# Patient Record
Sex: MALE | Race: BLACK OR AFRICAN AMERICAN | Employment: OTHER | URBAN - METROPOLITAN AREA
[De-identification: names, ages, dates, MRNs, and addresses within clinical notes are randomized per-mention and may not be internally consistent; named-entity substitution may affect disease eponyms.]

---

## 2017-01-01 ENCOUNTER — NURSING HOME VISIT (OUTPATIENT)
Dept: GERIATRICS | Facility: CLINIC | Age: 72
End: 2017-01-01
Payer: COMMERCIAL

## 2017-01-01 ENCOUNTER — TRANSFERRED RECORDS (OUTPATIENT)
Dept: HEALTH INFORMATION MANAGEMENT | Facility: CLINIC | Age: 72
End: 2017-01-01

## 2017-01-01 ENCOUNTER — HOSPITAL ENCOUNTER (EMERGENCY)
Facility: CLINIC | Age: 72
Discharge: HOME | End: 2017-10-11
Attending: EMERGENCY MEDICINE
Payer: COMMERCIAL

## 2017-01-01 ENCOUNTER — APPOINTMENT (OUTPATIENT)
Dept: GENERAL RADIOLOGY | Facility: CLINIC | Age: 72
End: 2017-01-01
Attending: EMERGENCY MEDICINE
Payer: COMMERCIAL

## 2017-01-01 ENCOUNTER — APPOINTMENT (OUTPATIENT)
Dept: PHYSICAL THERAPY | Facility: CLINIC | Age: 72
DRG: 872 | End: 2017-01-01
Payer: COMMERCIAL

## 2017-01-01 ENCOUNTER — HOSPITAL ENCOUNTER (OUTPATIENT)
Facility: CLINIC | Age: 72
Setting detail: SPECIMEN
Discharge: HOME | End: 2017-08-16
Attending: PATHOLOGY | Admitting: INTERNAL MEDICINE
Payer: COMMERCIAL

## 2017-01-01 ENCOUNTER — INFUSION THERAPY VISIT (OUTPATIENT)
Dept: INFUSION THERAPY | Facility: CLINIC | Age: 72
End: 2017-01-01
Attending: INTERNAL MEDICINE
Payer: COMMERCIAL

## 2017-01-01 ENCOUNTER — DOCUMENTATION ONLY (OUTPATIENT)
Dept: PHARMACY | Facility: CLINIC | Age: 72
End: 2017-01-01

## 2017-01-01 ENCOUNTER — ONCOLOGY VISIT (OUTPATIENT)
Dept: ONCOLOGY | Facility: CLINIC | Age: 72
End: 2017-01-01
Attending: INTERNAL MEDICINE
Payer: COMMERCIAL

## 2017-01-01 ENCOUNTER — TELEPHONE (OUTPATIENT)
Dept: ONCOLOGY | Facility: CLINIC | Age: 72
End: 2017-01-01

## 2017-01-01 ENCOUNTER — APPOINTMENT (OUTPATIENT)
Dept: LAB | Facility: CLINIC | Age: 72
End: 2017-01-01
Attending: INTERNAL MEDICINE
Payer: COMMERCIAL

## 2017-01-01 ENCOUNTER — TELEPHONE (OUTPATIENT)
Dept: PHARMACY | Facility: CLINIC | Age: 72
End: 2017-01-01

## 2017-01-01 ENCOUNTER — CLINICAL UPDATE (OUTPATIENT)
Dept: PHARMACY | Facility: CLINIC | Age: 72
End: 2017-01-01

## 2017-01-01 ENCOUNTER — HOSPITAL ENCOUNTER (EMERGENCY)
Facility: CLINIC | Age: 72
Discharge: HOME | End: 2017-09-26
Attending: EMERGENCY MEDICINE | Admitting: EMERGENCY MEDICINE
Payer: COMMERCIAL

## 2017-01-01 ENCOUNTER — HOSPITAL ENCOUNTER (EMERGENCY)
Facility: CLINIC | Age: 72
Discharge: HOME | End: 2017-07-19
Attending: EMERGENCY MEDICINE | Admitting: EMERGENCY MEDICINE
Payer: COMMERCIAL

## 2017-01-01 ENCOUNTER — HOSPITAL ENCOUNTER (OUTPATIENT)
Facility: CLINIC | Age: 72
Setting detail: SPECIMEN
Discharge: HOME | End: 2017-06-14
Attending: INTERNAL MEDICINE | Admitting: INTERNAL MEDICINE
Payer: COMMERCIAL

## 2017-01-01 ENCOUNTER — HOSPITAL ENCOUNTER (OUTPATIENT)
Facility: CLINIC | Age: 72
Setting detail: SPECIMEN
Discharge: HOME | End: 2017-12-01
Attending: INTERNAL MEDICINE | Admitting: INTERNAL MEDICINE
Payer: COMMERCIAL

## 2017-01-01 ENCOUNTER — HOSPITAL ENCOUNTER (OUTPATIENT)
Facility: CLINIC | Age: 72
Setting detail: SPECIMEN
Discharge: HOME | End: 2017-03-22
Attending: UROLOGY | Admitting: INTERNAL MEDICINE
Payer: COMMERCIAL

## 2017-01-01 ENCOUNTER — HOSPITAL ENCOUNTER (OUTPATIENT)
Facility: CLINIC | Age: 72
Setting detail: SPECIMEN
Discharge: HOME | End: 2017-08-14
Attending: INTERNAL MEDICINE | Admitting: INTERNAL MEDICINE
Payer: COMMERCIAL

## 2017-01-01 ENCOUNTER — TELEPHONE (OUTPATIENT)
Dept: GERIATRICS | Facility: CLINIC | Age: 72
End: 2017-01-01

## 2017-01-01 ENCOUNTER — CARE COORDINATION (OUTPATIENT)
Dept: ONCOLOGY | Facility: CLINIC | Age: 72
End: 2017-01-01

## 2017-01-01 ENCOUNTER — APPOINTMENT (OUTPATIENT)
Dept: GENERAL RADIOLOGY | Facility: CLINIC | Age: 72
DRG: 872 | End: 2017-01-01
Attending: EMERGENCY MEDICINE
Payer: COMMERCIAL

## 2017-01-01 ENCOUNTER — HOSPITAL ENCOUNTER (OUTPATIENT)
Facility: CLINIC | Age: 72
Setting detail: SPECIMEN
Discharge: HOME | End: 2017-12-29
Payer: COMMERCIAL

## 2017-01-01 ENCOUNTER — NURSING HOME VISIT (OUTPATIENT)
Dept: GERIATRICS | Facility: CLINIC | Age: 72
End: 2017-01-01

## 2017-01-01 ENCOUNTER — PRE VISIT (OUTPATIENT)
Dept: PULMONOLOGY | Facility: CLINIC | Age: 72
End: 2017-01-01

## 2017-01-01 ENCOUNTER — HOSPITAL ENCOUNTER (OUTPATIENT)
Dept: NUCLEAR MEDICINE | Facility: CLINIC | Age: 72
Setting detail: NUCLEAR MEDICINE
Discharge: HOME | End: 2017-03-29
Attending: INTERNAL MEDICINE | Admitting: INTERNAL MEDICINE
Payer: COMMERCIAL

## 2017-01-01 ENCOUNTER — HOSPITAL ENCOUNTER (EMERGENCY)
Facility: CLINIC | Age: 72
Discharge: HOME | End: 2017-12-18
Attending: EMERGENCY MEDICINE | Admitting: EMERGENCY MEDICINE
Payer: COMMERCIAL

## 2017-01-01 ENCOUNTER — HOSPITAL ENCOUNTER (INPATIENT)
Facility: CLINIC | Age: 72
LOS: 5 days | Discharge: NURSING, REHAB OR LONG-TERM CARE | DRG: 872 | End: 2017-06-06
Attending: EMERGENCY MEDICINE | Admitting: INTERNAL MEDICINE
Payer: COMMERCIAL

## 2017-01-01 ENCOUNTER — INFUSION THERAPY VISIT (OUTPATIENT)
Dept: INFUSION THERAPY | Facility: CLINIC | Age: 72
End: 2017-01-01
Attending: UROLOGY
Payer: COMMERCIAL

## 2017-01-01 ENCOUNTER — HOSPITAL ENCOUNTER (OUTPATIENT)
Facility: CLINIC | Age: 72
Setting detail: SPECIMEN
Discharge: HOME | End: 2017-04-03
Attending: INTERNAL MEDICINE | Admitting: INTERNAL MEDICINE
Payer: COMMERCIAL

## 2017-01-01 ENCOUNTER — HOSPITAL ENCOUNTER (OUTPATIENT)
Dept: NUCLEAR MEDICINE | Facility: CLINIC | Age: 72
Setting detail: NUCLEAR MEDICINE
Discharge: HOME | End: 2017-04-26
Attending: INTERNAL MEDICINE | Admitting: INTERNAL MEDICINE
Payer: COMMERCIAL

## 2017-01-01 ENCOUNTER — HOSPITAL ENCOUNTER (EMERGENCY)
Facility: CLINIC | Age: 72
Discharge: HOME | End: 2017-10-12
Attending: EMERGENCY MEDICINE | Admitting: EMERGENCY MEDICINE
Payer: COMMERCIAL

## 2017-01-01 ENCOUNTER — HOSPITAL ENCOUNTER (OUTPATIENT)
Facility: CLINIC | Age: 72
Setting detail: OBSERVATION
Discharge: HOME | End: 2017-12-14
Attending: EMERGENCY MEDICINE | Admitting: EMERGENCY MEDICINE
Payer: COMMERCIAL

## 2017-01-01 ENCOUNTER — HOSPITAL ENCOUNTER (OUTPATIENT)
Facility: CLINIC | Age: 72
Setting detail: SPECIMEN
Discharge: HOME | End: 2017-05-17
Attending: INTERNAL MEDICINE | Admitting: INTERNAL MEDICINE
Payer: COMMERCIAL

## 2017-01-01 ENCOUNTER — HOSPITAL ENCOUNTER (OUTPATIENT)
Dept: NUCLEAR MEDICINE | Facility: CLINIC | Age: 72
Setting detail: NUCLEAR MEDICINE
Discharge: HOME | End: 2017-05-24
Attending: INTERNAL MEDICINE | Admitting: INTERNAL MEDICINE
Payer: COMMERCIAL

## 2017-01-01 ENCOUNTER — MEDICAL CORRESPONDENCE (OUTPATIENT)
Dept: HEALTH INFORMATION MANAGEMENT | Facility: CLINIC | Age: 72
End: 2017-01-01

## 2017-01-01 ENCOUNTER — ONCOLOGY VISIT (OUTPATIENT)
Dept: ONCOLOGY | Facility: CLINIC | Age: 72
End: 2017-01-01
Attending: PHYSICIAN ASSISTANT
Payer: COMMERCIAL

## 2017-01-01 ENCOUNTER — HOSPITAL ENCOUNTER (OUTPATIENT)
Facility: CLINIC | Age: 72
Setting detail: SPECIMEN
Discharge: HOME | End: 2017-12-04
Attending: INTERNAL MEDICINE | Admitting: INTERNAL MEDICINE
Payer: COMMERCIAL

## 2017-01-01 ENCOUNTER — HOSPITAL ENCOUNTER (EMERGENCY)
Facility: CLINIC | Age: 72
Discharge: OTHER INSTITUTION | End: 2017-06-21
Attending: EMERGENCY MEDICINE | Admitting: EMERGENCY MEDICINE
Payer: COMMERCIAL

## 2017-01-01 ENCOUNTER — DOCUMENTATION ONLY (OUTPATIENT)
Dept: GERIATRICS | Facility: CLINIC | Age: 72
End: 2017-01-01

## 2017-01-01 ENCOUNTER — ALLIED HEALTH/NURSE VISIT (OUTPATIENT)
Dept: ONCOLOGY | Facility: CLINIC | Age: 72
End: 2017-01-01

## 2017-01-01 ENCOUNTER — HOSPITAL ENCOUNTER (OUTPATIENT)
Dept: LAB | Facility: CLINIC | Age: 72
End: 2017-06-12
Attending: PHYSICIAN ASSISTANT
Payer: COMMERCIAL

## 2017-01-01 ENCOUNTER — OFFICE VISIT (OUTPATIENT)
Dept: PALLIATIVE CARE | Facility: CLINIC | Age: 72
End: 2017-01-01
Attending: INTERNAL MEDICINE
Payer: COMMERCIAL

## 2017-01-01 ENCOUNTER — PRE VISIT (OUTPATIENT)
Dept: UROLOGY | Facility: CLINIC | Age: 72
End: 2017-01-01

## 2017-01-01 ENCOUNTER — ONCOLOGY VISIT (OUTPATIENT)
Dept: ONCOLOGY | Facility: CLINIC | Age: 72
End: 2017-01-01
Attending: UROLOGY
Payer: COMMERCIAL

## 2017-01-01 ENCOUNTER — OFFICE VISIT (OUTPATIENT)
Dept: PULMONOLOGY | Facility: CLINIC | Age: 72
End: 2017-01-01
Attending: INTERNAL MEDICINE
Payer: COMMERCIAL

## 2017-01-01 ENCOUNTER — INFUSION THERAPY VISIT (OUTPATIENT)
Dept: INFUSION THERAPY | Facility: CLINIC | Age: 72
End: 2017-01-01
Attending: FAMILY MEDICINE
Payer: COMMERCIAL

## 2017-01-01 ENCOUNTER — CARE COORDINATION (OUTPATIENT)
Dept: CARE COORDINATION | Facility: CLINIC | Age: 72
End: 2017-01-01

## 2017-01-01 ENCOUNTER — CARE COORDINATION (OUTPATIENT)
Dept: GERIATRIC MEDICINE | Facility: CLINIC | Age: 72
End: 2017-01-01

## 2017-01-01 ENCOUNTER — HOSPITAL ENCOUNTER (OUTPATIENT)
Facility: CLINIC | Age: 72
Setting detail: SPECIMEN
End: 2017-01-01
Attending: INTERNAL MEDICINE
Payer: COMMERCIAL

## 2017-01-01 ENCOUNTER — DOCUMENTATION ONLY (OUTPATIENT)
Dept: INFUSION THERAPY | Facility: CLINIC | Age: 72
End: 2017-01-01

## 2017-01-01 ENCOUNTER — APPOINTMENT (OUTPATIENT)
Dept: OCCUPATIONAL THERAPY | Facility: CLINIC | Age: 72
DRG: 872 | End: 2017-01-01
Attending: INTERNAL MEDICINE
Payer: COMMERCIAL

## 2017-01-01 ENCOUNTER — HOSPITAL ENCOUNTER (OUTPATIENT)
Facility: CLINIC | Age: 72
Setting detail: SPECIMEN
Discharge: HOME | End: 2017-04-19
Attending: INTERNAL MEDICINE | Admitting: INTERNAL MEDICINE
Payer: COMMERCIAL

## 2017-01-01 ENCOUNTER — HOSPITAL ENCOUNTER (OUTPATIENT)
Facility: CLINIC | Age: 72
Setting detail: SPECIMEN
Discharge: HOME | End: 2017-08-29
Attending: INTERNAL MEDICINE | Admitting: INTERNAL MEDICINE
Payer: COMMERCIAL

## 2017-01-01 ENCOUNTER — APPOINTMENT (OUTPATIENT)
Dept: OCCUPATIONAL THERAPY | Facility: CLINIC | Age: 72
DRG: 872 | End: 2017-01-01
Payer: COMMERCIAL

## 2017-01-01 ENCOUNTER — HOSPITAL ENCOUNTER (OUTPATIENT)
Facility: CLINIC | Age: 72
Setting detail: SPECIMEN
Discharge: HOME | End: 2017-07-19
Attending: INTERNAL MEDICINE | Admitting: INTERNAL MEDICINE
Payer: COMMERCIAL

## 2017-01-01 ENCOUNTER — HOSPITAL ENCOUNTER (OUTPATIENT)
Facility: CLINIC | Age: 72
Setting detail: SPECIMEN
Discharge: HOME | End: 2017-10-09
Attending: INTERNAL MEDICINE | Admitting: INTERNAL MEDICINE
Payer: COMMERCIAL

## 2017-01-01 VITALS
HEIGHT: 71 IN | HEART RATE: 92 BPM | RESPIRATION RATE: 16 BRPM | TEMPERATURE: 99.7 F | SYSTOLIC BLOOD PRESSURE: 120 MMHG | OXYGEN SATURATION: 98 % | WEIGHT: 230 LBS | DIASTOLIC BLOOD PRESSURE: 51 MMHG | BODY MASS INDEX: 32.2 KG/M2

## 2017-01-01 VITALS
DIASTOLIC BLOOD PRESSURE: 71 MMHG | OXYGEN SATURATION: 94 % | HEART RATE: 78 BPM | BODY MASS INDEX: 32.64 KG/M2 | WEIGHT: 234 LBS | TEMPERATURE: 99.5 F | RESPIRATION RATE: 20 BRPM | SYSTOLIC BLOOD PRESSURE: 125 MMHG

## 2017-01-01 VITALS
HEART RATE: 109 BPM | SYSTOLIC BLOOD PRESSURE: 143 MMHG | DIASTOLIC BLOOD PRESSURE: 78 MMHG | RESPIRATION RATE: 20 BRPM | TEMPERATURE: 98 F

## 2017-01-01 VITALS
TEMPERATURE: 97.5 F | OXYGEN SATURATION: 94 % | DIASTOLIC BLOOD PRESSURE: 61 MMHG | RESPIRATION RATE: 18 BRPM | HEART RATE: 89 BPM | SYSTOLIC BLOOD PRESSURE: 105 MMHG

## 2017-01-01 VITALS
TEMPERATURE: 97 F | BODY MASS INDEX: 34.38 KG/M2 | HEART RATE: 88 BPM | DIASTOLIC BLOOD PRESSURE: 66 MMHG | RESPIRATION RATE: 16 BRPM | OXYGEN SATURATION: 97 % | WEIGHT: 245.6 LBS | HEIGHT: 71 IN | SYSTOLIC BLOOD PRESSURE: 145 MMHG

## 2017-01-01 VITALS
BODY MASS INDEX: 33.47 KG/M2 | TEMPERATURE: 97.8 F | WEIGHT: 240 LBS | SYSTOLIC BLOOD PRESSURE: 136 MMHG | OXYGEN SATURATION: 95 % | RESPIRATION RATE: 20 BRPM | HEART RATE: 72 BPM | DIASTOLIC BLOOD PRESSURE: 74 MMHG

## 2017-01-01 VITALS
DIASTOLIC BLOOD PRESSURE: 63 MMHG | RESPIRATION RATE: 16 BRPM | HEART RATE: 105 BPM | TEMPERATURE: 99.1 F | SYSTOLIC BLOOD PRESSURE: 108 MMHG | WEIGHT: 247.5 LBS | SYSTOLIC BLOOD PRESSURE: 106 MMHG | HEART RATE: 52 BPM | BODY MASS INDEX: 34.65 KG/M2 | OXYGEN SATURATION: 97 % | HEIGHT: 71 IN | DIASTOLIC BLOOD PRESSURE: 73 MMHG | RESPIRATION RATE: 16 BRPM | TEMPERATURE: 98.4 F

## 2017-01-01 VITALS
TEMPERATURE: 97 F | BODY MASS INDEX: 33.6 KG/M2 | HEIGHT: 71 IN | OXYGEN SATURATION: 94 % | RESPIRATION RATE: 20 BRPM | WEIGHT: 240 LBS | HEART RATE: 80 BPM | DIASTOLIC BLOOD PRESSURE: 78 MMHG | SYSTOLIC BLOOD PRESSURE: 145 MMHG

## 2017-01-01 VITALS
DIASTOLIC BLOOD PRESSURE: 71 MMHG | HEART RATE: 107 BPM | RESPIRATION RATE: 20 BRPM | SYSTOLIC BLOOD PRESSURE: 118 MMHG | OXYGEN SATURATION: 98 % | TEMPERATURE: 98.9 F

## 2017-01-01 VITALS
HEART RATE: 101 BPM | DIASTOLIC BLOOD PRESSURE: 60 MMHG | WEIGHT: 239 LBS | RESPIRATION RATE: 20 BRPM | BODY MASS INDEX: 35.29 KG/M2 | TEMPERATURE: 98.3 F | SYSTOLIC BLOOD PRESSURE: 117 MMHG | OXYGEN SATURATION: 94 %

## 2017-01-01 VITALS
SYSTOLIC BLOOD PRESSURE: 111 MMHG | HEART RATE: 99 BPM | TEMPERATURE: 97.2 F | WEIGHT: 208.3 LBS | BODY MASS INDEX: 30.76 KG/M2 | DIASTOLIC BLOOD PRESSURE: 60 MMHG | OXYGEN SATURATION: 95 % | RESPIRATION RATE: 18 BRPM

## 2017-01-01 VITALS
TEMPERATURE: 98.6 F | HEART RATE: 97 BPM | SYSTOLIC BLOOD PRESSURE: 120 MMHG | OXYGEN SATURATION: 99 % | DIASTOLIC BLOOD PRESSURE: 71 MMHG | RESPIRATION RATE: 18 BRPM

## 2017-01-01 VITALS
TEMPERATURE: 97 F | SYSTOLIC BLOOD PRESSURE: 120 MMHG | OXYGEN SATURATION: 94 % | HEART RATE: 86 BPM | RESPIRATION RATE: 20 BRPM | BODY MASS INDEX: 29.34 KG/M2 | DIASTOLIC BLOOD PRESSURE: 80 MMHG | WEIGHT: 210.4 LBS

## 2017-01-01 VITALS
SYSTOLIC BLOOD PRESSURE: 135 MMHG | HEART RATE: 82 BPM | OXYGEN SATURATION: 97 % | BODY MASS INDEX: 30.32 KG/M2 | WEIGHT: 205.3 LBS | TEMPERATURE: 97.4 F | RESPIRATION RATE: 18 BRPM | DIASTOLIC BLOOD PRESSURE: 78 MMHG

## 2017-01-01 VITALS
WEIGHT: 240 LBS | OXYGEN SATURATION: 96 % | HEART RATE: 80 BPM | SYSTOLIC BLOOD PRESSURE: 122 MMHG | TEMPERATURE: 97.2 F | DIASTOLIC BLOOD PRESSURE: 62 MMHG | BODY MASS INDEX: 33.47 KG/M2 | RESPIRATION RATE: 20 BRPM

## 2017-01-01 VITALS
WEIGHT: 223.9 LBS | RESPIRATION RATE: 18 BRPM | BODY MASS INDEX: 31.23 KG/M2 | TEMPERATURE: 98.7 F | OXYGEN SATURATION: 96 % | SYSTOLIC BLOOD PRESSURE: 107 MMHG | HEART RATE: 94 BPM | DIASTOLIC BLOOD PRESSURE: 58 MMHG

## 2017-01-01 VITALS
RESPIRATION RATE: 16 BRPM | WEIGHT: 205.25 LBS | TEMPERATURE: 97.4 F | HEART RATE: 108 BPM | SYSTOLIC BLOOD PRESSURE: 139 MMHG | BODY MASS INDEX: 28.73 KG/M2 | OXYGEN SATURATION: 98 % | HEIGHT: 71 IN | DIASTOLIC BLOOD PRESSURE: 59 MMHG

## 2017-01-01 VITALS
HEART RATE: 97 BPM | RESPIRATION RATE: 18 BRPM | OXYGEN SATURATION: 97 % | DIASTOLIC BLOOD PRESSURE: 48 MMHG | TEMPERATURE: 97.7 F | SYSTOLIC BLOOD PRESSURE: 105 MMHG

## 2017-01-01 VITALS
RESPIRATION RATE: 18 BRPM | SYSTOLIC BLOOD PRESSURE: 129 MMHG | OXYGEN SATURATION: 91 % | DIASTOLIC BLOOD PRESSURE: 62 MMHG | HEART RATE: 102 BPM | TEMPERATURE: 98.4 F

## 2017-01-01 VITALS
RESPIRATION RATE: 18 BRPM | SYSTOLIC BLOOD PRESSURE: 123 MMHG | TEMPERATURE: 98.3 F | HEIGHT: 71 IN | WEIGHT: 237 LBS | BODY MASS INDEX: 33.18 KG/M2 | OXYGEN SATURATION: 93 % | HEART RATE: 114 BPM | DIASTOLIC BLOOD PRESSURE: 68 MMHG

## 2017-01-01 VITALS
TEMPERATURE: 98.9 F | DIASTOLIC BLOOD PRESSURE: 55 MMHG | WEIGHT: 204 LBS | SYSTOLIC BLOOD PRESSURE: 116 MMHG | OXYGEN SATURATION: 98 % | RESPIRATION RATE: 18 BRPM | HEART RATE: 100 BPM | BODY MASS INDEX: 28.45 KG/M2

## 2017-01-01 VITALS
HEART RATE: 96 BPM | TEMPERATURE: 94 F | SYSTOLIC BLOOD PRESSURE: 108 MMHG | HEIGHT: 69 IN | WEIGHT: 239.9 LBS | DIASTOLIC BLOOD PRESSURE: 68 MMHG | OXYGEN SATURATION: 94 % | RESPIRATION RATE: 18 BRPM | BODY MASS INDEX: 35.53 KG/M2

## 2017-01-01 VITALS
SYSTOLIC BLOOD PRESSURE: 138 MMHG | HEART RATE: 83 BPM | TEMPERATURE: 97.6 F | WEIGHT: 209 LBS | RESPIRATION RATE: 18 BRPM | DIASTOLIC BLOOD PRESSURE: 72 MMHG | BODY MASS INDEX: 29.15 KG/M2 | OXYGEN SATURATION: 96 %

## 2017-01-01 VITALS
HEIGHT: 69 IN | WEIGHT: 202 LBS | BODY MASS INDEX: 29.92 KG/M2 | SYSTOLIC BLOOD PRESSURE: 120 MMHG | TEMPERATURE: 99.3 F | HEART RATE: 112 BPM | RESPIRATION RATE: 20 BRPM | DIASTOLIC BLOOD PRESSURE: 69 MMHG | OXYGEN SATURATION: 98 %

## 2017-01-01 VITALS
OXYGEN SATURATION: 94 % | WEIGHT: 221.3 LBS | DIASTOLIC BLOOD PRESSURE: 64 MMHG | RESPIRATION RATE: 18 BRPM | SYSTOLIC BLOOD PRESSURE: 124 MMHG | HEART RATE: 90 BPM | TEMPERATURE: 98 F | BODY MASS INDEX: 30.87 KG/M2

## 2017-01-01 VITALS
SYSTOLIC BLOOD PRESSURE: 120 MMHG | OXYGEN SATURATION: 95 % | TEMPERATURE: 99 F | BODY MASS INDEX: 29.07 KG/M2 | WEIGHT: 208.4 LBS | DIASTOLIC BLOOD PRESSURE: 64 MMHG | RESPIRATION RATE: 20 BRPM | HEART RATE: 80 BPM

## 2017-01-01 VITALS
WEIGHT: 220 LBS | HEART RATE: 70 BPM | SYSTOLIC BLOOD PRESSURE: 130 MMHG | TEMPERATURE: 97.9 F | OXYGEN SATURATION: 95 % | RESPIRATION RATE: 18 BRPM | DIASTOLIC BLOOD PRESSURE: 70 MMHG | BODY MASS INDEX: 30.68 KG/M2

## 2017-01-01 VITALS
RESPIRATION RATE: 18 BRPM | HEART RATE: 78 BPM | TEMPERATURE: 96.7 F | OXYGEN SATURATION: 96 % | BODY MASS INDEX: 34.93 KG/M2 | DIASTOLIC BLOOD PRESSURE: 67 MMHG | WEIGHT: 249.5 LBS | SYSTOLIC BLOOD PRESSURE: 124 MMHG | HEIGHT: 71 IN

## 2017-01-01 VITALS
HEART RATE: 98 BPM | RESPIRATION RATE: 18 BRPM | SYSTOLIC BLOOD PRESSURE: 110 MMHG | DIASTOLIC BLOOD PRESSURE: 67 MMHG | OXYGEN SATURATION: 95 % | WEIGHT: 146.4 LBS | TEMPERATURE: 98.5 F | BODY MASS INDEX: 20.5 KG/M2 | HEIGHT: 71 IN

## 2017-01-01 VITALS
SYSTOLIC BLOOD PRESSURE: 122 MMHG | OXYGEN SATURATION: 97 % | TEMPERATURE: 98.2 F | HEIGHT: 71 IN | DIASTOLIC BLOOD PRESSURE: 61 MMHG | HEART RATE: 106 BPM | RESPIRATION RATE: 16 BRPM

## 2017-01-01 VITALS
RESPIRATION RATE: 18 BRPM | HEART RATE: 98 BPM | TEMPERATURE: 97.2 F | WEIGHT: 208.2 LBS | DIASTOLIC BLOOD PRESSURE: 60 MMHG | BODY MASS INDEX: 30.75 KG/M2 | SYSTOLIC BLOOD PRESSURE: 111 MMHG | OXYGEN SATURATION: 96 %

## 2017-01-01 VITALS
OXYGEN SATURATION: 98 % | BODY MASS INDEX: 30.94 KG/M2 | SYSTOLIC BLOOD PRESSURE: 134 MMHG | HEIGHT: 71 IN | DIASTOLIC BLOOD PRESSURE: 77 MMHG | HEART RATE: 100 BPM | WEIGHT: 221 LBS | RESPIRATION RATE: 18 BRPM | TEMPERATURE: 97.6 F

## 2017-01-01 VITALS
DIASTOLIC BLOOD PRESSURE: 69 MMHG | TEMPERATURE: 98.1 F | HEART RATE: 102 BPM | RESPIRATION RATE: 22 BRPM | SYSTOLIC BLOOD PRESSURE: 147 MMHG

## 2017-01-01 VITALS
WEIGHT: 240.08 LBS | HEIGHT: 71 IN | SYSTOLIC BLOOD PRESSURE: 138 MMHG | OXYGEN SATURATION: 94 % | RESPIRATION RATE: 21 BRPM | TEMPERATURE: 98.8 F | DIASTOLIC BLOOD PRESSURE: 66 MMHG | HEART RATE: 70 BPM | BODY MASS INDEX: 33.61 KG/M2

## 2017-01-01 VITALS
WEIGHT: 224 LBS | SYSTOLIC BLOOD PRESSURE: 137 MMHG | DIASTOLIC BLOOD PRESSURE: 68 MMHG | HEART RATE: 90 BPM | BODY MASS INDEX: 31.24 KG/M2 | OXYGEN SATURATION: 96 % | TEMPERATURE: 98.5 F | RESPIRATION RATE: 18 BRPM

## 2017-01-01 VITALS
OXYGEN SATURATION: 96 % | RESPIRATION RATE: 18 BRPM | BODY MASS INDEX: 31.38 KG/M2 | SYSTOLIC BLOOD PRESSURE: 128 MMHG | TEMPERATURE: 97.6 F | HEART RATE: 78 BPM | WEIGHT: 225 LBS | DIASTOLIC BLOOD PRESSURE: 60 MMHG

## 2017-01-01 VITALS
BODY MASS INDEX: 28.42 KG/M2 | HEIGHT: 71 IN | WEIGHT: 203 LBS | OXYGEN SATURATION: 95 % | SYSTOLIC BLOOD PRESSURE: 178 MMHG | TEMPERATURE: 97.6 F | DIASTOLIC BLOOD PRESSURE: 93 MMHG | RESPIRATION RATE: 20 BRPM

## 2017-01-01 VITALS
HEART RATE: 80 BPM | RESPIRATION RATE: 20 BRPM | WEIGHT: 221 LBS | OXYGEN SATURATION: 94 % | TEMPERATURE: 97.1 F | SYSTOLIC BLOOD PRESSURE: 128 MMHG | DIASTOLIC BLOOD PRESSURE: 60 MMHG | BODY MASS INDEX: 30.82 KG/M2

## 2017-01-01 VITALS
BODY MASS INDEX: 35.43 KG/M2 | RESPIRATION RATE: 20 BRPM | HEART RATE: 99 BPM | DIASTOLIC BLOOD PRESSURE: 55 MMHG | SYSTOLIC BLOOD PRESSURE: 100 MMHG | TEMPERATURE: 98.1 F | WEIGHT: 254 LBS | OXYGEN SATURATION: 91 %

## 2017-01-01 VITALS
RESPIRATION RATE: 18 BRPM | SYSTOLIC BLOOD PRESSURE: 115 MMHG | HEART RATE: 72 BPM | OXYGEN SATURATION: 94 % | TEMPERATURE: 97.2 F | WEIGHT: 239.2 LBS | BODY MASS INDEX: 35.32 KG/M2 | DIASTOLIC BLOOD PRESSURE: 62 MMHG

## 2017-01-01 VITALS
DIASTOLIC BLOOD PRESSURE: 48 MMHG | OXYGEN SATURATION: 95 % | SYSTOLIC BLOOD PRESSURE: 91 MMHG | BODY MASS INDEX: 30.92 KG/M2 | WEIGHT: 221.7 LBS | HEART RATE: 92 BPM | RESPIRATION RATE: 20 BRPM | TEMPERATURE: 98.7 F

## 2017-01-01 VITALS
HEIGHT: 71 IN | HEART RATE: 98 BPM | TEMPERATURE: 98.5 F | DIASTOLIC BLOOD PRESSURE: 65 MMHG | RESPIRATION RATE: 20 BRPM | WEIGHT: 237 LBS | SYSTOLIC BLOOD PRESSURE: 104 MMHG | OXYGEN SATURATION: 91 % | BODY MASS INDEX: 33.18 KG/M2

## 2017-01-01 VITALS
HEART RATE: 102 BPM | SYSTOLIC BLOOD PRESSURE: 120 MMHG | RESPIRATION RATE: 20 BRPM | BODY MASS INDEX: 30.85 KG/M2 | HEIGHT: 69 IN | OXYGEN SATURATION: 94 % | WEIGHT: 208.3 LBS | DIASTOLIC BLOOD PRESSURE: 64 MMHG | TEMPERATURE: 99 F

## 2017-01-01 VITALS
WEIGHT: 213 LBS | SYSTOLIC BLOOD PRESSURE: 144 MMHG | OXYGEN SATURATION: 94 % | HEART RATE: 102 BPM | TEMPERATURE: 98.4 F | BODY MASS INDEX: 29.71 KG/M2 | RESPIRATION RATE: 20 BRPM | DIASTOLIC BLOOD PRESSURE: 88 MMHG

## 2017-01-01 VITALS
HEIGHT: 71 IN | SYSTOLIC BLOOD PRESSURE: 151 MMHG | DIASTOLIC BLOOD PRESSURE: 87 MMHG | TEMPERATURE: 98.3 F | OXYGEN SATURATION: 97 % | BODY MASS INDEX: 32.08 KG/M2 | RESPIRATION RATE: 18 BRPM | HEART RATE: 88 BPM

## 2017-01-01 VITALS
DIASTOLIC BLOOD PRESSURE: 78 MMHG | SYSTOLIC BLOOD PRESSURE: 127 MMHG | TEMPERATURE: 99.7 F | RESPIRATION RATE: 22 BRPM | OXYGEN SATURATION: 98 %

## 2017-01-01 VITALS
TEMPERATURE: 97.6 F | OXYGEN SATURATION: 96 % | BODY MASS INDEX: 32.43 KG/M2 | HEART RATE: 78 BPM | RESPIRATION RATE: 18 BRPM | WEIGHT: 232.5 LBS

## 2017-01-01 VITALS
DIASTOLIC BLOOD PRESSURE: 67 MMHG | TEMPERATURE: 98.4 F | HEART RATE: 79 BPM | RESPIRATION RATE: 20 BRPM | SYSTOLIC BLOOD PRESSURE: 119 MMHG | OXYGEN SATURATION: 93 %

## 2017-01-01 VITALS — WEIGHT: 251.13 LBS | BODY MASS INDEX: 35.02 KG/M2

## 2017-01-01 VITALS — BODY MASS INDEX: 36.11 KG/M2 | WEIGHT: 244.5 LBS

## 2017-01-01 DIAGNOSIS — C61 PROSTATE CANCER METASTATIC TO MULTIPLE SITES (H): Primary | ICD-10-CM

## 2017-01-01 DIAGNOSIS — H40.9 GLAUCOMA, UNSPECIFIED GLAUCOMA TYPE, UNSPECIFIED LATERALITY: ICD-10-CM

## 2017-01-01 DIAGNOSIS — I48.0 PAROXYSMAL ATRIAL FIBRILLATION (H): ICD-10-CM

## 2017-01-01 DIAGNOSIS — N18.30 TYPE 2 DIABETES MELLITUS WITH STAGE 3 CHRONIC KIDNEY DISEASE, WITH LONG-TERM CURRENT USE OF INSULIN (H): Primary | ICD-10-CM

## 2017-01-01 DIAGNOSIS — J42 CHRONIC BRONCHITIS, UNSPECIFIED CHRONIC BRONCHITIS TYPE (H): ICD-10-CM

## 2017-01-01 DIAGNOSIS — C79.51 METASTASIS TO BONE (H): ICD-10-CM

## 2017-01-01 DIAGNOSIS — C79.51 METASTASIS TO BONE (H): Primary | ICD-10-CM

## 2017-01-01 DIAGNOSIS — J44.9 CHRONIC OBSTRUCTIVE PULMONARY DISEASE WITH SEVERITY TO BE DETERMINED (H): Primary | ICD-10-CM

## 2017-01-01 DIAGNOSIS — I25.119 CORONARY ARTERY DISEASE INVOLVING NATIVE HEART WITH ANGINA PECTORIS, UNSPECIFIED VESSEL OR LESION TYPE (H): ICD-10-CM

## 2017-01-01 DIAGNOSIS — H10.33 ACUTE CONJUNCTIVITIS OF BOTH EYES, UNSPECIFIED ACUTE CONJUNCTIVITIS TYPE: ICD-10-CM

## 2017-01-01 DIAGNOSIS — D72.829 LEUKOCYTOSIS, UNSPECIFIED TYPE: ICD-10-CM

## 2017-01-01 DIAGNOSIS — N39.0 URINARY TRACT INFECTION, SITE UNSPECIFIED: Primary | ICD-10-CM

## 2017-01-01 DIAGNOSIS — S81.812D LACERATION OF LEG, LEFT, SUBSEQUENT ENCOUNTER: ICD-10-CM

## 2017-01-01 DIAGNOSIS — K21.9 GASTROESOPHAGEAL REFLUX DISEASE, ESOPHAGITIS PRESENCE NOT SPECIFIED: ICD-10-CM

## 2017-01-01 DIAGNOSIS — N18.30 CKD (CHRONIC KIDNEY DISEASE) STAGE 3, GFR 30-59 ML/MIN (H): ICD-10-CM

## 2017-01-01 DIAGNOSIS — Z71.9 COUNSELING NOS(V65.40): Primary | ICD-10-CM

## 2017-01-01 DIAGNOSIS — F41.9 ANXIETY AND DEPRESSION: ICD-10-CM

## 2017-01-01 DIAGNOSIS — G89.4 CHRONIC PAIN SYNDROME: ICD-10-CM

## 2017-01-01 DIAGNOSIS — E66.01 MORBID OBESITY DUE TO EXCESS CALORIES (H): ICD-10-CM

## 2017-01-01 DIAGNOSIS — J44.9 CHRONIC OBSTRUCTIVE PULMONARY DISEASE, UNSPECIFIED COPD TYPE (H): ICD-10-CM

## 2017-01-01 DIAGNOSIS — J44.1 COPD EXACERBATION (H): ICD-10-CM

## 2017-01-01 DIAGNOSIS — D63.0 ANEMIA IN NEOPLASTIC DISEASE: ICD-10-CM

## 2017-01-01 DIAGNOSIS — R07.89 CHEST WALL PAIN: ICD-10-CM

## 2017-01-01 DIAGNOSIS — I50.32 CHRONIC DIASTOLIC HEART FAILURE (H): ICD-10-CM

## 2017-01-01 DIAGNOSIS — H57.10 EYE PAIN, UNSPECIFIED LATERALITY: ICD-10-CM

## 2017-01-01 DIAGNOSIS — C61 PROSTATE CANCER METASTATIC TO MULTIPLE SITES (H): ICD-10-CM

## 2017-01-01 DIAGNOSIS — R10.84 ABDOMINAL PAIN, GENERALIZED: ICD-10-CM

## 2017-01-01 DIAGNOSIS — C61 PROSTATE CANCER (H): ICD-10-CM

## 2017-01-01 DIAGNOSIS — S81.812A LACERATION OF LEG, LEFT, INITIAL ENCOUNTER: ICD-10-CM

## 2017-01-01 DIAGNOSIS — S81.812D LACERATION OF LEG, LEFT, SUBSEQUENT ENCOUNTER: Primary | ICD-10-CM

## 2017-01-01 DIAGNOSIS — M54.2 NECK PAIN ON LEFT SIDE: ICD-10-CM

## 2017-01-01 DIAGNOSIS — R63.4 LOSS OF WEIGHT: ICD-10-CM

## 2017-01-01 DIAGNOSIS — C79.51 MALIGNANT NEOPLASM OF PROSTATE METASTATIC TO BONE (H): ICD-10-CM

## 2017-01-01 DIAGNOSIS — N39.0 RECURRENT UTI: ICD-10-CM

## 2017-01-01 DIAGNOSIS — E11.22 TYPE 2 DIABETES MELLITUS WITH STAGE 3 CHRONIC KIDNEY DISEASE, WITH LONG-TERM CURRENT USE OF INSULIN (H): ICD-10-CM

## 2017-01-01 DIAGNOSIS — C61 PROSTATE CANCER METASTATIC TO BONE (H): Primary | ICD-10-CM

## 2017-01-01 DIAGNOSIS — C79.51 PROSTATE CANCER METASTATIC TO BONE (H): ICD-10-CM

## 2017-01-01 DIAGNOSIS — E11.22 TYPE 2 DIABETES MELLITUS WITH CHRONIC KIDNEY DISEASE, WITH LONG-TERM CURRENT USE OF INSULIN, UNSPECIFIED CKD STAGE (H): ICD-10-CM

## 2017-01-01 DIAGNOSIS — C61 PROSTATE CANCER METASTATIC TO BONE (H): ICD-10-CM

## 2017-01-01 DIAGNOSIS — I89.0 LYMPHEDEMA: ICD-10-CM

## 2017-01-01 DIAGNOSIS — G47.33 OSA (OBSTRUCTIVE SLEEP APNEA): ICD-10-CM

## 2017-01-01 DIAGNOSIS — N18.30 TYPE 2 DIABETES MELLITUS WITH STAGE 3 CHRONIC KIDNEY DISEASE, WITH LONG-TERM CURRENT USE OF INSULIN (H): ICD-10-CM

## 2017-01-01 DIAGNOSIS — I25.10 CORONARY ARTERY DISEASE INVOLVING NATIVE HEART, ANGINA PRESENCE UNSPECIFIED, UNSPECIFIED VESSEL OR LESION TYPE: ICD-10-CM

## 2017-01-01 DIAGNOSIS — F32.A ANXIETY AND DEPRESSION: ICD-10-CM

## 2017-01-01 DIAGNOSIS — J44.9 COPD (CHRONIC OBSTRUCTIVE PULMONARY DISEASE) (H): Primary | ICD-10-CM

## 2017-01-01 DIAGNOSIS — N30.00 ACUTE CYSTITIS WITHOUT HEMATURIA: Primary | ICD-10-CM

## 2017-01-01 DIAGNOSIS — A49.8 INFECTION DUE TO ESBL-PRODUCING ESCHERICHIA COLI: Primary | ICD-10-CM

## 2017-01-01 DIAGNOSIS — I25.10 ASCVD (ARTERIOSCLEROTIC CARDIOVASCULAR DISEASE): ICD-10-CM

## 2017-01-01 DIAGNOSIS — I10 BENIGN ESSENTIAL HYPERTENSION: ICD-10-CM

## 2017-01-01 DIAGNOSIS — D64.9 ANEMIA, UNSPECIFIED TYPE: ICD-10-CM

## 2017-01-01 DIAGNOSIS — C61 MALIGNANT NEOPLASM OF PROSTATE METASTATIC TO BONE (H): ICD-10-CM

## 2017-01-01 DIAGNOSIS — Z53.9 ERRONEOUS ENCOUNTER--DISREGARD: Primary | ICD-10-CM

## 2017-01-01 DIAGNOSIS — N39.0 URINARY TRACT INFECTION, SITE UNSPECIFIED: ICD-10-CM

## 2017-01-01 DIAGNOSIS — N18.9 ACUTE ON CHRONIC KIDNEY FAILURE (H): Primary | ICD-10-CM

## 2017-01-01 DIAGNOSIS — Z79.4 TYPE 2 DIABETES MELLITUS WITHOUT COMPLICATION, WITH LONG-TERM CURRENT USE OF INSULIN (H): ICD-10-CM

## 2017-01-01 DIAGNOSIS — E11.22 TYPE 2 DIABETES MELLITUS WITH STAGE 3 CHRONIC KIDNEY DISEASE, WITH LONG-TERM CURRENT USE OF INSULIN (H): Primary | ICD-10-CM

## 2017-01-01 DIAGNOSIS — R07.89 OTHER CHEST PAIN: ICD-10-CM

## 2017-01-01 DIAGNOSIS — R41.89 COGNITIVE IMPAIRMENT: ICD-10-CM

## 2017-01-01 DIAGNOSIS — F43.23 ADJUSTMENT DISORDER WITH MIXED ANXIETY AND DEPRESSED MOOD: ICD-10-CM

## 2017-01-01 DIAGNOSIS — N17.9 ACUTE ON CHRONIC KIDNEY FAILURE (H): Primary | ICD-10-CM

## 2017-01-01 DIAGNOSIS — J96.11 CHRONIC RESPIRATORY FAILURE WITH HYPOXIA (H): ICD-10-CM

## 2017-01-01 DIAGNOSIS — C79.51 PROSTATE CANCER METASTATIC TO BONE (H): Primary | ICD-10-CM

## 2017-01-01 DIAGNOSIS — I25.118 CORONARY ARTERY DISEASE OF NATIVE HEART WITH STABLE ANGINA PECTORIS, UNSPECIFIED VESSEL OR LESION TYPE (H): ICD-10-CM

## 2017-01-01 DIAGNOSIS — E11.9 TYPE 2 DIABETES MELLITUS WITHOUT COMPLICATION, WITH LONG-TERM CURRENT USE OF INSULIN (H): ICD-10-CM

## 2017-01-01 DIAGNOSIS — G47.30 SLEEP APNEA, UNSPECIFIED TYPE: ICD-10-CM

## 2017-01-01 DIAGNOSIS — J44.9 CHRONIC OBSTRUCTIVE PULMONARY DISEASE, UNSPECIFIED COPD TYPE (H): Primary | ICD-10-CM

## 2017-01-01 DIAGNOSIS — I50.32 CHRONIC DIASTOLIC CONGESTIVE HEART FAILURE (H): Primary | ICD-10-CM

## 2017-01-01 DIAGNOSIS — R33.9 URINE RETENTION: ICD-10-CM

## 2017-01-01 DIAGNOSIS — H10.9 CONJUNCTIVITIS OF BOTH EYES, UNSPECIFIED CONJUNCTIVITIS TYPE: Primary | ICD-10-CM

## 2017-01-01 DIAGNOSIS — E78.5 HYPERLIPIDEMIA, UNSPECIFIED HYPERLIPIDEMIA TYPE: ICD-10-CM

## 2017-01-01 DIAGNOSIS — M25.569 KNEE PAIN, UNSPECIFIED CHRONICITY, UNSPECIFIED LATERALITY: ICD-10-CM

## 2017-01-01 DIAGNOSIS — I89.0 LYMPHEDEMA OF BOTH LOWER EXTREMITIES: ICD-10-CM

## 2017-01-01 DIAGNOSIS — H10.9 CONJUNCTIVITIS OF BOTH EYES, UNSPECIFIED CONJUNCTIVITIS TYPE: ICD-10-CM

## 2017-01-01 DIAGNOSIS — I89.0 LYMPHEDEMA OF BOTH LOWER EXTREMITIES: Primary | ICD-10-CM

## 2017-01-01 DIAGNOSIS — R33.9 URINARY RETENTION: ICD-10-CM

## 2017-01-01 DIAGNOSIS — G89.29 CHRONIC ABDOMINAL PAIN: Primary | ICD-10-CM

## 2017-01-01 DIAGNOSIS — G89.29 OTHER CHRONIC PAIN: Primary | ICD-10-CM

## 2017-01-01 DIAGNOSIS — R06.02 SHORTNESS OF BREATH: ICD-10-CM

## 2017-01-01 DIAGNOSIS — Z79.4 TYPE 2 DIABETES MELLITUS WITH STAGE 3 CHRONIC KIDNEY DISEASE, WITH LONG-TERM CURRENT USE OF INSULIN (H): ICD-10-CM

## 2017-01-01 DIAGNOSIS — M25.569 KNEE PAIN: Primary | ICD-10-CM

## 2017-01-01 DIAGNOSIS — M25.569 KNEE PAIN: ICD-10-CM

## 2017-01-01 DIAGNOSIS — J44.89 OBSTRUCTIVE CHRONIC BRONCHITIS WITHOUT EXACERBATION (H): ICD-10-CM

## 2017-01-01 DIAGNOSIS — I10 ESSENTIAL HYPERTENSION: ICD-10-CM

## 2017-01-01 DIAGNOSIS — R11.0 NAUSEA: ICD-10-CM

## 2017-01-01 DIAGNOSIS — Z87.891 PERSONAL HISTORY OF TOBACCO USE, PRESENTING HAZARDS TO HEALTH: ICD-10-CM

## 2017-01-01 DIAGNOSIS — Z79.01 LONG TERM (CURRENT) USE OF ANTICOAGULANTS: ICD-10-CM

## 2017-01-01 DIAGNOSIS — E87.5 HYPERKALEMIA: ICD-10-CM

## 2017-01-01 DIAGNOSIS — K21.9 GASTROESOPHAGEAL REFLUX DISEASE WITHOUT ESOPHAGITIS: ICD-10-CM

## 2017-01-01 DIAGNOSIS — F43.21 SITUATIONAL DEPRESSION: ICD-10-CM

## 2017-01-01 DIAGNOSIS — R07.9 CHEST PAIN, UNSPECIFIED TYPE: ICD-10-CM

## 2017-01-01 DIAGNOSIS — Z79.4 TYPE 2 DIABETES MELLITUS WITH CHRONIC KIDNEY DISEASE, WITH LONG-TERM CURRENT USE OF INSULIN, UNSPECIFIED CKD STAGE (H): ICD-10-CM

## 2017-01-01 DIAGNOSIS — D63.8 ANEMIA OF CHRONIC DISEASE: Primary | ICD-10-CM

## 2017-01-01 DIAGNOSIS — Z79.4 TYPE 2 DIABETES MELLITUS WITH STAGE 3 CHRONIC KIDNEY DISEASE, WITH LONG-TERM CURRENT USE OF INSULIN (H): Primary | ICD-10-CM

## 2017-01-01 DIAGNOSIS — L97.909 VENOUS STASIS ULCERS, UNSPECIFIED LATERALITY: ICD-10-CM

## 2017-01-01 DIAGNOSIS — R10.9 CHRONIC ABDOMINAL PAIN: Primary | ICD-10-CM

## 2017-01-01 DIAGNOSIS — J42 CHRONIC BRONCHITIS, UNSPECIFIED CHRONIC BRONCHITIS TYPE (H): Primary | ICD-10-CM

## 2017-01-01 DIAGNOSIS — R07.89 OTHER CHEST PAIN: Primary | ICD-10-CM

## 2017-01-01 DIAGNOSIS — F19.90 MISUSE OF DRUGS: ICD-10-CM

## 2017-01-01 DIAGNOSIS — M54.2 NECK PAIN: ICD-10-CM

## 2017-01-01 DIAGNOSIS — C61 PROSTATE CANCER, PRIMARY, WITH METASTASIS FROM PROSTATE TO OTHER SITE (H): ICD-10-CM

## 2017-01-01 DIAGNOSIS — Z71.89 ADVANCED DIRECTIVES, COUNSELING/DISCUSSION: ICD-10-CM

## 2017-01-01 DIAGNOSIS — D64.89 ANEMIA DUE TO OTHER CAUSE: Primary | ICD-10-CM

## 2017-01-01 DIAGNOSIS — Z16.12 INFECTION DUE TO ESBL-PRODUCING ESCHERICHIA COLI: Primary | ICD-10-CM

## 2017-01-01 DIAGNOSIS — I48.0 PAROXYSMAL ATRIAL FIBRILLATION (H): Primary | ICD-10-CM

## 2017-01-01 DIAGNOSIS — I83.009 VENOUS STASIS ULCERS, UNSPECIFIED LATERALITY: ICD-10-CM

## 2017-01-01 DIAGNOSIS — D64.9 ANEMIA, UNSPECIFIED TYPE: Primary | ICD-10-CM

## 2017-01-01 DIAGNOSIS — R78.81 GRAM-POSITIVE BACTEREMIA: ICD-10-CM

## 2017-01-01 DIAGNOSIS — Z71.89 ACP (ADVANCE CARE PLANNING): Chronic | ICD-10-CM

## 2017-01-01 DIAGNOSIS — R50.9 FEVER: ICD-10-CM

## 2017-01-01 DIAGNOSIS — I25.10 ATHEROSCLEROSIS OF NATIVE CORONARY ARTERY OF NATIVE HEART WITHOUT ANGINA PECTORIS: ICD-10-CM

## 2017-01-01 DIAGNOSIS — D64.9 NORMOCYTIC ANEMIA: Primary | ICD-10-CM

## 2017-01-01 DIAGNOSIS — R50.9 FEVER, UNSPECIFIED FEVER CAUSE: ICD-10-CM

## 2017-01-01 DIAGNOSIS — R78.81 BACTEREMIA: Primary | ICD-10-CM

## 2017-01-01 DIAGNOSIS — Z79.4 ENCOUNTER FOR LONG-TERM (CURRENT) USE OF INSULIN (H): ICD-10-CM

## 2017-01-01 DIAGNOSIS — J44.9 COPD (CHRONIC OBSTRUCTIVE PULMONARY DISEASE) (H): ICD-10-CM

## 2017-01-01 DIAGNOSIS — D64.9 ANEMIA REQUIRING TRANSFUSIONS: Primary | ICD-10-CM

## 2017-01-01 DIAGNOSIS — R05.9 COUGH: ICD-10-CM

## 2017-01-01 LAB
ABO + RH BLD: NORMAL
ALBUMIN SERPL ELPH-MCNC: 2.5 G/DL (ref 3.7–5.1)
ALBUMIN SERPL-MCNC: 2.2 G/DL (ref 3.4–5)
ALBUMIN SERPL-MCNC: 2.2 GM/DL (ref 3.4–5)
ALBUMIN SERPL-MCNC: 2.3 G/DL (ref 3.4–5)
ALBUMIN SERPL-MCNC: 2.4 G/DL (ref 3.4–5)
ALBUMIN SERPL-MCNC: 2.4 G/DL (ref 3.4–5)
ALBUMIN SERPL-MCNC: 2.5 G/DL (ref 3.4–5)
ALBUMIN SERPL-MCNC: 2.6 G/DL (ref 3.4–5)
ALBUMIN SERPL-MCNC: 2.7 G/DL (ref 3.4–5)
ALBUMIN SERPL-MCNC: 2.8 G/DL (ref 3.4–5)
ALBUMIN SERPL-MCNC: 2.9 G/DL (ref 3.4–5)
ALBUMIN SERPL-MCNC: 3 G/DL (ref 3.4–5)
ALBUMIN UR-MCNC: 10 MG/DL
ALP SERPL-CCNC: 103 U/L (ref 40–150)
ALP SERPL-CCNC: 120 U/L (ref 40–150)
ALP SERPL-CCNC: 122 U/L (ref 40–150)
ALP SERPL-CCNC: 123 U/L (ref 40–150)
ALP SERPL-CCNC: 152 U/L (ref 40–150)
ALP SERPL-CCNC: 157 U/L (ref 40–150)
ALP SERPL-CCNC: 159 U/L (ref 40–150)
ALP SERPL-CCNC: 164 U/L (ref 40–150)
ALP SERPL-CCNC: 191 U/L (ref 40–150)
ALP SERPL-CCNC: 90 U/L (ref 40–150)
ALP SERPL-CCNC: 91 U/L (ref 40–150)
ALP SERPL-CCNC: 96 IU/L (ref 45–117)
ALP SERPL-CCNC: 99 U/L (ref 40–150)
ALPHA1 GLOB SERPL ELPH-MCNC: 0.8 G/DL (ref 0.2–0.4)
ALPHA2 GLOB SERPL ELPH-MCNC: 1.6 G/DL (ref 0.5–0.9)
ALT SERPL W P-5'-P-CCNC: 11 U/L (ref 0–70)
ALT SERPL W P-5'-P-CCNC: 11 U/L (ref 0–70)
ALT SERPL W P-5'-P-CCNC: 12 U/L (ref 0–70)
ALT SERPL W P-5'-P-CCNC: 13 U/L (ref 0–70)
ALT SERPL W P-5'-P-CCNC: 13 U/L (ref 0–70)
ALT SERPL W P-5'-P-CCNC: 14 U/L (ref 0–70)
ALT SERPL W P-5'-P-CCNC: 15 U/L (ref 0–70)
ALT SERPL W P-5'-P-CCNC: 15 U/L (ref 0–70)
ALT SERPL W P-5'-P-CCNC: 16 U/L (ref 0–70)
ALT SERPL W P-5'-P-CCNC: 17 U/L (ref 0–70)
ALT SERPL W P-5'-P-CCNC: 17 U/L (ref 0–70)
ALT SERPL W P-5'-P-CCNC: 9 U/L (ref 0–70)
ALT SERPL-CCNC: 9 IU/L (ref 12–68)
ANION GAP SERPL CALCULATED.3IONS-SCNC: 2 MMOL/L (ref 0–15)
ANION GAP SERPL CALCULATED.3IONS-SCNC: 2 MMOL/L (ref 3–14)
ANION GAP SERPL CALCULATED.3IONS-SCNC: 3 MMOL/L (ref 3–14)
ANION GAP SERPL CALCULATED.3IONS-SCNC: 3 MMOL/L (ref 3–14)
ANION GAP SERPL CALCULATED.3IONS-SCNC: 4 MMOL/L (ref 0–15)
ANION GAP SERPL CALCULATED.3IONS-SCNC: 4 MMOL/L (ref 3–14)
ANION GAP SERPL CALCULATED.3IONS-SCNC: 5 MMOL/L (ref 0–15)
ANION GAP SERPL CALCULATED.3IONS-SCNC: 5 MMOL/L (ref 3–14)
ANION GAP SERPL CALCULATED.3IONS-SCNC: 6 MMOL/L (ref 0–15)
ANION GAP SERPL CALCULATED.3IONS-SCNC: 6 MMOL/L (ref 0–1530)
ANION GAP SERPL CALCULATED.3IONS-SCNC: 6 MMOL/L (ref 3–14)
ANION GAP SERPL CALCULATED.3IONS-SCNC: 6 MMOL/L (ref 3–14)
ANION GAP SERPL CALCULATED.3IONS-SCNC: 7 MMOL/L (ref 3–14)
ANION GAP SERPL CALCULATED.3IONS-SCNC: 8 MMOL/L (ref 0–15)
ANION GAP SERPL CALCULATED.3IONS-SCNC: 8 MMOL/L (ref 3–14)
ANION GAP SERPL CALCULATED.3IONS-SCNC: 9 MMOL/L (ref 0–15)
ANION GAP SERPL CALCULATED.3IONS-SCNC: 9 MMOL/L (ref 3–14)
ANISOCYTOSIS BLD QL SMEAR: ABNORMAL
APPEARANCE UR: CLEAR
AST SERPL W P-5'-P-CCNC: 11 U/L (ref 0–45)
AST SERPL W P-5'-P-CCNC: 14 U/L (ref 0–45)
AST SERPL W P-5'-P-CCNC: 15 U/L (ref 0–45)
AST SERPL W P-5'-P-CCNC: 16 U/L (ref 0–45)
AST SERPL W P-5'-P-CCNC: 16 U/L (ref 0–45)
AST SERPL W P-5'-P-CCNC: 17 U/L (ref 0–45)
AST SERPL W P-5'-P-CCNC: 18 U/L (ref 0–45)
AST SERPL W P-5'-P-CCNC: 18 U/L (ref 0–45)
AST SERPL W P-5'-P-CCNC: 22 U/L (ref 0–45)
AST SERPL W P-5'-P-CCNC: 29 U/L (ref 0–45)
AST SERPL W P-5'-P-CCNC: 31 U/L (ref 0–45)
AST SERPL W P-5'-P-CCNC: ABNORMAL U/L (ref 0–45)
AST SERPL-CCNC: 13 IU/L (ref 12–37)
AST SERPL-CCNC: 16 IU/L (ref 12–37)
AST SERPL-CCNC: 34 IU/L (ref 12–37)
B-GLOBULIN SERPL ELPH-MCNC: 1.1 G/DL (ref 0.6–1)
BACTERIA SPEC CULT: ABNORMAL
BACTERIA SPEC CULT: ABNORMAL
BACTERIA SPEC CULT: NO GROWTH
BACTERIA SPEC CULT: NORMAL
BASOPHILS # BLD AUTO: 0 10E9/L (ref 0–0.2)
BASOPHILS NFR BLD AUTO: 0.1 %
BASOPHILS NFR BLD AUTO: 0.2 %
BASOPHILS NFR BLD AUTO: 0.3 %
BASOPHILS NFR BLD AUTO: 0.4 %
BASOPHILS NFR BLD AUTO: 0.4 %
BASOPHILS NFR BLD AUTO: 0.5 %
BASOPHILS NFR BLD AUTO: 0.5 %
BILIRUB SERPL-MCNC: 0.2 MG/DL (ref 0.2–1)
BILIRUB SERPL-MCNC: 0.3 MG/DL (ref 0.2–1.3)
BILIRUB SERPL-MCNC: 0.4 MG/DL (ref 0.2–1.3)
BILIRUB SERPL-MCNC: 0.5 MG/DL (ref 0.2–1.3)
BILIRUB SERPL-MCNC: 0.6 MG/DL (ref 0.2–1.3)
BILIRUB UR QL STRIP: NEGATIVE
BLD GP AB SCN SERPL QL: NORMAL
BLD PROD TYP BPU: NORMAL
BLD UNIT ID BPU: 0
BLOOD BANK CMNT PATIENT-IMP: NORMAL
BLOOD PRODUCT CODE: NORMAL
BPU ID: NORMAL
BUN SERPL-MCNC: 25 MG/DL (ref 7–30)
BUN SERPL-MCNC: 26 MG/DL (ref 7–24)
BUN SERPL-MCNC: 28 MG/DL (ref 7–24)
BUN SERPL-MCNC: 28 MG/DL (ref 7–30)
BUN SERPL-MCNC: 29 MG/DL (ref 7–24)
BUN SERPL-MCNC: 29 MG/DL (ref 7–30)
BUN SERPL-MCNC: 31 MG/DL (ref 7–30)
BUN SERPL-MCNC: 31 MG/DL (ref 7–30)
BUN SERPL-MCNC: 32 MG/DL (ref 7–24)
BUN SERPL-MCNC: 33 MG/DL (ref 7–30)
BUN SERPL-MCNC: 34 MG/DL (ref 7–24)
BUN SERPL-MCNC: 34 MG/DL (ref 7–24)
BUN SERPL-MCNC: 35 MG/DL (ref 7–24)
BUN SERPL-MCNC: 35 MG/DL (ref 7–30)
BUN SERPL-MCNC: 36 MG/DL (ref 7–30)
BUN SERPL-MCNC: 37 MG/DL (ref 7–30)
BUN SERPL-MCNC: 38 MG/DL (ref 7–24)
BUN SERPL-MCNC: 38 MG/DL (ref 7–24)
BUN SERPL-MCNC: 40 MG/DL (ref 7–24)
BUN SERPL-MCNC: 40 MG/DL (ref 7–30)
BUN SERPL-MCNC: 41 MG/DL (ref 7–30)
BUN SERPL-MCNC: 45 MG/DL (ref 7–30)
BUN SERPL-MCNC: 46 MG/DL (ref 7–21)
BUN SERPL-MCNC: 47 MG/DL (ref 7–30)
BUN SERPL-MCNC: 51 MG/DL (ref 7–30)
BUN SERPL-MCNC: 52 MG/DL (ref 7–24)
CALCIUM SERPL-MCNC: 7.4 MG/DL (ref 8.5–10.1)
CALCIUM SERPL-MCNC: 7.5 MG/DL (ref 8.5–10.1)
CALCIUM SERPL-MCNC: 7.8 MG/DL (ref 8.5–10.1)
CALCIUM SERPL-MCNC: 7.9 MG/DL (ref 8.5–10.1)
CALCIUM SERPL-MCNC: 7.9 MG/DL (ref 8.5–10.1)
CALCIUM SERPL-MCNC: 8 MG/DL (ref 8.5–10.1)
CALCIUM SERPL-MCNC: 8.1 MG/DL (ref 8.5–10.1)
CALCIUM SERPL-MCNC: 8.2 MG/DL (ref 8.5–10.1)
CALCIUM SERPL-MCNC: 8.3 MG/DL (ref 8.5–10.1)
CALCIUM SERPL-MCNC: 8.3 MG/DL (ref 8.5–10.1)
CALCIUM SERPL-MCNC: 8.4 MG/DL (ref 8.5–10.1)
CALCIUM SERPL-MCNC: 8.4 MG/DL (ref 8.5–10.1)
CALCIUM SERPL-MCNC: 8.6 MG/DL (ref 8.5–10.1)
CALCIUM SERPL-MCNC: 8.7 MG/DL (ref 8.5–10.1)
CALCIUM SERPL-MCNC: 8.7 MG/DL (ref 8.5–10.1)
CALCIUM SERPL-MCNC: 8.8 MG/DL (ref 8.5–10.1)
CALCIUM SERPL-MCNC: 8.8 MG/DL (ref 8.5–10.1)
CALCIUM SERPL-MCNC: 8.9 MG/DL (ref 8.5–10.1)
CALCIUM SERPL-MCNC: 9 MG/DL (ref 8.5–10.1)
CALCIUM SERPL-MCNC: 9.1 MG/DL (ref 8.5–10.1)
CALCIUM SERPL-MCNC: 9.1 MG/DL (ref 8.5–10.1)
CALCIUM SERPL-MCNC: 9.2 MG/DL (ref 8.5–10.1)
CALCIUM SERPL-MCNC: 9.3 MG/DL (ref 8.5–10.1)
CALCIUM SERPL-MCNC: 9.7 MG/DL (ref 8.5–10.1)
CHLORIDE SERPL-SCNC: 100 MMOL/L (ref 94–109)
CHLORIDE SERPL-SCNC: 102 MMOL/L (ref 94–109)
CHLORIDE SERPL-SCNC: 103 MMOL/L (ref 94–109)
CHLORIDE SERPL-SCNC: 103 MMOL/L (ref 94–109)
CHLORIDE SERPL-SCNC: 104 MMOL/L (ref 94–109)
CHLORIDE SERPL-SCNC: 105 MMOL/L (ref 94–109)
CHLORIDE SERPL-SCNC: 105 MMOL/L (ref 94–109)
CHLORIDE SERPL-SCNC: 106 MMOL/L (ref 94–109)
CHLORIDE SERPL-SCNC: 108 MMOL/L (ref 94–109)
CHLORIDE SERPL-SCNC: 109 MMOL/L (ref 94–109)
CHLORIDE SERPL-SCNC: 110 MMOL/L (ref 94–109)
CHLORIDE SERPL-SCNC: 111 MMOL/L (ref 94–109)
CHLORIDE SERPL-SCNC: 99 MMOL/L (ref 94–109)
CHLORIDE SERPL-SCNC: 99 MMOL/L (ref 94–109)
CHLORIDE SERPLBLD-SCNC: 101 MMOL/L (ref 98–112)
CHLORIDE SERPLBLD-SCNC: 102 MMOL/L (ref 98–112)
CHLORIDE SERPLBLD-SCNC: 102 MMOL/L (ref 98–112)
CHLORIDE SERPLBLD-SCNC: 103 MMOL/L (ref 98–112)
CHLORIDE SERPLBLD-SCNC: 107 MMOL/L (ref 98–112)
CHLORIDE SERPLBLD-SCNC: 109 MMOL/L (ref 98–112)
CHLORIDE SERPLBLD-SCNC: 111 MMOL/L (ref 98–112)
CHLORIDE SERPLBLD-SCNC: 111 MMOL/L (ref 98–112)
CHOLEST SERPL-MCNC: 134 MG/DL
CHOLEST SERPL-MCNC: 142 MG/DL
CO2 BLDCOV-SCNC: 27 MMOL/L (ref 21–28)
CO2 BLDCOV-SCNC: 30 MMOL/L (ref 21–28)
CO2 BLDCOV-SCNC: 42 MMOL/L (ref 21–28)
CO2 SERPL-SCNC: 25 MMOL/L (ref 20–32)
CO2 SERPL-SCNC: 25 MMOL/L (ref 21–32)
CO2 SERPL-SCNC: 26 MMOL/L (ref 20–32)
CO2 SERPL-SCNC: 26 MMOL/L (ref 20–32)
CO2 SERPL-SCNC: 27 MMOL/L (ref 20–32)
CO2 SERPL-SCNC: 28 MMOL/L (ref 20–32)
CO2 SERPL-SCNC: 28 MMOL/L (ref 20–32)
CO2 SERPL-SCNC: 28 MMOL/L (ref 21–32)
CO2 SERPL-SCNC: 29 MMOL/L (ref 20–32)
CO2 SERPL-SCNC: 29 MMOL/L (ref 20–32)
CO2 SERPL-SCNC: 30 MMOL/L (ref 20–32)
CO2 SERPL-SCNC: 30 MMOL/L (ref 20–32)
CO2 SERPL-SCNC: 30 MMOL/L (ref 21–32)
CO2 SERPL-SCNC: 31 MMOL/L (ref 20–32)
CO2 SERPL-SCNC: 31 MMOL/L (ref 20–32)
CO2 SERPL-SCNC: 32 MMOL/L (ref 21–32)
CO2 SERPL-SCNC: 33 MMOL/L (ref 20–32)
CO2 SERPL-SCNC: 33 MMOL/L (ref 21–32)
CO2 SERPL-SCNC: 34 MMOL/L (ref 20–32)
CO2 SERPL-SCNC: 34 MMOL/L (ref 21–32)
CO2 SERPL-SCNC: 34 MMOL/L (ref 21–32)
CO2 SERPL-SCNC: 35 MMOL/L (ref 21–32)
CO2 SERPL-SCNC: 36 MMOL/L (ref 20–32)
CO2 SERPL-SCNC: 36 MMOL/L (ref 20–32)
CO2 SERPL-SCNC: 37 MMOL/L (ref 20–32)
CO2 SERPL-SCNC: 37 MMOL/L (ref 21–32)
CO2 SERPL-SCNC: 37 MMOL/L (ref 21–32)
CO2 SERPL-SCNC: 39 MMOL/L (ref 20–32)
COLOR UR AUTO: YELLOW
COPATH REPORT: NORMAL
CREAT SERPL-MCNC: 1.19 MG/DL (ref 0.66–1.25)
CREAT SERPL-MCNC: 1.24 MG/DL (ref 0.66–1.25)
CREAT SERPL-MCNC: 1.27 MG/DL (ref 0.7–1.3)
CREAT SERPL-MCNC: 1.41 MG/DL (ref 0.66–1.25)
CREAT SERPL-MCNC: 1.42 MG/DL (ref 0.7–1.3)
CREAT SERPL-MCNC: 1.47 MG/DL (ref 0.7–1.3)
CREAT SERPL-MCNC: 1.52 MG/DL (ref 0.66–1.25)
CREAT SERPL-MCNC: 1.54 MG/DL (ref 0.66–1.25)
CREAT SERPL-MCNC: 1.55 MG/DL (ref 0.66–1.25)
CREAT SERPL-MCNC: 1.57 MG/DL (ref 0.7–1.3)
CREAT SERPL-MCNC: 1.59 MG/DL (ref 0.66–1.25)
CREAT SERPL-MCNC: 1.61 MG/DL (ref 0.66–1.25)
CREAT SERPL-MCNC: 1.61 MG/DL (ref 0.7–1.3)
CREAT SERPL-MCNC: 1.63 MG/DL (ref 0.7–1.3)
CREAT SERPL-MCNC: 1.64 MG/DL (ref 0.66–1.25)
CREAT SERPL-MCNC: 1.66 MG/DL (ref 0.7–1.3)
CREAT SERPL-MCNC: 1.67 MG/DL (ref 0.66–1.25)
CREAT SERPL-MCNC: 1.74 MG/DL (ref 0.66–1.25)
CREAT SERPL-MCNC: 1.77 MG/DL (ref 0.66–1.25)
CREAT SERPL-MCNC: 1.79 MG/DL (ref 0.7–1.3)
CREAT SERPL-MCNC: 1.79 MG/DL (ref 0.7–1.3)
CREAT SERPL-MCNC: 1.81 MG/DL (ref 0.66–1.25)
CREAT SERPL-MCNC: 1.82 MG/DL (ref 0.66–1.25)
CREAT SERPL-MCNC: 1.83 MG/DL (ref 0.66–1.25)
CREAT SERPL-MCNC: 1.87 MG/DL (ref 0.66–1.25)
CREAT SERPL-MCNC: 1.93 MG/DL (ref 0.66–1.25)
CREAT SERPL-MCNC: 1.93 MG/DL (ref 0.66–1.25)
CREAT SERPL-MCNC: 1.94 MG/DL (ref 0.7–1.3)
CREAT SERPL-MCNC: 1.96 MG/DL (ref 0.7–1.3)
CREAT SERPL-MCNC: 2.01 MG/DL (ref 0.66–1.25)
CREAT SERPL-MCNC: 2.02 MG/DL (ref 0.7–1.3)
CREAT SERPL-MCNC: 2.09 MG/DL (ref 0.7–1.3)
CREAT SERPL-MCNC: 2.12 MG/DL (ref 0.7–1.3)
CREAT SERPL-MCNC: 2.4 MG/DL (ref 0.66–1.25)
CRP SERPL-MCNC: 240 MG/L (ref 0–8)
CRP SERPL-MCNC: 240 MG/L (ref 0–8)
CRP SERPL-MCNC: 68 MG/L (ref 0–8)
CRP SERPL-MCNC: 96 MG/L (ref 0–8)
DAT IGG-SP REAG RBC-IMP: NORMAL
DIFFERENTIAL METHOD BLD: ABNORMAL
DIFFERENTIAL: ABNORMAL
DLCOCOR-%PRED-PRE: 54 %
DLCOCOR-PRE: 14.4 ML/MIN/MMHG
DLCOUNC-%PRED-PRE: 39 %
DLCOUNC-PRE: 10.28 ML/MIN/MMHG
DLCOUNC-PRED: 26.19 ML/MIN/MMHG
EOSINOPHIL # BLD AUTO: 0 10E9/L (ref 0–0.7)
EOSINOPHIL # BLD AUTO: 0.1 10E9/L (ref 0–0.7)
EOSINOPHIL # BLD AUTO: 0.1 10E9/L (ref 0–0.7)
EOSINOPHIL # BLD AUTO: 0.2 10E9/L (ref 0–0.7)
EOSINOPHIL NFR BLD AUTO: 0 %
EOSINOPHIL NFR BLD AUTO: 0 %
EOSINOPHIL NFR BLD AUTO: 0.1 %
EOSINOPHIL NFR BLD AUTO: 0.3 %
EOSINOPHIL NFR BLD AUTO: 0.3 %
EOSINOPHIL NFR BLD AUTO: 1.3 %
EOSINOPHIL NFR BLD AUTO: 1.4 %
EOSINOPHIL NFR BLD AUTO: 1.5 %
EOSINOPHIL NFR BLD AUTO: 1.7 %
EOSINOPHIL NFR BLD AUTO: 1.8 %
EOSINOPHIL NFR BLD AUTO: 1.9 %
EOSINOPHIL NFR BLD AUTO: 2 %
EOSINOPHIL NFR BLD AUTO: 2 %
EOSINOPHIL NFR BLD AUTO: 2.6 %
EOSINOPHIL NFR BLD AUTO: 2.8 %
EOSINOPHIL NFR BLD AUTO: 2.8 %
EOSINOPHIL NFR BLD AUTO: 2.9 %
ERV-%PRED-PRE: 152 %
ERV-PRE: 1.23 L
ERV-PRED: 0.81 L
ERYTHROCYTE [DISTWIDTH] IN BLOOD BY AUTOMATED COUNT: 15.8 % (ref 10–15)
ERYTHROCYTE [DISTWIDTH] IN BLOOD BY AUTOMATED COUNT: 15.8 % (ref 10–15)
ERYTHROCYTE [DISTWIDTH] IN BLOOD BY AUTOMATED COUNT: 16 % (ref 10–15)
ERYTHROCYTE [DISTWIDTH] IN BLOOD BY AUTOMATED COUNT: 16.1 % (ref 11.5–14.5)
ERYTHROCYTE [DISTWIDTH] IN BLOOD BY AUTOMATED COUNT: 16.2 % (ref 10–15)
ERYTHROCYTE [DISTWIDTH] IN BLOOD BY AUTOMATED COUNT: 16.2 % (ref 10–15)
ERYTHROCYTE [DISTWIDTH] IN BLOOD BY AUTOMATED COUNT: 16.5 % (ref 10–15)
ERYTHROCYTE [DISTWIDTH] IN BLOOD BY AUTOMATED COUNT: 17 % (ref 11.5–14.5)
ERYTHROCYTE [DISTWIDTH] IN BLOOD BY AUTOMATED COUNT: 17.1 % (ref 11.5–14.5)
ERYTHROCYTE [DISTWIDTH] IN BLOOD BY AUTOMATED COUNT: 17.6 % (ref 11.5–14.5)
ERYTHROCYTE [DISTWIDTH] IN BLOOD BY AUTOMATED COUNT: 17.9 % (ref 10–15)
ERYTHROCYTE [DISTWIDTH] IN BLOOD BY AUTOMATED COUNT: 18.1 % (ref 10–15)
ERYTHROCYTE [DISTWIDTH] IN BLOOD BY AUTOMATED COUNT: 18.3 % (ref 11.5–14.5)
ERYTHROCYTE [DISTWIDTH] IN BLOOD BY AUTOMATED COUNT: 18.9 % (ref 11.5–14.5)
ERYTHROCYTE [DISTWIDTH] IN BLOOD BY AUTOMATED COUNT: 19.1 % (ref 10–15)
ERYTHROCYTE [DISTWIDTH] IN BLOOD BY AUTOMATED COUNT: 19.1 % (ref 11.5–14.5)
ERYTHROCYTE [DISTWIDTH] IN BLOOD BY AUTOMATED COUNT: 19.1 % (ref 11.5–14.5)
ERYTHROCYTE [DISTWIDTH] IN BLOOD BY AUTOMATED COUNT: 19.5 % (ref 10–15)
ERYTHROCYTE [DISTWIDTH] IN BLOOD BY AUTOMATED COUNT: 19.7 % (ref 10–15)
ERYTHROCYTE [DISTWIDTH] IN BLOOD BY AUTOMATED COUNT: 19.7 % (ref 11.5–14.5)
ERYTHROCYTE [DISTWIDTH] IN BLOOD BY AUTOMATED COUNT: 19.8 % (ref 10–15)
ERYTHROCYTE [DISTWIDTH] IN BLOOD BY AUTOMATED COUNT: 19.9 % (ref 10–15)
ERYTHROCYTE [DISTWIDTH] IN BLOOD BY AUTOMATED COUNT: 20.1 % (ref 10–15)
ERYTHROCYTE [DISTWIDTH] IN BLOOD BY AUTOMATED COUNT: 20.3 % (ref 11.5–14.5)
ERYTHROCYTE [DISTWIDTH] IN BLOOD BY AUTOMATED COUNT: 20.6 % (ref 11.5–14.5)
ERYTHROCYTE [DISTWIDTH] IN BLOOD BY AUTOMATED COUNT: 20.7 % (ref 10–15)
ERYTHROCYTE [DISTWIDTH] IN BLOOD BY AUTOMATED COUNT: 20.9 % (ref 10–15)
ERYTHROCYTE [SEDIMENTATION RATE] IN BLOOD BY WESTERGREN METHOD: 137 MM/H (ref 0–20)
EXPTIME-PRE: 10.91 SEC
FEF2575-%PRED-PRE: 14 %
FEF2575-PRE: 0.33 L/SEC
FEF2575-PRED: 2.31 L/SEC
FEFMAX-%PRED-PRE: 30 %
FEFMAX-PRE: 2.56 L/SEC
FEFMAX-PRED: 8.42 L/SEC
FERRITIN SERPL-MCNC: 4041 NG/ML (ref 26–388)
FEV1-%PRED-PRE: 31 %
FEV1-PRE: 0.96 L
FEV1FEV6-PRE: 46 %
FEV1FEV6-PRED: 77 %
FEV1FVC-PRE: 39 %
FEV1FVC-PRED: 76 %
FEV1SVC-PRE: 36 %
FEV1SVC-PRED: 62 %
FIFMAX-PRE: 2.3 L/SEC
FOLATE SERPL-MCNC: 3.4 NG/ML
FRCPLETH-%PRED-PRE: 134 %
FRCPLETH-PRE: 5.07 L
FRCPLETH-PRED: 3.77 L
FVC-%PRED-PRE: 61 %
FVC-PRE: 2.44 L
FVC-PRED: 3.99 L
GAMMA GLOB SERPL ELPH-MCNC: 0.7 G/DL (ref 0.7–1.6)
GFR SERPL CREATININE-BSD FRML MDRD: 27 ML/MIN/1.7M2
GFR SERPL CREATININE-BSD FRML MDRD: 31 ML/MIN/1.73M2
GFR SERPL CREATININE-BSD FRML MDRD: 31 ML/MIN/1.73M2
GFR SERPL CREATININE-BSD FRML MDRD: 33 ML/MIN/1.73M2
GFR SERPL CREATININE-BSD FRML MDRD: 33 ML/MIN/1.7M2
GFR SERPL CREATININE-BSD FRML MDRD: 34 ML/MIN/1.73M2
GFR SERPL CREATININE-BSD FRML MDRD: 34 ML/MIN/1.73M2
GFR SERPL CREATININE-BSD FRML MDRD: 34 ML/MIN/1.7M2
GFR SERPL CREATININE-BSD FRML MDRD: 34 ML/MIN/1.7M2
GFR SERPL CREATININE-BSD FRML MDRD: 36 ML/MIN/1.7M2
GFR SERPL CREATININE-BSD FRML MDRD: 37 ML/MIN/1.7M2
GFR SERPL CREATININE-BSD FRML MDRD: 38 ML/MIN/1.73M2
GFR SERPL CREATININE-BSD FRML MDRD: 38 ML/MIN/1.73M2
GFR SERPL CREATININE-BSD FRML MDRD: 38 ML/MIN/1.7M2
GFR SERPL CREATININE-BSD FRML MDRD: 39 ML/MIN/1.7M2
GFR SERPL CREATININE-BSD FRML MDRD: 41 ML/MIN/1.7M2
GFR SERPL CREATININE-BSD FRML MDRD: 42 ML/MIN/1.73M2
GFR SERPL CREATININE-BSD FRML MDRD: 42 ML/MIN/1.7M2
GFR SERPL CREATININE-BSD FRML MDRD: 42 ML/MIN/1.7M2
GFR SERPL CREATININE-BSD FRML MDRD: 43 ML/MIN/1.73M2
GFR SERPL CREATININE-BSD FRML MDRD: 43 ML/MIN/1.7M2
GFR SERPL CREATININE-BSD FRML MDRD: 44 ML/MIN/1.73M2
GFR SERPL CREATININE-BSD FRML MDRD: 44 ML/MIN/1.7M2
GFR SERPL CREATININE-BSD FRML MDRD: 45 ML/MIN/1.7M2
GFR SERPL CREATININE-BSD FRML MDRD: 45 ML/MIN/1.7M2
GFR SERPL CREATININE-BSD FRML MDRD: 47 ML/MIN/1.73M2
GFR SERPL CREATININE-BSD FRML MDRD: 49 ML/MIN/1.73M2
GFR SERPL CREATININE-BSD FRML MDRD: 49 ML/MIN/1.7M2
GFR SERPL CREATININE-BSD FRML MDRD: 50 ML/MIN/1.73M2
GFR SERPL CREATININE-BSD FRML MDRD: 56 ML/MIN/1.73M2
GFR SERPL CREATININE-BSD FRML MDRD: 57 ML/MIN/1.7M2
GFR SERPL CREATININE-BSD FRML MDRD: 60 ML/MIN/1.7M2
GLUCOSE BLDC GLUCOMTR-MCNC: 102 MG/DL (ref 70–99)
GLUCOSE BLDC GLUCOMTR-MCNC: 104 MG/DL (ref 70–99)
GLUCOSE BLDC GLUCOMTR-MCNC: 106 MG/DL (ref 70–99)
GLUCOSE BLDC GLUCOMTR-MCNC: 106 MG/DL (ref 70–99)
GLUCOSE BLDC GLUCOMTR-MCNC: 107 MG/DL (ref 70–99)
GLUCOSE BLDC GLUCOMTR-MCNC: 110 MG/DL (ref 70–99)
GLUCOSE BLDC GLUCOMTR-MCNC: 110 MG/DL (ref 70–99)
GLUCOSE BLDC GLUCOMTR-MCNC: 112 MG/DL (ref 70–99)
GLUCOSE BLDC GLUCOMTR-MCNC: 114 MG/DL (ref 70–99)
GLUCOSE BLDC GLUCOMTR-MCNC: 115 MG/DL (ref 70–99)
GLUCOSE BLDC GLUCOMTR-MCNC: 120 MG/DL (ref 70–99)
GLUCOSE BLDC GLUCOMTR-MCNC: 125 MG/DL (ref 70–99)
GLUCOSE BLDC GLUCOMTR-MCNC: 129 MG/DL (ref 70–99)
GLUCOSE BLDC GLUCOMTR-MCNC: 131 MG/DL (ref 70–99)
GLUCOSE BLDC GLUCOMTR-MCNC: 132 MG/DL (ref 70–99)
GLUCOSE BLDC GLUCOMTR-MCNC: 133 MG/DL (ref 70–99)
GLUCOSE BLDC GLUCOMTR-MCNC: 133 MG/DL (ref 70–99)
GLUCOSE BLDC GLUCOMTR-MCNC: 135 MG/DL (ref 70–99)
GLUCOSE BLDC GLUCOMTR-MCNC: 136 MG/DL (ref 70–99)
GLUCOSE BLDC GLUCOMTR-MCNC: 144 MG/DL (ref 70–99)
GLUCOSE BLDC GLUCOMTR-MCNC: 151 MG/DL (ref 70–99)
GLUCOSE BLDC GLUCOMTR-MCNC: 152 MG/DL (ref 70–99)
GLUCOSE BLDC GLUCOMTR-MCNC: 159 MG/DL (ref 70–99)
GLUCOSE BLDC GLUCOMTR-MCNC: 176 MG/DL (ref 70–99)
GLUCOSE BLDC GLUCOMTR-MCNC: 181 MG/DL (ref 70–99)
GLUCOSE BLDC GLUCOMTR-MCNC: 211 MG/DL (ref 70–99)
GLUCOSE BLDC GLUCOMTR-MCNC: 218 MG/DL (ref 70–99)
GLUCOSE BLDC GLUCOMTR-MCNC: 240 MG/DL (ref 70–99)
GLUCOSE BLDC GLUCOMTR-MCNC: 346 MG/DL (ref 70–99)
GLUCOSE BLDC GLUCOMTR-MCNC: 37 MG/DL (ref 70–99)
GLUCOSE BLDC GLUCOMTR-MCNC: 48 MG/DL (ref 70–99)
GLUCOSE BLDC GLUCOMTR-MCNC: 54 MG/DL (ref 70–99)
GLUCOSE BLDC GLUCOMTR-MCNC: 57 MG/DL (ref 70–99)
GLUCOSE BLDC GLUCOMTR-MCNC: 60 MG/DL (ref 70–99)
GLUCOSE BLDC GLUCOMTR-MCNC: 62 MG/DL (ref 70–99)
GLUCOSE BLDC GLUCOMTR-MCNC: 65 MG/DL (ref 70–99)
GLUCOSE BLDC GLUCOMTR-MCNC: 69 MG/DL (ref 70–99)
GLUCOSE BLDC GLUCOMTR-MCNC: 74 MG/DL (ref 70–99)
GLUCOSE BLDC GLUCOMTR-MCNC: 74 MG/DL (ref 70–99)
GLUCOSE BLDC GLUCOMTR-MCNC: 75 MG/DL (ref 70–99)
GLUCOSE BLDC GLUCOMTR-MCNC: 79 MG/DL (ref 70–99)
GLUCOSE BLDC GLUCOMTR-MCNC: 81 MG/DL (ref 70–99)
GLUCOSE BLDC GLUCOMTR-MCNC: 82 MG/DL (ref 70–99)
GLUCOSE BLDC GLUCOMTR-MCNC: 90 MG/DL (ref 70–99)
GLUCOSE BLDC GLUCOMTR-MCNC: 93 MG/DL (ref 70–99)
GLUCOSE BLDC GLUCOMTR-MCNC: 94 MG/DL (ref 70–99)
GLUCOSE BLDC GLUCOMTR-MCNC: 96 MG/DL (ref 70–99)
GLUCOSE BLDC GLUCOMTR-MCNC: 97 MG/DL (ref 70–99)
GLUCOSE BLDC GLUCOMTR-MCNC: 98 MG/DL (ref 70–99)
GLUCOSE SERPL-MCNC: 100 MG/DL (ref 70–99)
GLUCOSE SERPL-MCNC: 103 MG/DL (ref 70–99)
GLUCOSE SERPL-MCNC: 104 MG/DL (ref 74–106)
GLUCOSE SERPL-MCNC: 105 MG/DL (ref 74–106)
GLUCOSE SERPL-MCNC: 108 MG/DL (ref 70–99)
GLUCOSE SERPL-MCNC: 108 MG/DL (ref 74–106)
GLUCOSE SERPL-MCNC: 114 MG/DL (ref 70–99)
GLUCOSE SERPL-MCNC: 114 MG/DL (ref 70–99)
GLUCOSE SERPL-MCNC: 115 MG/DL (ref 70–99)
GLUCOSE SERPL-MCNC: 115 MG/DL (ref 74–106)
GLUCOSE SERPL-MCNC: 121 MG/DL (ref 74–106)
GLUCOSE SERPL-MCNC: 125 MG/DL (ref 70–99)
GLUCOSE SERPL-MCNC: 125 MG/DL (ref 74–106)
GLUCOSE SERPL-MCNC: 126 MG/DL (ref 70–99)
GLUCOSE SERPL-MCNC: 128 MG/DL (ref 70–99)
GLUCOSE SERPL-MCNC: 128 MG/DL (ref 74–106)
GLUCOSE SERPL-MCNC: 155 MG/DL (ref 70–99)
GLUCOSE SERPL-MCNC: 160 MG/DL (ref 70–99)
GLUCOSE SERPL-MCNC: 201 MG/DL (ref 74–106)
GLUCOSE SERPL-MCNC: 228 MG/DL (ref 74–106)
GLUCOSE SERPL-MCNC: 39 MG/DL (ref 70–99)
GLUCOSE SERPL-MCNC: 51 MG/DL (ref 74–106)
GLUCOSE SERPL-MCNC: 61 MG/DL (ref 70–99)
GLUCOSE SERPL-MCNC: 68 MG/DL (ref 74–106)
GLUCOSE SERPL-MCNC: 72 MG/DL (ref 70–99)
GLUCOSE SERPL-MCNC: 87 MG/DL (ref 70–99)
GLUCOSE SERPL-MCNC: 92 MG/DL (ref 70–99)
GLUCOSE SERPL-MCNC: 94 MG/DL (ref 70–99)
GLUCOSE SERPL-MCNC: 96 MG/DL (ref 74–106)
GLUCOSE SERPL-MCNC: 97 MG/DL (ref 70–99)
GLUCOSE UR STRIP-MCNC: NEGATIVE MG/DL
GRAM STN SPEC: ABNORMAL
HAPTOGLOB SERPL-MCNC: 663 MG/DL (ref 35–175)
HBA1C MFR BLD: 6 % (ref 0–5.7)
HBA1C MFR BLD: 6.2 % (ref 0–5.7)
HBA1C MFR BLD: 6.8 % (ref 0–5.7)
HCT VFR BLD AUTO: 18.9 % (ref 40–54)
HCT VFR BLD AUTO: 18.9 % (ref 40–54)
HCT VFR BLD AUTO: 20.5 % (ref 40–53)
HCT VFR BLD AUTO: 21.5 % (ref 40–53)
HCT VFR BLD AUTO: 21.6 % (ref 40–54)
HCT VFR BLD AUTO: 24.8 % (ref 40–54)
HCT VFR BLD AUTO: 24.9 % (ref 40–53)
HCT VFR BLD AUTO: 25.3 % (ref 40–53)
HCT VFR BLD AUTO: 25.4 % (ref 40–54)
HCT VFR BLD AUTO: 25.5 % (ref 40–53)
HCT VFR BLD AUTO: 25.6 % (ref 40–53)
HCT VFR BLD AUTO: 26.1 % (ref 40–53)
HCT VFR BLD AUTO: 26.1 % (ref 40–53)
HCT VFR BLD AUTO: 26.7 % (ref 40–53)
HCT VFR BLD AUTO: 27.5 % (ref 40–53)
HCT VFR BLD AUTO: 27.6 % (ref 40–54)
HCT VFR BLD AUTO: 28.4 % (ref 40–54)
HCT VFR BLD AUTO: 29.9 % (ref 40–53)
HCT VFR BLD AUTO: 30.3 % (ref 40–54)
HCT VFR BLD AUTO: 30.4 % (ref 40–54)
HCT VFR BLD AUTO: 30.5 % (ref 40–54)
HCT VFR BLD AUTO: 31.4 % (ref 40–53)
HCT VFR BLD AUTO: 31.5 % (ref 40–53)
HCT VFR BLD AUTO: 33.2 % (ref 40–53)
HCT VFR BLD AUTO: 33.5 % (ref 40–53)
HCT VFR BLD AUTO: 34 % (ref 40–54)
HCT VFR BLD AUTO: 34.1 % (ref 40–53)
HCT VFR BLD AUTO: 35.1 % (ref 40–53)
HCT VFR BLD AUTO: 35.6 % (ref 40–53)
HDLC SERPL-MCNC: 51 MG/DL
HDLC SERPL-MCNC: 54 MG/DL
HEMOGLOBIN: 5.5 GM/DL (ref 14–18)
HEMOGLOBIN: 5.5 GM/DL (ref 14–18)
HEMOGLOBIN: 6.4 GM/DL (ref 14–18)
HEMOGLOBIN: 6.9 GM/DL (ref 14–18)
HEMOGLOBIN: 7.1 G/DL (ref 14–18)
HEMOGLOBIN: 7.3 G/DL (ref 14–18)
HEMOGLOBIN: 7.3 G/DL (ref 14–18)
HEMOGLOBIN: 7.8 G/DL (ref 14–18)
HEMOGLOBIN: 8.1 GM/DL (ref 14–18)
HEMOGLOBIN: 8.2 GM/DL (ref 14–18)
HEMOGLOBIN: 8.6 G/DL (ref 14–18)
HEMOGLOBIN: 8.7 GM/DL (ref 14–18)
HEMOGLOBIN: 8.9 G/DL (ref 14–18)
HEMOGLOBIN: 9.8 GM/DL (ref 14–18)
HGB BLD-MCNC: 10.1 G/DL (ref 13.3–17.7)
HGB BLD-MCNC: 10.6 G/DL (ref 13.3–17.7)
HGB BLD-MCNC: 5.8 G/DL (ref 13.3–17.7)
HGB BLD-MCNC: 6.4 G/DL (ref 13.3–17.7)
HGB BLD-MCNC: 7 G/DL (ref 13.3–17.7)
HGB BLD-MCNC: 7.3 G/DL (ref 13.3–17.7)
HGB BLD-MCNC: 7.4 G/DL (ref 13.3–17.7)
HGB BLD-MCNC: 7.6 G/DL (ref 13.3–17.7)
HGB BLD-MCNC: 7.9 G/DL (ref 13.3–17.7)
HGB BLD-MCNC: 8 G/DL (ref 13.3–17.7)
HGB BLD-MCNC: 8 G/DL (ref 13.3–17.7)
HGB BLD-MCNC: 8.7 G/DL (ref 13.3–17.7)
HGB BLD-MCNC: 9 G/DL (ref 13.3–17.7)
HGB BLD-MCNC: 9 G/DL (ref 13.3–17.7)
HGB BLD-MCNC: 9.3 G/DL (ref 13.3–17.7)
HGB BLD-MCNC: 9.8 G/DL (ref 13.3–17.7)
HGB BLD-MCNC: 9.8 G/DL (ref 13.3–17.7)
HGB UR QL STRIP: NEGATIVE
IC-%PRED-PRE: 34 %
IC-PRE: 1.4 L
IC-PRED: 4.09 L
IMM GRANULOCYTES # BLD: 0 10E9/L (ref 0–0.4)
IMM GRANULOCYTES # BLD: 0 10E9/L (ref 0–0.4)
IMM GRANULOCYTES # BLD: 0.1 10E9/L (ref 0–0.4)
IMM GRANULOCYTES # BLD: 0.2 10E9/L (ref 0–0.4)
IMM GRANULOCYTES # BLD: 0.2 10E9/L (ref 0–0.4)
IMM GRANULOCYTES # BLD: 0.6 10E9/L (ref 0–0.4)
IMM GRANULOCYTES NFR BLD: 0.4 %
IMM GRANULOCYTES NFR BLD: 0.4 %
IMM GRANULOCYTES NFR BLD: 0.5 %
IMM GRANULOCYTES NFR BLD: 0.7 %
IMM GRANULOCYTES NFR BLD: 0.8 %
IMM GRANULOCYTES NFR BLD: 0.9 %
IMM GRANULOCYTES NFR BLD: 0.9 %
IMM GRANULOCYTES NFR BLD: 1 %
IMM GRANULOCYTES NFR BLD: 1.2 %
IMM GRANULOCYTES NFR BLD: 1.4 %
IMM GRANULOCYTES NFR BLD: 1.5 %
IMM GRANULOCYTES NFR BLD: 2.1 %
IMM GRANULOCYTES NFR BLD: 3.8 %
INR PPP: 1.5 (ref 1–1.2)
INR PPP: 1.68 (ref 0.86–1.14)
INR PPP: 1.7 (ref 1–1.2)
INR PPP: 1.74 (ref 0.86–1.14)
INR PPP: 1.8 (ref 1–1.2)
INR PPP: 1.8 (ref 1–1.2)
INR PPP: 1.9 (ref 1–1.2)
INR PPP: 1.92 (ref 0.86–1.14)
INR PPP: 2 (ref 1–1.2)
INR PPP: 2 (ref 1–1.2)
INR PPP: 2.1 (ref 1–1.2)
INR PPP: 2.2 (ref 1–1.2)
INR PPP: 2.2 (ref 1–1.2)
INR PPP: 2.25 (ref 0.86–1.14)
INR PPP: 2.3 (ref 1–1.2)
INR PPP: 2.6 (ref 1–1.2)
INR PPP: 2.7 (ref 1–1.2)
INR PPP: 2.8 (ref 1–1.2)
INR PPP: 2.8 (ref 1–1.2)
INR PPP: 3.1 (ref 1–1.2)
INR PPP: 3.3 (ref 1–1.2)
INR PPP: 3.3 (ref 1–1.2)
INR PPP: 3.4 (ref 1–1.2)
INR PPP: 4 (ref 1–1.2)
INR PPP: 4.2 (ref 1–1.2)
INR PPP: 4.8 (ref 1–1.2)
INR PPP: 5.1 (ref 1–1.2)
INR PPP: >8 (ref 1–1.2)
INTERPRETATION ECG - MUSE: NORMAL
IRON SATN MFR SERPL: 9 % (ref 15–46)
IRON SERPL-MCNC: 15 UG/DL (ref 35–180)
KETONES UR STRIP-MCNC: 5 MG/DL
LACTATE BLD-SCNC: 0.3 MMOL/L (ref 0.7–2.1)
LACTATE BLD-SCNC: 0.3 MMOL/L (ref 0.7–2.1)
LACTATE BLD-SCNC: 0.6 MMOL/L (ref 0.7–2.1)
LACTATE BLD-SCNC: 0.8 MMOL/L (ref 0.7–2.1)
LACTATE BLD-SCNC: 0.9 MMOL/L (ref 0.7–2)
LACTATE BLD-SCNC: 0.9 MMOL/L (ref 0.7–2.1)
LACTATE BLD-SCNC: 1 MMOL/L (ref 0.7–2.1)
LACTATE BLD-SCNC: 1.2 MMOL/L (ref 0.7–2.1)
LDH SERPL L TO P-CCNC: 282 U/L (ref 85–227)
LDLC SERPL CALC-MCNC: 50 MG/DL
LDLC SERPL CALC-MCNC: 53 MG/DL
LEUKOCYTE ESTERASE UR QL STRIP: ABNORMAL
LYMPHOCYTES # BLD AUTO: 0.6 10E9/L (ref 0.8–5.3)
LYMPHOCYTES # BLD AUTO: 0.6 10E9/L (ref 0.8–5.3)
LYMPHOCYTES # BLD AUTO: 0.7 10E9/L (ref 0.8–5.3)
LYMPHOCYTES # BLD AUTO: 0.8 10E9/L (ref 0.8–5.3)
LYMPHOCYTES # BLD AUTO: 0.9 10E9/L (ref 0.8–5.3)
LYMPHOCYTES # BLD AUTO: 1 10E9/L (ref 0.8–5.3)
LYMPHOCYTES # BLD AUTO: 1 10E9/L (ref 0.8–5.3)
LYMPHOCYTES # BLD AUTO: 1.1 10E9/L (ref 0.8–5.3)
LYMPHOCYTES # BLD AUTO: 1.2 10E9/L (ref 0.8–5.3)
LYMPHOCYTES # BLD AUTO: 1.3 10E9/L (ref 0.8–5.3)
LYMPHOCYTES # BLD AUTO: 1.3 10E9/L (ref 0.8–5.3)
LYMPHOCYTES # BLD AUTO: 1.4 10E9/L (ref 0.8–5.3)
LYMPHOCYTES # BLD AUTO: 1.6 10E9/L (ref 0.8–5.3)
LYMPHOCYTES # BLD AUTO: 1.6 10E9/L (ref 0.8–5.3)
LYMPHOCYTES # BLD AUTO: 1.7 10E9/L (ref 0.8–5.3)
LYMPHOCYTES NFR BLD AUTO: 11.5 %
LYMPHOCYTES NFR BLD AUTO: 11.6 %
LYMPHOCYTES NFR BLD AUTO: 12.3 %
LYMPHOCYTES NFR BLD AUTO: 12.4 %
LYMPHOCYTES NFR BLD AUTO: 12.5 %
LYMPHOCYTES NFR BLD AUTO: 12.8 %
LYMPHOCYTES NFR BLD AUTO: 13.3 %
LYMPHOCYTES NFR BLD AUTO: 13.7 %
LYMPHOCYTES NFR BLD AUTO: 17.4 %
LYMPHOCYTES NFR BLD AUTO: 17.7 %
LYMPHOCYTES NFR BLD AUTO: 17.7 %
LYMPHOCYTES NFR BLD AUTO: 19.4 %
LYMPHOCYTES NFR BLD AUTO: 3.6 %
LYMPHOCYTES NFR BLD AUTO: 6.2 %
LYMPHOCYTES NFR BLD AUTO: 6.4 %
LYMPHOCYTES NFR BLD AUTO: 6.8 %
LYMPHOCYTES NFR BLD AUTO: 9 %
Lab: NORMAL
M PROTEIN SERPL ELPH-MCNC: 0 G/DL
MCH RBC QN AUTO: 26 PG (ref 26.5–33)
MCH RBC QN AUTO: 26 PG (ref 27–33)
MCH RBC QN AUTO: 26.2 PG (ref 26.5–33)
MCH RBC QN AUTO: 26.4 PG (ref 26.5–33)
MCH RBC QN AUTO: 26.4 PG (ref 26.5–33)
MCH RBC QN AUTO: 26.5 PG (ref 26.5–33)
MCH RBC QN AUTO: 26.6 PG (ref 26.5–33)
MCH RBC QN AUTO: 26.6 PG (ref 26.5–33)
MCH RBC QN AUTO: 26.7 PG (ref 26.5–33)
MCH RBC QN AUTO: 26.7 PG (ref 26.5–33)
MCH RBC QN AUTO: 26.9 PG (ref 26.5–33)
MCH RBC QN AUTO: 27 PG (ref 27–33)
MCH RBC QN AUTO: 27.3 PG (ref 26.5–33)
MCH RBC QN AUTO: 27.4 PG (ref 26.5–33)
MCH RBC QN AUTO: 27.4 PG (ref 26.5–33)
MCH RBC QN AUTO: 27.5 PG (ref 26.5–33)
MCH RBC QN AUTO: 27.5 PG (ref 26.5–33)
MCH RBC QN AUTO: 28 PG (ref 27–33)
MCH RBC QN AUTO: 28.1 PG (ref 26.5–33)
MCHC RBC AUTO-ENTMCNC: 28 G/DL (ref 31.5–36.5)
MCHC RBC AUTO-ENTMCNC: 28 G/DL (ref 33–36)
MCHC RBC AUTO-ENTMCNC: 28 G/DL (ref 33–36)
MCHC RBC AUTO-ENTMCNC: 28 GM/DL (ref 33–36)
MCHC RBC AUTO-ENTMCNC: 28.1 G/DL (ref 31.5–36.5)
MCHC RBC AUTO-ENTMCNC: 28.3 G/DL (ref 31.5–36.5)
MCHC RBC AUTO-ENTMCNC: 28.4 G/DL (ref 31.5–36.5)
MCHC RBC AUTO-ENTMCNC: 28.6 G/DL (ref 31.5–36.5)
MCHC RBC AUTO-ENTMCNC: 28.7 G/DL (ref 31.5–36.5)
MCHC RBC AUTO-ENTMCNC: 28.7 G/DL (ref 31.5–36.5)
MCHC RBC AUTO-ENTMCNC: 28.8 G/DL (ref 31.5–36.5)
MCHC RBC AUTO-ENTMCNC: 28.9 G/DL (ref 31.5–36.5)
MCHC RBC AUTO-ENTMCNC: 29 G/DL (ref 31.5–36.5)
MCHC RBC AUTO-ENTMCNC: 29 G/DL (ref 33–36)
MCHC RBC AUTO-ENTMCNC: 29 G/DL (ref 33–36)
MCHC RBC AUTO-ENTMCNC: 29 GM/DL (ref 33–36)
MCHC RBC AUTO-ENTMCNC: 29.1 G/DL (ref 31.5–36.5)
MCHC RBC AUTO-ENTMCNC: 29.1 G/DL (ref 31.5–36.5)
MCHC RBC AUTO-ENTMCNC: 29.3 G/DL (ref 31.5–36.5)
MCHC RBC AUTO-ENTMCNC: 29.7 G/DL (ref 31.5–36.5)
MCHC RBC AUTO-ENTMCNC: 29.8 G/DL (ref 31.5–36.5)
MCHC RBC AUTO-ENTMCNC: 29.8 G/DL (ref 31.5–36.5)
MCHC RBC AUTO-ENTMCNC: 30 G/DL (ref 31.5–36.5)
MCHC RBC AUTO-ENTMCNC: 30 GM/DL (ref 33–36)
MCHC RBC AUTO-ENTMCNC: 30.3 G/DL (ref 31.5–36.5)
MCV RBC AUTO: 88 FL (ref 78–100)
MCV RBC AUTO: 88 FL (ref 78–100)
MCV RBC AUTO: 89 FL (ref 78–100)
MCV RBC AUTO: 90 FL (ref 78–100)
MCV RBC AUTO: 92 FL (ref 78–100)
MCV RBC AUTO: 92 FL (ref 80–100)
MCV RBC AUTO: 93 FL (ref 78–100)
MCV RBC AUTO: 93 FL (ref 80–100)
MCV RBC AUTO: 94 FL (ref 78–100)
MCV RBC AUTO: 94 FL (ref 80–100)
MCV RBC AUTO: 95 FL (ref 78–100)
MCV RBC AUTO: 95 FL (ref 78–100)
MCV RBC AUTO: 95 FL (ref 80–100)
MCV RBC AUTO: 96 FL (ref 78–100)
MCV RBC AUTO: 96 FL (ref 78–100)
MCV RBC AUTO: 96 FL (ref 80–100)
MCV RBC AUTO: 97 FL (ref 80–100)
MCV RBC AUTO: 98 FL (ref 78–100)
MCV RBC AUTO: 98 FL (ref 78–100)
MICRO REPORT STATUS: ABNORMAL
MICRO REPORT STATUS: NORMAL
MICROCYTES BLD QL SMEAR: PRESENT
MICROORGANISM SPEC CULT: ABNORMAL
MICROORGANISM SPEC CULT: ABNORMAL
MONOCYTES # BLD AUTO: 0.7 10E9/L (ref 0–1.3)
MONOCYTES # BLD AUTO: 0.8 10E9/L (ref 0–1.3)
MONOCYTES # BLD AUTO: 0.9 10E9/L (ref 0–1.3)
MONOCYTES # BLD AUTO: 1 10E9/L (ref 0–1.3)
MONOCYTES # BLD AUTO: 1.1 10E9/L (ref 0–1.3)
MONOCYTES # BLD AUTO: 1.2 10E9/L (ref 0–1.3)
MONOCYTES # BLD AUTO: 1.7 10E9/L (ref 0–1.3)
MONOCYTES NFR BLD AUTO: 10.1 %
MONOCYTES NFR BLD AUTO: 10.3 %
MONOCYTES NFR BLD AUTO: 10.4 %
MONOCYTES NFR BLD AUTO: 10.6 %
MONOCYTES NFR BLD AUTO: 11 %
MONOCYTES NFR BLD AUTO: 11.5 %
MONOCYTES NFR BLD AUTO: 11.6 %
MONOCYTES NFR BLD AUTO: 12.1 %
MONOCYTES NFR BLD AUTO: 12.4 %
MONOCYTES NFR BLD AUTO: 6 %
MONOCYTES NFR BLD AUTO: 6.2 %
MONOCYTES NFR BLD AUTO: 6.9 %
MONOCYTES NFR BLD AUTO: 8.2 %
MONOCYTES NFR BLD AUTO: 8.3 %
MONOCYTES NFR BLD AUTO: 8.4 %
MONOCYTES NFR BLD AUTO: 8.7 %
MONOCYTES NFR BLD AUTO: 8.7 %
MUCOUS THREADS #/AREA URNS LPF: PRESENT /LPF
NEUTROPHILS # BLD AUTO: 12 10E9/L (ref 1.6–8.3)
NEUTROPHILS # BLD AUTO: 14 10E9/L (ref 1.6–8.3)
NEUTROPHILS # BLD AUTO: 4 10E9/L (ref 1.6–8.3)
NEUTROPHILS # BLD AUTO: 4.7 10E9/L (ref 1.6–8.3)
NEUTROPHILS # BLD AUTO: 5 10E9/L (ref 1.6–8.3)
NEUTROPHILS # BLD AUTO: 5.3 10E9/L (ref 1.6–8.3)
NEUTROPHILS # BLD AUTO: 6.1 10E9/L (ref 1.6–8.3)
NEUTROPHILS # BLD AUTO: 6.2 10E9/L (ref 1.6–8.3)
NEUTROPHILS # BLD AUTO: 6.2 10E9/L (ref 1.6–8.3)
NEUTROPHILS # BLD AUTO: 6.9 10E9/L (ref 1.6–8.3)
NEUTROPHILS # BLD AUTO: 7 10E9/L (ref 1.6–8.3)
NEUTROPHILS # BLD AUTO: 7.3 10E9/L (ref 1.6–8.3)
NEUTROPHILS # BLD AUTO: 7.3 10E9/L (ref 1.6–8.3)
NEUTROPHILS # BLD AUTO: 7.8 10E9/L (ref 1.6–8.3)
NEUTROPHILS # BLD AUTO: 8.6 10E9/L (ref 1.6–8.3)
NEUTROPHILS # BLD AUTO: 8.8 10E9/L (ref 1.6–8.3)
NEUTROPHILS # BLD AUTO: 9.4 10E9/L (ref 1.6–8.3)
NEUTROPHILS NFR BLD AUTO: 65.9 %
NEUTROPHILS NFR BLD AUTO: 66.3 %
NEUTROPHILS NFR BLD AUTO: 67.2 %
NEUTROPHILS NFR BLD AUTO: 70.6 %
NEUTROPHILS NFR BLD AUTO: 71.3 %
NEUTROPHILS NFR BLD AUTO: 73 %
NEUTROPHILS NFR BLD AUTO: 74.1 %
NEUTROPHILS NFR BLD AUTO: 74.9 %
NEUTROPHILS NFR BLD AUTO: 75 %
NEUTROPHILS NFR BLD AUTO: 75 %
NEUTROPHILS NFR BLD AUTO: 75.6 %
NEUTROPHILS NFR BLD AUTO: 76.6 %
NEUTROPHILS NFR BLD AUTO: 78.2 %
NEUTROPHILS NFR BLD AUTO: 81.8 %
NEUTROPHILS NFR BLD AUTO: 84.6 %
NEUTROPHILS NFR BLD AUTO: 86.7 %
NEUTROPHILS NFR BLD AUTO: 89.3 %
NITRATE UR QL: NEGATIVE
NRBC # BLD AUTO: 0 10*3/UL
NRBC # BLD AUTO: 0.1 10*3/UL
NRBC BLD AUTO-RTO: 0 /100
NRBC BLD AUTO-RTO: 1 /100
NT-PROBNP SERPL-MCNC: 1090 PG/ML (ref 0–900)
NUM BPU REQUESTED: 1
NUM BPU REQUESTED: 2
NUM BPU REQUESTED: 2
PCO2 BLDV: 45 MM HG (ref 40–50)
PCO2 BLDV: 62 MM HG (ref 40–50)
PCO2 BLDV: 81 MM HG (ref 40–50)
PH BLDV: 7.3 PH (ref 7.32–7.43)
PH BLDV: 7.33 PH (ref 7.32–7.43)
PH BLDV: 7.4 PH (ref 7.32–7.43)
PH UR STRIP: 5.5 PH (ref 5–7)
PLATELET # BLD AUTO: 191 10E9/L (ref 150–450)
PLATELET # BLD AUTO: 192 10E9/L (ref 150–450)
PLATELET # BLD AUTO: 206 10E9/L (ref 150–450)
PLATELET # BLD AUTO: 227 10E9/L (ref 150–450)
PLATELET # BLD AUTO: 232 10E9/L (ref 150–450)
PLATELET # BLD AUTO: 237 10E9/L (ref 150–450)
PLATELET # BLD AUTO: 241 10E9/L (ref 150–450)
PLATELET # BLD AUTO: 244 10E9/L (ref 150–450)
PLATELET # BLD AUTO: 245 K/UL (ref 150–400)
PLATELET # BLD AUTO: 246 10E9/L (ref 150–450)
PLATELET # BLD AUTO: 258 10E9/L (ref 150–450)
PLATELET # BLD AUTO: 261 K/UL (ref 150–400)
PLATELET # BLD AUTO: 267 K/UL (ref 150–400)
PLATELET # BLD AUTO: 267 K/UL (ref 150–400)
PLATELET # BLD AUTO: 276 10E9/L (ref 150–450)
PLATELET # BLD AUTO: 282 10E9/L (ref 150–450)
PLATELET # BLD AUTO: 282 K/UL (ref 150–400)
PLATELET # BLD AUTO: 307 K/UL (ref 150–400)
PLATELET # BLD AUTO: 310 10E9/L (ref 150–450)
PLATELET # BLD AUTO: 310 K/UL (ref 150–400)
PLATELET # BLD AUTO: 311 10E9/L (ref 150–450)
PLATELET # BLD AUTO: 312 10E9/L (ref 150–450)
PLATELET # BLD AUTO: 314 10E9/L (ref 150–450)
PLATELET # BLD AUTO: 317 K/UL (ref 150–400)
PLATELET # BLD AUTO: 336 10E9/L (ref 150–450)
PLATELET # BLD AUTO: 338 10E9/L (ref 150–450)
PLATELET # BLD AUTO: 348 K/UL (ref 150–400)
PLATELET # BLD AUTO: 358 K/UL (ref 150–400)
PLATELET # BLD AUTO: 379 K/UL (ref 150–400)
PLATELET # BLD EST: NORMAL 10*3/UL
PO2 BLDV: 21 MM HG (ref 25–47)
PO2 BLDV: 27 MM HG (ref 25–47)
PO2 BLDV: 31 MM HG (ref 25–47)
POTASSIUM SERPL-SCNC: 3.8 MMOL/L (ref 3.4–5.3)
POTASSIUM SERPL-SCNC: 4 MMOL/L (ref 3.4–5.3)
POTASSIUM SERPL-SCNC: 4 MMOL/L (ref 3.4–5.3)
POTASSIUM SERPL-SCNC: 4.1 MMOL/L (ref 3.4–5.3)
POTASSIUM SERPL-SCNC: 4.2 MMOL/L (ref 3.4–5.3)
POTASSIUM SERPL-SCNC: 4.2 MMOL/L (ref 3.5–5.1)
POTASSIUM SERPL-SCNC: 4.3 MMOL/L (ref 3.4–5.3)
POTASSIUM SERPL-SCNC: 4.3 MMOL/L (ref 3.4–5.3)
POTASSIUM SERPL-SCNC: 4.3 MMOL/L (ref 3.5–5.1)
POTASSIUM SERPL-SCNC: 4.4 MMOL/L (ref 3.4–5.3)
POTASSIUM SERPL-SCNC: 4.5 MMOL/L (ref 3.4–5.3)
POTASSIUM SERPL-SCNC: 4.5 MMOL/L (ref 3.5–5.1)
POTASSIUM SERPL-SCNC: 4.6 MMOL/L (ref 3.4–5.3)
POTASSIUM SERPL-SCNC: 4.6 MMOL/L (ref 3.4–5.3)
POTASSIUM SERPL-SCNC: 4.6 MMOL/L (ref 3.5–5.1)
POTASSIUM SERPL-SCNC: 4.7 MMOL/L (ref 3.4–5.3)
POTASSIUM SERPL-SCNC: 4.8 MMOL/L (ref 3.4–5.3)
POTASSIUM SERPL-SCNC: 4.8 MMOL/L (ref 3.4–5.3)
POTASSIUM SERPL-SCNC: 4.8 MMOL/L (ref 3.5–5.1)
POTASSIUM SERPL-SCNC: 5 MMOL/L (ref 3.4–5.3)
POTASSIUM SERPL-SCNC: 5 MMOL/L (ref 3.5–5.1)
POTASSIUM SERPL-SCNC: 5 MMOL/L (ref 3.5–5.1)
POTASSIUM SERPL-SCNC: 5.1 MMOL/L (ref 3.4–5.3)
POTASSIUM SERPL-SCNC: 5.2 MMOL/L (ref 3.5–5.1)
POTASSIUM SERPL-SCNC: 5.2 MMOL/L (ref 3.5–5.1)
POTASSIUM SERPL-SCNC: 5.4 MMOL/L (ref 3.5–5.1)
POTASSIUM SERPL-SCNC: 5.6 MMOL/L (ref 3.5–5.1)
POTASSIUM SERPL-SCNC: 6 MMOL/L (ref 3.5–5.1)
PROT PATTERN SERPL ELPH-IMP: ABNORMAL
PROT SERPL-MCNC: 6.2 G/DL (ref 6.8–8.8)
PROT SERPL-MCNC: 6.8 G/DL (ref 6.8–8.8)
PROT SERPL-MCNC: 7 G/DL (ref 6.8–8.8)
PROT SERPL-MCNC: 7.1 G/DL (ref 6.8–8.8)
PROT SERPL-MCNC: 7.2 G/DL (ref 6.4–8.2)
PROT SERPL-MCNC: 7.2 G/DL (ref 6.8–8.8)
PROT SERPL-MCNC: 7.2 G/DL (ref 6.8–8.8)
PROT SERPL-MCNC: 7.3 G/DL (ref 6.8–8.8)
PROT SERPL-MCNC: 7.3 G/DL (ref 6.8–8.8)
PROT SERPL-MCNC: 7.5 G/DL (ref 6.8–8.8)
PROT SERPL-MCNC: 7.7 G/DL (ref 6.8–8.8)
PSA SERPL-MCNC: 1100 UG/L (ref 0–4)
PSA SERPL-MCNC: 210.71 UG/L (ref 0–4)
PSA SERPL-MCNC: 249 UG/L (ref 0–4)
PSA SERPL-MCNC: 300 UG/L (ref 0–4)
PSA SERPL-MCNC: 405 UG/L (ref 0–4)
PSA SERPL-MCNC: 598 UG/L (ref 0–4)
PSA SERPL-MCNC: 893 UG/L (ref 0–4)
RBC # BLD AUTO: 1.99 M/UL (ref 4.6–6.2)
RBC # BLD AUTO: 1.99 M/UL (ref 4.6–6.2)
RBC # BLD AUTO: 2.19 10E12/L (ref 4.4–5.9)
RBC # BLD AUTO: 2.35 M/UL (ref 4.6–6.2)
RBC # BLD AUTO: 2.44 10E12/L (ref 4.4–5.9)
RBC # BLD AUTO: 2.62 10E12/L (ref 4.4–5.9)
RBC # BLD AUTO: 2.62 M/UL (ref 4.6–6.2)
RBC # BLD AUTO: 2.71 10E12/L (ref 4.4–5.9)
RBC # BLD AUTO: 2.74 M/UL (ref 4.6–6.2)
RBC # BLD AUTO: 2.77 10E12/L (ref 4.4–5.9)
RBC # BLD AUTO: 2.8 10E12/L (ref 4.4–5.9)
RBC # BLD AUTO: 2.88 10E12/L (ref 4.4–5.9)
RBC # BLD AUTO: 2.93 M/UL (ref 4.6–6.2)
RBC # BLD AUTO: 2.94 M/UL (ref 4.6–6.2)
RBC # BLD AUTO: 2.97 10E12/L (ref 4.4–5.9)
RBC # BLD AUTO: 2.97 10E12/L (ref 4.4–5.9)
RBC # BLD AUTO: 3.01 10E12/L (ref 4.4–5.9)
RBC # BLD AUTO: 3.13 M/UL (ref 4.8–6.2)
RBC # BLD AUTO: 3.14 M/UL (ref 4.6–6.2)
RBC # BLD AUTO: 3.17 M/UL (ref 4.6–6.2)
RBC # BLD AUTO: 3.28 10E12/L (ref 4.4–5.9)
RBC # BLD AUTO: 3.29 10E12/L (ref 4.4–5.9)
RBC # BLD AUTO: 3.35 10E12/L (ref 4.4–5.9)
RBC # BLD AUTO: 3.38 10E12/L (ref 4.4–5.9)
RBC # BLD AUTO: 3.51 M/UL (ref 4.6–6.2)
RBC # BLD AUTO: 3.56 10E12/L (ref 4.4–5.9)
RBC # BLD AUTO: 3.59 10E12/L (ref 4.4–5.9)
RBC # BLD AUTO: 3.6 10E12/L (ref 4.4–5.9)
RBC # BLD AUTO: 3.94 10E12/L (ref 4.4–5.9)
RBC #/AREA URNS AUTO: <1 /HPF (ref 0–2)
RBC INCLUSIONS BLD: SLIGHT
RETICS # AUTO: 41.8 10E9/L (ref 25–95)
RETICS/RBC NFR AUTO: 1.4 % (ref 0.5–2)
RVPLETH-%PRED-PRE: 141 %
RVPLETH-PRE: 3.84 L
RVPLETH-PRED: 2.71 L
SAO2 % BLDV FROM PO2: 29 %
SAO2 % BLDV FROM PO2: 41 %
SAO2 % BLDV FROM PO2: 58 %
SODIUM SERPL-SCNC: 138 MMOL/L (ref 133–144)
SODIUM SERPL-SCNC: 139 MMOL/L (ref 133–144)
SODIUM SERPL-SCNC: 140 MMOL/L (ref 133–144)
SODIUM SERPL-SCNC: 141 MMOL/L (ref 133–144)
SODIUM SERPL-SCNC: 141 MMOL/L (ref 136–145)
SODIUM SERPL-SCNC: 141 MMOL/L (ref 136–145)
SODIUM SERPL-SCNC: 142 MMOL/L (ref 133–144)
SODIUM SERPL-SCNC: 142 MMOL/L (ref 136–145)
SODIUM SERPL-SCNC: 142 MMOL/L (ref 136–145)
SODIUM SERPL-SCNC: 143 MMOL/L (ref 133–144)
SODIUM SERPL-SCNC: 143 MMOL/L (ref 133–144)
SODIUM SERPL-SCNC: 143 MMOL/L (ref 136–145)
SODIUM SERPL-SCNC: 144 MMOL/L (ref 133–144)
SODIUM SERPL-SCNC: 144 MMOL/L (ref 136–145)
SODIUM SERPL-SCNC: 145 MMOL/L (ref 133–144)
SODIUM SERPL-SCNC: 145 MMOL/L (ref 133–144)
SODIUM SERPL-SCNC: 145 MMOL/L (ref 136–145)
SODIUM SERPL-SCNC: 146 MMOL/L (ref 133–144)
SODIUM SERPL-SCNC: 146 MMOL/L (ref 136–145)
SODIUM SERPL-SCNC: 146 MMOL/L (ref 136–145)
SP GR UR STRIP: 1.02 (ref 1–1.03)
SPECIMEN EXP DATE BLD: NORMAL
SPECIMEN SOURCE: ABNORMAL
SPECIMEN SOURCE: NORMAL
SQUAMOUS #/AREA URNS AUTO: <1 /HPF (ref 0–1)
TIBC SERPL-MCNC: 159 UG/DL (ref 240–430)
TLCPLETH-%PRED-PRE: 88 %
TLCPLETH-PRE: 6.48 L
TLCPLETH-PRED: 7.33 L
TRANS CELLS #/AREA URNS HPF: <1 /HPF (ref 0–1)
TRANSFERRIN SERPL-MCNC: 120 MG/DL (ref 210–360)
TRANSFUSION STATUS PATIENT QL: NORMAL
TRIGL SERPL-MCNC: 166 MG/DL
TRIGL SERPL-MCNC: 174 MG/DL
TROPONIN I BLD-MCNC: 0 UG/L (ref 0–0.1)
TROPONIN I SERPL-MCNC: <0.015 UG/L (ref 0–0.04)
TROPONIN I SERPL-MCNC: NORMAL UG/L (ref 0–0.04)
TROPONIN I SERPL-MCNC: NORMAL UG/L (ref 0–0.04)
TSH SERPL-ACNC: 0.57 UIU/ML (ref 0.36–3.74)
URN SPEC COLLECT METH UR: ABNORMAL
UROBILINOGEN UR STRIP-MCNC: NORMAL MG/DL (ref 0–2)
VA-%PRED-PRE: 63 %
VA-PRE: 4.47 L
VANCOMYCIN SERPL-MCNC: 23.3 MG/L
VANCOMYCIN SERPL-MCNC: 27 MG/L
VC-%PRED-PRE: 53 %
VC-PRE: 2.64 L
VC-PRED: 4.9 L
VIT B12 SERPL-MCNC: 607 PG/ML (ref 193–986)
WBC # BLD AUTO: 10.8 10E9/L (ref 4–11)
WBC # BLD AUTO: 11.4 10E9/L (ref 4–11)
WBC # BLD AUTO: 11.8 10E9/L (ref 4–11)
WBC # BLD AUTO: 12.6 K/UL (ref 4.3–10.8)
WBC # BLD AUTO: 12.6 K/UL (ref 4.3–10.8)
WBC # BLD AUTO: 15.3 10E9/L (ref 4–11)
WBC # BLD AUTO: 15.7 10E9/L (ref 4–11)
WBC # BLD AUTO: 6 10E9/L (ref 4–11)
WBC # BLD AUTO: 6.5 K/UL (ref 4.3–10.8)
WBC # BLD AUTO: 6.7 10E9/L (ref 4–11)
WBC # BLD AUTO: 7.4 10E9/L (ref 4–11)
WBC # BLD AUTO: 7.5 10E9/L (ref 4–11)
WBC # BLD AUTO: 7.8 K/UL (ref 4.3–10.8)
WBC # BLD AUTO: 8 10E9/L (ref 4–11)
WBC # BLD AUTO: 8 K/UL (ref 4.3–10.8)
WBC # BLD AUTO: 8.1 10E9/L (ref 4–11)
WBC # BLD AUTO: 8.1 K/UL (ref 4.3–10.8)
WBC # BLD AUTO: 8.2 10E9/L (ref 4–11)
WBC # BLD AUTO: 8.5 10E9/L (ref 4–11)
WBC # BLD AUTO: 8.6 K/UL (ref 4.3–10.8)
WBC # BLD AUTO: 8.7 10E9/L (ref 4–11)
WBC # BLD AUTO: 8.7 K/UL (ref 4.3–10.8)
WBC # BLD AUTO: 8.7 K/UL (ref 4.3–10.8)
WBC # BLD AUTO: 9.2 10E9/L (ref 4–11)
WBC # BLD AUTO: 9.4 10E9/L (ref 4–11)
WBC # BLD AUTO: 9.5 10E9/L (ref 4–11)
WBC # BLD AUTO: 9.6 K/UL (ref 4.3–10.8)
WBC # BLD AUTO: 9.7 K/UL (ref 4.3–10.8)
WBC # BLD AUTO: 9.9 10E9/L (ref 4–11)
WBC #/AREA URNS AUTO: 2 /HPF (ref 0–2)

## 2017-01-01 PROCEDURE — 85025 COMPLETE CBC W/AUTO DIFF WBC: CPT | Performed by: EMERGENCY MEDICINE

## 2017-01-01 PROCEDURE — 36415 COLL VENOUS BLD VENIPUNCTURE: CPT | Performed by: INTERNAL MEDICINE

## 2017-01-01 PROCEDURE — 00000146 ZZHCL STATISTIC GLUCOSE BY METER IP

## 2017-01-01 PROCEDURE — 96361 HYDRATE IV INFUSION ADD-ON: CPT

## 2017-01-01 PROCEDURE — 25000132 ZZH RX MED GY IP 250 OP 250 PS 637: Performed by: HOSPITALIST

## 2017-01-01 PROCEDURE — 36430 TRANSFUSION BLD/BLD COMPNT: CPT

## 2017-01-01 PROCEDURE — 25000125 ZZHC RX 250: Performed by: INTERNAL MEDICINE

## 2017-01-01 PROCEDURE — 99219 ZZC INITIAL OBSERVATION CARE,LEVL II: CPT | Mod: Z6 | Performed by: PHYSICIAN ASSISTANT

## 2017-01-01 PROCEDURE — 93005 ELECTROCARDIOGRAM TRACING: CPT

## 2017-01-01 PROCEDURE — 86901 BLOOD TYPING SEROLOGIC RH(D): CPT | Performed by: INTERNAL MEDICINE

## 2017-01-01 PROCEDURE — 25000132 ZZH RX MED GY IP 250 OP 250 PS 637: Performed by: PHYSICIAN ASSISTANT

## 2017-01-01 PROCEDURE — 40000847 ZZHCL STATISTIC MORPHOLOGY W/INTERP HISTOLOGY TC 85060: Performed by: INTERNAL MEDICINE

## 2017-01-01 PROCEDURE — 25000125 ZZHC RX 250: Performed by: EMERGENCY MEDICINE

## 2017-01-01 PROCEDURE — 85610 PROTHROMBIN TIME: CPT | Performed by: EMERGENCY MEDICINE

## 2017-01-01 PROCEDURE — 97161 PT EVAL LOW COMPLEX 20 MIN: CPT | Mod: GP | Performed by: PHYSICAL THERAPIST

## 2017-01-01 PROCEDURE — 86923 COMPATIBILITY TEST ELECTRIC: CPT

## 2017-01-01 PROCEDURE — 99285 EMERGENCY DEPT VISIT HI MDM: CPT | Mod: Z6 | Performed by: EMERGENCY MEDICINE

## 2017-01-01 PROCEDURE — 84153 ASSAY OF PSA TOTAL: CPT | Performed by: INTERNAL MEDICINE

## 2017-01-01 PROCEDURE — 90662 IIV NO PRSV INCREASED AG IM: CPT | Mod: ZF | Performed by: INTERNAL MEDICINE

## 2017-01-01 PROCEDURE — 40000556 ZZH STATISTIC PERIPHERAL IV START W US GUIDANCE

## 2017-01-01 PROCEDURE — 40000894 ZZH STATISTIC OT IP EVAL DEFER

## 2017-01-01 PROCEDURE — 40000275 ZZH STATISTIC RCP TIME EA 10 MIN

## 2017-01-01 PROCEDURE — 99285 EMERGENCY DEPT VISIT HI MDM: CPT | Mod: 25 | Performed by: EMERGENCY MEDICINE

## 2017-01-01 PROCEDURE — 83010 ASSAY OF HAPTOGLOBIN QUANT: CPT | Performed by: INTERNAL MEDICINE

## 2017-01-01 PROCEDURE — 83605 ASSAY OF LACTIC ACID: CPT | Performed by: EMERGENCY MEDICINE

## 2017-01-01 PROCEDURE — 99309 SBSQ NF CARE MODERATE MDM 30: CPT | Performed by: NURSE PRACTITIONER

## 2017-01-01 PROCEDURE — 85025 COMPLETE CBC W/AUTO DIFF WBC: CPT | Performed by: INTERNAL MEDICINE

## 2017-01-01 PROCEDURE — 99215 OFFICE O/P EST HI 40 MIN: CPT | Performed by: INTERNAL MEDICINE

## 2017-01-01 PROCEDURE — 97530 THERAPEUTIC ACTIVITIES: CPT | Mod: GP | Performed by: REHABILITATION PRACTITIONER

## 2017-01-01 PROCEDURE — 86140 C-REACTIVE PROTEIN: CPT | Performed by: PHYSICIAN ASSISTANT

## 2017-01-01 PROCEDURE — 84484 ASSAY OF TROPONIN QUANT: CPT | Performed by: EMERGENCY MEDICINE

## 2017-01-01 PROCEDURE — 81001 URINALYSIS AUTO W/SCOPE: CPT | Performed by: INTERNAL MEDICINE

## 2017-01-01 PROCEDURE — 80053 COMPREHEN METABOLIC PANEL: CPT | Performed by: INTERNAL MEDICINE

## 2017-01-01 PROCEDURE — 36415 COLL VENOUS BLD VENIPUNCTURE: CPT

## 2017-01-01 PROCEDURE — 84484 ASSAY OF TROPONIN QUANT: CPT

## 2017-01-01 PROCEDURE — 00000402 ZZHCL STATISTIC TOTAL PROTEIN: Performed by: INTERNAL MEDICINE

## 2017-01-01 PROCEDURE — 94640 AIRWAY INHALATION TREATMENT: CPT

## 2017-01-01 PROCEDURE — 96401 CHEMO ANTI-NEOPL SQ/IM: CPT

## 2017-01-01 PROCEDURE — 25000125 ZZHC RX 250: Performed by: PHYSICIAN ASSISTANT

## 2017-01-01 PROCEDURE — 25000128 H RX IP 250 OP 636: Performed by: EMERGENCY MEDICINE

## 2017-01-01 PROCEDURE — 87186 SC STD MICRODIL/AGAR DIL: CPT | Performed by: EMERGENCY MEDICINE

## 2017-01-01 PROCEDURE — 77790 RADIATION HANDLING: CPT

## 2017-01-01 PROCEDURE — 85027 COMPLETE CBC AUTOMATED: CPT | Performed by: INTERNAL MEDICINE

## 2017-01-01 PROCEDURE — 99233 SBSQ HOSP IP/OBS HIGH 50: CPT | Performed by: INTERNAL MEDICINE

## 2017-01-01 PROCEDURE — 99239 HOSP IP/OBS DSCHRG MGMT >30: CPT | Performed by: HOSPITALIST

## 2017-01-01 PROCEDURE — 99310 SBSQ NF CARE HIGH MDM 45: CPT | Performed by: NURSE PRACTITIONER

## 2017-01-01 PROCEDURE — 99212 OFFICE O/P EST SF 10 MIN: CPT | Mod: ZF

## 2017-01-01 PROCEDURE — 82565 ASSAY OF CREATININE: CPT | Performed by: INTERNAL MEDICINE

## 2017-01-01 PROCEDURE — 40000193 ZZH STATISTIC PT WARD VISIT: Performed by: REHABILITATION PRACTITIONER

## 2017-01-01 PROCEDURE — 12000008 ZZH R&B INTERMEDIATE UMMC

## 2017-01-01 PROCEDURE — 93005 ELECTROCARDIOGRAM TRACING: CPT | Performed by: EMERGENCY MEDICINE

## 2017-01-01 PROCEDURE — 86140 C-REACTIVE PROTEIN: CPT | Performed by: EMERGENCY MEDICINE

## 2017-01-01 PROCEDURE — 99212 OFFICE O/P EST SF 10 MIN: CPT | Mod: ZF,25

## 2017-01-01 PROCEDURE — 96372 THER/PROPH/DIAG INJ SC/IM: CPT

## 2017-01-01 PROCEDURE — 71020 XR CHEST 2 VW: CPT | Mod: 77

## 2017-01-01 PROCEDURE — 84165 PROTEIN E-PHORESIS SERUM: CPT | Performed by: INTERNAL MEDICINE

## 2017-01-01 PROCEDURE — 99214 OFFICE O/P EST MOD 30 MIN: CPT | Mod: ZP | Performed by: PHYSICIAN ASSISTANT

## 2017-01-01 PROCEDURE — 97140 MANUAL THERAPY 1/> REGIONS: CPT | Mod: GO | Performed by: OCCUPATIONAL THERAPIST

## 2017-01-01 PROCEDURE — 99212 OFFICE O/P EST SF 10 MIN: CPT | Mod: 25

## 2017-01-01 PROCEDURE — 96374 THER/PROPH/DIAG INJ IV PUSH: CPT | Performed by: EMERGENCY MEDICINE

## 2017-01-01 PROCEDURE — 36415 COLL VENOUS BLD VENIPUNCTURE: CPT | Performed by: EMERGENCY MEDICINE

## 2017-01-01 PROCEDURE — 40000274 ZZH STATISTIC RCP CONSULT EA 30 MIN

## 2017-01-01 PROCEDURE — 86901 BLOOD TYPING SEROLOGIC RH(D): CPT

## 2017-01-01 PROCEDURE — 93010 ELECTROCARDIOGRAM REPORT: CPT | Mod: Z6 | Performed by: EMERGENCY MEDICINE

## 2017-01-01 PROCEDURE — 25000128 H RX IP 250 OP 636: Performed by: INTERNAL MEDICINE

## 2017-01-01 PROCEDURE — 25000132 ZZH RX MED GY IP 250 OP 250 PS 637: Performed by: INTERNAL MEDICINE

## 2017-01-01 PROCEDURE — 00000346 ZZHCL STATISTIC REVIEW OUTSIDE SLIDES TC 88321: Performed by: INTERNAL MEDICINE

## 2017-01-01 PROCEDURE — P9016 RBC LEUKOCYTES REDUCED: HCPCS | Performed by: INTERNAL MEDICINE

## 2017-01-01 PROCEDURE — 82310 ASSAY OF CALCIUM: CPT | Performed by: INTERNAL MEDICINE

## 2017-01-01 PROCEDURE — 99207 ZZC CDG-CORRECTLY CODED, REVIEWED AND AGREE: CPT | Performed by: NURSE PRACTITIONER

## 2017-01-01 PROCEDURE — 82746 ASSAY OF FOLIC ACID SERUM: CPT | Performed by: INTERNAL MEDICINE

## 2017-01-01 PROCEDURE — 86900 BLOOD TYPING SEROLOGIC ABO: CPT | Performed by: EMERGENCY MEDICINE

## 2017-01-01 PROCEDURE — 87077 CULTURE AEROBIC IDENTIFY: CPT | Performed by: INTERNAL MEDICINE

## 2017-01-01 PROCEDURE — 86900 BLOOD TYPING SEROLOGIC ABO: CPT

## 2017-01-01 PROCEDURE — 80053 COMPREHEN METABOLIC PANEL: CPT | Performed by: EMERGENCY MEDICINE

## 2017-01-01 PROCEDURE — 86923 COMPATIBILITY TEST ELECTRIC: CPT | Performed by: EMERGENCY MEDICINE

## 2017-01-01 PROCEDURE — 94640 AIRWAY INHALATION TREATMENT: CPT | Performed by: EMERGENCY MEDICINE

## 2017-01-01 PROCEDURE — 87070 CULTURE OTHR SPECIMN AEROBIC: CPT | Performed by: INTERNAL MEDICINE

## 2017-01-01 PROCEDURE — 99232 SBSQ HOSP IP/OBS MODERATE 35: CPT | Performed by: INTERNAL MEDICINE

## 2017-01-01 PROCEDURE — 40000193 ZZH STATISTIC PT WARD VISIT: Performed by: PHYSICAL THERAPIST

## 2017-01-01 PROCEDURE — 80048 BASIC METABOLIC PNL TOTAL CA: CPT | Performed by: PHYSICIAN ASSISTANT

## 2017-01-01 PROCEDURE — 83615 LACTATE (LD) (LDH) ENZYME: CPT | Performed by: INTERNAL MEDICINE

## 2017-01-01 PROCEDURE — 86900 BLOOD TYPING SEROLOGIC ABO: CPT | Performed by: INTERNAL MEDICINE

## 2017-01-01 PROCEDURE — 82728 ASSAY OF FERRITIN: CPT | Performed by: INTERNAL MEDICINE

## 2017-01-01 PROCEDURE — 00000159 ZZHCL STATISTIC H-SEND OUTS PREP: Performed by: INTERNAL MEDICINE

## 2017-01-01 PROCEDURE — 99233 SBSQ HOSP IP/OBS HIGH 50: CPT | Mod: GC | Performed by: INTERNAL MEDICINE

## 2017-01-01 PROCEDURE — 94640 AIRWAY INHALATION TREATMENT: CPT | Mod: 76

## 2017-01-01 PROCEDURE — 83605 ASSAY OF LACTIC ACID: CPT | Performed by: INTERNAL MEDICINE

## 2017-01-01 PROCEDURE — 99214 OFFICE O/P EST MOD 30 MIN: CPT | Performed by: UROLOGY

## 2017-01-01 PROCEDURE — A9606 RADIUM RA223 DICHLORIDE THER: HCPCS | Performed by: INTERNAL MEDICINE

## 2017-01-01 PROCEDURE — 25000131 ZZH RX MED GY IP 250 OP 636 PS 637: Performed by: INTERNAL MEDICINE

## 2017-01-01 PROCEDURE — 87800 DETECT AGNT MULT DNA DIREC: CPT | Performed by: EMERGENCY MEDICINE

## 2017-01-01 PROCEDURE — 99284 EMERGENCY DEPT VISIT MOD MDM: CPT | Mod: Z6 | Performed by: EMERGENCY MEDICINE

## 2017-01-01 PROCEDURE — 80048 BASIC METABOLIC PNL TOTAL CA: CPT | Performed by: EMERGENCY MEDICINE

## 2017-01-01 PROCEDURE — 99211 OFF/OP EST MAY X REQ PHY/QHP: CPT | Mod: 25

## 2017-01-01 PROCEDURE — 86850 RBC ANTIBODY SCREEN: CPT | Performed by: INTERNAL MEDICINE

## 2017-01-01 PROCEDURE — 34400006 ZZH RX 344: Performed by: INTERNAL MEDICINE

## 2017-01-01 PROCEDURE — 86880 COOMBS TEST DIRECT: CPT | Performed by: INTERNAL MEDICINE

## 2017-01-01 PROCEDURE — 83605 ASSAY OF LACTIC ACID: CPT

## 2017-01-01 PROCEDURE — 99214 OFFICE O/P EST MOD 30 MIN: CPT | Performed by: INTERNAL MEDICINE

## 2017-01-01 PROCEDURE — 82803 BLOOD GASES ANY COMBINATION: CPT

## 2017-01-01 PROCEDURE — 25000132 ZZH RX MED GY IP 250 OP 250 PS 637: Performed by: EMERGENCY MEDICINE

## 2017-01-01 PROCEDURE — P9016 RBC LEUKOCYTES REDUCED: HCPCS | Performed by: EMERGENCY MEDICINE

## 2017-01-01 PROCEDURE — 97166 OT EVAL MOD COMPLEX 45 MIN: CPT | Mod: GO

## 2017-01-01 PROCEDURE — 25000132 ZZH RX MED GY IP 250 OP 250 PS 637: Performed by: NURSE PRACTITIONER

## 2017-01-01 PROCEDURE — 99284 EMERGENCY DEPT VISIT MOD MDM: CPT | Mod: 25 | Performed by: EMERGENCY MEDICINE

## 2017-01-01 PROCEDURE — 36415 COLL VENOUS BLD VENIPUNCTURE: CPT | Performed by: PHYSICIAN ASSISTANT

## 2017-01-01 PROCEDURE — 99309 SBSQ NF CARE MODERATE MDM 30: CPT | Performed by: INTERNAL MEDICINE

## 2017-01-01 PROCEDURE — 96372 THER/PROPH/DIAG INJ SC/IM: CPT | Mod: 59

## 2017-01-01 PROCEDURE — 99285 EMERGENCY DEPT VISIT HI MDM: CPT | Mod: 25

## 2017-01-01 PROCEDURE — 88321 CONSLTJ&REPRT SLD PREP ELSWR: CPT | Performed by: INTERNAL MEDICINE

## 2017-01-01 PROCEDURE — 80202 ASSAY OF VANCOMYCIN: CPT | Performed by: INTERNAL MEDICINE

## 2017-01-01 PROCEDURE — 84484 ASSAY OF TROPONIN QUANT: CPT | Mod: 91 | Performed by: EMERGENCY MEDICINE

## 2017-01-01 PROCEDURE — 83880 ASSAY OF NATRIURETIC PEPTIDE: CPT | Performed by: EMERGENCY MEDICINE

## 2017-01-01 PROCEDURE — 99214 OFFICE O/P EST MOD 30 MIN: CPT | Performed by: PHYSICIAN ASSISTANT

## 2017-01-01 PROCEDURE — 99223 1ST HOSP IP/OBS HIGH 75: CPT | Mod: AI | Performed by: INTERNAL MEDICINE

## 2017-01-01 PROCEDURE — 99211 OFF/OP EST MAY X REQ PHY/QHP: CPT

## 2017-01-01 PROCEDURE — 71010 XR CHEST PORT 1 VW: CPT

## 2017-01-01 PROCEDURE — 83540 ASSAY OF IRON: CPT | Performed by: INTERNAL MEDICINE

## 2017-01-01 PROCEDURE — 97530 THERAPEUTIC ACTIVITIES: CPT | Mod: GP | Performed by: PHYSICAL THERAPIST

## 2017-01-01 PROCEDURE — 87077 CULTURE AEROBIC IDENTIFY: CPT | Performed by: EMERGENCY MEDICINE

## 2017-01-01 PROCEDURE — 87186 SC STD MICRODIL/AGAR DIL: CPT | Performed by: INTERNAL MEDICINE

## 2017-01-01 PROCEDURE — 87205 SMEAR GRAM STAIN: CPT | Performed by: INTERNAL MEDICINE

## 2017-01-01 PROCEDURE — 96360 HYDRATION IV INFUSION INIT: CPT | Performed by: EMERGENCY MEDICINE

## 2017-01-01 PROCEDURE — 25000125 ZZHC RX 250

## 2017-01-01 PROCEDURE — 85025 COMPLETE CBC W/AUTO DIFF WBC: CPT | Performed by: PHYSICIAN ASSISTANT

## 2017-01-01 PROCEDURE — 99212 OFFICE O/P EST SF 10 MIN: CPT

## 2017-01-01 PROCEDURE — 85018 HEMOGLOBIN: CPT | Mod: 91 | Performed by: EMERGENCY MEDICINE

## 2017-01-01 PROCEDURE — 25800025 ZZH RX 258: Performed by: INTERNAL MEDICINE

## 2017-01-01 PROCEDURE — 12000001 ZZH R&B MED SURG/OB UMMC

## 2017-01-01 PROCEDURE — P9016 RBC LEUKOCYTES REDUCED: HCPCS

## 2017-01-01 PROCEDURE — 86901 BLOOD TYPING SEROLOGIC RH(D): CPT | Performed by: EMERGENCY MEDICINE

## 2017-01-01 PROCEDURE — 25000128 H RX IP 250 OP 636: Mod: ZF | Performed by: INTERNAL MEDICINE

## 2017-01-01 PROCEDURE — G0378 HOSPITAL OBSERVATION PER HR: HCPCS

## 2017-01-01 PROCEDURE — 97140 MANUAL THERAPY 1/> REGIONS: CPT | Mod: GO

## 2017-01-01 PROCEDURE — 84466 ASSAY OF TRANSFERRIN: CPT | Performed by: INTERNAL MEDICINE

## 2017-01-01 PROCEDURE — 86923 COMPATIBILITY TEST ELECTRIC: CPT | Performed by: INTERNAL MEDICINE

## 2017-01-01 PROCEDURE — 99282 EMERGENCY DEPT VISIT SF MDM: CPT

## 2017-01-01 PROCEDURE — 81301 MICROSATELLITE INSTABILITY: CPT | Performed by: INTERNAL MEDICINE

## 2017-01-01 PROCEDURE — 87040 BLOOD CULTURE FOR BACTERIA: CPT | Performed by: EMERGENCY MEDICINE

## 2017-01-01 PROCEDURE — G0008 ADMIN INFLUENZA VIRUS VAC: HCPCS | Mod: ZF

## 2017-01-01 PROCEDURE — 99207 ZZC CDG-CORRECTLY CODED, REVIEWED AND AGREE: CPT | Performed by: INTERNAL MEDICINE

## 2017-01-01 PROCEDURE — 87086 URINE CULTURE/COLONY COUNT: CPT | Performed by: INTERNAL MEDICINE

## 2017-01-01 PROCEDURE — 82607 VITAMIN B-12: CPT | Performed by: INTERNAL MEDICINE

## 2017-01-01 PROCEDURE — 80048 BASIC METABOLIC PNL TOTAL CA: CPT | Performed by: INTERNAL MEDICINE

## 2017-01-01 PROCEDURE — 99207 ZZC APP CREDIT; MD BILLING SHARED VISIT: CPT | Mod: Z6 | Performed by: EMERGENCY MEDICINE

## 2017-01-01 PROCEDURE — 87040 BLOOD CULTURE FOR BACTERIA: CPT | Performed by: INTERNAL MEDICINE

## 2017-01-01 PROCEDURE — 83550 IRON BINDING TEST: CPT | Performed by: INTERNAL MEDICINE

## 2017-01-01 PROCEDURE — 96365 THER/PROPH/DIAG IV INF INIT: CPT

## 2017-01-01 PROCEDURE — 96367 TX/PROPH/DG ADDL SEQ IV INF: CPT

## 2017-01-01 PROCEDURE — G0008 ADMIN INFLUENZA VIRUS VAC: HCPCS

## 2017-01-01 PROCEDURE — 40000141 ZZH STATISTIC PERIPHERAL IV START W/O US GUIDANCE

## 2017-01-01 PROCEDURE — 40000133 ZZH STATISTIC OT WARD VISIT

## 2017-01-01 PROCEDURE — 99284 EMERGENCY DEPT VISIT MOD MDM: CPT | Performed by: EMERGENCY MEDICINE

## 2017-01-01 PROCEDURE — 86850 RBC ANTIBODY SCREEN: CPT

## 2017-01-01 PROCEDURE — 96372 THER/PROPH/DIAG INJ SC/IM: CPT | Mod: ZF

## 2017-01-01 PROCEDURE — 86850 RBC ANTIBODY SCREEN: CPT | Performed by: EMERGENCY MEDICINE

## 2017-01-01 PROCEDURE — 99217 ZZC OBSERVATION CARE DISCHARGE: CPT | Mod: Z6 | Performed by: FAMILY MEDICINE

## 2017-01-01 PROCEDURE — 85045 AUTOMATED RETICULOCYTE COUNT: CPT | Performed by: INTERNAL MEDICINE

## 2017-01-01 PROCEDURE — 85652 RBC SED RATE AUTOMATED: CPT | Performed by: EMERGENCY MEDICINE

## 2017-01-01 PROCEDURE — 85060 BLOOD SMEAR INTERPRETATION: CPT | Performed by: INTERNAL MEDICINE

## 2017-01-01 PROCEDURE — 40000133 ZZH STATISTIC OT WARD VISIT: Performed by: OCCUPATIONAL THERAPIST

## 2017-01-01 RX ORDER — ALBUTEROL SULFATE 0.83 MG/ML
2.5 SOLUTION RESPIRATORY (INHALATION) ONCE
Status: COMPLETED | OUTPATIENT
Start: 2017-01-01 | End: 2017-01-01

## 2017-01-01 RX ORDER — PREDNISONE 5 MG/1
5 TABLET ORAL 2 TIMES DAILY
Qty: 60 TABLET | Refills: 0 | Status: SHIPPED | OUTPATIENT
Start: 2017-01-01 | End: 2017-01-01

## 2017-01-01 RX ORDER — BACITRACIN ZINC 500 [USP'U]/G
OINTMENT TOPICAL AT BEDTIME
Status: DISCONTINUED | OUTPATIENT
Start: 2017-01-01 | End: 2017-01-01 | Stop reason: HOSPADM

## 2017-01-01 RX ORDER — DEXTROSE MONOHYDRATE 25 G/50ML
25-50 INJECTION, SOLUTION INTRAVENOUS
Status: DISCONTINUED | OUTPATIENT
Start: 2017-01-01 | End: 2017-01-01 | Stop reason: HOSPADM

## 2017-01-01 RX ORDER — ONDANSETRON 4 MG/1
4 TABLET, ORALLY DISINTEGRATING ORAL EVERY 6 HOURS PRN
Status: DISCONTINUED | OUTPATIENT
Start: 2017-01-01 | End: 2017-01-01 | Stop reason: HOSPADM

## 2017-01-01 RX ORDER — ATORVASTATIN CALCIUM 10 MG/1
10 TABLET, FILM COATED ORAL AT BEDTIME
Status: DISCONTINUED | OUTPATIENT
Start: 2017-01-01 | End: 2017-01-01 | Stop reason: HOSPADM

## 2017-01-01 RX ORDER — POLYVINYL ALCOHOL 14 MG/ML
1 SOLUTION/ DROPS OPHTHALMIC 3 TIMES DAILY
Status: DISCONTINUED | OUTPATIENT
Start: 2017-01-01 | End: 2017-01-01 | Stop reason: HOSPADM

## 2017-01-01 RX ORDER — SULFAMETHOXAZOLE/TRIMETHOPRIM 800-160 MG
1 TABLET ORAL 2 TIMES DAILY
COMMUNITY
End: 2017-01-01

## 2017-01-01 RX ORDER — NICOTINE POLACRILEX 4 MG
15-30 LOZENGE BUCCAL
Status: DISCONTINUED | OUTPATIENT
Start: 2017-01-01 | End: 2017-01-01 | Stop reason: HOSPADM

## 2017-01-01 RX ORDER — IPRATROPIUM BROMIDE AND ALBUTEROL SULFATE 2.5; .5 MG/3ML; MG/3ML
3 SOLUTION RESPIRATORY (INHALATION) ONCE
Status: COMPLETED | OUTPATIENT
Start: 2017-01-01 | End: 2017-01-01

## 2017-01-01 RX ORDER — ALUMINA, MAGNESIA, AND SIMETHICONE 2400; 2400; 240 MG/30ML; MG/30ML; MG/30ML
30 SUSPENSION ORAL EVERY 4 HOURS PRN
Status: DISCONTINUED | OUTPATIENT
Start: 2017-01-01 | End: 2017-01-01 | Stop reason: HOSPADM

## 2017-01-01 RX ORDER — OMEPRAZOLE 10 MG/1
10 CAPSULE, DELAYED RELEASE ORAL DAILY
Status: DISCONTINUED | OUTPATIENT
Start: 2017-01-01 | End: 2017-01-01 | Stop reason: HOSPADM

## 2017-01-01 RX ORDER — DOXYCYCLINE 100 MG/1
100 CAPSULE ORAL EVERY 12 HOURS SCHEDULED
Status: DISCONTINUED | OUTPATIENT
Start: 2017-01-01 | End: 2017-01-01 | Stop reason: HOSPADM

## 2017-01-01 RX ORDER — CARBOXYMETHYLCELLULOSE SODIUM 5 MG/ML
1 SOLUTION/ DROPS OPHTHALMIC 4 TIMES DAILY
COMMUNITY
End: 2017-01-01

## 2017-01-01 RX ORDER — BUPIVACAINE HYDROCHLORIDE 5 MG/ML
INJECTION, SOLUTION PERINEURAL
Status: DISCONTINUED
Start: 2017-01-01 | End: 2017-01-01 | Stop reason: HOSPADM

## 2017-01-01 RX ORDER — BENZONATATE 100 MG/1
100 CAPSULE ORAL 3 TIMES DAILY PRN
Status: DISCONTINUED | OUTPATIENT
Start: 2017-01-01 | End: 2017-01-01 | Stop reason: HOSPADM

## 2017-01-01 RX ORDER — PIPERACILLIN SODIUM, TAZOBACTAM SODIUM 3; .375 G/15ML; G/15ML
3.38 INJECTION, POWDER, LYOPHILIZED, FOR SOLUTION INTRAVENOUS ONCE
Status: COMPLETED | OUTPATIENT
Start: 2017-01-01 | End: 2017-01-01

## 2017-01-01 RX ORDER — ABIRATERONE ACETATE 250 MG/1
1000 TABLET ORAL DAILY
Qty: 120 TABLET | Refills: 0 | Status: SHIPPED | OUTPATIENT
Start: 2017-01-01 | End: 2017-01-01

## 2017-01-01 RX ORDER — ATORVASTATIN CALCIUM 10 MG/1
10 TABLET, FILM COATED ORAL DAILY
Status: DISCONTINUED | OUTPATIENT
Start: 2017-01-01 | End: 2017-01-01 | Stop reason: HOSPADM

## 2017-01-01 RX ORDER — PIPERACILLIN SODIUM, TAZOBACTAM SODIUM 3; .375 G/15ML; G/15ML
3.38 INJECTION, POWDER, LYOPHILIZED, FOR SOLUTION INTRAVENOUS EVERY 6 HOURS
Status: DISCONTINUED | OUTPATIENT
Start: 2017-01-01 | End: 2017-01-01

## 2017-01-01 RX ORDER — ISOSORBIDE MONONITRATE 30 MG/1
60 TABLET, EXTENDED RELEASE ORAL DAILY
Status: DISCONTINUED | OUTPATIENT
Start: 2017-01-01 | End: 2017-01-01 | Stop reason: HOSPADM

## 2017-01-01 RX ORDER — HYDROMORPHONE HYDROCHLORIDE 1 MG/ML
0.5 INJECTION, SOLUTION INTRAMUSCULAR; INTRAVENOUS; SUBCUTANEOUS
Status: DISCONTINUED | OUTPATIENT
Start: 2017-01-01 | End: 2017-01-01 | Stop reason: HOSPADM

## 2017-01-01 RX ORDER — BUDESONIDE AND FORMOTEROL FUMARATE DIHYDRATE 160; 4.5 UG/1; UG/1
2 AEROSOL RESPIRATORY (INHALATION) 2 TIMES DAILY
COMMUNITY
End: 2017-01-01

## 2017-01-01 RX ORDER — ISOSORBIDE MONONITRATE 30 MG/1
30 TABLET, EXTENDED RELEASE ORAL DAILY
Start: 2017-01-01 | End: 2017-01-01

## 2017-01-01 RX ORDER — HYDROMORPHONE HYDROCHLORIDE 2 MG/1
2 TABLET ORAL 3 TIMES DAILY
Start: 2017-01-01 | End: 2017-01-01

## 2017-01-01 RX ORDER — PREDNISONE 5 MG/1
5 TABLET ORAL 2 TIMES DAILY WITH MEALS
Status: DISCONTINUED | OUTPATIENT
Start: 2017-01-01 | End: 2017-01-01 | Stop reason: HOSPADM

## 2017-01-01 RX ORDER — AMOXICILLIN 250 MG
1 CAPSULE ORAL 2 TIMES DAILY PRN
Status: DISCONTINUED | OUTPATIENT
Start: 2017-01-01 | End: 2017-01-01 | Stop reason: HOSPADM

## 2017-01-01 RX ORDER — ISOSORBIDE MONONITRATE 30 MG/1
60 TABLET, EXTENDED RELEASE ORAL DAILY
Qty: 30 TABLET | Status: ON HOLD
Start: 2017-01-01 | End: 2018-01-01

## 2017-01-01 RX ORDER — POLYVINYL ALCOHOL 14 MG/ML
1 SOLUTION/ DROPS OPHTHALMIC 3 TIMES DAILY
COMMUNITY
End: 2017-01-01

## 2017-01-01 RX ORDER — HYDROMORPHONE HYDROCHLORIDE 2 MG/1
4 TABLET ORAL 2 TIMES DAILY
Start: 2017-01-01 | End: 2017-01-01 | Stop reason: ALTCHOICE

## 2017-01-01 RX ORDER — ONDANSETRON 2 MG/ML
4 INJECTION INTRAMUSCULAR; INTRAVENOUS EVERY 6 HOURS PRN
Status: DISCONTINUED | OUTPATIENT
Start: 2017-01-01 | End: 2017-01-01 | Stop reason: HOSPADM

## 2017-01-01 RX ORDER — ASPIRIN 81 MG/1
81 TABLET ORAL DAILY
Status: DISCONTINUED | OUTPATIENT
Start: 2017-01-01 | End: 2017-01-01 | Stop reason: HOSPADM

## 2017-01-01 RX ORDER — TIOTROPIUM BROMIDE 18 UG/1
18 CAPSULE ORAL; RESPIRATORY (INHALATION) DAILY
Status: DISCONTINUED | OUTPATIENT
Start: 2017-01-01 | End: 2017-01-01 | Stop reason: CLARIF

## 2017-01-01 RX ORDER — PIPERACILLIN SODIUM, TAZOBACTAM SODIUM 2; .25 G/10ML; G/10ML
2.25 INJECTION, POWDER, LYOPHILIZED, FOR SOLUTION INTRAVENOUS EVERY 6 HOURS
Status: DISCONTINUED | OUTPATIENT
Start: 2017-01-01 | End: 2017-01-01

## 2017-01-01 RX ORDER — FUROSEMIDE 20 MG
20 TABLET ORAL DAILY
Status: DISCONTINUED | OUTPATIENT
Start: 2017-01-01 | End: 2017-01-01 | Stop reason: HOSPADM

## 2017-01-01 RX ORDER — SACCHAROMYCES BOULARDII 250 MG
250 CAPSULE ORAL 2 TIMES DAILY
COMMUNITY
End: 2017-01-01

## 2017-01-01 RX ORDER — BUDESONIDE 1 MG/2ML
1 INHALANT ORAL 2 TIMES DAILY
Status: DISCONTINUED | OUTPATIENT
Start: 2017-01-01 | End: 2017-01-01 | Stop reason: HOSPADM

## 2017-01-01 RX ORDER — ALBUTEROL SULFATE 0.83 MG/ML
2.5 SOLUTION RESPIRATORY (INHALATION)
Status: DISCONTINUED | OUTPATIENT
Start: 2017-01-01 | End: 2017-01-01 | Stop reason: HOSPADM

## 2017-01-01 RX ORDER — PREDNISONE 20 MG/1
60 TABLET ORAL ONCE
Status: COMPLETED | OUTPATIENT
Start: 2017-01-01 | End: 2017-01-01

## 2017-01-01 RX ORDER — HYDROMORPHONE HYDROCHLORIDE 2 MG/1
1 TABLET ORAL EVERY 4 HOURS PRN
Qty: 10 TABLET | Refills: 0 | Status: SHIPPED | DISCHARGE
Start: 2017-01-01 | End: 2017-01-01

## 2017-01-01 RX ORDER — NITROGLYCERIN 0.4 MG/1
0.4 TABLET SUBLINGUAL EVERY 5 MIN PRN
Status: DISCONTINUED | OUTPATIENT
Start: 2017-01-01 | End: 2017-01-01 | Stop reason: HOSPADM

## 2017-01-01 RX ORDER — FERROUS SULFATE 325(65) MG
325 TABLET ORAL 2 TIMES DAILY
Status: ON HOLD | COMMUNITY
End: 2018-01-01

## 2017-01-01 RX ORDER — HEPARIN SODIUM 5000 [USP'U]/.5ML
5000 INJECTION, SOLUTION INTRAVENOUS; SUBCUTANEOUS EVERY 12 HOURS
Status: DISCONTINUED | OUTPATIENT
Start: 2017-01-01 | End: 2017-01-01 | Stop reason: HOSPADM

## 2017-01-01 RX ORDER — ACETAMINOPHEN 500 MG
1000 TABLET ORAL ONCE
Status: COMPLETED | OUTPATIENT
Start: 2017-01-01 | End: 2017-01-01

## 2017-01-01 RX ORDER — MEGESTROL ACETATE 40 MG/1
80 TABLET ORAL 2 TIMES DAILY
Status: DISCONTINUED | OUTPATIENT
Start: 2017-01-01 | End: 2017-01-01 | Stop reason: HOSPADM

## 2017-01-01 RX ORDER — PREDNISONE 20 MG/1
60 TABLET ORAL DAILY
Status: DISCONTINUED | OUTPATIENT
Start: 2017-01-01 | End: 2017-01-01 | Stop reason: HOSPADM

## 2017-01-01 RX ORDER — ACETAMINOPHEN 325 MG/1
975 TABLET ORAL ONCE
Status: COMPLETED | OUTPATIENT
Start: 2017-01-01 | End: 2017-01-01

## 2017-01-01 RX ORDER — IPRATROPIUM BROMIDE AND ALBUTEROL SULFATE 2.5; .5 MG/3ML; MG/3ML
1 SOLUTION RESPIRATORY (INHALATION) EVERY 4 HOURS PRN
Status: DISCONTINUED | OUTPATIENT
Start: 2017-01-01 | End: 2017-01-01 | Stop reason: HOSPADM

## 2017-01-01 RX ORDER — TAMSULOSIN HYDROCHLORIDE 0.4 MG/1
0.4 CAPSULE ORAL DAILY
Status: DISCONTINUED | OUTPATIENT
Start: 2017-01-01 | End: 2017-01-01 | Stop reason: HOSPADM

## 2017-01-01 RX ORDER — IPRATROPIUM BROMIDE AND ALBUTEROL SULFATE 2.5; .5 MG/3ML; MG/3ML
1 SOLUTION RESPIRATORY (INHALATION) EVERY 4 HOURS PRN
Status: ON HOLD | COMMUNITY
End: 2018-01-01

## 2017-01-01 RX ORDER — ERYTHROMYCIN 5 MG/G
1 OINTMENT OPHTHALMIC AT BEDTIME
COMMUNITY
Start: 2017-01-01 | End: 2017-01-01

## 2017-01-01 RX ORDER — SODIUM POLYSTYRENE SULFONATE 15 G/60ML
15 SUSPENSION ORAL; RECTAL ONCE
Status: DISCONTINUED | OUTPATIENT
Start: 2017-01-01 | End: 2017-01-01 | Stop reason: HOSPADM

## 2017-01-01 RX ORDER — NALOXONE HYDROCHLORIDE 0.4 MG/ML
.1-.4 INJECTION, SOLUTION INTRAMUSCULAR; INTRAVENOUS; SUBCUTANEOUS
Status: DISCONTINUED | OUTPATIENT
Start: 2017-01-01 | End: 2017-01-01 | Stop reason: HOSPADM

## 2017-01-01 RX ORDER — FUROSEMIDE 20 MG
20 TABLET ORAL 2 TIMES DAILY
Qty: 30 TABLET | Status: ON HOLD | DISCHARGE
Start: 2017-01-01 | End: 2018-01-01

## 2017-01-01 RX ORDER — ISOSORBIDE MONONITRATE 60 MG/1
60 TABLET, EXTENDED RELEASE ORAL DAILY
Status: DISCONTINUED | OUTPATIENT
Start: 2017-01-01 | End: 2017-01-01 | Stop reason: HOSPADM

## 2017-01-01 RX ORDER — SACCHAROMYCES BOULARDII 250 MG
250 CAPSULE ORAL 2 TIMES DAILY
Qty: 18 CAPSULE | Refills: 0 | DISCHARGE
Start: 2017-01-01 | End: 2017-01-01

## 2017-01-01 RX ORDER — AMOXICILLIN 250 MG
2 CAPSULE ORAL 2 TIMES DAILY PRN
Status: DISCONTINUED | OUTPATIENT
Start: 2017-01-01 | End: 2017-01-01 | Stop reason: HOSPADM

## 2017-01-01 RX ORDER — PIPERACILLIN SODIUM, TAZOBACTAM SODIUM 3; .375 G/15ML; G/15ML
3.38 INJECTION, POWDER, LYOPHILIZED, FOR SOLUTION INTRAVENOUS EVERY 6 HOURS
Status: DISCONTINUED | OUTPATIENT
Start: 2017-01-01 | End: 2017-01-01 | Stop reason: HOSPADM

## 2017-01-01 RX ORDER — PREDNISONE 20 MG/1
40 TABLET ORAL ONCE
Status: COMPLETED | OUTPATIENT
Start: 2017-01-01 | End: 2017-01-01

## 2017-01-01 RX ORDER — ACETAMINOPHEN 325 MG/1
650 TABLET ORAL EVERY 4 HOURS PRN
Status: DISCONTINUED | OUTPATIENT
Start: 2017-01-01 | End: 2017-01-01

## 2017-01-01 RX ORDER — FUROSEMIDE 20 MG
20 TABLET ORAL 2 TIMES DAILY
Status: DISCONTINUED | OUTPATIENT
Start: 2017-01-01 | End: 2017-01-01 | Stop reason: HOSPADM

## 2017-01-01 RX ORDER — IPRATROPIUM BROMIDE AND ALBUTEROL SULFATE 2.5; .5 MG/3ML; MG/3ML
3 SOLUTION RESPIRATORY (INHALATION) 2 TIMES DAILY
Status: DISCONTINUED | OUTPATIENT
Start: 2017-01-01 | End: 2017-01-01 | Stop reason: HOSPADM

## 2017-01-01 RX ORDER — AMOXICILLIN 250 MG
2 CAPSULE ORAL 2 TIMES DAILY
Status: ON HOLD | COMMUNITY
End: 2018-01-01

## 2017-01-01 RX ORDER — METOPROLOL SUCCINATE 50 MG/1
50 TABLET, EXTENDED RELEASE ORAL DAILY
Status: DISCONTINUED | OUTPATIENT
Start: 2017-01-01 | End: 2017-01-01 | Stop reason: HOSPADM

## 2017-01-01 RX ORDER — HYDROMORPHONE HYDROCHLORIDE 2 MG/1
2 TABLET ORAL ONCE
Status: DISCONTINUED | OUTPATIENT
Start: 2017-01-01 | End: 2017-01-01

## 2017-01-01 RX ORDER — IPRATROPIUM BROMIDE AND ALBUTEROL SULFATE 2.5; .5 MG/3ML; MG/3ML
3 SOLUTION RESPIRATORY (INHALATION) 4 TIMES DAILY
Status: DISCONTINUED | OUTPATIENT
Start: 2017-01-01 | End: 2017-01-01 | Stop reason: HOSPADM

## 2017-01-01 RX ORDER — HYDROMORPHONE HYDROCHLORIDE 2 MG/1
2 TABLET ORAL EVERY 4 HOURS PRN
Start: 2017-01-01 | End: 2017-01-01

## 2017-01-01 RX ORDER — LIDOCAINE 40 MG/G
CREAM TOPICAL
Status: DISCONTINUED | OUTPATIENT
Start: 2017-01-01 | End: 2017-01-01 | Stop reason: HOSPADM

## 2017-01-01 RX ORDER — PREDNISONE 20 MG/1
40 TABLET ORAL DAILY
Qty: 8 TABLET | Refills: 0 | Status: SHIPPED | OUTPATIENT
Start: 2017-01-01 | End: 2017-01-01

## 2017-01-01 RX ORDER — LATANOPROST 50 UG/ML
1 SOLUTION/ DROPS OPHTHALMIC AT BEDTIME
COMMUNITY
End: 2017-01-01

## 2017-01-01 RX ORDER — FENTANYL 12.5 UG/1
12 PATCH TRANSDERMAL
Status: DISCONTINUED | OUTPATIENT
Start: 2017-01-01 | End: 2017-01-01 | Stop reason: HOSPADM

## 2017-01-01 RX ORDER — BUDESONIDE AND FORMOTEROL FUMARATE DIHYDRATE 160; 4.5 UG/1; UG/1
2 AEROSOL RESPIRATORY (INHALATION) 2 TIMES DAILY
Status: ON HOLD | COMMUNITY
End: 2018-01-01

## 2017-01-01 RX ORDER — AMOXICILLIN 250 MG
1 CAPSULE ORAL 2 TIMES DAILY
Status: DISCONTINUED | OUTPATIENT
Start: 2017-01-01 | End: 2017-01-01 | Stop reason: HOSPADM

## 2017-01-01 RX ORDER — FENTANYL 12.5 UG/1
1 PATCH TRANSDERMAL
Status: ON HOLD | COMMUNITY
End: 2018-01-01

## 2017-01-01 RX ORDER — FERROUS SULFATE 325(65) MG
325 TABLET ORAL 2 TIMES DAILY
Status: DISCONTINUED | OUTPATIENT
Start: 2017-01-01 | End: 2017-01-01 | Stop reason: HOSPADM

## 2017-01-01 RX ORDER — OSELTAMIVIR PHOSPHATE 30 MG/1
30 CAPSULE ORAL DAILY
COMMUNITY
Start: 2017-01-01 | End: 2017-01-01

## 2017-01-01 RX ORDER — ACETAMINOPHEN 650 MG/1
650 SUPPOSITORY RECTAL EVERY 4 HOURS PRN
Status: DISCONTINUED | OUTPATIENT
Start: 2017-01-01 | End: 2017-01-01

## 2017-01-01 RX ORDER — AMMONIUM LACTATE 12 G/100G
LOTION TOPICAL 2 TIMES DAILY PRN
Status: DISCONTINUED | OUTPATIENT
Start: 2017-01-01 | End: 2017-01-01 | Stop reason: HOSPADM

## 2017-01-01 RX ORDER — ERTAPENEM 1 G/1
1 INJECTION, POWDER, LYOPHILIZED, FOR SOLUTION INTRAMUSCULAR; INTRAVENOUS EVERY 24 HOURS
COMMUNITY
Start: 2017-01-01 | End: 2017-01-01

## 2017-01-01 RX ORDER — SODIUM POLYSTYRENE SULFONATE 15 G/60ML
15 SUSPENSION ORAL; RECTAL ONCE
Status: ON HOLD | COMMUNITY
End: 2017-01-01

## 2017-01-01 RX ORDER — IPRATROPIUM BROMIDE AND ALBUTEROL SULFATE 2.5; .5 MG/3ML; MG/3ML
3 SOLUTION RESPIRATORY (INHALATION)
Status: DISCONTINUED | OUTPATIENT
Start: 2017-01-01 | End: 2017-01-01

## 2017-01-01 RX ORDER — CARBOXYMETHYLCELLULOSE SODIUM 5 MG/ML
1 SOLUTION/ DROPS OPHTHALMIC 3 TIMES DAILY
Status: DISCONTINUED | OUTPATIENT
Start: 2017-01-01 | End: 2017-01-01 | Stop reason: HOSPADM

## 2017-01-01 RX ORDER — ABIRATERONE ACETATE 250 MG/1
1000 TABLET ORAL DAILY
COMMUNITY
End: 2017-01-01

## 2017-01-01 RX ORDER — METOPROLOL SUCCINATE 25 MG/1
50 TABLET, EXTENDED RELEASE ORAL DAILY
Status: DISCONTINUED | OUTPATIENT
Start: 2017-01-01 | End: 2017-01-01 | Stop reason: HOSPADM

## 2017-01-01 RX ORDER — AMOXICILLIN 250 MG
2 CAPSULE ORAL 2 TIMES DAILY
Start: 2017-01-01 | End: 2017-01-01

## 2017-01-01 RX ORDER — SODIUM CHLORIDE 9 MG/ML
INJECTION, SOLUTION INTRAVENOUS CONTINUOUS
Status: ACTIVE | OUTPATIENT
Start: 2017-01-01 | End: 2017-01-01

## 2017-01-01 RX ORDER — DOXYCYCLINE 100 MG/1
100 CAPSULE ORAL EVERY 12 HOURS
Qty: 14 CAPSULE | Refills: 0 | DISCHARGE
Start: 2017-01-01 | End: 2017-01-01

## 2017-01-01 RX ORDER — BACITRACIN ZINC 500 [USP'U]/G
OINTMENT TOPICAL
COMMUNITY
End: 2017-01-01

## 2017-01-01 RX ORDER — SODIUM CHLORIDE 9 MG/ML
INJECTION, SOLUTION INTRAVENOUS CONTINUOUS
Status: DISCONTINUED | OUTPATIENT
Start: 2017-01-01 | End: 2017-01-01 | Stop reason: CLARIF

## 2017-01-01 RX ORDER — GINSENG 100 MG
CAPSULE ORAL
Status: DISCONTINUED
Start: 2017-01-01 | End: 2017-01-01 | Stop reason: HOSPADM

## 2017-01-01 RX ORDER — MEGESTROL ACETATE 40 MG/1
80 TABLET ORAL 2 TIMES DAILY
Qty: 120 TABLET | Refills: 3 | Status: SHIPPED | OUTPATIENT
Start: 2017-01-01 | End: 2017-01-01

## 2017-01-01 RX ORDER — DEXAMETHASONE SODIUM PHOSPHATE 10 MG/ML
6 INJECTION, SOLUTION INTRAMUSCULAR; INTRAVENOUS ONCE
Status: COMPLETED | OUTPATIENT
Start: 2017-01-01 | End: 2017-01-01

## 2017-01-01 RX ORDER — AZITHROMYCIN 250 MG/1
TABLET, FILM COATED ORAL
Qty: 6 TABLET | Refills: 0 | Status: SHIPPED | OUTPATIENT
Start: 2017-01-01 | End: 2017-01-01

## 2017-01-01 RX ORDER — PREDNISONE 20 MG/1
40 TABLET ORAL DAILY
Qty: 10 TABLET | Refills: 0 | DISCHARGE
Start: 2017-01-01 | End: 2017-01-01

## 2017-01-01 RX ORDER — ACETAMINOPHEN 500 MG
1000 TABLET ORAL 3 TIMES DAILY
Status: DISCONTINUED | OUTPATIENT
Start: 2017-01-01 | End: 2017-01-01 | Stop reason: HOSPADM

## 2017-01-01 RX ADMIN — TOBRAMYCIN AND DEXAMETHASONE: 3; 1 OINTMENT OPHTHALMIC at 14:01

## 2017-01-01 RX ADMIN — ASPIRIN 81 MG: 81 TABLET, COATED ORAL at 08:31

## 2017-01-01 RX ADMIN — DENOSUMAB 120 MG: 120 INJECTION SUBCUTANEOUS at 12:08

## 2017-01-01 RX ADMIN — DICLOFENAC 4 G: 10 GEL TOPICAL at 16:11

## 2017-01-01 RX ADMIN — PREDNISONE 60 MG: 20 TABLET ORAL at 09:18

## 2017-01-01 RX ADMIN — POLYVINYL ALCOHOL 1 DROP: 14 SOLUTION/ DROPS OPHTHALMIC at 14:51

## 2017-01-01 RX ADMIN — CARBOXYMETHYLCELLULOSE SODIUM 1 DROP: 5 SOLUTION/ DROPS OPHTHALMIC at 15:47

## 2017-01-01 RX ADMIN — TAMSULOSIN HYDROCHLORIDE 0.4 MG: 0.4 CAPSULE ORAL at 08:52

## 2017-01-01 RX ADMIN — PIPERACILLIN SODIUM,TAZOBACTAM SODIUM 3.38 G: 3; .375 INJECTION, POWDER, FOR SOLUTION INTRAVENOUS at 01:52

## 2017-01-01 RX ADMIN — CARBOXYMETHYLCELLULOSE SODIUM 1 DROP: 5 SOLUTION/ DROPS OPHTHALMIC at 07:59

## 2017-01-01 RX ADMIN — RADIUM RA 223 DICHLORIDE 174.1 UCI.: 30 INJECTION INTRAVENOUS at 12:15

## 2017-01-01 RX ADMIN — DICLOFENAC 4 G: 10 GEL TOPICAL at 12:01

## 2017-01-01 RX ADMIN — SENNOSIDES AND DOCUSATE SODIUM 1 TABLET: 8.6; 5 TABLET ORAL at 08:18

## 2017-01-01 RX ADMIN — TAMSULOSIN HYDROCHLORIDE 0.4 MG: 0.4 CAPSULE ORAL at 09:46

## 2017-01-01 RX ADMIN — HEPARIN SODIUM 5000 UNITS: 5000 INJECTION, SOLUTION INTRAVENOUS; SUBCUTANEOUS at 01:51

## 2017-01-01 RX ADMIN — GUAIFENESIN 10 ML: 100 SOLUTION ORAL at 14:01

## 2017-01-01 RX ADMIN — TOBRAMYCIN AND DEXAMETHASONE: 3; 1 OINTMENT OPHTHALMIC at 12:31

## 2017-01-01 RX ADMIN — POLYVINYL ALCOHOL 1 DROP: 14 SOLUTION/ DROPS OPHTHALMIC at 21:01

## 2017-01-01 RX ADMIN — PIPERACILLIN SODIUM,TAZOBACTAM SODIUM 3.38 G: 3; .375 INJECTION, POWDER, FOR SOLUTION INTRAVENOUS at 08:17

## 2017-01-01 RX ADMIN — DENOSUMAB 120 MG: 120 INJECTION SUBCUTANEOUS at 11:29

## 2017-01-01 RX ADMIN — FLUTICASONE FUROATE AND VILANTEROL TRIFENATATE 1 PUFF: 200; 25 POWDER RESPIRATORY (INHALATION) at 08:52

## 2017-01-01 RX ADMIN — RANITIDINE 75 MG: 75 TABLET, FILM COATED ORAL at 20:59

## 2017-01-01 RX ADMIN — PIPERACILLIN SODIUM,TAZOBACTAM SODIUM 3.38 G: 3; .375 INJECTION, POWDER, FOR SOLUTION INTRAVENOUS at 14:01

## 2017-01-01 RX ADMIN — FLUTICASONE FUROATE AND VILANTEROL TRIFENATATE 1 PUFF: 200; 25 POWDER RESPIRATORY (INHALATION) at 08:05

## 2017-01-01 RX ADMIN — GUAIFENESIN 10 ML: 100 SOLUTION ORAL at 02:15

## 2017-01-01 RX ADMIN — IPRATROPIUM BROMIDE AND ALBUTEROL SULFATE 3 ML: .5; 3 SOLUTION RESPIRATORY (INHALATION) at 10:01

## 2017-01-01 RX ADMIN — GUAIFENESIN 10 ML: 100 SOLUTION ORAL at 22:26

## 2017-01-01 RX ADMIN — POLYVINYL ALCOHOL 1 DROP: 14 SOLUTION/ DROPS OPHTHALMIC at 20:36

## 2017-01-01 RX ADMIN — ATORVASTATIN CALCIUM 10 MG: 10 TABLET, FILM COATED ORAL at 09:45

## 2017-01-01 RX ADMIN — PIPERACILLIN SODIUM,TAZOBACTAM SODIUM 3.38 G: 3; .375 INJECTION, POWDER, FOR SOLUTION INTRAVENOUS at 02:00

## 2017-01-01 RX ADMIN — GUAIFENESIN 10 ML: 100 SOLUTION ORAL at 22:48

## 2017-01-01 RX ADMIN — HEPARIN SODIUM 5000 UNITS: 5000 INJECTION, SOLUTION INTRAVENOUS; SUBCUTANEOUS at 02:00

## 2017-01-01 RX ADMIN — TOBRAMYCIN AND DEXAMETHASONE: 3; 1 OINTMENT OPHTHALMIC at 07:59

## 2017-01-01 RX ADMIN — OYSTER SHELL CALCIUM WITH VITAMIN D 1 TABLET: 500; 200 TABLET, FILM COATED ORAL at 07:57

## 2017-01-01 RX ADMIN — OYSTER SHELL CALCIUM WITH VITAMIN D 1 TABLET: 500; 200 TABLET, FILM COATED ORAL at 21:00

## 2017-01-01 RX ADMIN — PIPERACILLIN SODIUM,TAZOBACTAM SODIUM 3.38 G: 3; .375 INJECTION, POWDER, FOR SOLUTION INTRAVENOUS at 21:00

## 2017-01-01 RX ADMIN — FUROSEMIDE 20 MG: 20 TABLET ORAL at 09:13

## 2017-01-01 RX ADMIN — ALBUTEROL SULFATE 2.5 MG: 2.5 SOLUTION RESPIRATORY (INHALATION) at 13:17

## 2017-01-01 RX ADMIN — DEXAMETHASONE SODIUM PHOSPHATE 6 MG: 10 INJECTION, SOLUTION INTRAMUSCULAR; INTRAVENOUS at 00:05

## 2017-01-01 RX ADMIN — GUAIFENESIN 10 ML: 100 SOLUTION ORAL at 21:12

## 2017-01-01 RX ADMIN — HEPARIN SODIUM 5000 UNITS: 5000 INJECTION, SOLUTION INTRAVENOUS; SUBCUTANEOUS at 14:51

## 2017-01-01 RX ADMIN — POLYVINYL ALCOHOL 1 DROP: 14 SOLUTION/ DROPS OPHTHALMIC at 08:23

## 2017-01-01 RX ADMIN — BENZONATATE 100 MG: 100 CAPSULE, LIQUID FILLED ORAL at 04:44

## 2017-01-01 RX ADMIN — HEPARIN SODIUM 5000 UNITS: 5000 INJECTION, SOLUTION INTRAVENOUS; SUBCUTANEOUS at 13:58

## 2017-01-01 RX ADMIN — FUROSEMIDE 20 MG: 20 TABLET ORAL at 14:00

## 2017-01-01 RX ADMIN — BACITRACIN ZINC: 500 OINTMENT TOPICAL at 22:19

## 2017-01-01 RX ADMIN — ACETAMINOPHEN 650 MG: 325 TABLET, FILM COATED ORAL at 01:00

## 2017-01-01 RX ADMIN — ACETAMINOPHEN 1000 MG: 500 TABLET, FILM COATED ORAL at 19:01

## 2017-01-01 RX ADMIN — DENOSUMAB 120 MG: 120 INJECTION SUBCUTANEOUS at 14:02

## 2017-01-01 RX ADMIN — PIPERACILLIN SODIUM,TAZOBACTAM SODIUM 3.38 G: 3; .375 INJECTION, POWDER, FOR SOLUTION INTRAVENOUS at 20:39

## 2017-01-01 RX ADMIN — RANITIDINE 75 MG: 75 TABLET, FILM COATED ORAL at 21:03

## 2017-01-01 RX ADMIN — SENNOSIDES AND DOCUSATE SODIUM 1 TABLET: 8.6; 5 TABLET ORAL at 09:12

## 2017-01-01 RX ADMIN — BENZONATATE 100 MG: 100 CAPSULE, LIQUID FILLED ORAL at 08:18

## 2017-01-01 RX ADMIN — OYSTER SHELL CALCIUM WITH VITAMIN D 1 TABLET: 500; 200 TABLET, FILM COATED ORAL at 08:24

## 2017-01-01 RX ADMIN — PREDNISONE 40 MG: 20 TABLET ORAL at 07:02

## 2017-01-01 RX ADMIN — ASPIRIN 81 MG: 81 TABLET, COATED ORAL at 08:53

## 2017-01-01 RX ADMIN — ATORVASTATIN CALCIUM 10 MG: 10 TABLET, FILM COATED ORAL at 22:02

## 2017-01-01 RX ADMIN — PREDNISONE 5 MG: 5 TABLET ORAL at 09:12

## 2017-01-01 RX ADMIN — POLYVINYL ALCOHOL 1 DROP: 14 SOLUTION/ DROPS OPHTHALMIC at 08:09

## 2017-01-01 RX ADMIN — PIPERACILLIN SODIUM,TAZOBACTAM SODIUM 3.38 G: 3; .375 INJECTION, POWDER, FOR SOLUTION INTRAVENOUS at 02:16

## 2017-01-01 RX ADMIN — DICLOFENAC 4 G: 10 GEL TOPICAL at 08:00

## 2017-01-01 RX ADMIN — RANITIDINE 75 MG: 75 TABLET, FILM COATED ORAL at 08:53

## 2017-01-01 RX ADMIN — FUROSEMIDE 20 MG: 20 TABLET ORAL at 19:01

## 2017-01-01 RX ADMIN — PIPERACILLIN SODIUM,TAZOBACTAM SODIUM 3.38 G: 3; .375 INJECTION, POWDER, FOR SOLUTION INTRAVENOUS at 08:10

## 2017-01-01 RX ADMIN — TAMSULOSIN HYDROCHLORIDE 0.4 MG: 0.4 CAPSULE ORAL at 09:18

## 2017-01-01 RX ADMIN — ATORVASTATIN CALCIUM 10 MG: 10 TABLET, FILM COATED ORAL at 21:25

## 2017-01-01 RX ADMIN — METOPROLOL SUCCINATE 50 MG: 50 TABLET, FILM COATED, EXTENDED RELEASE ORAL at 08:53

## 2017-01-01 RX ADMIN — DICLOFENAC 4 G: 10 GEL TOPICAL at 21:02

## 2017-01-01 RX ADMIN — FERROUS SULFATE 325 MG: 325 TABLET, FILM COATED ORAL at 09:18

## 2017-01-01 RX ADMIN — OYSTER SHELL CALCIUM WITH VITAMIN D 1 TABLET: 500; 200 TABLET, FILM COATED ORAL at 08:31

## 2017-01-01 RX ADMIN — INFLUENZA A VIRUSA/MICHIGAN/45/2015 X-275 (H1N1) ANTIGEN (FORMALDEHYDE INACTIVATED), INFLUENZA A VIRUS A/HONG KONG/4801/2014 X-263B (H3N2) ANTIGEN (FORMALDEHYDE INACTIVATED), AND INFLUENZA B VIRUS B/BRISBANE/60/2008 ANTIGEN (FORMALDEHYDE INACTIVATED) 0.5 ML: 60; 60; 60 INJECTION, SUSPENSION INTRAMUSCULAR at 14:48

## 2017-01-01 RX ADMIN — RANITIDINE 75 MG: 75 TABLET, FILM COATED ORAL at 20:41

## 2017-01-01 RX ADMIN — ALBUTEROL SULFATE 2.5 MG: 2.5 SOLUTION RESPIRATORY (INHALATION) at 22:08

## 2017-01-01 RX ADMIN — PREDNISONE 5 MG: 5 TABLET ORAL at 08:29

## 2017-01-01 RX ADMIN — Medication: at 14:51

## 2017-01-01 RX ADMIN — RANITIDINE 75 MG: 75 TABLET, FILM COATED ORAL at 21:12

## 2017-01-01 RX ADMIN — PREDNISONE 5 MG: 5 TABLET ORAL at 20:59

## 2017-01-01 RX ADMIN — ATORVASTATIN CALCIUM 10 MG: 10 TABLET, FILM COATED ORAL at 21:13

## 2017-01-01 RX ADMIN — DICLOFENAC 4 G: 10 GEL TOPICAL at 09:47

## 2017-01-01 RX ADMIN — IPRATROPIUM BROMIDE AND ALBUTEROL SULFATE 3 ML: .5; 3 SOLUTION RESPIRATORY (INHALATION) at 08:42

## 2017-01-01 RX ADMIN — IPRATROPIUM BROMIDE AND ALBUTEROL SULFATE 3 ML: .5; 3 SOLUTION RESPIRATORY (INHALATION) at 16:50

## 2017-01-01 RX ADMIN — SODIUM CHLORIDE 1000 ML: 9 INJECTION, SOLUTION INTRAVENOUS at 01:46

## 2017-01-01 RX ADMIN — RANITIDINE 75 MG: 75 TABLET, FILM COATED ORAL at 08:10

## 2017-01-01 RX ADMIN — TOBRAMYCIN AND DEXAMETHASONE: 3; 1 OINTMENT OPHTHALMIC at 23:39

## 2017-01-01 RX ADMIN — FLUTICASONE FUROATE AND VILANTEROL TRIFENATATE 1 PUFF: 200; 25 POWDER RESPIRATORY (INHALATION) at 08:23

## 2017-01-01 RX ADMIN — PIPERACILLIN AND TAZOBACTAM 3.38 G: 3; .375 INJECTION, POWDER, LYOPHILIZED, FOR SOLUTION INTRAVENOUS; PARENTERAL at 09:47

## 2017-01-01 RX ADMIN — PIPERACILLIN SODIUM,TAZOBACTAM SODIUM 3.38 G: 3; .375 INJECTION, POWDER, FOR SOLUTION INTRAVENOUS at 08:53

## 2017-01-01 RX ADMIN — IPRATROPIUM BROMIDE AND ALBUTEROL SULFATE 3 ML: .5; 3 SOLUTION RESPIRATORY (INHALATION) at 20:07

## 2017-01-01 RX ADMIN — TAMSULOSIN HYDROCHLORIDE 0.4 MG: 0.4 CAPSULE ORAL at 08:31

## 2017-01-01 RX ADMIN — IPRATROPIUM BROMIDE AND ALBUTEROL SULFATE 3 ML: .5; 3 SOLUTION RESPIRATORY (INHALATION) at 07:56

## 2017-01-01 RX ADMIN — PREDNISONE 5 MG: 5 TABLET ORAL at 16:10

## 2017-01-01 RX ADMIN — PIPERACILLIN SODIUM,TAZOBACTAM SODIUM 3.38 G: 3; .375 INJECTION, POWDER, FOR SOLUTION INTRAVENOUS at 08:05

## 2017-01-01 RX ADMIN — ISOSORBIDE MONONITRATE 60 MG: 30 TABLET, EXTENDED RELEASE ORAL at 07:56

## 2017-01-01 RX ADMIN — DICLOFENAC 4 G: 10 GEL TOPICAL at 14:01

## 2017-01-01 RX ADMIN — OYSTER SHELL CALCIUM WITH VITAMIN D 1 TABLET: 500; 200 TABLET, FILM COATED ORAL at 16:11

## 2017-01-01 RX ADMIN — ISOSORBIDE MONONITRATE 60 MG: 30 TABLET, EXTENDED RELEASE ORAL at 08:17

## 2017-01-01 RX ADMIN — TOBRAMYCIN AND DEXAMETHASONE: 3; 1 OINTMENT OPHTHALMIC at 14:52

## 2017-01-01 RX ADMIN — TOBRAMYCIN AND DEXAMETHASONE: 3; 1 OINTMENT OPHTHALMIC at 09:00

## 2017-01-01 RX ADMIN — FERROUS SULFATE 325 MG: 325 TABLET, FILM COATED ORAL at 20:01

## 2017-01-01 RX ADMIN — UMECLIDINIUM 1 PUFF: 62.5 AEROSOL, POWDER ORAL at 09:47

## 2017-01-01 RX ADMIN — IPRATROPIUM BROMIDE AND ALBUTEROL SULFATE 3 ML: .5; 3 SOLUTION RESPIRATORY (INHALATION) at 16:08

## 2017-01-01 RX ADMIN — RANITIDINE 75 MG: 75 TABLET, FILM COATED ORAL at 08:18

## 2017-01-01 RX ADMIN — TOBRAMYCIN AND DEXAMETHASONE: 3; 1 OINTMENT OPHTHALMIC at 09:50

## 2017-01-01 RX ADMIN — IPRATROPIUM BROMIDE AND ALBUTEROL SULFATE 3 ML: .5; 3 SOLUTION RESPIRATORY (INHALATION) at 20:14

## 2017-01-01 RX ADMIN — CARBOXYMETHYLCELLULOSE SODIUM 1 DROP: 5 SOLUTION/ DROPS OPHTHALMIC at 14:01

## 2017-01-01 RX ADMIN — OMEPRAZOLE 10 MG: 10 CAPSULE, DELAYED RELEASE ORAL at 08:04

## 2017-01-01 RX ADMIN — DENOSUMAB 120 MG: 120 INJECTION SUBCUTANEOUS at 11:17

## 2017-01-01 RX ADMIN — DICLOFENAC 4 G: 10 GEL TOPICAL at 08:23

## 2017-01-01 RX ADMIN — ASPIRIN 81 MG: 81 TABLET, COATED ORAL at 08:18

## 2017-01-01 RX ADMIN — TOBRAMYCIN AND DEXAMETHASONE: 3; 1 OINTMENT OPHTHALMIC at 12:01

## 2017-01-01 RX ADMIN — CARBOXYMETHYLCELLULOSE SODIUM 1 DROP: 5 SOLUTION/ DROPS OPHTHALMIC at 08:07

## 2017-01-01 RX ADMIN — Medication 25 ML: at 02:54

## 2017-01-01 RX ADMIN — IPRATROPIUM BROMIDE AND ALBUTEROL SULFATE 3 ML: .5; 3 SOLUTION RESPIRATORY (INHALATION) at 20:33

## 2017-01-01 RX ADMIN — OYSTER SHELL CALCIUM WITH VITAMIN D 1 TABLET: 500; 200 TABLET, FILM COATED ORAL at 09:46

## 2017-01-01 RX ADMIN — DENOSUMAB 120 MG: 120 INJECTION SUBCUTANEOUS at 14:17

## 2017-01-01 RX ADMIN — ISOSORBIDE MONONITRATE 60 MG: 30 TABLET, EXTENDED RELEASE ORAL at 09:46

## 2017-01-01 RX ADMIN — IPRATROPIUM BROMIDE AND ALBUTEROL SULFATE 3 ML: .5; 3 SOLUTION RESPIRATORY (INHALATION) at 22:32

## 2017-01-01 RX ADMIN — INSULIN GLARGINE 25 UNITS: 100 INJECTION, SOLUTION SUBCUTANEOUS at 21:04

## 2017-01-01 RX ADMIN — HEPARIN SODIUM 5000 UNITS: 5000 INJECTION, SOLUTION INTRAVENOUS; SUBCUTANEOUS at 14:01

## 2017-01-01 RX ADMIN — FUROSEMIDE 20 MG: 20 TABLET ORAL at 08:53

## 2017-01-01 RX ADMIN — ACETAMINOPHEN 1000 MG: 500 TABLET, FILM COATED ORAL at 03:05

## 2017-01-01 RX ADMIN — PIPERACILLIN SODIUM,TAZOBACTAM SODIUM 3.38 G: 3; .375 INJECTION, POWDER, FOR SOLUTION INTRAVENOUS at 21:23

## 2017-01-01 RX ADMIN — UMECLIDINIUM 1 PUFF: 62.5 AEROSOL, POWDER ORAL at 12:00

## 2017-01-01 RX ADMIN — RANITIDINE 75 MG: 75 TABLET, FILM COATED ORAL at 07:58

## 2017-01-01 RX ADMIN — DENOSUMAB 120 MG: 120 INJECTION SUBCUTANEOUS at 14:52

## 2017-01-01 RX ADMIN — FLUTICASONE FUROATE AND VILANTEROL TRIFENATATE 1 PUFF: 200; 25 POWDER RESPIRATORY (INHALATION) at 09:47

## 2017-01-01 RX ADMIN — SENNOSIDES AND DOCUSATE SODIUM 1 TABLET: 8.6; 5 TABLET ORAL at 07:57

## 2017-01-01 RX ADMIN — UMECLIDINIUM 1 PUFF: 62.5 AEROSOL, POWDER ORAL at 08:23

## 2017-01-01 RX ADMIN — UMECLIDINIUM 1 PUFF: 62.5 AEROSOL, POWDER ORAL at 08:52

## 2017-01-01 RX ADMIN — OMEPRAZOLE 10 MG: 10 CAPSULE, DELAYED RELEASE ORAL at 08:53

## 2017-01-01 RX ADMIN — ISOSORBIDE MONONITRATE 60 MG: 30 TABLET, EXTENDED RELEASE ORAL at 08:52

## 2017-01-01 RX ADMIN — GUAIFENESIN 10 ML: 100 SOLUTION ORAL at 02:00

## 2017-01-01 RX ADMIN — IPRATROPIUM BROMIDE AND ALBUTEROL SULFATE 3 ML: .5; 3 SOLUTION RESPIRATORY (INHALATION) at 08:26

## 2017-01-01 RX ADMIN — TAMSULOSIN HYDROCHLORIDE 0.4 MG: 0.4 CAPSULE ORAL at 08:17

## 2017-01-01 RX ADMIN — CARBOXYMETHYLCELLULOSE SODIUM 1 DROP: 5 SOLUTION/ DROPS OPHTHALMIC at 14:51

## 2017-01-01 RX ADMIN — ACETAMINOPHEN 1000 MG: 500 TABLET, FILM COATED ORAL at 09:18

## 2017-01-01 RX ADMIN — ASPIRIN 81 MG: 81 TABLET, COATED ORAL at 09:12

## 2017-01-01 RX ADMIN — RANITIDINE 75 MG: 75 TABLET, FILM COATED ORAL at 08:04

## 2017-01-01 RX ADMIN — PIPERACILLIN SODIUM,TAZOBACTAM SODIUM 3.38 G: 3; .375 INJECTION, POWDER, FOR SOLUTION INTRAVENOUS at 01:51

## 2017-01-01 RX ADMIN — SENNOSIDES AND DOCUSATE SODIUM 1 TABLET: 8.6; 5 TABLET ORAL at 21:26

## 2017-01-01 RX ADMIN — INSULIN GLARGINE 25 UNITS: 100 INJECTION, SOLUTION SUBCUTANEOUS at 09:52

## 2017-01-01 RX ADMIN — RANITIDINE 75 MG: 75 TABLET, FILM COATED ORAL at 09:45

## 2017-01-01 RX ADMIN — IPRATROPIUM BROMIDE AND ALBUTEROL SULFATE 3 ML: .5; 3 SOLUTION RESPIRATORY (INHALATION) at 20:27

## 2017-01-01 RX ADMIN — CARBOXYMETHYLCELLULOSE SODIUM 1 DROP: 5 SOLUTION/ DROPS OPHTHALMIC at 10:07

## 2017-01-01 RX ADMIN — ASPIRIN 81 MG: 81 TABLET, COATED ORAL at 09:18

## 2017-01-01 RX ADMIN — PIPERACILLIN AND TAZOBACTAM 2.25 G: 2; .25 INJECTION, POWDER, LYOPHILIZED, FOR SOLUTION INTRAVENOUS; PARENTERAL at 02:14

## 2017-01-01 RX ADMIN — RANITIDINE 150 MG: 150 TABLET ORAL at 09:18

## 2017-01-01 RX ADMIN — OMEPRAZOLE 10 MG: 10 CAPSULE, DELAYED RELEASE ORAL at 09:46

## 2017-01-01 RX ADMIN — MEGESTROL ACETATE 80 MG: 40 TABLET ORAL at 22:02

## 2017-01-01 RX ADMIN — PREDNISONE 60 MG: 20 TABLET ORAL at 13:34

## 2017-01-01 RX ADMIN — ASPIRIN 81 MG: 81 TABLET, COATED ORAL at 09:46

## 2017-01-01 RX ADMIN — POLYVINYL ALCOHOL 1 DROP: 14 SOLUTION/ DROPS OPHTHALMIC at 08:52

## 2017-01-01 RX ADMIN — Medication 25 ML: at 23:09

## 2017-01-01 RX ADMIN — DOXYCYCLINE HYCLATE 100 MG: 100 CAPSULE ORAL at 09:18

## 2017-01-01 RX ADMIN — PIPERACILLIN SODIUM,TAZOBACTAM SODIUM 3.38 G: 3; .375 INJECTION, POWDER, FOR SOLUTION INTRAVENOUS at 16:31

## 2017-01-01 RX ADMIN — OYSTER SHELL CALCIUM WITH VITAMIN D 1 TABLET: 500; 200 TABLET, FILM COATED ORAL at 17:29

## 2017-01-01 RX ADMIN — DICLOFENAC 4 G: 10 GEL TOPICAL at 09:00

## 2017-01-01 RX ADMIN — TOBRAMYCIN AND DEXAMETHASONE: 3; 1 OINTMENT OPHTHALMIC at 08:24

## 2017-01-01 RX ADMIN — CARBOXYMETHYLCELLULOSE SODIUM 1 DROP: 5 SOLUTION/ DROPS OPHTHALMIC at 08:52

## 2017-01-01 RX ADMIN — PREDNISONE 5 MG: 5 TABLET ORAL at 17:29

## 2017-01-01 RX ADMIN — METOPROLOL SUCCINATE 50 MG: 50 TABLET, FILM COATED, EXTENDED RELEASE ORAL at 09:46

## 2017-01-01 RX ADMIN — FUROSEMIDE 20 MG: 20 TABLET ORAL at 09:18

## 2017-01-01 RX ADMIN — FLUTICASONE FUROATE AND VILANTEROL TRIFENATATE 1 PUFF: 200; 25 POWDER RESPIRATORY (INHALATION) at 07:58

## 2017-01-01 RX ADMIN — PIPERACILLIN SODIUM,TAZOBACTAM SODIUM 3.38 G: 3; .375 INJECTION, POWDER, FOR SOLUTION INTRAVENOUS at 01:53

## 2017-01-01 RX ADMIN — DICLOFENAC 4 G: 10 GEL TOPICAL at 22:18

## 2017-01-01 RX ADMIN — HEPARIN SODIUM 5000 UNITS: 5000 INJECTION, SOLUTION INTRAVENOUS; SUBCUTANEOUS at 02:15

## 2017-01-01 RX ADMIN — BENZONATATE 100 MG: 100 CAPSULE, LIQUID FILLED ORAL at 12:00

## 2017-01-01 RX ADMIN — CARBOXYMETHYLCELLULOSE SODIUM 1 DROP: 5 SOLUTION/ DROPS OPHTHALMIC at 22:32

## 2017-01-01 RX ADMIN — IPRATROPIUM BROMIDE AND ALBUTEROL SULFATE 3 ML: .5; 3 SOLUTION RESPIRATORY (INHALATION) at 18:08

## 2017-01-01 RX ADMIN — PIPERACILLIN SODIUM,TAZOBACTAM SODIUM 3.38 G: 3; .375 INJECTION, POWDER, FOR SOLUTION INTRAVENOUS at 20:41

## 2017-01-01 RX ADMIN — ATORVASTATIN CALCIUM 10 MG: 10 TABLET, FILM COATED ORAL at 20:41

## 2017-01-01 RX ADMIN — POLYVINYL ALCOHOL 1 DROP: 14 SOLUTION/ DROPS OPHTHALMIC at 21:28

## 2017-01-01 RX ADMIN — DICLOFENAC 4 G: 10 GEL TOPICAL at 12:30

## 2017-01-01 RX ADMIN — IPRATROPIUM BROMIDE AND ALBUTEROL SULFATE 3 ML: .5; 3 SOLUTION RESPIRATORY (INHALATION) at 02:38

## 2017-01-01 RX ADMIN — PIPERACILLIN AND TAZOBACTAM 2.25 G: 2; .25 INJECTION, POWDER, LYOPHILIZED, FOR SOLUTION INTRAVENOUS; PARENTERAL at 15:45

## 2017-01-01 RX ADMIN — TAMSULOSIN HYDROCHLORIDE 0.4 MG: 0.4 CAPSULE ORAL at 09:13

## 2017-01-01 RX ADMIN — OYSTER SHELL CALCIUM WITH VITAMIN D 1 TABLET: 500; 200 TABLET, FILM COATED ORAL at 08:53

## 2017-01-01 RX ADMIN — CARBOXYMETHYLCELLULOSE SODIUM 1 DROP: 5 SOLUTION/ DROPS OPHTHALMIC at 15:46

## 2017-01-01 RX ADMIN — VANCOMYCIN HYDROCHLORIDE 2000 MG: 10 INJECTION, POWDER, LYOPHILIZED, FOR SOLUTION INTRAVENOUS at 03:52

## 2017-01-01 RX ADMIN — FLUTICASONE FUROATE AND VILANTEROL TRIFENATATE 1 PUFF: 200; 25 POWDER RESPIRATORY (INHALATION) at 09:09

## 2017-01-01 RX ADMIN — IPRATROPIUM BROMIDE AND ALBUTEROL SULFATE 3 ML: .5; 3 SOLUTION RESPIRATORY (INHALATION) at 04:41

## 2017-01-01 RX ADMIN — METOPROLOL SUCCINATE 50 MG: 50 TABLET, FILM COATED, EXTENDED RELEASE ORAL at 09:10

## 2017-01-01 RX ADMIN — HEPARIN SODIUM 5000 UNITS: 5000 INJECTION, SOLUTION INTRAVENOUS; SUBCUTANEOUS at 01:53

## 2017-01-01 RX ADMIN — UMECLIDINIUM 1 PUFF: 62.5 AEROSOL, POWDER ORAL at 08:06

## 2017-01-01 RX ADMIN — UMECLIDINIUM 1 PUFF: 62.5 AEROSOL, POWDER ORAL at 09:09

## 2017-01-01 RX ADMIN — RADIUM RA 223 DICHLORIDE 178.8 UCI.: 30 INJECTION INTRAVENOUS at 11:45

## 2017-01-01 RX ADMIN — PREDNISONE 5 MG: 5 TABLET ORAL at 08:18

## 2017-01-01 RX ADMIN — PIPERACILLIN SODIUM,TAZOBACTAM SODIUM 3.38 G: 3; .375 INJECTION, POWDER, FOR SOLUTION INTRAVENOUS at 15:46

## 2017-01-01 RX ADMIN — TAMSULOSIN HYDROCHLORIDE 0.4 MG: 0.4 CAPSULE ORAL at 07:57

## 2017-01-01 RX ADMIN — FUROSEMIDE 20 MG: 20 TABLET ORAL at 08:31

## 2017-01-01 RX ADMIN — PREDNISONE 5 MG: 5 TABLET ORAL at 21:13

## 2017-01-01 RX ADMIN — IPRATROPIUM BROMIDE AND ALBUTEROL SULFATE 3 ML: .5; 3 SOLUTION RESPIRATORY (INHALATION) at 08:25

## 2017-01-01 RX ADMIN — DENOSUMAB 120 MG: 120 INJECTION SUBCUTANEOUS at 10:06

## 2017-01-01 RX ADMIN — PREDNISONE 5 MG: 5 TABLET ORAL at 08:53

## 2017-01-01 RX ADMIN — AMOXICILLIN AND CLAVULANATE POTASSIUM 1 TABLET: 875; 125 TABLET, FILM COATED ORAL at 07:02

## 2017-01-01 RX ADMIN — FENTANYL 1 PATCH: 12 PATCH TRANSDERMAL at 09:18

## 2017-01-01 RX ADMIN — METOPROLOL SUCCINATE 50 MG: 50 TABLET, FILM COATED, EXTENDED RELEASE ORAL at 07:57

## 2017-01-01 RX ADMIN — OYSTER SHELL CALCIUM WITH VITAMIN D 1 TABLET: 500; 200 TABLET, FILM COATED ORAL at 09:12

## 2017-01-01 RX ADMIN — MEGESTROL ACETATE 80 MG: 40 TABLET ORAL at 09:27

## 2017-01-01 RX ADMIN — POLYVINYL ALCOHOL 1 DROP: 14 SOLUTION/ DROPS OPHTHALMIC at 08:06

## 2017-01-01 RX ADMIN — ATORVASTATIN CALCIUM 10 MG: 10 TABLET, FILM COATED ORAL at 20:36

## 2017-01-01 RX ADMIN — ISOSORBIDE MONONITRATE 60 MG: 60 TABLET, EXTENDED RELEASE ORAL at 09:18

## 2017-01-01 RX ADMIN — SENNOSIDES AND DOCUSATE SODIUM 1 TABLET: 8.6; 5 TABLET ORAL at 08:31

## 2017-01-01 RX ADMIN — GUAIFENESIN 10 ML: 100 SOLUTION ORAL at 08:52

## 2017-01-01 RX ADMIN — TOBRAMYCIN AND DEXAMETHASONE: 3; 1 OINTMENT OPHTHALMIC at 09:19

## 2017-01-01 RX ADMIN — IPRATROPIUM BROMIDE AND ALBUTEROL SULFATE 3 ML: .5; 3 SOLUTION RESPIRATORY (INHALATION) at 12:16

## 2017-01-01 RX ADMIN — HEPARIN SODIUM 5000 UNITS: 5000 INJECTION, SOLUTION INTRAVENOUS; SUBCUTANEOUS at 15:46

## 2017-01-01 RX ADMIN — IPRATROPIUM BROMIDE AND ALBUTEROL SULFATE 3 ML: .5; 3 SOLUTION RESPIRATORY (INHALATION) at 07:55

## 2017-01-01 RX ADMIN — VANCOMYCIN HYDROCHLORIDE 2000 MG: 10 INJECTION, POWDER, LYOPHILIZED, FOR SOLUTION INTRAVENOUS at 03:26

## 2017-01-01 RX ADMIN — RANITIDINE 75 MG: 75 TABLET, FILM COATED ORAL at 20:36

## 2017-01-01 RX ADMIN — PIPERACILLIN SODIUM,TAZOBACTAM SODIUM 3.38 G: 3; .375 INJECTION, POWDER, FOR SOLUTION INTRAVENOUS at 14:51

## 2017-01-01 RX ADMIN — DOXYCYCLINE HYCLATE 100 MG: 100 CAPSULE ORAL at 20:01

## 2017-01-01 RX ADMIN — POLYVINYL ALCOHOL 1 DROP: 14 SOLUTION/ DROPS OPHTHALMIC at 07:56

## 2017-01-01 RX ADMIN — ASPIRIN 81 MG: 81 TABLET, COATED ORAL at 07:57

## 2017-01-01 RX ADMIN — SODIUM CHLORIDE: 9 INJECTION, SOLUTION INTRAVENOUS at 15:45

## 2017-01-01 RX ADMIN — RADIUM RA 223 DICHLORIDE 165.2 UCI.: 30 INJECTION INTRAVENOUS at 12:03

## 2017-01-01 RX ADMIN — OMEPRAZOLE 10 MG: 10 CAPSULE, DELAYED RELEASE ORAL at 08:18

## 2017-01-01 RX ADMIN — DENOSUMAB 120 MG: 120 INJECTION SUBCUTANEOUS at 10:58

## 2017-01-01 RX ADMIN — DICLOFENAC 4 G: 10 GEL TOPICAL at 21:16

## 2017-01-01 RX ADMIN — TOBRAMYCIN AND DEXAMETHASONE: 3; 1 OINTMENT OPHTHALMIC at 22:23

## 2017-01-01 RX ADMIN — IPRATROPIUM BROMIDE AND ALBUTEROL SULFATE 3 ML: .5; 3 SOLUTION RESPIRATORY (INHALATION) at 16:18

## 2017-01-01 RX ADMIN — ISOSORBIDE MONONITRATE 60 MG: 30 TABLET, EXTENDED RELEASE ORAL at 09:10

## 2017-01-01 RX ADMIN — CARBOXYMETHYLCELLULOSE SODIUM 1 DROP: 5 SOLUTION/ DROPS OPHTHALMIC at 08:18

## 2017-01-01 RX ADMIN — OMEPRAZOLE 10 MG: 10 CAPSULE, DELAYED RELEASE ORAL at 21:00

## 2017-01-01 RX ADMIN — BUDESONIDE 1 MG: 1 SUSPENSION RESPIRATORY (INHALATION) at 07:55

## 2017-01-01 RX ADMIN — TOBRAMYCIN AND DEXAMETHASONE: 3; 1 OINTMENT OPHTHALMIC at 08:36

## 2017-01-01 RX ADMIN — BACITRACIN ZINC: 500 OINTMENT TOPICAL at 23:39

## 2017-01-01 RX ADMIN — OMEPRAZOLE 10 MG: 10 CAPSULE, DELAYED RELEASE ORAL at 07:56

## 2017-01-01 RX ADMIN — IPRATROPIUM BROMIDE AND ALBUTEROL SULFATE 3 ML: .5; 3 SOLUTION RESPIRATORY (INHALATION) at 16:09

## 2017-01-01 RX ADMIN — TOBRAMYCIN AND DEXAMETHASONE: 3; 1 OINTMENT OPHTHALMIC at 21:28

## 2017-01-01 RX ADMIN — METOPROLOL SUCCINATE 50 MG: 50 TABLET, FILM COATED, EXTENDED RELEASE ORAL at 08:17

## 2017-01-01 RX ADMIN — OMEPRAZOLE 10 MG: 10 CAPSULE, DELAYED RELEASE ORAL at 09:10

## 2017-01-01 RX ADMIN — SENNOSIDES AND DOCUSATE SODIUM 1 TABLET: 8.6; 5 TABLET ORAL at 09:46

## 2017-01-01 RX ADMIN — PIPERACILLIN AND TAZOBACTAM 2.25 G: 2; .25 INJECTION, POWDER, LYOPHILIZED, FOR SOLUTION INTRAVENOUS; PARENTERAL at 20:58

## 2017-01-01 RX ADMIN — METOPROLOL SUCCINATE 50 MG: 25 TABLET, EXTENDED RELEASE ORAL at 09:18

## 2017-01-01 RX ADMIN — CARBOXYMETHYLCELLULOSE SODIUM 1 DROP: 5 SOLUTION/ DROPS OPHTHALMIC at 09:12

## 2017-01-01 RX ADMIN — OMEPRAZOLE 10 MG: 10 CAPSULE, DELAYED RELEASE ORAL at 09:27

## 2017-01-01 RX ADMIN — SODIUM CHLORIDE 1000 ML: 9 INJECTION, SOLUTION INTRAVENOUS at 00:59

## 2017-01-01 RX ADMIN — SODIUM CHLORIDE 1000 ML: 9 INJECTION, SOLUTION INTRAVENOUS at 03:41

## 2017-01-01 RX ADMIN — VANCOMYCIN HYDROCHLORIDE 2000 MG: 10 INJECTION, POWDER, LYOPHILIZED, FOR SOLUTION INTRAVENOUS at 03:39

## 2017-01-01 RX ADMIN — HYDROMORPHONE HYDROCHLORIDE 1 MG: 2 TABLET ORAL at 02:00

## 2017-01-01 RX ADMIN — TOBRAMYCIN AND DEXAMETHASONE: 3; 1 OINTMENT OPHTHALMIC at 21:06

## 2017-01-01 RX ADMIN — SENNOSIDES AND DOCUSATE SODIUM 1 TABLET: 8.6; 5 TABLET ORAL at 21:00

## 2017-01-01 ASSESSMENT — PAIN DESCRIPTION - DESCRIPTORS: DESCRIPTORS: ACHING

## 2017-01-01 ASSESSMENT — ACTIVITIES OF DAILY LIVING (ADL)
COGNITION: 1 - ATTENTION OR MEMORY DEFICITS
RETIRED_COMMUNICATION: 0-->UNDERSTANDS/COMMUNICATES WITHOUT DIFFICULTY
DRESS: 0-->INDEPENDENT
BATHING: 0-->INDEPENDENT
AMBULATION: 1-->ASSISTIVE EQUIPMENT
TOILETING: 1-->ASSISTIVE EQUIPMENT
SWALLOWING: 0-->SWALLOWS FOODS/LIQUIDS WITHOUT DIFFICULTY
FALL_HISTORY_WITHIN_LAST_SIX_MONTHS: NO
TRANSFERRING: 1-->ASSISTIVE EQUIPMENT
RETIRED_EATING: 0-->INDEPENDENT

## 2017-01-01 ASSESSMENT — ENCOUNTER SYMPTOMS
RHINORRHEA: 0
BRUISES/BLEEDS EASILY: 0
BRUISES/BLEEDS EASILY: 0
WHEEZING: 1
CHILLS: 0
COLOR CHANGE: 0
COUGH: 1
DYSPHORIC MOOD: 0
SINUS PAIN: 0
WOUND: 1
POLYDIPSIA: 0
EYE PAIN: 0
FATIGUE: 0
SHORTNESS OF BREATH: 0
VOMITING: 0
LIGHT-HEADEDNESS: 0
SPUTUM PRODUCTION: 0
CHEST TIGHTNESS: 1
FEVER: 1
NAUSEA: 1
HEARTBURN: 0
SHORTNESS OF BREATH: 1
FEVER: 0
CHILLS: 0
ABDOMINAL PAIN: 1
BACK PAIN: 0
BACK PAIN: 0
ABDOMINAL PAIN: 0
DIARRHEA: 0
LOSS OF CONSCIOUSNESS: 0
VOICE CHANGE: 0
ADENOPATHY: 0
NECK PAIN: 1
VOMITING: 0
PALPITATIONS: 0
HEADACHES: 0
MYALGIAS: 0
ABDOMINAL PAIN: 0
BLOOD IN STOOL: 1
NECK PAIN: 0
EYE PAIN: 0
DIZZINESS: 0
VOMITING: 0
SORE THROAT: 0
COUGH: 1
DYSURIA: 0
VOMITING: 0
CHILLS: 0
BACK PAIN: 1
DIAPHORESIS: 0
SEIZURES: 0
SHORTNESS OF BREATH: 0
HEMATURIA: 0
FEVER: 1
DIZZINESS: 0
FATIGUE: 0
SHORTNESS OF BREATH: 1
MYALGIAS: 0
CHEST TIGHTNESS: 0
WEAKNESS: 0
ABDOMINAL PAIN: 0
LIGHT-HEADEDNESS: 0
NAUSEA: 0
MYALGIAS: 1
NAUSEA: 0
NUMBNESS: 0
HEMOPTYSIS: 0
NECK PAIN: 0
NERVOUS/ANXIOUS: 0
DIAPHORESIS: 0
CONSTIPATION: 0
COUGH: 0
FEVER: 0
UNEXPECTED WEIGHT CHANGE: 1
APPETITE CHANGE: 0
HEADACHES: 0
COUGH: 1
DIARRHEA: 0
PALPITATIONS: 0
TROUBLE SWALLOWING: 0
ARTHRALGIAS: 0
CONFUSION: 0
BRUISES/BLEEDS EASILY: 0
FEVER: 0
SHORTNESS OF BREATH: 1
WEAKNESS: 0
FREQUENCY: 1
FREQUENCY: 0
NAUSEA: 0
WHEEZING: 0

## 2017-01-01 ASSESSMENT — PAIN SCALES - GENERAL
PAINLEVEL: WORST PAIN (10)
PAINLEVEL: WORST PAIN (10)
PAINLEVEL: EXTREME PAIN (8)
PAINLEVEL: NO PAIN (0)
PAINLEVEL: NO PAIN (0)
PAINLEVEL: SEVERE PAIN (6)
PAINLEVEL: NO PAIN (0)
PAINLEVEL: NO PAIN (0)
PAINLEVEL: EXTREME PAIN (9)
PAINLEVEL: EXTREME PAIN (9)
PAINLEVEL: WORST PAIN (10)

## 2017-01-03 ENCOUNTER — TRANSFERRED RECORDS (OUTPATIENT)
Dept: HEALTH INFORMATION MANAGEMENT | Facility: CLINIC | Age: 72
End: 2017-01-03

## 2017-01-04 ENCOUNTER — ONCOLOGY VISIT (OUTPATIENT)
Dept: ONCOLOGY | Facility: CLINIC | Age: 72
End: 2017-01-04
Attending: PHYSICIAN ASSISTANT
Payer: COMMERCIAL

## 2017-01-04 ENCOUNTER — HOSPITAL ENCOUNTER (OUTPATIENT)
Dept: NUCLEAR MEDICINE | Facility: CLINIC | Age: 72
Setting detail: NUCLEAR MEDICINE
Discharge: HOME | End: 2017-01-04
Attending: INTERNAL MEDICINE | Admitting: INTERNAL MEDICINE
Payer: COMMERCIAL

## 2017-01-04 VITALS
BODY MASS INDEX: 35.44 KG/M2 | OXYGEN SATURATION: 96 % | TEMPERATURE: 98.6 F | WEIGHT: 254 LBS | DIASTOLIC BLOOD PRESSURE: 78 MMHG | SYSTOLIC BLOOD PRESSURE: 138 MMHG | HEART RATE: 95 BPM | RESPIRATION RATE: 20 BRPM

## 2017-01-04 DIAGNOSIS — C61 PROSTATE CANCER METASTATIC TO MULTIPLE SITES (H): ICD-10-CM

## 2017-01-04 DIAGNOSIS — C61 PROSTATE CANCER METASTATIC TO MULTIPLE SITES (H): Primary | ICD-10-CM

## 2017-01-04 DIAGNOSIS — C79.51 METASTASIS TO BONE (H): ICD-10-CM

## 2017-01-04 PROCEDURE — 34400006 ZZH RX 344: Performed by: INTERNAL MEDICINE

## 2017-01-04 PROCEDURE — A9606 RADIUM RA223 DICHLORIDE THER: HCPCS | Performed by: INTERNAL MEDICINE

## 2017-01-04 PROCEDURE — 99214 OFFICE O/P EST MOD 30 MIN: CPT | Mod: ZP | Performed by: PHYSICIAN ASSISTANT

## 2017-01-04 PROCEDURE — 77790 RADIATION HANDLING: CPT

## 2017-01-04 RX ORDER — PROCHLORPERAZINE MALEATE 5 MG
5 TABLET ORAL EVERY 6 HOURS PRN
Qty: 90 TABLET | Refills: 0 | Status: SHIPPED | OUTPATIENT
Start: 2017-01-04 | End: 2017-02-22

## 2017-01-04 RX ADMIN — RADIUM RA 223 DICHLORIDE 180 UCI.: 30 INJECTION INTRAVENOUS at 10:51

## 2017-01-04 ASSESSMENT — ANXIETY QUESTIONNAIRES
7. FEELING AFRAID AS IF SOMETHING AWFUL MIGHT HAPPEN: NOT AT ALL
GAD7 TOTAL SCORE: 9
3. WORRYING TOO MUCH ABOUT DIFFERENT THINGS: NEARLY EVERY DAY
5. BEING SO RESTLESS THAT IT IS HARD TO SIT STILL: NOT AT ALL
IF YOU CHECKED OFF ANY PROBLEMS ON THIS QUESTIONNAIRE, HOW DIFFICULT HAVE THESE PROBLEMS MADE IT FOR YOU TO DO YOUR WORK, TAKE CARE OF THINGS AT HOME, OR GET ALONG WITH OTHER PEOPLE: NOT DIFFICULT AT ALL
6. BECOMING EASILY ANNOYED OR IRRITABLE: NEARLY EVERY DAY
2. NOT BEING ABLE TO STOP OR CONTROL WORRYING: NOT AT ALL
1. FEELING NERVOUS, ANXIOUS, OR ON EDGE: NOT AT ALL

## 2017-01-04 ASSESSMENT — PATIENT HEALTH QUESTIONNAIRE - PHQ9: 5. POOR APPETITE OR OVEREATING: NEARLY EVERY DAY

## 2017-01-04 ASSESSMENT — PAIN SCALES - GENERAL: PAINLEVEL: NO PAIN (0)

## 2017-01-04 NOTE — Clinical Note
1/4/2017       RE: Tomas Grey  [facility]  [address]  [facility] [address]     Dear Colleague,    Thank you for referring your patient, Tomas Grey, to the [facility]. Please see a copy of my visit note below.    No notes on file    Again, thank you for allowing me to participate in the care of your patient.      Sincerely,    JASON Chery

## 2017-01-04 NOTE — Clinical Note
1/4/2017      RE: Tomas Grey  [facility]  [address]  [facility] [address]       [facility] PHYSICIANS  HEMATOLOGY ONCOLOGY    ONCOLOGY FOLLOWUP NOTE   Jan 4, 2017     DIAGNOSIS:    1- Metastatic prostate cancer with extensive metastases to bone.   2- Advanced COPD and multiple other co-morbidities.      TREATMENT:     1.  Lupron injection monthly, discontinued 6/2016.  Casodex 50 mg daily. discontinued 2/9/2015  - Xgeva monthly  - Xtandi 4/15/15  - Zytgea and prednisone 9/19/2016-present  - Xofigo 1/4/17     Subjective:  Mr. Grey is here prior to starting treatment with Xofigo. He has been well. He was admitted overnight to the hospital on 12/26 for acute hypercapnic respiratory failure. He was placed on BiPAP and CO2 level dropped quickly. He now has BiPAP at his facility. He notes his breathing has been stable. He appears sleepy during the visit but is arousable to voice and is oriented and tracks well with our conversation. He says he is always this sleepy. He denies feeling confused. Has not had had fevers/chills or wheezing. He has been eating and drinking well. Normal bowel movements. No  difficulties. No nausea. No new bone pain.     REVIEW OF SYSTEMS:  A complete review of systems was performed and found to be negative other than pertinent positives mentioned in history of present illness.     Past medical, social histories reviewed.    Meds- Reviewed.     PHYSICAL EXAMINATION:   VITAL SIGNS:/78 mmHg  Pulse 95  Temp(Src) 98.6  F (37  C) (Oral)  Resp 20  Wt 115.214 kg (254 lb)  SpO2 96%    GENERAL: Sitting comfortably in his scooter. Appears to fall asleep several times during our conversation but arouses very easily to voice  HEENT: Pupils are equal. Oropharynx is clear.   NECK: No cervical or supraclavicular lymphadenopathy.   LUNGS: Clear bilaterally. No wheezing   HEART: S1, S2, regular.   ABDOMEN: Soft, nontender, nondistended, no  hepatosplenomegaly.   EXTREMITIES: Warm, well perfused.   NEUROLOGIC: Alert, awake.   SKIN: No rash.   LYMPHATICS: No edema.     DATA: Labs from 12/28 12/28/2016 13:00   Sodium 144   Potassium 4.3   Chloride 101   Carbon Dioxide 37 (H)   Urea Nitrogen 57 (H)   Creatinine 1.64 (H)   GFR Estimate 42 (L)   GFR Estimate If Black 50 (L)   Calcium 9.1   Anion Gap 6   Albumin 2.8 (L)   Protein Total 6.6 (L)   Bilirubin Total 0.3   Alkaline Phosphatase 118   ALT 17   AST 16   PSA 46.26 (H)   Glucose 132 (H)   WBC 12.3 (H)   Hemoglobin 9.6 (L)   Hematocrit 34.3 (L)   Platelet Count 318   RBC Count 3.45 (L)   MCV 99   MCH 27.8   MCHC 28.0 (L)   RDW 16.1 (H)   Diff Method Automated Method   % Neutrophils 65.4   % Lymphocytes 17.6   % Monocytes 8.3   % Eosinophils 3.7   % Basophils 0.1   % Immature Granulocytes 4.9   Nucleated RBCs 1 (H)   Absolute Neutrophil 8.1   Absolute Lymphocytes 2.2   Absolute Monocytes 1.0   Absolute Eosinophils 0.5   Absolute Basophils 0.0   Abs Immature Granulocytes 0.6 (H)   Absolute Nucleated RBC 0.1      9/19/2016 13:00 10/24/2016 13:40 11/8/2016 00:00 11/28/2016 09:35 12/28/2016 13:00   PSA 12.55 (H) 22.28 (H) 28.20 34.88 (H) 46.26 (H)       ASSESSMENT:   1.  Metastatic prostate cancer with multiple sclerotic bone metastases. Primary oncologist is Dr. Oviedo. He was recently on Zytiga and prednisone with rising PSA so he met with Dr. Callahan to discuss alternative therapies, and he recommended Xofigo given his extensive bone metastasis. His baseline lab work is adequate to proceed today. I reviewed more common side effects of fatigue and mild GI symptoms including n/v/d. Reviewed double-flushing the toilet to urine and stool for the next 72 hours. He states understanding. Plan for 6 treatments which will be given every month. He can see Pastora or I prior to each treatment and follow-up with Dr. Oviedo as scheduled.     2.  Extensive bone metastases. He will continue to take calcium and vitamin D.  Will  continue Xgeva. Pain meds through assisted living now.     3.  Nausea prophylaxis: Will give rx for compazine 5 mg to take prn for any nausea with treatment.      Elina Jolly PA-C    [facility] [address] [address] [phone]

## 2017-01-04 NOTE — PROGRESS NOTES
[facility] PHYSICIANS  HEMATOLOGY ONCOLOGY    ONCOLOGY FOLLOWUP NOTE   Jan 4, 2017     DIAGNOSIS:    1- Metastatic prostate cancer with extensive metastases to bone.   2- Advanced COPD and multiple other co-morbidities.      TREATMENT:     1.  Lupron injection monthly, discontinued 6/2016.  Casodex 50 mg daily. discontinued 2/9/2015  - Xgeva monthly  - Xtandi 4/15/15  - Zytgea and prednisone 9/19/2016-present  - Xofigo 1/4/17     Subjective:  Mr. Grey is here prior to starting treatment with Xofigo. He has been well. He was admitted overnight to the hospital on 12/26 for acute hypercapnic respiratory failure. He was placed on BiPAP and CO2 level dropped quickly. He now has BiPAP at his facility. He notes his breathing has been stable. He appears sleepy during the visit but is arousable to voice and is oriented and tracks well with our conversation. He says he is always this sleepy. He denies feeling confused. Has not had had fevers/chills or wheezing. He has been eating and drinking well. Normal bowel movements. No  difficulties. No nausea. No new bone pain.     REVIEW OF SYSTEMS:  A complete review of systems was performed and found to be negative other than pertinent positives mentioned in history of present illness.     Past medical, social histories reviewed.    Meds- Reviewed.     PHYSICAL EXAMINATION:   VITAL SIGNS:/78 mmHg  Pulse 95  Temp(Src) 98.6  F (37  C) (Oral)  Resp 20  Wt 115.214 kg (254 lb)  SpO2 96%    GENERAL: Sitting comfortably in his scooter. Appears to fall asleep several times during our conversation but arouses very easily to voice  HEENT: Pupils are equal. Oropharynx is clear.   NECK: No cervical or supraclavicular lymphadenopathy.   LUNGS: Clear bilaterally. No wheezing   HEART: S1, S2, regular.   ABDOMEN: Soft, nontender, nondistended, no hepatosplenomegaly.   EXTREMITIES: Warm, well perfused.   NEUROLOGIC: Alert, awake.   SKIN: No rash.   LYMPHATICS: No edema.      DATA: Labs from 12/28 12/28/2016 13:00   Sodium 144   Potassium 4.3   Chloride 101   Carbon Dioxide 37 (H)   Urea Nitrogen 57 (H)   Creatinine 1.64 (H)   GFR Estimate 42 (L)   GFR Estimate If Black 50 (L)   Calcium 9.1   Anion Gap 6   Albumin 2.8 (L)   Protein Total 6.6 (L)   Bilirubin Total 0.3   Alkaline Phosphatase 118   ALT 17   AST 16   PSA 46.26 (H)   Glucose 132 (H)   WBC 12.3 (H)   Hemoglobin 9.6 (L)   Hematocrit 34.3 (L)   Platelet Count 318   RBC Count 3.45 (L)   MCV 99   MCH 27.8   MCHC 28.0 (L)   RDW 16.1 (H)   Diff Method Automated Method   % Neutrophils 65.4   % Lymphocytes 17.6   % Monocytes 8.3   % Eosinophils 3.7   % Basophils 0.1   % Immature Granulocytes 4.9   Nucleated RBCs 1 (H)   Absolute Neutrophil 8.1   Absolute Lymphocytes 2.2   Absolute Monocytes 1.0   Absolute Eosinophils 0.5   Absolute Basophils 0.0   Abs Immature Granulocytes 0.6 (H)   Absolute Nucleated RBC 0.1      9/19/2016 13:00 10/24/2016 13:40 11/8/2016 00:00 11/28/2016 09:35 12/28/2016 13:00   PSA 12.55 (H) 22.28 (H) 28.20 34.88 (H) 46.26 (H)       ASSESSMENT:   1.  Metastatic prostate cancer with multiple sclerotic bone metastases. Primary oncologist is Dr. Oviedo. He was recently on Zytiga and prednisone with rising PSA so he met with Dr. Callahan to discuss alternative therapies, and he recommended Xofigo given his extensive bone metastasis. His baseline lab work is adequate to proceed today. I reviewed more common side effects of fatigue and mild GI symptoms including n/v/d. Reviewed double-flushing the toilet to urine and stool for the next 72 hours. He states understanding. Plan for 6 treatments which will be given every month. He can see Pastora or XENA prior to each treatment and follow-up with Dr. Oviedo as scheduled.     2.  Extensive bone metastases. He will continue to take calcium and vitamin D.  Will continue Xgeva. Pain meds through assisted living now.     3.  Nausea prophylaxis: Will give rx for compazine 5 mg to take prn  for any nausea with treatment.      Elina Jolly PA-C    [facility] [address] [address] [phone]

## 2017-01-04 NOTE — NURSING NOTE
"Tomas Grey is a 71 year old male who presents for:  Chief Complaint   Patient presents with     Oncology Clinic Visit     Prostate Ca        Initial Vitals:  /78 mmHg  Pulse 95  Temp(Src) 98.6  F (37  C) (Oral)  Resp 20  Wt 115.214 kg (254 lb)  SpO2 96% Estimated body mass index is 35.44 kg/(m^2) as calculated from the following:    Height as of 12/28/16: 1.803 m (5' 11\").    Weight as of this encounter: 115.214 kg (254 lb).. There is no height on file to calculate BSA. BP completed using cuff size: large  No Pain (0) No LMP for male patient. Allergies and medications reviewed.     Medications: Medication refills not needed today.  Pharmacy name entered into [employer]:    [facility]  [facility] - [place], [place] - [address]  [facility] - [place] - [address] [place] [address] & [facility] - [place] - [address] [facility] - [place], [place] - [address] [facility] - [place], [place] - [address] [place] [address] PHARMACY - [place], [place] - [address]    Comments:     6  minutes for nursing intake (face to face time)   Mey Brandt MA          "

## 2017-01-05 ASSESSMENT — ANXIETY QUESTIONNAIRES: GAD7 TOTAL SCORE: 9

## 2017-01-05 ASSESSMENT — PATIENT HEALTH QUESTIONNAIRE - PHQ9: SUM OF ALL RESPONSES TO PHQ QUESTIONS 1-9: 16

## 2017-01-10 ENCOUNTER — TRANSFERRED RECORDS (OUTPATIENT)
Dept: HEALTH INFORMATION MANAGEMENT | Facility: CLINIC | Age: 72
End: 2017-01-10

## 2017-01-11 ENCOUNTER — NURSING HOME VISIT (OUTPATIENT)
Dept: GERIATRICS | Facility: CLINIC | Age: 72
End: 2017-01-11
Payer: COMMERCIAL

## 2017-01-11 VITALS
TEMPERATURE: 97.5 F | WEIGHT: 250.2 LBS | SYSTOLIC BLOOD PRESSURE: 142 MMHG | OXYGEN SATURATION: 96 % | DIASTOLIC BLOOD PRESSURE: 72 MMHG | BODY MASS INDEX: 34.91 KG/M2 | HEART RATE: 78 BPM | RESPIRATION RATE: 18 BRPM

## 2017-01-11 DIAGNOSIS — J44.9 CHRONIC OBSTRUCTIVE PULMONARY DISEASE, UNSPECIFIED COPD TYPE (H): Primary | ICD-10-CM

## 2017-01-11 DIAGNOSIS — M25.562 CHRONIC PAIN OF BOTH KNEES: ICD-10-CM

## 2017-01-11 DIAGNOSIS — Z60.9 HIGH RISK SOCIAL SITUATION: ICD-10-CM

## 2017-01-11 DIAGNOSIS — N18.30 TYPE 2 DIABETES MELLITUS WITH STAGE 3 CHRONIC KIDNEY DISEASE, WITH LONG-TERM CURRENT USE OF INSULIN (H): ICD-10-CM

## 2017-01-11 DIAGNOSIS — G89.4 CHRONIC PAIN SYNDROME: ICD-10-CM

## 2017-01-11 DIAGNOSIS — K14.6 TONGUE PAIN: ICD-10-CM

## 2017-01-11 DIAGNOSIS — G89.29 CHRONIC PAIN OF BOTH KNEES: ICD-10-CM

## 2017-01-11 DIAGNOSIS — Z79.4 TYPE 2 DIABETES MELLITUS WITH STAGE 3 CHRONIC KIDNEY DISEASE, WITH LONG-TERM CURRENT USE OF INSULIN (H): ICD-10-CM

## 2017-01-11 DIAGNOSIS — M25.561 CHRONIC PAIN OF BOTH KNEES: ICD-10-CM

## 2017-01-11 DIAGNOSIS — E11.22 TYPE 2 DIABETES MELLITUS WITH STAGE 3 CHRONIC KIDNEY DISEASE, WITH LONG-TERM CURRENT USE OF INSULIN (H): ICD-10-CM

## 2017-01-11 DIAGNOSIS — C79.51 PROSTATE CANCER METASTATIC TO BONE (H): ICD-10-CM

## 2017-01-11 DIAGNOSIS — C61 PROSTATE CANCER METASTATIC TO BONE (H): ICD-10-CM

## 2017-01-11 PROCEDURE — 99309 SBSQ NF CARE MODERATE MDM 30: CPT | Performed by: NURSE PRACTITIONER

## 2017-01-11 PROCEDURE — 99207 ZZC CDG-CORRECTLY CODED, REVIEWED AND AGREE: CPT | Performed by: NURSE PRACTITIONER

## 2017-01-11 RX ORDER — PREDNISONE 5 MG/1
5 TABLET ORAL 2 TIMES DAILY
COMMUNITY
End: 2017-02-14

## 2017-01-11 RX ORDER — ABIRATERONE ACETATE 250 MG/1
1000 TABLET ORAL
COMMUNITY
End: 2017-02-14

## 2017-01-11 SDOH — SOCIAL STABILITY - SOCIAL INSECURITY: PROBLEM RELATED TO SOCIAL ENVIRONMENT, UNSPECIFIED: Z60.9

## 2017-01-11 NOTE — PROGRESS NOTES
"[facility]    Chief Complaint   Patient presents with     Nursing Home Acute     HPI:    Tomas Grey is a 71 year old  (1945), who is being seen today for an episodic care visit at [facility]. Today's concern is:    Chronic Obstructive Pulmonary Disease. Reports not having BiPAP in room at facility and not wearing last night. Does not believe he has worn BiPAP in at least a week.     Prostate Cancer Metastatic to Bone/Chronic Pain Syndrome. Complains of pain in the neck, back and shoulder. States \"Oxycodone, Percocet, whatever they are giving me doesn't work\". Upon review of documentation over the past week, has utilized Hydromorphone 6 times, about every other day on average. Typically utilizes in the evening.     Type 2 Diabetes Mellitus. Upon review of blood sugars over the past week, range is as follows:    Breakfast:  with an episode of 390  Lunch: 133-148 with an episode of 483  Dinner:  193-280 with an episode of 375  Bedtime: 147-290 with an episode of 446    Tongue Pain. Reports biting the right side of his tongue last week. Has continued to experience pain on the right side of his mouth since. Using Orajel with some relief and carrying in his pocket. States Hydromorphone doesn't touch the pain.     High Risk Social Situation. Previously expressed interest in discharging home to his son's house in [place].  [facility]  updated and facility SW. Was out on MITZI at son's house over New Year's and son called facility to see if resident could be transported back to facility sooner. Today, resident reports he does not want to go to his son's house. Reports it was his daughter in-law's idea. Is pleased with the facility and appreciates there is a nurse on staff 24 hours a day. Has not told his family his desires, but plans to next week at a scheduled care conference at the facility.     ALLERGIES: Morphine  Past Medical, Surgical, Family and " Social History reviewed and updated in [facility].    Current Outpatient Prescriptions   Medication Sig Dispense Refill     abiraterone (ABIRATERONE) 250 MG tablet Take 1,000 mg by mouth every morning (before breakfast)       predniSONE (DELTASONE) 5 MG tablet Take 5 mg by mouth 2 times daily       benzocaine (ORAJEL) 10 % GEL Take 1 applicator by mouth 4 times daily as needed for moderate pain       diclofenac (VOLTAREN) 1 % GEL topical gel Apply 4 grams to knees four times daily as needed using enclosed dosing card. 100 g 0     prochlorperazine (COMPAZINE) 5 MG tablet Take 1 tablet (5 mg) by mouth every 6 hours as needed for nausea or vomiting 90 tablet 0     HYDROmorphone (DILAUDID) 2 MG tablet Take 1 tablet (2 mg) by mouth every 4 hours as needed for moderate to severe pain 10 tablet 0     senna-docusate (SENOKOT-S;PERICOLACE) 8.6-50 MG per tablet Take 1 tablet by mouth 2 times daily 100 tablet      insulin aspart (NOVOLOG FLEXPEN) 100 UNIT/ML injection Inject Subcutaneous At Bedtime Per sliding scale: 150 - 199 = 2 Units; 200 - 249 = 3 Units; 250 - 299 = 5 Units; 300 - 349 = 6 Units; 350+ = 8 Units       insulin aspart (NOVOLOG FLEXPEN) 100 UNIT/ML injection Inject Subcutaneous 3 times daily (before meals) Per sliding scale: 120 - 149 = 2 Units; 150 - 199 = 3 Units; 200 - 249 = 6 Units; 250 - 299 = 9 Units; 300 - 349 = 12 Units; 350+ = 15 Units       insulin glargine (LANTUS) 100 UNIT/ML injection Inject 50 Units Subcutaneous At Bedtime        omeprazole (PRILOSEC) 10 MG CR capsule Take 1 capsule (10 mg) by mouth daily       isosorbide mononitrate (IMDUR) 60 MG 24 hr tablet Take 1 tablet (60 mg) by mouth daily       atorvastatin (LIPITOR) 10 MG tablet Take 10 mg by mouth daily       fluticasone - vilanterol (BREO ELLIPTA) 100-25 MCG/INH oral inhaler Inhale 1 puff into the lungs daily       tiotropium (SPIRIVA) 18 MCG inhalation capsule Inhale 18 mcg into the lungs daily       benzonatate (TESSALON) 100 MG capsule  Take 100 mg by mouth 3 times daily as needed for cough       polyvinyl alcohol (LIQUIFILM TEARS) 1.4 % ophthalmic solution Place 1 drop into both eyes 3 times daily       acetaminophen (TYLENOL) 500 MG tablet Take 500 mg by mouth every 4 hours as needed for pain Not to exceed 3000 mg/24 hours       alum & mag hydroxide-simethicone (MYLANTA/MAALOX) 200-200-20 MG/5ML SUSP Take 15 mLs by mouth every 6 hours as needed for indigestion or heartburn        magnesium hydroxide (MILK OF MAGNESIA) 400 MG/5ML suspension Take 30 mLs by mouth daily as needed for constipation or heartburn       nitroglycerin (NITROSTAT) 0.4 MG SL tablet Place 0.4 mg under the tongue every 5 minutes as needed for chest pain if you are still having symptoms after 3 doses (15 minutes) call 911.       ipratropium - albuterol 0.5 mg/2.5 mg/3 mL (DUONEB) 0.5-2.5 (3) MG/3ML nebulization Take 1 vial (3 mLs) by nebulization every 4 hours as needed for shortness of breath / dyspnea or wheezing 360 mL 3     aspirin 81 MG EC tablet Take 1 tablet (81 mg) by mouth daily 5 tablet 0     metoprolol (TOPROL XL) 50 MG 24 hr tablet Take 1 tablet (50 mg) by mouth daily 5 tablet 0     furosemide (LASIX) 40 MG tablet Take 1 tablet (40 mg) by mouth 2 times daily 0800, 1200 10 tablet 0     calcium carb 1250 mg, 500 mg [place],/vitamin D 200 units (OSCAL WITH D) 500-200 MG-UNIT per tablet Take 1 tablet by mouth 2 times daily (with meals) 10 tablet 0     tamsulosin (FLOMAX) 0.4 MG 24 hr capsule Take 1 capsule (0.4 mg) by mouth daily 5 capsule 0     sodium chloride (OCEAN) 0.65 % nasal spray Spray 2 sprays into both nostrils every hour as needed for congestion 1 Bottle 12     ammonium lactate (LAC-HYDRIN) 12 % lotion Apply topically 2 times daily as needed for dry skin To all extremities for dry skin       [DISCONTINUED] enzalutamide (XTANDI) 40 MG capsule Take 4 capsules (160 mg) by mouth daily 120 capsule 0     Medications reviewed:  Medications reconciled to facility chart  and changes were made to reflect current medications as identified as above med list. Below are the changes that were made:   Medications stopped since last EPIC medication reconciliation:   There are no discontinued medications.    Medications started since last [facility] medication reconciliation:  Orders Placed This Encounter   Medications     abiraterone (ABIRATERONE) 250 MG tablet     Sig: Take 1,000 mg by mouth every morning (before breakfast)     predniSONE (DELTASONE) 5 MG tablet     Sig: Take 5 mg by mouth 2 times daily     benzocaine (ORAJEL) 10 % GEL     Sig: Take 1 applicator by mouth 4 times daily as needed for moderate pain       REVIEW OF SYSTEMS:  4 point ROS including Respiratory, CV, GI and , other than that noted in the HPI,  is negative    Physical Exam:  /72 mmHg  Pulse 78  Temp(Src) 97.5  F (36.4  C)  Resp 18  Wt 250 lb 3.2 oz (113.49 kg)  SpO2 96%  GENERAL APPEARANCE:  Alert, in no distress  ENT:  Slight indentation on back, right side of tongue. moist mucous membranes  EYES:  EOM, conjunctivae, lids, pupils and irises normal  RESP:  respiratory effort and palpation of chest normal, no respiratory distress, diminished breath sounds throughout  CV:  Palpation and auscultation of heart done , regular rate and rhythm, no murmur, rub, or gallop  ABDOMEN:  normal bowel sounds, soft, nontender, no hepatosplenomegaly or other masses  M/S:   Active movement of bilateral upper and lower extremities. ACE wraps to BLE.  SKIN:  Inspection of skin and subcutaneous tissue baseline, Palpation of skin and subcutaneous tissue baseline  NEURO:   Cranial nerves 2-12 are normal tested and grossly at patient's baseline  PSYCH:  affect and mood normal    Recent Labs:      Last Basic Metabolic Panel:  NA      144   12/28/2016   POTASSIUM      4.3   12/28/2016  CHLORIDE      101   12/28/2016  JORDAN      9.1   12/28/2016  CO2       37   12/28/2016  BUN       57   12/28/2016  CR     1.64   12/28/2016  GLC       132   12/28/2016    WBC     12.3   12/28/2016  RBC     3.45   12/28/2016  HGB      9.6   12/28/2016  HCT     34.3   12/28/2016  MCV       99   12/28/2016  MCH     27.8   12/28/2016  MCHC     28.0   12/28/2016  RDW     16.1   12/28/2016  PLT      318   12/28/2016  PLT      282   11/30/2016    Assessment/Plan:  Chronic Obstructive Pulmonary Disease. Educated on the importance of wearing BiPAP when sleeping. Staff to arrange appointment with Pulmonology. Respiratory Therapy consult ordered. Continue Fluticasone,Tiotroptium and DuoNebs as ordered. Also on Benzonatate PRN.     Prostate Cancer Metastatic to Bone/Chronic Pain Syndrome. Will discuss with [facility] pharmacist medication for better pain relief. Is not utilizing Hydromorphone routinely. Has been on Oxycodone and Fentanyl Patches. Allergic to Morphine. Further plans per recommendations. May also benefit from seeing Palliative Care again.     Type 2 Diabetes Mellitus. Question is blood sugars are elevated due to eating meals/snack prior to accuchecks. Takes Prednisone daily which is likely contributing to higher blood sugars. Will increase Glargine from 48 Units to 50 Units. Monitor blood sugars in closely in the morning to avoid hypoglycemia. Continue Aspart Sliding Scale Insulin as ordered for now until baseline has been established.    Tongue Pain. Ordered Orajel PRN per patient request.    High Risk Social Situation. Commended resident on decision to stay at facility with [facility]  and SW. Offered to provide support if needed when communicating decision to family members. SW to be present at care conference next week when resident expresses desires to family.     Electronically signed by  SAL Acevedo CNP

## 2017-01-15 ENCOUNTER — HOSPITAL ENCOUNTER (EMERGENCY)
Facility: CLINIC | Age: 72
Discharge: HOME | End: 2017-01-16
Attending: EMERGENCY MEDICINE | Admitting: EMERGENCY MEDICINE
Payer: COMMERCIAL

## 2017-01-15 DIAGNOSIS — J43.9 PULMONARY EMPHYSEMA, UNSPECIFIED EMPHYSEMA TYPE (H): ICD-10-CM

## 2017-01-15 LAB
BASOPHILS # BLD AUTO: 0 10E9/L (ref 0–0.2)
BASOPHILS NFR BLD AUTO: 0.4 %
DIFFERENTIAL METHOD BLD: ABNORMAL
EOSINOPHIL # BLD AUTO: 0.4 10E9/L (ref 0–0.7)
EOSINOPHIL NFR BLD AUTO: 5.9 %
ERYTHROCYTE [DISTWIDTH] IN BLOOD BY AUTOMATED COUNT: 16.1 % (ref 10–15)
HCT VFR BLD AUTO: 33.3 % (ref 40–53)
HGB BLD-MCNC: 9.1 G/DL (ref 13.3–17.7)
IMM GRANULOCYTES # BLD: 0 10E9/L (ref 0–0.4)
IMM GRANULOCYTES NFR BLD: 0.4 %
LYMPHOCYTES # BLD AUTO: 2.2 10E9/L (ref 0.8–5.3)
LYMPHOCYTES NFR BLD AUTO: 30.6 %
MCH RBC QN AUTO: 28 PG (ref 26.5–33)
MCHC RBC AUTO-ENTMCNC: 27.3 G/DL (ref 31.5–36.5)
MCV RBC AUTO: 103 FL (ref 78–100)
MONOCYTES # BLD AUTO: 0.4 10E9/L (ref 0–1.3)
MONOCYTES NFR BLD AUTO: 5.8 %
NEUTROPHILS # BLD AUTO: 4.1 10E9/L (ref 1.6–8.3)
NEUTROPHILS NFR BLD AUTO: 56.9 %
NRBC # BLD AUTO: 0 10*3/UL
NRBC BLD AUTO-RTO: 1 /100
PLATELET # BLD AUTO: 272 10E9/L (ref 150–450)
RBC # BLD AUTO: 3.25 10E12/L (ref 4.4–5.9)
WBC # BLD AUTO: 7.1 10E9/L (ref 4–11)

## 2017-01-15 PROCEDURE — 80048 BASIC METABOLIC PNL TOTAL CA: CPT | Performed by: EMERGENCY MEDICINE

## 2017-01-15 PROCEDURE — 85025 COMPLETE CBC W/AUTO DIFF WBC: CPT | Performed by: EMERGENCY MEDICINE

## 2017-01-15 PROCEDURE — 85379 FIBRIN DEGRADATION QUANT: CPT | Performed by: EMERGENCY MEDICINE

## 2017-01-15 PROCEDURE — 84484 ASSAY OF TROPONIN QUANT: CPT | Performed by: EMERGENCY MEDICINE

## 2017-01-15 PROCEDURE — 99285 EMERGENCY DEPT VISIT HI MDM: CPT | Mod: 25 | Performed by: EMERGENCY MEDICINE

## 2017-01-15 PROCEDURE — 99284 EMERGENCY DEPT VISIT MOD MDM: CPT | Mod: 25 | Performed by: EMERGENCY MEDICINE

## 2017-01-15 NOTE — ED AVS SNAPSHOT
[facility], Emergency Department    [address] [facility] [address]    Phone:  [phone]                                       Tomas Grey   MRN: [id number]    Department:  [facility], Emergency Department   Date of Visit:  1/15/2017           Patient Information     Date Of Birth          1945        Your diagnoses for this visit were:     Pulmonary emphysema, unspecified emphysema type (H)        You were seen by Tal Caputo MD.        Discharge Instructions       Please make an appointment to follow up with Your Primary Care Provider in 3-5 days        Chronic Lung Disease: Preventing Lung Infections  Chronic lung diseases include chronic obstructive pulmonary disease (COPD), which includes chronic bronchitis and emphysema. Other chronic lung diseases include pulmonary fibrosis, sarcoidosis, and other conditions. When you have chronic lung diseases, it's very important to protect yourself from respiratory infections, like colds, the flu, and lung infections. Infections may cause your lung condition to worsen. Although you can't completely avoid them, there are things you can do to lessen the chance of infections.    Take precautions  Taking the following precautions can help you avoid illness:    Remember to keep your hands away from your nose and mouth. Germs on your hands get into your respiratory system this way.    Wash your hands often. When you wash them:    Use soap and warm water.    Rub your hands together well for at least 20 seconds.    Make sure to rinse them well.    Dry your hands on clean towels or air-dry them.    Use hand  containing alcohol, if you are unable to wash your hands. Use the  after touching doorknobs, handles, and supermarket carts, for example, since lots of people touch them. Then wash your hands as soon as you can.    To help prevent the flu, get a flu vaccination every year. This may be given at your health care provider's  office, a drugstore, or pharmacy, or at work. Get your flu shot as soon as the vaccines are available in your area. This is usually around September each year.    To help prevent pneumococcal pneumonia, get pneumonia vaccinations. Talk with your health care provider about which pneumococcal vaccinations you need.    Try to stay away from people with respiratory infections, such as colds or the flu. Stay away from crowded places, like shopping centers or movie theatres during cold and flu season.    If you smoke, think about quitting. In addition to causing or worsening many lung conditions, the lung damage from smoking increases your risk of infections. Stay away from others who smoke, too. This is also harmful and increases your chance of infections.    1749-1141 [employer] [address] [place] [address], [place] [address]. All rights reserved. This information is not intended as a substitute for professional medical care. Always follow your healthcare professional's instructions.           Future Appointments        Provider Department Dept Phone Center    1/16/2017 1:30 PM Manpreet Oviedo MD [facility] Cancer Care [phone] [place] [facility]    1/16/2017 2:00 PM  Infusion Chair 12 [facility] Infusion Services [phone] [place] [facility]    1/25/2017 1:00 PM [facility] Oncology Nurse [facility] Cancer Care [phone] [place] [facility]    2/1/2017 9:30 AM JASON Chery [facility] Cancer Clinic [phone] [facility]    2/1/2017 11:00 AM [facility] MED INJECTION ROOM 2 [facility] [place], Nuclear Medicine [phone] [facility]    2/22/2017 1:00 PM [facility] Oncology Nurse [facility] Cancer Care [phone] [place] [facility]    2/24/2017 1:00 PM Pulmonary Function Lab [facility] Pulmonary Function Testing [phone] [facility]    2/24/2017 2:05 PM Abraham Mcmahon MD [facility] [phone] [facility]     3/1/2017 9:30 AM JASON Chery [facility] Cancer [facility] [phone] [facility]    3/1/2017 11:00 AM [facility] NUC MED INJECTION ROOM 1 [facility] Nuclear [facility] [phone] [place]    3/22/2017 1:00 PM Fanny Oncology Nurse [facility] Care [phone] [place] [facility]    3/29/2017 9:30 AM JASON Chery [facility] Cancer [place] [address] [phone] [facility]    3/29/2017 11:00 AM [facility] NUC MED INJECTION ROOM 2 [facility] Nuclear [facility] [phone] [place]    4/19/2017 1:00 PM Fanny Oncology Nurse [facility] [phone] [place] [facility]    4/26/2017 9:30 AM JASON Chery [facility] Cancer [place] [address] [phone] [facility]    4/26/2017 11:00 AM [facility] NUC MED INJECTION ROOM 1 [facility] Nuclear [facility] [phone] [place]    5/17/2017 1:00 PM Fanny Oncology Nurse [facility] [phone] [place] [facility]    5/24/2017 9:30 AM JASON Chery [facility] Cancer [place] [address] [phone] [facility]    5/24/2017 11:00 AM [facility] NUC MED INJECTION ROOM 2 [facility] [phone] [facility]      24 Hour Appointment Hotline       To make an appointment at any [facility] clinic, call [phone] ([phone]). If you don't have a family doctor or clinic, we will help you find one. [facility] clinics are conveniently located to serve the needs of you and your family.             Review of your medicines      Our records show that you are taking the medicines listed below. If these are incorrect, please call your family doctor or clinic.        Dose / Directions Last dose taken    abiraterone 250 MG tablet   Dose:  1000 mg   Indication:  Cancer of the Prostate Gland   Generic drug:  abiraterone        Take 1,000 mg by mouth every morning (before breakfast)   Refills:  0        acetaminophen 500 MG tablet   Commonly known  as:  TYLENOL   Dose:  500 mg        Take 500 mg by mouth every 4 hours as needed for pain Not to exceed 3000 mg/24 hours   Refills:  0        alum & mag hydroxide-simethicone 200-200-20 MG/5ML Susp suspension   Commonly known as:  MYLANTA/MAALOX   Dose:  15 mL        Take 15 mLs by mouth every 6 hours as needed for indigestion or heartburn   Refills:  0        ammonium lactate 12 % lotion   Commonly known as:  LAC-HYDRIN   Indication:  Abnormal Dryness of Skin, Eyes or Mucous Membranes        Apply topically 2 times daily as needed for dry skin To all extremities for dry skin   Refills:  0        aspirin 81 MG EC tablet   Dose:  81 mg   Quantity:  5 tablet        Take 1 tablet (81 mg) by mouth daily   Refills:  0        benzonatate 100 MG capsule   Commonly known as:  TESSALON   Dose:  100 mg        Take 100 mg by mouth 3 times daily as needed for cough   Refills:  0        BREO ELLIPTA 100-25 MCG/INH oral inhaler   Dose:  1 puff   Indication:  Chronic Obstructive Lung Disease   Generic drug:  fluticasone-vilanterol        Inhale 1 puff into the lungs daily   Refills:  0        calcium carb 1250 mg (500 mg [place])/vitamin D 200 units 500-200 MG-UNIT per tablet   Commonly known as:  OSCAL with D   Dose:  1 tablet   Quantity:  10 tablet        Take 1 tablet by mouth 2 times daily (with meals)   Refills:  0        diclofenac 1 % Gel topical gel   Commonly known as:  VOLTAREN   Quantity:  100 g        Apply 4 grams to knees four times daily as needed using enclosed dosing card.   Refills:  0        furosemide 40 MG tablet   Commonly known as:  LASIX   Dose:  40 mg   Quantity:  10 tablet        Take 1 tablet (40 mg) by mouth 2 times daily 0800, 1200   Refills:  0        HYDROmorphone 2 MG tablet   Commonly known as:  DILAUDID   Dose:  2 mg   Quantity:  10 tablet        Take 1 tablet (2 mg) by mouth every 4 hours as needed for moderate to severe pain   Refills:  0        insulin glargine 100 UNIT/ML injection   Commonly  known as:  LANTUS   Dose:  50 Units   Indication:  Type 2 Diabetes        Inject 50 Units Subcutaneous At Bedtime   Refills:  0        ipratropium - albuterol 0.5 mg/2.5 mg/3 mL 0.5-2.5 (3) MG/3ML neb solution   Commonly known as:  DUONEB   Dose:  1 vial   Quantity:  360 mL        Take 1 vial (3 mLs) by nebulization every 4 hours as needed for shortness of breath / dyspnea or wheezing   Refills:  3        isosorbide mononitrate 60 MG 24 hr tablet   Commonly known as:  IMDUR   Dose:  60 mg        Take 1 tablet (60 mg) by mouth daily   Refills:  0        LIPITOR 10 MG tablet   Dose:  10 mg   Generic drug:  atorvastatin        Take 10 mg by mouth daily   Refills:  0        metoprolol 50 MG 24 hr tablet   Commonly known as:  TOPROL XL   Dose:  50 mg   Quantity:  5 tablet        Take 1 tablet (50 mg) by mouth daily   Refills:  0        MILK OF MAGNESIA 400 MG/5ML suspension   Dose:  30 mL   Generic drug:  magnesium hydroxide        Take 30 mLs by mouth daily as needed for constipation or heartburn   Refills:  0        nitroglycerin 0.4 MG sublingual tablet   Commonly known as:  NITROSTAT   Dose:  0.4 mg        Place 0.4 mg under the tongue every 5 minutes as needed for chest pain if you are still having symptoms after 3 doses (15 minutes) call 911.   Refills:  0        * NovoLOG FLEXPEN 100 UNIT/ML injection   Indication:  Type 2 Diabetes   Generic drug:  insulin aspart        Inject Subcutaneous At Bedtime Per sliding scale: 150 - 199 = 2 Units; 200 - 249 = 3 Units; 250 - 299 = 5 Units; 300 - 349 = 6 Units; 350+ = 8 Units   Refills:  0        * NovoLOG FLEXPEN 100 UNIT/ML injection   Indication:  Type 2 Diabetes   Generic drug:  insulin aspart        Inject Subcutaneous 3 times daily (before meals) Per sliding scale: 120 - 149 = 2 Units; 150 - 199 = 3 Units; 200 - 249 = 6 Units; 250 - 299 = 9 Units; 300 - 349 = 12 Units; 350+ = 15 Units   Refills:  0        omeprazole 10 MG CR capsule   Commonly known as:  priLOSEC    Dose:  10 mg        Take 1 capsule (10 mg) by mouth daily   Refills:  0        ORAJEL 10 % Gel   Dose:  1 applicator   Generic drug:  benzocaine        Take 1 applicator by mouth 4 times daily as needed for moderate pain   Refills:  0        polyvinyl alcohol 1.4 % ophthalmic solution   Commonly known as:  LIQUIFILM TEARS   Dose:  1 drop        Place 1 drop into both eyes 3 times daily   Refills:  0        predniSONE 5 MG tablet   Commonly known as:  DELTASONE   Dose:  5 mg   Indication:  Cancer of the Prostate Gland        Take 5 mg by mouth 2 times daily   Refills:  0        prochlorperazine 5 MG tablet   Commonly known as:  COMPAZINE   Dose:  5 mg   Quantity:  90 tablet        Take 1 tablet (5 mg) by mouth every 6 hours as needed for nausea or vomiting   Refills:  0        senna-docusate 8.6-50 MG per tablet   Commonly known as:  SENOKOT-S;PERICOLACE   Dose:  1 tablet   Quantity:  100 tablet        Take 1 tablet by mouth 2 times daily   Refills:  0        sodium chloride 0.65 % nasal spray   Commonly known as:  OCEAN   Dose:  2 spray   Quantity:  1 Bottle        Spray 2 sprays into both nostrils every hour as needed for congestion   Refills:  12        tamsulosin 0.4 MG capsule   Commonly known as:  FLOMAX   Dose:  0.4 mg   Quantity:  5 capsule        Take 1 capsule (0.4 mg) by mouth daily   Refills:  0        tiotropium 18 MCG capsule   Commonly known as:  SPIRIVA   Dose:  18 mcg   Indication:  Chronic Obstructive Lung Disease        Inhale 18 mcg into the lungs daily   Refills:  0        * Notice:  This list has 2 medication(s) that are the same as other medications prescribed for you. Read the directions carefully, and ask your doctor or other care provider to review them with you.            Procedures and tests performed during your visit     Procedure/Test Number of Times Performed    Basic metabolic panel 1    CBC with platelets differential 1    CT Chest Pulmonary Embolism w Contrast 1    Cardiac  "Continuous Monitoring 1    D dimer quantitative 1    EKG 12-lead, tracing only 1    ISTAT CG8 gas elec iCA gluc abdiel nurse POCT 2    ISTAT gases elec ica gluc abdiel POCT 2    Influenza A/B antigen 1    Lactic acid whole blood 1    Peripheral IV: Standard 1    Troponin I 1    Vascular Access Care Adult IP Consult 1    XR Chest 2 Views 1      Orders Needing Specimen Collection     None      Pending Results     No orders found for last 2 day(s).            Pending Culture Results     No orders found for last 2 day(s).            Thank you for choosing [facility]       Thank you for choosing [facility] for your care. Our goal is always to provide you with excellent care. Hearing back from our patients is one way we can continue to improve our services. Please take a few minutes to complete the written survey that you may receive in the mail after you visit with us. Thank you!        [employer] Information     [employer] lets you send messages to your doctor, view your test results, renew your prescriptions, schedule appointments and more. To sign up, go to [email or web address] . Click on \"Log in\" on the left side of the screen, which will take you to the Welcome page. Then click on \"Sign up Now\" on the right side of the page.     You will be asked to enter the access code listed below, as well as some personal information. Please follow the directions to create your username and password.     Your access code is: [email or web address]  Expires: 2017 10:32 AM     Your access code will  in 90 days. If you need help or a new code, please call your [facility] clinic or [phone].        Care EveryWhere ID     This is your Care EveryWhere ID. This could be used by other organizations to access your [facility] medical records  [id number]        After Visit Summary       This is your record. Keep this with you and show to your community pharmacist(s) and doctor(s) at your next visit.                  "

## 2017-01-15 NOTE — ED AVS SNAPSHOT
[facility], [place], Emergency Department    [address] [facility] [address]    Phone:  [phone]                                       Tomas Grey   MRN: [id number]    Department:  [facility], Emergency Department   Date of Visit:  1/15/2017           After Visit Summary Signature Page     I have received my discharge instructions, and my questions have been answered. I have discussed any challenges I see with this plan with the nurse or doctor.    ..........................................................................................................................................  Patient/Patient Representative Signature      ..........................................................................................................................................  Patient Representative Print Name and Relationship to Patient    ..................................................               ................................................  Date                                            Time    ..........................................................................................................................................  Reviewed by Signature/Title    ...................................................              ..............................................  Date                                                            Time

## 2017-01-16 ENCOUNTER — APPOINTMENT (OUTPATIENT)
Dept: GENERAL RADIOLOGY | Facility: CLINIC | Age: 72
End: 2017-01-16
Attending: EMERGENCY MEDICINE
Payer: COMMERCIAL

## 2017-01-16 ENCOUNTER — APPOINTMENT (OUTPATIENT)
Dept: CT IMAGING | Facility: CLINIC | Age: 72
End: 2017-01-16
Attending: EMERGENCY MEDICINE
Payer: COMMERCIAL

## 2017-01-16 ENCOUNTER — INFUSION THERAPY VISIT (OUTPATIENT)
Dept: INFUSION THERAPY | Facility: CLINIC | Age: 72
End: 2017-01-16
Attending: INTERNAL MEDICINE
Payer: COMMERCIAL

## 2017-01-16 ENCOUNTER — ONCOLOGY VISIT (OUTPATIENT)
Dept: ONCOLOGY | Facility: CLINIC | Age: 72
End: 2017-01-16
Attending: INTERNAL MEDICINE
Payer: COMMERCIAL

## 2017-01-16 ENCOUNTER — HOSPITAL ENCOUNTER (OUTPATIENT)
Facility: CLINIC | Age: 72
Setting detail: SPECIMEN
Discharge: HOME | End: 2017-01-16
Attending: INTERNAL MEDICINE | Admitting: INTERNAL MEDICINE
Payer: COMMERCIAL

## 2017-01-16 VITALS
TEMPERATURE: 98.8 F | DIASTOLIC BLOOD PRESSURE: 60 MMHG | SYSTOLIC BLOOD PRESSURE: 117 MMHG | OXYGEN SATURATION: 100 % | RESPIRATION RATE: 16 BRPM

## 2017-01-16 VITALS
HEART RATE: 79 BPM | TEMPERATURE: 98 F | BODY MASS INDEX: 35 KG/M2 | OXYGEN SATURATION: 96 % | WEIGHT: 250 LBS | SYSTOLIC BLOOD PRESSURE: 116 MMHG | RESPIRATION RATE: 16 BRPM | HEIGHT: 71 IN | DIASTOLIC BLOOD PRESSURE: 54 MMHG

## 2017-01-16 DIAGNOSIS — C61 PROSTATE CANCER METASTATIC TO BONE (H): ICD-10-CM

## 2017-01-16 DIAGNOSIS — C79.51 METASTASIS TO BONE (H): Primary | ICD-10-CM

## 2017-01-16 DIAGNOSIS — C79.51 PROSTATE CANCER METASTATIC TO BONE (H): ICD-10-CM

## 2017-01-16 DIAGNOSIS — C61 PROSTATE CANCER METASTATIC TO MULTIPLE SITES (H): Primary | ICD-10-CM

## 2017-01-16 DIAGNOSIS — K12.30 MUCOSITIS: ICD-10-CM

## 2017-01-16 DIAGNOSIS — C61 PROSTATE CANCER METASTATIC TO MULTIPLE SITES (H): ICD-10-CM

## 2017-01-16 LAB
ALBUMIN SERPL-MCNC: 3 G/DL (ref 3.4–5)
ALP SERPL-CCNC: 102 U/L (ref 40–150)
ALT SERPL W P-5'-P-CCNC: 13 U/L (ref 0–70)
ANION GAP SERPL CALCULATED.3IONS-SCNC: 1 MMOL/L (ref 3–14)
AST SERPL W P-5'-P-CCNC: 13 U/L (ref 0–45)
BILIRUB DIRECT SERPL-MCNC: <0.1 MG/DL (ref 0–0.2)
BILIRUB SERPL-MCNC: 0.3 MG/DL (ref 0.2–1.3)
BUN SERPL-MCNC: 35 MG/DL (ref 7–30)
CA-I BLD-SCNC: 3.1 MG/DL (ref 4.4–5.2)
CA-I BLD-SCNC: 4.9 MG/DL (ref 4.4–5.2)
CALCIUM SERPL-MCNC: 8.6 MG/DL (ref 8.5–10.1)
CHLORIDE SERPL-SCNC: 101 MMOL/L (ref 94–109)
CO2 BLDCOV-SCNC: 20 MMOL/L (ref 21–28)
CO2 BLDCOV-SCNC: 41 MMOL/L (ref 21–28)
CO2 SERPL-SCNC: 40 MMOL/L (ref 20–32)
CREAT SERPL-MCNC: 1.84 MG/DL (ref 0.66–1.25)
D DIMER PPP FEU-MCNC: 0.9 UG/ML FEU (ref 0–0.5)
FLUAV+FLUBV AG SPEC QL: NEGATIVE
FLUAV+FLUBV AG SPEC QL: NORMAL
GFR SERPL CREATININE-BSD FRML MDRD: 36 ML/MIN/1.7M2
GLUCOSE BLD-MCNC: 130 MG/DL (ref 70–99)
GLUCOSE BLD-MCNC: 72 MG/DL (ref 70–99)
GLUCOSE SERPL-MCNC: 138 MG/DL (ref 70–99)
HCT VFR BLD CALC: 20 %PCV (ref 40–53)
HCT VFR BLD CALC: 30 %PCV (ref 40–53)
HGB BLD CALC-MCNC: 10.2 G/DL (ref 13.3–17.7)
HGB BLD CALC-MCNC: 6.8 G/DL (ref 13.3–17.7)
LACTATE BLD-SCNC: 0.8 MMOL/L (ref 0.7–2.1)
PCO2 BLDV: 47 MM HG (ref 40–50)
PCO2 BLDV: 97 MM HG (ref 40–50)
PH BLDV: 7.23 PH (ref 7.32–7.43)
PH BLDV: 7.23 PH (ref 7.32–7.43)
PO2 BLDV: 25 MM HG (ref 25–47)
PO2 BLDV: 41 MM HG (ref 25–47)
POTASSIUM BLD-SCNC: 3.3 MMOL/L (ref 3.4–5.3)
POTASSIUM BLD-SCNC: 5.3 MMOL/L (ref 3.4–5.3)
POTASSIUM SERPL-SCNC: 4.8 MMOL/L (ref 3.4–5.3)
PROT SERPL-MCNC: 6.8 G/DL (ref 6.8–8.8)
SAO2 % BLDV FROM PO2: 32 %
SAO2 % BLDV FROM PO2: 66 %
SODIUM BLD-SCNC: 142 MMOL/L (ref 133–144)
SODIUM BLD-SCNC: 149 MMOL/L (ref 133–144)
SODIUM SERPL-SCNC: 142 MMOL/L (ref 133–144)
SPECIMEN SOURCE: NORMAL
TROPONIN I SERPL-MCNC: 0.02 UG/L (ref 0–0.04)

## 2017-01-16 PROCEDURE — 94640 AIRWAY INHALATION TREATMENT: CPT | Performed by: EMERGENCY MEDICINE

## 2017-01-16 PROCEDURE — 25000125 ZZHC RX 250: Performed by: EMERGENCY MEDICINE

## 2017-01-16 PROCEDURE — 40000501 ZZHCL STATISTIC HEMATOCRIT ED POCT

## 2017-01-16 PROCEDURE — 40000502 ZZHCL STATISTIC GLUCOSE ED POCT

## 2017-01-16 PROCEDURE — 93010 ELECTROCARDIOGRAM REPORT: CPT | Mod: Z6 | Performed by: EMERGENCY MEDICINE

## 2017-01-16 PROCEDURE — 82803 BLOOD GASES ANY COMBINATION: CPT

## 2017-01-16 PROCEDURE — 25500064 ZZH RX 255 OP 636: Performed by: EMERGENCY MEDICINE

## 2017-01-16 PROCEDURE — 99214 OFFICE O/P EST MOD 30 MIN: CPT | Performed by: INTERNAL MEDICINE

## 2017-01-16 PROCEDURE — 80076 HEPATIC FUNCTION PANEL: CPT | Performed by: INTERNAL MEDICINE

## 2017-01-16 PROCEDURE — 93005 ELECTROCARDIOGRAM TRACING: CPT | Performed by: EMERGENCY MEDICINE

## 2017-01-16 PROCEDURE — 40000497 ZZHCL STATISTIC SODIUM ED POCT

## 2017-01-16 PROCEDURE — 40000498 ZZHCL STATISTIC POTASSIUM ED POCT

## 2017-01-16 PROCEDURE — 82330 ASSAY OF CALCIUM: CPT

## 2017-01-16 PROCEDURE — 83605 ASSAY OF LACTIC ACID: CPT | Performed by: EMERGENCY MEDICINE

## 2017-01-16 PROCEDURE — 71260 CT THORAX DX C+: CPT

## 2017-01-16 PROCEDURE — 40000141 ZZH STATISTIC PERIPHERAL IV START W/O US GUIDANCE

## 2017-01-16 PROCEDURE — 87804 INFLUENZA ASSAY W/OPTIC: CPT | Mod: 91 | Performed by: EMERGENCY MEDICINE

## 2017-01-16 PROCEDURE — 71020 XR CHEST 2 VW: CPT

## 2017-01-16 RX ORDER — IOPAMIDOL 755 MG/ML
73 INJECTION, SOLUTION INTRAVASCULAR ONCE
Status: COMPLETED | OUTPATIENT
Start: 2017-01-16 | End: 2017-01-16

## 2017-01-16 RX ORDER — IPRATROPIUM BROMIDE AND ALBUTEROL SULFATE 2.5; .5 MG/3ML; MG/3ML
3 SOLUTION RESPIRATORY (INHALATION) ONCE
Status: COMPLETED | OUTPATIENT
Start: 2017-01-16 | End: 2017-01-16

## 2017-01-16 RX ADMIN — SODIUM CHLORIDE, PRESERVATIVE FREE 101 ML: 5 INJECTION INTRAVENOUS at 06:08

## 2017-01-16 RX ADMIN — IOPAMIDOL 73 ML: 755 INJECTION, SOLUTION INTRAVENOUS at 06:08

## 2017-01-16 RX ADMIN — IPRATROPIUM BROMIDE AND ALBUTEROL SULFATE 3 ML: .5; 3 SOLUTION RESPIRATORY (INHALATION) at 07:00

## 2017-01-16 RX ADMIN — DENOSUMAB 120 MG: 120 INJECTION SUBCUTANEOUS at 14:23

## 2017-01-16 ASSESSMENT — ENCOUNTER SYMPTOMS
SHORTNESS OF BREATH: 1
FEVER: 0
VOMITING: 0
ABDOMINAL PAIN: 0
DIARRHEA: 0
COUGH: 1

## 2017-01-16 ASSESSMENT — PAIN SCALES - GENERAL: PAINLEVEL: WORST PAIN (10)

## 2017-01-16 NOTE — DISCHARGE INSTRUCTIONS
Please make an appointment to follow up with Your Primary Care Provider in 3-5 days        Chronic Lung Disease: Preventing Lung Infections  Chronic lung diseases include chronic obstructive pulmonary disease (COPD), which includes chronic bronchitis and emphysema. Other chronic lung diseases include pulmonary fibrosis, sarcoidosis, and other conditions. When you have chronic lung diseases, it's very important to protect yourself from respiratory infections, like colds, the flu, and lung infections. Infections may cause your lung condition to worsen. Although you can't completely avoid them, there are things you can do to lessen the chance of infections.    Take precautions  Taking the following precautions can help you avoid illness:    Remember to keep your hands away from your nose and mouth. Germs on your hands get into your respiratory system this way.    Wash your hands often. When you wash them:    Use soap and warm water.    Rub your hands together well for at least 20 seconds.    Make sure to rinse them well.    Dry your hands on clean towels or air-dry them.    Use hand  containing alcohol, if you are unable to wash your hands. Use the  after touching doorknobs, handles, and supermarket carts, for example, since lots of people touch them. Then wash your hands as soon as you can.    To help prevent the flu, get a flu vaccination every year. This may be given at your health care provider's office, a drugstore, or pharmacy, or at work. Get your flu shot as soon as the vaccines are available in your area. This is usually around September each year.    To help prevent pneumococcal pneumonia, get pneumonia vaccinations. Talk with your health care provider about which pneumococcal vaccinations you need.    Try to stay away from people with respiratory infections, such as colds or the flu. Stay away from crowded places, like shopping centers or movie theatres during cold and flu season.    If you  smoke, think about quitting. In addition to causing or worsening many lung conditions, the lung damage from smoking increases your risk of infections. Stay away from others who smoke, too. This is also harmful and increases your chance of infections.    3684-4724 [employer]. [address] [place] [address], [place] [address]. All rights reserved. This information is not intended as a substitute for professional medical care. Always follow your healthcare professional's instructions.

## 2017-01-16 NOTE — PROGRESS NOTES
Infusion Nursing Note:  Tomas Grey presents today for xgeva/labs for.    Patient seen by provider today: Yes: Dr Oviedo    Note: Gave Rx to [facility] for Magic Mouthwash..  Pt in ER last night with labs done, hepatic panel was added today for treatment plan-zytiga.    Intravenous Access:  Lab draw site left FA, Needle type butterfly, Gauge 23g.  Labs drawn without difficulty.    Treatment Conditions:  HGB     10.2   1/16/2017  WBC      7.1   1/15/2017   ANEU      4.1   1/15/2017  PLT      272   1/15/2017    JORDAN       8.6     1/15/17       CR       1.84      1/15/17  Results reviewed, labs MET treatment parameters, ok to proceed with treatment for xgevva.  .      Post Infusion Assessment:  Patient tolerated injection without incident.  Site patent and intact, free from redness, edema or discomfort.    Discharge Plan:   Prescription refills RX given for Magic mouthwash.  Discharge instructions reviewed with: Patient.  Patient and/or family verbalized understanding of discharge instructions and all questions answered.  Copy of AVS reviewed with patient and/or family.  Patient will return 1/25/17 at [facility] for next appointment.Monthly for xgeva to [facility].  Patient discharged in stable condition accompanied by: self, attendant and Oxygen.  Departure Mode: Cart/scooter with [employer] transportation.    Stormy Gramajo RN

## 2017-01-16 NOTE — ED NOTES
Patient BIBA from nursing home for syncopal episode. Staff at facility found patient in chair, where he had syncopal episode. Patient reports chest pain and SOB - O2 sats 95-97% on 5L (patient uses 2L at baseline). Patient is alert and oriented.

## 2017-01-16 NOTE — PROGRESS NOTES
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 Estimated body mass index is 34.91 kg/(m^2) as calculated from the following:    Height as of 12/28/16: 1.803 m (5' 11\").    Weight as of 1/11/17: 113.49 kg (250 lb 3.2 oz).. There is no height or weight on file to calculate BSA. BP completed using cuff size: large  Data Unavailable No LMP for male patient. Allergies and medications reviewed.     Medications: Medication refills not needed today.  Pharmacy name entered into [facility]:    [facility]  [facility] - [place], [place] - [address]  [facility] - [place] - [address] [place] [address] [address] [facility] - [place], [place] - [address] [facility] - [place], [place] - [address] [facility] PHARMACY [facility] [place], [place] - [address] [place] [address] PHARMACY - [place], [place] - [address]    Comments: Follow up    8 minutes for nursing intake (face to face time)   Lily Decker CMA     DISCHARGE PLAN:  Next appointments: See patient instruction section  Departure Mode: W/C  Accompanied by: self  0 minutes for nursing discharge (face to face time)   Lily eDcker CMA              "

## 2017-01-16 NOTE — PROGRESS NOTES
"[facility] PHYSICIANS  HEMATOLOGY ONCOLOGY    ONCOLOGY FOLLOWUP NOTE      DIAGNOSIS:    1- Metastatic prostate cancer with extensive metastases to bone.   2- Advanced COPD and multiple other co-morbidities.      TREATMENT:     Lupron injection monthly, discontinued 6/2016.  Casodex 50 mg daily. discontinued 2/9/2015  - Xgeva monthly  - Xtandi 4/15/15  - Zytgea and prednisone 9/19/2016-present  - Xofigo 1/4/17     Subjective:  Mr. Tomas Grey comes in for followup today. He is at his baseline. No significant change in areas of bone pain.     REVIEW OF SYSTEMS:  A complete review of systems was performed and found to be negative other than pertinent positives mentioned in history of present illness.     Past medical, social histories reviewed.    Meds- Reviewed.     PHYSICAL EXAMINATION:   VITAL SIGNS:/54 mmHg  Pulse 79  Temp(Src) 98  F (36.7  C) (Tympanic)  Resp 16  Ht 1.803 m (5' 11\")  Wt 113.399 kg (250 lb)  BMI 34.88 kg/m2  SpO2 96%    GENERAL: Sitting comfortably.   HEENT: Pupils are equal. Oropharynx is clear.   NECK: No cervical or supraclavicular lymphadenopathy.   LUNGS: Clear bilaterally.   HEART: S1, S2, regular.   ABDOMEN: Soft, nontender, nondistended, no hepatosplenomegaly.   EXTREMITIES: Warm, well perfused.   NEUROLOGIC: Alert, awake.   SKIN: No rash.   LYMPHATICS: Bilateral edema he has dressing on his left leg.     DATA:  Recent Labs   Lab Test  01/16/17   0128  01/16/17   0059  01/15/17   2322  12/28/16   1300   12/26/16   1851   05/27/16   1410   05/28/15   0715   NA  142  149*  142  144   < >  146*   < >  143   < >  139   POTASSIUM  5.3  3.3*  4.8  4.3   < >  3.8   < >  3.8   < >  5.5*   CHLORIDE   --    --   101  101   < >  99   < >  106   < >  106   CO2   --    --   40*  37*   < >  41*   < >  33*   < >  27   ANIONGAP   --    --   1*  6   < >  5   < >  4   < >  6   BUN   --    --   35*  57*   < >  48*   < >  24   < >  26   CR   --    --   1.84*  1.64*   < >  1.80*   " < >  1.64*   < >  1.66*   GLC  130*  72  138*  132*   < >  61*   < >  130*   < >  121*   JORDAN   --    --   8.6  9.1   < >  9.2   < >  8.8   < >  8.5   MAG   --    --    --    --    --   1.7   --   1.9   < >   --    PHOS   --    --    --    --    --    --    --    --    --   3.3    < > = values in this interval not displayed.     Recent Labs   Lab Test  01/16/17   0128  01/16/17   0059  01/15/17   2322  12/28/16   1300  12/27/16   0717  12/26/16   1851   WBC   --    --   7.1  12.3*  16.0*  15.3*   HGB  10.2*  6.8*  9.1*  9.6*  9.7*  9.1*   PLT   --    --   272  318  328  333   MCV   --    --   103*  99  98  99   NEUTROPHIL   --    --   56.9  65.4   --   67.5     Recent Labs   Lab Test  12/28/16   1300  12/26/16   1851  11/28/16   0935   BILITOTAL  0.3  0.3  0.4   ALKPHOS  118  138  124   ALT  17  12  14   AST  16  11  10   ALBUMIN  2.8*  2.6*  2.8*   Results for HERMILO KING (MRN [id number]) as of 1/16/2017 13:33   Ref. Range 10/24/2016 13:40 11/8/2016 00:00 11/28/2016 09:35 12/28/2016 13:00   PSA Latest Ref Range: 0-4 ug/L 22.28 (H) 28.20 34.88 (H) 46.26 (H)     ECOG  performance status 2     ASSESSMENT:   1.  Metastatic prostate cancer with multiple sclerotic bone metastases.  The patient continues on monthly Lupron with Casodex.  He has been on this regimen since 05/2014.  He continues to do well symptomatically.  The patient switched care to us after having initial diagnosis and treatment in [place], [place]. He started Xtandi 4/15/15.   He is not able to keep up with his follow ups due to multiple other medical issues and repeated hospital admissions due to COPD exacerbations.   Started Zytega 9/19/16.  He saw Dr. Callahan as a second opinion and had Xofigo 1/4/17. We have continued zytega after that.   2.  Extensive bone metastases.   Bone scan 8/31/16 showed progression in bones.   He will continue to take calcium and vitamin D.  Will continue Xgeva.  - He has bitten his tongue and has area of mucositis, I  will prescribe magic mouth wash for that.      PLAN:   1- Continue Zytega and prednsione.  2- RTC MD six weeks  3- Labs before next visit- CBC diff, CMP, PSA  4- Continue Xgeva  5- Magic mouth wash for mucositis    TAYLOR PETERS MD  1/16/2017    CC: Balgobin, Christopher Lennox Paul, MD

## 2017-01-16 NOTE — ED PROVIDER NOTES
"  History     Chief Complaint   Patient presents with     Loss of Consciousness     Chest Pain     HPI  Tomas Grey is a 71 year old male with a history of COPD (on chronic 2L), ALEXIS, diastolic heart failure, CAD3, HTN, HLD, CKD, DM2 and stage IV prostate cancer w/ spinal metastases who presents via EMS from NH for evaluation of chest pain, shortness of breath, and possible syncope. EMS was called when staff at the patient's nursing home had apparently reported concern that the patient had a syncopal episode while sitting in a chair.     The patient here states he feels fine. He reports that he had some chest pain earlier, so a nurse at his care facility went to give him something for pain but reportedly gave him the \"wrong\" medication, Norco. He states that now his pain is resolved. He denies a history of this pain or any history of cardiac disease. He denies any leg pain or swelling. No history of DVT/PE. The patient is saturating poorly on his chronic 2L, requiring 5L presently. He does complain of some mild shortness of a breath and a mild nonproductive cough, which he believes is related to his COPD. He denies any fevers.    SHx: Patient is a former smoker, quit 5-6 years ago.      I have reviewed the Medications, Allergies, Past Medical and Surgical History, and Social History in the Epic system.    No current facility-administered medications for this encounter.     Current Outpatient Prescriptions   Medication     abiraterone (ABIRATERONE) 250 MG tablet     predniSONE (DELTASONE) 5 MG tablet     benzocaine (ORAJEL) 10 % GEL     diclofenac (VOLTAREN) 1 % GEL topical gel     prochlorperazine (COMPAZINE) 5 MG tablet     HYDROmorphone (DILAUDID) 2 MG tablet     senna-docusate (SENOKOT-S;PERICOLACE) 8.6-50 MG per tablet     insulin aspart (NOVOLOG FLEXPEN) 100 UNIT/ML injection     insulin aspart (NOVOLOG FLEXPEN) 100 UNIT/ML injection     insulin glargine (LANTUS) 100 UNIT/ML injection     omeprazole " (PRILOSEC) 10 MG CR capsule     isosorbide mononitrate (IMDUR) 60 MG 24 hr tablet     atorvastatin (LIPITOR) 10 MG tablet     fluticasone - vilanterol (BREO ELLIPTA) 100-25 MCG/INH oral inhaler     tiotropium (SPIRIVA) 18 MCG inhalation capsule     benzonatate (TESSALON) 100 MG capsule     polyvinyl alcohol (LIQUIFILM TEARS) 1.4 % ophthalmic solution     acetaminophen (TYLENOL) 500 MG tablet     alum & mag hydroxide-simethicone (MYLANTA/MAALOX) 200-200-20 MG/5ML SUSP     magnesium hydroxide (MILK OF MAGNESIA) 400 MG/5ML suspension     [DISCONTINUED] enzalutamide (XTANDI) 40 MG capsule     nitroglycerin (NITROSTAT) 0.4 MG SL tablet     ipratropium - albuterol 0.5 mg/2.5 mg/3 mL (DUONEB) 0.5-2.5 (3) MG/3ML nebulization     aspirin 81 MG EC tablet     metoprolol (TOPROL XL) 50 MG 24 hr tablet     furosemide (LASIX) 40 MG tablet     calcium carb 1250 mg, 500 mg [place],/vitamin D 200 units (OSCAL WITH D) 500-200 MG-UNIT per tablet     tamsulosin (FLOMAX) 0.4 MG 24 hr capsule     sodium chloride (OCEAN) 0.65 % nasal spray     ammonium lactate (LAC-HYDRIN) 12 % lotion     Past Medical History   Diagnosis Date     Hypertension      Diabetes mellitus (H)      Anxiety      Anemia      Osteoporosis      Depressive disorder      Insomnia      CAD (coronary artery disease)      Prostate cancer metastatic to multiple sites (H)      Aspiration pneumonia (H) 2014     C. difficile colitis      Chronic pain      CKD (chronic kidney disease)      COPD (chronic obstructive pulmonary disease) (H)      ALEXIS (obstructive sleep apnea)        Past Surgical History   Procedure Laterality Date     Abdomen surgery       Arthroscopy knee       Eye surgery         Family History   Problem Relation Age of Onset     DIABETES Mother      CANCER Mother      stomach       Social History   Substance Use Topics     Smoking status: Former Smoker     Smokeless tobacco: Never Used     Alcohol Use: No        Allergies   Allergen Reactions     Morphine      Pt  states not an allergy       Review of Systems   Constitutional: Negative for fever.   Respiratory: Positive for cough and shortness of breath.    Cardiovascular: Positive for chest pain (now resolved).   Gastrointestinal: Negative for vomiting, abdominal pain and diarrhea.   Neurological: Positive for syncope (?).       Physical Exam   BP: 145/55 mmHg  Heart Rate: 86  Temp: 98.8  F (37.1  C)  Resp: 18  SpO2: 98 %  Physical Exam  Vital Signs and Nursing Notes Reviewed.  General:  NAD  HEENT: Oropharynx clear.  No obvious signs of trauma.  PERRL.  EOMI  Neck: Supple.  No lymphadenopathy.  Cardiac: RRR.  No murmurs, rubs or gallops  Lungs: diminished. bilaterally.  Normal respiratory rate.    Abdomen:  Soft, Nontender, no rebound or guarding.  Back: No CVA tenderness.  Skin:  No rash.  No diaphoresis  Extremities:  No cyanosis, clubbing.  2 + edema bilaterally. Chronic venus stasis with open wounds.    Neurological:  CN II-XII intact, Strength intact, Sensation intact, No pronator drift, Normal gait.  Pulse:  Symmetric in bilateral upper and lower extremities.    ED Course   Procedures       12:20 AM  The patient was seen and examined by Tal Caputo MD, in Room 14.        EKG Interpretation:      Interpreted by Tal Caputo  Time reviewed: 005  Symptoms at time of EKG: syncope   Rhythm: normal sinus   Rate: normal  Axis: normal  Ectopy: none  Conduction: LVH  ST Segments/ T Waves: No ST-T wave changes  Q Waves: none  Comparison to prior: Unchanged    Clinical ImpressionLVH iwht no acute chagnes.           Critical Care time:  none               Labs Ordered and Resulted from Time of ED Arrival Up to the Time of Departure from the ED   CBC WITH PLATELETS DIFFERENTIAL - Abnormal; Notable for the following:     RBC Count 3.25 (*)     Hemoglobin 9.1 (*)     Hematocrit 33.3 (*)      (*)     MCHC 27.3 (*)     RDW 16.1 (*)     Nucleated RBCs 1 (*)     All other components within normal limits   BASIC METABOLIC  PANEL - Abnormal; Notable for the following:     Carbon Dioxide 40 (*)     Anion Gap 1 (*)     Glucose 138 (*)     Urea Nitrogen 35 (*)     Creatinine 1.84 (*)     GFR Estimate 36 (*)     GFR Estimate If Black 44 (*)     All other components within normal limits   D DIMER QUANTITATIVE - Abnormal; Notable for the following:     D Dimer 0.9 (*)     All other components within normal limits   ISTAT GASES ELEC ICA GLUC PAYAL POCT - Abnormal; Notable for the following:     Ph Venous 7.23 (*)     Bicarbonate Venous 20 (*)     Sodium 149 (*)     Potassium 3.3 (*)     Calcium Ionized 3.1 (*)     Hemoglobin 6.8 (*)     Hematocrit - POCT 20 (*)     All other components within normal limits   ISTAT GASES ELEC ICA GLUC PAYAL POCT - Abnormal; Notable for the following:     Ph Venous 7.23 (*)     PCO2 Venous 97 (*)     Bicarbonate Venous 41 (*)     Glucose 130 (*)     Hemoglobin 10.2 (*)     Hematocrit - POCT 30 (*)     All other components within normal limits   TROPONIN I   LACTIC ACID WHOLE BLOOD   CARDIAC CONTINUOUS MONITORING   PERIPHERAL IV CATHETER   ISTAT CG8 GAS ELEC ICA GLUC PAYAL NURSE POCT   ISTAT CG8 GAS ELEC ICA GLUC PAAYL NURSE POCT   INFLUENZA A/B ANTIGEN       Assessments & Plan (with Medical Decision Making)  71 year old who presents with possible syncopal episode.  Patient does not room number passing out.  States he fell asleep.  Patient's oxygen saturations are slightly down from his baseline though he has poor respiration at baseline.  EKG does not show any changes.  No signs of arrhythmia.  Troponins negative.  CT PE protocol is also negative.  No signs of pneumonia.  Patient feeling better.  Given his nap this morning.  Saturations are at his baseline on his normal 2 L.  Patient will be transferred back to his care facility.       I have reviewed the nursing notes.    I have reviewed the findings, diagnosis, plan and need for follow up with the patient.    New Prescriptions    No medications on file       Final  diagnoses:   Pulmonary emphysema, unspecified emphysema type (H)     I, Ranjit Welsh, am serving as a trained medical scribe to document services personally performed by Tal Caputo MD, based on the provider's statements to me.      Tal MCCALLUM MD, was physically present and have reviewed and verified the accuracy of this note documented by Ranjit Welsh.    1/15/2017   [facility], [place], EMERGENCY DEPARTMENT    Tal Caputo MD  01/16/17 0710

## 2017-01-16 NOTE — PATIENT INSTRUCTIONS
1- Continue Zytega and prednsione.  2- RTC MD six weeks- Scheduled for February 27th @ 1:00/June  3- Labs before next visit- CBC diff, CMP, PSA- Scheduled for February 20th @ 12:30/June  4- Continue Xgeva- Scheduled for February 13th @ 1:00/June  5 - Magic mouth wash for mucositis    AVS Printed and given.  June

## 2017-01-19 ENCOUNTER — NURSING HOME VISIT (OUTPATIENT)
Dept: GERIATRICS | Facility: CLINIC | Age: 72
End: 2017-01-19
Payer: COMMERCIAL

## 2017-01-19 VITALS
DIASTOLIC BLOOD PRESSURE: 83 MMHG | RESPIRATION RATE: 20 BRPM | OXYGEN SATURATION: 96 % | HEART RATE: 70 BPM | BODY MASS INDEX: 35.23 KG/M2 | WEIGHT: 252.5 LBS | SYSTOLIC BLOOD PRESSURE: 142 MMHG | TEMPERATURE: 96.8 F

## 2017-01-19 DIAGNOSIS — I89.0 LYMPHEDEMA OF BOTH LOWER EXTREMITIES: ICD-10-CM

## 2017-01-19 DIAGNOSIS — E87.5 HYPERKALEMIA: ICD-10-CM

## 2017-01-19 DIAGNOSIS — J44.9 CHRONIC OBSTRUCTIVE PULMONARY DISEASE, UNSPECIFIED COPD TYPE (H): Primary | ICD-10-CM

## 2017-01-19 PROCEDURE — 99309 SBSQ NF CARE MODERATE MDM 30: CPT | Performed by: NURSE PRACTITIONER

## 2017-01-19 NOTE — PROGRESS NOTES
"[facility]    Chief Complaint   Patient presents with     Nursing Home Acute     HPI:    Tomas Grey is a 71 year old  (1945), who is being seen today for an episodic care visit at [facility]. Today's concern is:    Chronic Hypercapnic and Hypoxemic Respiratory Failure Secondary to Chronic Obstructive Pulmonary Disease on Chronic Oxygen Supplementation/Obstructive Sleep Apnea. Sent to ED on 1/15/17 for possible syncopal episode. Reported being given the wrong pill, but upon review of documentation, had received Hydromorphone, which is prescribed for him. Was up most of the night prior and then \"passed out\". Work-up including EKG, Troponin and chest CT for pulmonary embolism were negative. PCO2 97 and Bicarbonate 41. Per staff report, resident has not been consistently using BiPAP machine. Resident reports he does wear BiPAP.     Lymphedema of Bilateral Lower Extremities. Upon review of documentation, has been refusing ACE wraps. Today, is wearing his ACE wraps. Reports he wears them.    Hyperkalemia. Upon review of labs obtained during ED visit, Potassium 4.8 -> 3.3 -> 5.3 in a 3 hour time frame.    ALLERGIES: Morphine  Past Medical, Surgical, Family and Social History reviewed and updated in [facility].    Current Outpatient Prescriptions   Medication Sig Dispense Refill     Diphenhyd-HC-Nystatin-Tetracyc (FIRST-DENNIS MOUTHWASH) SUSP Swish and swallow 5-10 mLs in mouth every 6 hours as needed 237 mL 1     abiraterone (ABIRATERONE) 250 MG tablet Take 1,000 mg by mouth every morning (before breakfast)       predniSONE (DELTASONE) 5 MG tablet Take 5 mg by mouth 2 times daily       benzocaine (ORAJEL) 10 % GEL Take 1 applicator by mouth 4 times daily as needed for moderate pain       diclofenac (VOLTAREN) 1 % GEL topical gel Apply 4 grams to knees four times daily as needed using enclosed dosing card. 100 g 0     prochlorperazine (COMPAZINE) 5 MG tablet Take 1 tablet (5 mg) by " mouth every 6 hours as needed for nausea or vomiting 90 tablet 0     HYDROmorphone (DILAUDID) 2 MG tablet Take 1 tablet (2 mg) by mouth every 4 hours as needed for moderate to severe pain 10 tablet 0     senna-docusate (SENOKOT-S;PERICOLACE) 8.6-50 MG per tablet Take 1 tablet by mouth 2 times daily 100 tablet      insulin aspart (NOVOLOG FLEXPEN) 100 UNIT/ML injection Inject Subcutaneous At Bedtime Per sliding scale: 150 - 199 = 2 Units; 200 - 249 = 3 Units; 250 - 299 = 5 Units; 300 - 349 = 6 Units; 350+ = 8 Units       insulin aspart (NOVOLOG FLEXPEN) 100 UNIT/ML injection Inject Subcutaneous 3 times daily (before meals) Per sliding scale: 120 - 149 = 2 Units; 150 - 199 = 3 Units; 200 - 249 = 6 Units; 250 - 299 = 9 Units; 300 - 349 = 12 Units; 350+ = 15 Units       insulin glargine (LANTUS) 100 UNIT/ML injection Inject 50 Units Subcutaneous At Bedtime        omeprazole (PRILOSEC) 10 MG CR capsule Take 1 capsule (10 mg) by mouth daily       isosorbide mononitrate (IMDUR) 60 MG 24 hr tablet Take 1 tablet (60 mg) by mouth daily       atorvastatin (LIPITOR) 10 MG tablet Take 10 mg by mouth daily       fluticasone - vilanterol (BREO ELLIPTA) 100-25 MCG/INH oral inhaler Inhale 1 puff into the lungs daily       tiotropium (SPIRIVA) 18 MCG inhalation capsule Inhale 18 mcg into the lungs daily       benzonatate (TESSALON) 100 MG capsule Take 100 mg by mouth 3 times daily as needed for cough       polyvinyl alcohol (LIQUIFILM TEARS) 1.4 % ophthalmic solution Place 1 drop into both eyes 3 times daily       acetaminophen (TYLENOL) 500 MG tablet Take 500 mg by mouth every 4 hours as needed for pain Not to exceed 3000 mg/24 hours       alum & mag hydroxide-simethicone (MYLANTA/MAALOX) 200-200-20 MG/5ML SUSP Take 15 mLs by mouth every 6 hours as needed for indigestion or heartburn        magnesium hydroxide (MILK OF MAGNESIA) 400 MG/5ML suspension Take 30 mLs by mouth daily as needed for constipation or heartburn        nitroglycerin (NITROSTAT) 0.4 MG SL tablet Place 0.4 mg under the tongue every 5 minutes as needed for chest pain if you are still having symptoms after 3 doses (15 minutes) call 911.       ipratropium - albuterol 0.5 mg/2.5 mg/3 mL (DUONEB) 0.5-2.5 (3) MG/3ML nebulization Take 1 vial (3 mLs) by nebulization every 4 hours as needed for shortness of breath / dyspnea or wheezing 360 mL 3     aspirin 81 MG EC tablet Take 1 tablet (81 mg) by mouth daily 5 tablet 0     metoprolol (TOPROL XL) 50 MG 24 hr tablet Take 1 tablet (50 mg) by mouth daily 5 tablet 0     furosemide (LASIX) 40 MG tablet Take 1 tablet (40 mg) by mouth 2 times daily 0800, 1200 10 tablet 0     calcium carb 1250 mg, 500 mg [place],/vitamin D 200 units (OSCAL WITH D) 500-200 MG-UNIT per tablet Take 1 tablet by mouth 2 times daily (with meals) 10 tablet 0     tamsulosin (FLOMAX) 0.4 MG 24 hr capsule Take 1 capsule (0.4 mg) by mouth daily 5 capsule 0     sodium chloride (OCEAN) 0.65 % nasal spray Spray 2 sprays into both nostrils every hour as needed for congestion 1 Bottle 12     ammonium lactate (LAC-HYDRIN) 12 % lotion Apply topically 2 times daily as needed for dry skin To all extremities for dry skin       [DISCONTINUED] enzalutamide (XTANDI) 40 MG capsule Take 4 capsules (160 mg) by mouth daily 120 capsule 0     Medications reviewed:  Medications reconciled to facility chart and changes were made to reflect current medications as identified as above med list. Below are the changes that were made:   Medications stopped since last EPIC medication reconciliation:   There are no discontinued medications.    Medications started since last [facility] medication reconciliation:  No orders of the defined types were placed in this encounter.     REVIEW OF SYSTEMS:  4 point ROS including Respiratory, CV, GI and , other than that noted in the HPI,  is negative    Physical Exam:  /83 mmHg  Pulse 70  Temp(Src) 96.8  F (36  C)  Resp 20  Wt 252 lb 8 oz (114.533  kg)  SpO2 96%  GENERAL APPEARANCE:  Alert, in no distress  ENT:  Slight indentation on back, right side of tongue. moist mucous membranes  EYES:  EOM, conjunctivae, lids, pupils and irises normal  RESP:  respiratory effort and palpation of chest normal, no respiratory distress, diminished breath sounds throughout  CV:  Palpation and auscultation of heart done , regular rate and rhythm, no murmur, rub, or gallop  ABDOMEN:  normal bowel sounds, soft, nontender, no hepatosplenomegaly or other masses  M/S:   Active movement of bilateral upper and lower extremities. ACE wraps to BLE.  SKIN:  Inspection of skin and subcutaneous tissue baseline, Palpation of skin and subcutaneous tissue baseline  NEURO:   Cranial nerves 2-12 are normal tested and grossly at patient's baseline  PSYCH:  affect and mood normal    Recent Labs:      Last Basic Metabolic Panel:  NA      142   1/16/2017   POTASSIUM      5.3   1/16/2017  CHLORIDE      101   1/15/2017  JORDAN      8.6   1/15/2017  CO2       40   1/15/2017  BUN       35   1/15/2017  CR     1.84   1/15/2017  GLC      130   1/16/2017    WBC      7.1   1/15/2017  RBC     3.25   1/15/2017  HGB     10.2   1/16/2017  HCT     33.3   1/15/2017  MCV      103   1/15/2017  MCH     28.0   1/15/2017  MCHC     27.3   1/15/2017  RDW     16.1   1/15/2017  PLT      272   1/15/2017  PLT      282   11/30/2016    Assessment/Plan:  Chronic Hypercapnic and Hypoxemic Respiratory Failure Secondary to Chronic Obstructive Pulmonary Disease on Chronic Oxygen Supplementation/Obstructive Sleep Apnea. Educated on the importance of using BIPAP machine and risks involved in not using it such as death. Follow-up with Pulmonology on 2/24/17 as scheduled. Respiratory Therapy consult ordered. Continue Fluticasone,Tiotroptium and DuoNebs as ordered. Also on Benzonatate PRN.     Lymphedema of Bilateral Lower Extremities. Educated on the importance of wearing ACE wraps to prevent worsening edema. Recommended elevating legs  when at rest to prevent edema. Continue Furosemide as ordered.    Hyperkalemia. Question accuracy of results. Likely hemolyzed. Will reorder BMP on 1/20/17 to ensure stability.     Electronically signed by  SAL Acevedo CNP

## 2017-01-20 ENCOUNTER — TRANSFERRED RECORDS (OUTPATIENT)
Dept: HEALTH INFORMATION MANAGEMENT | Facility: CLINIC | Age: 72
End: 2017-01-20

## 2017-01-20 LAB
ANION GAP SERPL CALCULATED.3IONS-SCNC: 6 MMOL/L (ref 0–15)
BUN SERPL-MCNC: 33 MG/DL (ref 7–24)
CALCIUM SERPL-MCNC: 8.9 MG/DL (ref 8.5–10.1)
CHLORIDE SERPLBLD-SCNC: 106 MMOL/L (ref 98–112)
CO2 SERPL-SCNC: 33 MMOL/L (ref 21–32)
CREAT SERPL-MCNC: 1.73 MG/DL (ref 0.7–1.3)
GFR SERPL CREATININE-BSD FRML MDRD: 39 ML/MIN/1.73M2
GLUCOSE SERPL-MCNC: 122 MG/DL (ref 74–106)
POTASSIUM SERPL-SCNC: 4.8 MMOL/L (ref 3.5–5.1)
SODIUM SERPL-SCNC: 145 MMOL/L (ref 136–145)

## 2017-01-25 ENCOUNTER — CARE COORDINATION (OUTPATIENT)
Dept: GERIATRIC MEDICINE | Facility: CLINIC | Age: 72
End: 2017-01-25

## 2017-01-25 ENCOUNTER — TRANSFERRED RECORDS (OUTPATIENT)
Dept: HEALTH INFORMATION MANAGEMENT | Facility: CLINIC | Age: 72
End: 2017-01-25

## 2017-01-25 ENCOUNTER — ONCOLOGY VISIT (OUTPATIENT)
Dept: ONCOLOGY | Facility: CLINIC | Age: 72
End: 2017-01-25
Attending: INTERNAL MEDICINE
Payer: COMMERCIAL

## 2017-01-25 ENCOUNTER — HOSPITAL ENCOUNTER (OUTPATIENT)
Facility: CLINIC | Age: 72
Setting detail: SPECIMEN
Discharge: HOME | End: 2017-01-25
Attending: INTERNAL MEDICINE | Admitting: INTERNAL MEDICINE
Payer: COMMERCIAL

## 2017-01-25 VITALS — BODY MASS INDEX: 35.29 KG/M2 | WEIGHT: 252.9 LBS

## 2017-01-25 DIAGNOSIS — M25.569 KNEE PAIN, UNSPECIFIED CHRONICITY, UNSPECIFIED LATERALITY: ICD-10-CM

## 2017-01-25 DIAGNOSIS — M25.569 KNEE PAIN: ICD-10-CM

## 2017-01-25 DIAGNOSIS — C61 PROSTATE CANCER (H): ICD-10-CM

## 2017-01-25 LAB
BASOPHILS # BLD AUTO: 0 10E9/L (ref 0–0.2)
BASOPHILS NFR BLD AUTO: 0.3 %
DIFFERENTIAL METHOD BLD: ABNORMAL
EOSINOPHIL # BLD AUTO: 0.2 10E9/L (ref 0–0.7)
EOSINOPHIL NFR BLD AUTO: 2 %
ERYTHROCYTE [DISTWIDTH] IN BLOOD BY AUTOMATED COUNT: 15.5 % (ref 10–15)
HCT VFR BLD AUTO: 34.1 % (ref 40–53)
HGB BLD-MCNC: 9.6 G/DL (ref 13.3–17.7)
IMM GRANULOCYTES # BLD: 0 10E9/L (ref 0–0.4)
IMM GRANULOCYTES NFR BLD: 0.5 %
LYMPHOCYTES # BLD AUTO: 1.3 10E9/L (ref 0.8–5.3)
LYMPHOCYTES NFR BLD AUTO: 17.2 %
MCH RBC QN AUTO: 27.7 PG (ref 26.5–33)
MCHC RBC AUTO-ENTMCNC: 28.2 G/DL (ref 31.5–36.5)
MCV RBC AUTO: 98 FL (ref 78–100)
MONOCYTES # BLD AUTO: 0.8 10E9/L (ref 0–1.3)
MONOCYTES NFR BLD AUTO: 10.6 %
NEUTROPHILS # BLD AUTO: 5.2 10E9/L (ref 1.6–8.3)
NEUTROPHILS NFR BLD AUTO: 69.4 %
NRBC # BLD AUTO: 0 10*3/UL
NRBC BLD AUTO-RTO: 0 /100
PLATELET # BLD AUTO: 229 10E9/L (ref 150–450)
RBC # BLD AUTO: 3.47 10E12/L (ref 4.4–5.9)
WBC # BLD AUTO: 7.5 10E9/L (ref 4–11)

## 2017-01-25 PROCEDURE — 85025 COMPLETE CBC W/AUTO DIFF WBC: CPT | Performed by: INTERNAL MEDICINE

## 2017-01-25 PROCEDURE — 36415 COLL VENOUS BLD VENIPUNCTURE: CPT

## 2017-01-25 NOTE — PROGRESS NOTES
Patient tolerated procedure without incident.  Patient discharged in the care of self.  Patient aware of next appointment.  Patient weight today was 252.9 lB

## 2017-01-25 NOTE — PROGRESS NOTES
"CM met with client and Juliana [facility] SWer, and Ekaterina Lozano GNJOANNA, on 1/11/17 to review clients POC as family continues to mention that they want to take him home to live at son Les's house in [place].  At the meeting we reviewed our recommendation that he remain in the SNF facility as there is 24hr nursing care and close monitoring of his condition available.  Client states that he likes being at [facility] and wants to remain there.  Client was asked if he was feeling pressure from his family to move in with them and he said \"Nah, I think they will listen to what I want.\"      CM received call from [facility] today stating that they held a routine care conference for client this morning with clients dtr in law, Bria (Les's wife), participating by phone.  Client is now stating he wants to return to community and live with Les and Bria.  Juliana expressed concern given clients multiple failed attempts to manage in the community and said that facility and [facility] do not support a d/c at this time.  Bria apparently became upset and wanted to talk with GNP and EDIL.  GNP on vacation this week.  CM called and spoke with Bria.  She states she is aware that client needs 24hr care and that she and her family are capable of providing it.  Bria states she has been a PCA in the past with [facility] and is a stay at home mom so has the time to be clients PCA now.  EDIL expressed concerns related to clients medications being administered as ordered, especially with clients pain management concerns.  Bria states Les is the oldest son and \"has his act together\" more than Robert and Duane/Mani did when client was in their homes.  Bria states that she knows client is in poor health and near the end of life and they think it would be best to care for him in their home.  Client has been very resistant to any conversations with providers/palliative care team re: code status changes or end of life care. Bria agreed to having a care " conference mid February with Juliana SOLO Lauren Nelson and client/family to review clients wishes and care needs along with what family is able to provide at home to determine if [facility] team would be supportive of a d/c to community.  Care conference tentatively planned for 2/15/17 at 3:00pm.    PERNELL Simmons   [facility]   [phone]

## 2017-01-27 DIAGNOSIS — C61 PROSTATE CANCER METASTATIC TO BONE (H): Primary | ICD-10-CM

## 2017-01-27 DIAGNOSIS — C79.51 METASTASIS TO BONE (H): ICD-10-CM

## 2017-01-27 DIAGNOSIS — C61 PROSTATE CANCER METASTATIC TO MULTIPLE SITES (H): ICD-10-CM

## 2017-01-27 DIAGNOSIS — C79.51 PROSTATE CANCER METASTATIC TO BONE (H): Primary | ICD-10-CM

## 2017-01-27 RX ORDER — ABIRATERONE ACETATE 250 MG/1
1000 TABLET ORAL DAILY
Qty: 120 TABLET | Refills: 0 | Status: SHIPPED | OUTPATIENT
Start: 2017-01-27 | End: 2017-02-21

## 2017-01-27 RX ORDER — PREDNISONE 5 MG/1
5 TABLET ORAL 2 TIMES DAILY
Qty: 60 TABLET | Refills: 0 | Status: SHIPPED | OUTPATIENT
Start: 2017-01-27 | End: 2017-02-21

## 2017-02-01 ENCOUNTER — ONCOLOGY VISIT (OUTPATIENT)
Dept: ONCOLOGY | Facility: CLINIC | Age: 72
End: 2017-02-01
Attending: PHYSICIAN ASSISTANT
Payer: COMMERCIAL

## 2017-02-01 ENCOUNTER — HOSPITAL ENCOUNTER (OUTPATIENT)
Dept: NUCLEAR MEDICINE | Facility: CLINIC | Age: 72
Setting detail: NUCLEAR MEDICINE
Discharge: HOME | End: 2017-02-01
Attending: INTERNAL MEDICINE | Admitting: INTERNAL MEDICINE
Payer: COMMERCIAL

## 2017-02-01 VITALS
BODY MASS INDEX: 35.38 KG/M2 | HEIGHT: 71 IN | SYSTOLIC BLOOD PRESSURE: 120 MMHG | HEART RATE: 100 BPM | OXYGEN SATURATION: 97 % | WEIGHT: 252.7 LBS | TEMPERATURE: 99.7 F | RESPIRATION RATE: 12 BRPM | DIASTOLIC BLOOD PRESSURE: 71 MMHG

## 2017-02-01 DIAGNOSIS — C61 PROSTATE CANCER METASTATIC TO MULTIPLE SITES (H): ICD-10-CM

## 2017-02-01 DIAGNOSIS — M25.512 CHRONIC PAIN OF BOTH SHOULDERS: ICD-10-CM

## 2017-02-01 DIAGNOSIS — G89.29 CHRONIC PAIN OF BOTH SHOULDERS: ICD-10-CM

## 2017-02-01 DIAGNOSIS — C61 PROSTATE CANCER METASTATIC TO MULTIPLE SITES (H): Primary | ICD-10-CM

## 2017-02-01 DIAGNOSIS — M25.511 CHRONIC PAIN OF BOTH SHOULDERS: ICD-10-CM

## 2017-02-01 DIAGNOSIS — C79.51 METASTASIS TO BONE (H): ICD-10-CM

## 2017-02-01 PROCEDURE — 34400006 ZZH RX 344: Performed by: INTERNAL MEDICINE

## 2017-02-01 PROCEDURE — A9606 RADIUM RA223 DICHLORIDE THER: HCPCS | Performed by: INTERNAL MEDICINE

## 2017-02-01 PROCEDURE — 77790 RADIATION HANDLING: CPT

## 2017-02-01 PROCEDURE — 99214 OFFICE O/P EST MOD 30 MIN: CPT | Mod: ZP | Performed by: PHYSICIAN ASSISTANT

## 2017-02-01 RX ORDER — LIDOCAINE 50 MG/G
PATCH TOPICAL
Qty: 30 PATCH | Refills: 0 | Status: SHIPPED | OUTPATIENT
Start: 2017-02-01 | End: 2017-02-01

## 2017-02-01 RX ORDER — LIDOCAINE 50 MG/G
PATCH TOPICAL
Qty: 30 PATCH | Refills: 0 | Status: SHIPPED | OUTPATIENT
Start: 2017-02-01 | End: 2017-01-01

## 2017-02-01 RX ADMIN — RADIUM RA 223 DICHLORIDE 177 UCI.: 30 INJECTION INTRAVENOUS at 10:45

## 2017-02-01 ASSESSMENT — PAIN SCALES - GENERAL: PAINLEVEL: EXTREME PAIN (9)

## 2017-02-01 NOTE — NURSING NOTE
"Tomas Grey is a 71 year old male who presents for:  Chief Complaint   Patient presents with     Oncology Clinic Visit     prostate ca        Initial Vitals:  /71 mmHg  Pulse 100  Temp(Src) 99.7  F (37.6  C) (Oral)  Resp 12  Ht 1.803 m (5' 11\")  Wt 114.624 kg (252 lb 11.2 oz)  BMI 35.26 kg/m2  SpO2 97% Estimated body mass index is 35.26 kg/(m^2) as calculated from the following:    Height as of this encounter: 1.803 m (5' 11\").    Weight as of this encounter: 114.624 kg (252 lb 11.2 oz).. Body surface area is 2.40 meters squared. BP completed using cuff size: large  Extreme Pain (9) No LMP for male patient. Allergies and medications reviewed.     Medications: Medication refills not needed today.  Pharmacy name entered into [employer]:    [facility]  [facility] - [place], [place] - [address]  [facility] [place] - [address] [place] [address] & [facility] - [place] - [address] [facility] [place] [address] [address] [facility] - [place], [place] - [address] [place] [address] SERVICE PHARMACY - [place], [place] - [address] - [address] [place] [address]    Comments: attempted to go over med list, but pt states med list should be the same    7 minutes for nursing intake (face to face time)   Lori Elizabeth CMA          "

## 2017-02-01 NOTE — PROGRESS NOTES
"[facility] PHYSICIANS  HEMATOLOGY ONCOLOGY    ONCOLOGY FOLLOWUP NOTE   Feb 1, 2017     DIAGNOSIS:    1- Metastatic prostate cancer with extensive metastases to bone.   2- Advanced COPD and multiple other co-morbidities.      TREATMENT:     1.  Lupron injection monthly, discontinued 6/2016.  Casodex 50 mg daily. discontinued 2/9/2015  - Xgeva monthly  - Xtandi 4/15/15  - Zytgea and prednisone 9/19/2016-present  - Xofigo 1/4/17     Subjective:  Mr. Grey is here prior to treatment 2 of Xofigo. It went very well last month. He had a few hot flashes after treatment but notes he has less pain in his legs now. His shoulder pain still bothers him. It can get up to 9 or 10 at times but it has not been worsening. He notices it more with lying supine. He has been on a lot of different narcotics, currently on dilaudid which is managed by care facility. He denies any worsening fatigue, nausea/vomiting, or diarrhea. Otherwise no issues. He notes his breathing has been stable. Has not had had fevers/chills or wheezing. He has been eating and drinking well. Normal bowel movements. No  difficulties. No new bone pain.     REVIEW OF SYSTEMS:  A complete review of systems was performed and found to be negative other than pertinent positives mentioned in history of present illness.     Past medical, social histories reviewed.    Meds- Reviewed.     PHYSICAL EXAMINATION:   VITAL SIGNS:/71 mmHg  Pulse 100  Temp(Src) 99.7  F (37.6  C) (Oral)  Resp 12  Ht 1.803 m (5' 11\")  Wt 114.624 kg (252 lb 11.2 oz)  BMI 35.26 kg/m2  SpO2 97%    GENERAL: Sitting comfortably in his scooter. More alert today and tracks well with conversation   HEENT: Pupils are equal. Oropharynx is clear.   NECK: No cervical or supraclavicular lymphadenopathy.   LUNGS: Clear bilaterally. No wheezing   HEART: S1, S2, regular.   ABDOMEN: Soft, nontender, nondistended, no hepatosplenomegaly.   EXTREMITIES: Warm, well perfused.   NEUROLOGIC: " Alert, awake.   SKIN: No rash.   LYMPHATICS: No edema.     DATA:    1/25/2017 13:23   WBC 7.5   Hemoglobin 9.6 (L)   Hematocrit 34.1 (L)   Platelet Count 229   RBC Count 3.47 (L)   MCV 98   MCH 27.7   MCHC 28.2 (L)   RDW 15.5 (H)   Diff Method Automated Method   % Neutrophils 69.4   % Lymphocytes 17.2   % Monocytes 10.6   % Eosinophils 2.0   % Basophils 0.3   % Immature Granulocytes 0.5   Nucleated RBCs 0   Absolute Neutrophil 5.2   Absolute Lymphocytes 1.3   Absolute Monocytes 0.8   Absolute Eosinophils 0.2   Absolute Basophils 0.0   Abs Immature Granulocytes 0.0   Absolute Nucleated RBC 0.0         ASSESSMENT:   1.  Metastatic prostate cancer with multiple sclerotic bone metastases. Primary oncologist is Dr. Oviedo. He was recently on Zytiga and prednisone with rising PSA so he met with Dr. Callahan to discuss alternative therapies, and he recommended Xofigo given his extensive bone metastasis. He is s/p 1 dose and tolerated well with some improvement in pain. His counts are at baseline. Plan for 6 treatments which will be given every month. He can see Pastora or I prior to each treatment and follow-up with Dr. Oviedo as scheduled.     2.  Extensive bone metastases. He will continue to take calcium and vitamin D.  Will continue Xgeva. Pain meds through assisted living now. Will give Rx for lidocaine patches for shoulder pain. History of diverting meds.     Elina Jolly PA-C    [facility]  [address] [place] [address] [id number] [phone]

## 2017-02-01 NOTE — Clinical Note
"2/1/2017       RE: Tomas Grey  [facility]  [address]  [facility] [address]     Dear Colleague,    Thank you for referring your patient, Tomas Grey, to the [facility]. Please see a copy of my visit note below.    [facility] PHYSICIANS  HEMATOLOGY ONCOLOGY    ONCOLOGY FOLLOWUP NOTE   Feb 1, 2017     DIAGNOSIS:    1- Metastatic prostate cancer with extensive metastases to bone.   2- Advanced COPD and multiple other co-morbidities.      TREATMENT:     1.  Lupron injection monthly, discontinued 6/2016.  Casodex 50 mg daily. discontinued 2/9/2015  - Xgeva monthly  - Xtandi 4/15/15  - Zytgea and prednisone 9/19/2016-present  - Xofigo 1/4/17     Subjective:  Mr. Grey is here prior to starting treatment with Xofigo. He has been well. He was admitted overnight to the hospital on 12/26 for acute hypercapnic respiratory failure. He was placed on BiPAP and CO2 level dropped quickly. He now has BiPAP at his facility. He notes his breathing has been stable. He appears sleepy during the visit but is arousable to voice and is oriented and tracks well with our conversation. He says he is always this sleepy. He denies feeling confused. Has not had had fevers/chills or wheezing. He has been eating and drinking well. Normal bowel movements. No  difficulties. No nausea. No new bone pain.     REVIEW OF SYSTEMS:  A complete review of systems was performed and found to be negative other than pertinent positives mentioned in history of present illness.     Past medical, social histories reviewed.    Meds- Reviewed.     PHYSICAL EXAMINATION:   VITAL SIGNS:/71 mmHg  Pulse 100  Temp(Src) 99.7  F (37.6  C) (Oral)  Resp 12  Ht 1.803 m (5' 11\")  Wt 114.624 kg (252 lb 11.2 oz)  BMI 35.26 kg/m2  SpO2 97%    GENERAL: Sitting comfortably in his scooter. Appears to fall asleep several times during our conversation but arouses very easily to voice  HEENT: Pupils are " equal. Oropharynx is clear.   NECK: No cervical or supraclavicular lymphadenopathy.   LUNGS: Clear bilaterally. No wheezing   HEART: S1, S2, regular.   ABDOMEN: Soft, nontender, nondistended, no hepatosplenomegaly.   EXTREMITIES: Warm, well perfused.   NEUROLOGIC: Alert, awake.   SKIN: No rash.   LYMPHATICS: No edema.     DATA: Labs from 12/28 12/28/2016 13:00   Sodium 144   Potassium 4.3   Chloride 101   Carbon Dioxide 37 (H)   Urea Nitrogen 57 (H)   Creatinine 1.64 (H)   GFR Estimate 42 (L)   GFR Estimate If Black 50 (L)   Calcium 9.1   Anion Gap 6   Albumin 2.8 (L)   Protein Total 6.6 (L)   Bilirubin Total 0.3   Alkaline Phosphatase 118   ALT 17   AST 16   PSA 46.26 (H)   Glucose 132 (H)   WBC 12.3 (H)   Hemoglobin 9.6 (L)   Hematocrit 34.3 (L)   Platelet Count 318   RBC Count 3.45 (L)   MCV 99   MCH 27.8   MCHC 28.0 (L)   RDW 16.1 (H)   Diff Method Automated Method   % Neutrophils 65.4   % Lymphocytes 17.6   % Monocytes 8.3   % Eosinophils 3.7   % Basophils 0.1   % Immature Granulocytes 4.9   Nucleated RBCs 1 (H)   Absolute Neutrophil 8.1   Absolute Lymphocytes 2.2   Absolute Monocytes 1.0   Absolute Eosinophils 0.5   Absolute Basophils 0.0   Abs Immature Granulocytes 0.6 (H)   Absolute Nucleated RBC 0.1      9/19/2016 13:00 10/24/2016 13:40 11/8/2016 00:00 11/28/2016 09:35 12/28/2016 13:00   PSA 12.55 (H) 22.28 (H) 28.20 34.88 (H) 46.26 (H)       ASSESSMENT:   1.  Metastatic prostate cancer with multiple sclerotic bone metastases. Primary oncologist is Dr. Oviedo. He was recently on Zytiga and prednisone with rising PSA so he met with Dr. Callahan to discuss alternative therapies, and he recommended Xofigo given his extensive bone metastasis. His baseline lab work is adequate to proceed today. I reviewed more common side effects of fatigue and mild GI symptoms including n/v/d. Reviewed double-flushing the toilet to urine and stool for the next 72 hours. He states understanding. Plan for 6 treatments which will be  given every month. He can see Pastora or I prior to each treatment and follow-up with Dr. Oviedo as scheduled.     2.  Extensive bone metastases. He will continue to take calcium and vitamin D.  Will continue Xgeva. Pain meds through assisted living now.     3.  Nausea prophylaxis: Will give rx for compazine 5 mg to take prn for any nausea with treatment.      Elina Jolly PA-C    [facility] [address] [address]  [phone]              Again, thank you for allowing me to participate in the care of your patient.      Sincerely,    JASON Chery

## 2017-02-01 NOTE — Clinical Note
"2/1/2017      RE: Tomas Grey  [facility]  [address]  [facility] [address]       [facility] PHYSICIANS  HEMATOLOGY ONCOLOGY    ONCOLOGY FOLLOWUP NOTE   Feb 1, 2017     DIAGNOSIS:    1- Metastatic prostate cancer with extensive metastases to bone.   2- Advanced COPD and multiple other co-morbidities.      TREATMENT:     1.  Lupron injection monthly, discontinued 6/2016.  Casodex 50 mg daily. discontinued 2/9/2015  - Xgeva monthly  - Xtandi 4/15/15  - Zytgea and prednisone 9/19/2016-present  - Xofigo 1/4/17     Subjective:  Mr. Grey is here prior to treatment 2 of Xofigo. It went very well last month. He had a few hot flashes after treatment but notes he has less pain in his legs now. His shoulder pain still bothers him. It can get up to 9 or 10 at times but it has not been worsening. He notices it more with lying supine. He has been on a lot of different narcotics, currently on dilaudid which is managed by care facility. He denies any worsening fatigue, nausea/vomiting, or diarrhea. Otherwise no issues. He notes his breathing has been stable. Has not had had fevers/chills or wheezing. He has been eating and drinking well. Normal bowel movements. No  difficulties. No new bone pain.     REVIEW OF SYSTEMS:  A complete review of systems was performed and found to be negative other than pertinent positives mentioned in history of present illness.     Past medical, social histories reviewed.    Meds- Reviewed.     PHYSICAL EXAMINATION:   VITAL SIGNS:/71 mmHg  Pulse 100  Temp(Src) 99.7  F (37.6  C) (Oral)  Resp 12  Ht 1.803 m (5' 11\")  Wt 114.624 kg (252 lb 11.2 oz)  BMI 35.26 kg/m2  SpO2 97%    GENERAL: Sitting comfortably in his scooter. More alert today and tracks well with conversation   HEENT: Pupils are equal. Oropharynx is clear.   NECK: No cervical or supraclavicular lymphadenopathy.   LUNGS: Clear bilaterally. No wheezing   HEART: S1, S2, regular. "   ABDOMEN: Soft, nontender, nondistended, no hepatosplenomegaly.   EXTREMITIES: Warm, well perfused.   NEUROLOGIC: Alert, awake.   SKIN: No rash.   LYMPHATICS: No edema.     DATA:    1/25/2017 13:23   WBC 7.5   Hemoglobin 9.6 (L)   Hematocrit 34.1 (L)   Platelet Count 229   RBC Count 3.47 (L)   MCV 98   MCH 27.7   MCHC 28.2 (L)   RDW 15.5 (H)   Diff Method Automated Method   % Neutrophils 69.4   % Lymphocytes 17.2   % Monocytes 10.6   % Eosinophils 2.0   % Basophils 0.3   % Immature Granulocytes 0.5   Nucleated RBCs 0   Absolute Neutrophil 5.2   Absolute Lymphocytes 1.3   Absolute Monocytes 0.8   Absolute Eosinophils 0.2   Absolute Basophils 0.0   Abs Immature Granulocytes 0.0   Absolute Nucleated RBC 0.0         ASSESSMENT:   1.  Metastatic prostate cancer with multiple sclerotic bone metastases. Primary oncologist is Dr. Oviedo. He was recently on Zytiga and prednisone with rising PSA so he met with Dr. Callahan to discuss alternative therapies, and he recommended Xofigo given his extensive bone metastasis. He is s/p 1 dose and tolerated well with some improvement in pain. His counts are at baseline. Plan for 6 treatments which will be given every month. He can see Pastora or I prior to each treatment and follow-up with Dr. Oviedo as scheduled.     2.  Extensive bone metastases. He will continue to take calcium and vitamin D.  Will continue Xgeva. Pain meds through assisted living now. Will give Rx for lidocaine patches for shoulder pain. History of diverting meds.     Elina Jolly PA-C    [facility] [address] [id number] [phone]

## 2017-02-03 ENCOUNTER — TELEPHONE (OUTPATIENT)
Dept: GERIATRICS | Facility: CLINIC | Age: 72
End: 2017-02-03

## 2017-02-03 NOTE — TELEPHONE ENCOUNTER
TELEPHONE ENCOUNTER:    Tomas Grey is a 71 year old  (1945),Nurse called today to report:   Patient went out from facility today and bought a box of Zantac .  They have no order for the Zantac and he doesn't want to give it up. Nurse asking 1st for order for Zantac 75 mg po BID and then ok to self administer medication.      ASSESSMENT/PLAN  Dyspepsia     Zantac 75 mg orally bid'    May use personal supply until gone, then the facility will supply    SAL Still CNP

## 2017-02-10 ENCOUNTER — PRE VISIT (OUTPATIENT)
Dept: PULMONOLOGY | Facility: CLINIC | Age: 72
End: 2017-02-10

## 2017-02-10 NOTE — TELEPHONE ENCOUNTER
1.  Date/reason for appt:  2/24/17  COPD    2.  Referring provider:  Internal, [facility] d/c summary 12/26/16 and 1/11/17 MARILEE Lozano Office Visit    3.  Call to patient (Yes / No - short description):  No, established, referred.  Records reviewed.  All records are in Epic and imaging is in PACS.

## 2017-02-13 ENCOUNTER — HOSPITAL ENCOUNTER (OUTPATIENT)
Facility: CLINIC | Age: 72
Setting detail: SPECIMEN
Discharge: HOME | End: 2017-02-13
Attending: INTERNAL MEDICINE | Admitting: INTERNAL MEDICINE
Payer: COMMERCIAL

## 2017-02-13 ENCOUNTER — INFUSION THERAPY VISIT (OUTPATIENT)
Dept: INFUSION THERAPY | Facility: CLINIC | Age: 72
End: 2017-02-13
Attending: INTERNAL MEDICINE
Payer: COMMERCIAL

## 2017-02-13 DIAGNOSIS — C79.51 METASTASIS TO BONE (H): Primary | ICD-10-CM

## 2017-02-13 DIAGNOSIS — C79.51 PROSTATE CANCER METASTATIC TO BONE (H): ICD-10-CM

## 2017-02-13 DIAGNOSIS — C61 PROSTATE CANCER METASTATIC TO BONE (H): ICD-10-CM

## 2017-02-13 DIAGNOSIS — C61 PROSTATE CANCER METASTATIC TO MULTIPLE SITES (H): ICD-10-CM

## 2017-02-13 LAB
ALBUMIN SERPL-MCNC: 2.9 G/DL (ref 3.4–5)
ALP SERPL-CCNC: 86 U/L (ref 40–150)
ALT SERPL W P-5'-P-CCNC: 14 U/L (ref 0–70)
ANION GAP SERPL CALCULATED.3IONS-SCNC: 5 MMOL/L (ref 3–14)
AST SERPL W P-5'-P-CCNC: 16 U/L (ref 0–45)
BILIRUB SERPL-MCNC: 0.5 MG/DL (ref 0.2–1.3)
BUN SERPL-MCNC: 43 MG/DL (ref 7–30)
CALCIUM SERPL-MCNC: 9.5 MG/DL (ref 8.5–10.1)
CHLORIDE SERPL-SCNC: 99 MMOL/L (ref 94–109)
CO2 SERPL-SCNC: 39 MMOL/L (ref 20–32)
CREAT SERPL-MCNC: 1.82 MG/DL (ref 0.66–1.25)
GFR SERPL CREATININE-BSD FRML MDRD: 37 ML/MIN/1.7M2
GLUCOSE SERPL-MCNC: 133 MG/DL (ref 70–99)
POTASSIUM SERPL-SCNC: 4.3 MMOL/L (ref 3.4–5.3)
PROT SERPL-MCNC: 7 G/DL (ref 6.8–8.8)
SODIUM SERPL-SCNC: 143 MMOL/L (ref 133–144)

## 2017-02-13 PROCEDURE — 80053 COMPREHEN METABOLIC PANEL: CPT | Performed by: INTERNAL MEDICINE

## 2017-02-13 PROCEDURE — 36415 COLL VENOUS BLD VENIPUNCTURE: CPT

## 2017-02-13 PROCEDURE — 96401 CHEMO ANTI-NEOPL SQ/IM: CPT

## 2017-02-13 PROCEDURE — 25000128 H RX IP 250 OP 636: Performed by: INTERNAL MEDICINE

## 2017-02-13 RX ADMIN — DENOSUMAB 120 MG: 120 INJECTION SUBCUTANEOUS at 12:18

## 2017-02-13 NOTE — ORAL ONC MGMT
Oral Chemotherapy Monitoring Program    Primary Oncologist: Dr. Oviedo  Primary Oncology Clinic: [facility]  Cancer Diagnosis: CRPC    Therapy History:  Start date 9/16/16 through 11/28/16.    Drug Interaction Assessment: None    Lab Monitoring Plan  CMP/BP  Subjective/Objective:  Tomas Grey is a 71 year old male seen in clinic for a follow-up visit for oral chemotherapy.  Marco denies any side effects from his zytiga or prednisone.  He does have pain in his back and they are adjusting his pain medications.     ORAL CHEMOTHERAPY 3/18/2016 4/25/2016 5/23/2016 7/27/2016 8/22/2016 10/31/2016 2/13/2017   Drug Name Xtandi (Enzalutamide) Xtandi (Enzalutamide) Xtandi (Enzalutamide) Xtandi (Enzalutamide) Xtandi (Enzalutamide) Zytiga (Abiraterone) Zytiga (Abiraterone)   Current Dosage 160mg 160mg 160mg 160mg 160mg 1000mg 1000mg   Current Schedule Daily Daily Daily Daily Daily Daily Daily   Cycle Details Continuous Continuous Continuous Continuous Continuous Continuous Continuous   Start Date of Last Cycle 3/18/2016 - - 6/27/2016 - 10/17/2016 -   Planned next cycle start date - - - - - 11/14/2016 2/26/2017   Doses missed in last 2 weeks 1 0 0 0 0 0 0   Adherence Assessment - - - - - - Adherent   Adverse Effects None Other (see comments) Other (see comments) None - - No AE identified during assessment   Home BPs not needed not needed not needed not needed - all BPs<140/90 all BPs<140/90   Any new drug interactions? - - - - - No No   Is the dose as ordered appropriate for the patient? - - - - - - Yes   Is the patient currently in pain? - - - - - Assessed in last 30 days. Yes   Does the patient feel the pain is currently being managed by a provider? - - - - - - Yes   Has the patient been assessed within the past 6 months for depression? - - - - - - Yes   Has the patient missed any days of school, work, or other routine activity? - - - - - - No       Last PHQ-2 Score on record:   PHQ-2 ( [facility]) 2/1/2017 1/16/2017   Q1:  "Little interest or pleasure in doing things 0 0   Q2: Feeling down, depressed or hopeless 0 0   PHQ-2 Score 0 0       Patient does not report depression symptoms.      Vitals:  BP:   BP Readings from Last 1 Encounters:   02/01/17 120/71     Wt Readings from Last 1 Encounters:   02/01/17 114.6 kg (252 lb 11.2 oz)     Estimated body surface area is 2.4 meters squared as calculated from the following:    Height as of 2/1/17: 1.803 m (5' 11\").    Weight as of 2/1/17: 114.6 kg (252 lb 11.2 oz).    Labs:  Lab Results   Component Value Date     02/13/2017      Lab Results   Component Value Date    POTASSIUM 4.3 02/13/2017     Lab Results   Component Value Date    JORDAN 9.5 02/13/2017     Lab Results   Component Value Date    ALBUMIN 2.9 02/13/2017     Lab Results   Component Value Date    MAG 1.7 12/26/2016     Lab Results   Component Value Date    PHOS 3.3 05/28/2015     Lab Results   Component Value Date    BUN 43 02/13/2017     Lab Results   Component Value Date    CR 1.82 02/13/2017       Lab Results   Component Value Date    AST 16 02/13/2017     Lab Results   Component Value Date    ALT 14 02/13/2017     Lab Results   Component Value Date    BILITOTAL 0.5 02/13/2017       Lab Results   Component Value Date    WBC 7.5 01/25/2017     Lab Results   Component Value Date    HGB 9.6 01/25/2017     Lab Results   Component Value Date     01/25/2017     11/30/2016     Lab Results   Component Value Date    ANEU 5.2 01/25/2017       Assessment:  Labs stable.  Doing well on his zytiga therapy.    Plan:  Continue with current therapy.    Follow-Up:  3/13/17    Refill Due:  2/24/17    Martin Meese, RPH      "

## 2017-02-13 NOTE — PROGRESS NOTES
Infusion Nursing Note:  Tomas Grey presents today for labs, xgeva.    Patient seen by provider today: No   present during visit today: Not Applicable.    Note: N/A.    Intravenous Access:  Lab draw site left AC, Needle type butterfly, Gauge 22.  Labs drawn without difficulty.    Treatment Conditions:  Lab Results   Component Value Date     02/13/2017                   Lab Results   Component Value Date    POTASSIUM 4.3 02/13/2017           Lab Results   Component Value Date    MAG 1.7 12/26/2016            Lab Results   Component Value Date    CR 1.82 02/13/2017                   Lab Results   Component Value Date    JORDAN 9.5 02/13/2017                Lab Results   Component Value Date    BILITOTAL 0.5 02/13/2017           Lab Results   Component Value Date    ALBUMIN 2.9 02/13/2017                    Lab Results   Component Value Date    ALT 14 02/13/2017           Lab Results   Component Value Date    AST 16 02/13/2017     Results reviewed, labs MET treatment parameters, ok to proceed with treatment.      Post Infusion Assessment:  Patient tolerated injection without incident.    Discharge Plan:   Copy of AVS reviewed with patient and/or family.  Patient will return 2/20 for labs for next appointment.  Patient discharged in stable condition accompanied by: self and transport company.  Departure Mode: Wheelchair.    Amanda Simons RN

## 2017-02-13 NOTE — MR AVS SNAPSHOT
After Visit Summary   2/13/2017    Tomas Grey    MRN: [id number]           Patient Information     Date Of Birth          1945        Visit Information        Provider Department      2/13/2017 1:00 PM RH INFUSION CHAIR 12 [facility]        Today's Diagnoses     Metastasis to bone (H)    -  1    Prostate cancer metastatic to multiple sites (H)        Prostate cancer metastatic to bone (H)           Follow-ups after your visit        Your next 10 appointments already scheduled     Feb 13, 2017  1:00 PM CST   Level 1 with RH INFUSION CHAIR 12   [facility] ([facility])    [facility]  [address]  [facility] [address]   [phone]            Feb 15, 2017  8:30 AM CST   Nursing Home with Annika Ortiz MD   [facility] ([facility])    [address] [place] [address]   [phone]            Feb 20, 2017 12:30 PM CST   Return Visit with  ONCOLOGY NURSE   [facility] ([facility])    [facility]  [address]  [facility] [address]   [phone]            Feb 24, 2017  1:00 PM CST   FULL PULMONARY FUNCTION with  PFL C   [facility] Pulmonary Function Testing ([facility])    [address] [place] [id number] [phone]            Feb 24, 2017  2:05 PM CST   (Arrive by 1:50 PM)   New Patient Visit with Abraham Mcmahon MD   [facility] ([facility])    [address] [place] [id number] [phone]            Feb 27, 2017  1:00 PM CST   Return Visit with Manpreet Oviedo MD   [facility] ([facility])    [facility]  [address]  [facility] [address]   [phone]            Mar 01, 2017  9:30 AM CST   (Arrive by 9:15 AM)   Return Visit with JASON Chery   [facility] ([facility])    [address] [facility]  [address] [facility] [address]   [phone]            Mar 01, 2017 11:00 AM CST   NM RADIUM 223 XOFIGO THERAPY with UUNMINJ1   [facility], Nuclear Medicine ([facility])    [address] [facility] [address]   [phone]           Please bring a list of your medicines to the exam. (Include vitamins, minerals and over-the-counter drugs.) You should wear comfortable clothes. Leave your valuables at home. Please bring related prior results and films.  Tell your doctor:   If you are breastfeeding or may be pregnant.   If you have had a barium test within the past few days. Barium may change the results of certain exams.   If you think you may need sedation (medicine to help you relax).  You may eat and drink as normal.  Please call your Imaging Department at your exam site with any questions.            Mar 22, 2017  1:00 PM CDT   Return Visit with RH ONCOLOGY NURSE   [facility] ([facility])    [facility]  [address]  [facility] [address]   [phone]            Mar 29, 2017 11:00 AM CDT   NM RADIUM 223 XOFIGO THERAPY with UUNMINJ2   [facility], Nuclear Medicine ([facility])    [address] [facility] [address] [phone]           Please bring a list of your medicines to the exam. (Include vitamins, minerals and over-the-counter drugs.) You should wear comfortable clothes. Leave your valuables at home. Please bring related prior results and films.  Tell your doctor:   If you are breastfeeding or may be pregnant.   If you have had a barium test within the past few  "days. Barium may change the results of certain exams.   If you think you may need sedation (medicine to help you relax).  You may eat and drink as normal.  Please call your Imaging Department at your exam site with any questions.              Future tests that were ordered for you today     Open Future Orders        Priority Expected Expires Ordered    Comprehensive metabolic panel Routine  2018            Who to contact     If you have questions or need follow up information about today's clinic visit or your schedule please contact [facility] INFUSION SERVICES directly at [phone].  Normal or non-critical lab and imaging results will be communicated to you by [employer], letter or phone within 4 business days after the clinic has received the results. If you do not hear from us within 7 days, please contact the clinic through JAZIO or phone. If you have a critical or abnormal lab result, we will notify you by phone as soon as possible.  Submit refill requests through JAZIO or call your pharmacy and they will forward the refill request to us. Please allow 3 business days for your refill to be completed.          Additional Information About Your Visit        JAZIO Information     JAZIO lets you send messages to your doctor, view your test results, renew your prescriptions, schedule appointments and more. To sign up, go to [email or web address] . Click on \"Log in\" on the left side of the screen, which will take you to the Welcome page. Then click on \"Sign up Now\" on the right side of the page.     You will be asked to enter the access code listed below, as well as some personal information. Please follow the directions to create your username and password.     Your access code is: [email or web address]  Expires: 2017 10:32 AM     Your access code will  in 90 days. If you need help or a new code, please call your [facility] or [phone].        Care EveryWhere ID  "    This is your Care EveryWhere ID. This could be used by other organizations to access your [facility] medical records  [id number]         Blood Pressure from Last 3 Encounters:   02/01/17 120/71   01/12/17 142/83   01/16/17 116/54    Weight from Last 3 Encounters:   02/01/17 114.6 kg (252 lb 11.2 oz)   01/25/17 114.7 kg (252 lb 14.4 oz)   01/12/17 114.5 kg (252 lb 8 oz)              We Performed the Following     Comprehensive metabolic panel        Primary Care Provider Office Phone # Fax #    Ekaterina Sow SAL Lozano [phone]       [facility] [address]  [facility] [address]        Thank you!     Thank you for choosing [facility] INFUSION SERVICES  for your care. Our goal is always to provide you with excellent care. Hearing back from our patients is one way we can continue to improve our services. Please take a few minutes to complete the written survey that you may receive in the mail after your visit with us. Thank you!             Your Updated Medication List - Protect others around you: Learn how to safely use, store and throw away your medicines at www.disposemymeds.org.          This list is accurate as of: 2/13/17 12:35 PM.  Always use your most recent med list.                   Brand Name Dispense Instructions for use    * abiraterone 250 MG tablet   Generic drug:  abiraterone      Take 1,000 mg by mouth every morning (before breakfast)       * abiraterone 250 MG tablet    ZYTIGA    120 tablet    Take 4 tablets (1,000 mg) by mouth daily for 30 doses Take on empty stomach.       acetaminophen 500 MG tablet    TYLENOL     Take 500 mg by mouth every 4 hours as needed for pain Not to exceed 3000 mg/24 hours       alum & mag hydroxide-simethicone 200-200-20 MG/5ML Susp suspension    MYLANTA/MAALOX     Take 15 mLs by mouth every 6 hours as needed for indigestion or heartburn       ammonium lactate 12 % lotion    LAC-HYDRIN     Apply topically 2  times daily as needed for dry skin To all extremities for dry skin       aspirin 81 MG EC tablet     5 tablet    Take 1 tablet (81 mg) by mouth daily       benzonatate 100 MG capsule    TESSALON     Take 100 mg by mouth 3 times daily as needed for cough       BREO ELLIPTA 100-25 MCG/INH oral inhaler   Generic drug:  fluticasone-vilanterol      Inhale 1 puff into the lungs daily       calcium carb 1250 mg (500 mg [place])/vitamin D 200 units 500-200 MG-UNIT per tablet    OSCAL with D    10 tablet    Take 1 tablet by mouth 2 times daily (with meals)       diclofenac 1 % Gel topical gel    VOLTAREN    100 g    Apply 4 grams to knees four times daily as needed using enclosed dosing card.       FIRST-DENNIS MOUTHWASH Susp     237 mL    Swish and swallow 5-10 mLs in mouth every 6 hours as needed       furosemide 40 MG tablet    LASIX    10 tablet    Take 1 tablet (40 mg) by mouth 2 times daily 0800, 1200       HYDROmorphone 2 MG tablet    DILAUDID    10 tablet    Take 1 tablet (2 mg) by mouth every 4 hours as needed for moderate to severe pain       insulin glargine 100 UNIT/ML injection    LANTUS     Inject 50 Units Subcutaneous At Bedtime       ipratropium - albuterol 0.5 mg/2.5 mg/3 mL 0.5-2.5 (3) MG/3ML neb solution    DUONEB    360 mL    Take 1 vial (3 mLs) by nebulization every 4 hours as needed for shortness of breath / dyspnea or wheezing       isosorbide mononitrate 60 MG 24 hr tablet    IMDUR     Take 1 tablet (60 mg) by mouth daily       lidocaine 5 % Patch    LIDODERM    30 patch    Apply up to 3 patches to painful area at once for up to 12 h within a 24 h period.  Remove after 12 hours.       LIPITOR 10 MG tablet   Generic drug:  atorvastatin      Take 10 mg by mouth daily       metoprolol 50 MG 24 hr tablet    TOPROL XL    5 tablet    Take 1 tablet (50 mg) by mouth daily       MILK OF MAGNESIA 400 MG/5ML suspension   Generic drug:  magnesium hydroxide      Take 30 mLs by mouth daily as needed for constipation or  heartburn       nitroglycerin 0.4 MG sublingual tablet    NITROSTAT     Place 0.4 mg under the tongue every 5 minutes as needed for chest pain if you are still having symptoms after 3 doses (15 minutes) call 911.       * NovoLOG FLEXPEN 100 UNIT/ML injection   Generic drug:  insulin aspart      Inject Subcutaneous At Bedtime Per sliding scale: 150 - 199 = 2 Units; 200 - 249 = 3 Units; 250 - 299 = 5 Units; 300 - 349 = 6 Units; 350+ = 8 Units       * NovoLOG FLEXPEN 100 UNIT/ML injection   Generic drug:  insulin aspart      Inject Subcutaneous 3 times daily (before meals) Per sliding scale: 120 - 149 = 2 Units; 150 - 199 = 3 Units; 200 - 249 = 6 Units; 250 - 299 = 9 Units; 300 - 349 = 12 Units; 350+ = 15 Units       omeprazole 10 MG CR capsule    priLOSEC     Take 1 capsule (10 mg) by mouth daily       ORAJEL 10 % Gel   Generic drug:  benzocaine      Take 1 applicator by mouth 4 times daily as needed for moderate pain       polyvinyl alcohol 1.4 % ophthalmic solution    LIQUIFILM TEARS     Place 1 drop into both eyes 3 times daily       * predniSONE 5 MG tablet    DELTASONE     Take 5 mg by mouth 2 times daily       * predniSONE 5 MG tablet    DELTASONE    60 tablet    Take 1 tablet (5 mg) by mouth 2 times daily       prochlorperazine 5 MG tablet    COMPAZINE    90 tablet    Take 1 tablet (5 mg) by mouth every 6 hours as needed for nausea or vomiting       senna-docusate 8.6-50 MG per tablet    SENOKOT-S;PERICOLACE    100 tablet    Take 1 tablet by mouth 2 times daily       sodium chloride 0.65 % nasal spray    OCEAN    1 Bottle    Spray 2 sprays into both nostrils every hour as needed for congestion       tamsulosin 0.4 MG capsule    FLOMAX    5 capsule    Take 1 capsule (0.4 mg) by mouth daily       tiotropium 18 MCG capsule    SPIRIVA     Inhale 18 mcg into the lungs daily       * Notice:  This list has 6 medication(s) that are the same as other medications prescribed for you. Read the directions carefully, and ask  your doctor or other care provider to review them with you.

## 2017-02-14 VITALS
TEMPERATURE: 97.4 F | WEIGHT: 248.7 LBS | BODY MASS INDEX: 34.69 KG/M2 | SYSTOLIC BLOOD PRESSURE: 131 MMHG | DIASTOLIC BLOOD PRESSURE: 59 MMHG | HEART RATE: 88 BPM | RESPIRATION RATE: 20 BRPM | OXYGEN SATURATION: 97 %

## 2017-02-15 ENCOUNTER — NURSING HOME VISIT (OUTPATIENT)
Dept: GERIATRICS | Facility: CLINIC | Age: 72
End: 2017-02-15

## 2017-02-15 DIAGNOSIS — Z53.9 ERRONEOUS ENCOUNTER--DISREGARD: Primary | ICD-10-CM

## 2017-02-15 NOTE — PROGRESS NOTES
Erroneous Encounter - Please Disregard  Attempted to see Mr. Grey for regulatory visit, but he was out of the facility on a shopping trip.     Annika Ortiz MD

## 2017-02-16 ENCOUNTER — CARE COORDINATION (OUTPATIENT)
Dept: GERIATRIC MEDICINE | Facility: CLINIC | Age: 72
End: 2017-02-16

## 2017-02-16 NOTE — PROGRESS NOTES
CM attended care conference at [facility] 2/15 with client, son - Les, dtr in law - Bria, unit SWer - Juliana and NP - Ekaterina Lozano.  Client voiced desire to d/c to his sons home.  Les and Bria report they have cared for his mother at the end of her life and are ready/willing to provide client the care he needs.   Team expressed our concerns about the level of care client needs (pain mgmt, DM, daily leg wraps, COPD/breathing difficulties, many appts, oxygen, etc) being appropriate for LTC.  Family insistent they can manage and want him at home for the end of his life.  After much discussion we agreed on a trial period where client would be on MITZI from facility for 4-5 days at [facility] and see how it goes.  Family agreeable to come to facility next week for training with nursing staff prior to the MITZI.  Juliana will keep me updated on how things go after the MITZI.    Lakshmi Calderon, PERNELL   [facility]   [phone]

## 2017-02-17 DIAGNOSIS — C61 PROSTATE CANCER METASTATIC TO MULTIPLE SITES (H): ICD-10-CM

## 2017-02-17 DIAGNOSIS — C79.51 METASTASIS TO BONE (H): ICD-10-CM

## 2017-02-17 DIAGNOSIS — C79.51 PROSTATE CANCER METASTATIC TO BONE (H): Primary | ICD-10-CM

## 2017-02-17 DIAGNOSIS — C61 PROSTATE CANCER METASTATIC TO BONE (H): Primary | ICD-10-CM

## 2017-02-17 RX ORDER — PREDNISONE 5 MG/1
5 TABLET ORAL 2 TIMES DAILY
Qty: 60 TABLET | Refills: 0 | Status: SHIPPED | OUTPATIENT
Start: 2017-02-17 | End: 2017-01-01

## 2017-02-17 RX ORDER — ABIRATERONE ACETATE 250 MG/1
1000 TABLET ORAL DAILY
Qty: 120 TABLET | Refills: 0 | Status: SHIPPED | OUTPATIENT
Start: 2017-02-17 | End: 2017-01-01

## 2017-02-20 ENCOUNTER — HOSPITAL ENCOUNTER (OUTPATIENT)
Facility: CLINIC | Age: 72
Setting detail: SPECIMEN
Discharge: HOME | End: 2017-02-20
Attending: INTERNAL MEDICINE | Admitting: INTERNAL MEDICINE
Payer: COMMERCIAL

## 2017-02-20 ENCOUNTER — ONCOLOGY VISIT (OUTPATIENT)
Dept: ONCOLOGY | Facility: CLINIC | Age: 72
End: 2017-02-20
Attending: INTERNAL MEDICINE
Payer: COMMERCIAL

## 2017-02-20 DIAGNOSIS — C61 PROSTATE CANCER METASTATIC TO MULTIPLE SITES (H): ICD-10-CM

## 2017-02-20 LAB
ALBUMIN SERPL-MCNC: 3 G/DL (ref 3.4–5)
ALP SERPL-CCNC: 84 U/L (ref 40–150)
ALT SERPL W P-5'-P-CCNC: 15 U/L (ref 0–70)
ANION GAP SERPL CALCULATED.3IONS-SCNC: 3 MMOL/L (ref 3–14)
ANISOCYTOSIS BLD QL SMEAR: SLIGHT
AST SERPL W P-5'-P-CCNC: 13 U/L (ref 0–45)
BASO STIPL BLD QL SMEAR: PRESENT
BASOPHILS # BLD AUTO: 0 10E9/L (ref 0–0.2)
BASOPHILS NFR BLD AUTO: 0.4 %
BILIRUB SERPL-MCNC: 0.3 MG/DL (ref 0.2–1.3)
BUN SERPL-MCNC: 37 MG/DL (ref 7–30)
CALCIUM SERPL-MCNC: 8.5 MG/DL (ref 8.5–10.1)
CHLORIDE SERPL-SCNC: 104 MMOL/L (ref 94–109)
CO2 SERPL-SCNC: 38 MMOL/L (ref 20–32)
CREAT SERPL-MCNC: 1.73 MG/DL (ref 0.66–1.25)
DIFFERENTIAL METHOD BLD: ABNORMAL
EOSINOPHIL # BLD AUTO: 0.2 10E9/L (ref 0–0.7)
EOSINOPHIL NFR BLD AUTO: 2.9 %
ERYTHROCYTE [DISTWIDTH] IN BLOOD BY AUTOMATED COUNT: 15.7 % (ref 10–15)
GFR SERPL CREATININE-BSD FRML MDRD: 39 ML/MIN/1.7M2
GLUCOSE SERPL-MCNC: 73 MG/DL (ref 70–99)
HCT VFR BLD AUTO: 34.4 % (ref 40–53)
HGB BLD-MCNC: 9.8 G/DL (ref 13.3–17.7)
IMM GRANULOCYTES # BLD: 0.1 10E9/L (ref 0–0.4)
IMM GRANULOCYTES NFR BLD: 0.8 %
LYMPHOCYTES # BLD AUTO: 1.2 10E9/L (ref 0.8–5.3)
LYMPHOCYTES NFR BLD AUTO: 16 %
MACROCYTES BLD QL SMEAR: PRESENT
MCH RBC QN AUTO: 28 PG (ref 26.5–33)
MCHC RBC AUTO-ENTMCNC: 28.5 G/DL (ref 31.5–36.5)
MCV RBC AUTO: 98 FL (ref 78–100)
MONOCYTES # BLD AUTO: 0.7 10E9/L (ref 0–1.3)
MONOCYTES NFR BLD AUTO: 9.5 %
NEUTROPHILS # BLD AUTO: 5.4 10E9/L (ref 1.6–8.3)
NEUTROPHILS NFR BLD AUTO: 70.4 %
NRBC # BLD AUTO: 0 10*3/UL
NRBC BLD AUTO-RTO: 0 /100
PLATELET # BLD AUTO: 222 10E9/L (ref 150–450)
POLYCHROMASIA BLD QL SMEAR: SLIGHT
POTASSIUM SERPL-SCNC: 4.1 MMOL/L (ref 3.4–5.3)
PROT SERPL-MCNC: 7.1 G/DL (ref 6.8–8.8)
PSA SERPL-MCNC: 163.58 UG/L (ref 0–4)
RBC # BLD AUTO: 3.5 10E12/L (ref 4.4–5.9)
SODIUM SERPL-SCNC: 145 MMOL/L (ref 133–144)
WBC # BLD AUTO: 7.7 10E9/L (ref 4–11)

## 2017-02-20 PROCEDURE — 80053 COMPREHEN METABOLIC PANEL: CPT | Performed by: INTERNAL MEDICINE

## 2017-02-20 PROCEDURE — 84153 ASSAY OF PSA TOTAL: CPT | Performed by: INTERNAL MEDICINE

## 2017-02-20 PROCEDURE — 36415 COLL VENOUS BLD VENIPUNCTURE: CPT

## 2017-02-20 PROCEDURE — 85025 COMPLETE CBC W/AUTO DIFF WBC: CPT | Performed by: INTERNAL MEDICINE

## 2017-02-20 NOTE — MR AVS SNAPSHOT
After Visit Summary   2/20/2017    Tomas Grey    MRN: [id number]           Patient Information     Date Of Birth          1945        Visit Information        Provider Department      2/20/2017 12:30 PM RH ONCOLOGY NURSE [facility]        Today's Diagnoses     Prostate cancer metastatic to multiple sites (H)           Follow-ups after your visit        Your next 10 appointments already scheduled     Feb 20, 2017 12:30 PM CST   Return Visit with RH ONCOLOGY NURSE   [facility] ([facility])    [facility]  [address]  [facility] [address]   [phone]            Feb 24, 2017  1:00 PM CST   FULL PULMONARY FUNCTION with  PFL C   [facility] Pulmonary Function Testing ([facility])    [address] [place] [address]   [phone]            Feb 24, 2017  2:05 PM CST   (Arrive by 1:50 PM)   New Patient Visit with Abraham Mcmahon MD   [facility] ([facility])    [address] [place] [address]   [phone]            Feb 27, 2017  1:00 PM CST   Return Visit with Manpreet Oviedo MD   [facility] ([facility])    [facility]  [address]  [facility] [address]   [phone]            Mar 01, 2017  9:30 AM CST   (Arrive by 9:15 AM)   Return Visit with JASON Chery   [facility] ([facility])    [address] [place] [address]   [phone]            Mar 01, 2017 11:00 AM CST   NM RADIUM 223 XOFIGO THERAPY with UUNMINJ1   [facility] ([facility])    [address] [facility] [address] [phone]           Please bring a  list of your medicines to the exam. (Include vitamins, minerals and over-the-counter drugs.) You should wear comfortable clothes. Leave your valuables at home. Please bring related prior results and films.  Tell your doctor:   If you are breastfeeding or may be pregnant.   If you have had a barium test within the past few days. Barium may change the results of certain exams.   If you think you may need sedation (medicine to help you relax).  You may eat and drink as normal.  Please call your Imaging Department at your exam site with any questions.            Mar 22, 2017  1:00 PM CDT   Return Visit with RH ONCOLOGY NURSE   [facility] ([facility])    [facility]  [address]  [facility] [address] [phone]            Mar 29, 2017  9:30 AM CDT   (Arrive by 9:15 AM)   Return Visit with Lakeisha Singh PA-C   [facility] ([facility])    [address] [facility]  2nd [address]  [facility] [address] [phone]            Mar 29, 2017 11:00 AM CDT   NM RADIUM 223 XOFIGO THERAPY with UUNMINJ2   [facility] ([facility])    [address] [facility] [id number] [phone]           Please bring a list of your medicines to the exam. (Include vitamins, minerals and over-the-counter drugs.) You should wear comfortable clothes. Leave your valuables at home. Please bring related prior results and films.  Tell your doctor:   If you are breastfeeding or may be pregnant.   If you have had a barium test within the past few days. Barium may change the results of certain exams.   If you think you may need sedation (medicine to help you relax).  You may eat and drink as normal.  Please call your Imaging Department at your exam site with any questions.            Apr 19, 2017  1:00 PM CDT   Return Visit with  ONCOLOGY NURSE  "  [facility] ([facility])    [facility]  [address]  [facility] [address] [phone]              Who to contact     If you have questions or need follow up information about today's clinic visit or your schedule please contact [facility] CANCER CARE directly at [phone].  Normal or non-critical lab and imaging results will be communicated to you by MyChart, letter or phone within 4 business days after the clinic has received the results. If you do not hear from us within 7 days, please contact the clinic through MyChart or phone. If you have a critical or abnormal lab result, we will notify you by phone as soon as possible.  Submit refill requests through [employer] or call your pharmacy and they will forward the refill request to us. Please allow 3 business days for your refill to be completed.          Additional Information About Your Visit        [employer] Information     App[employer] lets you send messages to your doctor, view your test results, renew your prescriptions, schedule appointments and more. To sign up, go to [email or web address] . Click on \"Log in\" on the left side of the screen, which will take you to the Welcome page. Then click on \"Sign up Now\" on the right side of the page.     You will be asked to enter the access code listed below, as well as some personal information. Please follow the directions to create your username and password.     Your access code is: [email or web address]  Expires: 2017 10:32 AM     Your access code will  in 90 days. If you need help or a new code, please call your [place] clinic or [phone].        Care EveryWhere ID     This is your Care EveryWhere ID. This could be used by other organizations to access your [place] medical records  [id number]         Blood Pressure from Last 3 Encounters:   17 131/59   17 120/71   17 142/83    Weight from Last 3 " Encounters:   02/12/17 112.8 kg (248 lb 11.2 oz)   02/01/17 114.6 kg (252 lb 11.2 oz)   01/25/17 114.7 kg (252 lb 14.4 oz)              We Performed the Following     CBC with platelets differential     Comprehensive metabolic panel     PSA tumor marker        Primary Care Provider Office Phone # Fax #    SAL Acevedo [phone]       [facility] [address] [facility] [address]  [facility] [address]        Thank you!     Thank you for choosing [facility] CAN[facility]ER [facility]  for your care. Our goal is always to provide you with excellent care. Hearing back from our patients is one way we can continue to improve our services. Please take a few minutes to complete the written survey that you may receive in the mail after your visit with us. Thank you!             Your Updated Medication List - Protect others around you: Learn how to safely use, store and throw away your medicines at www.disposemymeds.org.          This list is accurate as of: 2/20/17 12:13 PM.  Always use your most recent med list.                   Brand Name Dispense Instructions for use    * abiraterone 250 MG tablet    ZYTIGA    120 tablet    Take 4 tablets (1,000 mg) by mouth daily for 30 doses Take on empty stomach.       * abiraterone 250 MG tablet    ZYTIGA    120 tablet    Take 4 tablets (1,000 mg) by mouth daily for 30 doses Take on empty stomach.       acetaminophen 500 MG tablet    TYLENOL     Take 500 mg by mouth every 4 hours as needed for pain Not to exceed 3000 mg/24 hours       alum & mag hydroxide-simethicone 200-200-20 MG/5ML Susp suspension    MYLANTA/MAALOX     Take 15 mLs by mouth every 6 hours as needed for indigestion or heartburn       ammonium lactate 12 % lotion    LAC-HYDRIN     Apply topically 2 times daily as needed for dry skin To all extremities for dry skin       aspirin 81 MG EC tablet     5 tablet    Take 1 tablet (81 mg) by mouth daily       benzonatate 100 MG capsule    TESSALON      Take 100 mg by mouth 3 times daily as needed for cough       BREO ELLIPTA 100-25 MCG/INH oral inhaler   Generic drug:  fluticasone-vilanterol      Inhale 1 puff into the lungs daily       calcium carb 1250 mg (500 mg [place])/vitamin D 200 units 500-200 MG-UNIT per tablet    OSCAL with D    10 tablet    Take 1 tablet by mouth 2 times daily (with meals)       diclofenac 1 % Gel topical gel    VOLTAREN    100 g    Apply 4 grams to knees four times daily as needed using enclosed dosing card.       FIRST-DENNIS MOUTHWASH Susp     237 mL    Swish and swallow 5-10 mLs in mouth every 6 hours as needed       furosemide 40 MG tablet    LASIX    10 tablet    Take 1 tablet (40 mg) by mouth 2 times daily 0800, 1200       HYDROmorphone 2 MG tablet    DILAUDID    10 tablet    Take 1 tablet (2 mg) by mouth every 4 hours as needed for moderate to severe pain       insulin glargine 100 UNIT/ML injection    LANTUS     Inject 25 Units Subcutaneous 2 times daily       ipratropium - albuterol 0.5 mg/2.5 mg/3 mL 0.5-2.5 (3) MG/3ML neb solution    DUONEB    360 mL    Take 1 vial (3 mLs) by nebulization every 4 hours as needed for shortness of breath / dyspnea or wheezing       isosorbide mononitrate 60 MG 24 hr tablet    IMDUR     Take 1 tablet (60 mg) by mouth daily       lidocaine 5 % Patch    LIDODERM    30 patch    Apply up to 3 patches to painful area at once for up to 12 h within a 24 h period.  Remove after 12 hours.       LIPITOR 10 MG tablet   Generic drug:  atorvastatin      Take 10 mg by mouth daily       metoprolol 50 MG 24 hr tablet    TOPROL XL    5 tablet    Take 1 tablet (50 mg) by mouth daily       MILK OF MAGNESIA 400 MG/5ML suspension   Generic drug:  magnesium hydroxide      Take 30 mLs by mouth daily as needed for constipation or heartburn       nitroglycerin 0.4 MG sublingual tablet    NITROSTAT     Place 0.4 mg under the tongue every 5 minutes as needed for chest pain if you are still having symptoms after 3 doses  (15 minutes) call 911.       * NovoLOG FLEXPEN 100 UNIT/ML injection   Generic drug:  insulin aspart      Inject Subcutaneous At Bedtime Per sliding scale: 150 - 199 = 2 Units; 200 - 249 = 3 Units; 250 - 299 = 5 Units; 300 - 349 = 6 Units; 350+ = 8 Units       * NovoLOG FLEXPEN 100 UNIT/ML injection   Generic drug:  insulin aspart      Inject Subcutaneous 3 times daily (before meals) Per sliding scale: 120 - 149 = 2 Units; 150 - 199 = 3 Units; 200 - 249 = 6 Units; 250 - 299 = 9 Units; 300 - 349 = 12 Units; 350+ = 15 Units       omeprazole 10 MG CR capsule    priLOSEC     Take 1 capsule (10 mg) by mouth daily       ORAJEL 10 % Gel   Generic drug:  benzocaine      Take 1 applicator by mouth 4 times daily as needed for moderate pain       polyvinyl alcohol 1.4 % ophthalmic solution    LIQUIFILM TEARS     Place 1 drop into both eyes 3 times daily       * predniSONE 5 MG tablet    DELTASONE    60 tablet    Take 1 tablet (5 mg) by mouth 2 times daily       * predniSONE 5 MG tablet    DELTASONE    60 tablet    Take 1 tablet (5 mg) by mouth 2 times daily       prochlorperazine 5 MG tablet    COMPAZINE    90 tablet    Take 1 tablet (5 mg) by mouth every 6 hours as needed for nausea or vomiting       senna-docusate 8.6-50 MG per tablet    SENOKOT-S;PERICOLACE    100 tablet    Take 1 tablet by mouth 2 times daily       sodium chloride 0.65 % nasal spray    OCEAN    1 Bottle    Spray 2 sprays into both nostrils every hour as needed for congestion       tamsulosin 0.4 MG capsule    FLOMAX    5 capsule    Take 1 capsule (0.4 mg) by mouth daily       tiotropium 18 MCG capsule    SPIRIVA     Inhale 18 mcg into the lungs daily       * Notice:  This list has 6 medication(s) that are the same as other medications prescribed for you. Read the directions carefully, and ask your doctor or other care provider to review them with you.

## 2017-02-20 NOTE — NURSING NOTE
Medical Assistant Note:  Tomas Grey presents today for lab draw.    Patient seen by provider today: No.   present during visit today: Not Applicable.    Concerns: No Concerns.    Procedure:  Labs drawn: .    Post Assessment:  Labs drawn without difficulty: Yes.    Discharge Plan:  Departure Mode: Wheelchair.    Lily Decker

## 2017-02-21 VITALS
OXYGEN SATURATION: 94 % | BODY MASS INDEX: 34.23 KG/M2 | RESPIRATION RATE: 18 BRPM | DIASTOLIC BLOOD PRESSURE: 75 MMHG | SYSTOLIC BLOOD PRESSURE: 117 MMHG | WEIGHT: 245.4 LBS | HEART RATE: 78 BPM | TEMPERATURE: 98.5 F

## 2017-02-21 RX ORDER — BUDESONIDE AND FORMOTEROL FUMARATE DIHYDRATE 160; 4.5 UG/1; UG/1
2 AEROSOL RESPIRATORY (INHALATION) 2 TIMES DAILY
COMMUNITY
Start: 2017-03-01 | End: 2017-02-24

## 2017-02-22 ENCOUNTER — NURSING HOME VISIT (OUTPATIENT)
Dept: GERIATRICS | Facility: CLINIC | Age: 72
End: 2017-02-22
Payer: COMMERCIAL

## 2017-02-22 DIAGNOSIS — J44.9 CHRONIC OBSTRUCTIVE PULMONARY DISEASE, UNSPECIFIED COPD TYPE (H): ICD-10-CM

## 2017-02-22 DIAGNOSIS — J20.9 ACUTE BRONCHITIS, UNSPECIFIED ORGANISM: ICD-10-CM

## 2017-02-22 DIAGNOSIS — C79.51 PROSTATE CANCER METASTATIC TO BONE (H): Primary | ICD-10-CM

## 2017-02-22 DIAGNOSIS — J96.11 CHRONIC RESPIRATORY FAILURE WITH HYPOXIA (H): ICD-10-CM

## 2017-02-22 DIAGNOSIS — C61 PROSTATE CANCER METASTATIC TO BONE (H): Primary | ICD-10-CM

## 2017-02-22 DIAGNOSIS — Z79.4 TYPE 2 DIABETES MELLITUS WITH COMPLICATION, WITH LONG-TERM CURRENT USE OF INSULIN (H): ICD-10-CM

## 2017-02-22 DIAGNOSIS — E11.8 TYPE 2 DIABETES MELLITUS WITH COMPLICATION, WITH LONG-TERM CURRENT USE OF INSULIN (H): ICD-10-CM

## 2017-02-22 PROCEDURE — 99309 SBSQ NF CARE MODERATE MDM 30: CPT | Performed by: INTERNAL MEDICINE

## 2017-02-22 PROCEDURE — 99207 ZZC CDG-CORRECTLY CODED, REVIEWED AND AGREE: CPT | Performed by: INTERNAL MEDICINE

## 2017-02-22 NOTE — PROGRESS NOTES
"[facility]  Nursing Home Regulatory Visit  February 22, 2017    Chief Complaint   Patient presents with     longterm Regulatory       HPI:    Tomas Grey is a 71 year old  (1945), who is being seen today for a federally mandated E/M visit at [facility]. Today's concerns are:    1) Metastatic Prostate Cancer -- follows with Dr. Oviedo and Dr. Callahan with oncology. Has progressive disease despite treatment with extensive bone metastasis.  2) COPD / Chronic Hypoxic Respiratory Failure -- Reporting progressive cough, now with yellow sputum. Has turned up supplemental O2 by one liter. Feels like it is \"settling in [his] chest\".  Managed with budesonide-formoterol 160-4.5 mcg BID, fluticasone-vilanterol 100-25 mg daily and DuoNebs q4h PRN. He is also on prednisone 5 mg BID for prostate cancer.   3) DM, Type II -- Hgb A1c 7.9 in October. Sugars well controlled overall in 110s-180s on glargine 25 units BID plus sliding scale aspart    ALLERGIES: Morphine    PAST MEDICAL HISTORY:   Past Medical History   Diagnosis Date     Anemia      Anxiety      Aspiration pneumonia (H) 2014     C. difficile colitis      CAD (coronary artery disease)      Chronic pain      CKD (chronic kidney disease)      COPD (chronic obstructive pulmonary disease) (H)      Depressive disorder      Diabetes mellitus (H)      Hypertension      Insomnia      ALEXIS (obstructive sleep apnea)      Osteoporosis      Prostate cancer metastatic to multiple sites (H)        PAST SURGICAL HISTORY:   Past Surgical History   Procedure Laterality Date     Abdomen surgery       Arthroscopy knee       Eye surgery         FAMILY HISTORY:   Family History   Problem Relation Age of Onset     DIABETES Mother      CANCER Mother      stomach       SOCIAL HISTORY:   Lives in a SNF    MEDICATIONS:  Current Outpatient Prescriptions   Medication Sig Dispense Refill     ranitidine (RANITIDINE) 75 MG tablet Take 75 mg by mouth 2 times " daily       predniSONE (DELTASONE) 5 MG tablet Take 1 tablet (5 mg) by mouth 2 times daily 60 tablet 0     abiraterone (ZYTIGA) 250 MG tablet Take 4 tablets (1,000 mg) by mouth daily for 30 doses Take on empty stomach. 120 tablet 0     lidocaine (LIDODERM) 5 % Patch Apply up to 3 patches to painful area at once for up to 12 h within a 24 h period.  Remove after 12 hours. 30 patch 0     Diphenhyd-HC-Nystatin-Tetracyc (FIRST-DENNIS MOUTHWASH) SUSP Swish and swallow 5-10 mLs in mouth every 6 hours as needed 237 mL 1     benzocaine (ORAJEL) 10 % GEL Take 1 applicator by mouth 4 times daily as needed for moderate pain       diclofenac (VOLTAREN) 1 % GEL topical gel Apply 4 grams to knees four times daily as needed using enclosed dosing card. 100 g 0     prochlorperazine (COMPAZINE) 5 MG tablet Take 1 tablet (5 mg) by mouth every 6 hours as needed for nausea or vomiting 90 tablet 0     HYDROmorphone (DILAUDID) 2 MG tablet Take 1 tablet (2 mg) by mouth every 4 hours as needed for moderate to severe pain 10 tablet 0     senna-docusate (SENOKOT-S;PERICOLACE) 8.6-50 MG per tablet Take 1 tablet by mouth 2 times daily 100 tablet      insulin aspart (NOVOLOG FLEXPEN) 100 UNIT/ML injection Inject Subcutaneous At Bedtime Per sliding scale: 150 - 199 = 2 Units; 200 - 249 = 3 Units; 250 - 299 = 5 Units; 300 - 349 = 6 Units; 350+ = 8 Units       insulin aspart (NOVOLOG FLEXPEN) 100 UNIT/ML injection Inject Subcutaneous 3 times daily (before meals) Per sliding scale: 120 - 149 = 2 Units; 150 - 199 = 3 Units; 200 - 249 = 6 Units; 250 - 299 = 9 Units; 300 - 349 = 12 Units; 350+ = 15 Units       insulin glargine (LANTUS) 100 UNIT/ML injection Inject 25 Units Subcutaneous 2 times daily        omeprazole (PRILOSEC) 10 MG CR capsule Take 1 capsule (10 mg) by mouth daily       isosorbide mononitrate (IMDUR) 60 MG 24 hr tablet Take 1 tablet (60 mg) by mouth daily       atorvastatin (LIPITOR) 10 MG tablet Take 10 mg by mouth daily        fluticasone - vilanterol (BREO ELLIPTA) 100-25 MCG/INH oral inhaler Inhale 1 puff into the lungs daily       tiotropium (SPIRIVA) 18 MCG inhalation capsule Inhale 18 mcg into the lungs daily       benzonatate (TESSALON) 100 MG capsule Take 100 mg by mouth 3 times daily as needed for cough       polyvinyl alcohol (LIQUIFILM TEARS) 1.4 % ophthalmic solution Place 1 drop into both eyes 3 times daily       acetaminophen (TYLENOL) 500 MG tablet Take 500 mg by mouth every 4 hours as needed for pain Not to exceed 3000 mg/24 hours       alum & mag hydroxide-simethicone (MYLANTA/MAALOX) 200-200-20 MG/5ML SUSP Take 15 mLs by mouth every 6 hours as needed for indigestion or heartburn        magnesium hydroxide (MILK OF MAGNESIA) 400 MG/5ML suspension Take 30 mLs by mouth daily as needed for constipation or heartburn       nitroglycerin (NITROSTAT) 0.4 MG SL tablet Place 0.4 mg under the tongue every 5 minutes as needed for chest pain if you are still having symptoms after 3 doses (15 minutes) call 911.       ipratropium - albuterol 0.5 mg/2.5 mg/3 mL (DUONEB) 0.5-2.5 (3) MG/3ML nebulization Take 1 vial (3 mLs) by nebulization every 4 hours as needed for shortness of breath / dyspnea or wheezing 360 mL 3     aspirin 81 MG EC tablet Take 1 tablet (81 mg) by mouth daily 5 tablet 0     metoprolol (TOPROL XL) 50 MG 24 hr tablet Take 1 tablet (50 mg) by mouth daily 5 tablet 0     furosemide (LASIX) 40 MG tablet Take 1 tablet (40 mg) by mouth 2 times daily 0800, 1200 10 tablet 0     calcium carb 1250 mg, 500 mg [place],/vitamin D 200 units (OSCAL WITH D) 500-200 MG-UNIT per tablet Take 1 tablet by mouth 2 times daily (with meals) 10 tablet 0     tamsulosin (FLOMAX) 0.4 MG 24 hr capsule Take 1 capsule (0.4 mg) by mouth daily 5 capsule 0     sodium chloride (OCEAN) 0.65 % nasal spray Spray 2 sprays into both nostrils every hour as needed for congestion 1 Bottle 12     ammonium lactate (LAC-HYDRIN) 12 % lotion Apply topically 2 times daily  as needed for dry skin To all extremities for dry skin       [START ON 3/1/2017] budesonide-formoterol (SYMBICORT) 160-4.5 MCG/ACT Inhaler Inhale 2 puffs into the lungs 2 times daily Reported on 2017       [DISCONTINUED] enzalutamide (XTANDI) 40 MG capsule Take 4 capsules (160 mg) by mouth daily 120 capsule 0       Medications reviewed:  Medications reconciled to facility chart and changes were made to reflect current medications as identified as above med list. Below are the changes that were made:   Medications stopped since last EPIC medication reconciliation:   Medications Discontinued During This Encounter   Medication Reason     predniSONE (DELTASONE) 5 MG tablet Medication Reconciliation Clean Up     abiraterone (ZYTIGA) 250 MG tablet Medication Reconciliation Clean Up     benzocaine (ORAJEL) 10 % GEL      benzonatate (TESSALON) 100 MG capsule      sodium chloride (OCEAN) 0.65 % nasal spray      insulin aspart (NOVOLOG FLEXPEN) 100 UNIT/ML injection      insulin aspart (NOVOLOG FLEXPEN) 100 UNIT/ML injection      Diphenhyd-HC-Nystatin-Tetracyc (FIRST-DENNIS MOUTHWASH) SUSP      alum & mag hydroxide-simethicone (MYLANTA/MAALOX) 200-200-20 MG/5ML SUSP      prochlorperazine (COMPAZINE) 5 MG tablet      magnesium hydroxide (MILK OF MAGNESIA) 400 MG/5ML suspension      Medications started since last [facility] medication reconciliation:  Orders Placed This Encounter   Medications     ranitidine (RANITIDINE) 75 MG tablet     Sig: Take 75 mg by mouth 2 times daily     budesonide-formoterol (SYMBICORT) 160-4.5 MCG/ACT Inhaler     Sig: Inhale 2 puffs into the lungs 2 times daily Reported on 2017     AZITHROMYCIN PO     Si mg PO x1 day, then 250 mg daily x4 days (total 5 day course)       Case Management:  I have reviewed the care plan and MDS and do agree with the plan.   Information reviewed:  Medications, vital signs, orders, and nursing notes.    ROS:  10 point ROS neg other than the symptoms noted above  "in the HPI.    PHYSICAL EXAM:  /75  Pulse 78  Temp 98.5  F (36.9  C)  Resp 18  Wt 245 lb 6.4 oz (111.3 kg)  SpO2 94%  BMI 34.23 kg/m2  Gen: sitting in power scooter, alert, cooperative and in no acute distress  HEENT: poor dentition; nasal cannula in place  Card: RRR, S1, S2, no murmurs  Resp: moving very little air through lungs, no wheezing  GI: abdomen soft, not-tender  Ext: bialteral LE edema  Neuro: CX II-XII grossly in tact; ROM in all four extremities grossly in tact    Lab/Diagnostic data:  Reviewed as per Epic    ASSESSMENT/PLAN    1) Metastatic Prostate Cancer   Follows with Dr. Oviedo and Dr. Callahan with oncology. Has progressive disease despite treatment with extensive bone metastasis.  -- ongoing management per oncology (has follow up next week on 2/27)    2) COPD / Chronic Hypoxic Respiratory Failure / Acute Bronchitis  Reporting progressive cough, now with yellow sputum. Has turned up supplemental O2 by one liter. Feels like it is \"settling in [his] chest\". Given immunosuppression, upcoming home visit and overall clinical picture will elect to treat.   -- z-pack x5 days for bronchitis (admittedly, likely viral but with cormorbids will treat)  -- continues on budesonide-formoterol 160-4.5 mcg BID, fluticasone-vilanterol 100-25 mg daily and DuoNebs q4h PRN. He is also on prednisone 5 mg BID for prostate cancer.   -- follow up with pulm as scheduled on 2/24    3) DM, Type II   Hgb A1c 7.9 in October. Sugars well controlled overall in 110s-180s  -- continues on glargine 25 units BID  -- follow sugars and adjust insulin as needed    Tomas Grey is stable. No concerns per nursing. No changes to plan of care today.    Electronically signed by:  Annika Ortiz MD    "

## 2017-02-23 ENCOUNTER — TELEPHONE (OUTPATIENT)
Dept: ONCOLOGY | Facility: CLINIC | Age: 72
End: 2017-02-23

## 2017-02-24 ENCOUNTER — OFFICE VISIT (OUTPATIENT)
Dept: PULMONOLOGY | Facility: CLINIC | Age: 72
End: 2017-02-24
Attending: INTERNAL MEDICINE
Payer: COMMERCIAL

## 2017-02-24 VITALS
HEIGHT: 71 IN | OXYGEN SATURATION: 94 % | WEIGHT: 260 LBS | TEMPERATURE: 98.2 F | BODY MASS INDEX: 36.4 KG/M2 | HEART RATE: 90 BPM | DIASTOLIC BLOOD PRESSURE: 50 MMHG | SYSTOLIC BLOOD PRESSURE: 89 MMHG

## 2017-02-24 DIAGNOSIS — J44.9 CHRONIC OBSTRUCTIVE PULMONARY DISEASE, UNSPECIFIED COPD TYPE (H): Primary | ICD-10-CM

## 2017-02-24 DIAGNOSIS — J44.1 OBSTRUCTIVE CHRONIC BRONCHITIS WITH EXACERBATION (H): Primary | ICD-10-CM

## 2017-02-24 LAB
DLCOCOR-%PRED-PRE: 48 %
DLCOCOR-PRE: 12.64 ML/MIN/MMHG
DLCOUNC-%PRED-PRE: 39 %
DLCOUNC-PRE: 10.52 ML/MIN/MMHG
DLCOUNC-PRED: 26.33 ML/MIN/MMHG
ERV-%PRED-PRE: 156 %
ERV-PRE: 1.05 L
ERV-PRED: 0.67 L
EXPTIME-PRE: 8.76 SEC
FEF2575-%PRED-POST: 10 %
FEF2575-%PRED-PRE: 12 %
FEF2575-POST: 0.26 L/SEC
FEF2575-PRE: 0.3 L/SEC
FEF2575-PRED: 2.34 L/SEC
FEFMAX-%PRED-PRE: 31 %
FEFMAX-PRE: 2.69 L/SEC
FEFMAX-PRED: 8.49 L/SEC
FEV1-%PRED-PRE: 27 %
FEV1-PRE: 0.84 L
FEV1FEV6-PRE: 46 %
FEV1FEV6-PRED: 78 %
FEV1FVC-PRE: 40 %
FEV1FVC-PRED: 76 %
FEV1SVC-PRE: 39 %
FEV1SVC-PRED: 62 %
FIFMAX-PRE: 2.18 L/SEC
FVC-%PRED-PRE: 52 %
FVC-PRE: 2.12 L
FVC-PRED: 4.01 L
IC-%PRED-PRE: 25 %
IC-PRE: 1.08 L
IC-PRED: 4.24 L
VA-%PRED-PRE: 69 %
VA-PRE: 4.85 L
VC-%PRED-PRE: 43 %
VC-PRE: 2.14 L
VC-PRED: 4.91 L

## 2017-02-24 RX ORDER — AZITHROMYCIN 250 MG/1
250 TABLET, FILM COATED ORAL DAILY
Qty: 6 TABLET | Refills: 0 | Status: SHIPPED | OUTPATIENT
Start: 2017-02-24 | End: 2017-03-01

## 2017-02-24 ASSESSMENT — PAIN SCALES - GENERAL: PAINLEVEL: EXTREME PAIN (9)

## 2017-02-24 NOTE — TELEPHONE ENCOUNTER
PA Initiation    Medication: Lidoderm patches  Insurance Company:  [employer]  Pharmacy Filling the Rx:  [facility]   Filling Pharmacy Phone:  [phone]  Filling Pharmacy Fax:  [phone]  Start Date:  2/11/17 requested date    HERMILO JIMENEZ/[id number]) FERNANDO (Key: [id number]) - [id number]  Lidocaine 5% patches  Status: Sent to Plan via [facility]   Sent: February 23rd, 2017    Form: [facility] General Prior Authorization Form for General Medication Requests  [phone] phone [phone] fax  Original Claim Info 75 CALL HELP DESK    PRIOR AUTHORIZATION DENIED    Medication: Lidoderm patches  DENIED    Denial Date:  2/24/17    Denial Rational: Coverage only for pain associated with  Postherpetic neuralgia or diabetic neuropathic pain

## 2017-02-24 NOTE — LETTER
2/24/2017       RE: Tomas Grey  [facility]  [address]  [facility] [address]     Dear Colleague,    Thank you for referring your patient, Tomas Grey, to the [facility] at [facility]. Please see a copy of my visit note below.    Reason for Visit  Tomas Grey is a 71 year old year old male who is being seen for No chief complaint on file.    Pulmonary HPI    The patient was seen and examined by Abraham Mcmahon     Tomas Grey is a 71 year old male with a history of COPD (on chronic 2L), ALEXIS, diastolic heart failure, CAD3, HTN, HLD, CKD, DM2 and stage IV prostate cancer w/ spinal and bony metastases who presents for further evaluation of his lung disease.     He was previously diagnosed with COPD and chronic hypoxemic respiratory failure requiring 2 L nasal cannula. He is maintained on symbicort as well spiriva/duonebs with chronic low dose prednisone as well for his prostate CA (5 mg BID). He mentions formal diagnosis of COPD last year but has been on supplemental O2 for several years.  He mentions on average 1 episode of bronchitis per year.     He was recently evaluated in the ED this January 2017 for a possible ? syncopal episode thought related to sedating medications but noted to have worsening hypoxemia (increase O2 requirement to 5L). Work-up included CT CHEST which was negative for PE as well signs of pneumonia. He was subsequently seen by his geriatrician at his nursing home with complaints of chest cold for which he was prescribed azithromycin.     At present, symptoms include baseline dyspnea on exertion of only a few feet with his walker. He presently has cough with sputum production of clear yellow but at baseline endorses only minimal bronchitic symptoms.   He denies signficant wheeze or hemoptysis.    He is a former smoker and lives in an [facility] with several children nearby. He  denies a family history of lung disease.       Current Outpatient Prescriptions   Medication     AZITHROMYCIN PO     ranitidine (RANITIDINE) 75 MG tablet     [START ON 3/1/2017] budesonide-formoterol (SYMBICORT) 160-4.5 MCG/ACT Inhaler     predniSONE (DELTASONE) 5 MG tablet     abiraterone (ZYTIGA) 250 MG tablet     lidocaine (LIDODERM) 5 % Patch     diclofenac (VOLTAREN) 1 % GEL topical gel     HYDROmorphone (DILAUDID) 2 MG tablet     senna-docusate (SENOKOT-S;PERICOLACE) 8.6-50 MG per tablet     insulin glargine (LANTUS) 100 UNIT/ML injection     omeprazole (PRILOSEC) 10 MG CR capsule     isosorbide mononitrate (IMDUR) 60 MG 24 hr tablet     atorvastatin (LIPITOR) 10 MG tablet     fluticasone - vilanterol (BREO ELLIPTA) 100-25 MCG/INH oral inhaler     tiotropium (SPIRIVA) 18 MCG inhalation capsule     polyvinyl alcohol (LIQUIFILM TEARS) 1.4 % ophthalmic solution     acetaminophen (TYLENOL) 500 MG tablet     [DISCONTINUED] enzalutamide (XTANDI) 40 MG capsule     nitroglycerin (NITROSTAT) 0.4 MG SL tablet     ipratropium - albuterol 0.5 mg/2.5 mg/3 mL (DUONEB) 0.5-2.5 (3) MG/3ML nebulization     aspirin 81 MG EC tablet     metoprolol (TOPROL XL) 50 MG 24 hr tablet     furosemide (LASIX) 40 MG tablet     calcium carb 1250 mg, 500 mg [place],/vitamin D 200 units (OSCAL WITH D) 500-200 MG-UNIT per tablet     tamsulosin (FLOMAX) 0.4 MG 24 hr capsule     ammonium lactate (LAC-HYDRIN) 12 % lotion     No current facility-administered medications for this visit.      Allergies   Allergen Reactions     Morphine      Pt states not an allergy     Past Medical History   Diagnosis Date     Anemia      Anxiety      Aspiration pneumonia (H) 2014     C. difficile colitis      CAD (coronary artery disease)      Chronic pain      CKD (chronic kidney disease)      COPD (chronic obstructive pulmonary disease) (H)      Depressive disorder      Diabetes mellitus (H)      Hypertension      Insomnia      ALEXIS (obstructive sleep apnea)       "Osteoporosis      Prostate cancer metastatic to multiple sites (H)        Past Surgical History   Procedure Laterality Date     Abdomen surgery       Arthroscopy knee       Eye surgery         Social History     Social History     Marital status:      Spouse name: N/A     Number of children: N/A     Years of education: N/A     Occupational History     Not on file.     Social History Main Topics     Smoking status: Former Smoker     Smokeless tobacco: Never Used     Alcohol use No     Drug use: No     Sexual activity: No     Other Topics Concern     Not on file     Social History Narrative       ROS Pulmonary    A complete ROS was otherwise negative except as noted   BP (!) 89/50 (BP Location: Right arm, Patient Position: Chair, Cuff Size: Adult Large)  Pulse 90  Temp 98.2  F (36.8  C)  Ht 1.803 m (5' 11\")  Wt 117.9 kg (260 lb)  SpO2 94%  BMI 36.26 kg/m2  Exam:   GENERAL APPEARANCE: Well developed, well nourished, alert, and in no apparent distress. Sitting in motorized wheelchair   EYES: PERRL, EOMI  HENT: Nasal mucosa with no edema and no hyperemia. No nasal polyps.  MOUTH: Oral mucosa is moist, without any lesions, no tonsillar enlargement, no oropharyngeal exudate. Poor dentition  NECK: supple, no masses, no thyromegaly.  RESP: normal percussion, decreased air flow throughout.  No crackles. No rhonchi. No wheezes. Distant breath sounds  CV: Normal S1, S2, regular rhythm, normal rate. No murmur.  No rub. No gallop. No LE edema.   ABDOMEN:  Bowel sounds normal, soft, nontender, no HSM or masses.   MS: extremities normal. No clubbing. No cyanosis.  SKIN: no rash on limited exam  NEURO: Mentation intact, speech normal, normal strength and tone, normal gait and stance  PSYCH: mentation appears normal. and affect normal/bright  DATA:   IMAGING  CT CHEST:    1/16/17     There is no pulmonary embolism. Centrilobular emphysema. No  pneumothorax or pleural effusion. Minimal bibasilar and right middle  lobe " atelectasis.     The heart is not enlarged. The thoracic aorta is atherosclerotic  without evidence of dissection or aneurysm. There is no pericardial  effusion. No lymphadenopathy in the chest. Bilateral gynecomastia.     Limited visualization of upper abdomen. Incompletely visualized fluid  density lesions in the kidneys.     Bones: Multilevel vertebral bodies and ribs sclerotic changes without  acute fracture.          IMPRESSION:   1. No pulmonary embolism.  2. Emphysema.  3. Multifocal bone metastasis consistent with patient's history of  metastatic prostate cancer.    Recent Results (from the past 168 hour(s))   CBC with platelets differential    Collection Time: 02/20/17 12:08 PM   Result Value Ref Range    WBC 7.7 4.0 - 11.0 10e9/L    RBC Count 3.50 (L) 4.4 - 5.9 10e12/L    Hemoglobin 9.8 (L) 13.3 - 17.7 g/dL    Hematocrit 34.4 (L) 40.0 - 53.0 %    MCV 98 78 - 100 fl    MCH 28.0 26.5 - 33.0 pg    MCHC 28.5 (L) 31.5 - 36.5 g/dL    RDW 15.7 (H) 10.0 - 15.0 %    Platelet Count 222 150 - 450 10e9/L    Diff Method Automated Method     % Neutrophils 70.4 %    % Lymphocytes 16.0 %    % Monocytes 9.5 %    % Eosinophils 2.9 %    % Basophils 0.4 %    % Immature Granulocytes 0.8 %    Nucleated RBCs 0 0 /100    Absolute Neutrophil 5.4 1.6 - 8.3 10e9/L    Absolute Lymphocytes 1.2 0.8 - 5.3 10e9/L    Absolute Monocytes 0.7 0.0 - 1.3 10e9/L    Absolute Eosinophils 0.2 0.0 - 0.7 10e9/L    Absolute Basophils 0.0 0.0 - 0.2 10e9/L    Abs Immature Granulocytes 0.1 0 - 0.4 10e9/L    Absolute Nucleated RBC 0.0     Anisocytosis Slight     Polychromasia Slight     Macrocytes Present     Basophilic Stipling Present    Comprehensive metabolic panel    Collection Time: 02/20/17 12:08 PM   Result Value Ref Range    Sodium 145 (H) 133 - 144 mmol/L    Potassium 4.1 3.4 - 5.3 mmol/L    Chloride 104 94 - 109 mmol/L    Carbon Dioxide 38 (H) 20 - 32 mmol/L    Anion Gap 3 3 - 14 mmol/L    Glucose 73 70 - 99 mg/dL    Urea Nitrogen 37 (H) 7 -  30 mg/dL    Creatinine 1.73 (H) 0.66 - 1.25 mg/dL    GFR Estimate 39 (L) >60 mL/min/1.7m2    GFR Estimate If Black 47 (L) >60 mL/min/1.7m2    Calcium 8.5 8.5 - 10.1 mg/dL    Bilirubin Total 0.3 0.2 - 1.3 mg/dL    Albumin 3.0 (L) 3.4 - 5.0 g/dL    Protein Total 7.1 6.8 - 8.8 g/dL    Alkaline Phosphatase 84 40 - 150 U/L    ALT 15 0 - 70 U/L    AST 13 0 - 45 U/L   PSA tumor marker    Collection Time: 02/20/17 12:08 PM   Result Value Ref Range    .58 (H) 0 - 4 ug/L   General PFT Lab (Please always keep checked)    Collection Time: 02/24/17 12:45 PM   Result Value Ref Range    FVC-Pred 4.01 L    FVC-Pre 2.12 L    FVC-%Pred-Pre 52 %    FEV1-Pre 0.84 L    FEV1-%Pred-Pre 27 %    FEV1FVC-Pred 76 %    FEV1FVC-Pre 40 %    FEFMax-Pred 8.49 L/sec    FEFMax-Pre 2.69 L/sec    FEFMax-%Pred-Pre 31 %    FEF2575-Pred 2.34 L/sec    FEF2575-Pre 0.30 L/sec    RKO1597-%Pred-Pre 12 %    FEF2575-Post 0.26 L/sec    XRV2538-%Pred-Post 10 %    ExpTime-Pre 8.76 sec    FIFMax-Pre 2.18 L/sec    VC-Pred 4.91 L    VC-Pre 2.14 L    VC-%Pred-Pre 43 %    IC-Pred 4.24 L    IC-Pre 1.08 L    IC-%Pred-Pre 25 %    ERV-Pred 0.67 L    ERV-Pre 1.05 L    ERV-%Pred-Pre 156 %    FEV1FEV6-Pred 78 %    FEV1FEV6-Pre 46 %    DLCOunc-Pred 26.33 ml/min/mmHg    DLCOunc-Pre 10.52 ml/min/mmHg    DLCOunc-%Pred-Pre 39 %    DLCOcor-Pre 12.64 ml/min/mmHg    DLCOcor-%Pred-Pre 48 %    VA-Pre 4.85 L    VA-%Pred-Pre 69 %    FEV1SVC-Pred 62 %    FEV1SVC-Pre 39 %     Very severe obstruction with severe diffusion impairment.     ASSESSMENT AND PLAN:   1. COPD Stage 4, GOLD D - on appropriate therapy (symbicort twice a day, spiriva once per day)  - zpak to have oh hand in case of flare    2. COPD exacerbation - currently in treatment and improving     3. Chronic hypoxemic respiratory failure  - continue to titrate oxygen to maintain saturations >90%    4. ALEXIS - on CPAP, alternates with BIPAP with CO2 retention  - continue as prescribed by Sleep MD    DISCUSSION  The patient  is a pleasant 71-year-old male with a history of Gold D COPD with severe to very severe obstruction on pulmonary function tests and chronic respiratory status in the clinic for evaluation of pulmonary disease.  At present he seems currently compensated in the midst of treatment for bronchitic flare.  We reviewed the diagnosis of COPD and maintenance therapy, currently he is on Symbicort once a day and informed him on proper dosing to b.i.d., as well as the counselled on early recognition and treatment of exacerbations and provided additional prescription for Z-EDGAR to have on hand in case of recurrent flare.  Overall, he seems to be managing his airways disease fairly well and was titrating his oxygen as well to maintain levels appropriately.  We will have him followup in roughly 6 months' time, otherwise call in the interim.     A total of 60 min spent with >50% spent with direct face to face interaction with this patient, providing counseling and coordination of care regarding the above issues.       Again, thank you for allowing me to participate in the care of your patient.      Sincerely,    Abraham Mcmahon MD

## 2017-02-24 NOTE — MR AVS SNAPSHOT
After Visit Summary   2/24/2017    Tomas Grey    MRN: [id number]           Patient Information     Date Of Birth          1945        Visit Information        Provider Department      2/24/2017 2:05 PM Abraham Mcmahon MD [facility]        Today's Diagnoses     Obstructive chronic bronchitis with exacerbation (H)    -  1      Care Instructions    1. COPD Stage 4, GOLD D - on appropriate therapy (symbicort twice a day, spiriva once per day)  - zpak to have oh hand in case of flare    2. Chronic hypoxemic respiratory failure  - continue to titrate oxygen to maintain saturations >90%    3. ALEXIS - on CPAP, alternates with BIPAP with CO2 retention  - continue as prescribed by Sleep MD      If anything please call [phone]            Follow-ups after your visit        Follow-up notes from your care team     Return in about 6 months (around 8/24/2017).      Your next 10 appointments already scheduled     Feb 27, 2017  1:00 PM CST   Return Visit with Manpreet Oviedo MD   [facility] ([facility])    [facility]  [address]  [facility] [address]  [facility] [address]   [phone]            Mar 01, 2017  9:30 AM CST   (Arrive by 9:15 AM)   Return Visit with JASON Chery   [facility] ([facility])    [address] [id number] [phone]            Mar 01, 2017 11:00 AM CST   NM RADIUM 223 XOFIGO THERAPY with UUNMINJ1   [facility], [place], Nuclear Medicine ([facility] [place])    [address] [facility] [id number] [phone]           Please bring a list of your medicines to the exam. (Include vitamins, minerals and over-the-counter drugs.) You should wear comfortable clothes. Leave your valuables at home. Please bring related prior results and films.  Tell your  doctor:   If you are breastfeeding or may be pregnant.   If you have had a barium test within the past few days. Barium may change the results of certain exams.   If you think you may need sedation (medicine to help you relax).  You may eat and drink as normal.  Please call your Imaging Department at your exam site with any questions.            Mar 22, 2017  1:00 PM CDT   Return Visit with RH ONCOLOGY NURSE   [facility] ([facility])    [facility]  [address]  [facility] [address]   [phone]            Mar 29, 2017  9:30 AM CDT   (Arrive by 9:15 AM)   Return Visit with Lakeisha Singh PA-C   [facility] ([facility])    [address] [facility]  [address] [facility] [id number] [phone]            Mar 29, 2017 11:00 AM CDT   NM RADIUM 223 XOFIGO THERAPY with UUNMINJ2   [facility] [place], Nuclear Medicine ([facility] [place])    [address] [facility] [id number] [phone]           Please bring a list of your medicines to the exam. (Include vitamins, minerals and over-the-counter drugs.) You should wear comfortable clothes. Leave your valuables at home. Please bring related prior results and films.  Tell your doctor:   If you are breastfeeding or may be pregnant.   If you have had a barium test within the past few days. Barium may change the results of certain exams.   If you think you may need sedation (medicine to help you relax).  You may eat and drink as normal.  Please call your Imaging Department at your exam site with any questions.            Apr 19, 2017  1:00 PM CDT   Return Visit with RH ONCOLOGY NURSE   [facility]    [facility]  [address]  [facility] [address]   [phone]            Apr 26, 2017  9:30 AM CDT   (Arrive by 9:15  AM)   Return Visit with Lakeisha Singh PA-C   [facility] ([facility])    [address] [facility] [address]  2nd Floor  [facility] [id number] [phone]            Apr 26, 2017 11:00 AM CDT   NM RADIUM 223 XOFIGO THERAPY with UUNMINJ1   [facility] [place], Nuclear Medicine ([facility] [place])    [address] [facility] [id number] [phone]           Please bring a list of your medicines to the exam. (Include vitamins, minerals and over-the-counter drugs.) You should wear comfortable clothes. Leave your valuables at home. Please bring related prior results and films.  Tell your doctor:   If you are breastfeeding or may be pregnant.   If you have had a barium test within the past few days. Barium may change the results of certain exams.   If you think you may need sedation (medicine to help you relax).  You may eat and drink as normal.  Please call your Imaging Department at your exam site with any questions.            May 17, 2017  1:00 PM CDT   Return Visit with RH ONCOLOGY NURSE   [facility] ([facility])    [facility]  [address] [place]  [address]  [facility] [address] [phone]              Who to contact     If you have questions or need follow up information about today's clinic visit or your schedule please contact [facility] directly at [phone].  Normal or non-critical lab and imaging results will be communicated to you by MyChart, letter or phone within 4 business days after the clinic has received the results. If you do not hear from us within 7 days, please contact the clinic through MyChart or phone. If you have a critical or abnormal lab result, we will notify you by phone as soon as possible.  Submit refill requests through [employer] or call your pharmacy and they will forward the refill request to us. Please  "allow 3 business days for your refill to be completed.          Additional Information About Your Visit        MyChart Information     [employer]AllAccount[employer]art lets you send messages to your doctor, view your test results, renew your prescriptions, schedule appointments and more. To sign up, go to [email or web address] . Click on \"Log in\" on the left side of the screen, which will take you to the Welcome page. Then click on \"Sign up Now\" on the right side of the page.     You will be asked to enter the access code listed below, as well as some personal information. Please follow the directions to create your username and password.     Your access code is: [email or web address]  Expires: 2017 10:32 AM     Your access code will  in 90 days. If you need help or a new code, please call your [place] clinic or [phone].        Care EveryWhere ID     This is your Care EveryWhere ID. This could be used by other organizations to access your [place] medical records  [id number]        Your Vitals Were     Pulse Temperature Height Pulse Oximetry BMI (Body Mass Index)       90 98.2  F (36.8  C) 1.803 m (5' 11\") 94% 36.26 kg/m2        Blood Pressure from Last 3 Encounters:   17 (!) 89/50   17 117/75   17 131/59    Weight from Last 3 Encounters:   17 117.9 kg (260 lb)   17 111.3 kg (245 lb 6.4 oz)   17 112.8 kg (248 lb 11.2 oz)              Today, you had the following     No orders found for display         Today's Medication Changes          These changes are accurate as of: 17  2:43 PM.  If you have any questions, ask your nurse or doctor.               These medicines have changed or have updated prescriptions.        Dose/Directions    * AZITHROMYCIN PO   This may have changed:  Another medication with the same name was added. Make sure you understand how and when to take each.   Changed by:  Annika Ortiz MD        500 mg PO x1 day, then 250 mg daily x4 days (total 5 day course) "   Refills:  0       * azithromycin 250 MG tablet   Commonly known as:  ZITHROMAX   This may have changed:  You were already taking a medication with the same name, and this prescription was added. Make sure you understand how and when to take each.   Used for:  Obstructive chronic bronchitis with exacerbation (H)   Changed by:  Abraham Mcmahon MD        Dose:  250 mg   Take 1 tablet (250 mg) by mouth daily   Quantity:  6 tablet   Refills:  0       * Notice:  This list has 2 medication(s) that are the same as other medications prescribed for you. Read the directions carefully, and ask your doctor or other care provider to review them with you.      Stop taking these medicines if you haven't already. Please contact your care team if you have questions.     budesonide-formoterol 160-4.5 MCG/ACT Inhaler   Commonly known as:  SYMBICORT   Stopped by:  Abraham Mcmahon MD                Where to get your medicines      These medications were sent to [facility], [address], [facility] [address]     Phone:  [phone]     azithromycin 250 MG tablet                Primary Care Provider Office Phone # Fax #    Ekaterina SAL Babb [phone]       [facility] [address] [address]        Thank you!     Thank you for choosing [facility]  for your care. Our goal is always to provide you with excellent care. Hearing back from our patients is one way we can continue to improve our services. Please take a few minutes to complete the written survey that you may receive in the mail after your visit with us. Thank you!             Your Updated Medication List - Protect others around you: Learn how to safely use, store and throw away your medicines at www.disposemymeds.org.          This list is accurate as of: 2/24/17  2:43 PM.  Always  use your most recent med list.                   Brand Name Dispense Instructions for use    abiraterone 250 MG tablet    ZYTIGA    120 tablet    Take 4 tablets (1,000 mg) by mouth daily for 30 doses Take on empty stomach.       acetaminophen 500 MG tablet    TYLENOL     Take 500 mg by mouth every 4 hours as needed for pain Not to exceed 3000 mg/24 hours       ammonium lactate 12 % lotion    LAC-HYDRIN     Apply topically 2 times daily as needed for dry skin To all extremities for dry skin       aspirin 81 MG EC tablet     5 tablet    Take 1 tablet (81 mg) by mouth daily       * AZITHROMYCIN PO      500 mg PO x1 day, then 250 mg daily x4 days (total 5 day course)       * azithromycin 250 MG tablet    ZITHROMAX    6 tablet    Take 1 tablet (250 mg) by mouth daily       BREO ELLIPTA 100-25 MCG/INH oral inhaler   Generic drug:  fluticasone-vilanterol      Inhale 1 puff into the lungs daily       calcium carb 1250 mg (500 mg [place])/vitamin D 200 units 500-200 MG-UNIT per tablet    OSCAL with D    10 tablet    Take 1 tablet by mouth 2 times daily (with meals)       diclofenac 1 % Gel topical gel    VOLTAREN    100 g    Apply 4 grams to knees four times daily as needed using enclosed dosing card.       furosemide 40 MG tablet    LASIX    10 tablet    Take 1 tablet (40 mg) by mouth 2 times daily 0800, 1200       HYDROmorphone 2 MG tablet    DILAUDID    10 tablet    Take 1 tablet (2 mg) by mouth every 4 hours as needed for moderate to severe pain       insulin glargine 100 UNIT/ML injection    LANTUS     Inject 25 Units Subcutaneous 2 times daily       ipratropium - albuterol 0.5 mg/2.5 mg/3 mL 0.5-2.5 (3) MG/3ML neb solution    DUONEB    360 mL    Take 1 vial (3 mLs) by nebulization every 4 hours as needed for shortness of breath / dyspnea or wheezing       isosorbide mononitrate 60 MG 24 hr tablet    IMDUR     Take 1 tablet (60 mg) by mouth daily       lidocaine 5 % Patch    LIDODERM    30 patch    Apply up to 3 patches to  painful area at once for up to 12 h within a 24 h period.  Remove after 12 hours.       LIPITOR 10 MG tablet   Generic drug:  atorvastatin      Take 10 mg by mouth daily       metoprolol 50 MG 24 hr tablet    TOPROL XL    5 tablet    Take 1 tablet (50 mg) by mouth daily       nitroglycerin 0.4 MG sublingual tablet    NITROSTAT     Place 0.4 mg under the tongue every 5 minutes as needed for chest pain if you are still having symptoms after 3 doses (15 minutes) call 911.       omeprazole 10 MG CR capsule    priLOSEC     Take 1 capsule (10 mg) by mouth daily       polyvinyl alcohol 1.4 % ophthalmic solution    LIQUIFILM TEARS     Place 1 drop into both eyes 3 times daily       predniSONE 5 MG tablet    DELTASONE    60 tablet    Take 1 tablet (5 mg) by mouth 2 times daily       ranitidine 75 MG tablet   Generic drug:  ranitidine      Take 75 mg by mouth 2 times daily       senna-docusate 8.6-50 MG per tablet    SENOKOT-S;PERICOLACE    100 tablet    Take 1 tablet by mouth 2 times daily       tamsulosin 0.4 MG capsule    FLOMAX    5 capsule    Take 1 capsule (0.4 mg) by mouth daily       tiotropium 18 MCG capsule    SPIRIVA     Inhale 18 mcg into the lungs daily       * Notice:  This list has 2 medication(s) that are the same as other medications prescribed for you. Read the directions carefully, and ask your doctor or other care provider to review them with you.

## 2017-02-24 NOTE — PROGRESS NOTES
Reason for Visit  Tomas Grey is a 71 year old year old male who is being seen for No chief complaint on file.    Pulmonary HPI    The patient was seen and examined by Abraham Mcmahon     Tomas Grey is a 71 year old male with a history of COPD (on chronic 2L), ALEXIS, diastolic heart failure, CAD3, HTN, HLD, CKD, DM2 and stage IV prostate cancer w/ spinal and bony metastases who presents for further evaluation of his lung disease.     He was previously diagnosed with COPD and chronic hypoxemic respiratory failure requiring 2 L nasal cannula. He is maintained on symbicort as well spiriva/duonebs with chronic low dose prednisone as well for his prostate CA (5 mg BID). He mentions formal diagnosis of COPD last year but has been on supplemental O2 for several years.  He mentions on average 1 episode of bronchitis per year.     He was recently evaluated in the ED this January 2017 for a possible ? syncopal episode thought related to sedating medications but noted to have worsening hypoxemia (increase O2 requirement to 5L). Work-up included CT CHEST which was negative for PE as well signs of pneumonia. He was subsequently seen by his geriatrician at his nursing home with complaints of chest cold for which he was prescribed azithromycin.     At present, symptoms include baseline dyspnea on exertion of only a few feet with his walker. He presently has cough with sputum production of clear yellow but at baseline endorses only minimal bronchitic symptoms.   He denies signficant wheeze or hemoptysis.    He is a former smoker and lives in an [facility] with several children nearby. He denies a family history of lung disease.       Current Outpatient Prescriptions   Medication     AZITHROMYCIN PO     ranitidine (RANITIDINE) 75 MG tablet     [START ON 3/1/2017] budesonide-formoterol (SYMBICORT) 160-4.5 MCG/ACT Inhaler     predniSONE (DELTASONE) 5 MG tablet     abiraterone (ZYTIGA) 250 MG tablet     lidocaine  (LIDODERM) 5 % Patch     diclofenac (VOLTAREN) 1 % GEL topical gel     HYDROmorphone (DILAUDID) 2 MG tablet     senna-docusate (SENOKOT-S;PERICOLACE) 8.6-50 MG per tablet     insulin glargine (LANTUS) 100 UNIT/ML injection     omeprazole (PRILOSEC) 10 MG CR capsule     isosorbide mononitrate (IMDUR) 60 MG 24 hr tablet     atorvastatin (LIPITOR) 10 MG tablet     fluticasone - vilanterol (BREO ELLIPTA) 100-25 MCG/INH oral inhaler     tiotropium (SPIRIVA) 18 MCG inhalation capsule     polyvinyl alcohol (LIQUIFILM TEARS) 1.4 % ophthalmic solution     acetaminophen (TYLENOL) 500 MG tablet     [DISCONTINUED] enzalutamide (XTANDI) 40 MG capsule     nitroglycerin (NITROSTAT) 0.4 MG SL tablet     ipratropium - albuterol 0.5 mg/2.5 mg/3 mL (DUONEB) 0.5-2.5 (3) MG/3ML nebulization     aspirin 81 MG EC tablet     metoprolol (TOPROL XL) 50 MG 24 hr tablet     furosemide (LASIX) 40 MG tablet     calcium carb 1250 mg, 500 mg [place],/vitamin D 200 units (OSCAL WITH D) 500-200 MG-UNIT per tablet     tamsulosin (FLOMAX) 0.4 MG 24 hr capsule     ammonium lactate (LAC-HYDRIN) 12 % lotion     No current facility-administered medications for this visit.      Allergies   Allergen Reactions     Morphine      Pt states not an allergy     Past Medical History   Diagnosis Date     Anemia      Anxiety      Aspiration pneumonia (H) 2014     C. difficile colitis      CAD (coronary artery disease)      Chronic pain      CKD (chronic kidney disease)      COPD (chronic obstructive pulmonary disease) (H)      Depressive disorder      Diabetes mellitus (H)      Hypertension      Insomnia      ALEXIS (obstructive sleep apnea)      Osteoporosis      Prostate cancer metastatic to multiple sites (H)        Past Surgical History   Procedure Laterality Date     Abdomen surgery       Arthroscopy knee       Eye surgery         Social History     Social History     Marital status:      Spouse name: N/A     Number of children: N/A     Years of education: N/A  "    Occupational History     Not on file.     Social History Main Topics     Smoking status: Former Smoker     Smokeless tobacco: Never Used     Alcohol use No     Drug use: No     Sexual activity: No     Other Topics Concern     Not on file     Social History Narrative       ROS Pulmonary    A complete ROS was otherwise negative except as noted   BP (!) 89/50 (BP Location: Right arm, Patient Position: Chair, Cuff Size: Adult Large)  Pulse 90  Temp 98.2  F (36.8  C)  Ht 1.803 m (5' 11\")  Wt 117.9 kg (260 lb)  SpO2 94%  BMI 36.26 kg/m2  Exam:   GENERAL APPEARANCE: Well developed, well nourished, alert, and in no apparent distress. Sitting in motorized wheelchair   EYES: PERRL, EOMI  HENT: Nasal mucosa with no edema and no hyperemia. No nasal polyps.  MOUTH: Oral mucosa is moist, without any lesions, no tonsillar enlargement, no oropharyngeal exudate. Poor dentition  NECK: supple, no masses, no thyromegaly.  RESP: normal percussion, decreased air flow throughout.  No crackles. No rhonchi. No wheezes. Distant breath sounds  CV: Normal S1, S2, regular rhythm, normal rate. No murmur.  No rub. No gallop. No LE edema.   ABDOMEN:  Bowel sounds normal, soft, nontender, no HSM or masses.   MS: extremities normal. No clubbing. No cyanosis.  SKIN: no rash on limited exam  NEURO: Mentation intact, speech normal, normal strength and tone, normal gait and stance  PSYCH: mentation appears normal. and affect normal/bright  DATA:   IMAGING  CT CHEST:    1/16/17     There is no pulmonary embolism. Centrilobular emphysema. No  pneumothorax or pleural effusion. Minimal bibasilar and right middle  lobe atelectasis.     The heart is not enlarged. The thoracic aorta is atherosclerotic  without evidence of dissection or aneurysm. There is no pericardial  effusion. No lymphadenopathy in the chest. Bilateral gynecomastia.     Limited visualization of upper abdomen. Incompletely visualized fluid  density lesions in the kidneys.     Bones: " Multilevel vertebral bodies and ribs sclerotic changes without  acute fracture.          IMPRESSION:   1. No pulmonary embolism.  2. Emphysema.  3. Multifocal bone metastasis consistent with patient's history of  metastatic prostate cancer.    Recent Results (from the past 168 hour(s))   CBC with platelets differential    Collection Time: 02/20/17 12:08 PM   Result Value Ref Range    WBC 7.7 4.0 - 11.0 10e9/L    RBC Count 3.50 (L) 4.4 - 5.9 10e12/L    Hemoglobin 9.8 (L) 13.3 - 17.7 g/dL    Hematocrit 34.4 (L) 40.0 - 53.0 %    MCV 98 78 - 100 fl    MCH 28.0 26.5 - 33.0 pg    MCHC 28.5 (L) 31.5 - 36.5 g/dL    RDW 15.7 (H) 10.0 - 15.0 %    Platelet Count 222 150 - 450 10e9/L    Diff Method Automated Method     % Neutrophils 70.4 %    % Lymphocytes 16.0 %    % Monocytes 9.5 %    % Eosinophils 2.9 %    % Basophils 0.4 %    % Immature Granulocytes 0.8 %    Nucleated RBCs 0 0 /100    Absolute Neutrophil 5.4 1.6 - 8.3 10e9/L    Absolute Lymphocytes 1.2 0.8 - 5.3 10e9/L    Absolute Monocytes 0.7 0.0 - 1.3 10e9/L    Absolute Eosinophils 0.2 0.0 - 0.7 10e9/L    Absolute Basophils 0.0 0.0 - 0.2 10e9/L    Abs Immature Granulocytes 0.1 0 - 0.4 10e9/L    Absolute Nucleated RBC 0.0     Anisocytosis Slight     Polychromasia Slight     Macrocytes Present     Basophilic Stipling Present    Comprehensive metabolic panel    Collection Time: 02/20/17 12:08 PM   Result Value Ref Range    Sodium 145 (H) 133 - 144 mmol/L    Potassium 4.1 3.4 - 5.3 mmol/L    Chloride 104 94 - 109 mmol/L    Carbon Dioxide 38 (H) 20 - 32 mmol/L    Anion Gap 3 3 - 14 mmol/L    Glucose 73 70 - 99 mg/dL    Urea Nitrogen 37 (H) 7 - 30 mg/dL    Creatinine 1.73 (H) 0.66 - 1.25 mg/dL    GFR Estimate 39 (L) >60 mL/min/1.7m2    GFR Estimate If Black 47 (L) >60 mL/min/1.7m2    Calcium 8.5 8.5 - 10.1 mg/dL    Bilirubin Total 0.3 0.2 - 1.3 mg/dL    Albumin 3.0 (L) 3.4 - 5.0 g/dL    Protein Total 7.1 6.8 - 8.8 g/dL    Alkaline Phosphatase 84 40 - 150 U/L    ALT 15 0 - 70  U/L    AST 13 0 - 45 U/L   PSA tumor marker    Collection Time: 02/20/17 12:08 PM   Result Value Ref Range    .58 (H) 0 - 4 ug/L   General PFT Lab (Please always keep checked)    Collection Time: 02/24/17 12:45 PM   Result Value Ref Range    FVC-Pred 4.01 L    FVC-Pre 2.12 L    FVC-%Pred-Pre 52 %    FEV1-Pre 0.84 L    FEV1-%Pred-Pre 27 %    FEV1FVC-Pred 76 %    FEV1FVC-Pre 40 %    FEFMax-Pred 8.49 L/sec    FEFMax-Pre 2.69 L/sec    FEFMax-%Pred-Pre 31 %    FEF2575-Pred 2.34 L/sec    FEF2575-Pre 0.30 L/sec    RDS6671-%Pred-Pre 12 %    FEF2575-Post 0.26 L/sec    IKZ8494-%Pred-Post 10 %    ExpTime-Pre 8.76 sec    FIFMax-Pre 2.18 L/sec    VC-Pred 4.91 L    VC-Pre 2.14 L    VC-%Pred-Pre 43 %    IC-Pred 4.24 L    IC-Pre 1.08 L    IC-%Pred-Pre 25 %    ERV-Pred 0.67 L    ERV-Pre 1.05 L    ERV-%Pred-Pre 156 %    FEV1FEV6-Pred 78 %    FEV1FEV6-Pre 46 %    DLCOunc-Pred 26.33 ml/min/mmHg    DLCOunc-Pre 10.52 ml/min/mmHg    DLCOunc-%Pred-Pre 39 %    DLCOcor-Pre 12.64 ml/min/mmHg    DLCOcor-%Pred-Pre 48 %    VA-Pre 4.85 L    VA-%Pred-Pre 69 %    FEV1SVC-Pred 62 %    FEV1SVC-Pre 39 %     Very severe obstruction with severe diffusion impairment.     ASSESSMENT AND PLAN:   1. COPD Stage 4, GOLD D - on appropriate therapy (symbicort twice a day, spiriva once per day)  - zpak to have oh hand in case of flare    2. COPD exacerbation - currently in treatment and improving     3. Chronic hypoxemic respiratory failure  - continue to titrate oxygen to maintain saturations >90%    4. ALEXIS - on CPAP, alternates with BIPAP with CO2 retention  - continue as prescribed by Sleep MD    DISCUSSION  The patient is a pleasant 71-year-old male with a history of Gold D COPD with severe to very severe obstruction on pulmonary function tests and chronic respiratory status in the clinic for evaluation of pulmonary disease.  At present he seems currently compensated in the midst of treatment for bronchitic flare.  We reviewed the diagnosis of COPD  and maintenance therapy, currently he is on Symbicort once a day and informed him on proper dosing to b.i.d., as well as the counselled on early recognition and treatment of exacerbations and provided additional prescription for Z-EDGAR to have on hand in case of recurrent flare.  Overall, he seems to be managing his airways disease fairly well and was titrating his oxygen as well to maintain levels appropriately.  We will have him followup in roughly 6 months' time, otherwise call in the interim.     A total of 60 min spent with >50% spent with direct face to face interaction with this patient, providing counseling and coordination of care regarding the above issues.

## 2017-02-24 NOTE — PATIENT INSTRUCTIONS
1. COPD Stage 4, GOLD D - on appropriate therapy (symbicort twice a day, spiriva once per day)  - zpak to have oh hand in case of flare    2. Chronic hypoxemic respiratory failure  - continue to titrate oxygen to maintain saturations >90%    3. ALEXIS - on CPAP, alternates with BIPAP with CO2 retention  - continue as prescribed by Sleep MD      If anything please call [phone]

## 2017-02-24 NOTE — NURSING NOTE
Chief Complaint   Patient presents with     Consult     COPD     Mary Fitzgerald LPN  Neuroscience- Movement Disorders and Epilepsy

## 2017-02-27 ENCOUNTER — ONCOLOGY VISIT (OUTPATIENT)
Dept: ONCOLOGY | Facility: CLINIC | Age: 72
End: 2017-02-27
Attending: INTERNAL MEDICINE
Payer: COMMERCIAL

## 2017-02-27 ENCOUNTER — APPOINTMENT (OUTPATIENT)
Dept: GENERAL RADIOLOGY | Facility: CLINIC | Age: 72
End: 2017-02-27
Attending: EMERGENCY MEDICINE
Payer: COMMERCIAL

## 2017-02-27 ENCOUNTER — HOSPITAL ENCOUNTER (EMERGENCY)
Facility: CLINIC | Age: 72
Discharge: HOME | End: 2017-02-27
Attending: EMERGENCY MEDICINE | Admitting: EMERGENCY MEDICINE
Payer: COMMERCIAL

## 2017-02-27 VITALS
SYSTOLIC BLOOD PRESSURE: 160 MMHG | HEART RATE: 99 BPM | OXYGEN SATURATION: 95 % | TEMPERATURE: 98 F | RESPIRATION RATE: 20 BRPM | DIASTOLIC BLOOD PRESSURE: 127 MMHG

## 2017-02-27 VITALS
HEART RATE: 91 BPM | TEMPERATURE: 98.4 F | RESPIRATION RATE: 16 BRPM | OXYGEN SATURATION: 94 % | SYSTOLIC BLOOD PRESSURE: 104 MMHG | HEIGHT: 71 IN | DIASTOLIC BLOOD PRESSURE: 58 MMHG | BODY MASS INDEX: 36.68 KG/M2 | WEIGHT: 262 LBS

## 2017-02-27 DIAGNOSIS — S80.212A ABRASION OF KNEE, LEFT, INITIAL ENCOUNTER: ICD-10-CM

## 2017-02-27 DIAGNOSIS — S40.019A CONTUSION OF SHOULDER, UNSPECIFIED LATERALITY, INITIAL ENCOUNTER: ICD-10-CM

## 2017-02-27 DIAGNOSIS — C61 PROSTATE CANCER METASTATIC TO MULTIPLE SITES (H): Primary | ICD-10-CM
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
## 2017-02-27 DIAGNOSIS — S80.00XA CONTUSION OF KNEE, UNSPECIFIED LATERALITY, INITIAL ENCOUNTER: ICD-10-CM

## 2017-02-27 PROCEDURE — 99214 OFFICE O/P EST MOD 30 MIN: CPT | Performed by: INTERNAL MEDICINE

## 2017-02-27 PROCEDURE — 99211 OFF/OP EST MAY X REQ PHY/QHP: CPT

## 2017-02-27 PROCEDURE — 73030 X-RAY EXAM OF SHOULDER: CPT | Mod: 50

## 2017-02-27 PROCEDURE — 99285 EMERGENCY DEPT VISIT HI MDM: CPT | Mod: 25

## 2017-02-27 PROCEDURE — 25000128 H RX IP 250 OP 636: Performed by: EMERGENCY MEDICINE

## 2017-02-27 PROCEDURE — 73562 X-RAY EXAM OF KNEE 3: CPT | Mod: 50

## 2017-02-27 PROCEDURE — 96372 THER/PROPH/DIAG INJ SC/IM: CPT

## 2017-02-27 RX ORDER — HYDROMORPHONE HYDROCHLORIDE 1 MG/ML
0.5 INJECTION, SOLUTION INTRAMUSCULAR; INTRAVENOUS; SUBCUTANEOUS ONCE
Status: COMPLETED | OUTPATIENT
Start: 2017-02-27 | End: 2017-02-27

## 2017-02-27 RX ADMIN — HYDROMORPHONE HYDROCHLORIDE 0.5 MG: 1 INJECTION, SOLUTION INTRAMUSCULAR; INTRAVENOUS; SUBCUTANEOUS at 15:25

## 2017-02-27 ASSESSMENT — PAIN SCALES - GENERAL: PAINLEVEL: WORST PAIN (10)

## 2017-02-27 NOTE — PROGRESS NOTES
"[facility] PHYSICIANS  HEMATOLOGY ONCOLOGY    ONCOLOGY FOLLOWUP NOTE      DIAGNOSIS:    1- Metastatic prostate cancer with extensive metastases to bone.   2- Advanced COPD and multiple other co-morbidities.      TREATMENT:     Lupron injection monthly, discontinued 6/2016.  Casodex 50 mg daily. discontinued 2/9/2015  - Xgeva monthly  - Xtandi 4/15/15  - Zytgea and prednisone 9/19/2016-present  - Xofigo 1/4/17     Subjective:  Mr. Tomas Grey comes in for followup today. He does not have any new symptos and is tolerating treatment well. He states that lately he has difficulty urinating with a very weak stream. No dysuria.     REVIEW OF SYSTEMS:  A complete review of systems was performed and found to be negative other than pertinent positives mentioned in history of present illness.     Past medical, social histories reviewed.    Meds- Reviewed.     PHYSICAL EXAMINATION:   VITAL SIGNS:/58  Pulse 91  Temp 98.4  F (36.9  C) (Oral)  Resp 16  Ht 1.803 m (5' 11\")  Wt 118.8 kg (262 lb)  SpO2 94%  BMI 36.54 kg/m2  GENERAL: Sitting comfortably.   HEENT: Pupils are equal. Oropharynx is clear.   NECK: No cervical or supraclavicular lymphadenopathy.   LUNGS: Clear bilaterally.   HEART: S1, S2, regular.   ABDOMEN: Soft, nontender, nondistended, no hepatosplenomegaly.   EXTREMITIES: Warm, well perfused.   NEUROLOGIC: Alert, awake.   SKIN: No rash.   LYMPHATICS: Bilateral edema he has dressing on his left leg.     DATA:  Recent Labs   Lab Test  02/20/17   1208  02/13/17   1220   12/26/16   1851   05/27/16   1410   05/28/15   0715   NA  145*  143   < >  146*   < >  143   < >  139   POTASSIUM  4.1  4.3   < >  3.8   < >  3.8   < >  5.5*   CHLORIDE  104  99   < >  99   < >  106   < >  106   CO2  38*  39*   < >  41*   < >  33*   < >  27   ANIONGAP  3  5   < >  5   < >  4   < >  6   BUN  37*  43*   < >  48*   < >  24   < >  26   CR  1.73*  1.82*   < >  1.80*   < >  1.64*   < >  1.66*   GLC  73  133*   < >  61*   " < >  130*   < >  121*   JORDAN  8.5  9.5   < >  9.2   < >  8.8   < >  8.5   MAG   --    --    --   1.7   --   1.9   < >   --    PHOS   --    --    --    --    --    --    --   3.3    < > = values in this interval not displayed.     Recent Labs   Lab Test  02/20/17   1208  01/25/17   1323  01/16/17   0128   01/15/17   2322   WBC  7.7  7.5   --    --   7.1   HGB  9.8*  9.6*  10.2*   < >  9.1*   PLT  222  229   --    --   272   MCV  98  98   --    --   103*   NEUTROPHIL  70.4  69.4   --    --   56.9    < > = values in this interval not displayed.     Recent Labs   Lab Test  02/20/17   1208  02/13/17   1220  01/16/17   1415   BILITOTAL  0.3  0.5  0.3   ALKPHOS  84  86  102   ALT  15  14  13   AST  13  16  13   ALBUMIN  3.0*  2.9*  3.0*   Results for HERMILO KING WILL (MRN [id number]) as of 2/27/2017 12:49   Ref. Range 11/28/2016 09:35 12/28/2016 13:00 2/20/2017 12:08   PSA Latest Ref Range: 0 - 4 ug/L 34.88 (H) 46.26 (H) 163.58 (H)     ECOG  performance status 2     ASSESSMENT:   1.  Metastatic prostate cancer with multiple sclerotic bone metastases.  The patient continues on monthly Lupron with Casodex.  He has been on this regimen since 05/2014.  He continues to do well symptomatically.  The patient switched care to us after having initial diagnosis and treatment in [place], [place]. He started Xtandi 4/15/15.   He is not able to keep up with his follow ups due to multiple other medical issues and repeated hospital admissions due to COPD exacerbations.   Started Zytega 9/19/16.  He saw Dr. Callahan as a second opinion and had Xofigo 1/4/17. We have continued zytega after that.   - His PSA is consistently going up.   - He is not a candidate of chemotherapy. I will continue the current treatment as long as he is asymptomatic.   2.  Extensive bone metastases.   Bone scan 8/31/16 showed progression in bones.   He will continue to take calcium and vitamin D.  Will continue Xgeva.  3.  I will have him see Dr. Graf for urine  retention.      PLAN:   1- Continue Zytega and prednsione.  2- RTC MD six weeks  3- Labs before next visit- CBC diff, CMP, PSA  4- Continue Xgeva  5- Schedule an appointment with Dr. Homar PETERS MD  2/27/2017    CC: Balgobin, Christopher Lennox Paul, MD
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
## 2017-02-27 NOTE — NURSING NOTE
"Tomas Grey is a 72 year old male who presents for:  Chief Complaint   Patient presents with     Oncology Clinic Visit     Follow up        Initial Vitals:  /58  Pulse 91  Temp 98.4  F (36.9  C) (Oral)  Resp 16  Ht 1.803 m (5' 11\")  Wt 118.8 kg (262 lb)  SpO2 94%  BMI 36.54 kg/m2 Estimated body mass index is 36.54 kg/(m^2) as calculated from the following:    Height as of this encounter: 1.803 m (5' 11\").    Weight as of this encounter: 118.8 kg (262 lb).. Body surface area is 2.44 meters squared. BP completed using cuff size: large  Worst Pain (10) No LMP for male patient. Allergies and medications reviewed.     Medications: Medication refills not needed today.  Pharmacy name entered into EPIC:    [facility]  [facility] - [place], [place] - [address]  [id number] - [place] - [address] [place] [id number] & [facility] - [place], [place] - [address] [facility] - [place], [place] - [address] [facility] - [address] [facility] - [place], [place] - [address] - [address] [place] [address]    Comments: Follow up    8 minutes for nursing intake (face to face time)   Lily Decker CMA     DISCHARGE PLAN:  Next appointments: See patient instruction section  Departure Mode:wheel chair  Accompanied by: self  0 minutes for nursing discharge (face to face time)   Lily Decker CMA            "

## 2017-02-27 NOTE — ED NOTES
"Pt states he does not want to go down to CT due to him being unable to lay flat. Pt states that \"there is no pain or anything wrong with my head\". MD notified of pt's concerns.   "

## 2017-02-27 NOTE — PATIENT INSTRUCTIONS
1- Continue Zytega and prednsione.    2- RTC MD six weeks    3- Labs before next visit- CBC diff, CMP, PSA    4- Continue Xgeva    5- Schedule an appointment with Dr. Graf for urine retention
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
## 2017-02-27 NOTE — ED AVS SNAPSHOT
[facility]    [address]    [facility] [address]    Phone:  [phone]    Fax:  [phone]                                       Tomas Grey   MRN: [id number]    Department:  [facility]   Date of Visit:  2/27/2017           Patient Information     Date Of Birth          1945        Your diagnoses for this visit were:     Fall, initial encounter     Contusion of shoulder, unspecified laterality, initial encounter     Contusion of knee, unspecified laterality, initial encounter     Abrasion of knee, left, initial encounter        You were seen by Ken Kirk DO.      Follow-up Information     Follow up with Ekaterina Lozano APRN CNP. Call in 2 days.    Specialty:  Nurse Practitioner    Why:  As needed    Contact information:    [facility]  [address]  [facility] [address]  [phone]          Follow up with [facility].    Specialty:  EMERGENCY MEDICINE    Why:  If symptoms worsen    Contact information:    [address] [facility] [address] [phone]        Discharge Instructions         Abrasions  Abrasions are skin scrapes. Their treatment depends on how large and deep the abrasion is.  Home care   You may be prescribed an antibiotic cream or ointment to apply to the wound. This helps prevent infection. Follow instructions when using this medication.  General care    To care for the abrasion, do the following each day for as long as directed by your health care provider.    If you were given a bandage, change it once a day. If your bandage sticks to the wound, soak it in warm water until it loosens.    Wash the area with soap and warm water. You may do this in a sink or under a tub faucet or shower. Rinse off the soap. Then pat the area dry with a clean towel.    If antibiotic ointment or cream was prescribed, reapply it  to the wound as directed. Cover the wound with a fresh non-stick bandage. If the bandage becomes wet or dirty, change it as soon as possible.    You may use acetaminophen or ibuprofen to control pain unless another pain medication was prescribed. Note: If you have chronic liver or kidney disease or ever had a stomach ulcer or GI bleeding, talk with your health care provider before using these medications. Do not use ibuprofen in children under six months of age.    Most skin wounds heal within ten days. But an infection may occur despite treatment. Therefore, monitor the wound for signs of infection as listed below.  Follow-up care  Follow up with your health care provider, or as advised.  When to seek medical advice  Call your health care provider right away if any of these occur:    Fever of 101 F (38.3 C) or higher, or as directed by your health care provider    Increasing pain, redness, swelling, or drainage from the wound    Bleeding from the wound that does not stop after a few minutes of steady, firm pressure    Decreased ability to move any body part near wound    9393-2492 [employer]. [address] [place] [address]. All rights reserved. This information is not intended as a substitute for professional medical care. Always follow your healthcare professional's instructions.          Bruises (Contusions)    A contusion is a bruise. A bruise happens when a blow to your body doesn't break the skin but does break blood vessels beneath the skin. Blood leaking from the broken vessels causes redness and swelling. As it heals, your bruise is likely to turn colors like purple, green, and yellow. This is normal. The bruise should fade in 2 or 3 weeks.  Factors that make you more likely to bruise  Almost everyone bruises now and then. Certain people do bruise more easily than others. You're more prone to bruising as you get older. That's because blood vessels become more fragile with age.  You're also more likely to bruise if you have a clotting disorder such as hemophilia or take medications that reduce clotting, including aspirin.  When to go to the emergency room (ER)  Bruises almost always heal on their own without special treatment. But for some people, a bad bruise can be serious. Seek medical care if you:    Have a clotting disorder such as hemophilia.    Have cirrhosis or other serious liver disease.    Take blood-thinning medications such as warfarin (Coumadin).  What to expect in the ER  A doctor will examine your bruise and ask about any health conditions you have. In some cases, you may have a test to check how well your blood clots. Other treatment will depend on your needs.  Follow-up care  Sometimes a bruise gets worse instead of better. It may become larger and more swollen. This can occur when your body walls off a small pool of blood under the skin (hematoma). In very rare cases, your doctor may need to drain excess blood from the area.  Tip:  Apply an ice pack or bag of frozen peas to a bruise (keep a thin cloth between the cold source and your skin). This can help reduce redness and swelling.     2831-4091 [employer]. [address] [place] [address]. All rights reserved. This information is not intended as a substitute for professional medical care. Always follow your healthcare professional's instructions.          Future Appointments        Provider Department Dept Phone Center    3/1/2017 9:30 AM JASON Chery [facility] [phone] [facility]    3/1/2017 11:00 AM [facility] MED INJECTION ROOM 1 [facility], Nuclear Medicine [phone] [facility]    3/8/2017 12:30 PM Iraj Graf MD [facility] [phone] [place] [facility]    3/22/2017 1:00 PM Fanny Oncology Nurse [facility] [phone] [place] [facility]    3/29/2017 9:30 AM CARL Betancourt  [facility] [place] [address] [phone] [facility]    3/29/2017 11:00 AM [facility] NUC MED INJECTION ROOM 2 [facility] Nuclear [facility] [phone] [place] [facility]    4/3/2017 12:30 PM Zaids Oncology Nurse [facility] [phone] [facility]    4/10/2017 1:30 PM Manpreet Oviedo MD [facility] [phone] [place] [facility]    4/19/2017 1:00 PM Zaids Oncology Nurse [facility] [phone] [place] [facility]    4/26/2017 9:30 AM CARL Betancourt [facility] [place] [address] [phone] [facility]    4/26/2017 11:00 AM [facility] NUC MED INJECTION ROOM 1 [facility] [phone] [place] [facility]    5/17/2017 1:00 PM Fanny Oncology Nurse [facility] [phone] [place] [facility]    5/24/2017 9:30 AM CARL Betancourt [facility] [place] [address] [phone] [facility]    5/24/2017 11:00 AM [facility] NUC MED INJECTION ROOM 2 [place] [phone] [facility]      24 Hour Appointment Hotline       To make an appointment at any [facility] clinic, call [phone] ([phone]). If you don't have a family doctor or clinic, we will help you find one. [facility] clinics are conveniently located to serve the needs of you and your family.             Review of your medicines      Our records show that you are taking the medicines listed below. If these are incorrect, please call your family doctor or clinic.        Dose / Directions Last dose taken    abiraterone 250 MG tablet   Commonly known as:  ZYTIGA   Dose:  1000 mg   Quantity:  120 tablet        Take 4 tablets (1,000 mg) by mouth daily for 30 doses Take on empty stomach.   Refills:  0        acetaminophen 500 MG tablet   Commonly known as:  TYLENOL   Dose:  500 mg        Take 500 mg by mouth every 4 hours as needed for pain Not to exceed 3000 mg/24 hours   Refills:  0        ammonium lactate 12 %  lotion   Commonly known as:  LAC-HYDRIN   Indication:  Abnormal Dryness of Skin, Eyes or Mucous Membranes        Apply topically 2 times daily as needed for dry skin To all extremities for dry skin   Refills:  0        aspirin 81 MG EC tablet   Dose:  81 mg   Quantity:  5 tablet        Take 1 tablet (81 mg) by mouth daily   Refills:  0        * azithromycin 250 MG tablet   Commonly known as:  ZITHROMAX   Dose:  250 mg   Quantity:  6 tablet        Take 1 tablet (250 mg) by mouth daily   Refills:  0        BREO ELLIPTA 100-25 MCG/INH oral inhaler   Dose:  1 puff   Indication:  Chronic Obstructive Lung Disease   Generic drug:  fluticasone-vilanterol        Inhale 1 puff into the lungs daily   Refills:  0        calcium carb 1250 mg (500 mg [place])/vitamin D 200 units 500-200 MG-UNIT per tablet   Commonly known as:  OSCAL with D   Dose:  1 tablet   Quantity:  10 tablet        Take 1 tablet by mouth 2 times daily (with meals)   Refills:  0        diclofenac 1 % Gel topical gel   Commonly known as:  VOLTAREN   Quantity:  100 g        Apply 4 grams to knees four times daily as needed using enclosed dosing card.   Refills:  0        furosemide 40 MG tablet   Commonly known as:  LASIX   Dose:  40 mg   Quantity:  10 tablet        Take 1 tablet (40 mg) by mouth 2 times daily 0800, 1200   Refills:  0        HYDROmorphone 2 MG tablet   Commonly known as:  DILAUDID   Dose:  2 mg   Quantity:  10 tablet        Take 1 tablet (2 mg) by mouth every 4 hours as needed for moderate to severe pain   Refills:  0        insulin glargine 100 UNIT/ML injection   Commonly known as:  LANTUS   Dose:  25 Units   Indication:  Type 2 Diabetes        Inject 25 Units Subcutaneous 2 times daily   Refills:  0        ipratropium - albuterol 0.5 mg/2.5 mg/3 mL 0.5-2.5 (3) MG/3ML neb solution   Commonly known as:  DUONEB   Dose:  1 vial   Quantity:  360 mL        Take 1 vial (3 mLs) by nebulization every 4 hours as needed for shortness of breath / dyspnea  or wheezing   Refills:  3        isosorbide mononitrate 60 MG 24 hr tablet   Commonly known as:  IMDUR   Dose:  60 mg        Take 1 tablet (60 mg) by mouth daily   Refills:  0        lidocaine 5 % Patch   Commonly known as:  LIDODERM   Quantity:  30 patch        Apply up to 3 patches to painful area at once for up to 12 h within a 24 h period.  Remove after 12 hours.   Refills:  0        LIPITOR 10 MG tablet   Dose:  10 mg   Generic drug:  atorvastatin        Take 10 mg by mouth daily   Refills:  0        metoprolol 50 MG 24 hr tablet   Commonly known as:  TOPROL XL   Dose:  50 mg   Quantity:  5 tablet        Take 1 tablet (50 mg) by mouth daily   Refills:  0        nitroglycerin 0.4 MG sublingual tablet   Commonly known as:  NITROSTAT   Dose:  0.4 mg        Place 0.4 mg under the tongue every 5 minutes as needed for chest pain if you are still having symptoms after 3 doses (15 minutes) call 911.   Refills:  0        omeprazole 10 MG CR capsule   Commonly known as:  priLOSEC   Dose:  10 mg        Take 1 capsule (10 mg) by mouth daily   Refills:  0        polyvinyl alcohol 1.4 % ophthalmic solution   Commonly known as:  LIQUIFILM TEARS   Dose:  1 drop        Place 1 drop into both eyes 3 times daily   Refills:  0        predniSONE 5 MG tablet   Commonly known as:  DELTASONE   Dose:  5 mg   Quantity:  60 tablet        Take 1 tablet (5 mg) by mouth 2 times daily   Refills:  0        ranitidine 75 MG tablet   Dose:  75 mg   Indication:  Gastroesophageal Reflux Disease   Generic drug:  ranitidine        Take 75 mg by mouth 2 times daily   Refills:  0        senna-docusate 8.6-50 MG per tablet   Commonly known as:  SENOKOT-S;PERICOLACE   Dose:  1 tablet   Quantity:  100 tablet        Take 1 tablet by mouth 2 times daily   Refills:  0        tamsulosin 0.4 MG capsule   Commonly known as:  FLOMAX   Dose:  0.4 mg   Quantity:  5 capsule        Take 1 capsule (0.4 mg) by mouth daily   Refills:  0        tiotropium 18 MCG  capsule   Commonly known as:  SPIRIVA   Dose:  18 mcg   Indication:  Chronic Obstructive Lung Disease        Inhale 18 mcg into the lungs daily   Refills:  0        * Notice:  This list has 1 medication(s) that are the same as other medications prescribed for you. Read the directions carefully, and ask your doctor or other care provider to review them with you.      ASK your doctor about these medications        Dose / Directions Last dose taken    * AZITHROMYCIN PO   Ask about: Should I take this medication?        500 mg PO x1 day, then 250 mg daily x4 days (total 5 day course)   Refills:  0        * Notice:  This list has 1 medication(s) that are the same as other medications prescribed for you. Read the directions carefully, and ask your doctor or other care provider to review them with you.            Procedures and tests performed during your visit     XR Knee Bilateral 3 Views    XR Shoulder Bilateral G/E 2 Views      Orders Needing Specimen Collection     None      Pending Results     Date and Time Order Name Status Description    2/27/2017 1518 XR Shoulder Bilateral G/E 2 Views Preliminary     2/27/2017 1518 XR Knee Bilateral 3 Views Preliminary             Pending Culture Results     No orders found from 2/25/2017 to 2/28/2017.             Test Results from your hospital stay     2/27/2017  5:43 PM - Interface, Radiant Ib      Narrative     KNEE BILATERAL THREE VIEWS   2/27/2017 5:29 PM     HISTORY: Fall, knee pain.    COMPARISON: 9/20/2016.    FINDINGS:     Right knee: There has been a previous right total knee arthroplasty.  Components appear well seated. Vascular calcification. No interval  change.    Left knee: Previous left total knee arthroplasty. Components appear  well seated. No interval change. Vascular calcification.        Impression     IMPRESSION: Bilateral total knee arthroplasties. Nothing acute and no  interval change.         2/27/2017  5:41 PM - Interface, Radiant Ib      Narrative      BILATERAL SHOULDER TWO OR MORE VIEWS   2/27/2017 5:28 PM     HISTORY: Fall, shoulder pain.    COMPARISON: Left shoulder 9/5/2016.    FINDINGS: Both shoulders are negative. No change on the left since  9/5/2016. No previous on the right.        Impression     IMPRESSION: Negative.                Clinical Quality Measure: Blood Pressure Screening     Your blood pressure was checked while you were in the emergency department today. The last reading we obtained was  BP: (!) 160/127 . Please read the guidelines below about what these numbers mean and what you should do about them.  If your systolic blood pressure (the top number) is less than 120 and your diastolic blood pressure (the bottom number) is less than 80, then your blood pressure is normal. There is nothing more that you need to do about it.  If your systolic blood pressure (the top number) is 120-139 or your diastolic blood pressure (the bottom number) is 80-89, your blood pressure may be higher than it should be. You should have your blood pressure rechecked within a year by a primary care provider.  If your systolic blood pressure (the top number) is 140 or greater or your diastolic blood pressure (the bottom number) is 90 or greater, you may have high blood pressure. High blood pressure is treatable, but if left untreated over time it can put you at risk for heart attack, stroke, or kidney failure. You should have your blood pressure rechecked by a primary care provider within the next 4 weeks.  If your provider in the emergency department today gave you specific instructions to follow-up with your doctor or provider even sooner than that, you should follow that instruction and not wait for up to 4 weeks for your follow-up visit.        Thank you for choosing [facility]       Thank you for choosing [facility] for your care. Our goal is always to provide you with excellent care. Hearing back from our patients is one way we can continue to improve our services.  "Please take a few minutes to complete the written survey that you may receive in the mail after you visit with us. Thank you!        [employer] Information     [employer] lets you send messages to your doctor, view your test results, renew your prescriptions, schedule appointments and more. To sign up, go to [email or web address] . Click on \"Log in\" on the left side of the screen, which will take you to the Welcome page. Then click on \"Sign up Now\" on the right side of the page.     You will be asked to enter the access code listed below, as well as some personal information. Please follow the directions to create your username and password.     Your access code is: [email or web address]  Expires: 2017  1:58 PM     Your access code will  in 90 days. If you need help or a new code, please call your [facility] clinic or [phone].        Care EveryWhere ID     This is your Care EveryWhere ID. This could be used by other organizations to access your [facility] medical records  [id number]        After Visit Summary       This is your record. Keep this with you and show to your community pharmacist(s) and doctor(s) at your next visit.                  "

## 2017-02-27 NOTE — ED AVS SNAPSHOT
[facility]    [address]    [facility] [address]    Phone:  [phone]    Fax:  [phone]                                       Tomas Grey   MRN: [id number]    Department:  [facility]   Date of Visit:  2/27/2017           After Visit Summary Signature Page     I have received my discharge instructions, and my questions have been answered. I have discussed any challenges I see with this plan with the nurse or doctor.    ..........................................................................................................................................  Patient/Patient Representative Signature      ..........................................................................................................................................  Patient Representative Print Name and Relationship to Patient    ..................................................               ................................................  Date                                            Time    ..........................................................................................................................................  Reviewed by Signature/Title    ...................................................              ..............................................  Date                                                            Time

## 2017-02-27 NOTE — MR AVS SNAPSHOT
After Visit Summary   2/27/2017    Tomas Grey    MRN: [id number]           Patient Information     Date Of Birth          1945        Visit Information        Provider Department      2/27/2017 1:00 PM Manpreet Oviedo MD [facility] RH Oncology [facility]      Today's Diagnoses     Prostate cancer metastatic to multiple sites (H)    -  1      Care Instructions        1- Continue Zytega and prednsione.    2- RTC MD six weeks    3- Labs before next visit- CBC diff, CMP, PSA    4- Continue Xgeva    5- Schedule an appointment with Dr. Graf for urine retention            Follow-ups after your visit        Your next 10 appointments already scheduled     Mar 01, 2017  9:30 AM CST   (Arrive by 9:15 AM)   Return Visit with JASON Chery   [facility] ([facility] [place])    [address] [id number] [phone]            Mar 01, 2017 11:00 AM CST   NM RADIUM 223 XOFIGO THERAPY with UUNMINJ1   [facility], [place], Nuclear Medicine ([facility] [place])    [address] [facility] [id number] [phone]           Please bring a list of your medicines to the exam. (Include vitamins, minerals and over-the-counter drugs.) You should wear comfortable clothes. Leave your valuables at home. Please bring related prior results and films.  Tell your doctor:   If you are breastfeeding or may be pregnant.   If you have had a barium test within the past few days. Barium may change the results of certain exams.   If you think you may need sedation (medicine to help you relax).  You may eat and drink as normal.  Please call your Imaging Department at your exam site with any questions.            Mar 08, 2017 12:30 PM CST   Return Visit with Iraj Graf MD   [facility] ([facility])    [facility] [place]  [place]  [address]  [facility] [address]   [phone]            Mar 22, 2017  1:00 PM CDT   Return Visit with RH ONCOLOGY NURSE   [facility] ([facility])    [facility] [place]  [address]  [facility] [address]   [phone]            Mar 29, 2017  9:30 AM CDT   (Arrive by 9:15 AM)   Return Visit with Lakeisha Singh PA-C   [facility] ([email or web address] [place])    [address] [facility]  2nd Floor  [facility] [id number] [phone]            Mar 29, 2017 11:00 AM CDT   NM RADIUM 223 XOFIGO THERAPY with UUNMINJ2   [facility], [place], Nuclear Medicine ([facility])    [address] [facility] [id number] [phone]           Please bring a list of your medicines to the exam. (Include vitamins, minerals and over-the-counter drugs.) You should wear comfortable clothes. Leave your valuables at home. Please bring related prior results and films.  Tell your doctor:   If you are breastfeeding or may be pregnant.   If you have had a barium test within the past few days. Barium may change the results of certain exams.   If you think you may need sedation (medicine to help you relax).  You may eat and drink as normal.  Please call your Imaging Department at your exam site with any questions.            Apr 03, 2017 12:30 PM CDT   Return Visit with RH ONCOLOGY NURSE   [facility] ([facility])    [facility] [place]  [address]  [facility] [address]   [phone]            Apr 10, 2017  1:30 PM CDT   Return Visit with Manpreet Oviedo MD   [facility] ([facility])    [facility] [place]  [address]  [facility] [address]   [phone]            Apr 19, 2017  1:00 PM CDT   Return Visit with RH ONCOLOGY NURSE    [facility] ([facility])    [facility]  [address]  [facility] [address] [phone]            Apr 26, 2017 11:00 AM CDT   NM RADIUM 223 XOFIGO THERAPY with UUNMINJ1   [facility], [place], Nuclear Medicine ([facility] [place])    [address] [facility] [id number] [phone]           Please bring a list of your medicines to the exam. (Include vitamins, minerals and over-the-counter drugs.) You should wear comfortable clothes. Leave your valuables at home. Please bring related prior results and films.  Tell your doctor:   If you are breastfeeding or may be pregnant.   If you have had a barium test within the past few days. Barium may change the results of certain exams.   If you think you may need sedation (medicine to help you relax).  You may eat and drink as normal.  Please call your Imaging Department at your exam site with any questions.              Future tests that were ordered for you today     Open Future Orders        Priority Expected Expires Ordered    CBC with platelets differential Routine 4/10/2017 11/27/2017 2/27/2017    Comprehensive metabolic panel Routine 4/10/2017 11/27/2017 2/27/2017    PSA tumor marker Routine 4/10/2017 11/27/2017 2/27/2017            Who to contact     If you have questions or need follow up information about today's clinic visit or your schedule please contact [facility] directly at [phone].  Normal or non-critical lab and imaging results will be communicated to you by MyChart, letter or phone within 4 business days after the clinic has received the results. If you do not hear from us within 7 days, please contact the clinic through MyChart or phone. If you have a critical or abnormal lab result, we will notify you by phone as soon as possible.  Submit refill requests through [employer] or call your pharmacy and they  "will forward the refill request to us. Please allow 3 business days for your refill to be completed.          Additional Information About Your Visit        MyChart Information     [employer] lets you send messages to your doctor, view your test results, renew your prescriptions, schedule appointments and more. To sign up, go to [email or web address] . Click on \"Log in\" on the left side of the screen, which will take you to the Welcome page. Then click on \"Sign up Now\" on the right side of the page.     You will be asked to enter the access code listed below, as well as some personal information. Please follow the directions to create your username and password.     Your access code is: [email or web address]  Expires: 2017  1:58 PM     Your access code will  in 90 days. If you need help or a new code, please call your [place] clinic or [phone].        Care EveryWhere ID     This is your Care EveryWhere ID. This could be used by other organizations to access your [place] medical records  [id number]        Your Vitals Were     Pulse Temperature Respirations Height Pulse Oximetry BMI (Body Mass Index)    91 98.4  F (36.9  C) (Oral) 16 1.803 m (5' 11\") 94% 36.54 kg/m2       Blood Pressure from Last 3 Encounters:   17 104/58   17 (!) 89/50   17 117/75    Weight from Last 3 Encounters:   17 118.8 kg (262 lb)   17 117.9 kg (260 lb)   17 111.3 kg (245 lb 6.4 oz)               Primary Care Provider Office Phone # Fax #    SAL Acevedo [phone]       [facility] [address] [address]        Thank you!     Thank you for choosing [facility]  for your care. Our goal is always to provide you with excellent care. Hearing back from our patients is one way we can continue to improve our services. Please take a few minutes to complete the written survey that you may receive in the mail after your " visit with us. Thank you!             Your Updated Medication List - Protect others around you: Learn how to safely use, store and throw away your medicines at www.disposemymeds.org.          This list is accurate as of: 2/27/17  1:58 PM.  Always use your most recent med list.                   Brand Name Dispense Instructions for use    abiraterone 250 MG tablet    ZYTIGA    120 tablet    Take 4 tablets (1,000 mg) by mouth daily for 30 doses Take on empty stomach.       acetaminophen 500 MG tablet    TYLENOL     Take 500 mg by mouth every 4 hours as needed for pain Not to exceed 3000 mg/24 hours       ammonium lactate 12 % lotion    LAC-HYDRIN     Apply topically 2 times daily as needed for dry skin To all extremities for dry skin       aspirin 81 MG EC tablet     5 tablet    Take 1 tablet (81 mg) by mouth daily       azithromycin 250 MG tablet    ZITHROMAX    6 tablet    Take 1 tablet (250 mg) by mouth daily       BREO ELLIPTA 100-25 MCG/INH oral inhaler   Generic drug:  fluticasone-vilanterol      Inhale 1 puff into the lungs daily       calcium carb 1250 mg (500 mg Saxman)/vitamin D 200 units 500-200 MG-UNIT per tablet    OSCAL with D    10 tablet    Take 1 tablet by mouth 2 times daily (with meals)       diclofenac 1 % Gel topical gel    VOLTAREN    100 g    Apply 4 grams to knees four times daily as needed using enclosed dosing card.       furosemide 40 MG tablet    LASIX    10 tablet    Take 1 tablet (40 mg) by mouth 2 times daily 0800, 1200       HYDROmorphone 2 MG tablet    DILAUDID    10 tablet    Take 1 tablet (2 mg) by mouth every 4 hours as needed for moderate to severe pain       insulin glargine 100 UNIT/ML injection    LANTUS     Inject 25 Units Subcutaneous 2 times daily       ipratropium - albuterol 0.5 mg/2.5 mg/3 mL 0.5-2.5 (3) MG/3ML neb solution    DUONEB    360 mL    Take 1 vial (3 mLs) by nebulization every 4 hours as needed for shortness of breath / dyspnea or wheezing       isosorbide  mononitrate 60 MG 24 hr tablet    IMDUR     Take 1 tablet (60 mg) by mouth daily       lidocaine 5 % Patch    LIDODERM    30 patch    Apply up to 3 patches to painful area at once for up to 12 h within a 24 h period.  Remove after 12 hours.       LIPITOR 10 MG tablet   Generic drug:  atorvastatin      Take 10 mg by mouth daily       metoprolol 50 MG 24 hr tablet    TOPROL XL    5 tablet    Take 1 tablet (50 mg) by mouth daily       nitroglycerin 0.4 MG sublingual tablet    NITROSTAT     Place 0.4 mg under the tongue every 5 minutes as needed for chest pain if you are still having symptoms after 3 doses (15 minutes) call 911.       omeprazole 10 MG CR capsule    priLOSEC     Take 1 capsule (10 mg) by mouth daily       polyvinyl alcohol 1.4 % ophthalmic solution    LIQUIFILM TEARS     Place 1 drop into both eyes 3 times daily       predniSONE 5 MG tablet    DELTASONE    60 tablet    Take 1 tablet (5 mg) by mouth 2 times daily       ranitidine 75 MG tablet   Generic drug:  ranitidine      Take 75 mg by mouth 2 times daily       senna-docusate 8.6-50 MG per tablet    SENOKOT-S;PERICOLACE    100 tablet    Take 1 tablet by mouth 2 times daily       tamsulosin 0.4 MG capsule    FLOMAX    5 capsule    Take 1 capsule (0.4 mg) by mouth daily       tiotropium 18 MCG capsule    SPIRIVA     Inhale 18 mcg into the lungs daily

## 2017-02-27 NOTE — DISCHARGE INSTRUCTIONS
Abrasions  Abrasions are skin scrapes. Their treatment depends on how large and deep the abrasion is.  Home care   You may be prescribed an antibiotic cream or ointment to apply to the wound. This helps prevent infection. Follow instructions when using this medication.  General care    To care for the abrasion, do the following each day for as long as directed by your health care provider.    If you were given a bandage, change it once a day. If your bandage sticks to the wound, soak it in warm water until it loosens.    Wash the area with soap and warm water. You may do this in a sink or under a tub faucet or shower. Rinse off the soap. Then pat the area dry with a clean towel.    If antibiotic ointment or cream was prescribed, reapply it to the wound as directed. Cover the wound with a fresh non-stick bandage. If the bandage becomes wet or dirty, change it as soon as possible.    You may use acetaminophen or ibuprofen to control pain unless another pain medication was prescribed. Note: If you have chronic liver or kidney disease or ever had a stomach ulcer or GI bleeding, talk with your health care provider before using these medications. Do not use ibuprofen in children under six months of age.    Most skin wounds heal within ten days. But an infection may occur despite treatment. Therefore, monitor the wound for signs of infection as listed below.  Follow-up care  Follow up with your health care provider, or as advised.  When to seek medical advice  Call your health care provider right away if any of these occur:    Fever of 101 F (38.3 C) or higher, or as directed by your health care provider    Increasing pain, redness, swelling, or drainage from the wound    Bleeding from the wound that does not stop after a few minutes of steady, firm pressure    Decreased ability to move any body part near wound    9762-6488 [employer]. [address] [place] [address], [place] [address]. All rights reserved. This  information is not intended as a substitute for professional medical care. Always follow your healthcare professional's instructions.          Bruises (Contusions)    A contusion is a bruise. A bruise happens when a blow to your body doesn't break the skin but does break blood vessels beneath the skin. Blood leaking from the broken vessels causes redness and swelling. As it heals, your bruise is likely to turn colors like purple, green, and yellow. This is normal. The bruise should fade in 2 or 3 weeks.  Factors that make you more likely to bruise  Almost everyone bruises now and then. Certain people do bruise more easily than others. You're more prone to bruising as you get older. That's because blood vessels become more fragile with age. You're also more likely to bruise if you have a clotting disorder such as hemophilia or take medications that reduce clotting, including aspirin.  When to go to the emergency room (ER)  Bruises almost always heal on their own without special treatment. But for some people, a bad bruise can be serious. Seek medical care if you:    Have a clotting disorder such as hemophilia.    Have cirrhosis or other serious liver disease.    Take blood-thinning medications such as warfarin (Coumadin).  What to expect in the ER  A doctor will examine your bruise and ask about any health conditions you have. In some cases, you may have a test to check how well your blood clots. Other treatment will depend on your needs.  Follow-up care  Sometimes a bruise gets worse instead of better. It may become larger and more swollen. This can occur when your body walls off a small pool of blood under the skin (hematoma). In very rare cases, your doctor may need to drain excess blood from the area.  Tip:  Apply an ice pack or bag of frozen peas to a bruise (keep a thin cloth between the cold source and your skin). This can help reduce redness and swelling.     4790-5006 [employer]. [address] [place] [address]  [place] [address]. All rights reserved. This information is not intended as a substitute for professional medical care. Always follow your healthcare professional's instructions.

## 2017-02-28 ENCOUNTER — TELEPHONE (OUTPATIENT)
Dept: GERIATRICS | Facility: CLINIC | Age: 72
End: 2017-02-28

## 2017-02-28 RX ORDER — BUDESONIDE AND FORMOTEROL FUMARATE DIHYDRATE 160; 4.5 UG/1; UG/1
2 AEROSOL RESPIRATORY (INHALATION) 2 TIMES DAILY
Qty: 1 INHALER | Refills: 11 | Status: SHIPPED | OUTPATIENT
Start: 2017-03-01 | End: 2017-03-01

## 2017-02-28 NOTE — TELEPHONE ENCOUNTER
TELEPHONE ENCOUNTER:    Tomas Grey is a 72 year old  (1945),Nurse called today to report:  Patient returned from the hospital and was found in his room administering a neb treatment.  Apparently he took some from the hospital and put them in his pocket.  They are asking for an order for him to self administer nebs with set up.    ASSESSMENT/PLAN  Self administer nebs - they will do a complete medication administration assessment to determine the appropriateness of self administration.    SAL Still CNP

## 2017-03-01 ENCOUNTER — TELEPHONE (OUTPATIENT)
Dept: ONCOLOGY | Facility: CLINIC | Age: 72
End: 2017-03-01

## 2017-03-01 ENCOUNTER — HOSPITAL ENCOUNTER (OUTPATIENT)
Dept: NUCLEAR MEDICINE | Facility: CLINIC | Age: 72
Setting detail: NUCLEAR MEDICINE
Discharge: HOME | End: 2017-03-01
Attending: INTERNAL MEDICINE | Admitting: INTERNAL MEDICINE
Payer: COMMERCIAL

## 2017-03-01 DIAGNOSIS — C79.51 METASTASIS TO BONE (H): ICD-10-CM

## 2017-03-01 DIAGNOSIS — C61 PROSTATE CANCER METASTATIC TO MULTIPLE SITES (H): ICD-10-CM

## 2017-03-01 PROCEDURE — 40000141 ZZH STATISTIC PERIPHERAL IV START W/O US GUIDANCE

## 2017-03-01 PROCEDURE — 34400006 ZZH RX 344: Performed by: INTERNAL MEDICINE

## 2017-03-01 PROCEDURE — A9606 RADIUM RA223 DICHLORIDE THER: HCPCS | Performed by: INTERNAL MEDICINE

## 2017-03-01 PROCEDURE — 77790 RADIATION HANDLING: CPT

## 2017-03-01 RX ADMIN — RADIUM RA 223 DICHLORIDE 172.2 UCI.: 30 INJECTION INTRAVENOUS at 11:00
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
## 2017-03-02 ENCOUNTER — NURSING HOME VISIT (OUTPATIENT)
Dept: GERIATRICS | Facility: CLINIC | Age: 72
End: 2017-03-02
Payer: COMMERCIAL

## 2017-03-02 VITALS
WEIGHT: 245.2 LBS | RESPIRATION RATE: 18 BRPM | BODY MASS INDEX: 34.33 KG/M2 | HEART RATE: 82 BPM | OXYGEN SATURATION: 96 % | SYSTOLIC BLOOD PRESSURE: 113 MMHG | TEMPERATURE: 98 F | DIASTOLIC BLOOD PRESSURE: 54 MMHG | HEIGHT: 71 IN

## 2017-03-02 DIAGNOSIS — Z60.9 HIGH RISK SOCIAL SITUATION: ICD-10-CM

## 2017-03-02 DIAGNOSIS — G89.4 CHRONIC PAIN SYNDROME: ICD-10-CM
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
## 2017-03-02 DIAGNOSIS — J44.9 CHRONIC OBSTRUCTIVE PULMONARY DISEASE, UNSPECIFIED COPD TYPE (H): ICD-10-CM

## 2017-03-02 DIAGNOSIS — I25.118 CORONARY ARTERY DISEASE OF NATIVE HEART WITH STABLE ANGINA PECTORIS, UNSPECIFIED VESSEL OR LESION TYPE (H): ICD-10-CM

## 2017-03-02 DIAGNOSIS — I10 BENIGN ESSENTIAL HYPERTENSION: ICD-10-CM

## 2017-03-02 DIAGNOSIS — Z79.4 TYPE 2 DIABETES MELLITUS WITH STAGE 3 CHRONIC KIDNEY DISEASE, WITH LONG-TERM CURRENT USE OF INSULIN (H): ICD-10-CM

## 2017-03-02 DIAGNOSIS — F41.9 ANXIETY AND DEPRESSION: ICD-10-CM

## 2017-03-02 DIAGNOSIS — I50.32 CHRONIC DIASTOLIC HEART FAILURE (H): ICD-10-CM

## 2017-03-02 DIAGNOSIS — C79.51 PROSTATE CANCER METASTATIC TO BONE (H): ICD-10-CM

## 2017-03-02 DIAGNOSIS — N18.30 TYPE 2 DIABETES MELLITUS WITH STAGE 3 CHRONIC KIDNEY DISEASE, WITH LONG-TERM CURRENT USE OF INSULIN (H): ICD-10-CM

## 2017-03-02 DIAGNOSIS — E11.22 TYPE 2 DIABETES MELLITUS WITH STAGE 3 CHRONIC KIDNEY DISEASE, WITH LONG-TERM CURRENT USE OF INSULIN (H): ICD-10-CM

## 2017-03-02 DIAGNOSIS — F32.A ANXIETY AND DEPRESSION: ICD-10-CM

## 2017-03-02 DIAGNOSIS — I89.0 LYMPHEDEMA: ICD-10-CM

## 2017-03-02 DIAGNOSIS — C61 PROSTATE CANCER METASTATIC TO BONE (H): ICD-10-CM

## 2017-03-02 DIAGNOSIS — Z00.00 ANNUAL PHYSICAL EXAM: Primary | ICD-10-CM

## 2017-03-02 DIAGNOSIS — K21.9 GASTROESOPHAGEAL REFLUX DISEASE, ESOPHAGITIS PRESENCE NOT SPECIFIED: ICD-10-CM

## 2017-03-02 DIAGNOSIS — N18.30 CKD (CHRONIC KIDNEY DISEASE) STAGE 3, GFR 30-59 ML/MIN (H): ICD-10-CM

## 2017-03-02 PROCEDURE — 99318 ZZC ANNUAL NURSING FAC ASSESSMNT, STABLE: CPT | Performed by: NURSE PRACTITIONER

## 2017-03-02 SDOH — SOCIAL STABILITY - SOCIAL INSECURITY: PROBLEM RELATED TO SOCIAL ENVIRONMENT, UNSPECIFIED: Z60.9

## 2017-03-02 NOTE — PROGRESS NOTES
[facility]  Chief Complaint   Patient presents with     Annual Comprehensive Nursing Home     HPI:    Tomas Grey is a 72 year old  (1945), who is being seen today for an annual comprehensive visit at [facility]. Today's concerns are:    Annual Physical Exam. Next due March 2018.    Fall. Evaluated in the ED on 2/27/17 after tipping over in his electric scooter while riding in the [employer]. Bilateral knee and shoulder x-ray negative for acute findings. Today, resident reports pain. Upon review of documentation, has not utilized Hydromorphone PRN since the fall.    Chronic Obstructive Pulmonary Disease, Stage IV, on Chronic Oxygen Supplementation/Obstructive Sleep Apnea. Treated for COPD exacerbation outpatient with a Z-Pack from 2/22/17 through 2/27/17. Recent hospitalizations for COPD exacerbations 11/30/16 through 12/2/16, 12/19/16 through 12/24/16 and 12/26/16 through 12/27/16. Seen by Pulmonology on 2/24/17 and noted to be on appropriate therapy. Uses BIPAP at night for ALEXIS, but often refuses to wear at facility.     Prostate Cancer with Extensive Metastases to Bone. Bone scan 8/31/16 showed progression in bones. Seen by Dr. Oviedo on 11/28/16. PSA continues to rise. Not a good candidate for chemotherapy. Referred to Dr. Callahan for a second opinion on 12/9/16 with plans for therapy with radium 223 for painful metastasis for a total of 6 treatments administered monthly. Taking Zytega and Prednisone since 9/19/16.    Chronic Pain Syndrome with Chronic Chest and Abdominal Pain. Secondary to above. History of diverting medication to family members in the past. Complains of generalized pain, but upon review of documentation, has only utilized Hydromorphone 4 times in the past month. Has a Lidocaine Patch.    Type 2 Diabetes Mellitus. Last A1C 7.7 on 12/20/16. Upon review of blood sugars over the past week, range is as follows:    Breakfast:   Lunch: 126-197  with an episode of 305 and 357  Dinner: 123-182 with an episode of 308  Bedtime: 134-194 with an episode of 227 and 281    Coronary Artery Disease/ Hypertension/Chronic Diastolic Heart Failure with EF 55-60%. Denies chest pain today. Upon review of blood pressure over the past month, systolic range from 111-131. Diastolic range from 54-77. Based on JNC-8 goals, patient's age of 72 year old, presence of diabetes or CKD, and goals of care goal BP is  <140/90 mm Hg. patient is stable with current plan of care and routine assessment..Weight 245 lbs on 3/2/17 and 250.1 lbs on 2/4/17.    Chronic Kidney Disease Stage III. Last Creatinine 1.73 on 2/20/17. Previous Creatinine 1.82 on 2/13/17.    Lymphedema of Bilateral Lower Extremities with Venous Stasis Ulcers. Occasionally refuses ACE wraps. Spends most of the day and night in his electric scooter with legs in a dependent position.    Gastroesophageal Reflux Disease. Denies heartburn.     Anxiety and Depression. PHQ-9 Score 14 on 1/9/17. Not interested in an antidepressant during previous discussions. Depression screen done: PHQ-9 Given screen score and clinical assessment patient is needing further interventions of therapy versus medication, but refuses. Will plan to reassess in the coming months.    High Risk Social Situation. Has previously failed at home living with 2 of his sons in the past. Now expresses desire to live at home with his son, Les. Conference held with resident, son, Les, daughter in-law, Bria, [employer] Lakshmi SOLO and Juliana ANDREWS. The decision was made have resident go on an MITZI to his son's house from 3/2/17 until 3/7/17. If successful, consider discharge in the coming weeks.     ALLERGIES: Morphine  PROBLEM LIST:  Patient Active Problem List   Diagnosis     Insomnia     COPD (chronic obstructive pulmonary disease) (H)     Anemia     Osteoporosis     ASCVD (arteriosclerotic cardiovascular disease)     Prostate cancer metastatic to multiple sites  (H)     Venous stasis dermatitis     Fecal incontinence     CKD (chronic kidney disease) stage 3, GFR 30-59 ml/min     Serum albumin decreased     Right heart failure (H)     Type 2 diabetes, HbA1C goal < 8% (H)     Impaired mobility     Knee pain     Irregular heart rhythm     Sleep apnea     Metastasis to bone (H)     SOB (shortness of breath)     FTT (failure to thrive) in adult     Dyspnea     Atypical chest pain     Prostate cancer metastatic to bone (H)     Chronic pain     ACP (advance care planning)     Respiratory failure (H)     CAD (coronary artery disease)     Physical deconditioning     Hyperlipidemia     Renal insufficiency     Epigastric pain     Lymphedema of both lower extremities     Primary pulmonary hypertension (H)     Redness of eye, left     Chest wall pain     Chronic obstructive pulmonary disease, unspecified COPD type (H)     FH: chronic lung disease requiring oxygen     COPD exacerbation (H)     History of heart attack     Nocturnal dyspnea     Dyspnea on exertion     Chronic obstructive pulmonary disease with severity to be determined (H)     Leg pain     Adjustment disorder with mixed anxiety and depressed mood     Noncompliance with medication regimen     Type 2 diabetes mellitus with stage 3 chronic kidney disease (H)     Chronic respiratory failure with hypoxia and hypercapnia (H)     Misuse of drugs (H)     Pneumonia of left lower lobe due to infectious organism     Pneumonia     Hypercapnia     Health Care Home     HTN (hypertension)     CHF (congestive heart failure) (H)     Chronic respiratory failure with hypoxia (H)     PAST MEDICAL HISTORY:  has a past medical history of Anemia; Anxiety; Aspiration pneumonia (H) (2014); C. difficile colitis; CAD (coronary artery disease); Chronic pain; CKD (chronic kidney disease); COPD (chronic obstructive pulmonary disease) (H); Depressive disorder; Diabetes mellitus (H); Hypertension; Insomnia; ALEXIS (obstructive sleep apnea); Osteoporosis;  and Prostate cancer metastatic to multiple sites (H).  PAST SURGICAL HISTORY:  has a past surgical history that includes Abdomen surgery; Arthroscopy knee; and Eye surgery.  FAMILY HISTORY: family history includes CANCER in his mother; DIABETES in his mother.  SOCIAL HISTORY:  reports that he has quit smoking. He has never used smokeless tobacco. He reports that he does not drink alcohol or use illicit drugs.  IMMUNIZATIONS:  Most Recent Immunizations   Administered Date(s) Administered     Influenza (High Dose) 3 valent vaccine 10/11/2016     Influenza (IIV3) 11/04/2015     Pneumococcal (PCV 13) 11/04/2015     Pneumococcal 23 valent 02/08/2012     Tdap (Adacel,Boostrix) 11/04/2015     Above immunizations pulled from [facility]. [facility] and facility records also reconciled. Outstanding information sent to  to update [facility].  Future immunizations are not needed at this point as all recommended immunizations are up to date.   MEDICATIONS:  Current Outpatient Prescriptions   Medication Sig Dispense Refill     Oseltamivir Phosphate (TAMIFLU PO) Take 30 mg by mouth daily       ranitidine (RANITIDINE) 75 MG tablet Take 75 mg by mouth 2 times daily       predniSONE (DELTASONE) 5 MG tablet Take 1 tablet (5 mg) by mouth 2 times daily 60 tablet 0     abiraterone (ZYTIGA) 250 MG tablet Take 4 tablets (1,000 mg) by mouth daily for 30 doses Take on empty stomach. 120 tablet 0     lidocaine (LIDODERM) 5 % Patch Apply up to 3 patches to painful area at once for up to 12 h within a 24 h period.  Remove after 12 hours. 30 patch 0     diclofenac (VOLTAREN) 1 % GEL topical gel Apply 4 grams to knees four times daily as needed using enclosed dosing card. 100 g 0     HYDROmorphone (DILAUDID) 2 MG tablet Take 1 tablet (2 mg) by mouth every 4 hours as needed for moderate to severe pain 10 tablet 0     senna-docusate (SENOKOT-S;PERICOLACE) 8.6-50 MG per tablet Take 1 tablet by mouth 2 times daily 100 tablet       insulin glargine (LANTUS) 100 UNIT/ML injection Inject 25 Units Subcutaneous 2 times daily        omeprazole (PRILOSEC) 10 MG CR capsule Take 1 capsule (10 mg) by mouth daily       isosorbide mononitrate (IMDUR) 60 MG 24 hr tablet Take 1 tablet (60 mg) by mouth daily       atorvastatin (LIPITOR) 10 MG tablet Take 10 mg by mouth daily       fluticasone - vilanterol (BREO ELLIPTA) 100-25 MCG/INH oral inhaler Inhale 1 puff into the lungs daily       tiotropium (SPIRIVA) 18 MCG inhalation capsule Inhale 18 mcg into the lungs daily       polyvinyl alcohol (LIQUIFILM TEARS) 1.4 % ophthalmic solution Place 1 drop into both eyes 3 times daily       acetaminophen (TYLENOL) 500 MG tablet Take 500 mg by mouth every 4 hours as needed for pain Not to exceed 3000 mg/24 hours       nitroglycerin (NITROSTAT) 0.4 MG SL tablet Place 0.4 mg under the tongue every 5 minutes as needed for chest pain if you are still having symptoms after 3 doses (15 minutes) call 911.       ipratropium - albuterol 0.5 mg/2.5 mg/3 mL (DUONEB) 0.5-2.5 (3) MG/3ML nebulization Take 1 vial (3 mLs) by nebulization every 4 hours as needed for shortness of breath / dyspnea or wheezing 360 mL 3     aspirin 81 MG EC tablet Take 1 tablet (81 mg) by mouth daily 5 tablet 0     metoprolol (TOPROL XL) 50 MG 24 hr tablet Take 1 tablet (50 mg) by mouth daily 5 tablet 0     furosemide (LASIX) 40 MG tablet Take 1 tablet (40 mg) by mouth 2 times daily 0800, 1200 10 tablet 0     calcium carb 1250 mg, 500 mg Torres Martinez,/vitamin D 200 units (OSCAL WITH D) 500-200 MG-UNIT per tablet Take 1 tablet by mouth 2 times daily (with meals) 10 tablet 0     tamsulosin (FLOMAX) 0.4 MG 24 hr capsule Take 1 capsule (0.4 mg) by mouth daily 5 capsule 0     ammonium lactate (LAC-HYDRIN) 12 % lotion Apply topically 2 times daily as needed for dry skin To all extremities for dry skin       [DISCONTINUED] enzalutamide (XTANDI) 40 MG capsule Take 4 capsules (160 mg) by mouth daily 120 capsule 0  "    Medications reviewed:  Medications reconciled to facility chart and changes were made to reflect current medications as identified as above med list. Below are the changes that were made:   Medications stopped since last EPIC medication reconciliation:   There are no discontinued medications.    Medications started since last [facility] medication reconciliation:  No orders of the defined types were placed in this encounter.    Case Management:  I have reviewed the facility/SNF care plan/MDS which was done 1/9/17, including the falls risk, nutrition and pain screening. I also reviewed the current immunizations, and preventive care..Future cancer screening indicated is colonoscopy and provider and pt will formulate a POC 6/20/17: Reviewed recommendations for colonoscopy with resident. He will think about having procedure done in the future. Patient's desire to return to the community is present, but is not able due to care needs . Current Level of Care is appropriate.    Advance Directive Discussion:    I reviewed the current advanced directives as reflected in [facility] and the facility chart. I contacted the first party, Tom \"Mani\", on 12/26/16  and discussed the plan of Care. I reviewed the POLST, resigned, dated and sent to [facility].  I did review the advance directives with the resident on 12/26/16 and resident requested not to discuss the issue in the future.     Team Discussion:  I communicated with the appropriate disciplines involved with the Plan of Care:   Nursing    Patient Goal:  Patient's goal is family's/responsible party's goal for the patient is discharge him to son's house.    Information reviewed:  Medications, vital signs, orders, and nursing notes.    ROS:  4 point ROS including Respiratory, CV, GI and , other than that noted in the HPI,  is negative    Exam:  /54  Pulse 82  Temp 98  F (36.7  C)  Resp 18  Ht 5' 11\" (1.803 m)  Wt 245 lb 3.2 oz (111.2 kg)  SpO2 96%  BMI 34.2 " kg/m2  GENERAL APPEARANCE: Alert, in no distress  ENT: Mouth and posterior oropharynx normal, moist mucous membranes  EYES: EOM, conjunctivae, lids, pupils and irises normal  RESP: respiratory effort and palpation of chest normal, no respiratory distress, diminished breath sounds throughout  CV: Palpation and auscultation of heart done , regular rate and rhythm, no murmur, rub, or gallop  ABDOMEN: normal bowel sounds, soft, nontender, no hepatosplenomegaly or other masses  M/S: Active movement of bilateral upper and lower extremities. ACE wraps on BLE.  SKIN: Inspection of skin and subcutaneous tissue baseline, Palpation of skin and subcutaneous tissue baseline  NEURO: Cranial nerves 2-12 are normal tested and grossly at patient's baseline  PSYCH: affect and mood normal    Lab/Diagnostic data:      Last Basic Metabolic Panel:  Lab Results   Component Value Date     02/20/2017      Lab Results   Component Value Date    POTASSIUM 4.1 02/20/2017     Lab Results   Component Value Date    CHLORIDE 104 02/20/2017     Lab Results   Component Value Date    JORDAN 8.5 02/20/2017     Lab Results   Component Value Date    CO2 38 02/20/2017     Lab Results   Component Value Date    BUN 37 02/20/2017     Lab Results   Component Value Date    CR 1.73 02/20/2017     Lab Results   Component Value Date    GLC 73 02/20/2017       Lab Results   Component Value Date    WBC 7.7 02/20/2017     Lab Results   Component Value Date    RBC 3.50 02/20/2017     Lab Results   Component Value Date    HGB 9.8 02/20/2017     Lab Results   Component Value Date    HCT 34.4 02/20/2017     Lab Results   Component Value Date    MCV 98 02/20/2017     Lab Results   Component Value Date    MCH 28.0 02/20/2017     Lab Results   Component Value Date    MCHC 28.5 02/20/2017     Lab Results   Component Value Date    RDW 15.7 02/20/2017     Lab Results   Component Value Date     02/20/2017     11/30/2016     ASSESSMENT/PLAN:  Annual Physical  Exam. Next due March 2018.    Fall 2/27/17. Secondary to [employer] taking a corner too quickly. Imaging negative for acute findings. Hydromorphone available as needed for pain.     Chronic Obstructive Pulmonary Disease, Stage IV, on Chronic Oxygen Supplementation/Obstructive Sleep Apnea. Follow-up with Dr. Mcmahon in 6 months as recommended. Should have Z-Pack dose available PRN for exacerbation. Continue Tiotropium and Fluticasone as ordered. DuoNebs available PRN. Also on Prednisone for Zytiga supplement. BIPAP to be worn at night for sleep apnea.     Prostate Cancer Metastatic to Bone. Follow-up with Dr. Oviedo and Dr. Callahan as recommended. Followed by Dr. Gallagher in Palliative Care. Taking Zytiga and Prednisone as well as Radium 223 monthly x 6 doses. Continue Hydromorphone PRN and Lidocaine Patch as ordered. Also takes Tamsulosin.     Chronic Pain Syndrome. Likely Secondary to Above. History of diverting medication to family members in the past. Continue Hydromorphone, Lidocaine Patch and Acetaminophen as ordered. Voltaren gel ordered for knee pain.     Type 2 Diabetes Mellitus. Last A1C 7.7 on 12/20/16. Most recent blood sugars <200. Continue Glargine as ordered.    Coronary Artery Disease/Hypertension/Chronic Diastolic Heart Failure with EF 55-60%. Most blood pressures <140/90. Continue Aspirin, Furosemide, Isosorbide Mononitrate, Metoprolol, Atorvastatin and Nitroglycerin as ordered.    CKD (chronic kidney disease) stage 3, GFR 30-59 ml/min. Baseline Creatinine upper 1s. Monitor BMP periodically. Avoid nephrotoxic medications.    Lymphedema of Both Lower Extremities with Venous Stasis Ulcers. ACE wraps. Also on Furosemide BID.    Gastroesophageal Reflux. Continue Omeprazole and Ranitidine as ordered.    Anxiety and Depression. Patient refused antidepressant and in-house therapist visits during previous conversations. Will continue to re-approach.    High Risk Social Situation. Despite failing  previous attempts to live with other sons, will leave on MITZI with son, Les, for 5 days starting today. If successful, goal is to discharge from long term care to son's house.     Electronically signed by:  SAL Acevedo CNP

## 2017-03-04 ENCOUNTER — TRANSFERRED RECORDS (OUTPATIENT)
Dept: HEALTH INFORMATION MANAGEMENT | Facility: CLINIC | Age: 72
End: 2017-03-04

## 2017-03-06 ENCOUNTER — CARE COORDINATION (OUTPATIENT)
Dept: GERIATRIC MEDICINE | Facility: CLINIC | Age: 72
End: 2017-03-06

## 2017-03-06 NOTE — PROGRESS NOTES
"EDIL received several messages from clients dtr in law, Bria [phone], from over the weekend when I arrived this morning.  EDIL called her back and Bria states client is on MITZI at their home now since Thursday. She is wondering is 24hr PCA is covered by his insurance.  EDIL explained that client would need a PCA assessment but that I estimate he would qualify for around 8hrs/day.  Bria reports client has been to the ED once since staying with them as \"he didn't feel good\". She states he had an infection and they gave him abx and sent him back to Bria's home.  Bria states she is realizing he needs a lot of care and she has been main caregiver, stating other family members \"said they would help and had good intentions but it has mainly come down on me.\"  Bria states she plans to go back to school in the fall and has her own kids to care for so she does not think she can care for client 24/7.  EDIL supported her decision telling her that client is happy at [facility], does activities, has some buddies, etc and while he is at facility his family can just be his family and love him, spend time with him, take him out to eat or to their home for the day, etc.  EDIL again told her that my recommendation is that he remain in LTC but that I appreciate her desire to care for him at home.  Bria is thankful for the teams honesty on his condition and my suggestion to do the trial MITZI as she states \"you were right, he is a lot of work!\"  She plans to discuss with her  but likely will leave client in LTC when they return him to [facility] tomorrow.  EDIL called facility [facility], to update.    Lakshmi Calderon, PERNELL  [facility]   [phone]    "

## 2017-03-14 ENCOUNTER — CARE COORDINATION (OUTPATIENT)
Dept: GERIATRIC MEDICINE | Facility: CLINIC | Age: 72
End: 2017-03-14

## 2017-03-22 NOTE — MR AVS SNAPSHOT
After Visit Summary   3/22/2017    Tomas Grey    MRN: [id number]           Patient Information     Date Of Birth          1945        Visit Information        Provider Department      3/22/2017 11:30 AM Iraj Graf MD [facility]        Care Instructions    Patient allowed to straight cath himself for residual urine, PRN.  Was given small amount straight cath supplies and reinforced self cath instructions.  Will attempt to order future supplies from [employer] with insurance coverage.  Please contact Oncology Clinic with any questions @ [phone]  Gage Yang RN, BSN, OCN  [facility]  Patient Care Coordinator            Follow-ups after your visit        Your next 10 appointments already scheduled     Mar 29, 2017  9:30 AM CDT   (Arrive by 9:15 AM)   Return Visit with Lakeisha Singh PA-C   [facility] ([facility])    [address] [id number] [phone]            Mar 29, 2017 11:00 AM CDT   NM RADIUM 223 XOFIGO THERAPY with UUNMINJ2   [facility], [place], Nuclear Medicine ([facility] [place])    [address] [facility] [address] [phone]           Please bring a list of your medicines to the exam. (Include vitamins, minerals and over-the-counter drugs.) You should wear comfortable clothes. Leave your valuables at home. Please bring related prior results and films.  Tell your doctor:   If you are breastfeeding or may be pregnant.   If you have had a barium test within the past few days. Barium may change the results of certain exams.   If you think you may need sedation (medicine to help you relax).  You may eat and drink as normal.  Please call your Imaging Department at your exam site with any questions.            Apr 03, 2017 12:30 PM CDT   Return Visit with  ONCOLOGY NURSE    [facility] ([facility])    [facility]  [address]  [facility] [address]   [phone]            Apr 10, 2017  1:30 PM CDT   Return Visit with Manpreet Oviedo MD   [facility] ([facility])    [facility]  [address]  [facility] [address]   [phone]            Apr 19, 2017  1:00 PM CDT   Return Visit with RH ONCOLOGY NURSE   [facility] ([facility])    [facility]  [address]  [facility] [address]   [phone]            Apr 26, 2017  9:30 AM CDT   (Arrive by 9:15 AM)   Return Visit with Lakeisha Singh PA-C   [facility] ([facility] [place])    [address] [id number] [phone]            Apr 26, 2017 11:00 AM CDT   NM RADIUM 223 XOFIGO THERAPY with UUNMINJ1   [facility], [place], Nuclear Medicine ([facility] [place])    [address] [facility] [address] [phone]           Please bring a list of your medicines to the exam. (Include vitamins, minerals and over-the-counter drugs.) You should wear comfortable clothes. Leave your valuables at home. Please bring related prior results and films.  Tell your doctor:   If you are breastfeeding or may be pregnant.   If you have had a barium test within the past few days. Barium may change the results of certain exams.   If you think you may need sedation (medicine to help you relax).  You may eat and drink as normal.  Please call your Imaging Department at your exam site with any questions.            May 17, 2017  1:00 PM CDT   Return Visit with RH ONCOLOGY NURSE   [facility] ([facility])    [facility]  [address]  [facility] [address]    [phone]            May 24, 2017  9:30 AM CDT   (Arrive by 9:15 AM)   Return Visit with Lakeisha Singh PA-C   [facility] ([facility] [place])    [address] [facility]  [address] [facility] [id number] [phone]            May 24, 2017 11:00 AM CDT   NM RADIUM 223 XOFIGO THERAPY with UUNMINJ2   [facility], [place], Nuclear Medicine ([facility] [place])    [address] [facility] [id number] [phone]           Please bring a list of your medicines to the exam. (Include vitamins, minerals and over-the-counter drugs.) You should wear comfortable clothes. Leave your valuables at home. Please bring related prior results and films.  Tell your doctor:   If you are breastfeeding or may be pregnant.   If you have had a barium test within the past few days. Barium may change the results of certain exams.   If you think you may need sedation (medicine to help you relax).  You may eat and drink as normal.  Please call your Imaging Department at your exam site with any questions.              Who to contact     If you have questions or need follow up information about today's clinic visit or your schedule please contact [facility] directly at [phone].  Normal or non-critical lab and imaging results will be communicated to you by crossvertisehart, letter or phone within 4 business days after the clinic has received the results. If you do not hear from us within 7 days, please contact the clinic through crossvertisehart or phone. If you have a critical or abnormal lab result, we will notify you by phone as soon as possible.  Submit refill requests through [email or web address] or call your pharmacy and they will forward the refill request to us. Please allow 3 business days for your refill to be completed.          Additional Information About Your Visit        [email or web address] Information     [email or web address] lets you send messages to your doctor,  "view your test results, renew your prescriptions, schedule appointments and more. To sign up, go to [email or web address] . Click on \"Log in\" on the left side of the screen, which will take you to the Welcome page. Then click on \"Sign up Now\" on the right side of the page.     You will be asked to enter the access code listed below, as well as some personal information. Please follow the directions to create your username and password.     Your access code is: [email or web address]  Expires: 2017  2:58 PM     Your access code will  in 90 days. If you need help or a new code, please call your [facility] clinic or [phone].        Care EveryWhere ID     This is your Care EveryWhere ID. This could be used by other organizations to access your [facility] medical records  [id number]        Your Vitals Were     Pulse Temperature Respirations Pulse Oximetry BMI (Body Mass Index)       99 98.1  F (36.7  C) (Tympanic) 20 91% 35.43 kg/m2        Blood Pressure from Last 3 Encounters:   17 100/55   17 113/54   17 133/54    Weight from Last 3 Encounters:   17 115.2 kg (254 lb)   17 111.2 kg (245 lb 3.2 oz)   17 111.1 kg (245 lb)              Today, you had the following     No orders found for display       Primary Care Provider Office Phone # Fax #    SAL Acevedo [phone]       [facility] [address] [address]        Thank you!     Thank you for choosing [facility]  for your care. Our goal is always to provide you with excellent care. Hearing back from our patients is one way we can continue to improve our services. Please take a few minutes to complete the written survey that you may receive in the mail after your visit with us. Thank you!             Your Updated Medication List - Protect others around you: Learn how to safely use, store and throw away your medicines at www.disposemymeds.org. "          This list is accurate as of: 3/22/17 12:51 PM.  Always use your most recent med list.                   Brand Name Dispense Instructions for use    acetaminophen 500 MG tablet    TYLENOL     Take 500 mg by mouth every 4 hours as needed for pain Not to exceed 3000 mg/24 hours       ammonium lactate 12 % lotion    LAC-HYDRIN     Apply topically 2 times daily as needed for dry skin To all extremities for dry skin       aspirin 81 MG EC tablet     5 tablet    Take 1 tablet (81 mg) by mouth daily       BREO ELLIPTA 100-25 MCG/INH oral inhaler   Generic drug:  fluticasone-vilanterol      Inhale 1 puff into the lungs daily       calcium carb 1250 mg (500 mg [place])/vitamin D 200 units 500-200 MG-UNIT per tablet    OSCAL with D    10 tablet    Take 1 tablet by mouth 2 times daily (with meals)       diclofenac 1 % Gel topical gel    VOLTAREN    100 g    Apply 4 grams to knees four times daily as needed using enclosed dosing card.       furosemide 40 MG tablet    LASIX    10 tablet    Take 1 tablet (40 mg) by mouth 2 times daily 0800, 1200       HYDROmorphone 2 MG tablet    DILAUDID    10 tablet    Take 1 tablet (2 mg) by mouth every 4 hours as needed for moderate to severe pain       insulin glargine 100 UNIT/ML injection    LANTUS     Inject 25 Units Subcutaneous 2 times daily       ipratropium - albuterol 0.5 mg/2.5 mg/3 mL 0.5-2.5 (3) MG/3ML neb solution    DUONEB    360 mL    Take 1 vial (3 mLs) by nebulization every 4 hours as needed for shortness of breath / dyspnea or wheezing       isosorbide mononitrate 60 MG 24 hr tablet    IMDUR     Take 1 tablet (60 mg) by mouth daily       lidocaine 5 % Patch    LIDODERM    30 patch    Apply up to 3 patches to painful area at once for up to 12 h within a 24 h period.  Remove after 12 hours.       LIPITOR 10 MG tablet   Generic drug:  atorvastatin      Take 10 mg by mouth daily       metoprolol 50 MG 24 hr tablet    TOPROL XL    5 tablet    Take 1 tablet (50 mg) by mouth  daily       nitroglycerin 0.4 MG sublingual tablet    NITROSTAT     Place 0.4 mg under the tongue every 5 minutes as needed for chest pain if you are still having symptoms after 3 doses (15 minutes) call 911.       omeprazole 10 MG CR capsule    priLOSEC     Take 1 capsule (10 mg) by mouth daily       polyvinyl alcohol 1.4 % ophthalmic solution    LIQUIFILM TEARS     Place 1 drop into both eyes 3 times daily       predniSONE 5 MG tablet    DELTASONE    60 tablet    Take 1 tablet (5 mg) by mouth 2 times daily       ranitidine 75 MG tablet   Generic drug:  ranitidine      Take 75 mg by mouth 2 times daily       senna-docusate 8.6-50 MG per tablet    SENOKOT-S;PERICOLACE    100 tablet    Take 1 tablet by mouth 2 times daily       tamsulosin 0.4 MG capsule    FLOMAX    5 capsule    Take 1 capsule (0.4 mg) by mouth daily       tiotropium 18 MCG capsule    SPIRIVA     Inhale 18 mcg into the lungs daily

## 2017-03-22 NOTE — PROGRESS NOTES
Chief Complaint:    Metastatic prostate cancer with urine retention         Consult or Referral:     Mr. Tomas Grey is a 72 year old male seen in consultation from Dr. Oviedo         History of Present Illness:    Tomas Grey is a very pleasant 72 year old male who presents with a history of metastatic prostate cancer, hormone refractory and being treated by Dr. Oviedo.  Also has developed urinary retention.    He is wheel chair bound.      He sometimes feels like he has to pee and it wont come out.  He has tried flomax, with minimal efficacy.      Today is feeling pain from his bone mets and has a headache.             Past Medical History:     Past Medical History:   Diagnosis Date     Anemia      Anxiety      Aspiration pneumonia (H) 2014     C. difficile colitis      CAD (coronary artery disease)      Chronic pain      CKD (chronic kidney disease)      COPD (chronic obstructive pulmonary disease) (H)      Depressive disorder      Diabetes mellitus (H)      Hypertension      Insomnia      ALEXIS (obstructive sleep apnea)      Osteoporosis      Prostate cancer metastatic to multiple sites (H)             Past Surgical History:     Past Surgical History:   Procedure Laterality Date     ABDOMEN SURGERY       ARTHROSCOPY KNEE       EYE SURGERY              Medications     Current Outpatient Prescriptions   Medication     ranitidine (RANITIDINE) 75 MG tablet     predniSONE (DELTASONE) 5 MG tablet     lidocaine (LIDODERM) 5 % Patch     diclofenac (VOLTAREN) 1 % GEL topical gel     HYDROmorphone (DILAUDID) 2 MG tablet     senna-docusate (SENOKOT-S;PERICOLACE) 8.6-50 MG per tablet     insulin glargine (LANTUS) 100 UNIT/ML injection     omeprazole (PRILOSEC) 10 MG CR capsule     isosorbide mononitrate (IMDUR) 60 MG 24 hr tablet     atorvastatin (LIPITOR) 10 MG tablet     fluticasone - vilanterol (BREO ELLIPTA) 100-25 MCG/INH oral inhaler     tiotropium (SPIRIVA) 18 MCG inhalation capsule     polyvinyl  alcohol (LIQUIFILM TEARS) 1.4 % ophthalmic solution     acetaminophen (TYLENOL) 500 MG tablet     nitroglycerin (NITROSTAT) 0.4 MG SL tablet     ipratropium - albuterol 0.5 mg/2.5 mg/3 mL (DUONEB) 0.5-2.5 (3) MG/3ML nebulization     aspirin 81 MG EC tablet     metoprolol (TOPROL XL) 50 MG 24 hr tablet     furosemide (LASIX) 40 MG tablet     calcium carb 1250 mg, 500 mg [place],/vitamin D 200 units (OSCAL WITH D) 500-200 MG-UNIT per tablet     tamsulosin (FLOMAX) 0.4 MG 24 hr capsule     ammonium lactate (LAC-HYDRIN) 12 % lotion     abiraterone (ZYTIGA) 250 MG tablet     predniSONE (DELTASONE) 5 MG tablet     [DISCONTINUED] enzalutamide (XTANDI) 40 MG capsule     No current facility-administered medications for this visit.             Family History:     Family History   Problem Relation Age of Onset     DIABETES Mother      CANCER Mother      stomach            Social History:     Social History     Social History     Marital status:      Spouse name: N/A     Number of children: N/A     Years of education: N/A     Occupational History     Not on file.     Social History Main Topics     Smoking status: Former Smoker     Smokeless tobacco: Never Used     Alcohol use No     Drug use: No     Sexual activity: No     Other Topics Concern     Not on file     Social History Narrative            Allergies:   Morphine         Review of Systems:  From intake questionnaire     Negative 14 system review except as noted on HPI, nurse's note.         Physical Exam:     Patient is a 72 year old  male   Vitals: Blood pressure 100/55, pulse 99, temperature 98.1  F (36.7  C), temperature source Tympanic, resp. rate 20, weight 115.2 kg (254 lb), SpO2 91 %.  General Appearance Adult: Alert, no acute distress, oriented  HENT: throat/mouth:normal, missing teeth  Lungs: no respiratory distress, or pursed lip breathing, but on nasal canula O2  Abdomen:  Body mass index is 35.43 kg/(m^2).  Musculoskeltal: extremities normal, + peripheral  edema  Neuro: Alert, oriented, speech and mentation normal  Psych: affect and mood normal  Gait: not observed due to wheel chair status  : deferred      Labs and Pathology:    I reviewed all applicable laboratory and pathology data and went over findings with patient  Significant for   Lab Results   Component Value Date    CR 1.73 02/20/2017    CR 1.82 02/13/2017    CR 1.73 01/20/2017    CR 1.84 01/15/2017    CR 1.64 12/28/2016    CR 1.72 12/27/2016    CR 1.80 12/26/2016    CR 2.15 12/15/2016    CR 2.12 11/30/2016    CR 2.05 11/28/2016     PSA   Date Value Ref Range Status   02/20/2017 163.58 (H) 0 - 4 ug/L Final     Comment:     Assay Method:  Chemiluminescence using Siemens Vista analyzer   12/28/2016 46.26 (H) 0 - 4 ug/L Final     Comment:     Assay Method:  Chemiluminescence using Siemens Vista analyzer   11/28/2016 34.88 (H) 0 - 4 ug/L Final     Comment:     Assay Method:  Chemiluminescence using Siemens Vista analyzer   11/08/2016 28.20 ng/mL Final   10/24/2016 22.28 (H) 0 - 4 ug/L Final     Comment:     Assay Method:  Chemiluminescence using Siemens Vista analyzer   09/19/2016 12.55 (H) 0 - 4 ug/L Final     Comment:     Assay Method:  Chemiluminescence using Siemens Vista analyzer   08/22/2016 12.83 (H) 0 - 4 ug/L Final     Comment:     Assay Method:  Chemiluminescence using Siemens Vista analyzer   06/06/2016 3.76 0 - 4 ug/L Final   04/25/2016 3.29 0 - 4 ug/L Final   03/18/2016 1.94 0 - 4 ug/L Final             Imaging:    I reviewed all applicable imaging and went over findings with patient.  Significant for multiple painful bony mets      Outside and Past Medical records:    I spent 10 minutes reviewing outside and past medical records.           Assessment and Plan:     Assessment:   Urine retention in the setting of metastatic lymph node dissection.    Plan:  Patient has advanced metastatic prostate cancer and is progressing through treatment.  He has no curative options and his primary objective is to  avoid pain.    His scr has risen and this may be due to bladder outlet obstruction, however, he is quite resistant to catherization.    He is on flomax and hormonal treatment for his prostate cancer    We discussed surgical options, but he is resistant to that.  He doesn't want to end up in complete retention.    We taught him to self cath today and sent him with some supplies just in case he gets into trouble.    Otherwise, I would allow him to void as able.    His kidney function may worsen, but that is not a primary concern for him.  He would like to avoid pain.        Iraj Graf MD  Department of Urology Staff  [facility]  Patient Care Team:  Ekaterina Lozano APRN CNP as PCP - General (Nurse Practitioner)  Gage Ynag RN as Registered Nurse  Ekaterina Lozano APRN CNP as Nurse Practitioner (Nurse Practitioner)  Annika Ortiz MD as Hospitalist (Internal Medicine)  Argentina Croft as Nursing Assistant  Radha Lam, RN as Registered Nurse  Amanda Ortiz Annette J      Copy to patient  HERMILO KING  [facility]  [address]  [facility] [address]

## 2017-03-22 NOTE — NURSING NOTE
"Tomas Grey is a 72 year old male who presents for:  Chief Complaint   Patient presents with     Oncology Clinic Visit     follow up        Initial Vitals:  /55 (Cuff Size: Adult Large)  Pulse 99  Temp 98.1  F (36.7  C) (Tympanic)  Resp 20  Wt 115.2 kg (254 lb)  SpO2 91%  BMI 35.43 kg/m2 Estimated body mass index is 35.43 kg/(m^2) as calculated from the following:    Height as of 3/1/17: 1.803 m (5' 11\").    Weight as of this encounter: 115.2 kg (254 lb).. Body surface area is 2.4 meters squared. BP completed using cuff size: large  Worst Pain (10) No LMP for male patient. Allergies and medications reviewed.     Medications: Medication refills NEEDED TODAY:  LIDOCAIN PATCH.  Pharmacy name entered into EPIC:    [facility]  [facility] - [place], [place] - [address] [place] [address] [id number] - [place] [address] [facility] [address] [facility] - [place], [place] - [address] [facility] [place] - [place], [place] - [address] [facility]ACY [facility] - [address] PHARMACY - [place], [place] - [address] - [address] [place] [address]    Comments: Follow up    8 minutes for nursing intake (face to face time)   Veronica Ngo CMA   DISCHARGE PLAN:  Next appointments: See patient instruction section  Departure Mode: Ambulatory  Accompanied by: self  0 minutes for nursing discharge (face to face time)   Veronica Ngo CMA            "

## 2017-03-22 NOTE — PATIENT INSTRUCTIONS
Patient allowed to straight cath himself for residual urine, PRN.  Was given small amount straight cath supplies and reinforced self cath instructions.  Will attempt to order future supplies from [employer] with insurance coverage.  Please contact Oncology Clinic with any questions @ [phone]  Gage Yang RN, BSN, OCN  [facility]  Patient Care Coordinator

## 2017-03-22 NOTE — ORAL ONC MGMT
Oral Chemotherapy Monitoring Program    Primary Oncologist: Dr. Oviedo  Primary Oncology Clinic: [facility]  Cancer Diagnosis: CRPC     Therapy History:  Start date 9/16/16 through 11/28/16.     Drug Interaction Assessment: None     Lab Monitoring Plan  CMP/BP  Subjective/Objective:  Tomas Grey is a 72 year old male seen in clinic for a follow-up visit for oral chemotherapy.  He denies any side effects from zytiga/prednisone.  He currently resides in a nursing home.    ORAL CHEMOTHERAPY 4/25/2016 5/23/2016 7/27/2016 8/22/2016 10/31/2016 2/13/2017 3/22/2017   Drug Name Xtandi (Enzalutamide) Xtandi (Enzalutamide) Xtandi (Enzalutamide) Xtandi (Enzalutamide) Zytiga (Abiraterone) Zytiga (Abiraterone) Zytiga (Abiraterone)   Current Dosage 160mg 160mg 160mg 160mg 1000mg 1000mg 1000mg   Current Schedule Daily Daily Daily Daily Daily Daily Daily   Cycle Details Continuous Continuous Continuous Continuous Continuous Continuous Continuous   Start Date of Last Cycle - - 6/27/2016 - 10/17/2016 - -   Planned next cycle start date - - - - 11/14/2016 2/26/2017 -   Doses missed in last 2 weeks 0 0 0 0 0 0 0   Adherence Assessment - - - - - Adherent Adherent   Adverse Effects Other (see comments) Other (see comments) None - - No AE identified during assessment No AE identified during assessment   Home BPs not needed not needed not needed - all BPs<140/90 all BPs<140/90 all BPs<140/90   Any new drug interactions? - - - - No No No   Is the dose as ordered appropriate for the patient? - - - - - Yes Yes   Is the patient currently in pain? - - - - Assessed in last 30 days. Yes Assessed in last 30 days.   Does the patient feel the pain is currently being managed by a provider? - - - - - Yes -   Has the patient been assessed within the past 6 months for depression? - - - - - Yes Yes   Has the patient missed any days of school, work, or other routine activity? - - - - - No No       Last PHQ-2 Score on record:   PHQ-2 ( 1999 [employer])  "3/22/2017 2/27/2017   Q1: Little interest or pleasure in doing things 0 0   Q2: Feeling down, depressed or hopeless 0 0   PHQ-2 Score 0 0       Patient does not report depression symptoms.      Vitals:  BP:   BP Readings from Last 1 Encounters:   03/22/17 100/55     Wt Readings from Last 1 Encounters:   03/22/17 115.2 kg (254 lb)     Estimated body surface area is 2.4 meters squared as calculated from the following:    Height as of 3/1/17: 1.803 m (5' 11\").    Weight as of an earlier encounter on 3/22/17: 115.2 kg (254 lb).    Labs:  Lab Results   Component Value Date     03/22/2017      Lab Results   Component Value Date    POTASSIUM 4.2 03/22/2017     Lab Results   Component Value Date    JORDAN 8.7 03/22/2017     Lab Results   Component Value Date    ALBUMIN 3.0 03/22/2017     Lab Results   Component Value Date    MAG 1.7 12/26/2016     Lab Results   Component Value Date    PHOS 3.3 05/28/2015     Lab Results   Component Value Date    BUN 40 03/22/2017     Lab Results   Component Value Date    CR 1.81 03/22/2017       Lab Results   Component Value Date    AST 16 03/22/2017     Lab Results   Component Value Date    ALT 14 03/22/2017     Lab Results   Component Value Date    BILITOTAL 0.5 03/22/2017       Lab Results   Component Value Date    WBC 8.1 03/22/2017     Lab Results   Component Value Date    HGB 9.8 03/22/2017     Lab Results   Component Value Date     03/22/2017     Lab Results   Component Value Date    ANEU 6.1 03/22/2017         Assessment:  Labs stable    Plan:  Continue with current regimen    Follow-Up:  4/10/17 with Dr. Oviedo    Refill Due:  4/21/17 release 4/14    Martin Meese, [facility]      "

## 2017-03-22 NOTE — MR AVS SNAPSHOT
After Visit Summary   3/22/2017    Tomas Grey    MRN: [id number]           Patient Information     Date Of Birth          1945        Visit Information        Provider Department      3/22/2017 12:00 PM  INFUSION CHAIR 12 [facility]        Today's Diagnoses     Knee pain    -  1    Prostate cancer (H)        Knee pain, unspecified chronicity, unspecified laterality        Chronic respiratory failure with hypoxia (H)        Metastasis to bone (H)        Prostate cancer metastatic to multiple sites (H)        Prostate cancer metastatic to bone (H)           Follow-ups after your visit        Your next 10 appointments already scheduled     Mar 29, 2017  9:30 AM CDT   (Arrive by 9:15 AM)   Return Visit with Lakeisha Singh PA-C   [facility] ([facility])    [address] [address] [phone]            Mar 29, 2017 11:00 AM CDT   NM RADIUM 223 XOFIGO THERAPY with UUNMINJ2   [facility] [place], Nuclear Medicine ([facility])    [address] [facility] [address] [phone]           Please bring a list of your medicines to the exam. (Include vitamins, minerals and over-the-counter drugs.) You should wear comfortable clothes. Leave your valuables at home. Please bring related prior results and films.  Tell your doctor:   If you are breastfeeding or may be pregnant.   If you have had a barium test within the past few days. Barium may change the results of certain exams.   If you think you may need sedation (medicine to help you relax).  You may eat and drink as normal.  Please call your Imaging Department at your exam site with any questions.            Apr 03, 2017 12:30 PM CDT   Return Visit with  ONCOLOGY NURSE   [facility] ([facility])    [facility] [place]  [place]  [address]  [facility] [address]   [phone]            Apr 10, 2017  1:30 PM CDT   Return Visit with Manpreet Oviedo MD   [facility] ([facility])    [facility] [place]  [address]  [facility] [address]   [phone]            Apr 19, 2017  1:00 PM CDT   Return Visit with RH ONCOLOGY NURSE   [facility] ([facility])    [facility] [place]  [address]  [facility] [address]   [phone]            Apr 26, 2017  9:30 AM CDT   (Arrive by 9:15 AM)   Return Visit with CARL [facility] ([facility])    [address] [address] [phone]            Apr 26, 2017 11:00 AM CDT   NM RADIUM 223 XOFIGO THERAPY with UUNMINJ1   [facility] [place], Nuclear Medicine ([facility])    [address] [facility] [address] [phone]           Please bring a list of your medicines to the exam. (Include vitamins, minerals and over-the-counter drugs.) You should wear comfortable clothes. Leave your valuables at home. Please bring related prior results and films.  Tell your doctor:   If you are breastfeeding or may be pregnant.   If you have had a barium test within the past few days. Barium may change the results of certain exams.   If you think you may need sedation (medicine to help you relax).  You may eat and drink as normal.  Please call your Imaging Department at your exam site with any questions.            May 17, 2017  1:00 PM CDT   Return Visit with RH ONCOLOGY NURSE   [facility] ([facility])    [facility] [place]  [address]  [facility] [address]   [phone]            May 24, 2017  9:30 AM CDT   (Arrive by 9:15 AM)   Return Visit  with Lakeisha Singh PA-C   [facility] ([facility])    [address] [facility] [address]  2nd Floor  [facility] [address] [phone]            May 24, 2017 11:00 AM CDT   NM RADIUM 223 XOFIGO THERAPY with UUNMINJ2   [facility] [place], Nuclear Medicine ([facility])    [address] [facility] [id number] [phone]           Please bring a list of your medicines to the exam. (Include vitamins, minerals and over-the-counter drugs.) You should wear comfortable clothes. Leave your valuables at home. Please bring related prior results and films.  Tell your doctor:   If you are breastfeeding or may be pregnant.   If you have had a barium test within the past few days. Barium may change the results of certain exams.   If you think you may need sedation (medicine to help you relax).  You may eat and drink as normal.  Please call your Imaging Department at your exam site with any questions.              Who to contact     If you have questions or need follow up information about today's clinic visit or your schedule please contact [facility] directly at [phone].  Normal or non-critical lab and imaging results will be communicated to you by MyChart, letter or phone within 4 business days after the clinic has received the results. If you do not hear from us within 7 days, please contact the clinic through [employer] or phone. If you have a critical or abnormal lab result, we will notify you by phone as soon as possible.  Submit refill requests through [employer] or call your pharmacy and they will forward the refill request to us. Please allow 3 business days for your refill to be completed.          Additional Information About Your Visit        [employer] Information     [employer] lets you send messages to your doctor, view your test results, renew your prescriptions, schedule appointments and  "more. To sign up, go to [email or web address] . Click on \"Log in\" on the left side of the screen, which will take you to the Welcome page. Then click on \"Sign up Now\" on the right side of the page.     You will be asked to enter the access code listed below, as well as some personal information. Please follow the directions to create your username and password.     Your access code is: [email or web address]  Expires: 2017  2:58 PM     Your access code will  in 90 days. If you need help or a new code, please call your [place] clinic or [phone].        Care EveryWhere ID     This is your Care EveryWhere ID. This could be used by other organizations to access your [place] medical records  [id number]         Blood Pressure from Last 3 Encounters:   17 100/55   17 113/54   17 133/54    Weight from Last 3 Encounters:   17 115.2 kg (254 lb)   17 111.2 kg (245 lb 3.2 oz)   17 111.1 kg (245 lb)              We Performed the Following     CBC with platelets differential     Comprehensive metabolic panel     PSA tumor marker          Today's Medication Changes          These changes are accurate as of: 3/22/17  3:15 PM.  If you have any questions, ask your nurse or doctor.               Start taking these medicines.        Dose/Directions    abiraterone 250 MG tablet   Commonly known as:  ZYTIGA   Used for:  Prostate cancer metastatic to bone (H), Metastasis to bone (H), Prostate cancer metastatic to multiple sites (H)        Dose:  1000 mg   Take 4 tablets (1,000 mg) by mouth daily for 30 doses Take on empty stomach.   Quantity:  120 tablet   Refills:  0         These medicines have changed or have updated prescriptions.        Dose/Directions    * predniSONE 5 MG tablet   Commonly known as:  DELTASONE   This may have changed:  Another medication with the same name was added. Make sure you understand how and when to take each.   Used for:  Prostate cancer metastatic to bone " (H), Metastasis to bone (H), Prostate cancer metastatic to multiple sites (H)   Changed by:  Ryley Callahan MD        Dose:  5 mg   Take 1 tablet (5 mg) by mouth 2 times daily   Quantity:  60 tablet   Refills:  0       * predniSONE 5 MG tablet   Commonly known as:  DELTASONE   This may have changed:  You were already taking a medication with the same name, and this prescription was added. Make sure you understand how and when to take each.   Used for:  Prostate cancer metastatic to bone (H), Metastasis to bone (H), Prostate cancer metastatic to multiple sites (H)        Dose:  5 mg   Take 1 tablet (5 mg) by mouth 2 times daily   Quantity:  60 tablet   Refills:  0       * Notice:  This list has 2 medication(s) that are the same as other medications prescribed for you. Read the directions carefully, and ask your doctor or other care provider to review them with you.         Where to get your medicines      These medications were sent to [facility] - [address] [address]    Hours:  Mon-Fri 8:30am-5:00pm Toll Free [phone] Phone:  [phone]     abiraterone 250 MG tablet         These medications were sent to [address] [address]     Phone:  [phone]     predniSONE 5 MG tablet                Primary Care Provider Office Phone # Fax #    SAL Acevedo [phone]       [facility] [address] [place] [address]        Thank you!     Thank you for choosing  [facility]  for your care. Our goal is always to provide you with excellent care. Hearing back from our patients is one way we can continue to improve our services. Please take a few minutes to complete the written survey that you may receive in the mail after your visit  with us. Thank you!             Your Updated Medication List - Protect others around you: Learn how to safely use, store and throw away your medicines at www.disposemymeds.org.          This list is accurate as of: 3/22/17  3:15 PM.  Always use your most recent med list.                   Brand Name Dispense Instructions for use    abiraterone 250 MG tablet    ZYTIGA    120 tablet    Take 4 tablets (1,000 mg) by mouth daily for 30 doses Take on empty stomach.       acetaminophen 500 MG tablet    TYLENOL     Take 500 mg by mouth every 4 hours as needed for pain Not to exceed 3000 mg/24 hours       ammonium lactate 12 % lotion    LAC-HYDRIN     Apply topically 2 times daily as needed for dry skin To all extremities for dry skin       aspirin 81 MG EC tablet     5 tablet    Take 1 tablet (81 mg) by mouth daily       BREO ELLIPTA 100-25 MCG/INH oral inhaler   Generic drug:  fluticasone-vilanterol      Inhale 1 puff into the lungs daily       calcium carb 1250 mg (500 mg [place])/vitamin D 200 units 500-200 MG-UNIT per tablet    OSCAL with D    10 tablet    Take 1 tablet by mouth 2 times daily (with meals)       diclofenac 1 % Gel topical gel    VOLTAREN    100 g    Apply 4 grams to knees four times daily as needed using enclosed dosing card.       furosemide 40 MG tablet    LASIX    10 tablet    Take 1 tablet (40 mg) by mouth 2 times daily 0800, 1200       HYDROmorphone 2 MG tablet    DILAUDID    10 tablet    Take 1 tablet (2 mg) by mouth every 4 hours as needed for moderate to severe pain       insulin glargine 100 UNIT/ML injection    LANTUS     Inject 25 Units Subcutaneous 2 times daily       ipratropium - albuterol 0.5 mg/2.5 mg/3 mL 0.5-2.5 (3) MG/3ML neb solution    DUONEB    360 mL    Take 1 vial (3 mLs) by nebulization every 4 hours as needed for shortness of breath / dyspnea or wheezing       isosorbide mononitrate 60 MG 24 hr tablet    IMDUR     Take 1 tablet (60 mg) by mouth daily       lidocaine 5 % Patch     LIDODERM    30 patch    Apply up to 3 patches to painful area at once for up to 12 h within a 24 h period.  Remove after 12 hours.       LIPITOR 10 MG tablet   Generic drug:  atorvastatin      Take 10 mg by mouth daily       metoprolol 50 MG 24 hr tablet    TOPROL XL    5 tablet    Take 1 tablet (50 mg) by mouth daily       nitroglycerin 0.4 MG sublingual tablet    NITROSTAT     Place 0.4 mg under the tongue every 5 minutes as needed for chest pain if you are still having symptoms after 3 doses (15 minutes) call 911.       omeprazole 10 MG CR capsule    priLOSEC     Take 1 capsule (10 mg) by mouth daily       polyvinyl alcohol 1.4 % ophthalmic solution    LIQUIFILM TEARS     Place 1 drop into both eyes 3 times daily       * predniSONE 5 MG tablet    DELTASONE    60 tablet    Take 1 tablet (5 mg) by mouth 2 times daily       * predniSONE 5 MG tablet    DELTASONE    60 tablet    Take 1 tablet (5 mg) by mouth 2 times daily       ranitidine 75 MG tablet   Generic drug:  ranitidine      Take 75 mg by mouth 2 times daily       senna-docusate 8.6-50 MG per tablet    SENOKOT-S;PERICOLACE    100 tablet    Take 1 tablet by mouth 2 times daily       tamsulosin 0.4 MG capsule    FLOMAX    5 capsule    Take 1 capsule (0.4 mg) by mouth daily       tiotropium 18 MCG capsule    SPIRIVA     Inhale 18 mcg into the lungs daily       * Notice:  This list has 2 medication(s) that are the same as other medications prescribed for you. Read the directions carefully, and ask your doctor or other care provider to review them with you.

## 2017-03-22 NOTE — PROGRESS NOTES
Infusion Nursing Note:  Tomas Grey presents today for an Xgeva SQ injection and labs.    Patient seen by provider today: Yes: Dr. Graf   present during visit today: Not Applicable.    Note: N/A.    Intravenous Access:  Lab draw site left antebutal, Needle type butterfly, Gauge 23.    Treatment Conditions:  Lab Results   Component Value Date    HGB 9.8 03/22/2017     Lab Results   Component Value Date    WBC 8.1 03/22/2017      Lab Results   Component Value Date    ANEU 6.1 03/22/2017     Lab Results   Component Value Date     03/22/2017      Lab Results   Component Value Date     03/22/2017                   Lab Results   Component Value Date    POTASSIUM 4.2 03/22/2017           Lab Results   Component Value Date    MAG 1.7 12/26/2016            Lab Results   Component Value Date    CR 1.81 03/22/2017                   Lab Results   Component Value Date    JORDAN 8.7 03/22/2017                Lab Results   Component Value Date    BILITOTAL 0.5 03/22/2017           Lab Results   Component Value Date    ALBUMIN 3.0 03/22/2017                    Lab Results   Component Value Date    ALT 14 03/22/2017           Lab Results   Component Value Date    AST 16 03/22/2017     Results reviewed, labs MET treatment parameters, ok to proceed with treatment.      Post Infusion Assessment:  Patient tolerated injection without incident.    Discharge Plan:   Discharge instructions reviewed with: Patient.  Patient and/or family verbalized understanding of discharge instructions and all questions answered.  Copy of AVS reviewed with patient and/or family.  Patient will return 4/3/17 for labs.  Patient discharged in stable condition accompanied by: self. Ride coming to pick him up and bring him back to his home.  Departure Mode: Wheelchair.    Lakshmi Salazar RN

## 2017-03-24 NOTE — TELEPHONE ENCOUNTER
[facility] called and needed orders for patient to straight cath himself.  Per 3/22/17 pt instructions note, pt is allowed to straight cath himself, PRN.  Faxed instructions to Supply.  No further questions or concerns at this time.

## 2017-03-28 NOTE — TELEPHONE ENCOUNTER
Called facility where patient resides.  Faxed orders for self-cathing supplies to charge RN and she will place order for DME.  Will notify clinic with any questions or concerns.  Faxed to [phone]  Atten: Mónica Yang, RN, BSN, OCN  [facility] [place]  Patient Care Coordinator

## 2017-03-29 NOTE — NURSING NOTE
"Tomas Grey is a 72 year old male who presents for:  Chief Complaint   Patient presents with     Oncology Clinic Visit     Prostate Cancer        Initial Vitals:  /62  Pulse 102  Temp 98.4  F (36.9  C) (Oral)  Resp 18  SpO2 91% Estimated body mass index is 35.43 kg/(m^2) as calculated from the following:    Height as of 3/1/17: 1.803 m (5' 11\").    Weight as of 3/22/17: 115.2 kg (254 lb).. There is no height or weight on file to calculate BSA. BP completed using cuff size: large  Extreme Pain (9) No LMP for male patient. Allergies and medications reviewed.     Medications: Medication refills not needed today.  Pharmacy name entered into [email or web address]:    [facility]  [facility] - [place], [place] - [address] [place] [address] DRUG STORE [facility] - [place] - [address] AT [place] [address] & [facility] - [place], [place] - [address] [facility] - [place], [place] - [id number] [place] [address] PHARMACY [facility] - [address] SERVICE PHARMACY - [place], [place] - [address] - [address] [place] [address]    Comments: Patient is at a pain level of 9 today, bilateral shoulder pain.     6 minutes for nursing intake (face to face time)   Karol Lovell CMA       "

## 2017-03-29 NOTE — PROGRESS NOTES
"[facility]  HEMATOLOGY ONCOLOGY    ONCOLOGY FOLLOWUP NOTE      DIAGNOSIS:    1- Metastatic prostate cancer with extensive metastases to bone.   2- Advanced COPD and multiple other co-morbidities.      TREATMENT:     Lupron injection monthly, discontinued 6/2016.  Casodex 50 mg daily. discontinued 2/9/2015  - Xgeva monthly  - Xtandi 4/15/15  - Zytgea and prednisone 9/19/2016-present  - Xofigo 1/4/17     Subjective:  Mr. Tomas Grey comes in for followup today. He is here for Xofigo dose #3. He feels the Xofigo infusions are going well without any symptoms after the infusion.  He continues on Zytiga + prednisone and tolerates that daily as well without issues.  He has no new bone pains to mention.  He does have chronic belly pain that is generalized and he mentions this today, although its his \"normal\" pain that comes and goes daily and resolves with TUMS and cold water.  He is stooling daily usually 3 x day which are soft without black/bloody stools.  No recent f/s/c. Breathing is stable on his O2.    Past medical, social histories reviewed.    Meds- Reviewed.     PHYSICAL EXAMINATION:   VITAL SIGNS:/62  Pulse 102  Temp 98.4  F (36.9  C) (Oral)  Resp 18  SpO2 91%  Wt Readings from Last 4 Encounters:   03/22/17 115.2 kg (254 lb)   02/23/17 111.2 kg (245 lb 3.2 oz)   03/01/17 111.1 kg (245 lb)   02/27/17 118.8 kg (262 lb)       GENERAL: Sitting comfortably in motorized scooter.  There is a faint smell of stool.  He falls asleep easily.   HEENT: Pupils are equal. Oropharynx is clear.   NECK: No cervical or supraclavicular lymphadenopathy.   LUNGS: Clear bilaterally.   HEART: S1, S2, regular.   ABDOMEN: Soft, mild tenderness to generalized palpation. No rebound or guarding.   EXTREMITIES: Warm, well perfused.   NEUROLOGIC: Alert, awake.   SKIN: No rash.   LYMPHATICS: Bilateral edema he has dressing on his left leg.     DATA:     2/13/2017 12:20 2/20/2017 12:08 3/22/2017 13:05 "   Sodium 143 145 (H) 142   Potassium 4.3 4.1 4.2   Chloride 99 104 99   Carbon Dioxide 39 (H) 38 (H) 39 (H)   Urea Nitrogen 43 (H) 37 (H) 40 (H)   Creatinine 1.82 (H) 1.73 (H) 1.81 (H)   GFR Estimate 37 (L) 39 (L) 37 (L)   GFR Estimate If Black 45 (L) 47 (L) 45 (L)   Calcium 9.5 8.5 8.7   Anion Gap 5 3 4   Albumin 2.9 (L) 3.0 (L) 3.0 (L)   Protein Total 7.0 7.1 7.0   Bilirubin Total 0.5 0.3 0.5   Alkaline Phosphatase 86 84 91   ALT 14 15 14   AST 16 13 16      2/20/2017 12:08 3/22/2017 13:05   .58 (H) 210.71 (H)      1/25/2017 13:23 2/20/2017 12:08 3/22/2017 13:05   WBC 7.5 7.7 8.1   Hemoglobin 9.6 (L) 9.8 (L) 9.8 (L)   Hematocrit 34.1 (L) 34.4 (L) 34.1 (L)   Platelet Count 229 222 206   RBC Count 3.47 (L) 3.50 (L) 3.56 (L)   MCV 98 98 96     ECOG  performance status 2     ASSESSMENT:   1.  Metastatic prostate cancer with multiple sclerotic bone metastases.  Currently on Zytiga/pred and getting C3 of Xofigo today.  His PSA continues to rise, but sometimes after C3 things can turn around on Xofigo.  We will stay hopeful. He is not a candidate for chemotherapy due to his other co morbidities. He is not symptomatic from his bone mets currently and his counts continue to be stable.     2.  Extensive bone metastases.   Bone scan 8/31/16 showed progression in bones.   He will continue to take calcium and vitamin D.  Will continue Xgeva.    Pastora Singh PA-C

## 2017-03-29 NOTE — LETTER
"3/29/2017    RE: Tomas Grey  [facility] AND [facility]  [address]  [facility] [address]       [facility] PHYSICIANS  HEMATOLOGY ONCOLOGY    ONCOLOGY FOLLOWUP NOTE      DIAGNOSIS:    1- Metastatic prostate cancer with extensive metastases to bone.   2- Advanced COPD and multiple other co-morbidities.      TREATMENT:     Lupron injection monthly, discontinued 6/2016.  Casodex 50 mg daily. discontinued 2/9/2015  - Xgeva monthly  - Xtandi 4/15/15  - Zytgea and prednisone 9/19/2016-present  - Xofigo 1/4/17     Subjective:  Mr. Tomas Grey comes in for followup today. He is here for Xofigo dose #3. He feels the Xofigo infusions are going well without any symptoms after the infusion.  He continues on Zytiga + prednisone and tolerates that daily as well without issues.  He has no new bone pains to mention.  He does have chronic belly pain that is generalized and he mentions this today, although its his \"normal\" pain that comes and goes daily and resolves with TUMS and cold water.  He is stooling daily usually 3 x day which are soft without black/bloody stools.  No recent f/s/c. Breathing is stable on his O2.    Past medical, social histories reviewed.    Meds- Reviewed.     PHYSICAL EXAMINATION:   VITAL SIGNS:/62  Pulse 102  Temp 98.4  F (36.9  C) (Oral)  Resp 18  SpO2 91%  Wt Readings from Last 4 Encounters:   03/22/17 115.2 kg (254 lb)   02/23/17 111.2 kg (245 lb 3.2 oz)   03/01/17 111.1 kg (245 lb)   02/27/17 118.8 kg (262 lb)       GENERAL: Sitting comfortably in motorized scooter.  There is a faint smell of stool.  He falls asleep easily.   HEENT: Pupils are equal. Oropharynx is clear.   NECK: No cervical or supraclavicular lymphadenopathy.   LUNGS: Clear bilaterally.   HEART: S1, S2, regular.   ABDOMEN: Soft, mild tenderness to generalized palpation. No rebound or guarding.   EXTREMITIES: Warm, well perfused.   NEUROLOGIC: Alert, awake.   SKIN: No rash.   LYMPHATICS: " Bilateral edema he has dressing on his left leg.     DATA:     2/13/2017 12:20 2/20/2017 12:08 3/22/2017 13:05   Sodium 143 145 (H) 142   Potassium 4.3 4.1 4.2   Chloride 99 104 99   Carbon Dioxide 39 (H) 38 (H) 39 (H)   Urea Nitrogen 43 (H) 37 (H) 40 (H)   Creatinine 1.82 (H) 1.73 (H) 1.81 (H)   GFR Estimate 37 (L) 39 (L) 37 (L)   GFR Estimate If Black 45 (L) 47 (L) 45 (L)   Calcium 9.5 8.5 8.7   Anion Gap 5 3 4   Albumin 2.9 (L) 3.0 (L) 3.0 (L)   Protein Total 7.0 7.1 7.0   Bilirubin Total 0.5 0.3 0.5   Alkaline Phosphatase 86 84 91   ALT 14 15 14   AST 16 13 16      2/20/2017 12:08 3/22/2017 13:05   .58 (H) 210.71 (H)      1/25/2017 13:23 2/20/2017 12:08 3/22/2017 13:05   WBC 7.5 7.7 8.1   Hemoglobin 9.6 (L) 9.8 (L) 9.8 (L)   Hematocrit 34.1 (L) 34.4 (L) 34.1 (L)   Platelet Count 229 222 206   RBC Count 3.47 (L) 3.50 (L) 3.56 (L)   MCV 98 98 96     ECOG  performance status 2     ASSESSMENT:   1.  Metastatic prostate cancer with multiple sclerotic bone metastases.  Currently on Zytiga/pred and getting C3 of Xofigo today.  His PSA continues to rise, but sometimes after C3 things can turn around on Xofigo.  We will stay hopeful. He is not a candidate for chemotherapy due to his other co morbidities. He is not symptomatic from his bone mets currently and his counts continue to be stable.     2.  Extensive bone metastases.   Bone scan 8/31/16 showed progression in bones.   He will continue to take calcium and vitamin D.  Will continue Xgeva.    Pastora Singh PA-C

## 2017-03-29 NOTE — MR AVS SNAPSHOT
After Visit Summary   3/29/2017    Tomas Grey    MRN: [id number]           Patient Information     Date Of Birth          1945        Visit Information        Provider Department      3/29/2017 9:30 AM Lakeisha Singh PA-C [facility]        Today's Diagnoses     Prostate cancer metastatic to multiple sites (H)    -  1       Follow-ups after your visit        Your next 10 appointments already scheduled     Apr 03, 2017 12:30 PM CDT   Return Visit with RH ONCOLOGY NURSE   [facility] ([facility])    [facility]  [address]  [facility] [address]   [phone]            Apr 10, 2017  1:30 PM CDT   Return Visit with Manpreet Oviedo MD   [facility] ([facility])    Critical access hospital Ctr [facility]  [address]  [facility] [address]   [phone]            Apr 19, 2017  1:00 PM CDT   Return Visit with RH ONCOLOGY NURSE   [facility] ([facility])    [facility]  [address]  [facility] [address]   [phone]            Apr 26, 2017  9:30 AM CDT   (Arrive by 9:15 AM)   Return Visit with CARL Wooten [facility] ([facility])    [address] [address] [phone]            Apr 26, 2017 11:00 AM CDT   NM RADIUM 223 XOFIGO THERAPY with UUNMINJ1   [facility] ([facility])    [address] [facility] [address] [phone]           Please bring a list of your medicines to the exam. (Include vitamins, minerals and over-the-counter drugs.) You should wear comfortable clothes. Leave your valuables at home. Please bring related prior results and films.  Tell your doctor:   If you are breastfeeding or  may be pregnant.   If you have had a barium test within the past few days. Barium may change the results of certain exams.   If you think you may need sedation (medicine to help you relax).  You may eat and drink as normal.  Please call your Imaging Department at your exam site with any questions.            May 17, 2017  1:00 PM CDT   Return Visit with RH ONCOLOGY NURSE   [facility] ([facility])    [facility]  [address]  [facility] [address] [phone]            May 24, 2017  9:30 AM CDT   (Arrive by 9:15 AM)   Return Visit with Lakeisha Singh PA-C   [facility] ([facility])    [address]  [address]  [facility] [address] [phone]            May 24, 2017 11:00 AM CDT   NM RADIUM 223 XOFIGO THERAPY with UUNMINJ2   [facility] [place], Nuclear Medicine ([facility])    [address] [facility] [address] [phone]           Please bring a list of your medicines to the exam. (Include vitamins, minerals and over-the-counter drugs.) You should wear comfortable clothes. Leave your valuables at home. Please bring related prior results and films.  Tell your doctor:   If you are breastfeeding or may be pregnant.   If you have had a barium test within the past few days. Barium may change the results of certain exams.   If you think you may need sedation (medicine to help you relax).  You may eat and drink as normal.  Please call your Imaging Department at your exam site with any questions.              Who to contact     If you have questions or need follow up information about today's clinic visit or your schedule please contact [facility] directly at [phone].  Normal or non-critical lab and imaging results will be communicated to you by MyChart, letter or phone within 4 business days after the  "clinic has received the results. If you do not hear from us within 7 days, please contact the clinic through [employer] or phone. If you have a critical or abnormal lab result, we will notify you by phone as soon as possible.  Submit refill requests through [employer] or call your pharmacy and they will forward the refill request to us. Please allow 3 business days for your refill to be completed.          Additional Information About Your Visit        [employer] Information     [employer] lets you send messages to your doctor, view your test results, renew your prescriptions, schedule appointments and more. To sign up, go to [email or web address] . Click on \"Log in\" on the left side of the screen, which will take you to the Welcome page. Then click on \"Sign up Now\" on the right side of the page.     You will be asked to enter the access code listed below, as well as some personal information. Please follow the directions to create your username and password.     Your access code is: [email or web address]  Expires: 2017  2:58 PM     Your access code will  in 90 days. If you need help or a new code, please call your [place] clinic or [phone].        Care EveryWhere ID     This is your Care EveryWhere ID. This could be used by other organizations to access your [facility] medical records  [id number]        Your Vitals Were     Pulse Temperature Respirations Pulse Oximetry          102 98.4  F (36.9  C) (Oral) 18 91%         Blood Pressure from Last 3 Encounters:   17 129/62   17 100/55   17 113/54    Weight from Last 3 Encounters:   17 115.2 kg (254 lb)   17 111.2 kg (245 lb 3.2 oz)   17 111.1 kg (245 lb)              Today, you had the following     No orders found for display       Primary Care Provider Office Phone # Fax #    SAL Acevedo [phone]       [place] [facility] [address]  [place] [address]        Thank you!     Thank " you for choosing [facility]  for your care. Our goal is always to provide you with excellent care. Hearing back from our patients is one way we can continue to improve our services. Please take a few minutes to complete the written survey that you may receive in the mail after your visit with us. Thank you!             Your Updated Medication List - Protect others around you: Learn how to safely use, store and throw away your medicines at www.disposemymeds.org.          This list is accurate as of: 3/29/17  1:27 PM.  Always use your most recent med list.                   Brand Name Dispense Instructions for use    abiraterone 250 MG tablet    ZYTIGA    120 tablet    Take 4 tablets (1,000 mg) by mouth daily for 30 doses Take on empty stomach.       acetaminophen 500 MG tablet    TYLENOL     Take 500 mg by mouth every 4 hours as needed for pain Not to exceed 3000 mg/24 hours       ammonium lactate 12 % lotion    LAC-HYDRIN     Apply topically 2 times daily as needed for dry skin To all extremities for dry skin       aspirin 81 MG EC tablet     5 tablet    Take 1 tablet (81 mg) by mouth daily       BREO ELLIPTA 100-25 MCG/INH oral inhaler   Generic drug:  fluticasone-vilanterol      Inhale 1 puff into the lungs daily       calcium carb 1250 mg (500 mg [place])/vitamin D 200 units 500-200 MG-UNIT per tablet    OSCAL with D    10 tablet    Take 1 tablet by mouth 2 times daily (with meals)       diclofenac 1 % Gel topical gel    VOLTAREN    100 g    Apply 4 grams to knees four times daily as needed using enclosed dosing card.       furosemide 40 MG tablet    LASIX    10 tablet    Take 1 tablet (40 mg) by mouth 2 times daily 0800, 1200       HYDROmorphone 2 MG tablet    DILAUDID    10 tablet    Take 1 tablet (2 mg) by mouth every 4 hours as needed for moderate to severe pain       insulin glargine 100 UNIT/ML injection    LANTUS     Inject 25 Units Subcutaneous 2 times daily       ipratropium - albuterol  0.5 mg/2.5 mg/3 mL 0.5-2.5 (3) MG/3ML neb solution    DUONEB    360 mL    Take 1 vial (3 mLs) by nebulization every 4 hours as needed for shortness of breath / dyspnea or wheezing       isosorbide mononitrate 60 MG 24 hr tablet    IMDUR     Take 1 tablet (60 mg) by mouth daily       lidocaine 5 % Patch    LIDODERM    30 patch    Apply up to 3 patches to painful area at once for up to 12 h within a 24 h period.  Remove after 12 hours.       LIPITOR 10 MG tablet   Generic drug:  atorvastatin      Take 10 mg by mouth daily       metoprolol 50 MG 24 hr tablet    TOPROL XL    5 tablet    Take 1 tablet (50 mg) by mouth daily       nitroglycerin 0.4 MG sublingual tablet    NITROSTAT     Place 0.4 mg under the tongue every 5 minutes as needed for chest pain if you are still having symptoms after 3 doses (15 minutes) call 911.       omeprazole 10 MG CR capsule    priLOSEC     Take 1 capsule (10 mg) by mouth daily       polyvinyl alcohol 1.4 % ophthalmic solution    LIQUIFILM TEARS     Place 1 drop into both eyes 3 times daily       * predniSONE 5 MG tablet    DELTASONE    60 tablet    Take 1 tablet (5 mg) by mouth 2 times daily       * predniSONE 5 MG tablet    DELTASONE    60 tablet    Take 1 tablet (5 mg) by mouth 2 times daily       ranitidine 75 MG tablet   Generic drug:  ranitidine      Take 75 mg by mouth 2 times daily       senna-docusate 8.6-50 MG per tablet    SENOKOT-S;PERICOLACE    100 tablet    Take 1 tablet by mouth 2 times daily       tamsulosin 0.4 MG capsule    FLOMAX    5 capsule    Take 1 capsule (0.4 mg) by mouth daily       tiotropium 18 MCG capsule    SPIRIVA     Inhale 18 mcg into the lungs daily       * Notice:  This list has 2 medication(s) that are the same as other medications prescribed for you. Read the directions carefully, and ask your doctor or other care provider to review them with you.

## 2017-04-03 NOTE — MR AVS SNAPSHOT
After Visit Summary   4/3/2017    Tomas Grey    MRN: [id number]           Patient Information     Date Of Birth          1945        Visit Information        Provider Department      4/3/2017 12:30 PM RH ONCOLOGY NURSE [facility]        Today's Diagnoses     Prostate cancer metastatic to multiple sites (H)           Follow-ups after your visit        Your next 10 appointments already scheduled     Apr 10, 2017  1:30 PM CDT   Return Visit with Manpreet Oviedo MD   [facility] ([facility])    [facility]  [address]  [facility] [address]   [phone]            Apr 19, 2017  1:00 PM CDT   Return Visit with  ONCOLOGY NURSE   [facility] ([facility])    [facility]  [address]  [facility] [address]   [phone]            Apr 26, 2017  9:30 AM CDT   (Arrive by 9:15 AM)   Return Visit with Lakeisha Singh PA-C   [facility] ([facility] [place])    [address] [id number] [phone]            Apr 26, 2017 11:00 AM CDT   NM RADIUM 223 XOFIGO THERAPY with UUNMINJ1   [facility] [place], Nuclear Medicine ([facility] [place])    [address] [facility] [address] [phone]           Please bring a list of your medicines to the exam. (Include vitamins, minerals and over-the-counter drugs.) You should wear comfortable clothes. Leave your valuables at home. Please bring related prior results and films.  Tell your doctor:   If you are breastfeeding or may be pregnant.   If you have had a barium test within the past few days. Barium may change the results of certain exams.   If you think you may need sedation (medicine to help you relax).  You may eat and drink as normal.  Please call your Imaging  Department at your exam site with any questions.            May 17, 2017  1:00 PM CDT   Return Visit with RH ONCOLOGY NURSE   [facility] ([facility])    [facility]  [address]  [facility] [address] [phone]            May 24, 2017  9:30 AM CDT   (Arrive by 9:15 AM)   Return Visit with Lakeisha Singh PA-C   [facility] ([facility] [place])    [address] [facility]  2nd Floor  [facility] [id number] [phone]            May 24, 2017 11:00 AM CDT   NM RADIUM 223 XOFIGO THERAPY with UUNMINJ2   [facility] [place], Nuclear Medicine ([facility])    [address] [facility] [address] [phone]           Please bring a list of your medicines to the exam. (Include vitamins, minerals and over-the-counter drugs.) You should wear comfortable clothes. Leave your valuables at home. Please bring related prior results and films.  Tell your doctor:   If you are breastfeeding or may be pregnant.   If you have had a barium test within the past few days. Barium may change the results of certain exams.   If you think you may need sedation (medicine to help you relax).  You may eat and drink as normal.  Please call your Imaging Department at your exam site with any questions.              Who to contact     If you have questions or need follow up information about today's clinic visit or your schedule please contact [facility] CANCER CARE directly at [phone].  Normal or non-critical lab and imaging results will be communicated to you by MyChart, letter or phone within 4 business days after the clinic has received the results. If you do not hear from us within 7 days, please contact the clinic through MyChart or phone. If you have a critical or abnormal lab result, we will notify you by phone as soon as possible.  Submit refill  "requests through [employer] or call your pharmacy and they will forward the refill request to us. Please allow 3 business days for your refill to be completed.          Additional Information About Your Visit        [email or web address] Information     [employer] lets you send messages to your doctor, view your test results, renew your prescriptions, schedule appointments and more. To sign up, go to [email or web address] . Click on \"Log in\" on the left side of the screen, which will take you to the Welcome page. Then click on \"Sign up Now\" on the right side of the page.     You will be asked to enter the access code listed below, as well as some personal information. Please follow the directions to create your username and password.     Your access code is: [email or web address]  Expires: 2017  2:58 PM     Your access code will  in 90 days. If you need help or a new code, please call your [place] clinic or [phone].        Care EveryWhere ID     This is your Care EveryWhere ID. This could be used by other organizations to access your [place] medical records  [id number]         Blood Pressure from Last 3 Encounters:   17 129/62   17 100/55   17 113/54    Weight from Last 3 Encounters:   17 115.2 kg (254 lb)   17 111.2 kg (245 lb 3.2 oz)   17 111.1 kg (245 lb)              We Performed the Following     CBC with platelets differential     Comprehensive metabolic panel        Primary Care Provider Office Phone # Fax #    SAL Acevedo [phone]       [place] [facility] [address]  [facility] [address]        Thank you!     Thank you for choosing [facility] CAN[facility]ER [facility]  for your care. Our goal is always to provide you with excellent care. Hearing back from our patients is one way we can continue to improve our services. Please take a few minutes to complete the written survey that you may receive in the mail after your visit with us. " Thank you!             Your Updated Medication List - Protect others around you: Learn how to safely use, store and throw away your medicines at www.disposemymeds.org.          This list is accurate as of: 4/3/17 12:51 PM.  Always use your most recent med list.                   Brand Name Dispense Instructions for use    abiraterone 250 MG tablet    ZYTIGA    120 tablet    Take 4 tablets (1,000 mg) by mouth daily for 30 doses Take on empty stomach.       acetaminophen 500 MG tablet    TYLENOL     Take 500 mg by mouth every 4 hours as needed for pain Not to exceed 3000 mg/24 hours       ammonium lactate 12 % lotion    LAC-HYDRIN     Apply topically 2 times daily as needed for dry skin To all extremities for dry skin       aspirin 81 MG EC tablet     5 tablet    Take 1 tablet (81 mg) by mouth daily       BREO ELLIPTA 100-25 MCG/INH oral inhaler   Generic drug:  fluticasone-vilanterol      Inhale 1 puff into the lungs daily       calcium carb 1250 mg (500 mg [place])/vitamin D 200 units 500-200 MG-UNIT per tablet    OSCAL with D    10 tablet    Take 1 tablet by mouth 2 times daily (with meals)       diclofenac 1 % Gel topical gel    VOLTAREN    100 g    Apply 4 grams to knees four times daily as needed using enclosed dosing card.       furosemide 40 MG tablet    LASIX    10 tablet    Take 1 tablet (40 mg) by mouth 2 times daily 0800, 1200       HYDROmorphone 2 MG tablet    DILAUDID    10 tablet    Take 1 tablet (2 mg) by mouth every 4 hours as needed for moderate to severe pain       insulin glargine 100 UNIT/ML injection    LANTUS     Inject 25 Units Subcutaneous 2 times daily       ipratropium - albuterol 0.5 mg/2.5 mg/3 mL 0.5-2.5 (3) MG/3ML neb solution    DUONEB    360 mL    Take 1 vial (3 mLs) by nebulization every 4 hours as needed for shortness of breath / dyspnea or wheezing       isosorbide mononitrate 60 MG 24 hr tablet    IMDUR     Take 1 tablet (60 mg) by mouth daily       lidocaine 5 % Patch    LIDODERM     30 patch    Apply up to 3 patches to painful area at once for up to 12 h within a 24 h period.  Remove after 12 hours.       LIPITOR 10 MG tablet   Generic drug:  atorvastatin      Take 10 mg by mouth daily       metoprolol 50 MG 24 hr tablet    TOPROL XL    5 tablet    Take 1 tablet (50 mg) by mouth daily       nitroglycerin 0.4 MG sublingual tablet    NITROSTAT     Place 0.4 mg under the tongue every 5 minutes as needed for chest pain if you are still having symptoms after 3 doses (15 minutes) call 911.       omeprazole 10 MG CR capsule    priLOSEC     Take 1 capsule (10 mg) by mouth daily       polyvinyl alcohol 1.4 % ophthalmic solution    LIQUIFILM TEARS     Place 1 drop into both eyes 3 times daily       * predniSONE 5 MG tablet    DELTASONE    60 tablet    Take 1 tablet (5 mg) by mouth 2 times daily       * predniSONE 5 MG tablet    DELTASONE    60 tablet    Take 1 tablet (5 mg) by mouth 2 times daily       ranitidine 75 MG tablet   Generic drug:  ranitidine      Take 75 mg by mouth 2 times daily       senna-docusate 8.6-50 MG per tablet    SENOKOT-S;PERICOLACE    100 tablet    Take 1 tablet by mouth 2 times daily       tamsulosin 0.4 MG capsule    FLOMAX    5 capsule    Take 1 capsule (0.4 mg) by mouth daily       tiotropium 18 MCG capsule    SPIRIVA     Inhale 18 mcg into the lungs daily       * Notice:  This list has 2 medication(s) that are the same as other medications prescribed for you. Read the directions carefully, and ask your doctor or other care provider to review them with you.

## 2017-04-03 NOTE — PROGRESS NOTES
Received message from Juliana at [facility] stating that clients, dtr in law, Bria has called the facility and let them know the family will not be pursuing d/c to community for client as they have realized it will be too much work. D/C conference cancelled for Wed 4/5.    PERNELL Simmons   [facility]   [phone]

## 2017-04-09 NOTE — TELEPHONE ENCOUNTER
Called by nursing staff at [facility] about patient who c/o CP.   They had already sent this patient to the hospital before they called me.     Patricia Polanco MD

## 2017-04-10 NOTE — PROGRESS NOTES
"[facility] PHYSICIANS  HEMATOLOGY ONCOLOGY    ONCOLOGY FOLLOWUP NOTE      DIAGNOSIS:    1- Metastatic prostate cancer with extensive metastases to bone.   2- Advanced COPD and multiple other co-morbidities.      TREATMENT:     Lupron injection monthly, discontinued 6/2016.  Casodex 50 mg daily. discontinued 2/9/2015  - Xgeva monthly  - Xtandi 4/15/15  - Zytgea and prednisone 9/19/2016-present  - Xofigo 1/4/17     Subjective:  Mr. Tomas Grey comes in for followup today. His pain is in a reasonable control. He is tolerating treatment well. No new complaints.     REVIEW OF SYSTEMS:  A complete review of systems was performed and found to be negative other than pertinent positives mentioned in history of present illness.     Past medical, social histories reviewed.    Meds- Reviewed.     PHYSICAL EXAMINATION:   VITAL SIGNS:/63  Pulse 52  Temp 98.4  F (36.9  C) (Tympanic)  Resp 16  Ht 1.803 m (5' 11\")  Wt 112.3 kg (247 lb 8 oz)  BMI 34.52 kg/m2  GENERAL: Sitting comfortably.   HEENT: Pupils are equal. Oropharynx is clear.   NECK: No cervical or supraclavicular lymphadenopathy.   LUNGS: Clear bilaterally.   HEART: S1, S2, regular.   ABDOMEN: Soft, nontender, nondistended, no hepatosplenomegaly.   EXTREMITIES: Warm, well perfused.   NEUROLOGIC: Alert, awake.   SKIN: No rash.   LYMPHATICS: Bilateral edema he has dressing on his left leg.     DATA:  Recent Labs   Lab Test  04/03/17   1244  03/22/17   1305   12/26/16   1851   05/27/16   1410   05/28/15   0715   NA  142  142   < >  146*   < >  143   < >  139   POTASSIUM  4.7  4.2   < >  3.8   < >  3.8   < >  5.5*   CHLORIDE  103  99   < >  99   < >  106   < >  106   CO2  36*  39*   < >  41*   < >  33*   < >  27   ANIONGAP  3  4   < >  5   < >  4   < >  6   BUN  51*  40*   < >  48*   < >  24   < >  26   CR  1.93*  1.81*   < >  1.80*   < >  1.64*   < >  1.66*   GLC  128*  114*   < >  61*   < >  130*   < >  121*   JORDAN  8.8  8.7   < >  9.2   < >  8.8 "   < >  8.5   MAG   --    --    --   1.7   --   1.9   < >   --    PHOS   --    --    --    --    --    --    --   3.3    < > = values in this interval not displayed.     Recent Labs   Lab Test  04/03/17   1244  03/22/17   1305  02/20/17   1208   WBC  9.5  8.1  7.7   HGB  9.3*  9.8*  9.8*   PLT  241  206  222   MCV  98  96  98   NEUTROPHIL  76.6  74.9  70.4     Recent Labs   Lab Test  04/03/17   1244  03/22/17   1305  02/20/17   1208   BILITOTAL  0.3  0.5  0.3   ALKPHOS  120  91  84   ALT  17  14  15   AST  15  16  13   ALBUMIN  2.9*  3.0*  3.0*   Results for HERMILO KING (MRN [id number]) as of 4/10/2017 13:45   Ref. Range 12/28/2016 13:00 2/20/2017 12:08 3/22/2017 13:05   PSA Latest Ref Range: 0 - 4 ug/L 46.26 (H) 163.58 (H) 210.71 (H)         Results for orders placed or performed during the hospital encounter of 03/29/17   NM Olanta 223 Xofigo Therapy    Narrative    Examination:  NM RADIUM 223 XOFIGO THERAPY  3/29/2017 2:41 PM     Comparison:  Radium 223 Ministration 3/1/2017    History:  Painful bony metastasis, Malignant neoplasm of prostate,  Secondary malignant neoplasm of bone    Additional Information: None.    Procedure: After confirming the identity of the patient, and after  consultation with the ordering physician Dr. Callahan the risks and  benefits of Olanta-223 therapy were discussed at length with the  patient, and all questions were answered. 178.8 uCi of Radium-223 were  given by IV to the patient without complications.      Impression    Impression:    178.8 uCi Radium-223 therapeutic treatment for  metastatic prostate cancer.    I have personally reviewed the examination and initial interpretation  and I agree with the findings.    MEGHA DEJESUS MD         ECOG  performance status 2     ASSESSMENT:   1.  Metastatic prostate cancer with multiple sclerotic bone metastases.  The patient continues on monthly Lupron with Casodex.  He has been on this regimen since 05/2014.  He continues to do well  symptomatically.  The patient switched care to us after having initial diagnosis and treatment in [place], [place]. He started Xtandi 4/15/15.   He is not able to keep up with his follow ups due to multiple other medical issues and repeated hospital admissions due to COPD exacerbations.   Started Zytega 9/19/16.  He saw Dr. Callahan as a second opinion and had Xofigo 1/4/17. We have continued zytega after that.   - His PSA is getting worse.  - He is not a candidate of chemotherapy. I will continue the current treatment as long as he is asymptomatic.   2.  Extensive bone metastases.   Bone scan 8/31/16 showed progression in bones.   He will continue to take calcium and vitamin D.  Will continue Xgeva.  3.  I will have him see Dr. Graf for urine retention.      PLAN:   1- Continue Zytega and prednsione.  2- RTC MD six weeks  3- Labs before next visit- CBC diff, CMP, PSA  4- Continue Xgeva  TAYLOR PETERS MD  4/10/2017    CC: Balgobin, Christopher Lennox Paul, MD

## 2017-04-10 NOTE — MR AVS SNAPSHOT
After Visit Summary   4/10/2017    Tomas Grey    MRN: [id number]           Patient Information     Date Of Birth          1945        Visit Information        Provider Department      4/10/2017 1:30 PM Manpreet Oviedo MD [facility] RH Oncology [facility]      Today's Diagnoses     Prostate cancer metastatic to multiple sites (H)    -  1      Care Instructions        1- Continue Zytega and prednsione.    2- RTC MD six weeks    3- Labs before next visit- CBC diff, CMP, PSA    4- Continue Xgeva        Follow-ups after your visit        Your next 10 appointments already scheduled     Apr 19, 2017  1:00 PM CDT   Return Visit with  ONCOLOGY NURSE   [facility] ([facility])    [facility]  [address]  [facility] [id number] [phone]            Apr 26, 2017  9:30 AM CDT   (Arrive by 9:15 AM)   Return Visit with Lakeisha Singh PA-C   [facility] ([facility])    [address] [address] [phone]            Apr 26, 2017 11:00 AM CDT   NM RADIUM 223 XOFIGO THERAPY with UUNMINJ1   [facility], [place], Nuclear Medicine ([facility])    [address] [facility] [address] [phone]           Please bring a list of your medicines to the exam. (Include vitamins, minerals and over-the-counter drugs.) You should wear comfortable clothes. Leave your valuables at home. Please bring related prior results and films.  Tell your doctor:   If you are breastfeeding or may be pregnant.   If you have had a barium test within the past few days. Barium may change the results of certain exams.   If you think you may need sedation (medicine to help you relax).  You may eat and drink as normal.  Please call your Imaging Department at your exam site with any questions.            May  15, 2017  2:00 PM CDT   Return Visit with RH ONCOLOGY NURSE   [facility] ([facility])    [facility]  [address]  [facility] [address]   [phone]            May 17, 2017  1:00 PM CDT   Return Visit with RH ONCOLOGY NURSE   [facility] ([facility])    [facility]  [address]  [facility] [address]   [phone]            May 22, 2017  3:00 PM CDT   Return Visit with Manpreet Oviedo MD   [facility] ([facility])    [facility]  [address]  [facility] [address]   [phone]            May 24, 2017  9:30 AM CDT   (Arrive by 9:15 AM)   Return Visit with Lakeisha Singh PA-C   [facility] ([facility])    [address] [id number] [phone]            May 24, 2017 11:00 AM CDT   NM RADIUM 223 XOFIGO THERAPY with UUNMINJ2   [facility] [place], Nuclear Medicine ([facility] [place])    [address] [facility] [id number] [phone]           Please bring a list of your medicines to the exam. (Include vitamins, minerals and over-the-counter drugs.) You should wear comfortable clothes. Leave your valuables at home. Please bring related prior results and films.  Tell your doctor:   If you are breastfeeding or may be pregnant.   If you have had a barium test within the past few days. Barium may change the results of certain exams.   If you think you may need sedation (medicine to help you relax).  You may eat and drink as normal.  Please call your Imaging Department at your exam site with any questions.              Who to contact     If you have questions or need follow up information about today's clinic visit or your schedule please contact [facility]  "CANCER CARE directly at [phone].  Normal or non-critical lab and imaging results will be communicated to you by MyChart, letter or phone within 4 business days after the clinic has received the results. If you do not hear from us within 7 days, please contact the clinic through [employer] or phone. If you have a critical or abnormal lab result, we will notify you by phone as soon as possible.  Submit refill requests through [employer] or call your pharmacy and they will forward the refill request to us. Please allow 3 business days for your refill to be completed.          Additional Information About Your Visit        [employer] Information     [employer] lets you send messages to your doctor, view your test results, renew your prescriptions, schedule appointments and more. To sign up, go to [email or web address] . Click on \"Log in\" on the left side of the screen, which will take you to the Welcome page. Then click on \"Sign up Now\" on the right side of the page.     You will be asked to enter the access code listed below, as well as some personal information. Please follow the directions to create your username and password.     Your access code is: [email or web address]  Expires: 2017  2:58 PM     Your access code will  in 90 days. If you need help or a new code, please call your [place] clinic or [phone].        Care EveryWhere ID     This is your Care EveryWhere ID. This could be used by other organizations to access your [place] medical records  [id number]        Your Vitals Were     Pulse Temperature Respirations Height BMI (Body Mass Index)       52 98.4  F (36.9  C) (Tympanic) 16 1.803 m (5' 11\") 34.52 kg/m2        Blood Pressure from Last 3 Encounters:   04/10/17 106/63   17 129/62   17 100/55    Weight from Last 3 Encounters:   04/10/17 112.3 kg (247 lb 8 oz)   17 115.2 kg (254 lb)   17 111.2 kg (245 lb 3.2 oz)               Primary Care Provider Office Phone # Fax #    Ekaterina " SAL Babb [phone]       [facility] [address]  [facility] [address]        Thank you!     Thank you for choosing [facility]  for your care. Our goal is always to provide you with excellent care. Hearing back from our patients is one way we can continue to improve our services. Please take a few minutes to complete the written survey that you may receive in the mail after your visit with us. Thank you!             Your Updated Medication List - Protect others around you: Learn how to safely use, store and throw away your medicines at www.disposemymeds.org.          This list is accurate as of: 4/10/17 11:59 PM.  Always use your most recent med list.                   Brand Name Dispense Instructions for use    abiraterone 250 MG tablet    ZYTIGA    120 tablet    Take 4 tablets (1,000 mg) by mouth daily for 30 doses Take on empty stomach.       acetaminophen 500 MG tablet    TYLENOL     Take 500 mg by mouth every 4 hours as needed for pain Not to exceed 3000 mg/24 hours       ammonium lactate 12 % lotion    LAC-HYDRIN     Apply topically 2 times daily as needed for dry skin To all extremities for dry skin       aspirin 81 MG EC tablet     5 tablet    Take 1 tablet (81 mg) by mouth daily       BREO ELLIPTA 100-25 MCG/INH oral inhaler   Generic drug:  fluticasone-vilanterol      Inhale 1 puff into the lungs daily       calcium carb 1250 mg (500 mg Samish)/vitamin D 200 units 500-200 MG-UNIT per tablet    OSCAL with D    10 tablet    Take 1 tablet by mouth 2 times daily (with meals)       diclofenac 1 % Gel topical gel    VOLTAREN    100 g    Apply 4 grams to knees four times daily as needed using enclosed dosing card.       furosemide 40 MG tablet    LASIX    10 tablet    Take 1 tablet (40 mg) by mouth 2 times daily 0800, 1200       HYDROmorphone 2 MG tablet    DILAUDID    10 tablet    Take 1 tablet (2 mg) by mouth every 4 hours as needed for  moderate to severe pain       insulin glargine 100 UNIT/ML injection    LANTUS     Inject 25 Units Subcutaneous 2 times daily       ipratropium - albuterol 0.5 mg/2.5 mg/3 mL 0.5-2.5 (3) MG/3ML neb solution    DUONEB    360 mL    Take 1 vial (3 mLs) by nebulization every 4 hours as needed for shortness of breath / dyspnea or wheezing       isosorbide mononitrate 60 MG 24 hr tablet    IMDUR     Take 1 tablet (60 mg) by mouth daily       lidocaine 5 % Patch    LIDODERM    30 patch    Apply up to 3 patches to painful area at once for up to 12 h within a 24 h period.  Remove after 12 hours.       LIPITOR 10 MG tablet   Generic drug:  atorvastatin      Take 10 mg by mouth daily       metoprolol 50 MG 24 hr tablet    TOPROL XL    5 tablet    Take 1 tablet (50 mg) by mouth daily       nitroglycerin 0.4 MG sublingual tablet    NITROSTAT     Place 0.4 mg under the tongue every 5 minutes as needed for chest pain if you are still having symptoms after 3 doses (15 minutes) call 911.       omeprazole 10 MG CR capsule    priLOSEC     Take 1 capsule (10 mg) by mouth daily       polyvinyl alcohol 1.4 % ophthalmic solution    LIQUIFILM TEARS     Place 1 drop into both eyes 3 times daily       * predniSONE 5 MG tablet    DELTASONE    60 tablet    Take 1 tablet (5 mg) by mouth 2 times daily       * predniSONE 5 MG tablet    DELTASONE    60 tablet    Take 1 tablet (5 mg) by mouth 2 times daily       ranitidine 75 MG tablet   Generic drug:  ranitidine      Take 75 mg by mouth 2 times daily       senna-docusate 8.6-50 MG per tablet    SENOKOT-S;PERICOLACE    100 tablet    Take 1 tablet by mouth 2 times daily       tamsulosin 0.4 MG capsule    FLOMAX    5 capsule    Take 1 capsule (0.4 mg) by mouth daily       tiotropium 18 MCG capsule    SPIRIVA     Inhale 18 mcg into the lungs daily       * Notice:  This list has 2 medication(s) that are the same as other medications prescribed for you. Read the directions carefully, and ask your doctor  or other care provider to review them with you.

## 2017-04-10 NOTE — PATIENT INSTRUCTIONS
1- Continue Zytega and prednsione.    2- RTC MD six weeks    3- Labs before next visit- CBC diff, CMP, PSA    4- Continue Xgeva

## 2017-04-10 NOTE — NURSING NOTE
"Tomas Grey is a 72 year old male who presents for:  Chief Complaint   Patient presents with     Oncology Clinic Visit     Prostate cancer metastatic to bone - follow up        Initial Vitals:  /63  Pulse 52  Temp 98.4  F (36.9  C) (Tympanic)  Resp 16  Ht 1.803 m (5' 11\")  Wt 112.3 kg (247 lb 8 oz)  BMI 34.52 kg/m2 Estimated body mass index is 34.52 kg/(m^2) as calculated from the following:    Height as of this encounter: 1.803 m (5' 11\").    Weight as of this encounter: 112.3 kg (247 lb 8 oz).. Body surface area is 2.37 meters squared. BP completed using cuff size: large  Extreme Pain (8) No LMP for male patient. Allergies and medications reviewed.     Medications: Medication refills not needed today.  Pharmacy name entered into EPIC:    [facility] - [place], [place] - [address]  [facility] - [place] - [address] [place] [address] & [facility] - [place] - [address] [facility] - [place], [place] - [address] [facility] - [address] [facility]Y - [place], [place] - [address] - [address] [place] [address]    Comments: follow up    8 minutes for nursing intake (face to face time)   Lily Decker CMA     DISCHARGE PLAN:  Next appointments: See patient instruction section  Departure Mode: wheel chair  Accompanied by: self  0 minutes for nursing discharge (face to face time)   Lily Decker CMA            "

## 2017-04-18 NOTE — PROGRESS NOTES
[facility]    Chief Complaint   Patient presents with     Nursing Home Acute     HPI:    Tomas Grey is a 72 year old  (1945), who is being seen today for an episodic care visit at [facility]. Today's concern is:    Lymphedema of Bilateral Lower Extremities. Per staff report, lower extremities weeping clear drainage. Per resident report, over the weekend, both legs began weeping, right more than left. Reports they are changing his dressing often as they are becoming saturated. Complains of generalized pain.     Chronic Kidney Disease Stage III. Last Creatinine 1.93 on 4/3/17. Previous Creatinine 1.81 on 3/22/17.     Type 2 Diabetes Mellitus. Last A1C 7.7 on 12/20/16. Upon review of blood sugars over the past week, range is as follows:    Breakfast: 122-164 with an episode of 256 and 338  Lunch: 119-203 with an episode of 270  Dinner: 108-329  Bedtime: 165-226 with an episode of 253 and 319    ALLERGIES: Morphine  Past Medical, Surgical, Family and Social History reviewed and updated in [facility].    Current Outpatient Prescriptions   Medication Sig Dispense Refill     oseltamivir (TAMIFLU) 30 MG capsule Take 30 mg by mouth daily for 14 Days. Stop date: 4/20/17       abiraterone (ZYTIGA) 250 MG tablet Take 4 tablets (1,000 mg) by mouth daily for 30 doses Take on empty stomach. 120 tablet 0     predniSONE (DELTASONE) 5 MG tablet Take 1 tablet (5 mg) by mouth 2 times daily 60 tablet 0     ranitidine (RANITIDINE) 75 MG tablet Take 75 mg by mouth 2 times daily       lidocaine (LIDODERM) 5 % Patch Apply up to 3 patches to painful area at once for up to 12 h within a 24 h period.  Remove after 12 hours. 30 patch 0     diclofenac (VOLTAREN) 1 % GEL topical gel Apply 4 grams to knees four times daily as needed using enclosed dosing card. 100 g 0     HYDROmorphone (DILAUDID) 2 MG tablet Take 1 tablet (2 mg) by mouth every 4 hours as needed for moderate to severe pain 10 tablet 0      senna-docusate (SENOKOT-S;PERICOLACE) 8.6-50 MG per tablet Take 1 tablet by mouth 2 times daily 100 tablet      insulin glargine (LANTUS) 100 UNIT/ML injection Inject 25 Units Subcutaneous 2 times daily        omeprazole (PRILOSEC) 10 MG CR capsule Take 1 capsule (10 mg) by mouth daily       isosorbide mononitrate (IMDUR) 60 MG 24 hr tablet Take 1 tablet (60 mg) by mouth daily       atorvastatin (LIPITOR) 10 MG tablet Take 10 mg by mouth daily       fluticasone - vilanterol (BREO ELLIPTA) 100-25 MCG/INH oral inhaler Inhale 1 puff into the lungs daily       tiotropium (SPIRIVA) 18 MCG inhalation capsule Inhale 18 mcg into the lungs daily       polyvinyl alcohol (LIQUIFILM TEARS) 1.4 % ophthalmic solution Place 1 drop into both eyes 3 times daily       acetaminophen (TYLENOL) 500 MG tablet Take 500 mg by mouth every 4 hours as needed for pain Not to exceed 3000 mg/24 hours       nitroglycerin (NITROSTAT) 0.4 MG SL tablet Place 0.4 mg under the tongue every 5 minutes as needed for chest pain if you are still having symptoms after 3 doses (15 minutes) call 911.       ipratropium - albuterol 0.5 mg/2.5 mg/3 mL (DUONEB) 0.5-2.5 (3) MG/3ML nebulization Take 1 vial (3 mLs) by nebulization every 4 hours as needed for shortness of breath / dyspnea or wheezing 360 mL 3     aspirin 81 MG EC tablet Take 1 tablet (81 mg) by mouth daily 5 tablet 0     metoprolol (TOPROL XL) 50 MG 24 hr tablet Take 1 tablet (50 mg) by mouth daily 5 tablet 0     furosemide (LASIX) 40 MG tablet Take 1 tablet (40 mg) by mouth 2 times daily 0800, 1200 10 tablet 0     calcium carb 1250 mg, 500 mg [place],/vitamin D 200 units (OSCAL WITH D) 500-200 MG-UNIT per tablet Take 1 tablet by mouth 2 times daily (with meals) 10 tablet 0     tamsulosin (FLOMAX) 0.4 MG 24 hr capsule Take 1 capsule (0.4 mg) by mouth daily 5 capsule 0     ammonium lactate (LAC-HYDRIN) 12 % lotion Apply topically 2 times daily as needed for dry skin To all extremities for dry skin        "[DISCONTINUED] enzalutamide (XTANDI) 40 MG capsule Take 4 capsules (160 mg) by mouth daily 120 capsule 0     Medications reviewed:  Medications reconciled to facility chart and changes were made to reflect current medications as identified as above med list. Below are the changes that were made:   Medications stopped since last EPIC medication reconciliation:     Medications started since last [facility] medication reconciliation:  Orders Placed This Encounter   Medications     oseltamivir (TAMIFLU) 30 MG capsule     Sig: Take 30 mg by mouth daily for 14 Days. Stop date: 4/20/17     REVIEW OF SYSTEMS:  4 point ROS including Respiratory, CV, GI and , other than that noted in the HPI,  is negative    Physical Exam:  /67  Pulse 78  Temp 96.7  F (35.9  C)  Resp 18  Ht 5' 11\" (1.803 m)  Wt 249 lb 8 oz (113.2 kg)  SpO2 96%  BMI 34.8 kg/m2  GENERAL APPEARANCE:  Alert, in no distress  ENT:  Mouth and posterior oropharynx normal, moist mucous membranes  EYES:  EOM, conjunctivae, lids, pupils and irises normal  RESP:  respiratory effort and palpation of chest normal, lungs clear to auscultation , no respiratory distress  CV:  Palpation and auscultation of heart done , regular rate and rhythm, no murmur, rub, or gallop  ABDOMEN:  normal bowel sounds, soft, nontender, no hepatosplenomegaly or other masses  M/S:   Active movement of bilateral upper and lower extremities. Weeping BLE.  SKIN:  Inspection of skin and subcutaneous tissue baseline, Palpation of skin and subcutaneous tissue baseline, abrasion on right shin  NEURO:   Cranial nerves 2-12 are normal tested and grossly at patient's baseline  PSYCH:  affect and mood normal    Recent Labs:      CBC RESULTS:   Recent Labs   Lab Test  04/03/17   1244  03/22/17   1305   WBC  9.5  8.1   RBC  3.38*  3.56*   HGB  9.3*  9.8*   HCT  33.2*  34.1*   MCV  98  96   MCH  27.5  27.5   MCHC  28.0*  28.7*   RDW  15.8*  15.8*   PLT  241  206       Last Basic Metabolic " Panel:  Recent Labs   Lab Test  04/03/17   1244  03/22/17   1305   NA  142  142   POTASSIUM  4.7  4.2   CHLORIDE  103  99   JORDAN  8.8  8.7   CO2  36*  39*   BUN  51*  40*   CR  1.93*  1.81*   GLC  128*  114*       Liver Function Studies -   Recent Labs   Lab Test  04/03/17   1244  03/22/17   1305   PROTTOTAL  7.3  7.0   ALBUMIN  2.9*  3.0*   BILITOTAL  0.3  0.5   ALKPHOS  120  91   AST  15  16   ALT  17  14       TSH   Date Value Ref Range Status   11/30/2016 0.84 mcU/mL Final   04/29/2015 0.62 0.40 - 4.00 mU/L Final       Lab Results   Component Value Date    A1C 7.9 10/06/2016    A1C 6.8 07/19/2016     Assessment/Plan:  Lymphedema of Bilateral Lower Extremities. Weights stable. Due to weeping edema, will increase Furosemide from 40 mg to 60 mg BID x 3 days then resume 40 mg BID. ABD and Kerlix to be applied to BLE daily and PRN. Cover with ACE wraps. Further plans pending results.    Chronic Kidney Disease Stage III. Baseline Creatinine upper 1s. Due to increase in Furosemide, will repeat BMP on 4/20/17.     Type 2 Diabetes Mellitus. Last A1C 7.7 on 12/20/16. Blood sugars labile, likely secondary to diet, snacking and sugary drinks. Also on Prednisone. Most blood sugars <200. Continue Glargine as ordered for now. Due for repeat A1C.     Electronically signed by  SAL Acevedo CNP

## 2017-04-19 NOTE — MR AVS SNAPSHOT
After Visit Summary   4/19/2017    Tomas Grey    MRN: [id number]           Patient Information     Date Of Birth          1945        Visit Information        Provider Department      4/19/2017 1:00 PM RH ONCOLOGY NURSE [facility]        Today's Diagnoses     Knee pain        Prostate cancer (H)        Knee pain, unspecified chronicity, unspecified laterality           Follow-ups after your visit        Your next 10 appointments already scheduled     Apr 26, 2017  9:30 AM CDT   (Arrive by 9:15 AM)   Return Visit with Lakeisha Singh PA-C   [facility] ([facility])    [address] [id number] [phone]            Apr 26, 2017 11:00 AM CDT   NM RADIUM 223 XOFIGO THERAPY with UUNMINJ1   [facility] [place], Nuclear Medicine ([facility], [facility])    [address] [facility] [id number] [phone]           Please bring a list of your medicines to the exam. (Include vitamins, minerals and over-the-counter drugs.) You should wear comfortable clothes. Leave your valuables at home. Please bring related prior results and films.  Tell your doctor:   If you are breastfeeding or may be pregnant.   If you have had a barium test within the past few days. Barium may change the results of certain exams.   If you think you may need sedation (medicine to help you relax).  You may eat and drink as normal.  Please call your Imaging Department at your exam site with any questions.            May 17, 2017  1:00 PM CDT   Return Visit with RH ONCOLOGY NURSE   [facility] ([facility])    [facility]  [address]  [facility] [address]   [phone]            May 22, 2017  3:00 PM CDT   Return Visit with Manpreet Oviedo MD   [facility] ([facility])     [facility]  [address]  [place] [address]   [phone]            May 24, 2017  9:30 AM CDT   (Arrive by 9:15 AM)   Return Visit with CARL Wooten [facility] ([facility])    [address] [facility]  2nd Floor  [facility] [address] [phone]            May 24, 2017 11:00 AM CDT   NM RADIUM 223 XOFIGO THERAPY with UUNMINJ2   [facility] [place], Nuclear Medicine ([facility])    [address] [facility] [address]   [phone]           Please bring a list of your medicines to the exam. (Include vitamins, minerals and over-the-counter drugs.) You should wear comfortable clothes. Leave your valuables at home. Please bring related prior results and films.  Tell your doctor:   If you are breastfeeding or may be pregnant.   If you have had a barium test within the past few days. Barium may change the results of certain exams.   If you think you may need sedation (medicine to help you relax).  You may eat and drink as normal.  Please call your Imaging Department at your exam site with any questions.              Who to contact     If you have questions or need follow up information about today's clinic visit or your schedule please contact [facility] directly at [phone].  Normal or non-critical lab and imaging results will be communicated to you by MyChart, letter or phone within 4 business days after the clinic has received the results. If you do not hear from us within 7 days, please contact the clinic through MyChart or phone. If you have a critical or abnormal lab result, we will notify you by phone as soon as possible.  Submit refill requests through [employer] or call your pharmacy and they will forward the refill request to us. Please allow 3 business days for your refill to be completed.          Additional Information About  "Your Visit        [employer] Information     [employer] lets you send messages to your doctor, view your test results, renew your prescriptions, schedule appointments and more. To sign up, go to [email or web address] . Click on \"Log in\" on the left side of the screen, which will take you to the Welcome page. Then click on \"Sign up Now\" on the right side of the page.     You will be asked to enter the access code listed below, as well as some personal information. Please follow the directions to create your username and password.     Your access code is: [email or web address]  Expires: 2017  2:58 PM     Your access code will  in 90 days. If you need help or a new code, please call your [facility] clinic or [phone].        Care EveryWhere ID     This is your Care EveryWhere ID. This could be used by other organizations to access your [facility] medical records  [id number]        Your Vitals Were     BMI (Body Mass Index)                   35.02 kg/m2            Blood Pressure from Last 3 Encounters:   17 124/67   04/10/17 106/63   17 129/62    Weight from Last 3 Encounters:   17 113.9 kg (251 lb 2 oz)   17 113.2 kg (249 lb 8 oz)   04/10/17 112.3 kg (247 lb 8 oz)              We Performed the Following     CBC with platelets differential     Comprehensive metabolic panel     PSA tumor marker          Today's Medication Changes          These changes are accurate as of: 17  1:03 PM.  If you have any questions, ask your nurse or doctor.               These medicines have changed or have updated prescriptions.        Dose/Directions    furosemide 40 MG tablet   Commonly known as:  LASIX   This may have changed:  additional instructions   Used for:  Lymphedema of both lower extremities        Dose:  40 mg   Take 1 tablet (40 mg) by mouth 2 times daily 0800, 1200   Quantity:  10 tablet   Refills:  0                Primary Care Provider Office Phone # Fax #    SAL Acevedo CNP " [phone]       [facility] [address] [facility] [address]  [facility] [address]        Thank you!     Thank you for choosing [facility]  for your care. Our goal is always to provide you with excellent care. Hearing back from our patients is one way we can continue to improve our services. Please take a few minutes to complete the written survey that you may receive in the mail after your visit with us. Thank you!             Your Updated Medication List - Protect others around you: Learn how to safely use, store and throw away your medicines at www.disposemymeds.org.          This list is accurate as of: 4/19/17  1:03 PM.  Always use your most recent med list.                   Brand Name Dispense Instructions for use    abiraterone 250 MG tablet    ZYTIGA    120 tablet    Take 4 tablets (1,000 mg) by mouth daily for 30 doses Take on empty stomach.       acetaminophen 500 MG tablet    TYLENOL     Take 500 mg by mouth every 4 hours as needed for pain Not to exceed 3000 mg/24 hours       ammonium lactate 12 % lotion    LAC-HYDRIN     Apply topically 2 times daily as needed for dry skin To all extremities for dry skin       aspirin 81 MG EC tablet     5 tablet    Take 1 tablet (81 mg) by mouth daily       BREO ELLIPTA 100-25 MCG/INH oral inhaler   Generic drug:  fluticasone-vilanterol      Inhale 1 puff into the lungs daily       calcium carb 1250 mg (500 mg [place])/vitamin D 200 units 500-200 MG-UNIT per tablet    OSCAL with D    10 tablet    Take 1 tablet by mouth 2 times daily (with meals)       diclofenac 1 % Gel topical gel    VOLTAREN    100 g    Apply 4 grams to knees four times daily as needed using enclosed dosing card.       furosemide 40 MG tablet    LASIX    10 tablet    Take 1 tablet (40 mg) by mouth 2 times daily 0800, 1200       HYDROmorphone 2 MG tablet    DILAUDID    10 tablet    Take 1 tablet (2 mg) by mouth every 4 hours as needed for moderate to severe pain        insulin glargine 100 UNIT/ML injection    LANTUS     Inject 25 Units Subcutaneous 2 times daily       ipratropium - albuterol 0.5 mg/2.5 mg/3 mL 0.5-2.5 (3) MG/3ML neb solution    DUONEB    360 mL    Take 1 vial (3 mLs) by nebulization every 4 hours as needed for shortness of breath / dyspnea or wheezing       isosorbide mononitrate 60 MG 24 hr tablet    IMDUR     Take 1 tablet (60 mg) by mouth daily       lidocaine 5 % Patch    LIDODERM    30 patch    Apply up to 3 patches to painful area at once for up to 12 h within a 24 h period.  Remove after 12 hours.       LIPITOR 10 MG tablet   Generic drug:  atorvastatin      Take 10 mg by mouth daily       metoprolol 50 MG 24 hr tablet    TOPROL XL    5 tablet    Take 1 tablet (50 mg) by mouth daily       nitroglycerin 0.4 MG sublingual tablet    NITROSTAT     Place 0.4 mg under the tongue every 5 minutes as needed for chest pain if you are still having symptoms after 3 doses (15 minutes) call 911.       omeprazole 10 MG CR capsule    priLOSEC     Take 1 capsule (10 mg) by mouth daily       oseltamivir 30 MG capsule    TAMIFLU     Take 30 mg by mouth daily for 14 Days. Stop date: 4/20/17       polyvinyl alcohol 1.4 % ophthalmic solution    LIQUIFILM TEARS     Place 1 drop into both eyes 3 times daily       predniSONE 5 MG tablet    DELTASONE    60 tablet    Take 1 tablet (5 mg) by mouth 2 times daily       ranitidine 75 MG tablet   Generic drug:  ranitidine      Take 75 mg by mouth 2 times daily       senna-docusate 8.6-50 MG per tablet    SENOKOT-S;PERICOLACE    100 tablet    Take 1 tablet by mouth 2 times daily       tamsulosin 0.4 MG capsule    FLOMAX    5 capsule    Take 1 capsule (0.4 mg) by mouth daily       tiotropium 18 MCG capsule    SPIRIVA     Inhale 18 mcg into the lungs daily

## 2017-04-19 NOTE — PROGRESS NOTES
Medical Assistant Note:  Tomas Grey presents today for lab and weight.    Patient seen by provider today: No.   present during visit today: Not Applicable.    Concerns: No Concerns.    Procedure:  Labs drawn: .    Post Assessment:  Labs drawn without difficulty: Yes.    Discharge Plan:  Departure Mode: Wheelchair.    Face to Face Time: 8.    [place] Decker

## 2017-04-26 NOTE — NURSING NOTE
"Oncology Rooming Note    April 26, 2017 9:42 AM   Tomas rGey is a 53 year old male who presents for: Oncology Clinic Visit    Initial Vitals: /67 (BP Location: Right arm, Patient Position: Chair, Cuff Size: Adult Large)  Pulse 98  Temp 98.5  F (36.9  C) (Oral)  Resp 18  Ht 1.803 m (5' 10.98\")  Wt 66.4 kg (146 lb 6.4 oz)  SpO2 94%  BMI 20.43 kg/m2 Estimated body mass index is 20.43 kg/(m^2) as calculated from the following:    Height as of this encounter: 1.803 m (5' 10.98\").    Weight as of this encounter: 66.4 kg (146 lb 6.4 oz). Body surface area is 1.82 meters squared.  Extreme Pain (9) Comment: Shoulders   No LMP for male patient.  Allergies reviewed: Yes  Medications reviewed: Yes    Medications: Medication refills not needed today.  Pharmacy name entered into SinglePlatform:    [facility]  [facility] - [place], [place] - [address]  [facility] - [place] - [address] [place] [address] & [facility] - [place], [place] - [address] [facility] - [place], [place] - [address] [facility] [address] [place] [address] PHARMACY - [place], [place] - [address]    Clinical concerns:Pt having SOB, which he states is normal for him. Melly Singh was notified.    7 minutes for nursing intake (face to face time)     Lesley Valderrama LPN                "

## 2017-04-26 NOTE — LETTER
"4/26/2017       RE: Tomas Grey  [facility]  [address]  [facility] [address]     Dear Colleague,    Thank you for referring your patient, Tomas Grey, to the [facility]. Please see a copy of my visit note below.    [facility]IANS  HEMATOLOGY ONCOLOGY    ONCOLOGY FOLLOWUP NOTE      DIAGNOSIS:    1- Metastatic prostate cancer with extensive metastases to bone.   2- Advanced COPD and multiple other co-morbidities.      TREATMENT:     Lupron injection monthly, discontinued 6/2016.  Casodex 50 mg daily. discontinued 2/9/2015  - Xgeva monthly  - Xtandi 4/15/15  - Zytgea and prednisone 9/19/2016-present  - Xofigo 1/4/17     Subjective:  Mr. Tomas Grey comes in for followup today. His pain is in a reasonable control- chronic in his abdomen mostly. Has some shoulder and back pains that per him are longstanding. He is tolerating treatment well. No new complaints except his terrible BERRY that is weeping.     REVIEW OF SYSTEMS:  A complete review of systems was performed and found to be negative other than pertinent positives mentioned in history of present illness.     Past medical, social histories reviewed.    Meds- Reviewed.     PHYSICAL EXAMINATION:   VITAL SIGNS:/67 (BP Location: Right arm, Patient Position: Chair, Cuff Size: Adult Large)  Pulse 98  Temp 98.5  F (36.9  C) (Oral)  Resp 18  Ht 1.803 m (5' 10.98\")  Wt 66.4 kg (146 lb 6.4 oz)  SpO2 95%  BMI 20.43 kg/m2  GENERAL: Sitting comfortably.   HEENT: Pupils are equal. Oropharynx is clear.   NECK: No cervical or supraclavicular lymphadenopathy.   LUNGS: Clear bilaterally.   HEART: S1, S2, regular.   ABDOMEN: Soft, nontender, nondistended, no hepatosplenomegaly.   EXTREMITIES: Warm, well perfused.   NEUROLOGIC: Alert, awake.   SKIN: No rash.   LYMPHATICS: Bilateral edema he has dressing on his legs    DATA:     12/28/2016 13:00 2/20/2017 12:08 3/22/2017 13:05 4/19/2017 12:55 "   PSA 46.26 (H) 163.58 (H) 210.71 (H) 249.00 (H)      3/22/2017 13:05 4/3/2017 12:44 4/19/2017 12:55   WBC 8.1 9.5 8.0   Hemoglobin 9.8 (L) 9.3 (L) 10.1 (L)   Hematocrit 34.1 (L) 33.2 (L) 35.1 (L)   Platelet Count 206 241 227   RBC Count 3.56 (L) 3.38 (L) 3.60 (L)   MCV 96 98 98      4/3/2017 12:44 4/19/2017 12:55 4/20/2017 00:00   Sodium 142 141 144   Potassium 4.7 4.4 5.2 (H)   Chloride 103 100 101   Carbon Dioxide 36 (H) 36 (H) 37 (H)   Urea Nitrogen 51 (H) 40 (H) 46 (H)   Creatinine 1.93 (H) 1.64 (H) 1.96 (H)   GFR Estimate 34 (L) 42 (L) 34 (L)   GFR Estimate If Black 42 (L) 50 (L) 41 (L)   Calcium 8.8 9.1 9.2   Anion Gap 3 5 6.0     ECOG  performance status 2     ASSESSMENT:   1.  Metastatic prostate cancer with multiple sclerotic bone metastases.  The patient continues on monthly Lupron with Casodex.  He has been on this regimen since 05/2014.  He continues to do well symptomatically.  The patient switched care to us after having initial diagnosis and treatment in [place], [place]. He started Xtandi 4/15/15. He is not able to keep up with his follow ups due to multiple other medical issues and repeated hospital admissions due to COPD exacerbations.   Started Zytega 9/19/16. He saw Dr. Callahan as a second opinion and had Xofigo 1/4/17.   - His PSA is getting worse.  We reviewed this today  - He is not a candidate of chemotherapy. We will continue the current treatment as long as he is asymptomatic.     2.  Extensive bone metastases.   Bone scan 8/31/16 showed progression in bones.   He will continue to take calcium and vitamin D.  Will continue Xgeva (today).    Pastora Singh PA-C

## 2017-04-26 NOTE — MR AVS SNAPSHOT
After Visit Summary   4/26/2017    Tomas Grey    MRN: [id number]           Patient Information     Date Of Birth          1945        Visit Information        Provider Department      4/26/2017 9:30 AM Lakeisha Singh PA-C M [facility]        Today's Diagnoses     Metastasis to bone (H)    -  1    Prostate cancer metastatic to multiple sites (H)           Follow-ups after your visit        Your next 10 appointments already scheduled     May 17, 2017  1:00 PM CDT   Return Visit with RH ONCOLOGY NURSE   [facility] ([facility])    [facility]  [address] [place] [address]  [facility] [address]   [phone]            May 22, 2017  3:00 PM CDT   Return Visit with Manpreet Oviedo MD   [facility] ([facility])    [facility]  [address]  [facility] [address]   [phone]            May 24, 2017  9:30 AM CDT   (Arrive by 9:15 AM)   Return Visit with CARL Wooten [facility] ([facility] [place])    [address] [id number] [phone]            May 24, 2017 11:00 AM CDT   NM RADIUM 223 XOFIGO THERAPY with UUNMINJ2   [facility] [place], Nuclear Medicine ([facility] [place])    [address] [facility] [address] [phone]           Please bring a list of your medicines to the exam. (Include vitamins, minerals and over-the-counter drugs.) You should wear comfortable clothes. Leave your valuables at home. Please bring related prior results and films.  Tell your doctor:   If you are breastfeeding or may be pregnant.   If you have had a barium test within the past few days. Barium may change the results of certain exams.   If you think you may need sedation (medicine to help you relax).  You may eat and drink as  "normal.  Please call your Imaging Department at your exam site with any questions.              Who to contact     If you have questions or need follow up information about today's clinic visit or your schedule please contact [facility] directly at [phone].  Normal or non-critical lab and imaging results will be communicated to you by MyChart, letter or phone within 4 business days after the clinic has received the results. If you do not hear from us within 7 days, please contact the clinic through [facility] or phone. If you have a critical or abnormal lab result, we will notify you by phone as soon as possible.  Submit refill requests through [employer] or call your pharmacy and they will forward the refill request to us. Please allow 3 business days for your refill to be completed.          Additional Information About Your Visit        [facility] Information     [employer] lets you send messages to your doctor, view your test results, renew your prescriptions, schedule appointments and more. To sign up, go to [email or web address] . Click on \"Log in\" on the left side of the screen, which will take you to the Welcome page. Then click on \"Sign up Now\" on the right side of the page.     You will be asked to enter the access code listed below, as well as some personal information. Please follow the directions to create your username and password.     Your access code is: [email or web address]  Expires: 2017  2:58 PM     Your access code will  in 90 days. If you need help or a new code, please call your [place] clinic or [phone].        Care EveryWhere ID     This is your Care EveryWhere ID. This could be used by other organizations to access your [place] medical records  [id number]        Your Vitals Were     Pulse Temperature Respirations Height Pulse Oximetry BMI (Body Mass Index)    98 98.5  F (36.9  C) (Oral) 18 1.803 m (5' 10.98\") 95% 20.43 kg/m2       Blood Pressure from Last 3 " Encounters:   No data found for BP    Weight from Last 3 Encounters:   No data found for Wt              Today, you had the following     No orders found for display         Today's Medication Changes          These changes are accurate as of: 4/26/17 11:59 PM.  If you have any questions, ask your nurse or doctor.               These medicines have changed or have updated prescriptions.        Dose/Directions    furosemide 40 MG tablet   Commonly known as:  LASIX   This may have changed:  additional instructions   Used for:  Lymphedema of both lower extremities        Dose:  40 mg   Take 1 tablet (40 mg) by mouth 2 times daily 0800, 1200   Quantity:  10 tablet   Refills:  0       predniSONE 5 MG tablet   Commonly known as:  DELTASONE   This may have changed:  Another medication with the same name was removed. Continue taking this medication, and follow the directions you see here.   Used for:  Prostate cancer metastatic to bone (H), Metastasis to bone (H), Prostate cancer metastatic to multiple sites (H)        Dose:  5 mg   Take 1 tablet (5 mg) by mouth 2 times daily   Quantity:  60 tablet   Refills:  0                Primary Care Provider Office Phone # Fax #    SAL Acevedo [phone]       [place] [facility] [address] [facility] [address]  [facility] [address]        Thank you!     Thank you for choosing [facility]  for your care. Our goal is always to provide you with excellent care. Hearing back from our patients is one way we can continue to improve our services. Please take a few minutes to complete the written survey that you may receive in the mail after your visit with us. Thank you!             Your Updated Medication List - Protect others around you: Learn how to safely use, store and throw away your medicines at www.disposemymeds.org.          This list is accurate as of: 4/26/17 11:59 PM.  Always use your most recent med list.                   Brand Name  Dispense Instructions for use    abiraterone 250 MG tablet    ZYTIGA    120 tablet    Take 4 tablets (1,000 mg) by mouth daily for 30 doses Take on empty stomach.       acetaminophen 500 MG tablet    TYLENOL     Take 500 mg by mouth every 4 hours as needed for pain Not to exceed 3000 mg/24 hours       ammonium lactate 12 % lotion    LAC-HYDRIN     Apply topically 2 times daily as needed for dry skin To all extremities for dry skin       aspirin 81 MG EC tablet     5 tablet    Take 1 tablet (81 mg) by mouth daily       BREO ELLIPTA 100-25 MCG/INH oral inhaler   Generic drug:  fluticasone-vilanterol      Inhale 1 puff into the lungs daily       calcium carb 1250 mg (500 mg [place])/vitamin D 200 units 500-200 MG-UNIT per tablet    OSCAL with D    10 tablet    Take 1 tablet by mouth 2 times daily (with meals)       diclofenac 1 % Gel topical gel    VOLTAREN    100 g    Apply 4 grams to knees four times daily as needed using enclosed dosing card.       furosemide 40 MG tablet    LASIX    10 tablet    Take 1 tablet (40 mg) by mouth 2 times daily 0800, 1200       HYDROmorphone 2 MG tablet    DILAUDID    10 tablet    Take 1 tablet (2 mg) by mouth every 4 hours as needed for moderate to severe pain       insulin glargine 100 UNIT/ML injection    LANTUS     Inject 25 Units Subcutaneous 2 times daily       ipratropium - albuterol 0.5 mg/2.5 mg/3 mL 0.5-2.5 (3) MG/3ML neb solution    DUONEB    360 mL    Take 1 vial (3 mLs) by nebulization every 4 hours as needed for shortness of breath / dyspnea or wheezing       isosorbide mononitrate 60 MG 24 hr tablet    IMDUR     Take 1 tablet (60 mg) by mouth daily       lidocaine 5 % Patch    LIDODERM    30 patch    Apply up to 3 patches to painful area at once for up to 12 h within a 24 h period.  Remove after 12 hours.       LIPITOR 10 MG tablet   Generic drug:  atorvastatin      Take 10 mg by mouth daily       metoprolol 50 MG 24 hr tablet    TOPROL XL    5 tablet    Take 1 tablet (50  mg) by mouth daily       nitroglycerin 0.4 MG sublingual tablet    NITROSTAT     Place 0.4 mg under the tongue every 5 minutes as needed for chest pain if you are still having symptoms after 3 doses (15 minutes) call 911.       omeprazole 10 MG CR capsule    priLOSEC     Take 1 capsule (10 mg) by mouth daily       polyvinyl alcohol 1.4 % ophthalmic solution    LIQUIFILM TEARS     Place 1 drop into both eyes 3 times daily       predniSONE 5 MG tablet    DELTASONE    60 tablet    Take 1 tablet (5 mg) by mouth 2 times daily       ranitidine 75 MG tablet   Generic drug:  ranitidine      Take 75 mg by mouth 2 times daily       senna-docusate 8.6-50 MG per tablet    SENOKOT-S;PERICOLACE    100 tablet    Take 1 tablet by mouth 2 times daily       tamsulosin 0.4 MG capsule    FLOMAX    5 capsule    Take 1 capsule (0.4 mg) by mouth daily       tiotropium 18 MCG capsule    SPIRIVA     Inhale 18 mcg into the lungs daily

## 2017-05-11 NOTE — PROGRESS NOTES
"[facility] PHYSICIANS  HEMATOLOGY ONCOLOGY    ONCOLOGY FOLLOWUP NOTE      DIAGNOSIS:    1- Metastatic prostate cancer with extensive metastases to bone.   2- Advanced COPD and multiple other co-morbidities.      TREATMENT:     Lupron injection monthly, discontinued 6/2016.  Casodex 50 mg daily. discontinued 2/9/2015  - Xgeva monthly  - Xtandi 4/15/15  - Zytgea and prednisone 9/19/2016-present  - Xofigo 1/4/17     Subjective:  Mr. Tomas Grey comes in for followup today. His pain is in a reasonable control- chronic in his abdomen mostly. Has some shoulder and back pains that per him are longstanding. He is tolerating treatment well. No new complaints except his terrible BERRY that is weeping.     REVIEW OF SYSTEMS:  A complete review of systems was performed and found to be negative other than pertinent positives mentioned in history of present illness.     Past medical, social histories reviewed.    Meds- Reviewed.     PHYSICAL EXAMINATION:   VITAL SIGNS:/67 (BP Location: Right arm, Patient Position: Chair, Cuff Size: Adult Large)  Pulse 98  Temp 98.5  F (36.9  C) (Oral)  Resp 18  Ht 1.803 m (5' 10.98\")  Wt 66.4 kg (146 lb 6.4 oz)  SpO2 95%  BMI 20.43 kg/m2  GENERAL: Sitting comfortably.   HEENT: Pupils are equal. Oropharynx is clear.   NECK: No cervical or supraclavicular lymphadenopathy.   LUNGS: Clear bilaterally.   HEART: S1, S2, regular.   ABDOMEN: Soft, nontender, nondistended, no hepatosplenomegaly.   EXTREMITIES: Warm, well perfused.   NEUROLOGIC: Alert, awake.   SKIN: No rash.   LYMPHATICS: Bilateral edema he has dressing on his legs    DATA:     12/28/2016 13:00 2/20/2017 12:08 3/22/2017 13:05 4/19/2017 12:55   PSA 46.26 (H) 163.58 (H) 210.71 (H) 249.00 (H)      3/22/2017 13:05 4/3/2017 12:44 4/19/2017 12:55   WBC 8.1 9.5 8.0   Hemoglobin 9.8 (L) 9.3 (L) 10.1 (L)   Hematocrit 34.1 (L) 33.2 (L) 35.1 (L)   Platelet Count 206 241 227   RBC Count 3.56 (L) 3.38 (L) 3.60 (L)   MCV 96 " 98 98      4/3/2017 12:44 4/19/2017 12:55 4/20/2017 00:00   Sodium 142 141 144   Potassium 4.7 4.4 5.2 (H)   Chloride 103 100 101   Carbon Dioxide 36 (H) 36 (H) 37 (H)   Urea Nitrogen 51 (H) 40 (H) 46 (H)   Creatinine 1.93 (H) 1.64 (H) 1.96 (H)   GFR Estimate 34 (L) 42 (L) 34 (L)   GFR Estimate If Black 42 (L) 50 (L) 41 (L)   Calcium 8.8 9.1 9.2   Anion Gap 3 5 6.0     ECOG  performance status 2     ASSESSMENT:   1.  Metastatic prostate cancer with multiple sclerotic bone metastases.  The patient continues on monthly Lupron with Casodex.  He has been on this regimen since 05/2014.  He continues to do well symptomatically.  The patient switched care to us after having initial diagnosis and treatment in [place], [place]. He started Xtandi 4/15/15. He is not able to keep up with his follow ups due to multiple other medical issues and repeated hospital admissions due to COPD exacerbations.   Started Zytega 9/19/16. He saw Dr. Callahan as a second opinion and had Xofigo 1/4/17.   - His PSA is getting worse.  We reviewed this today  - He is not a candidate of chemotherapy. We will continue the current treatment as long as he is asymptomatic.     2.  Extensive bone metastases.   Bone scan 8/31/16 showed progression in bones.   He will continue to take calcium and vitamin D.  Will continue Xgeva (today).    Pastora Singh PA-C

## 2017-05-14 NOTE — PROGRESS NOTES
Received a call from nursing that Tomas requesting to go to the hospital.  Vitals seem appropriate but he states his vision is blurry.  Will not accept any type of intervention from nursing just wanted to go to hospital.    Gave order to go to the ED for evaluation.    Electronically signed by Abigail Knight RN, CNP

## 2017-05-17 PROBLEM — E66.01 MORBID OBESITY (H): Status: ACTIVE | Noted: 2017-01-01

## 2017-05-17 NOTE — MR AVS SNAPSHOT
After Visit Summary   5/17/2017    Tomas Grey    MRN: [id number]           Patient Information     Date Of Birth          1945        Visit Information        Provider Department      5/17/2017 1:00 PM  ONCOLOGY NURSE [facility]        Today's Diagnoses     Prostate cancer metastatic to multiple sites (H)           Follow-ups after your visit        Your next 10 appointments already scheduled     May 22, 2017  3:00 PM CDT   Return Visit with Manpreet Oviedo MD   [facility] ([facility])    [facility]  [address]  [facility] [address]   [phone]            May 24, 2017  9:30 AM CDT   (Arrive by 9:15 AM)   Return Visit with CARL Wooten [facility] ([facility] [place])    [address] [id number] [phone]            May 24, 2017 11:00 AM CDT   NM RADIUM 223 XOFIGO THERAPY with UUNMINJ2   [facility] [place], Nuclear Medicine ([facility] [place])    [address] [facility] [id number] [phone]           Please bring a list of your medicines to the exam. (Include vitamins, minerals and over-the-counter drugs.) You should wear comfortable clothes. Leave your valuables at home. Please bring related prior results and films.  Tell your doctor:   If you are breastfeeding or may be pregnant.   If you have had a barium test within the past few days. Barium may change the results of certain exams.   If you think you may need sedation (medicine to help you relax).  You may eat and drink as normal.  Please call your Imaging Department at your exam site with any questions.              Who to contact     If you have questions or need follow up information about today's clinic visit or your schedule please contact [facility] CARE directly at  "[phone].  Normal or non-critical lab and imaging results will be communicated to you by MyChart, letter or phone within 4 business days after the clinic has received the results. If you do not hear from us within 7 days, please contact the clinic through [employer] or phone. If you have a critical or abnormal lab result, we will notify you by phone as soon as possible.  Submit refill requests through [employer] or call your pharmacy and they will forward the refill request to us. Please allow 3 business days for your refill to be completed.          Additional Information About Your Visit        [employer] Information     [employer] lets you send messages to your doctor, view your test results, renew your prescriptions, schedule appointments and more. To sign up, go to [email or web address] . Click on \"Log in\" on the left side of the screen, which will take you to the Welcome page. Then click on \"Sign up Now\" on the right side of the page.     You will be asked to enter the access code listed below, as well as some personal information. Please follow the directions to create your username and password.     Your access code is: [email or web address]  Expires: 2017  2:58 PM     Your access code will  in 90 days. If you need help or a new code, please call your [place] clinic or [phone].        Care EveryWhere ID     This is your Care EveryWhere ID. This could be used by other organizations to access your [place] medical records  [id number]        Your Vitals Were     BMI (Body Mass Index)                   36.11 kg/m2            Blood Pressure from Last 3 Encounters:   17 108/68   17 110/67   17 124/67    Weight from Last 3 Encounters:   17 110.9 kg (244 lb 8 oz)   17 108.8 kg (239 lb 14.4 oz)   17 66.4 kg (146 lb 6.4 oz)              We Performed the Following     CBC with platelets differential     Comprehensive metabolic panel     PSA tumor marker        Primary Care " Provider Office Phone # Fax #    SAL Acevedo [phone]       [facility] [address] [address]        Thank you!     Thank you for choosing [facility]CER CARE  for your care. Our goal is always to provide you with excellent care. Hearing back from our patients is one way we can continue to improve our services. Please take a few minutes to complete the written survey that you may receive in the mail after your visit with us. Thank you!             Your Updated Medication List - Protect others around you: Learn how to safely use, store and throw away your medicines at www.disposemymeds.org.          This list is accurate as of: 5/17/17  1:34 PM.  Always use your most recent med list.                   Brand Name Dispense Instructions for use    abiraterone 250 MG tablet    ZYTIGA    120 tablet    Take 4 tablets (1,000 mg) by mouth daily for 30 doses Take on empty stomach.       acetaminophen 500 MG tablet    TYLENOL     Take 500 mg by mouth every 4 hours as needed for pain Not to exceed 3000 mg/24 hours       ammonium lactate 12 % lotion    LAC-HYDRIN     Apply topically 2 times daily as needed for dry skin To all extremities for dry skin       aspirin 81 MG EC tablet     5 tablet    Take 1 tablet (81 mg) by mouth daily       bacitracin ointment      Apply to Right great toe topically in the evening for Ingrown toenail       BREO ELLIPTA 100-25 MCG/INH oral inhaler   Generic drug:  fluticasone-vilanterol      Inhale 1 puff into the lungs daily       budesonide-formoterol 160-4.5 MCG/ACT Inhaler    SYMBICORT     Inhale 2 puffs into the lungs 2 times daily       calcium carb 1250 mg (500 mg [place])/vitamin D 200 units 500-200 MG-UNIT per tablet    OSCAL with D    10 tablet    Take 1 tablet by mouth 2 times daily (with meals)       carboxymethylcellulose 0.5 % Soln ophthalmic solution    REFRESH PLUS     Place 1 drop into both eyes 3 times  daily       diclofenac 1 % Gel topical gel    VOLTAREN    100 g    Apply 4 grams to knees four times daily as needed using enclosed dosing card.       HYDROmorphone 2 MG tablet    DILAUDID    10 tablet    Take 1 tablet (2 mg) by mouth every 4 hours as needed for moderate to severe pain       insulin glargine 100 UNIT/ML injection    LANTUS     Inject 25 Units Subcutaneous 2 times daily       ipratropium - albuterol 0.5 mg/2.5 mg/3 mL 0.5-2.5 (3) MG/3ML neb solution    DUONEB    360 mL    Take 1 vial (3 mLs) by nebulization every 4 hours as needed for shortness of breath / dyspnea or wheezing       isosorbide mononitrate 60 MG 24 hr tablet    IMDUR     Take 1 tablet (60 mg) by mouth daily       LIPITOR 10 MG tablet   Generic drug:  atorvastatin      Take 10 mg by mouth daily       metoprolol 50 MG 24 hr tablet    TOPROL XL    5 tablet    Take 1 tablet (50 mg) by mouth daily       nitroglycerin 0.4 MG sublingual tablet    NITROSTAT     Place 0.4 mg under the tongue every 5 minutes as needed for chest pain if you are still having symptoms after 3 doses (15 minutes) call 911.       omeprazole 10 MG CR capsule    priLOSEC     Take 1 capsule (10 mg) by mouth daily       polyvinyl alcohol 1.4 % ophthalmic solution    LIQUIFILM TEARS     Place 1 drop into both eyes 3 times daily       predniSONE 5 MG tablet    DELTASONE    60 tablet    Take 1 tablet (5 mg) by mouth 2 times daily       ranitidine 75 MG tablet   Generic drug:  ranitidine      Take 75 mg by mouth 2 times daily       senna-docusate 8.6-50 MG per tablet    SENOKOT-S;PERICOLACE    100 tablet    Take 1 tablet by mouth 2 times daily       tamsulosin 0.4 MG capsule    FLOMAX    5 capsule    Take 1 capsule (0.4 mg) by mouth daily       tiotropium 18 MCG capsule    SPIRIVA     Inhale 18 mcg into the lungs daily

## 2017-05-17 NOTE — NURSING NOTE
Medical Assistant Note:  Tomas Grey presents today for Blood draw and weight check.    Patient seen by provider today: No.   present during visit today: Not Applicable.    Concerns: No Concerns, Patient weigh today at 244.5 lbs    Procedure:  Labs drawn: Yes.    Post Assessment:  Labs drawn without difficulty: Yes.    Discharge Plan:  Patient discharged in stable condition accompanied by: self.    Face to Face Time: 10.    Albertina Wang MA

## 2017-05-23 NOTE — PROGRESS NOTES
[facility]    Chief Complaint   Patient presents with     RECHECK     HPI:    Tomas Grey is a 72 year old  (1945), who is being seen today for an episodic care visit at [facility]. Today's concern is:    Bilateral Conjunctivitis. Noted to have eye irritation during latest hospitalization at [facility] 5/14/17 through 5/16/17. Yesterday, staff reported resident's eyes have been red since 5/19/17. Drainage present this morning. Cleaned with water and baby shampoo with no improvement. Resident also reported they were painful and he couldn't see. Evaluated by outside Ophthalmologist. Treated with Erythromycin ointment and TobraDex ointment. Today, resident states he has an eye infection and still has pain in his eyes.     Hyperkalemia. Furosemide discontinued during hospitalization 5/14/17 through 5/16/17 for NATTY. Repeat BMP on 5/22/17 revealed Potassium 6.0. Treated with Kayexalate 15 g x 1 with repeat Potassium 5.6 on 5/23/17. Resident denies heart palpitations. Complains of frequency of loose stools.     Venous Stasis Ulcers. Placed in [place] during hospitalization with instructions to follow-up in [facility] 5/22/17. Resident reports he did not follow-up and wants wounds to be managed at the nursing home.     ALLERGIES: Morphine  Past Medical, Surgical, Family and Social History reviewed and updated in [email or web address].    Current Outpatient Prescriptions   Medication Sig Dispense Refill     erythromycin (ROMYCIN) ophthalmic ointment Place 1 Application into both eyes At Bedtime       tobramycin-dexamethasone (TOBRADEX) ophthalmic ointment Place 1 Application into both eyes 4 times daily       sodium polystyrene (KAYEXALATE) 15 GM/60ML suspension Take 15 g by mouth once       bacitracin ointment Apply to Right great toe topically in the evening for Ingrown toenail       carboxymethylcellulose (REFRESH PLUS) 0.5 % SOLN ophthalmic solution Place 1 drop into both eyes 3 times  daily       budesonide-formoterol (SYMBICORT) 160-4.5 MCG/ACT Inhaler Inhale 2 puffs into the lungs 2 times daily       abiraterone (ZYTIGA) 250 MG tablet Take 4 tablets (1,000 mg) by mouth daily for 30 doses Take on empty stomach. 120 tablet 0     predniSONE (DELTASONE) 5 MG tablet Take 1 tablet (5 mg) by mouth 2 times daily 60 tablet 0     ranitidine (RANITIDINE) 75 MG tablet Take 75 mg by mouth 2 times daily       diclofenac (VOLTAREN) 1 % GEL topical gel Apply 4 grams to knees four times daily as needed using enclosed dosing card. 100 g 0     HYDROmorphone (DILAUDID) 2 MG tablet Take 1 tablet (2 mg) by mouth every 4 hours as needed for moderate to severe pain 10 tablet 0     senna-docusate (SENOKOT-S;PERICOLACE) 8.6-50 MG per tablet Take 1 tablet by mouth 2 times daily 100 tablet      insulin glargine (LANTUS) 100 UNIT/ML injection Inject 25 Units Subcutaneous 2 times daily        omeprazole (PRILOSEC) 10 MG CR capsule Take 1 capsule (10 mg) by mouth daily       isosorbide mononitrate (IMDUR) 60 MG 24 hr tablet Take 1 tablet (60 mg) by mouth daily       atorvastatin (LIPITOR) 10 MG tablet Take 10 mg by mouth daily       fluticasone - vilanterol (BREO ELLIPTA) 100-25 MCG/INH oral inhaler Inhale 1 puff into the lungs daily       tiotropium (SPIRIVA) 18 MCG inhalation capsule Inhale 18 mcg into the lungs daily       polyvinyl alcohol (LIQUIFILM TEARS) 1.4 % ophthalmic solution Place 1 drop into both eyes 3 times daily       acetaminophen (TYLENOL) 500 MG tablet Take 500 mg by mouth every 4 hours as needed for pain Not to exceed 3000 mg/24 hours       nitroglycerin (NITROSTAT) 0.4 MG SL tablet Place 0.4 mg under the tongue every 5 minutes as needed for chest pain if you are still having symptoms after 3 doses (15 minutes) call 911.       ipratropium - albuterol 0.5 mg/2.5 mg/3 mL (DUONEB) 0.5-2.5 (3) MG/3ML nebulization Take 1 vial (3 mLs) by nebulization every 4 hours as needed for shortness of breath / dyspnea or  wheezing 360 mL 3     aspirin 81 MG EC tablet Take 1 tablet (81 mg) by mouth daily 5 tablet 0     metoprolol (TOPROL XL) 50 MG 24 hr tablet Take 1 tablet (50 mg) by mouth daily 5 tablet 0     calcium carb 1250 mg, 500 mg [place],/vitamin D 200 units (OSCAL WITH D) 500-200 MG-UNIT per tablet Take 1 tablet by mouth 2 times daily (with meals) 10 tablet 0     tamsulosin (FLOMAX) 0.4 MG 24 hr capsule Take 1 capsule (0.4 mg) by mouth daily 5 capsule 0     ammonium lactate (LAC-HYDRIN) 12 % lotion Apply topically 2 times daily as needed for dry skin To all extremities for dry skin       [DISCONTINUED] enzalutamide (XTANDI) 40 MG capsule Take 4 capsules (160 mg) by mouth daily 120 capsule 0     REVIEW OF SYSTEMS:  4 point ROS including Respiratory, CV, GI and , other than that noted in the HPI,  is negative    Physical Exam:  /62  Pulse 72  Temp 97.2  F (36.2  C)  Resp 18  Wt 239 lb 3.2 oz (108.5 kg)  SpO2 94%  BMI 35.32 kg/m2  GENERAL APPEARANCE: Alert, in no distress  ENT: Mouth and posterior oropharynx normal, moist mucous membranes  EYES: Bilateral sclera red. EOMI.   RESP: respiratory effort and palpation of chest normal, diminished throughout, no respiratory distress  CV: Palpation and auscultation of heart done, regular rate and rhythm, no murmur, rub, or gallop  ABDOMEN: normal bowel sounds, soft, nontender, no hepatosplenomegaly or other masses  M/S: Active movement of bilateral upper and lower extremities. ACE wraps on BLE.  SKIN: Inspection of skin and subcutaneous tissue baseline, Palpation of skin and subcutaneous tissue baseline  NEURO: Cranial nerves 2-12 are normal tested and grossly at patient's baseline  PSYCH: affect and mood normal    Recent Labs:      Last Basic Metabolic Panel:  Lab Results   Component Value Date     05/22/2017      Lab Results   Component Value Date    POTASSIUM 6.0 05/22/2017     Lab Results   Component Value Date    CHLORIDE 107 05/22/2017     Lab Results    Component Value Date    JORDAN 9.2 05/22/2017     Lab Results   Component Value Date    CO2 32 05/22/2017     Lab Results   Component Value Date    BUN 34 05/22/2017     Lab Results   Component Value Date    CR 1.63 05/22/2017     Lab Results   Component Value Date     05/22/2017     Lab Results   Component Value Date    WBC 8.0 05/22/2017     Lab Results   Component Value Date    RBC 3.51 05/22/2017     Lab Results   Component Value Date    HGB 9.8 05/22/2017     Lab Results   Component Value Date    HCT 34.0 05/22/2017     Lab Results   Component Value Date    MCV 97 05/22/2017     Lab Results   Component Value Date    MCH 28 05/22/2017     Lab Results   Component Value Date    MCHC 29 05/22/2017     Lab Results   Component Value Date    RDW 16.1 05/22/2017     Lab Results   Component Value Date     05/22/2017     Assessment/Plan:  Bilateral Conjunctivitis. Continue Erythromycin and TobraDex ointment as ordered. Follow-up with [facility] on 6/1/17 as scheduled.     Hyperkalemia. Likely secondary to recent NATTY in the setting of CKD. Improving. Furosemide discontinued during hospitalization. Will repeat Kayexalate 15 g x 1 with repeat BMP on 5/25/17.     Chronic Venous Stasis Ulcers. Resident requests not to go out for wound care appointments. Staff to manage at facility.     Electronically signed by  SAL Acevedo CNP

## 2017-05-24 NOTE — MR AVS SNAPSHOT
After Visit Summary   5/24/2017    Tomas Grey    MRN: [id number]           Patient Information     Date Of Birth          1945        Visit Information        Provider Department      5/24/2017 9:30 AM Lakeisha Singh PA-C [facility]        Today's Diagnoses     Prostate cancer metastatic to multiple sites (H)    -  1    Metastasis to bone (H)           Follow-ups after your visit        Your next 10 appointments already scheduled     May 24, 2017 11:00 AM CDT   NM RADIUM 223 XOFIGO THERAPY with UUNMINJ2   [facility] [place], Nuclear Medicine ([facility])    [address] [facility] [id number] [phone]           Please bring a list of your medicines to the exam. (Include vitamins, minerals and over-the-counter drugs.) You should wear comfortable clothes. Leave your valuables at home. Please bring related prior results and films.  Tell your doctor:   If you are breastfeeding or may be pregnant.   If you have had a barium test within the past few days. Barium may change the results of certain exams.   If you think you may need sedation (medicine to help you relax).  You may eat and drink as normal.  Please call your Imaging Department at your exam site with any questions.            Jun 12, 2017 10:00 AM CDT   NM INJECTION with SHNMINJ   [facility] Nuclear Medicine ([facility])    [address]  [facility] [address]   [phone]            Jun 12, 2017 10:45 AM CDT   LAB with SH LAB ONLY   [facility])    [address] [place] [address]   [phone]           Patient must bring picture ID.  Patient should be prepared to give a urine specimen  Please do not eat 10-12 hours before your appointment if you are coming in fasting for labs on lipids, cholesterol, or glucose (sugar).  Pregnant women should follow their Care Team  instructions. Water with medications is okay. Do not drink coffee or other fluids.   If you have concerns about taking  your medications, please ask at office or if scheduling via 7write, send a message by clicking on Secure Messaging, Message Your Care Team.            Jun 12, 2017  1:00 PM CDT   NM BONE SCAN WHOLE BODY with SHNM1   [facility] Nuclear Medicine ([facility])    [address] [facility] [address] [phone]           Please bring a list of your medicines to the exam. (Include vitamins, minerals and over-the-counter drugs.) You should wear comfortable clothes. Leave your valuables at home. Please bring related prior results and films. Tell your doctor:   If you are breastfeeding or may be pregnant.   If you have had a barium test within the past few days. Barium may change the results of certain exams.   If you think you may need sedation (medicine to help you relax).  You may eat and drink as normal.  Drink plenty of fluids and empty bladder frequently between injection and scans.            Jun 14, 2017  1:15 PM CDT   Return Visit with Manpreet Oviedo MD   [facility] ([facility])    [facility]  [address]  [facility] [address] [phone]              Future tests that were ordered for you today     Open Future Orders        Priority Expected Expires Ordered    CBC with platelets differential Routine 6/12/2017 6/19/2017 5/24/2017    Comprehensive metabolic panel Routine 6/12/2017 6/19/2017 5/24/2017    PSA tumor marker Routine 6/12/2017 6/19/2017 5/24/2017    NM Bone whole body Routine 6/12/2017 6/19/2017 5/24/2017    Testosterone total Routine 6/12/2017 6/19/2017 5/24/2017            Who to contact     If you have questions or need follow up information about today's clinic visit or your schedule please contact [facility] directly at [phone].  Normal or non-critical lab  "and imaging results will be communicated to you by MyChart, letter or phone within 4 business days after the clinic has received the results. If you do not hear from us within 7 days, please contact the clinic through [employer] or phone. If you have a critical or abnormal lab result, we will notify you by phone as soon as possible.  Submit refill requests through CASTT or call your pharmacy and they will forward the refill request to us. Please allow 3 business days for your refill to be completed.          Additional Information About Your Visit        FAB [employer] Information     CASTT lets you send messages to your doctor, view your test results, renew your prescriptions, schedule appointments and more. To sign up, go to [email or web address] . Click on \"Log in\" on the left side of the screen, which will take you to the Welcome page. Then click on \"Sign up Now\" on the right side of the page.     You will be asked to enter the access code listed below, as well as some personal information. Please follow the directions to create your username and password.     Your access code is: [email or web address]  Expires: 2017  2:58 PM     Your access code will  in 90 days. If you need help or a new code, please call your [facility] clinic or [phone].        Care EveryWhere ID     This is your Care EveryWhere ID. This could be used by other organizations to access your [facility] medical records  [id number]        Your Vitals Were     Pulse Temperature Respirations Pulse Oximetry BMI (Body Mass Index)       101 98.3  F (36.8  C) (Oral) 20 94% 35.29 kg/m2        Blood Pressure from Last 3 Encounters:   17 117/60   17 115/62   17 108/68    Weight from Last 3 Encounters:   17 108.4 kg (239 lb)   17 108.5 kg (239 lb 3.2 oz)   17 110.9 kg (244 lb 8 oz)               Primary Care Provider Office Phone # Fax #    SAL Acevedo [phone]       [facility] [address] [facility] [address]  [facility] [address]        Thank you!     Thank you for choosing [facility]  for your care. Our goal is always to provide you with excellent care. Hearing back from our patients is one way we can continue to improve our services. Please take a few minutes to complete the written survey that you may receive in the mail after your visit with us. Thank you!             Your Updated Medication List - Protect others around you: Learn how to safely use, store and throw away your medicines at www.disposemymeds.org.          This list is accurate as of: 5/24/17 10:07 AM.  Always use your most recent med list.                   Brand Name Dispense Instructions for use    abiraterone 250 MG tablet    ZYTIGA    120 tablet    Take 4 tablets (1,000 mg) by mouth daily for 30 doses Take on empty stomach.       acetaminophen 500 MG tablet    TYLENOL     Take 500 mg by mouth every 4 hours as needed for pain Not to exceed 3000 mg/24 hours       ammonium lactate 12 % lotion    LAC-HYDRIN     Apply topically 2 times daily as needed for dry skin To all extremities for dry skin       aspirin 81 MG EC tablet     5 tablet    Take 1 tablet (81 mg) by mouth daily       bacitracin ointment      Apply to Right great toe topically in the evening for Ingrown toenail       BREO ELLIPTA 100-25 MCG/INH oral inhaler   Generic drug:  fluticasone-vilanterol      Inhale 1 puff into the lungs daily       budesonide-formoterol 160-4.5 MCG/ACT Inhaler    SYMBICORT     Inhale 2 puffs into the lungs 2 times daily       calcium carb 1250 mg (500 mg [place])/vitamin D 200 units 500-200 MG-UNIT per tablet    OSCAL with D    10 tablet    Take 1 tablet by mouth 2 times daily (with meals)       carboxymethylcellulose 0.5 % Soln ophthalmic solution    REFRESH PLUS     Place 1 drop into both eyes 3 times daily       diclofenac 1 % Gel topical gel    VOLTAREN    100 g    Apply 4 grams to knees four times daily as  needed using enclosed dosing card.       erythromycin ophthalmic ointment    ROMYCIN     Place 1 Application into both eyes At Bedtime       HYDROmorphone 2 MG tablet    DILAUDID    10 tablet    Take 1 tablet (2 mg) by mouth every 4 hours as needed for moderate to severe pain       insulin glargine 100 UNIT/ML injection    LANTUS     Inject 25 Units Subcutaneous 2 times daily       ipratropium - albuterol 0.5 mg/2.5 mg/3 mL 0.5-2.5 (3) MG/3ML neb solution    DUONEB    360 mL    Take 1 vial (3 mLs) by nebulization every 4 hours as needed for shortness of breath / dyspnea or wheezing       isosorbide mononitrate 60 MG 24 hr tablet    IMDUR     Take 1 tablet (60 mg) by mouth daily       LIPITOR 10 MG tablet   Generic drug:  atorvastatin      Take 10 mg by mouth daily       metoprolol 50 MG 24 hr tablet    TOPROL XL    5 tablet    Take 1 tablet (50 mg) by mouth daily       nitroglycerin 0.4 MG sublingual tablet    NITROSTAT     Place 0.4 mg under the tongue every 5 minutes as needed for chest pain if you are still having symptoms after 3 doses (15 minutes) call 911.       omeprazole 10 MG CR capsule    priLOSEC     Take 1 capsule (10 mg) by mouth daily       polyvinyl alcohol 1.4 % ophthalmic solution    LIQUIFILM TEARS     Place 1 drop into both eyes 3 times daily       predniSONE 5 MG tablet    DELTASONE    60 tablet    Take 1 tablet (5 mg) by mouth 2 times daily       ranitidine 75 MG tablet   Generic drug:  ranitidine      Take 75 mg by mouth 2 times daily       senna-docusate 8.6-50 MG per tablet    SENOKOT-S;PERICOLACE    100 tablet    Take 1 tablet by mouth 2 times daily       sodium polystyrene 15 GM/60ML suspension    KAYEXALATE     Take 15 g by mouth once       tamsulosin 0.4 MG capsule    FLOMAX    5 capsule    Take 1 capsule (0.4 mg) by mouth daily       tiotropium 18 MCG capsule    SPIRIVA     Inhale 18 mcg into the lungs daily       tobramycin-dexamethasone ophthalmic ointment    TOBRADEX     Place 1  Application into both eyes 4 times daily

## 2017-05-24 NOTE — PROGRESS NOTES
[facility] PHYSICIANS  HEMATOLOGY ONCOLOGY    ONCOLOGY FOLLOWUP NOTE      DIAGNOSIS:    1- Metastatic prostate cancer with extensive metastases to bone.   2- Advanced COPD and multiple other co-morbidities.      TREATMENT:     Lupron injection monthly, discontinued 6/2016.  Casodex 50 mg daily. discontinued 2/9/2015  - Xgeva monthly  - Xtandi 4/15/15  - Zytgea and prednisone 9/19/2016-present  - Xofigo 1/4/17     Subjective:  Mr. Tomas Grey comes in for followup today. His pain is in a reasonable control- chronic in his shoulders and back.  There are no new pains today and no worsening of his old pains.  No numbness/tingling in the extremities.  He has dilaudid prn and tylenol more often. He has no side effects from the [place]. He was inpatient last week with new eye issues.  They are red and irritated and he is on drops.  Daily BM, some loose stools with certain foods that he avoids.     REVIEW OF SYSTEMS:  A complete review of systems was performed and found to be negative other than pertinent positives mentioned in history of present illness.     Past medical, social histories reviewed.    Meds- Reviewed.     PHYSICAL EXAMINATION:   VITAL SIGNS:/60 (BP Location: Right arm, Patient Position: Chair, Cuff Size: Adult Large)  Pulse 101  Temp 98.3  F (36.8  C) (Oral)  Resp 20  Wt 108.4 kg (239 lb)  SpO2 94%  BMI 35.29 kg/m2  GENERAL: Sitting comfortably.   HEENT: conjunctival irriation. Oropharynx is clear.   LUNGS: Clear bilaterally but decreased breathsounds throughout  HEART: S1, S2, regular.   ABDOMEN: Soft, nontender, nondistended, active BS  NEUROLOGIC: Alert, awake. Grossly intact, on scooter today, complete testing not done    DATA:       5/17/2017 13:25 5/22/2017 00:00   WBC 6.0 8.0   Hemoglobin 9.0 (L) 9.8 (A)   Hematocrit 31.5 (L) 34.0 (A)   Platelet Count 237 261   RBC Count 3.28 (L) 3.51 (A)   MCV 96 97      5/17/2017 13:25 5/22/2017 00:00 5/23/2017 00:00   Sodium 144 143  145   Potassium 4.3 6.0 (A) 5.6 (H)   Chloride 110 (H) 107 107   Carbon Dioxide 29 32 30   Urea Nitrogen 45 (H) 34 (A) 32 (H)   Creatinine 1.87 (H) 1.63 (A) 1.57 (H)   GFR Estimate 36 (L) 42 (L) 44 (L)   GFR Estimate If Black 43 (L) 51 (L) 53 (L)   Calcium 8.9 9.2 9.2   Anion Gap 5 4.0 8.0      12/28/2016 13:00 2/20/2017 12:08 3/22/2017 13:05 4/19/2017 12:55 5/17/2017 13:25   PSA 46.26 (H) 163.58 (H) 210.71 (H) 249.00 (H) 300.00 (H)     ECOG  performance status 2     ASSESSMENT:   1.  Metastatic prostate cancer with multiple sclerotic bone metastases.  Tomas and I reviewed that this is his last [place] infusion today.  His PSA has continued to increase during his therapy, which is not unusual.  The goal of the therapy is to improve pain.  I don't get a clear picture from him if he feels his pain is any better.  He continues on Zytiga and prednisone and has been off Lupron as the Zytiga suppresses the testosterone. I explained to him that we will finish this last dose today and then proceed with a bone scan to assess response and have him see Dr Oviedo with labs and a testosterone.  There are only chemotherapy options left for Mr Grey to try and he is a poor candidate.  Thus palliative care would need to be pursued, but he will review this next month with Dr Oviedo.     2.  Extensive bone metastases. No new pains or worsening of old pains.    Bone scan 8/31/16 showed progression in bones- repeat bone scan next month.   He will continue to take calcium and vitamin D.  Will continue Xgeva (today).    Pastora Singh PA-C

## 2017-05-24 NOTE — NURSING NOTE
"Oncology Rooming Note    May 24, 2017 8:57 AM   Tomas Grey is a 72 year old male who presents for:    Chief Complaint   Patient presents with     Oncology Clinic Visit     Prostate Ca      Initial Vitals: There were no vitals taken for this visit. Estimated body mass index is 35.32 kg/(m^2) as calculated from the following:    Height as of 5/16/17: 1.753 m (5' 9\").    Weight as of 5/23/17: 108.5 kg (239 lb 3.2 oz). There is no height or weight on file to calculate BSA.  Data Unavailable Comment: Data Unavailable   No LMP for male patient.  Allergies reviewed: Yes  Medications reviewed: Yes    Medications: Medication refills not needed today.  Pharmacy name entered into [email or web address]:    [facility]  [facility] - [place], [place] - [address]  [facility] - [place] - [address] [place] [address] [address] [facility] [place] - [address] [facility] [place] [address] [facility] [place], [place] - [address] [place] [address] PHARMACY - [place], [place] - [address] [place] [address] - [address] [place] [address]    Clinical concerns: no concerns  Minna was notified.    8 minutes for nursing intake (face to face time)     Mey Brandt MA              "

## 2017-05-24 NOTE — LETTER
5/24/2017      RE: Tomas Grey  [facility] AND [facility]  [address]  [facility] [address]       [facility] PHYSICIANS  HEMATOLOGY ONCOLOGY    ONCOLOGY FOLLOWUP NOTE      DIAGNOSIS:    1- Metastatic prostate cancer with extensive metastases to bone.   2- Advanced COPD and multiple other co-morbidities.      TREATMENT:     Lupron injection monthly, discontinued 6/2016.  Casodex 50 mg daily. discontinued 2/9/2015  - Xgeva monthly  - Xtandi 4/15/15  - Zytgea and prednisone 9/19/2016-present  - Xofigo 1/4/17     Subjective:  Mr. Tomas Grey comes in for followup today. His pain is in a reasonable control- chronic in his shoulders and back.  There are no new pains today and no worsening of his old pains.  No numbness/tingling in the extremities.  He has dilaudid prn and tylenol more often. He has no side effects from the [facility]. He was inpatient last week with new eye issues.  They are red and irritated and he is on drops.  Daily BM, some loose stools with certain foods that he avoids.     REVIEW OF SYSTEMS:  A complete review of systems was performed and found to be negative other than pertinent positives mentioned in history of present illness.     Past medical, social histories reviewed.    Meds- Reviewed.     PHYSICAL EXAMINATION:   VITAL SIGNS:/60 (BP Location: Right arm, Patient Position: Chair, Cuff Size: Adult Large)  Pulse 101  Temp 98.3  F (36.8  C) (Oral)  Resp 20  Wt 108.4 kg (239 lb)  SpO2 94%  BMI 35.29 kg/m2  GENERAL: Sitting comfortably.   HEENT: conjunctival irriation. Oropharynx is clear.   LUNGS: Clear bilaterally but decreased breathsounds throughout  HEART: S1, S2, regular.   ABDOMEN: Soft, nontender, nondistended, active BS  NEUROLOGIC: Alert, awake. Grossly intact, on scooter today, complete testing not done    DATA:       5/17/2017 13:25 5/22/2017 00:00   WBC 6.0 8.0   Hemoglobin 9.0 (L) 9.8 (A)   Hematocrit 31.5 (L) 34.0 (A)   Platelet Count 237 261    RBC Count 3.28 (L) 3.51 (A)   MCV 96 97      5/17/2017 13:25 5/22/2017 00:00 5/23/2017 00:00   Sodium 144 143 145   Potassium 4.3 6.0 (A) 5.6 (H)   Chloride 110 (H) 107 107   Carbon Dioxide 29 32 30   Urea Nitrogen 45 (H) 34 (A) 32 (H)   Creatinine 1.87 (H) 1.63 (A) 1.57 (H)   GFR Estimate 36 (L) 42 (L) 44 (L)   GFR Estimate If Black 43 (L) 51 (L) 53 (L)   Calcium 8.9 9.2 9.2   Anion Gap 5 4.0 8.0      12/28/2016 13:00 2/20/2017 12:08 3/22/2017 13:05 4/19/2017 12:55 5/17/2017 13:25   PSA 46.26 (H) 163.58 (H) 210.71 (H) 249.00 (H) 300.00 (H)     ECOG  performance status 2     ASSESSMENT:   1.  Metastatic prostate cancer with multiple sclerotic bone metastases.  Tomsa and I reviewed that this is his last Deloit infusion today.  His PSA has continued to increase during his therapy, which is not unusual.  The goal of the therapy is to improve pain.  I don't get a clear picture from him if he feels his pain is any better.  He continues on Zytiga and prednisone and has been off Lupron as the Zytiga suppresses the testosterone. I explained to him that we will finish this last dose today and then proceed with a bone scan to assess response and have him see Dr Oviedo with labs and a testosterone.  There are only chemotherapy options left for Mr Grey to try and he is a poor candidate.  Thus palliative care would need to be pursued, but he will review this next month with Dr Oviedo.     2.  Extensive bone metastases. No new pains or worsening of old pains.    Bone scan 8/31/16 showed progression in bones- repeat bone scan next month.   He will continue to take calcium and vitamin D.  Will continue Xgeva (today).    Pastora Singh PA-C

## 2017-05-25 NOTE — TELEPHONE ENCOUNTER
Oral Chemotherapy Monitoring Program    Primary Oncologist: Dr. Oviedo  Primary Oncology Clinic: [facility]  Cancer Diagnosis: [facility]      Therapy History:  Start date 9/16/16.      Drug Interaction Assessment: None  Subjective/Objective:  Tomas Grey is a 72 year old male contacted by phone for a follow-up visit for oral chemotherapy.  F/u with [facility] and rehab. Per nurse, patient doesn't have any new medication, side effects, or missing dose of Zytiga. Last Zytiga refill on nursing home record was on 5/16/2017, and remained quantity is 86 tablets.     ORAL CHEMOTHERAPY 5/23/2016 7/27/2016 8/22/2016 10/31/2016 2/13/2017 3/22/2017 5/25/2017   Drug Name Xtandi (Enzalutamide) Xtandi (Enzalutamide) Xtandi (Enzalutamide) Zytiga (Abiraterone) Zytiga (Abiraterone) Zytiga (Abiraterone) Zytiga (Abiraterone)   Current Dosage 160mg 160mg 160mg 1000mg 1000mg 1000mg 1000mg   Current Schedule Daily Daily Daily Daily Daily Daily Daily   Cycle Details Continuous Continuous Continuous Continuous Continuous Continuous Continuous   Start Date of Last Cycle - 6/27/2016 - 10/17/2016 - - -   Planned next cycle start date - - - 11/14/2016 2/26/2017 - 6/15/2017   Doses missed in last 2 weeks 0 0 0 0 0 0 0   Adherence Assessment - - - - Adherent Adherent Adherent   Adverse Effects Other (see comments) None - - No AE identified during assessment No AE identified during assessment No AE identified during assessment   Home BPs not needed not needed - all BPs<140/90 all BPs<140/90 all BPs<140/90 all BPs<140/90   Any new drug interactions? - - - No No No No   Is the dose as ordered appropriate for the patient? - - - - Yes Yes Yes   Is the patient currently in pain? - - - Assessed in last 30 days. Yes Assessed in last 30 days. Assessed in last 30 days.   Does the patient feel the pain is currently being managed by a provider? - - - - Yes - -   Has the patient been assessed within the past 6 months for depression? - - - - Yes Yes Yes  "  Has the patient missed any days of school, work, or other routine activity? - - - - No No No       Last PHQ-2 Score on record:   PHQ-2 ( 1999 [employer]) 4/10/2017 3/22/2017   Q1: Little interest or pleasure in doing things 0 0   Q2: Feeling down, depressed or hopeless 0 0   PHQ-2 Score 0 0       Patient does not report depression symptoms.      Vitals:  BP:   BP Readings from Last 1 Encounters:   05/24/17 117/60     Wt Readings from Last 1 Encounters:   05/24/17 108.4 kg (239 lb)     Estimated body surface area is 2.3 meters squared as calculated from the following:    Height as of 5/16/17: 1.753 m (5' 9\").    Weight as of 5/24/17: 108.4 kg (239 lb).    Labs:  Lab Results   Component Value Date     05/25/2017      Lab Results   Component Value Date    POTASSIUM 5.2 05/25/2017     Lab Results   Component Value Date    JORDAN 9.2 05/25/2017     Lab Results   Component Value Date    ALBUMIN 2.8 05/17/2017     Lab Results   Component Value Date    MAG 1.7 12/26/2016     Lab Results   Component Value Date    PHOS 3.3 05/28/2015     Lab Results   Component Value Date    BUN 29 05/25/2017     Lab Results   Component Value Date    CR 1.61 05/25/2017       Lab Results   Component Value Date    AST 16 05/17/2017     Lab Results   Component Value Date    ALT 15 05/17/2017     Lab Results   Component Value Date    BILITOTAL 0.3 05/17/2017       Lab Results   Component Value Date    WBC 8.0 05/22/2017     Lab Results   Component Value Date    HGB 9.8 05/22/2017     Lab Results   Component Value Date     05/22/2017     Lab Results   Component Value Date    ANEU 4.0 05/17/2017           Assessment:  Labs stable     Plan:  Continue with current regimen     Follow-Up:  6/14/17 with Dr. Oviedo     Refill Due:  6/15/17     Matty Rock, PharmD student      "

## 2017-06-01 PROBLEM — A41.9 BACTERIAL SEPSIS (H): Status: ACTIVE | Noted: 2017-01-01

## 2017-06-01 PROBLEM — R41.82 ALTERED MENTAL STATUS: Status: ACTIVE | Noted: 2017-01-01

## 2017-06-01 NOTE — IP AVS SNAPSHOT
MRN:[id number]                      After Visit Summary   6/1/2017    Tomas Grey    MRN: [id number]           Thank you!     Thank you for choosing [facility] for your care. Our goal is always to provide you with excellent care. Hearing back from our patients is one way we can continue to improve our services. Please take a few minutes to complete the written survey that you may receive in the mail after you visit with us. Thank you!        Patient Information     Date Of Birth          1945        Designated Caregiver       Most Recent Value    Caregiver    Will someone help with your care after discharge? yes    Name of designated caregiver Les Bourgeois    Phone number of caregiver [phone]    Caregiver address [place], [place]      About your hospital stay     You were admitted on:  June 1, 2017 You last received care in the:  [address] [facility]    You were discharged on:  June 6, 2017        Reason for your hospital stay       Admit with Streptococcla bacteremia. Likely from the right shin wound. Has chronic lymphedema  And needs to work on that. He needs antibiotics for total two weeks for Strep Bacteremia. Needs to use leg warsp, keep feet up while sitting. FU with lymphedema clinic                  Who to Call     For medical emergencies, please call 911.  For non-urgent questions about your medical care, please call your primary care provider or clinic, [phone]          Attending Provider     Provider Specialty    Bright Steel MD Emergency Medicine    Benito Almanza MD Internal Medicine    Micah, Radha Burrows MD Internal Medicine    Viktoria Gunderson MD Internal Medicine       Primary Care Provider Office Phone # Fax #    SAL Acevedo [phone]      After Care Instructions     Advance Diet as Tolerated       Follow this diet upon discharge: Orders Placed This Encounter      Room Service       Snacks/Supplements Adult: Glucerna (Adult); With Meals      Room Service      Moderate Consistent CHO Diet    Fluid restriction for 1500  cc per day            Daily weights       Call Provider for weight gain of more than 2 pounds per day or 5 pounds per week.            General info for SNF       Length of Stay Estimate: Long Term Care  Condition at Discharge: Improving  Level of care:board and care  Rehabilitation Potential: Fair  Admission H&P remains valid and up-to-date: No (If no, please update H&P)  Recent Chemotherapy: N/A  Use Nursing Home Standing Orders: No            Intake and output       Every shift            Mantoux instructions       Give two-step Mantoux (PPD) Per Facility Policy No (if no explain) he is resident of the same long term care fascility                  Follow-up Appointments     Adult [facility] Follow-up and recommended labs and tests       Follow up with primary care provider, Ekaterina Lozano, within 7 days for hospital follow- up.  The following labs/tests are recommended: BMP in three days for change in lasix dose.  FU with Lymphedema clinic in one weeks time for chronic leg edema      Appointments on [place] and/or [facility] (with [facility] or [facility] provider or service). Call [phone] if you haven't heard regarding these appointments within 7 days of discharge.                  Your next 10 appointments already scheduled     Jun 12, 2017 10:00 AM CDT   NM INJECTION with SHNMINJ   [facility] ([facility])    [address] [facility] [address]   [phone]            Jun 12, 2017 10:45 AM CDT   LAB with SH LAB ONLY   [facility] ([facility])    [address] [place] [address]   [phone]           Patient must bring picture ID.  Patient should be prepared to give a urine specimen  Please do not eat 10-12 hours before your appointment if you are coming in fasting for labs on  lipids, cholesterol, or glucose (sugar).  Pregnant women should follow their Care Team instructions. Water with medications is okay. Do not drink coffee or other fluids.   If you have concerns about taking  your medications, please ask at office or if scheduling via [email or web address], send a message by clicking on Secure Messaging, Message Your Care Team.            Jun 12, 2017  1:00 PM CDT   NM BONE SCAN WHOLE BODY with SHNM1   [facility] ([facility])    [address] [facility] [id number] [phone]           Please bring a list of your medicines to the exam. (Include vitamins, minerals and over-the-counter drugs.) You should wear comfortable clothes. Leave your valuables at home. Please bring related prior results and films. Tell your doctor:   If you are breastfeeding or may be pregnant.   If you have had a barium test within the past few days. Barium may change the results of certain exams.   If you think you may need sedation (medicine to help you relax).  You may eat and drink as normal.  Drink plenty of fluids and empty bladder frequently between injection and scans.            Jun 14, 2017  1:15 PM CDT   Return Visit with Manpreet Oviedo MD   [facility] ([facility])    [facility]  [address] [id number] [phone]              Additional Services     Lymphedema Clinic Referral       Ambreen riglenn HUNTER from lymphedema, acting as source of infection. Needs to work with lymphedema clinic for long term management.            Occupational Therapy Adult Consult       Evaluate and treat as clinically indicated.    Reason:  weakness            Physical Therapy Adult Consult       Evaluate and treat as clinically indicated.    Reason:  weakness                  Future tests that were ordered for you     Basic metabolic panel       BMP next Friday ie 6/9/17                  Pending Results     Date  "and Time Order Name Status Description    6/3/2017 1236 Blood culture Preliminary     6/3/2017 1236 Blood culture Preliminary     6/1/2017 0605 Wound Culture Aerobic Bacterial Preliminary     6/1/2017 0312 Blood Culture ONE site Preliminary             Statement of Approval     Ordered          06/06/17 1036  I have reviewed and agree with all the recommendations and orders detailed in this document.  EFFECTIVE NOW     Approved and electronically signed by:  Viktoria Gunderson MD             Admission Information     Date & Time Provider Department Dept. Phone    6/1/2017 Viktoria Gunderson MD [address] [facility] [phone]      Your Vitals Were     Blood Pressure Pulse Temperature Respirations Height Weight    145/66 (BP Location: Right arm) 88 97  F (36.1  C) (Oral) 16 1.803 m (5' 11\") 111.4 kg (245 lb 9.6 oz)    Pulse Oximetry BMI (Body Mass Index)                97% 34.25 kg/m2          Care EveryWhere ID     This is your Care EveryWhere ID. This could be used by other organizations to access your [facility] medical records  [id number]           Review of your medicines      CONTINUE these medicines which may have CHANGED, or have new prescriptions. If we are uncertain of the size of tablets/capsules you have at home, strength may be listed as something that might have changed.        Dose / Directions    furosemide 20 MG tablet   Commonly known as:  LASIX   This may have changed:    - medication strength  - when to take this   Used for:  Chronic diastolic congestive heart failure (H), Lymphedema of both lower extremities        Dose:  20 mg   Take 1 tablet (20 mg) by mouth 2 times daily   Quantity:  30 tablet   Refills:  0       HYDROmorphone 2 MG tablet   Commonly known as:  DILAUDID   This may have changed:  how much to take   Used for:  Metastasis to bone (H)        Dose:  1 mg   Take 0.5 tablets (1 mg) by mouth every 4 hours as needed for moderate to severe pain   Quantity:  10 tablet "   Refills:  0       metoprolol 50 MG 24 hr tablet   Commonly known as:  TOPROL XL   This may have changed:  additional instructions   Used for:  ASCVD (arteriosclerotic cardiovascular disease)        Dose:  50 mg   Take 1 tablet (50 mg) by mouth daily   Quantity:  5 tablet   Refills:  0       predniSONE 5 MG tablet   Commonly known as:  DELTASONE   This may have changed:  additional instructions   Used for:  Prostate cancer metastatic to bone (H), Metastasis to bone (H), Prostate cancer metastatic to multiple sites (H)        Dose:  5 mg   Take 1 tablet (5 mg) by mouth 2 times daily   Quantity:  60 tablet   Refills:  0         CONTINUE these medicines which have NOT CHANGED        Dose / Directions    abiraterone 250 MG tablet   Commonly known as:  ZYTIGA        Dose:  1000 mg   Take 1,000 mg by mouth daily   Refills:  0       acetaminophen 500 MG tablet   Commonly known as:  TYLENOL        Dose:  500 mg   Take 500 mg by mouth every 4 hours as needed for pain Not to exceed 3000 mg/24 hours   Refills:  0       ammonium lactate 12 % lotion   Commonly known as:  LAC-HYDRIN   Indication:  Abnormal Dryness of Skin, Eyes or Mucous Membranes        Apply topically 2 times daily as needed for dry skin To all extremities for dry skin   Refills:  0       aspirin 81 MG EC tablet   Used for:  ASCVD (arteriosclerotic cardiovascular disease)        Dose:  81 mg   Take 1 tablet (81 mg) by mouth daily   Quantity:  5 tablet   Refills:  0       bacitracin ointment        Apply to Right great toe topically in the evening for Ingrown toenail   Refills:  0       budesonide-formoterol 160-4.5 MCG/ACT Inhaler   Commonly known as:  SYMBICORT        Dose:  2 puff   Inhale 2 puffs into the lungs 2 times daily   Refills:  0       calcium carb 1250 mg (500 mg [place])/vitamin D 200 units 500-200 MG-UNIT per tablet   Commonly known as:  OSCAL with D   Used for:  Malignant neoplasm of prostate (H)        Dose:  1 tablet   Take 1 tablet by mouth 2  times daily (with meals)   Quantity:  10 tablet   Refills:  0       carboxymethylcellulose 0.5 % Soln ophthalmic solution   Commonly known as:  REFRESH PLUS        Dose:  1 drop   Place 1 drop into both eyes 3 times daily   Refills:  0       diclofenac 1 % Gel topical gel   Commonly known as:  VOLTAREN   Used for:  Chronic pain of both knees        Apply 4 grams to knees four times daily as needed using enclosed dosing card.   Quantity:  100 g   Refills:  0       insulin glargine 100 UNIT/ML injection   Commonly known as:  LANTUS   Indication:  Type 2 Diabetes        Dose:  25 Units   Inject 25 Units Subcutaneous 2 times daily   Refills:  0       ipratropium - albuterol 0.5 mg/2.5 mg/3 mL 0.5-2.5 (3) MG/3ML neb solution   Commonly known as:  DUONEB        Dose:  1 vial   Take 1 vial (3 mLs) by nebulization every 4 hours as needed for shortness of breath / dyspnea or wheezing   Quantity:  360 mL   Refills:  3       isosorbide mononitrate 60 MG 24 hr tablet   Commonly known as:  IMDUR   Used for:  ASCVD (arteriosclerotic cardiovascular disease)        Dose:  60 mg   Take 1 tablet (60 mg) by mouth daily   Refills:  0       LIPITOR 10 MG tablet   Generic drug:  atorvastatin        Dose:  10 mg   Take 10 mg by mouth At Bedtime   Refills:  0       nitroglycerin 0.4 MG sublingual tablet   Commonly known as:  NITROSTAT        Dose:  0.4 mg   Place 0.4 mg under the tongue every 5 minutes as needed for chest pain if you are still having symptoms after 3 doses (15 minutes) call 911.   Refills:  0       omeprazole 10 MG CR capsule   Commonly known as:  priLOSEC   Used for:  Gastric reflux        Dose:  10 mg   Take 1 capsule (10 mg) by mouth daily   Refills:  0       polyvinyl alcohol 1.4 % ophthalmic solution   Commonly known as:  LIQUIFILM TEARS        Dose:  1 drop   Place 1 drop into both eyes 3 times daily   Refills:  0       ranitidine 75 MG tablet   Indication:  Gastroesophageal Reflux Disease   Generic drug:  ranitidine         Dose:  75 mg   Take 75 mg by mouth 2 times daily   Refills:  0       senna-docusate 8.6-50 MG per tablet   Commonly known as:  SENOKOT-S;PERICOLACE   Used for:  Abdominal pain, generalized        Dose:  1 tablet   Take 1 tablet by mouth 2 times daily   Quantity:  100 tablet   Refills:  0       tamsulosin 0.4 MG capsule   Commonly known as:  FLOMAX   Used for:  Benign non-nodular prostatic hyperplasia without lower urinary tract symptoms        Dose:  0.4 mg   Take 1 capsule (0.4 mg) by mouth daily   Quantity:  5 capsule   Refills:  0       tiotropium 18 MCG capsule   Commonly known as:  SPIRIVA   Indication:  Chronic Obstructive Lung Disease        Dose:  18 mcg   Inhale 18 mcg into the lungs daily   Refills:  0       tobramycin-dexamethasone ophthalmic ointment   Commonly known as:  TOBRADEX        Dose:  1 Application   Place 1 Application into both eyes 4 times daily   Refills:  0         STOP taking     BREO ELLIPTA 100-25 MCG/INH oral inhaler   Generic drug:  fluticasone-vilanterol           sodium polystyrene 15 GM/60ML suspension   Commonly known as:  KAYEXALATE                Where to get your medicines      Some of these will need a paper prescription and others can be bought over the counter. Ask your nurse if you have questions.     You don't need a prescription for these medications     furosemide 20 MG tablet         Information about where to get these medications is not yet available     ! Ask your nurse or doctor about these medications     HYDROmorphone 2 MG tablet                Protect others around you: Learn how to safely use, store and throw away your medicines at www.disposemymeds.org.             Medication List: This is a list of all your medications and when to take them. Check marks below indicate your daily home schedule. Keep this list as a reference.      Medications           Morning Afternoon Evening Bedtime As Needed    abiraterone 250 MG tablet   Commonly known as:  ZYTIGA    Take 1,000 mg by mouth daily                                acetaminophen 500 MG tablet   Commonly known as:  TYLENOL   Take 500 mg by mouth every 4 hours as needed for pain Not to exceed 3000 mg/24 hours   Last time this was given:  650 mg on 6/1/2017  1:00 AM                                ammonium lactate 12 % lotion   Commonly known as:  LAC-HYDRIN   Apply topically 2 times daily as needed for dry skin To all extremities for dry skin   Last time this was given:  6/4/2017  2:51 PM                                aspirin 81 MG EC tablet   Take 1 tablet (81 mg) by mouth daily   Last time this was given:  81 mg on 6/6/2017  9:12 AM                                bacitracin ointment   Apply to Right great toe topically in the evening for Ingrown toenail   Last time this was given:  6/4/2017 10:19 PM                                budesonide-formoterol 160-4.5 MCG/ACT Inhaler   Commonly known as:  SYMBICORT   Inhale 2 puffs into the lungs 2 times daily                                calcium carb 1250 mg (500 mg [place])/vitamin D 200 units 500-200 MG-UNIT per tablet   Commonly known as:  OSCAL with D   Take 1 tablet by mouth 2 times daily (with meals)   Last time this was given:  1 tablet on 6/6/2017  9:12 AM                                carboxymethylcellulose 0.5 % Soln ophthalmic solution   Commonly known as:  REFRESH PLUS   Place 1 drop into both eyes 3 times daily   Last time this was given:  1 drop on 6/6/2017  9:12 AM                                diclofenac 1 % Gel topical gel   Commonly known as:  VOLTAREN   Apply 4 grams to knees four times daily as needed using enclosed dosing card.   Last time this was given:  4 g on 6/4/2017 10:18 PM                                furosemide 20 MG tablet   Commonly known as:  LASIX   Take 1 tablet (20 mg) by mouth 2 times daily   Last time this was given:  20 mg on 6/6/2017  9:13 AM                                HYDROmorphone 2 MG tablet   Commonly known as:  DILAUDID    Take 0.5 tablets (1 mg) by mouth every 4 hours as needed for moderate to severe pain   Last time this was given:  1 mg on 6/4/2017  2:00 AM                                insulin glargine 100 UNIT/ML injection   Commonly known as:  LANTUS   Inject 25 Units Subcutaneous 2 times daily   Last time this was given:  25 Units on 6/5/2017 10:29 PM                                ipratropium - albuterol 0.5 mg/2.5 mg/3 mL 0.5-2.5 (3) MG/3ML neb solution   Commonly known as:  DUONEB   Take 1 vial (3 mLs) by nebulization every 4 hours as needed for shortness of breath / dyspnea or wheezing   Last time this was given:  3 mLs on 6/6/2017  8:26 AM                                isosorbide mononitrate 60 MG 24 hr tablet   Commonly known as:  IMDUR   Take 1 tablet (60 mg) by mouth daily   Last time this was given:  60 mg on 6/6/2017  9:10 AM                                LIPITOR 10 MG tablet   Take 10 mg by mouth At Bedtime   Last time this was given:  10 mg on 6/5/2017  8:41 PM   Generic drug:  atorvastatin                                metoprolol 50 MG 24 hr tablet   Commonly known as:  TOPROL XL   Take 1 tablet (50 mg) by mouth daily   Last time this was given:  50 mg on 6/6/2017  9:10 AM                                nitroglycerin 0.4 MG sublingual tablet   Commonly known as:  NITROSTAT   Place 0.4 mg under the tongue every 5 minutes as needed for chest pain if you are still having symptoms after 3 doses (15 minutes) call 911.                                omeprazole 10 MG CR capsule   Commonly known as:  priLOSEC   Take 1 capsule (10 mg) by mouth daily   Last time this was given:  10 mg on 6/6/2017  9:10 AM                                polyvinyl alcohol 1.4 % ophthalmic solution   Commonly known as:  LIQUIFILM TEARS   Place 1 drop into both eyes 3 times daily   Last time this was given:  1 drop on 6/6/2017  8:09 AM                                predniSONE 5 MG tablet   Commonly known as:  DELTASONE   Take 1 tablet (5  mg) by mouth 2 times daily   Last time this was given:  5 mg on 6/6/2017  9:12 AM                                ranitidine 75 MG tablet   Take 75 mg by mouth 2 times daily   Last time this was given:  75 mg on 6/6/2017  8:10 AM   Generic drug:  ranitidine                                senna-docusate 8.6-50 MG per tablet   Commonly known as:  SENOKOT-S;PERICOLACE   Take 1 tablet by mouth 2 times daily   Last time this was given:  1 tablet on 6/6/2017  9:12 AM                                tamsulosin 0.4 MG capsule   Commonly known as:  FLOMAX   Take 1 capsule (0.4 mg) by mouth daily   Last time this was given:  0.4 mg on 6/6/2017  9:13 AM                                tiotropium 18 MCG capsule   Commonly known as:  SPIRIVA   Inhale 18 mcg into the lungs daily                                tobramycin-dexamethasone ophthalmic ointment   Commonly known as:  TOBRADEX   Place 1 Application into both eyes 4 times daily   Last time this was given:  6/6/2017  9:19 AM

## 2017-06-01 NOTE — PROGRESS NOTES
"SPIRITUAL HEALTH SERVICES  SPIRITUAL ASSESSMENT Progress Note  [facility] ([place]) [facility]    PRIMARY FOCUS:     Emotional/spiritual/[place]ist distress    Support for coping    ILLNESS CIRCUMSTANCES:   Reviewed documentation. Reflective conversation shared with Marco.  Reason for visit: request on admission.      Context of Serious Illness/Symptom(s) - Marco speculated that, in addition to COPD, he currently has some kind of infection which may be related to his legs.    Resources for Support - sons and grandchildren; apolonia - three children in [place] and six in [place].    DISTRESS:     Emotional/Existential/Relational Distress -   o Marco described having two knees replacements about six years ago which has left him able to stand but unable to walk freely.  o He also mentioned a number of family members who have , most especially his wife of fifty-six years who  six years ago.    Spiritual/Jain Distress - None described.    Social/Cultural/Economic Distress - \"I give all my money to the place I am staying except $93 a month which I have for spending money.\"    SPIRITUAL/Pentecostalism COPING:     Mormon/[place] - Marco identified a Sikhism home in [place] but said that since moving to [place], services in his facility - [facility] - provide his only Muslim opportunities.    Spiritual Practice(s) - \"I pray all the way through the day, about everything.\"  Per his request, we prayed together.  Introduced [facility].    Emotional/Existential/Relational Connections -   o He described having visits from sons who live locally.  o He also described receiving good care from the staff at the facility which serves as his home.    GOALS OF CARE:    Goals of Care - Learn what the treatment plan is to get him back home.    Meaning/Sense-Making - None described.    PLAN: Will visit this pt 1-2 x/wk while he remains on the unit.  Visits are available on request.      Ag Burgess M.Div.  Staff   Pager [phone]      "

## 2017-06-01 NOTE — TELEPHONE ENCOUNTER
Called by nursing staff at [facility] about patient who has developed fever, hypoxia, shaking.       T 103.1        /79    O2 sat 85% on 2 L/min     Patient is asking to go to the hospital, reports he will call on his own if nursing staff does not.   Nurse feels patient is unstable.     With tachycardia and hypoxia have agreed to send pt to ER.    Patricia Polanco MD

## 2017-06-01 NOTE — ED NOTES
Patient hasn't feeling well lately. Patient resides at nursing home where they recorded a high heart rate and a temp of 103.1. BG in route was 137.

## 2017-06-01 NOTE — ED PROVIDER NOTES
"  History     Chief Complaint   Patient presents with     Fever     HPI  Tomas Grey is a 72 year old male coming from [facility] with medical  history of HTN, diabetes mellitus, anemia, CAD, metastatic prostate cancer, chronic pain, CKD, and COPD among others who presents to the ED with fever. Patient states he has \"cramping\" pain in both of his legs. He also has complaints of vomiting and cough. He currently denies nausea or diarrhea. He came from his nursing home where he was noted to have a fever to 103.1.  He notes some pain and weeping from his legs.     PAST MEDICAL HISTORY  Past Medical History:   Diagnosis Date     Anemia      Anxiety      Aspiration pneumonia (H) 2014     C. difficile colitis      CAD (coronary artery disease)      Chronic pain      CKD (chronic kidney disease)      COPD (chronic obstructive pulmonary disease) (H)      Depressive disorder      Diabetes mellitus (H)      Hypertension      Insomnia      ALEXIS (obstructive sleep apnea)      Osteoporosis      Prostate cancer metastatic to multiple sites (H)      PAST SURGICAL HISTORY  Past Surgical History:   Procedure Laterality Date     ABDOMEN SURGERY       ARTHROSCOPY KNEE       EYE SURGERY       FAMILY HISTORY  Family History   Problem Relation Age of Onset     DIABETES Mother      CANCER Mother      stomach     SOCIAL HISTORY  Social History   Substance Use Topics     Smoking status: Former Smoker     Smokeless tobacco: Never Used     Alcohol use No     MEDICATIONS  Current Facility-Administered Medications   Medication     acetaminophen (TYLENOL) tablet 975 mg     Current Outpatient Prescriptions   Medication     tobramycin-dexamethasone (TOBRADEX) ophthalmic ointment     sodium polystyrene (KAYEXALATE) 15 GM/60ML suspension     bacitracin ointment     carboxymethylcellulose (REFRESH PLUS) 0.5 % SOLN ophthalmic solution     budesonide-formoterol (SYMBICORT) 160-4.5 MCG/ACT Inhaler     predniSONE (DELTASONE) 5 MG " tablet     ranitidine (RANITIDINE) 75 MG tablet     diclofenac (VOLTAREN) 1 % GEL topical gel     HYDROmorphone (DILAUDID) 2 MG tablet     senna-docusate (SENOKOT-S;PERICOLACE) 8.6-50 MG per tablet     insulin glargine (LANTUS) 100 UNIT/ML injection     omeprazole (PRILOSEC) 10 MG CR capsule     isosorbide mononitrate (IMDUR) 60 MG 24 hr tablet     atorvastatin (LIPITOR) 10 MG tablet     fluticasone - vilanterol (BREO ELLIPTA) 100-25 MCG/INH oral inhaler     tiotropium (SPIRIVA) 18 MCG inhalation capsule     polyvinyl alcohol (LIQUIFILM TEARS) 1.4 % ophthalmic solution     acetaminophen (TYLENOL) 500 MG tablet     [DISCONTINUED] enzalutamide (XTANDI) 40 MG capsule     nitroglycerin (NITROSTAT) 0.4 MG SL tablet     ipratropium - albuterol 0.5 mg/2.5 mg/3 mL (DUONEB) 0.5-2.5 (3) MG/3ML nebulization     aspirin 81 MG EC tablet     metoprolol (TOPROL XL) 50 MG 24 hr tablet     calcium carb 1250 mg, 500 mg [place],/vitamin D 200 units (OSCAL WITH D) 500-200 MG-UNIT per tablet     tamsulosin (FLOMAX) 0.4 MG 24 hr capsule     ammonium lactate (LAC-HYDRIN) 12 % lotion     ALLERGIES  Allergies   Allergen Reactions     Morphine      Pt states not an allergy       I have reviewed the Medications, Allergies, Past Medical and Surgical History, and Social History in the Epic system.    Review of Systems   All other systems reviewed and are negative.           Physical Exam   BP: 106/48  Pulse: 112  Temp: 101.7  F (38.7  C)  Resp: 16  SpO2: 96 %  Physical Exam   Constitutional: He is oriented to person, place, and time. No distress.   HENT:   Head: Normocephalic and atraumatic.   Right Ear: External ear normal.   Left Ear: External ear normal.   Mouth/Throat: Oropharynx is clear and moist.   Eyes: Conjunctivae are normal. Pupils are equal, round, and reactive to light.   Neck: Normal range of motion. Neck supple.   Cardiovascular: Normal rate and intact distal pulses.    Pulmonary/Chest: Effort normal. No respiratory distress. He has  no wheezes. He has no rales. He exhibits no tenderness.   Abdominal: Soft. There is no tenderness. There is no rebound and no guarding.   Musculoskeletal: Normal range of motion. He exhibits edema.   Swelling and weeping of his lower extremities with mild erythema   Neurological: He is alert and oriented to person, place, and time.   Skin: Skin is warm and dry. No rash noted. He is not diaphoretic. There is erythema.   Psychiatric: He has a normal mood and affect. His behavior is normal. Thought content normal.   Nursing note and vitals reviewed.      ED Course     ED Course     Procedures   12:33 AM  The patient was seen and examined by Dr. Steel in Room 3.                Critical Care time:  none               Labs Ordered and Resulted from Time of ED Arrival Up to the Time of Departure from the ED - No data to display         Assessments & Plan (with Medical Decision Making)   1.  Fever    71 yo M with history of COPD and CAD who presents with a fever of 103 from his nursing home. No clear source of infection identified  His chest xray shows no acute process.  Urinalysis also is unremarkable with 2 WBC and neg nitrite.  His exam does show some swelling and weeping along with some mild erythema of his lower extremities.  Possible cellulitis.  He was treated with Vancomycin and Zosyn after cultures were obtained.  He was given tylenol for his fever and given a NS bolus. He was admitted to the medicine service.          I have reviewed the nursing notes.    I have reviewed the findings, diagnosis, plan and need for follow up with the patient.    New Prescriptions    No medications on file       Final diagnoses:   None     Jose MCCALLUM, am serving as a trained medical scribe to document services personally performed by Bright Steel MD, based on the provider's statements to me.      Bright MCCALLUM MD, was physically present and have reviewed and verified the accuracy of this note documented by Jose Soni.      6/1/2017   [facility], [place], EMERGENCY DEPARTMENT     Bright Steel MD  07/31/17 5325

## 2017-06-01 NOTE — PHARMACY-VANCOMYCIN DOSING SERVICE
Pharmacy Vancomycin Initial Note  Date of Service 2017  Patient's  1945  72 year old, male    Indication: Sepsis    Current estimated CrCl = Estimated Creatinine Clearance: 34.3 mL/min (based on Cr of 2.4).    Creatinine for last 3 days  2017: Creatinine 2.02 mg/dL  2017: 12:43 AM Creatinine 2.40 mg/dL    Recent Vancomycin Level(s) for last 3 days  No results found for requested labs within last 72 hours.      Vancomycin IV Administrations (past 72 hours)      No vancomycin orders with administrations in past 72 hours.                Nephrotoxins and other renal medications (Future)    Start     Dose/Rate Route Frequency Ordered Stop    17 0300  vancomycin (VANCOCIN) 2,000 mg in NaCl 0.9 % 500 mL intermittent infusion      2,000 mg Intravenous EVERY 24 HOURS 17 0221      17 0134  piperacillin-tazobactam (ZOSYN) 3.375 g vial to attach to  mL bag      3.375 g  over 1 Hours Intravenous ONCE 17 0134            Contrast Orders - past 72 hours     None                Plan:  1.  Start vancomycin  2000 mg IV q24h.   2.  Goal Trough Level: 15-20 mg/L   3.  Pharmacy will check trough levels as appropriate in 1-3 Days.    4. Serum creatinine levels will be ordered daily for the first week of therapy and at least twice weekly for subsequent weeks.    5. [place] method utilized to dose vancomycin therapy: Method 2    Tanika Meadows, PharmD, BCPS

## 2017-06-01 NOTE — PLAN OF CARE
"Problem: Goal Outcome Summary  Goal: Goal Outcome Summary  Outcome: No Change  /58 (BP Location: Right arm)  Pulse 112  Temp 99.1  F (37.3  C) (Oral)  Resp 20  Ht 1.803 m (5' 11\")  Wt 108.9 kg (240 lb)  SpO2 98%  BMI 33.47 kg/m2 Pt temp wet down after tylenol. He uses oxygen at 2L per NC. Patient admitted  From [facility] where he had fever and delirium this evening. PMH includes:  Severe COPD, CAD, CKD III, Stage IV prostate CA with bone mets, and CAD. Contact Isolation for MRSA.  Pt has bilateral weeping lower extremity wounds.  Tele set up.  Alert/oriented to person, place, but not time.   Pt received zosyn in ED and completed vanco once on 5B via left PIV.  UAUC sent. Plan to hold dilaudid home dose in setting of confusion/delerium concerns.  Plan:  Continue to monitor and follow POC.  Notify MD of changes.                "

## 2017-06-01 NOTE — H&P
"[facility]  H&P- General Medicine Wards    Date of service: 2017  Attending physician: Dr. 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     Patient is notably a very poor historian, but endorses some pain in his legs and increased swelling as well \"water leaking out\" for several days. States they went to check his vitals at the NH and his HR was elevated and temperature was 103.1. Patient states he has a chronic headache, always feels cold, with chronic SOB on 2L oxygen at home. Only has chest pain when there isn't any \"oxygen in my tank\". Per the ED note, patient endorsed some vomiting, but denies this during my discussion. Denies abd pain or nausea currently, but does have abdominal pain at site of umbilical hernia when someone presses on it.     In the ED, patient was noted to have temperature of 100.1, HR of 112 but HDS and normal RR on home oxygen. He was treated with 1L NS and continuous at 125 cc/hour and also APAP 975mg x1. His labs were notable for WBC 15.7, Hgb of 9.8, creatinine of 2.40, but normal lactate. He was started on Vanc and Zosyn for sepsis with presumed source either the urine or the lower extremity cellulitis.           Review of Systems:   10-point ROS reviewed & found negative w/ exceptions noted in the HPI.          Past Medical History:     Past Medical History:   Diagnosis Date     Anemia      Anxiety      Aspiration pneumonia (H)      C. difficile colitis      CAD (coronary artery disease)      Chronic pain      CKD (chronic kidney " disease)      COPD (chronic obstructive pulmonary disease) (H)      Depressive disorder      Diabetes mellitus (H)      Hypertension      Insomnia      ALEXIS (obstructive sleep apnea)      Osteoporosis      Prostate cancer metastatic to multiple sites (H)        Past Surgical History:   Procedure Laterality Date     ABDOMEN SURGERY       ARTHROSCOPY KNEE       EYE SURGERY              Allergies:     Allergies   Allergen Reactions     Morphine      Pt states not an allergy             Medications:     No current facility-administered medications on file prior to encounter.   Current Outpatient Prescriptions on File Prior to Encounter:  tobramycin-dexamethasone (TOBRADEX) ophthalmic ointment Place 1 Application into both eyes 4 times daily   sodium polystyrene (KAYEXALATE) 15 GM/60ML suspension Take 15 g by mouth once   bacitracin ointment Apply to Right great toe topically in the evening for Ingrown toenail   carboxymethylcellulose (REFRESH PLUS) 0.5 % SOLN ophthalmic solution Place 1 drop into both eyes 3 times daily   budesonide-formoterol (SYMBICORT) 160-4.5 MCG/ACT Inhaler Inhale 2 puffs into the lungs 2 times daily   predniSONE (DELTASONE) 5 MG tablet Take 1 tablet (5 mg) by mouth 2 times daily   ranitidine (RANITIDINE) 75 MG tablet Take 75 mg by mouth 2 times daily   diclofenac (VOLTAREN) 1 % GEL topical gel Apply 4 grams to knees four times daily as needed using enclosed dosing card.   HYDROmorphone (DILAUDID) 2 MG tablet Take 1 tablet (2 mg) by mouth every 4 hours as needed for moderate to severe pain   senna-docusate (SENOKOT-S;PERICOLACE) 8.6-50 MG per tablet Take 1 tablet by mouth 2 times daily   insulin glargine (LANTUS) 100 UNIT/ML injection Inject 25 Units Subcutaneous 2 times daily    omeprazole (PRILOSEC) 10 MG CR capsule Take 1 capsule (10 mg) by mouth daily   isosorbide mononitrate (IMDUR) 60 MG 24 hr tablet Take 1 tablet (60 mg) by mouth daily   atorvastatin (LIPITOR) 10 MG tablet Take 10 mg by mouth  daily   fluticasone - vilanterol (BREO ELLIPTA) 100-25 MCG/INH oral inhaler Inhale 1 puff into the lungs daily   tiotropium (SPIRIVA) 18 MCG inhalation capsule Inhale 18 mcg into the lungs daily   polyvinyl alcohol (LIQUIFILM TEARS) 1.4 % ophthalmic solution Place 1 drop into both eyes 3 times daily   acetaminophen (TYLENOL) 500 MG tablet Take 500 mg by mouth every 4 hours as needed for pain Not to exceed 3000 mg/24 hours   [DISCONTINUED] enzalutamide (XTANDI) 40 MG capsule Take 4 capsules (160 mg) by mouth daily   nitroglycerin (NITROSTAT) 0.4 MG SL tablet Place 0.4 mg under the tongue every 5 minutes as needed for chest pain if you are still having symptoms after 3 doses (15 minutes) call 911.   ipratropium - albuterol 0.5 mg/2.5 mg/3 mL (DUONEB) 0.5-2.5 (3) MG/3ML nebulization Take 1 vial (3 mLs) by nebulization every 4 hours as needed for shortness of breath / dyspnea or wheezing   aspirin 81 MG EC tablet Take 1 tablet (81 mg) by mouth daily   metoprolol (TOPROL XL) 50 MG 24 hr tablet Take 1 tablet (50 mg) by mouth daily   calcium carb 1250 mg, 500 mg [place],/vitamin D 200 units (OSCAL WITH D) 500-200 MG-UNIT per tablet Take 1 tablet by mouth 2 times daily (with meals)   tamsulosin (FLOMAX) 0.4 MG 24 hr capsule Take 1 capsule (0.4 mg) by mouth daily   ammonium lactate (LAC-HYDRIN) 12 % lotion Apply topically 2 times daily as needed for dry skin To all extremities for dry skin            Social &  Family History:     Family History   Problem Relation Age of Onset     DIABETES Mother      CANCER Mother      stomach     Social History     Social History     Marital status:      Spouse name: N/A     Number of children: N/A     Years of education: N/A     Occupational History     Not on file.     Social History Main Topics     Smoking status: Former Smoker     Smokeless tobacco: Never Used     Alcohol use No     Drug use: No     Sexual activity: No     Other Topics Concern     Not on file     Social History  "Narrative             Physical Exam:   /50 (BP Location: Right arm)  Pulse 112  Temp 99.5  F (37.5  C) (Oral)  Resp 20  Ht 1.803 m (5' 11\")  Wt 108.9 kg (240 lb)  SpO2 96%  BMI 33.47 kg/m2  Temp (24hrs), Av.4  F (38  C), Min:99.5  F (37.5  C), Max:101.7  F (38.7  C)    General: obese man, laying inbed, alert, pleasant and cooperative, NAD  HEENT: NCAT, anicteric sclera, bilateral colobomas that constricted and minimal reaction (previous phacoemulsification), EOMI, OP clear and MMM, neck is supple and no LAD  CV: tachycardic, but regular rhythm, nl S1/S2, no S3/S4 appreciated, no m/r/g; intact & symmetric 2+ pulses (radial)  Lungs: very poor breath sounds bilaterally, but no wheezing or cough present.   Abd: soft, tender at umbilical hernia, ND, normal bowel sounds, no HSM or masses palpable  Ext: WWP, 3+ BLE nonpitting edema with keloid scars bilaterally and small superficial ulceration with purulent drainage of right anterior lower extremity.   Neuro: AOx3, CN 2-12 intact b/l, strength is 5/5 in UE and LE b/l, normal sensation throughout.           Data:     Results for orders placed or performed during the hospital encounter of 17 (from the past 24 hour(s))   Comprehensive metabolic panel   Result Value Ref Range    Sodium 138 133 - 144 mmol/L    Potassium 4.8 3.4 - 5.3 mmol/L    Chloride 99 94 - 109 mmol/L    Carbon Dioxide 33 (H) 20 - 32 mmol/L    Anion Gap 6 3 - 14 mmol/L    Glucose 114 (H) 70 - 99 mg/dL    Urea Nitrogen 41 (H) 7 - 30 mg/dL    Creatinine 2.40 (H) 0.66 - 1.25 mg/dL    GFR Estimate 27 (L) >60 mL/min/1.7m2    GFR Estimate If Black 32 (L) >60 mL/min/1.7m2    Calcium 9.0 8.5 - 10.1 mg/dL    Bilirubin Total 0.4 0.2 - 1.3 mg/dL    Albumin 3.0 (L) 3.4 - 5.0 g/dL    Protein Total 7.7 6.8 - 8.8 g/dL    Alkaline Phosphatase 122 40 - 150 U/L    ALT 12 0 - 70 U/L    AST 11 0 - 45 U/L   Lactic acid   Result Value Ref Range    Lactic Acid 1.2 0.7 - 2.1 mmol/L   CBC with platelets " differential   Result Value Ref Range    WBC 15.7 (H) 4.0 - 11.0 10e9/L    RBC Count 3.59 (L) 4.4 - 5.9 10e12/L    Hemoglobin 9.8 (L) 13.3 - 17.7 g/dL    Hematocrit 33.5 (L) 40.0 - 53.0 %    MCV 93 78 - 100 fl    MCH 27.3 26.5 - 33.0 pg    MCHC 29.3 (L) 31.5 - 36.5 g/dL    RDW 16.2 (H) 10.0 - 15.0 %    Platelet Count 282 150 - 450 10e9/L    Diff Method Automated Method     % Neutrophils 89.3 %    % Lymphocytes 3.6 %    % Monocytes 6.2 %    % Eosinophils 0.3 %    % Basophils 0.1 %    % Immature Granulocytes 0.5 %    Nucleated RBCs 0 0 /100    Absolute Neutrophil 14.0 (H) 1.6 - 8.3 10e9/L    Absolute Lymphocytes 0.6 (L) 0.8 - 5.3 10e9/L    Absolute Monocytes 1.0 0.0 - 1.3 10e9/L    Absolute Eosinophils 0.0 0.0 - 0.7 10e9/L    Absolute Basophils 0.0 0.0 - 0.2 10e9/L    Abs Immature Granulocytes 0.1 0 - 0.4 10e9/L    Absolute Nucleated RBC 0.0    Chest  XR, 1 view portable    Impression    Impression: No acute cardiopulmonary process. No evidence for  pneumonia.   ISTAT gases lactate abdiel POCT   Result Value Ref Range    Ph Venous 7.30 (L) 7.32 - 7.43 pH    PCO2 Venous 62 (H) 40 - 50 mm Hg    PO2 Venous 21 (L) 25 - 47 mm Hg    Bicarbonate Venous 30 (H) 21 - 28 mmol/L    O2 Sat Venous 29 %    Lactic Acid 0.6 (L) 0.7 - 2.1 mmol/L     *Note: Due to a large number of results and/or encounters for the requested time period, some results have not been displayed. A complete set of results can be found in Results Review.             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     #. Sepsis (HR, WBC, h/o fever)  Patient currently meets SIRS criteria with unknown source, but suspect either lower extremity cellulitis vs UTI (awaiting UA results). CXR  was notably unremarkable for cardiopulmonary process. Reports that patient was delirious at NH, but AAOx3 during my assessment. Started on Vanc (MRSA positive) and Zosyn in the ED and will continue here.   -- continue vancomycin and zosyn  -- add on CRP to trend  -- wound culture and gram stain  -- wound care nurse consult  -- follow up UA and Blood cultures  -- holding dilaudid (2 mg q 4 hour at home) given concerns for delirium  -- holding home prednisone as unclear if patient is actually taking daily and nothing in previous pulmonology notes.   -- hold PTA APAP    #. Severe COPD  Does not appear to be worse, is on home oxygen level with normal O2 sats and bicarb on BMP is at baseline so do not suspect worsening acidosis.   -- continue PTA symbicort BID  -- continue PTA spiriva  -- continue PTA duoneb PRN  -- hold breo ellipta that is on med list as unclear why on as well as symbicort   -- holding home prednisone as unclear if patient is actually taking daily and nothing in previous pulmonology notes.     #. NATTY on CKD III  #. BPH  Baseline creatinine appears to be 1.6 - 2 recently. Is elevated to 2.40 at admit with BUN:Cr ratio consistent with pre-renal.  -- continue tamsulosin  -- await results of UA  -- continue IVF fluids  -- trend Cr to verify improvement.     #. CAD  #. HFpEF  Patient currently asymptomatic and appears to be at baseline.   -- continue PTA imdur and ASA  -- continue PTA metoprolol succinate 50 mg daily    #. HLD  -- continue home atorvastatin    #. DMII  -- continue home glargine 25 units   -- medium SSI  -- TID AC and qHS BG checks    #. Chronic Pain  -- hold home dilaudid given concerns for delirium  -- continue diclofenac gel to knees    F/E/N:  -- no mIVF  -- replete electrolytes as needed  -- consistent carb diet    Ppx:  -- GI: continue PTA omeprazole and ranitidine  -- VTE: none, PT to help with ambulation    Lines/Devices:  PIV    Code: Full     Dispo: Admit to medicine for IV abx  with plan to transition to oral and monitor for response.     Plan of care to be disscussed with primary team in the am.      Shahid Chávez MD  PGY2 Internal Medicine  P) [phone]    Physician Attestation   I, Radha Obrien, saw this patient with the resident and agree with the resident s findings and plan of care as documented in the resident s note.      I personally reviewed vital signs, medications, labs and imaging.    Key findings:   Had 9 kids, 6 locally  No POA for healthcare  Wants oldest son to make decision if he cannot  Says he does not know about his prostate cancer and where it is  Uses a wheelchair; can get into the WC though  No medication changes lately per patient but when spoke with his PCP today his lasix had been stopped then restarted at a lower dose recently.  Of note, per her the patient does know about the prostate cancer.  Has no POA and family involved but not always on same page.  Distant HS  3+ B LE edema with open sores on both anterior lower legs (not new per PCP)  A/P:  1. Sepsis: most likely source is leg wounds given negative UA.  Blood cultures pending.  -Continue vanco and zosyn for now.  Nares + for MRSA.  -Lymphedema consult placed today  2. NATTY on CKD: has had labile Cr's per his PCP lately. Will hydrate and recheck in morning-hopefully he was simply volume down intravascularly.  3. COPD: Severe COPD.  Continue his meds.  Also on oxygen at the NH  4. Prostate ca: Palliative treatment only.      Radha Obrien  Date of Service (when I saw the patient): 06/01/17

## 2017-06-01 NOTE — IP AVS SNAPSHOT
[address] [address]    Phone:  [phone]                                       After Visit Summary   6/1/2017    Tomas Grey    MRN: [id number]           After Visit Summary Signature Page     I have received my discharge instructions, and my questions have been answered. I have discussed any challenges I see with this plan with the nurse or doctor.    ..........................................................................................................................................  Patient/Patient Representative Signature      ..........................................................................................................................................  Patient Representative Print Name and Relationship to Patient    ..................................................               ................................................  Date                                            Time    ..........................................................................................................................................  Reviewed by Signature/Title    ...................................................              ..............................................  Date                                                            Time

## 2017-06-01 NOTE — ED NOTES
Bed: ED03  Expected date: 5/31/17  Expected time: 11:51 PM  Means of arrival: Ambulance  Comments:  [id number] with a 70 yo male c/o fever. Yellow in 25 mins

## 2017-06-01 NOTE — PROGRESS NOTES
"[facility] Nurse Inpatient Adult Wound Assessment    Initial Assessment  Reason for consultation: Evaluate and treat bilateral lower legs    Assessment:   Right lower leg wound due to lymphedema,  Venous Ulcer versus trauma     Status: initial assessment      TREATMENT  PLAN  Right lower leg wound:  Cleanse wound with microklenz and pat dry.  Apply Exudry 6x9\" (imanage # [id number]) over wound, cover with ABD pad, wrap with kerlix and secure with two 6\" aces bandages wrapped toes to knee.  Change daily or if dressings soaked.    Left lower leg: clean leg daily.  Wrap with kerlix, then secure with two 6\" aces bandages wrapped toes to knee.  Change daily or if dressings soaked.    Keep legs elevated at all times.      Recommend: lymphedema consult    Orders Written  [facility] Nurse follow-up plan: weekly   Nursing to notify the Provider(s) and re-consult the WO Nurse if wound(s) deteriorates or new skin concern.    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     Objective Data   Containment of urine/stool: Continent of bowel and Continent of bladder    Current Diet/ Nutrition    Active Diet Order    Active Diet Order      Moderate Consistent CHO Diet    Output:       Risk Assessment:   Herrera Herrera Score  Avg: 15  Min: 15  Max: 15  Herrera Q No Data Recorded     NSRAS No Data Recorded                                  Labs:     Recent Labs  Lab 06/01/17  0043   HGB 9.8*   WBC 15.7*   CRP 68.0*       Recent Labs  Lab 06/01/17  0550 06/01/17  0357 06/01/17  0043   CULT Culture negative < 24 hours, reincubate No growth after 2 hours No growth after 5 hours       Physical Exam  Skin assessment:   Focused skin inspection: bilateral lower " legs    Wound Location:  Right lower leg  Wound History: Scattered dark areas consistent with healed wounds.  Legs edematous with pebbled texture of chronic lymphedema.  RIght leg wound weeping serous fluid since admit.  Few pinpoint areas of weeping.  Measurements (length x width x depth, in cm) 0.8 x 0.6 x 0 cm  Wound Base:  100% dermis  Palpation of the wound bed: normal   Periwound skin: intact and dry/scaly  Color: normal and consistent with surrounding tissue  Temperature: normal   Drainage: copious  Description of drainage: serous  Odor: mild  Pain: score 4/10 with dressig change, aching     Left lower leg: Same pebbled, chronic lymphedema appearance as right leg.  No open wounds at this time, but weeping from pinpoint areas small amount serous fluid.      Bilateral legs could benefit from lymphedema consult.    Interventions  Current support surface: Standard  Atmos Air  Current off-loading measures: Foam padding and Pillows under calves  Visual inspection of wounds(s) completed.  Wound Care: was done per plan of care.  Supplies: exudry pads  Education provided today: Keep legs elevated.    Discussed plan of care with Patient and Nurse  Face to face time: 20 minutes

## 2017-06-01 NOTE — PLAN OF CARE
Problem: Goal Outcome Summary  Goal: Goal Outcome Summary  Outcome: No Change  RN assumed cares at 0700, Pt alert and oriented to all but time.  VS stable throughout the shift with some mild hypertension this afternoon.  Pt able to ambulate with an assist of one to the commode.  Has been having some diarrhea this shift.  Pt uses the urinal independently.  Pt is diabetic with some hypoglycemia this afternoon, lowest result was 60.  Pt was given juice and recheck was 104.  Pt's appetite has been fair.  Pt is forgetful, however no other cognitive deficits noted.  Pt has a productive cough that started before admission.  Pt has bilateral lower extremity edema which is weeping actively.  WO nurse consulted this morning and put in new wound care orders.  Pt does not endorse any pain other than with movement.  No acute incidents this shift.  Continue to monitor and notify MD of any changes.

## 2017-06-02 NOTE — PLAN OF CARE
Problem: Goal Outcome Summary  Goal: Goal Outcome Summary  Patient has bilateral swelling in legs. Sitting in recliner with legs elevated. Patient had a low blood sugar this am was given d50 and then ate breakfast and drank some apple juice. Blood sugar increased and has been in the low 100 through out day. Patient stands up and pivots to the chair from the bed uses an electric scooter at the nursing home.  Patient has a cough that is productive at times and at times is not. Per patient uses nebs and inhalers at the nursing home so RT called for a Duoneb at bedside. Patient had lactic acid drawn this afternoon that was 1.0.  Patient on tele and has been tachy with normal sinus rhythm. Patient refused lunch had a big breakfast and ate everything. P;cont to monitor

## 2017-06-02 NOTE — PROGRESS NOTES
"CLINICAL NUTRITION SERVICES - ASSESSMENT NOTE     Nutrition Prescription    RECOMMENDATIONS FOR MDs/PROVIDERS TO ORDER:  - May consider Multivitamins with minerals for support of wound healing     Malnutrition Status:    Non-severe malnutrition in the context of acute illness    Recommendations already ordered by Registered Dietitian (RD):  Glucerna oral supplements with meals   Each Glucerna supplement provides 220 kcals, 10 gms PRO, 26 gms CHO.    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 , per chart, meets SIRS criteria     NUTRITION HISTORY  Pt reports fair po and appetite PTA.     CURRENT NUTRITION ORDERS  Diet: Moderate Consistent Carbohydrate  Intake/Tolerance:Per patient, tolerating PO. Intake ~ 75% meal yesterday. Likes Oral supplements and would like them in between meals.     LABS  BUN: 31, Cr: 2.01 - H    MEDICATIONS  Insulin  Prednisone     ANTHROPOMETRICS  Height: 180.3 cm (5' 11\")  Most Recent Weight: 108 kg (238 lb 3.2 oz)   - admission wt on 6/1   IBW: 78.1 kg ( 138% IBW )   BMI: 33.2 kg /m2 -  Obesity Grade I BMI 30-34.9  Weight History: 5.9 kg wt loss since middle of April ( 5% net loss in 6 weeks )   Wt Readings from Last 10 Encounters:   06/01/17 108 kg (238 lb 3.2 oz)   05/24/17 108.4 kg (239 lb)   05/23/17 108.5 kg (239 lb 3.2 oz)   05/17/17 110.9 kg (244 lb 8 oz)   05/16/17 108.8 kg (239 lb 14.4 oz)   04/26/17 66.4 kg (146 lb 6.4 oz)   04/19/17 113.9 kg (251 lb 2 oz)   04/18/17 113.2 kg (249 lb 8 oz) "   04/10/17 112.3 kg (247 lb 8 oz)   03/22/17 115.2 kg (254 lb)       Dosing Weight: 86 kg adjusted wt     ASSESSED NUTRITION NEEDS  Estimated Energy Needs: 1720 - 2150  kcals/day (20 - 25 kcals/kg)  Justification: Maintenance and Obese  Estimated Protein Needs: 86 - 103  grams protein/day (1 - 1.2 grams of pro/kg)  Justification: Maintenance,  Pending CKD   Estimated Fluid Needs:   Justification: Per provider pending fluid status    PHYSICAL FINDINGS  - Legs wrapped from MTPs to knee crease 2/2 weeping edema  - WOC nurse assessment: Right lower leg wound due to lymphedema, Venous Ulcer versus trauma     MALNUTRITION  % Intake: < 75% for > 7 days (non-severe)  % Weight Loss: Up to 5% in 1 month (non-severe)  Subcutaneous Fat Loss: None observed  Muscle Loss: None observed  Fluid Accumulation/Edema: Moderate  Malnutrition Diagnosis: Non-severe malnutrition in the context of acute illness    NUTRITION DIAGNOSIS  Predicted inadequate oral intake related to AMS/fevers may inhibits pt's ability to take adequate po    INTERVENTIONS  Implementation  Nutrition Education: Encouraged oral supplements if decrease intake   Medical food supplement therapy - Ordered Glucerna with meals    Goals  Patient to consume % of nutritionally adequate meal trays TID, or the equivalent with supplements/snacks.     Monitoring/Evaluation  Progress toward goals will be monitored and evaluated per protocol.    Gill Ayon RD/LULU  Pager [phone]

## 2017-06-02 NOTE — PLAN OF CARE
"Problem: Goal Outcome Summary  Goal: Goal Outcome Summary  Outcome: No Change  Vitals: BP (!) 107/37 (BP Location: Left arm)  Pulse 112  Temp 98.5  F (36.9  C) (Oral)  Resp 20  Ht 1.803 m (5' 11\")  Wt 108 kg (238 lb 3.2 oz)  SpO2 98%  BMI 33.22 kg/m2  BMI= Body mass index is 33.22 kg/(m^2). Contact precautions for MRSA.  Full code. Alert/oriented to person and place, but not to time.  Pt had hypoglycemic episode this shift  with lowest BG at  0226 of 57.  Pt received 240 ml orange juice which only brought BG to 69. Pt given 25 ml 50% dextrose and BG increase to 129.    Results for HERMILO KING (MRN [id number]) as of 6/2/2017 04:57    Ref. Range 6/1/2017 23:00 6/1/2017 23:21 6/2/2017 00:05 6/2/2017 02:26 6/2/2017 02:46 6/2/2017 03:11   Glucose Latest Ref Range: 70 - 99 mg/dL 74 115 (H) 152 (H) 57 (L) 69 (L) 129 (H)       Pt has a productive cough and MD contacted for medication. He received tessalon ashley prn with relief.   Pt has bilateral lower extremity edema which is weeping actively.  [facility] nurse consulted this morning and put in new wound care ordersPt endorsed having cough. MD notified and patient received teslon ashley with relief. Pt given warm blankets as he states he was cold.  Up and moving around during night preferring to lay side ways on the bed with feet hanging over or on the floor. Educated patient on importance of elevating feet and wrapping legs.  BLE cool. Pt received IV Zosyn and Vanco per orders via left PIV.  Plan: Continue to monitor and follow POC.  NOtify MD of changes.      l         "

## 2017-06-02 NOTE — PLAN OF CARE
Problem: Goal Outcome Summary  Goal: Goal Outcome Summary  Outcome: No Change  D: VSS, Afeb. See Epic for full assessment. Pt having low FS off and on all shift. See lab values. Treated and resolved. Remained baseline for orientation. Denied pain. C/O feet being cold. Wrapped in warm blankets. Lays in odd  position. Keeps feet on floor. Explained about ways to decrease edema in legs by elevating them. Pt is unintrested in idea.  A/P: Stable at present time, continue close monitoring of FS.

## 2017-06-02 NOTE — PROGRESS NOTES
Patient likes to have legs hang off bed or if chair likes them down. Educated patient oimportance of elevating legs. Patient did allow legs to be elevated off and on throughout shift.

## 2017-06-02 NOTE — PLAN OF CARE
Problem: Goal Outcome Summary  Goal: Goal Outcome Summary  PT 5B: Eval complete, treatment initiated. Pt sitting up in recliner upon arrival, demonstrated sit<>stand with CGA and FWW, and marched in place with min A for stability using FWW. Recommend disch back to SNF with continued therapy to progress strength and endurance for improved independence with functional mobility.

## 2017-06-02 NOTE — PROGRESS NOTES
"Daily progress note    Interval hx: Pt. Seen and examined. Says he is feeling much better today.  Legs hurt a bit.  Breathing ok.  No chest pain, no nausea.  Eating ok.    Vital signs: Blood pressure 95/43, pulse 112, temperature 99.1  F (37.3  C), temperature source Oral, resp. rate 20, height 1.803 m (5' 11\"), weight 108 kg (238 lb 3.2 oz), SpO2 93 %.    Labs:  Recent Results (from the past 36 hour(s))   ISTAT gases lactate abdiel POCT    Collection Time: 06/01/17  1:52 AM   Result Value Ref Range    Ph Venous 7.30 (L) 7.32 - 7.43 pH    PCO2 Venous 62 (H) 40 - 50 mm Hg    PO2 Venous 21 (L) 25 - 47 mm Hg    Bicarbonate Venous 30 (H) 21 - 28 mmol/L    O2 Sat Venous 29 %    Lactic Acid 0.6 (L) 0.7 - 2.1 mmol/L   Blood Culture ONE site    Collection Time: 06/01/17  3:57 AM   Result Value Ref Range    Specimen Description Blood Right Hand     Culture Micro No growth after 1 day     Micro Report Status Pending    Urine Culture    Collection Time: 06/01/17  5:50 AM   Result Value Ref Range    Specimen Description Unspecified Urine     Special Requests Specimen received in preservative     Culture Micro       <10,000 colonies/mL mixed urogenital rex Susceptibility testing not routinely   done      Micro Report Status FINAL 06/02/2017    UA with Microscopic    Collection Time: 06/01/17  5:50 AM   Result Value Ref Range    Color Urine Yellow     Appearance Urine Clear     Glucose Urine Negative NEG mg/dL    Bilirubin Urine Negative NEG    Ketones Urine 5 (A) NEG mg/dL    Specific Gravity Urine 1.018 1.003 - 1.035    Blood Urine Negative NEG    pH Urine 5.5 5.0 - 7.0 pH    Protein Albumin Urine 10 (A) NEG mg/dL    Urobilinogen mg/dL Normal 0.0 - 2.0 mg/dL    Nitrite Urine Negative NEG    Leukocyte Esterase Urine Moderate (A) NEG    Source Clean catch urine     WBC Urine 2 0 - 2 /HPF    RBC Urine <1 0 - 2 /HPF    Squamous Epithelial /HPF Urine <1 0 - 1 /HPF    Transitional Epi <1 0 - 1 /HPF    Mucous Urine Present (A) NEG /LPF "   Lactic acid level STAT    Collection Time: 06/01/17 10:50 AM   Result Value Ref Range    Lactic Acid 0.8 0.7 - 2.1 mmol/L   Glucose by meter    Collection Time: 06/01/17 12:28 PM   Result Value Ref Range    Glucose 60 (L) 70 - 99 mg/dL   Glucose by meter    Collection Time: 06/01/17 12:49 PM   Result Value Ref Range    Glucose 74 70 - 99 mg/dL   Glucose by meter    Collection Time: 06/01/17  1:16 PM   Result Value Ref Range    Glucose 104 (H) 70 - 99 mg/dL   Glucose by meter    Collection Time: 06/01/17  2:53 PM   Result Value Ref Range    Glucose 94 70 - 99 mg/dL   Glucose by meter    Collection Time: 06/01/17  5:42 PM   Result Value Ref Range    Glucose 48 (LL) 70 - 99 mg/dL   Glucose by meter    Collection Time: 06/01/17  7:18 PM   Result Value Ref Range    Glucose 132 (H) 70 - 99 mg/dL   Glucose by meter    Collection Time: 06/01/17 10:23 PM   Result Value Ref Range    Glucose 37 (LL) 70 - 99 mg/dL   Glucose by meter    Collection Time: 06/01/17 10:41 PM   Result Value Ref Range    Glucose 62 (L) 70 - 99 mg/dL   Glucose by meter    Collection Time: 06/01/17 11:00 PM   Result Value Ref Range    Glucose 74 70 - 99 mg/dL   Glucose by meter    Collection Time: 06/01/17 11:21 PM   Result Value Ref Range    Glucose 115 (H) 70 - 99 mg/dL   Glucose by meter    Collection Time: 06/02/17 12:05 AM   Result Value Ref Range    Glucose 152 (H) 70 - 99 mg/dL   Glucose by meter    Collection Time: 06/02/17  2:26 AM   Result Value Ref Range    Glucose 57 (L) 70 - 99 mg/dL   Glucose by meter    Collection Time: 06/02/17  2:46 AM   Result Value Ref Range    Glucose 69 (L) 70 - 99 mg/dL   Glucose by meter    Collection Time: 06/02/17  3:11 AM   Result Value Ref Range    Glucose 129 (H) 70 - 99 mg/dL   Comprehensive metabolic panel    Collection Time: 06/02/17  6:27 AM   Result Value Ref Range    Sodium 140 133 - 144 mmol/L    Potassium 4.5 3.4 - 5.3 mmol/L    Chloride 105 94 - 109 mmol/L    Carbon Dioxide 28 20 - 32 mmol/L     Anion Gap 7 3 - 14 mmol/L    Glucose 39 (LL) 70 - 99 mg/dL    Urea Nitrogen 31 (H) 7 - 30 mg/dL    Creatinine 2.01 (H) 0.66 - 1.25 mg/dL    GFR Estimate 33 (L) >60 mL/min/1.7m2    GFR Estimate If Black 40 (L) >60 mL/min/1.7m2    Calcium 7.9 (L) 8.5 - 10.1 mg/dL    Bilirubin Total 0.4 0.2 - 1.3 mg/dL    Albumin 2.4 (L) 3.4 - 5.0 g/dL    Protein Total 6.2 (L) 6.8 - 8.8 g/dL    Alkaline Phosphatase 90 40 - 150 U/L    ALT 13 0 - 70 U/L    AST 14 0 - 45 U/L   Glucose by meter    Collection Time: 06/02/17  7:47 AM   Result Value Ref Range    Glucose 54 (L) 70 - 99 mg/dL   Glucose by meter    Collection Time: 06/02/17  8:32 AM   Result Value Ref Range    Glucose 136 (H) 70 - 99 mg/dL   Glucose by meter    Collection Time: 06/02/17 10:07 AM   Result Value Ref Range    Glucose 346 (H) 70 - 99 mg/dL       Physical exam:   Lying in bed, legs lying off bed  CTA B  RRR  Abd: +BS, NTND  Blood cultures still negative    Cr 2.01 (improved)  Albumin 2.4    Assessment and Plan:   Tomas Grey is a 72 year old man with a history of severe COPD (FEV1 0.41L, 13%, on chronic 2L), CAD, diastolic heart failure (EF 55-60%) , CKD stage III, HLD and 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      #. Sepsis (HR, WBC, h/o fever)--likely from celluitis  Patient currently meets SIRS criteria with unknown source, but suspect either lower extremity cellulitis vs UTI (awaiting UA results). CXR was notably unremarkable for cardiopulmonary process. Reports that patient was delirious at NH, but AAOx3 during my assessment. Started on Vanc (MRSA positive) and Zosyn in the ED and will continue here.   -- continue vancomycin and zosyn-d/c vanco tomorrow  -- wound culture and gram stain  -- wound care nurse consult  -- follow up UA and Blood cultures  -- holding dilaudid (2mg q4 hour at home) given concerns for delirium--will restart at 1/2 dose on  Saturday  -- restart home prednisone (on this for his prostate cancer)  -- hold PTA APAP     #. Severe COPD  Does not appear to be worse, is on home oxygen level with normal O2 sats and bicarb on BMP is at baseline so do not suspect worsening acidosis.   -- continue PTA symbicort BID  -- continue PTA spiriva  -- continue PTA duoneb PRN  -- hold breo ellipta that is on med list as unclear why on as well as symbicort   -- holding home prednisone as unclear if patient is actually taking daily and nothing in previous pulmonology notes.      #. NATTY on CKD III  #. BPH  Baseline creatinine appears to be 1.6 - 2 recently. Cr back to baseline now.  -- continue tamsulosin     #. CAD  #. HFpEF  Patient currently asymptomatic and appears to be at baseline.   -- continue PTA imdur and ASA  -- continue PTA metoprolol succinate 50 mg daily     #. HLD  -- continue home atorvastatin     #. DMII  -- continue home glargine 25 units (was accidentally placed on BID on admit-changed to qHS).  -- medium SSI  -- TID AC and qHS BG checks     #. Chronic Pain  -- hold home dilaudid given concerns for delirium--will restart on Saturday at 1/2 dose  -- continue diclofenac gel to knees     F/E/N:  -- no mIVF  -- replete electrolytes as needed  -- consistent carb diet     Ppx:  -- GI: continue PTA omeprazole and ranitidine  -- VTE: none, PT to help with ambulation     Lines/Devices:  PIV     Code: Full     Radha Stormy Obrien MD, MSEd  Hospitalist  Professor of Medicine  [facility]  [phone]

## 2017-06-03 NOTE — PHARMACY-ADMISSION MEDICATION HISTORY
Admission medication history interview status for the 6/1/2017 admission is complete. See Epic admission navigator for allergy information, pharmacy, prior to admission medications and immunization status.     Medication history interview sources:  MAR from [facility] ([phone]), [facility] records in Care Everywhere    Changes made to PTA medication list (reason)  Added: furosemide 20 mg po daily; abiraterone 1000 mg po once daily  Deleted: none  Changed: none    Additional medication history information (including reliability of information, actions taken by pharmacist):  - Patient was unable to answer questions about his medications.  - Lantus, Lac-Hydrin lotion, and diclofenac gel were not listed on the patient's current MAR from [facility], however, they were listed on his [facility] current medication list in Care Everywhere.        Prior to Admission medications    Medication Sig Last Dose Taking? Auth Provider   FUROSEMIDE PO Take 20 mg by mouth daily 5/31/2017 at AM Yes Unknown, Entered By History   abiraterone (ZYTIGA) 250 MG tablet Take 1,000 mg by mouth daily 5/31/2017 at 0700 Yes Unknown, Entered By History   tobramycin-dexamethasone (TOBRADEX) ophthalmic ointment Place 1 Application into both eyes 4 times daily 5/31/2017 at HS Yes Reported, Patient   bacitracin ointment Apply to Right great toe topically in the evening for Ingrown toenail Past Month at Unknown time Yes Reported, Patient   carboxymethylcellulose (REFRESH PLUS) 0.5 % SOLN ophthalmic solution Place 1 drop into both eyes 3 times daily 5/31/2017 at 2000 Yes Reported, Patient   budesonide-formoterol (SYMBICORT) 160-4.5 MCG/ACT Inhaler Inhale 2 puffs into the lungs 2 times daily 5/31/2017 at PM Yes Reported, Patient   predniSONE (DELTASONE) 5 MG tablet Take 1 tablet (5 mg) by mouth 2 times daily  Patient taking differently: Take 5 mg by mouth 2 times daily At 0800 and 1700 5/31/2017 at 1700 Yes Ryley Callahan MD   ranitidine (RANITIDINE) 75  MG tablet Take 75 mg by mouth 2 times daily 5/31/2017 at 2000 Yes Reported, Patient   HYDROmorphone (DILAUDID) 2 MG tablet Take 1 tablet (2 mg) by mouth every 4 hours as needed for moderate to severe pain 5/31/2017 at 1622 Yes Jess Arias,    senna-docusate (SENOKOT-S;PERICOLACE) 8.6-50 MG per tablet Take 1 tablet by mouth 2 times daily 5/31/2017 at PM Yes Ekaterina Lozano APRN CNP   omeprazole (PRILOSEC) 10 MG CR capsule Take 1 capsule (10 mg) by mouth daily 5/31/2017 at 0800 Yes Ekaterina Lozano APRN CNP   isosorbide mononitrate (IMDUR) 60 MG 24 hr tablet Take 1 tablet (60 mg) by mouth daily 5/31/2017 at AM Yes Ekaterina Lozano APRN CNP   atorvastatin (LIPITOR) 10 MG tablet Take 10 mg by mouth At Bedtime  5/31/2017 at HS Yes Reported, Patient   fluticasone - vilanterol (BREO ELLIPTA) 100-25 MCG/INH oral inhaler Inhale 1 puff into the lungs daily 5/31/2017 at AM Yes Reported, Patient   tiotropium (SPIRIVA) 18 MCG inhalation capsule Inhale 18 mcg into the lungs daily 5/31/2017 at AM Yes Reported, Patient   polyvinyl alcohol (LIQUIFILM TEARS) 1.4 % ophthalmic solution Place 1 drop into both eyes 3 times daily 5/31/2017 at PM Yes Reported, Patient   acetaminophen (TYLENOL) 500 MG tablet Take 500 mg by mouth every 4 hours as needed for pain Not to exceed 3000 mg/24 hours 5/31/2017 at 1623 Yes Unknown, Entered By History   aspirin 81 MG EC tablet Take 1 tablet (81 mg) by mouth daily 5/31/2017 at 0800 Yes Lucero Harris PA-C   metoprolol (TOPROL XL) 50 MG 24 hr tablet Take 1 tablet (50 mg) by mouth daily  Patient taking differently: Take 50 mg by mouth daily Hold for pulse < 60 5/31/2017 at AM Yes Lucero Harris PA-C   calcium carb 1250 mg, 500 mg [place],/vitamin D 200 units (OSCAL WITH D) 500-200 MG-UNIT per tablet Take 1 tablet by mouth 2 times daily (with meals) 5/31/2017 at PM Yes Lucero Harris PA-C   tamsulosin (FLOMAX) 0.4 MG 24 hr capsule Take 1 capsule (0.4 mg) by  mouth daily 5/31/2017 at AM Yes Lucero Harris PA-C   sodium polystyrene (KAYEXALATE) 15 GM/60ML suspension Take 15 g by mouth once 5/23/2017 at 1314  Reported, Patient   diclofenac (VOLTAREN) 1 % GEL topical gel Apply 4 grams to knees four times daily as needed using enclosed dosing card. Unknown at Unknown time  Ekaterina Lozano APRN CNP   insulin glargine (LANTUS) 100 UNIT/ML injection Inject 25 Units Subcutaneous 2 times daily  Unknown at Unknown time  Reported, Patient   nitroglycerin (NITROSTAT) 0.4 MG SL tablet Place 0.4 mg under the tongue every 5 minutes as needed for chest pain if you are still having symptoms after 3 doses (15 minutes) call 911. More than a month at Unknown time  Unknown, Entered By History   ipratropium - albuterol 0.5 mg/2.5 mg/3 mL (DUONEB) 0.5-2.5 (3) MG/3ML nebulization Take 1 vial (3 mLs) by nebulization every 4 hours as needed for shortness of breath / dyspnea or wheezing More than a month at Unknown time  Yamila Cunningham, SAL CNP   ammonium lactate (LAC-HYDRIN) 12 % lotion Apply topically 2 times daily as needed for dry skin To all extremities for dry skin Unknown at Unknown time           Medication history completed by: Elvia Villafuerte, PharmD

## 2017-06-03 NOTE — PLAN OF CARE
"Problem: Goal Outcome Summary  Goal: Goal Outcome Summary  Outcome: No Change  /49 (BP Location: Right arm)  Pulse 112  Temp 99.5  F (37.5  C) (Oral)  Resp 18  Ht 1.803 m (5' 11\")  Wt 108 kg (238 lb 3.2 oz)  SpO2 94%  BMI 33.22 kg/m2.  Contact precautions for MRSA. Pt on  TELE with  NSR with tachycardia.  Alert/ox3. Pt has intermittent cough and received prn Robitussin with  Improvement.  Pt's breathing better while sitting in recliner, but needs encouragement to keep both legs elevated to improve circulation  With bilateral l/e wounds.  Wound care done to right  Leg per orders as wraps were saturated.  Pt received IV abx x2 during shift. BS   133, 75.  Pt had 2 cups apple juice.  Plan:  Continue to monitor and follow POC.  Notify MD of changes.      "

## 2017-06-03 NOTE — PHARMACY-VANCOMYCIN DOSING SERVICE
Pharmacy Vancomycin Note  Date of Service Emi 3, 2017  Patient's  1945   72 year old, male    Indication: Sepsis  Goal Trough Level: 15-20 mg/L  Day of Therapy: 2  Current Vancomycin regimen:  2000 mg IV q24h    Current estimated CrCl = Estimated Creatinine Clearance: 42.1 mL/min (based on Cr of 2.01).    Creatinine for last 3 days  2017: 12:43 AM Creatinine 2.40 mg/dL  2017:  6:27 AM Creatinine 2.01 mg/dL    Recent Vancomycin Levels (past 3 days)  6/3/2017:  1:48 AM Vancomycin Level 23.3 mg/L    Vancomycin IV Administrations (past 72 hours)                   vancomycin (VANCOCIN) 2,000 mg in NaCl 0.9 % 500 mL intermittent infusion (mg) 2,000 mg New Bag 17 0352     2,000 mg New Bag 17 0326     2,000 mg New Bag 17 0339                Nephrotoxins and other renal medications (Future)    Start     Dose/Rate Route Frequency Ordered Stop    17 1400  piperacillin-tazobactam (ZOSYN) 3.375 g vial to attach to  mL bag      3.375 g  over 1 Hours Intravenous EVERY 6 HOURS 17 1059      17 0300  vancomycin (VANCOCIN) 2,000 mg in NaCl 0.9 % 500 mL intermittent infusion      2,000 mg Intravenous EVERY 24 HOURS 17 0221               Contrast Orders - past 72 hours     None          Interpretation of levels and current regimen:  Trough level is  Supratherapeutic, and this was after only 2 doses subsequent dosing will only raise trough levels as he approaches steady-state with this regimen.    Has serum creatinine changed > 50% in last 72 hours: No    Urine output:  diminished urine output    Renal Function: unclear    Plan:  1.  Decrease Dose to 2000mg IV Vanco Q 48H  2.  Pharmacy will check trough levels as appropriate on 6 AM  3. Serum creatinine levels will be ordered daily for the first week of therapy and at least twice weekly for subsequent weeks.      Terry Barbour PharmD, [facility]    [phone]  Pager [phone]          .

## 2017-06-03 NOTE — PROGRESS NOTES
" 06/02/17 1232   Quick Adds   Type of Visit Initial PT Evaluation   Living Environment   Lives With facility resident   Living Arrangements (SNF with rehab services)   Home Accessibility no concerns   Number of Stairs to Enter Home 0   Number of Stairs Within Home 0   Transportation Available agency transportation   Living Environment Comment Pt states that he lives in SNF, has assist and AE for ADLs and mobility.   Self-Care   Usual Activity Tolerance fair   Current Activity Tolerance poor   Regular Exercise no   Equipment Currently Used at Home wheelchair;respiratory supplies   Activity/Exercise/Self-Care Comment Pt states that he was transferring bed<>power chair with assist, IND with mobility from . Pt received assist for shower 2x/wk, sitting at shower chair. Pt denies regular exercise, but does say that he has been working with PT in his nursing home.   Functional Level Prior   Ambulation 4-->completely dependent  (power chair)   Transferring 2-->assistive person   Toileting 3-->assistive equipment and person   Bathing 3-->assistive equipment and person   Dressing 0-->independent   Eating 0-->independent   Communication 0-->understands/communicates without difficulty   Swallowing 0-->swallows foods/liquids without difficulty   Cognition 1 - attention or memory deficits   Fall history within last six months no   Which of the above functional risks had a recent onset or change? 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    Precautions/Limitations spinal precautions;fall precautions   Weight-Bearing Status - LUE full weight-bearing   Weight-Bearing Status - RUE full weight-bearing   Weight-Bearing Status - LLE full weight-bearing   Weight-Bearing Status - RLE full weight-bearing   Heart Disease Risk Factors High blood pressure;Lack of physical activity;Overweight;Medical history;Age   General Observations Pt received sitting up in chair   General Info Comments Activity orders: Ambulate   Cognitive Status Examination   Orientation person;place   Level of Consciousness lethargic/somnolent   Follows Commands and Answers Questions 75% of the time   Personal Safety and Judgment intact   Memory intact   Pain Assessment   Patient Currently in Pain No   Integumentary/Edema   Integumentary/Edema Comments Pt with chronic BLE, with weeping open wounds. Dressing intact, moist.   Posture    Posture Forward head position;Protracted shoulders;Kyphosis   Range of Motion (ROM)   ROM Comment BLE/UE ROM WFL per functional mobility assessment   Strength   Strength Comments Pt demonstrates BLE weakness with incomplete AROM hip flexion, knee extension and toe raises.   Bed Mobility   Bed Mobility Comments Not assessed this visit as pt sitting up in recliner and declining return to bed at this time.   Transfer Skills   Transfer Comments Pt tx sit>stand with FWW from recliner, with CGA.   Gait   Gait Comments NT d/t pt declining further mobility with report of HA and lethargy   Balance   Balance Comments Pt stands with BUE supported and CGA, requires min A with dynamic stance   General Therapy Interventions   Planned Therapy Interventions ADL retraining;balance training;bed mobility training;gait training;neuromuscular re-education;strengthening;transfer training;progressive activity/exercise;home program guidelines;risk factor education   Clinical Impression   Criteria for  "Skilled Therapeutic Intervention yes, treatment indicated   PT Diagnosis Impaired functional mobility   Influenced by the following impairments decreased strength, decreased stability, impaired endurance   Functional limitations due to impairments bed mobility, transfers, gait   Clinical Presentation Stable/Uncomplicated   Clinical Presentation Rationale Pt largely limited d/t cognition and lethargy this day, anticipate with improved arousal pt will be more participatory/mobile   Clinical Decision Making (Complexity) Low complexity   Therapy Frequency` other (see comments)   Predicted Duration of Therapy Intervention (days/wks) 6x/wk   Anticipated Discharge Disposition Transitional Care Facility   Risk & Benefits of therapy have been explained Yes   Patient, Family & other staff in agreement with plan of care Yes   [facility] TM \"6 Clicks\"   2016, Trustees of [facility], under license to [employer].  All rights reserved.   6 Clicks Short Forms Basic Mobility Inpatient Short Form   [facility]  \"6 Clicks\" V.2 Basic Mobility Inpatient Short Form   1. Turning from your back to your side while in a flat bed without using bedrails? 3 - A Little   2. Moving from lying on your back to sitting on the side of a flat bed without using bedrails? 3 - A Little   3. Moving to and from a bed to a chair (including a wheelchair)? 3 - A Little   4. Standing up from a chair using your arms (e.g., wheelchair, or bedside chair)? 3 - A Little   5. To walk in hospital room? 2 - A Lot   6. Climbing 3-5 steps with a railing? 2 - A Lot   Basic Mobility Raw Score (Score out of 24.Lower scores equate to lower levels of function) 16   Total Evaluation Time   Total Evaluation Time (Minutes) 8     "

## 2017-06-03 NOTE — PROGRESS NOTES
"Daily progress note    Interval hx: Pt. Seen and examined. Coughing up more sputum today.  Says he has no appetite.  No nausea or vomiting, just not hungry.  Legs feeling a bit better than yesterday but still hurt.  Says he was on a water pill at the NH every day, lasix.    Vital signs: Blood pressure 109/49, pulse 112, temperature 99.5  F (37.5  C), temperature source Oral, resp. rate 18, height 1.803 m (5' 11\"), weight 108 kg (238 lb 3.2 oz), SpO2 94 %.    Labs:  Recent Results (from the past 36 hour(s))   Glucose by meter    Collection Time: 06/02/17  2:26 AM   Result Value Ref Range    Glucose 57 (L) 70 - 99 mg/dL   Glucose by meter    Collection Time: 06/02/17  2:46 AM   Result Value Ref Range    Glucose 69 (L) 70 - 99 mg/dL   Glucose by meter    Collection Time: 06/02/17  3:11 AM   Result Value Ref Range    Glucose 129 (H) 70 - 99 mg/dL   Comprehensive metabolic panel    Collection Time: 06/02/17  6:27 AM   Result Value Ref Range    Sodium 140 133 - 144 mmol/L    Potassium 4.5 3.4 - 5.3 mmol/L    Chloride 105 94 - 109 mmol/L    Carbon Dioxide 28 20 - 32 mmol/L    Anion Gap 7 3 - 14 mmol/L    Glucose 39 (LL) 70 - 99 mg/dL    Urea Nitrogen 31 (H) 7 - 30 mg/dL    Creatinine 2.01 (H) 0.66 - 1.25 mg/dL    GFR Estimate 33 (L) >60 mL/min/1.7m2    GFR Estimate If Black 40 (L) >60 mL/min/1.7m2    Calcium 7.9 (L) 8.5 - 10.1 mg/dL    Bilirubin Total 0.4 0.2 - 1.3 mg/dL    Albumin 2.4 (L) 3.4 - 5.0 g/dL    Protein Total 6.2 (L) 6.8 - 8.8 g/dL    Alkaline Phosphatase 90 40 - 150 U/L    ALT 13 0 - 70 U/L    AST 14 0 - 45 U/L   Glucose by meter    Collection Time: 06/02/17  7:47 AM   Result Value Ref Range    Glucose 54 (L) 70 - 99 mg/dL   Glucose by meter    Collection Time: 06/02/17  8:32 AM   Result Value Ref Range    Glucose 136 (H) 70 - 99 mg/dL   Glucose by meter    Collection Time: 06/02/17 10:07 AM   Result Value Ref Range    Glucose 346 (H) 70 - 99 mg/dL   Lactic acid level STAT    Collection Time: 06/02/17  1:38 PM "   Result Value Ref Range    Lactic Acid 1.0 0.7 - 2.1 mmol/L   Glucose by meter    Collection Time: 06/02/17  2:21 PM   Result Value Ref Range    Glucose 120 (H) 70 - 99 mg/dL   Glucose by meter    Collection Time: 06/02/17  5:13 PM   Result Value Ref Range    Glucose 97 70 - 99 mg/dL   Glucose by meter    Collection Time: 06/02/17 10:29 PM   Result Value Ref Range    Glucose 98 70 - 99 mg/dL   Vancomycin level    Collection Time: 06/03/17  1:48 AM   Result Value Ref Range    Vancomycin Level 23.3 mg/L   Glucose by meter    Collection Time: 06/03/17  1:58 AM   Result Value Ref Range    Glucose 133 (H) 70 - 99 mg/dL   Glucose by meter    Collection Time: 06/03/17  7:25 AM   Result Value Ref Range    Glucose 75 70 - 99 mg/dL   CBC with platelets    Collection Time: 06/03/17  7:54 AM   Result Value Ref Range    WBC 7.5 4.0 - 11.0 10e9/L    RBC Count 2.97 (L) 4.4 - 5.9 10e12/L    Hemoglobin 8.0 (L) 13.3 - 17.7 g/dL    Hematocrit 27.5 (L) 40.0 - 53.0 %    MCV 93 78 - 100 fl    MCH 26.9 26.5 - 33.0 pg    MCHC 29.1 (L) 31.5 - 36.5 g/dL    RDW 16.5 (H) 10.0 - 15.0 %    Platelet Count 191 150 - 450 10e9/L       Physical exam:   Sitting in chair, eyes closed.  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       #. Sepsis (HR, WBC, h/o fever)--likely from celluitis  Patient currently meets SIRS criteria with unknown source, but suspect either lower extremity cellulitis vs UTI (awaiting UA results). CXR was notably unremarkable for cardiopulmonary process. Reports that patient was delirious at NH, but AAOx3 during my assessment. Started on Vanc (MRSA positive) and Zosyn in the ED and  will continue here.  1 blood culture from 6/1 + for group B strep  -Continue current abx (keep vanco given the pending sensitivites  -Recheck new cultures today (6/3/17)  -- wound care nurse consult  -- holding dilaudid (2mg q4 hour at home) given concerns for delirium--will restart at 1/2 dose and less often (q6)      #. Severe COPD  Does not appear to be worse, is on home oxygen level with normal O2 sats and bicarb on BMP is at baseline so do not suspect worsening acidosis. Does have wheezing.  -- continue PTA symbicort BID  -- continue PTA spiriva  -- continue PTA duoneb PRN  --scheduled nebs 4x/day        #. NATTY on CKD III  #. BPH  Baseline creatinine appears to be 1.6 - 2 recently. Cr back to baseline now.  -- continue tamsulosin      #. CAD  #. HFpEF  Patient currently asymptomatic and appears to be at baseline.   -- continue PTA imdur and ASA  -- continue PTA metoprolol succinate 50 mg daily  -Restart lasix 6/3/17      #. HLD  -- continue home atorvastatin      #. DMII  -- continue home glargine 25 units (was accidentally placed on BID on admit-changed to qHS).  -- medium SSI  -- TID AC and qHS BG checks      #. Chronic Pain  -- hold home dilaudid given concerns for delirium--will restart at lower dose.  If he develops confusion this should be STOPPED.  -- continue diclofenac gel to knees      F/E/N:  -- no mIVF  -- replete electrolytes as needed  -- consistent carb diet      Ppx:  -- GI: continue PTA omeprazole and ranitidine  -- VTE: heparin SQ BID, PT to help with ambulation      Lines/Devices:  PIV      Code: Full      Radha Stormy Obrien MD, MSEd  Hospitalist  Professor of Medicine  [facility]  [phone]   n/a

## 2017-06-03 NOTE — PLAN OF CARE
Problem: Goal Outcome Summary  Goal: Goal Outcome Summary  Patient had lower legs redressed. Patient up with sba. Patient did not elevate legs as much as yesterday and the rt leg is weeping more then yesterday. Patient ate all breakfast no lunch. Patient states he has not eaten that much. P;cont to monitor

## 2017-06-03 NOTE — PLAN OF CARE
Problem: Goal Outcome Summary  Goal: Goal Outcome Summary  Outcome: No Change  A:  Patient having frequent cough , causing him to feel like he is choking and causing emesis at times.  Gold team MD updated and new order for robitussin received.  Voided times one.  Refusing to lay down in bed or put feet up.  Having complaints of headache.    R:  Continue to monitor and treat per plan of care.

## 2017-06-04 NOTE — PHARMACY-VANCOMYCIN DOSING SERVICE
Pharmacy Vancomycin Note  Date of Service 2017  Patient's  1945   72 year old, male    Indication: Sepsis  Goal Trough Level: 15-20 mg/L  Day of Therapy: 3  Current Vancomycin regimen:  2000 mg IV q24h previously, planning on going to Q48H (same dose). Last 2000mg dose was 6/3 ~ 0400.    Current estimated CrCl = Estimated Creatinine Clearance: 44.7 mL/min (based on Cr of 1.93).    Creatinine for last 3 days  2017:  6:27 AM Creatinine 2.01 mg/dL  2017:  7:25 AM Creatinine 1.93 mg/dL    Recent Vancomycin Levels (past 3 days)  6/3/2017:  1:48 AM Vancomycin Level 23.3 mg/L  2017:  7:25 AM Vancomycin Level 27.0 mg/L - Last 2000mg dose was 6/3 ~ 0400.      Vancomycin IV Administrations (past 72 hours)                   vancomycin (VANCOCIN) 2,000 mg in NaCl 0.9 % 500 mL intermittent infusion (mg) 2,000 mg New Bag 17 0352     2,000 mg New Bag 17 0326                Nephrotoxins and other renal medications (Future)    Start     Dose/Rate Route Frequency Ordered Stop    17 0800  vancomycin (VANCOCIN) 2,000 mg in NaCl 0.9 % 500 mL intermittent infusion      2,000 mg Intravenous EVERY 48 HOURS 17 1205      17 1230  furosemide (LASIX) tablet 20 mg      20 mg Oral DAILY 17 1227      17 1400  piperacillin-tazobactam (ZOSYN) 3.375 g vial to attach to  mL bag      3.375 g  over 1 Hours Intravenous EVERY 6 HOURS 17 1059               Contrast Orders - past 72 hours     None          Interpretation of levels and current regimen:  Trough level is  Supratherapeutic    Has serum creatinine changed > 50% in last 72 hours: No    Renal Function: Stable    Plan:  1.  Keep yesterday's plan of moving to 2000mg IV vanco Q48H, next dose  @ 0800  2.  Pharmacy will check trough levels as appropriate in 3-5 Days.    3. Serum creatinine levels will be ordered daily for the first week of therapy and at least twice weekly for subsequent weeks.      Terry Barbour  PharmD, [facility]    [phone]  Pager [phone]      .

## 2017-06-04 NOTE — PLAN OF CARE
"Problem: Goal Outcome Summary  Goal: Goal Outcome Summary  BP 90/50 (BP Location: Right arm)  Pulse 112  Temp 98.5  F (36.9  C) (Oral)  Resp 18  Ht 1.803 m (5' 11\")  Wt 112 kg (247 lb)  SpO2 97%  BMI 34.45 kg/m2      Afebrile, soft BPs, AOVSS on 2L O2 via NC w/ sats in mid 90s. A&Ox4, a little forgetful. Up w/ SBA/walker to BSC, using urinal, voiding adequately, no BM this shift. Dressings on edematous BLE are CDI. Pt spent part of night in bed, part in recliner. Pt needs encouragement to keep legs elevated to improve circulation to leg wounds, states that they become uncomfortable in that position. Pt s breathing improves in recliner vs bed. LS diminished throughout. Dilaudid given for pain x 1. Zosyn and heparin given per MAR. PIV infusing TKO. Will continue to monitor and follow POC.      "

## 2017-06-04 NOTE — PLAN OF CARE
Problem: Goal Outcome Summary  Goal: Goal Outcome Summary  Outcome: No Change  A:  Patient having low blood pressure and complains of feeling cold.  Sirs protocol  Triggered and lactic acid ordered.  Patient voided 250 cc and had 211 cc residual.  Legs continue edematous.  Assisted to bed tonight with legs up.  Patient more tired tonight.    R:  Continue to monitor and treat per plan of care.

## 2017-06-04 NOTE — PLAN OF CARE
Problem: Goal Outcome Summary  Goal: Goal Outcome Summary  PT - per plan established by the Physical Therapist, according to functional mobility the  discharge recommendation is back to SNF for cont care and skilled PT to progress functional mobility. Pt limited by COPD and general weakness. Pt demo STS x 6 with short amb to W/c ~ 8'x 1 with WW. Pt on 4L 02 during PT session with pt SATs 94%

## 2017-06-04 NOTE — PROGRESS NOTES
06/04/17 1000   Rehab Discipline   Discipline OT   Type of Visit   Type of visit Initial Edema Evaluation   General Information   Start of care 06/04/17   Referring physician Radha Obrien   Orders Evaluate and treat as indicated   Order date 06/01/17   Medical diagnosis lymphedema   Onset of illness / date of surgery 06/01/17   Edema onset 06/01/17   Affected body parts LUE;LLE;RUE;RLE   Edema etiology Infection;Chronic Venous Insufficiency  (inactivity; cancer)   Edema etiology comments Pt with limited activity with alck muscle pumping to aide LE decongestion. Pt also with advanced cardiac and Pulm history exacerbating LE swelling. 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   Surgical / medical history reviewed Yes   Prior level of functional mobility Mod I-Dep   Prior treatment Compression garments;Elevation;Diuretics   Community support (SNF and faculty)   Patient role / employment history Unemployed;Disabled   Living environment SNF   Fall Screening   Fall screen completed by OT   Per patient, fall 2 or more times in past year? No   Per patient, fall with injury in past year? No   Is patient a fall risk? Yes   Subjective Report   Patient report of symptoms weeping; heaviness   Patient / Family Goals   Patient / family goals statement to reduce swelling to reduce weeping   Pain   Patient currently in pain No   Cognitive Status   Orientation Orientation to person, place and time   Level of consciousness Alert   Follows commands and  answers questions 100% of the time   Personal safety and judgement Intact   Memory Intact   Edema Exam / Assessment   Skin condition Pitting;Venous distention   Skin condition comments 3+ pitting ankle-knee crease; mild-mod thigh edema; 2+ dorsal feet   Pitting 2+;3+   Dorsal pedal pulse (unable to palpate-no signs ischemia)   Stemmer sign Positive   Girth Measurements   Girth Measurements (non taken 2/2 wounds/dressings)   Range of Motion   ROM comments WFL   Strength   Strength comments NT'd   Activities of Daily Living   Activities of Daily Living (SNF-Min to Max A ADL's)   Sensory   Sensory perception comments BLE neuropathy reported   Coordination   Coordination Gross motor coordination appropriate   Muscle Tone   Muscle tone No deficits were identified   Planned Edema Interventions   Planned edema interventions Gradient compression bandaging;Exercises;Precautions to prevent infection / exacerbation;Education;Home management program development   Clinical Impression   Criteria for skilled therapeutic intervention met Yes   Therapy diagnosis lymphedema; decreased functional ambulation   Influenced by the following impairments / conditions Stage 1;Phlebolymphedema;Wounds / Ulcers   Assessment of Occupational Performance 3-5 Performance Deficits   Identified Performance Deficits decrease dfunctional ambulation; decreased I with LB dressing; decreased I with sit-stand transfers; infection risk   Clinical Decision Making (Complexity) Moderate complexity   Treatment frequency 5 times / week   Treatment duration 6/15/17   Patient / family and/or staff in agreement with plan of care Yes   Risks and benefits of therapy have been explained Yes   Clinical impression comments Pt will benefit from skilled lymphedema services to reduce BLE lymphedema to aid increased I with transfers, ambulation, LB cares, and infection prevention.   Total Evaluation Time   Total evaluation time 15

## 2017-06-04 NOTE — PROGRESS NOTES
"Daily progress note    Interval hx: Pt. Seen and examined. Sleeping in chair, legs down.  No issues overnight.  Pain the same.    Vital signs: Blood pressure 99/41, pulse 112, temperature 98.6  F (37  C), temperature source Oral, resp. rate 16, height 1.803 m (5' 11\"), weight 112 kg (247 lb), SpO2 97 %.    Labs:  Recent Results (from the past 36 hour(s))   Vancomycin level    Collection Time: 06/03/17  1:48 AM   Result Value Ref Range    Vancomycin Level 23.3 mg/L   Glucose by meter    Collection Time: 06/03/17  1:58 AM   Result Value Ref Range    Glucose 133 (H) 70 - 99 mg/dL   Glucose by meter    Collection Time: 06/03/17  7:25 AM   Result Value Ref Range    Glucose 75 70 - 99 mg/dL   CBC with platelets    Collection Time: 06/03/17  7:54 AM   Result Value Ref Range    WBC 7.5 4.0 - 11.0 10e9/L    RBC Count 2.97 (L) 4.4 - 5.9 10e12/L    Hemoglobin 8.0 (L) 13.3 - 17.7 g/dL    Hematocrit 27.5 (L) 40.0 - 53.0 %    MCV 93 78 - 100 fl    MCH 26.9 26.5 - 33.0 pg    MCHC 29.1 (L) 31.5 - 36.5 g/dL    RDW 16.5 (H) 10.0 - 15.0 %    Platelet Count 191 150 - 450 10e9/L   Glucose by meter    Collection Time: 06/03/17 12:12 PM   Result Value Ref Range    Glucose 107 (H) 70 - 99 mg/dL   Blood culture    Collection Time: 06/03/17  1:29 PM   Result Value Ref Range    Specimen Description Blood Right Hand     Culture Micro No growth after 17 hours     Micro Report Status Pending    Blood culture    Collection Time: 06/03/17  1:32 PM   Result Value Ref Range    Specimen Description Blood Left Arm     Culture Micro No growth after 17 hours     Micro Report Status Pending    Gram stain    Collection Time: 06/03/17  2:33 PM   Result Value Ref Range    Specimen Description Wound Right Leg     Gram Stain Rare Gram positive cocci  No WBC'S seen   (A)     Micro Report Status FINAL 06/03/2017    Glucose by meter    Collection Time: 06/03/17  5:19 PM   Result Value Ref Range    Glucose 114 (H) 70 - 99 mg/dL   Lactic acid whole blood    " Collection Time: 06/03/17 10:43 PM   Result Value Ref Range    Lactic Acid 0.9 0.7 - 2.1 mmol/L   Glucose by meter    Collection Time: 06/03/17 11:31 PM   Result Value Ref Range    Glucose 106 (H) 70 - 99 mg/dL   Glucose by meter    Collection Time: 06/04/17  2:02 AM   Result Value Ref Range    Glucose 96 70 - 99 mg/dL   Glucose by meter    Collection Time: 06/04/17  6:02 AM   Result Value Ref Range    Glucose 81 70 - 99 mg/dL   Glucose by meter    Collection Time: 06/04/17  7:19 AM   Result Value Ref Range    Glucose 106 (H) 70 - 99 mg/dL   Vancomycin level    Collection Time: 06/04/17  7:25 AM   Result Value Ref Range    Vancomycin Level 27.0 (HH) mg/L   Basic metabolic panel    Collection Time: 06/04/17  7:25 AM   Result Value Ref Range    Sodium 142 133 - 144 mmol/L    Potassium 4.7 3.4 - 5.3 mmol/L    Chloride 109 94 - 109 mmol/L    Carbon Dioxide 26 20 - 32 mmol/L    Anion Gap 7 3 - 14 mmol/L    Glucose 100 (H) 70 - 99 mg/dL    Urea Nitrogen 28 7 - 30 mg/dL    Creatinine 1.93 (H) 0.66 - 1.25 mg/dL    GFR Estimate 34 (L) >60 mL/min/1.7m2    GFR Estimate If Black 42 (L) >60 mL/min/1.7m2    Calcium 7.5 (L) 8.5 - 10.1 mg/dL   Glucose by meter    Collection Time: 06/04/17  7:38 AM   Result Value Ref Range    Glucose 133 (H) 70 - 99 mg/dL       Physical exam:   Sitting in chair, eyes closed.  B wheezing improved c/w yesterday  RRR  Abd: +BS, NTND  Legs wrapped, 3+ B edema    Assessment and Plan:   #. Sepsis (HR, WBC, h/o fever)--likely from celluitis, septicemia  Patient currently meets SIRS criteria with unknown source, but suspect either lower extremity cellulitis.  CXR was notably unremarkable for cardiopulmonary process. Started on Vanc (MRSA positive) and Zosyn in the ED.  1 blood culture from 6/1 + for group B strep  -DC Vanco today (pan-sensitive)  -Recheck bld cultures from 6/3 NGTD  -wound care nurse consult  -Plan for 7 days of abx from negative cultures      #. Severe COPD  Does not appear to be worse, is  on home oxygen level with normal O2 sats and bicarb on BMP is at baseline so do not suspect worsening acidosis. Does have wheezing.  -- continue PTA symbicort BID  -- continue PTA spiriva  -- continue PTA duoneb PRN  --scheduled nebs 4x/day         #. NATTY on CKD III  #. BPH  Baseline creatinine appears to be 1.6 - 2 recently. Cr back to baseline now.  -- continue tamsulosin  --Back on his Lasix      #. CAD  #. HFpEF  Patient currently asymptomatic and appears to be at baseline.   -- continue PTA imdur and ASA  -- continue PTA metoprolol succinate 50 mg daily  -Restart lasix 6/3/17      #. HLD  -- continue home atorvastatin      #. DMII  -- continue home glargine 25 units (was accidentally placed on BID on admit-changed to qHS).  -- medium SSI  -- TID AC and qHS BG checks      #. Chronic Pain  -- hold home dilaudid given concerns for delirium--will restart at lower dose.  If he develops confusion this should be STOPPED.  -- continue diclofenac gel to knees      F/E/N:  -- no mIVF  -- replete electrolytes as needed  -- consistent carb diet      Ppx:  -- GI: continue PTA omeprazole and ranitidine  -- VTE: heparin SQ BID, PT to help with ambulation      Lines/Devices:  PIV      Code: Full     Dispo: Plan to dc back to NH in 1-2 days once converted to po abx as long as 6/3 cultures remain negative      Radha Stormy Obrien MD, MSEd  Hospitalist  Professor of Medicine  [facility]  [phone]

## 2017-06-04 NOTE — PLAN OF CARE
Problem: Goal Outcome Summary  Goal: Goal Outcome Summary  Edema 5B: Edema eval complete. Pt with extensive weeping and open skin throughout BLE's. Nursing provided dressing changes and new ace wraps donned foot-knee to aid reductions in BLE swelling to promote improved I with transfers, LB cares, and ambulation. Pt educated on services and need to get weeping to diminish before he can be changed over to GCB's for more appropriate materials to address LE swelling/lymphedema. Use of positioning and muscle pumping ex's discussed as means for reductions as well.

## 2017-06-04 NOTE — PLAN OF CARE
Problem: Goal Outcome Summary  Goal: Goal Outcome Summary  Patient up with sba and walker to chair to commode or to bed. Legs wrapped this am encouraging to keep feet elevated. Patient ate breakfast and lunch which last two days only eats twice a day. Appetite coming back. Blood sugars required no ss coverage. P;cont to monitor

## 2017-06-05 NOTE — PROGRESS NOTES
Social Work Services Discharge Note      Patient Name:  Tomas Grey     Anticipated Discharge Date:  6/6/2017    Discharge Disposition:   LT:  [facility] T: [phone], F: [phone]  Return to facility,  updated admissions by phone this AM. Faxed updated information per their request. OK to return tomorrow     faxed DC ppwk 6/6 @ 10:45 am    Following MD:  Ekaterina Lozano MD     Additional Services/Equipment Arranged:  [facility] transport arranged for 11 am Tuesday. [facility] care portable O2 will be delivered prior to DC on 6/6/2017     Patient / Family response to discharge plan:  In agreement. Dr. Obrien did attempt to speak with patient regarding      Persons notified of above discharge plan:  Kalee Obrien MD, Charge RN, Patient, Admissions    Staff Discharge Instructions:  Please fax discharge orders and signed hard scripts for any controlled substances.  Please print a packet and send with patient.     CTS Handoff completed:  YES upon dc    Medicare Notice of Rights provided to the patient/family:  YES    DAGO [place]  5[address]  (Medical/Surgical)  Phone: [phone]  Pager: [phone]

## 2017-06-05 NOTE — PLAN OF CARE
Problem: Goal Outcome Summary  Goal: Goal Outcome Summary  Outcome: No Change  Late entry for 6/4/2017 2300   A:  Patient resting in chair.  Refusing to put feet up or go to bed.  Denies pain.  Did have an episode of shortness of breath due to coughing and robitussin given with relief.   Had large soft stool.  O2 sat 97%on  2L per nc  R:  Continue to monitor and treat per plan of care.

## 2017-06-05 NOTE — DISCHARGE SUMMARY
[facility]  Discharge Summary    Tomas Grey MRN# [id number]   YOB: 1945 Age: 72 year old     Date of Admission:  6/1/2017  Date of Discharge:  6/6/2017  Admitting Physician:  Radha Obrien MD  Discharge Physician:  Viktoria Gunderson MD  Discharging Service:  Internal Medicine     Primary Provider: Ekaterina Lozano              Discharge Diagnosis:     Primary Discharge Diagnosis:  1. Group B streptococcus Bacteremia  2. Sepsis from above  3. Chronic Lymphedema BL LE with weeping right shin wound which does not look infected (group pseudomonas on the culture, but I don't believe its causing wound infection as the wound does not look infected)    Past Medical History:   Diagnosis Date     Anemia      Anxiety      Aspiration pneumonia (H) 2014     C. difficile colitis      CAD (coronary artery disease)      Chronic pain      CKD (chronic kidney disease)      COPD (chronic obstructive pulmonary disease) (H)      Depressive disorder      Diabetes mellitus (H)      Hypertension      Insomnia      ALEXIS (obstructive sleep apnea)      Osteoporosis      Prostate cancer metastatic to multiple sites (H)                     Discharge Medications:     Current Discharge Medication List      START taking these medications    Details   amoxicillin-clavulanate (AUGMENTIN) 875-125 MG per tablet Take 1 tablet by mouth 2 times daily  Qty: 18 tablet, Refills: 0    Associated Diagnoses: Gram-positive bacteremia      saccharomyces boulardii (FLORASTOR) 250 MG capsule Take 1 capsule (250 mg) by mouth 2 times daily  Qty: 18 capsule, Refills: 0    Associated Diagnoses: Gram-positive bacteremia         CONTINUE these medications which have CHANGED    Details   furosemide (LASIX) 20 MG tablet Take 1 tablet (20 mg) by mouth 2 times daily  Qty: 30 tablet    Associated Diagnoses: Chronic diastolic congestive heart failure (H); Lymphedema of both lower extremities      HYDROmorphone  (DILAUDID) 2 MG tablet Take 0.5 tablets (1 mg) by mouth every 4 hours as needed for moderate to severe pain  Qty: 10 tablet, Refills: 0    Associated Diagnoses: Metastasis to bone (H)         CONTINUE these medications which have NOT CHANGED    Details   abiraterone (ZYTIGA) 250 MG tablet Take 1,000 mg by mouth daily      tobramycin-dexamethasone (TOBRADEX) ophthalmic ointment Place 1 Application into both eyes 4 times daily      bacitracin ointment Apply to Right great toe topically in the evening for Ingrown toenail      carboxymethylcellulose (REFRESH PLUS) 0.5 % SOLN ophthalmic solution Place 1 drop into both eyes 3 times daily      budesonide-formoterol (SYMBICORT) 160-4.5 MCG/ACT Inhaler Inhale 2 puffs into the lungs 2 times daily      predniSONE (DELTASONE) 5 MG tablet Take 1 tablet (5 mg) by mouth 2 times daily  Qty: 60 tablet, Refills: 0    Associated Diagnoses: Prostate cancer metastatic to bone (H); Metastasis to bone (H); Prostate cancer metastatic to multiple sites (H)      ranitidine (RANITIDINE) 75 MG tablet Take 75 mg by mouth 2 times daily      senna-docusate (SENOKOT-S;PERICOLACE) 8.6-50 MG per tablet Take 1 tablet by mouth 2 times daily  Qty: 100 tablet    Associated Diagnoses: Abdominal pain, generalized      omeprazole (PRILOSEC) 10 MG CR capsule Take 1 capsule (10 mg) by mouth daily    Associated Diagnoses: Gastric reflux      isosorbide mononitrate (IMDUR) 60 MG 24 hr tablet Take 1 tablet (60 mg) by mouth daily    Associated Diagnoses: ASCVD (arteriosclerotic cardiovascular disease)      atorvastatin (LIPITOR) 10 MG tablet Take 10 mg by mouth At Bedtime       tiotropium (SPIRIVA) 18 MCG inhalation capsule Inhale 18 mcg into the lungs daily      polyvinyl alcohol (LIQUIFILM TEARS) 1.4 % ophthalmic solution Place 1 drop into both eyes 3 times daily      acetaminophen (TYLENOL) 500 MG tablet Take 500 mg by mouth every 4 hours as needed for pain Not to exceed 3000 mg/24 hours      aspirin 81 MG EC  tablet Take 1 tablet (81 mg) by mouth daily  Qty: 5 tablet, Refills: 0    Associated Diagnoses: ASCVD (arteriosclerotic cardiovascular disease)      metoprolol (TOPROL XL) 50 MG 24 hr tablet Take 1 tablet (50 mg) by mouth daily  Qty: 5 tablet, Refills: 0    Associated Diagnoses: ASCVD (arteriosclerotic cardiovascular disease)      calcium carb 1250 mg, 500 mg [place],/vitamin D 200 units (OSCAL WITH D) 500-200 MG-UNIT per tablet Take 1 tablet by mouth 2 times daily (with meals)  Qty: 10 tablet, Refills: 0    Associated Diagnoses: Malignant neoplasm of prostate (H)      tamsulosin (FLOMAX) 0.4 MG 24 hr capsule Take 1 capsule (0.4 mg) by mouth daily  Qty: 5 capsule, Refills: 0    Associated Diagnoses: Benign non-nodular prostatic hyperplasia without lower urinary tract symptoms      diclofenac (VOLTAREN) 1 % GEL topical gel Apply 4 grams to knees four times daily as needed using enclosed dosing card.  Qty: 100 g, Refills: 0    Associated Diagnoses: Chronic pain of both knees      insulin glargine (LANTUS) 100 UNIT/ML injection Inject 25 Units Subcutaneous 2 times daily       nitroglycerin (NITROSTAT) 0.4 MG SL tablet Place 0.4 mg under the tongue every 5 minutes as needed for chest pain if you are still having symptoms after 3 doses (15 minutes) call 911.      ipratropium - albuterol 0.5 mg/2.5 mg/3 mL (DUONEB) 0.5-2.5 (3) MG/3ML nebulization Take 1 vial (3 mLs) by nebulization every 4 hours as needed for shortness of breath / dyspnea or wheezing  Qty: 360 mL, Refills: 3      ammonium lactate (LAC-HYDRIN) 12 % lotion Apply topically 2 times daily as needed for dry skin To all extremities for dry skin         STOP taking these medications       sodium polystyrene (KAYEXALATE) 15 GM/60ML suspension Comments:   Reason for Stopping:         fluticasone - vilanterol (BREO ELLIPTA) 100-25 MCG/INH oral inhaler Comments:   Reason for Stopping:                    Hospital Course:   History of Present Illness  Please see the  "history and physicial for full details.  Tomas Grey is a 72 year old year old male admitted for confusion found to have septicemia.  Please see the history and physical dated 6/1/2017 for full details.    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      Patient is notably a very poor historian, but endorses some pain in his legs and increased swelling as well \"water leaking out\" for several days. States they went to check his vitals at the NH and his HR was elevated and temperature was 103.1. Patient states he has a chronic headache, always feels cold, with chronic SOB on 2L oxygen at home. Only has chest pain when there isn't any \"oxygen in my tank\". Per the ED note, patient endorsed some vomiting, but denies this during my discussion. Denies abd pain or nausea currently, but does have abdominal pain at site of umbilical hernia when someone presses on it.      In the ED, patient was noted to have temperature of 100.1, HR of 112 but HDS and normal RR on home oxygen. He was treated with 1L NS and continuous at 125 cc/hour and also APAP 975mg x1. His labs were notable for WBC 15.7, Hgb of 9.8, creatinine of 2.40, but normal lactate. He was started on Vanc and Zosyn for sepsis with presumed source either the urine or the lower extremity cellulitis.     Physical Examination of Day of Discharge  B/P: 120/48, T: 98.2, P: 85, R: 16    Hospital Course      1. Group B streptococcal Bacteremia: Blood culture from 6/1 positive.  6/3 negative.  Likely source is righ shin wound. It does not look infected but its weeping due to chronic leg edema.   Was treated with Zosyn on admit and changed to Augmentin on discharge. He needs total 14 day course given bacteremia from 1st of " "June.  He should keep his legs elevated as much as is reasonable.  Needs to keep it wrapped. Needs long term management with lymphedema clinic. I have increased the dose of lasix to 20 mg PO BID to manage his lymphedema. FU BMP in three day to monitor Creat and K. Cut back on dilaudid dose to 1mg rather than 1-2mg prn every 4 hr as needed for pain.     2. COPD: He was continued on his nebs.  He an FEV1 of 0.4 and remains on oxygen chronically.    3. DM: Lantus accidentally given BID initially and has some hypoglycemia however this was changed to usual qDay dosing.  His sugars were good otherwise.    4. Metastatic prostate cancer: Prednisone continued.  Needs to f/u with his Oncologist Dr. Oviedo for goals of care.  Of note, patient would like his oldest son to be his alternate decision maker.               Discharge Disposition:   Discharged to nursing home           Condition on Discharge:   Discharge condition: Stable   Code status on discharge: Full Code           Procedures:   none          Consultations:   Consultation during this admission received from none.               Final Day of Progress before Discharge:       Physical Exam:  Blood pressure 145/66, pulse 88, temperature 97  F (36.1  C), temperature source Oral, resp. rate 16, height 1.803 m (5' 11\"), weight 111.4 kg (245 lb 9.6 oz), SpO2 97 %.  GENERAL: Alert and oriented x 3. NAD.   HEENT: Anicteric sclera. PERRL. Mucous membranes moist and without lesions.   CV: RRR. S1, S2. No murmurs appreciated.   RESPIRATORY: Effort normal. Lungs CTAB with no wheezing, rales, rhonchi.   GI: Abdomen soft and non distended with normoactive bowel sounds present in all quadrants. No tenderness, rebound, guarding.      Data:  All laboratory data reviewed             Significant Results:     Results for orders placed or performed during the hospital encounter of 06/01/17   Chest  XR, 1 view portable    Narrative    Exam: XR CHEST PORT 1 VW, 6/1/2017 1:34 " AM    Indication: fever    Comparison: CT correlation 1/16/2017. X-ray 1/16/2017.    Findings:   Normal cardiomediastinal silhouette. Pulmonary vasculature is  relatively distinct. Emphysematous lungs. No acute focal opacities,  pleural effusion or pneumothorax. Bone metastases noted on CT are not  well visualized.      Impression    Impression: No acute cardiopulmonary process. No evidence for  pneumonia.    I have personally reviewed the examination and initial interpretation  and I agree with the findings.    MEGHA DEJESUS MD   Comprehensive metabolic panel   Result Value Ref Range    Sodium 138 133 - 144 mmol/L    Potassium 4.8 3.4 - 5.3 mmol/L    Chloride 99 94 - 109 mmol/L    Carbon Dioxide 33 (H) 20 - 32 mmol/L    Anion Gap 6 3 - 14 mmol/L    Glucose 114 (H) 70 - 99 mg/dL    Urea Nitrogen 41 (H) 7 - 30 mg/dL    Creatinine 2.40 (H) 0.66 - 1.25 mg/dL    GFR Estimate 27 (L) >60 mL/min/1.7m2    GFR Estimate If Black 32 (L) >60 mL/min/1.7m2    Calcium 9.0 8.5 - 10.1 mg/dL    Bilirubin Total 0.4 0.2 - 1.3 mg/dL    Albumin 3.0 (L) 3.4 - 5.0 g/dL    Protein Total 7.7 6.8 - 8.8 g/dL    Alkaline Phosphatase 122 40 - 150 U/L    ALT 12 0 - 70 U/L    AST 11 0 - 45 U/L   Lactic acid   Result Value Ref Range    Lactic Acid 1.2 0.7 - 2.1 mmol/L   CBC with platelets differential   Result Value Ref Range    WBC 15.7 (H) 4.0 - 11.0 10e9/L    RBC Count 3.59 (L) 4.4 - 5.9 10e12/L    Hemoglobin 9.8 (L) 13.3 - 17.7 g/dL    Hematocrit 33.5 (L) 40.0 - 53.0 %    MCV 93 78 - 100 fl    MCH 27.3 26.5 - 33.0 pg    MCHC 29.3 (L) 31.5 - 36.5 g/dL    RDW 16.2 (H) 10.0 - 15.0 %    Platelet Count 282 150 - 450 10e9/L    Diff Method Automated Method     % Neutrophils 89.3 %    % Lymphocytes 3.6 %    % Monocytes 6.2 %    % Eosinophils 0.3 %    % Basophils 0.1 %    % Immature Granulocytes 0.5 %    Nucleated RBCs 0 0 /100    Absolute Neutrophil 14.0 (H) 1.6 - 8.3 10e9/L    Absolute Lymphocytes 0.6 (L) 0.8 - 5.3 10e9/L    Absolute Monocytes 1.0  0.0 - 1.3 10e9/L    Absolute Eosinophils 0.0 0.0 - 0.7 10e9/L    Absolute Basophils 0.0 0.0 - 0.2 10e9/L    Abs Immature Granulocytes 0.1 0 - 0.4 10e9/L    Absolute Nucleated RBC 0.0    UA with Microscopic   Result Value Ref Range    Color Urine Yellow     Appearance Urine Clear     Glucose Urine Negative NEG mg/dL    Bilirubin Urine Negative NEG    Ketones Urine 5 (A) NEG mg/dL    Specific Gravity Urine 1.018 1.003 - 1.035    Blood Urine Negative NEG    pH Urine 5.5 5.0 - 7.0 pH    Protein Albumin Urine 10 (A) NEG mg/dL    Urobilinogen mg/dL Normal 0.0 - 2.0 mg/dL    Nitrite Urine Negative NEG    Leukocyte Esterase Urine Moderate (A) NEG    Source Clean catch urine     WBC Urine 2 0 - 2 /HPF    RBC Urine <1 0 - 2 /HPF    Squamous Epithelial /HPF Urine <1 0 - 1 /HPF    Transitional Epi <1 0 - 1 /HPF    Mucous Urine Present (A) NEG /LPF   CRP inflammation   Result Value Ref Range    CRP Inflammation 68.0 (H) 0.0 - 8.0 mg/L   Lactic acid level STAT   Result Value Ref Range    Lactic Acid 0.8 0.7 - 2.1 mmol/L   Glucose by meter   Result Value Ref Range    Glucose 60 (L) 70 - 99 mg/dL   Glucose by meter   Result Value Ref Range    Glucose 74 70 - 99 mg/dL   Glucose by meter   Result Value Ref Range    Glucose 104 (H) 70 - 99 mg/dL   Glucose by meter   Result Value Ref Range    Glucose 94 70 - 99 mg/dL   Glucose by meter   Result Value Ref Range    Glucose 48 (LL) 70 - 99 mg/dL   Glucose by meter   Result Value Ref Range    Glucose 132 (H) 70 - 99 mg/dL   Glucose by meter   Result Value Ref Range    Glucose 37 (LL) 70 - 99 mg/dL   Glucose by meter   Result Value Ref Range    Glucose 62 (L) 70 - 99 mg/dL   Glucose by meter   Result Value Ref Range    Glucose 74 70 - 99 mg/dL   Glucose by meter   Result Value Ref Range    Glucose 115 (H) 70 - 99 mg/dL   Glucose by meter   Result Value Ref Range    Glucose 152 (H) 70 - 99 mg/dL   Comprehensive metabolic panel   Result Value Ref Range    Sodium 140 133 - 144 mmol/L     Potassium 4.5 3.4 - 5.3 mmol/L    Chloride 105 94 - 109 mmol/L    Carbon Dioxide 28 20 - 32 mmol/L    Anion Gap 7 3 - 14 mmol/L    Glucose 39 (LL) 70 - 99 mg/dL    Urea Nitrogen 31 (H) 7 - 30 mg/dL    Creatinine 2.01 (H) 0.66 - 1.25 mg/dL    GFR Estimate 33 (L) >60 mL/min/1.7m2    GFR Estimate If Black 40 (L) >60 mL/min/1.7m2    Calcium 7.9 (L) 8.5 - 10.1 mg/dL    Bilirubin Total 0.4 0.2 - 1.3 mg/dL    Albumin 2.4 (L) 3.4 - 5.0 g/dL    Protein Total 6.2 (L) 6.8 - 8.8 g/dL    Alkaline Phosphatase 90 40 - 150 U/L    ALT 13 0 - 70 U/L    AST 14 0 - 45 U/L   Glucose by meter   Result Value Ref Range    Glucose 57 (L) 70 - 99 mg/dL   Glucose by meter   Result Value Ref Range    Glucose 69 (L) 70 - 99 mg/dL   Glucose by meter   Result Value Ref Range    Glucose 129 (H) 70 - 99 mg/dL   Glucose by meter   Result Value Ref Range    Glucose 54 (L) 70 - 99 mg/dL   Glucose by meter   Result Value Ref Range    Glucose 136 (H) 70 - 99 mg/dL   Glucose by meter   Result Value Ref Range    Glucose 346 (H) 70 - 99 mg/dL   Lactic acid level STAT   Result Value Ref Range    Lactic Acid 1.0 0.7 - 2.1 mmol/L   Glucose by meter   Result Value Ref Range    Glucose 120 (H) 70 - 99 mg/dL   Glucose by meter   Result Value Ref Range    Glucose 97 70 - 99 mg/dL   Vancomycin level   Result Value Ref Range    Vancomycin Level 23.3 mg/L   Glucose by meter   Result Value Ref Range    Glucose 98 70 - 99 mg/dL   CBC with platelets   Result Value Ref Range    WBC 7.5 4.0 - 11.0 10e9/L    RBC Count 2.97 (L) 4.4 - 5.9 10e12/L    Hemoglobin 8.0 (L) 13.3 - 17.7 g/dL    Hematocrit 27.5 (L) 40.0 - 53.0 %    MCV 93 78 - 100 fl    MCH 26.9 26.5 - 33.0 pg    MCHC 29.1 (L) 31.5 - 36.5 g/dL    RDW 16.5 (H) 10.0 - 15.0 %    Platelet Count 191 150 - 450 10e9/L   Glucose by meter   Result Value Ref Range    Glucose 133 (H) 70 - 99 mg/dL   Glucose by meter   Result Value Ref Range    Glucose 75 70 - 99 mg/dL   Glucose by meter   Result Value Ref Range    Glucose 107  (H) 70 - 99 mg/dL   Glucose by meter   Result Value Ref Range    Glucose 114 (H) 70 - 99 mg/dL   Lactic acid whole blood   Result Value Ref Range    Lactic Acid 0.9 0.7 - 2.1 mmol/L   Glucose by meter   Result Value Ref Range    Glucose 106 (H) 70 - 99 mg/dL   Vancomycin level   Result Value Ref Range    Vancomycin Level 27.0 (HH) mg/L   Basic metabolic panel   Result Value Ref Range    Sodium 142 133 - 144 mmol/L    Potassium 4.7 3.4 - 5.3 mmol/L    Chloride 109 94 - 109 mmol/L    Carbon Dioxide 26 20 - 32 mmol/L    Anion Gap 7 3 - 14 mmol/L    Glucose 100 (H) 70 - 99 mg/dL    Urea Nitrogen 28 7 - 30 mg/dL    Creatinine 1.93 (H) 0.66 - 1.25 mg/dL    GFR Estimate 34 (L) >60 mL/min/1.7m2    GFR Estimate If Black 42 (L) >60 mL/min/1.7m2    Calcium 7.5 (L) 8.5 - 10.1 mg/dL   Glucose by meter   Result Value Ref Range    Glucose 96 70 - 99 mg/dL   Glucose by meter   Result Value Ref Range    Glucose 81 70 - 99 mg/dL   Glucose by meter   Result Value Ref Range    Glucose 106 (H) 70 - 99 mg/dL   Glucose by meter   Result Value Ref Range    Glucose 133 (H) 70 - 99 mg/dL   Glucose by meter   Result Value Ref Range    Glucose 93 70 - 99 mg/dL   Glucose by meter   Result Value Ref Range    Glucose 102 (H) 70 - 99 mg/dL   Glucose by meter   Result Value Ref Range    Glucose 110 (H) 70 - 99 mg/dL   Creatinine   Result Value Ref Range    Creatinine 1.82 (H) 0.66 - 1.25 mg/dL    GFR Estimate 37 (L) >60 mL/min/1.7m2    GFR Estimate If Black 45 (L) >60 mL/min/1.7m2   Glucose by meter   Result Value Ref Range    Glucose 110 (H) 70 - 99 mg/dL   Glucose by meter   Result Value Ref Range    Glucose 112 (H) 70 - 99 mg/dL   Glucose by meter   Result Value Ref Range    Glucose 181 (H) 70 - 99 mg/dL   Glucose by meter   Result Value Ref Range    Glucose 131 (H) 70 - 99 mg/dL   Glucose by meter   Result Value Ref Range    Glucose 82 70 - 99 mg/dL   Glucose by meter   Result Value Ref Range    Glucose 135 (H) 70 - 99 mg/dL   Glucose by meter    Result Value Ref Range    Glucose 125 (H) 70 - 99 mg/dL   Glucose by meter   Result Value Ref Range    Glucose 159 (H) 70 - 99 mg/dL   Creatinine   Result Value Ref Range    Creatinine 1.61 (H) 0.66 - 1.25 mg/dL    GFR Estimate 42 (L) >60 mL/min/1.7m2    GFR Estimate If Black 51 (L) >60 mL/min/1.7m2   Glucose by meter   Result Value Ref Range    Glucose 90 70 - 99 mg/dL   EKG 12 lead   Result Value Ref Range    Interpretation ECG Click View Image link to view waveform and result    ISTAT gases lactate abdiel POCT   Result Value Ref Range    Ph Venous 7.30 (L) 7.32 - 7.43 pH    PCO2 Venous 62 (H) 40 - 50 mm Hg    PO2 Venous 21 (L) 25 - 47 mm Hg    Bicarbonate Venous 30 (H) 21 - 28 mmol/L    O2 Sat Venous 29 %    Lactic Acid 0.6 (L) 0.7 - 2.1 mmol/L   Urine Culture   Result Value Ref Range    Specimen Description Unspecified Urine     Special Requests Specimen received in preservative     Culture Micro       <10,000 colonies/mL mixed urogenital rex Susceptibility testing not routinely   done      Micro Report Status FINAL 06/02/2017      *Note: Due to a large number of results and/or encounters for the requested time period, some results have not been displayed. A complete set of results can be found in Results Review.      No results found for this or any previous visit (from the past 48 hour(s)).             Pending Results:   Unresulted Labs Ordered in the Past 30 Days of this Admission     Date and Time Order Name Status Description    6/3/2017 1236 Blood culture Preliminary     6/3/2017 1236 Blood culture Preliminary     6/1/2017 0605 Wound Culture Aerobic Bacterial Preliminary     6/1/2017 0312 Blood Culture ONE site Preliminary                   Discharge Instructions and Follow-Up:     Discharge Procedure Orders  Basic metabolic panel   Standing Status: Future  Standing Exp. Date: 08/05/17   Order Comments: BMP next Friday ie 6/9/17     Lymphedema Clinic Referral     General info for SNF   Order Comments:  Length of Stay Estimate: Long Term Care  Condition at Discharge: Improving  Level of care:board and care  Rehabilitation Potential: Fair  Admission H&P remains valid and up-to-date: No (If no, please update H&P)  Recent Chemotherapy: N/A  Use Nursing Home Standing Orders: No     Mantoux instructions   Order Comments: Give two-step Mantoux (PPD) Per Facility Policy No (if no explain) he is resident of the same long term care fascility     Reason for your hospital stay   Order Comments: Admit with Streptococcla bacteremia. Likely from the right shin wound. Has chronic lymphedema  And needs to work on that. He needs antibiotics for total two weeks for Strep Bacteremia. Needs to use leg warsp, keep feet up while sitting. FU with lymphedema clinic     Intake and output   Order Comments: Every shift     Daily weights   Order Comments: Call Provider for weight gain of more than 2 pounds per day or 5 pounds per week.     Adult [facility] Follow-up and recommended labs and tests   Order Comments: Follow up with primary care provider, Ekaterina Lozano, within 7 days for hospital follow- up.  The following labs/tests are recommended: BMP in three days for change in lasix dose.  FU with Lymphedema clinic in one weeks time for chronic leg edema      Appointments on [place] and/or [facility] (with [facility] or [facility] provider or service). Call [phone] if you haven't heard regarding these appointments within 7 days of discharge.     Full Code     Occupational Therapy Adult Consult   Order Comments: Evaluate and treat as clinically indicated.    Reason:  weakness     Physical Therapy Adult Consult   Order Comments: Evaluate and treat as clinically indicated.    Reason:  weakness     Advance Diet as Tolerated   Order Comments: Follow this diet upon discharge: Orders Placed This Encounter     Room Service     Snacks/Supplements Adult: Glucerna (Adult); With Meals     Room Service     Moderate Consistent CHO Diet    Fluid  restriction for 1500  cc per day   Order Specific Question Answer Comments   Is discharge order? Yes           Viktoria Gunderson  Internal Medicine Staff Hospitalist  [phone]  June 6, 2017        Time spent on patient: 40 minutes total including face to face and coordinating care time reviewing current illness, any medication changes, and the care plan for today.

## 2017-06-05 NOTE — PLAN OF CARE
Problem: Goal Outcome Summary  Goal: Goal Outcome Summary  PT 5B- pt seen for LE exercise, shoulder and neck exercise and sit to stand transfers. Pt has low tolerance for standing and only stood for 30 sec x 3 with long sitting rest in between stands. Pt needs much more therapy to improve his functional status. Pt on 2 L O2 today. Recommend discharge to TCU.

## 2017-06-05 NOTE — PLAN OF CARE
Problem: Goal Outcome Summary  Goal: Goal Outcome Summary  Edema 5B: ACE wraps removed and wound cares/dressing change performed by RN. Large amounts of drainage continues on R LE > L LE. Recommend continued use of ACE bandages at this time due to drainage, will transition to GCB when appropriate for further compression. Remove ACE bandages if saturated in drainage, causing pain/numbness, or become soiled. Will continue to coordinate edema wrap changes with daily RN wound cares.

## 2017-06-05 NOTE — PLAN OF CARE
Problem: Goal Outcome Summary  Goal: Goal Outcome Summary  Outcome: Improving  Pt is A/Ox4. Pt denied pain. Pt has lymphedema wraps on BLE and dressing change was done prior to rewrap of lymphedema. Pt appetite was good this shift eating 100% of both meals this shift. Pt compliant with all cares. VSS. Continue to monitor.

## 2017-06-05 NOTE — PROGRESS NOTES
"Daily progress note    Interval hx: Pt. Seen and examined. Feeling much better and hoping to discharge tomorrow.  Legs better.  Breathing better.  Still having some coughing.  No fevers.    Vital signs: Blood pressure 120/48, pulse 85, temperature 98.2  F (36.8  C), temperature source Oral, resp. rate 16, height 1.803 m (5' 11\"), weight 110.7 kg (244 lb 0.8 oz), SpO2 98 %.    Labs:  Recent Results (from the past 36 hour(s))   Glucose by meter    Collection Time: 06/04/17  6:02 AM   Result Value Ref Range    Glucose 81 70 - 99 mg/dL   Glucose by meter    Collection Time: 06/04/17  7:19 AM   Result Value Ref Range    Glucose 106 (H) 70 - 99 mg/dL   Vancomycin level    Collection Time: 06/04/17  7:25 AM   Result Value Ref Range    Vancomycin Level 27.0 (HH) mg/L   Basic metabolic panel    Collection Time: 06/04/17  7:25 AM   Result Value Ref Range    Sodium 142 133 - 144 mmol/L    Potassium 4.7 3.4 - 5.3 mmol/L    Chloride 109 94 - 109 mmol/L    Carbon Dioxide 26 20 - 32 mmol/L    Anion Gap 7 3 - 14 mmol/L    Glucose 100 (H) 70 - 99 mg/dL    Urea Nitrogen 28 7 - 30 mg/dL    Creatinine 1.93 (H) 0.66 - 1.25 mg/dL    GFR Estimate 34 (L) >60 mL/min/1.7m2    GFR Estimate If Black 42 (L) >60 mL/min/1.7m2    Calcium 7.5 (L) 8.5 - 10.1 mg/dL   Glucose by meter    Collection Time: 06/04/17  7:38 AM   Result Value Ref Range    Glucose 133 (H) 70 - 99 mg/dL   Glucose by meter    Collection Time: 06/04/17 11:11 AM   Result Value Ref Range    Glucose 93 70 - 99 mg/dL   Glucose by meter    Collection Time: 06/04/17  9:09 PM   Result Value Ref Range    Glucose 102 (H) 70 - 99 mg/dL   Glucose by meter    Collection Time: 06/04/17 10:17 PM   Result Value Ref Range    Glucose 110 (H) 70 - 99 mg/dL   Glucose by meter    Collection Time: 06/05/17  2:12 AM   Result Value Ref Range    Glucose 110 (H) 70 - 99 mg/dL   Glucose by meter    Collection Time: 06/05/17  3:25 AM   Result Value Ref Range    Glucose 112 (H) 70 - 99 mg/dL   Glucose by " meter    Collection Time: 06/05/17  6:26 AM   Result Value Ref Range    Glucose 181 (H) 70 - 99 mg/dL   Creatinine    Collection Time: 06/05/17  7:16 AM   Result Value Ref Range    Creatinine 1.82 (H) 0.66 - 1.25 mg/dL    GFR Estimate 37 (L) >60 mL/min/1.7m2    GFR Estimate If Black 45 (L) >60 mL/min/1.7m2   Glucose by meter    Collection Time: 06/05/17  7:45 AM   Result Value Ref Range    Glucose 131 (H) 70 - 99 mg/dL   Glucose by meter    Collection Time: 06/05/17 12:15 PM   Result Value Ref Range    Glucose 82 70 - 99 mg/dL   Glucose by meter    Collection Time: 06/05/17  1:52 PM   Result Value Ref Range    Glucose 135 (H) 70 - 99 mg/dL       Physical exam:   Sitting up in bed, much more alert, NAD  Faint B wheezing  RRR  Legs w/3+ edema and wrapped    Assessment and Plan:   #. Sepsis (HR, WBC, h/o fever)--likely from celluitis, septicemia  Patient currently meets SIRS criteria with unknown source, but suspect either lower extremity cellulitis.  CXR was notably unremarkable for cardiopulmonary process. Started on Vanc (MRSA positive) and Zosyn in the ED.  1 blood culture from 6/1 + for group B strep  -DC Vanco today (pan-sensitive)  -6/3 cultures negative  -wound care nurse consult  -Plan for 7 days of abx from negative cultures (so until 6/10)      #. Severe COPD  Does not appear to be worse, is on home oxygen level with normal O2 sats and bicarb on BMP is at baseline so do not suspect worsening acidosis. Does have wheezing.  -- continue PTA symbicort BID  -- continue PTA spiriva  -- continue PTA duoneb PRN  --scheduled nebs 4x/day          #. NATTY on CKD III  #. BPH  Baseline creatinine appears to be 1.6 - 2 recently. Cr back to baseline now.  -- continue tamsulosin  --Back on his Lasix      #. CAD  #. HFpEF  Patient currently asymptomatic and appears to be at baseline.   -- continue PTA imdur and ASA  -- continue PTA metoprolol succinate 50 mg daily  -Restarted lasix 6/3/17      #. HLD  -- continue home  atorvastatin      #. DMII  -- continue home glargine 25 units (was accidentally placed on BID on admit-changed to qHS).  -- medium SSI  -- TID AC and qHS BG checks      #. Chronic Pain  -- hold home dilaudid given concerns for delirium-- restarted at lower dose.  If he develops confusion this should be STOPPED.  -- continue diclofenac gel to knees      F/E/N:  -- no mIVF  -- replete electrolytes as needed  -- consistent carb diet      Ppx:  -- GI: continue PTA omeprazole and ranitidine  -- VTE: heparin SQ BID, PT to help with ambulation. This should NOT continue after discharge.      Lines/Devices:  PIV      Code: Full      Dispo: Has ride for tomorrow morning at 11am.  Patient glad.      Radha Obrien MD, MSEd  Hospitalist  Professor of Medicine  [facility]  [phone]

## 2017-06-06 NOTE — PLAN OF CARE
"Problem: Goal Outcome Summary  Goal: Goal Outcome Summary  /66 (BP Location: Right arm)  Pulse 88  Temp 97  F (36.1  C) (Oral)  Resp 16  Ht 1.803 m (5' 11\")  Wt 111.4 kg (245 lb 9.6 oz)  SpO2 97%  BMI 34.25 kg/m2      Afebrile, soft BPs, AOVSS on 2L O2 via NC w/ sats in mid 90s. A&Ox4, a little forgetful. Pt spent night sitting on edge of bed, sleeping folded over the overbed table with feet propped up on pillows; refuses to sit in recliner or lie in bed, says that he can t breathe in those positions. LS diminished throughout. Pt c/o dyspnea a couple of times this shift; used I/S and acapella with good result, coughed up sputum, stated that he felt better. Lymphedema wraps to BLE are intact. Zosyn and heparin given per MAR. PIV infusing TKO between IV antibiotics. Plan is for possible D/C to TCU today or tomorrow. Will continue to monitor and follow POC.      "

## 2017-06-06 NOTE — PROGRESS NOTES
"[facility]  \"Hello, my name is Marianela Ojeda , and I am calling from the [facility].  I want to check in and see how you are doing, after leaving the hospital.  You may also receive a call from your Care Coordinator (care team), but I want to make sure you don t have any urgent needs.  I have a couple questions to review with you:     Post-Discharge Outreach                                                    Tomas Grey is a 72 year old male     Follow-up Appointments           Adult [facility] Follow-up and recommended labs and tests         Follow up with primary care provider, Ekaterina Lozano, within 7 days for hospital follow- up.  The following labs/tests are recommended: BMP in three days for change in lasix dose.  FU with Lymphedema clinic in one weeks time for chronic leg edema       Appointments on [place] and/or [facility] (with [facility] or [facility] provider or service). Call [phone] if you haven't heard regarding these appointments within 7 days of discharge.                        Your next 10 appointments already scheduled            Jun 12, 2017 10:00 AM CDT   NM INJECTION with SHNMINJ   [facility] ([facility])     [address] [address] [address]   [phone]                  Jun 12, 2017 10:45 AM CDT   LAB with SH LAB ONLY   [facility] ([facility])     [address] [facility] [address]   [phone]                 Patient must bring picture ID.  Patient should be prepared to give a urine specimen  Please do not eat 10-12 hours before your appointment if you are coming in fasting for labs on lipids, cholesterol, or glucose (sugar).  Pregnant women should follow their Care Team instructions. Water with medications is okay. Do not drink coffee or other fluids.   If you have concerns about taking  your medications, please ask at office or if scheduling " via [facility], send a message by clicking on Secure Messaging, Message Your Care Team.                  Jun 12, 2017  1:00 PM CDT   NM BONE SCAN WHOLE BODY with SHNM1   [facility] ([facility])     [address] [facility] [address] [phone]                 Please bring a list of your medicines to the exam. (Include vitamins, minerals and over-the-counter drugs.) You should wear comfortable clothes. Leave your valuables at home. Please bring related prior results and films. Tell your doctor:   If you are breastfeeding or may be pregnant.   If you have had a barium test within the past few days. Barium may change the results of certain exams.   If you think you may need sedation (medicine to help you relax).  You may eat and drink as normal.  Drink plenty of fluids and empty bladder frequently between injection and scans.                  Jun 14, 2017  1:15 PM CDT   Return Visit with Manpreet Oviedo MD   [facility] ([facility])     [facility]  [address]  [facility] [address] [phone]             Care Team:    Patient Care Team       Relationship Specialty Notifications Start End    Ekaterina Lozano APRN CNP PCP - General Nurse Practitioner All results, Admissions 1/2/17     Phone: [phone] Fax: [phone]         [facility] [address] [facility] [address]    Gage Yang, RN Registered Nurse  Admissions 9/26/16     Annika Ortiz MD Hospitalist Internal Medicine  11/8/16     Phone: [phone] Pager: [phone] Fax: [phone]         [facility] [address] [facility] [address]    Radha Lam, RN Registered Nurse  Admissions 11/8/16     Comment:   [facility] Utilization Review Specialist    Amanda Ortiz    Admissions 11/8/16     Comment:   [facility] Administrative Support    Bri Deutsch    Admissions 3/1/17     Comment:    [facility] Administrative Support    Mercedes Lin Other (see comments)  All results, Admissions 5/8/17     Comment:  [facility]             Transition of Care Review                                                      Did you have a surgery or procedure during your hospital visit? No   If yes, do you have any of the following:     Signs of infection:  Group B streptococcus Bacteremia    Pain:  Yes     Pain Scale (0-10) 1/10     Location: leg wound    Wound/incision concerns? Chronic Lymphedema BL LE with weeping right shin wound     Do you have all of your medications/refills?  Yes    Are you having any side effects or questions about your medication(s)? No    Do you have any new or worsening symptoms?  No    Do you have any future appointments scheduled?   Yes       Next 5 appointments (look out 90 days)     Jun 14, 2017  1:15 PM CDT   Return Visit with Manpreet Oviedo MD   [facility] ([facility])    [facility]  [address] [facility] [address]  [facility] [address]   [phone]                      Plan                                                      Thanks for your time.  Your Care Coordinator may follow-up within the next couple days.  In the meantime if you have questions, concerns or problems call your care team.        Marianela Ojeda

## 2017-06-06 NOTE — PLAN OF CARE
Problem: Goal Outcome Summary  Goal: Goal Outcome Summary  Outcome: Improving  D: Stable for discharge, IV out. Review of discharge plans done with M.D. And patient. Left with belongings, oxygen on and with transport per w/c. Report to be  Called to facility.

## 2017-06-06 NOTE — PLAN OF CARE
Problem: Goal Outcome Summary  Goal: Goal Outcome Summary  Physical Therapy Discharge Summary     Reason for therapy discharge:    Discharged to transitional care facility.     Progress towards therapy goal(s). See goals on Care Plan in [facility] electronic health record for goal details.  Goals partially met.  Barriers to achieving goals:   limited tolerance for therapy and discharge from facility.     Therapy recommendation(s):    Continued therapy is recommended.  Rationale/Recommendations:  Continue addressing functional status.

## 2017-06-06 NOTE — PROGRESS NOTES
"SPIRITUAL HEALTH SERVICES  SPIRITUAL ASSESSMENT Progress Note  [facility] ([place] 5B    PRIMARY FOCUS:     Goals of care    Symptom/pain management    Support for coping    ILLNESS CIRCUMSTANCES:   Responded to Marco's request for chaplaincy care. Reviewed documentation. Reflective conversation and life review shared with Marco while integrating aspects of his serious illness and family narratives.     Context of Serious Illness/Symptom(s) - Marco talked about his \"bone cancer, COPD, diabetes\" and what they mean for him.    Resources for Support - eleven children, fifty-six grandchildren, several great grandchildren    DISTRESS:     Emotional/Spiritual/Existential Distress - Marco acknowledged the hardship, distress and grief experienced through his illness challenges and barriers. He talked about his grief over his wife's death \"eight years ago.\"    Buddhism Distress - Discussed, declined    Social/Cultural/Economic Distress - Marco talked about his transition to residing in a nursing care environment    SPIRITUAL/Adventist COPING:     Latter day/[place] - Holiness apolonia is important for coping    Spiritual Practice(s) - Prayer was shared; palm cross was provided    Emotional/Relational/Existential Connections - Marco shared many stories and named his sense of pride about his family through life review. Also talked about his father serving and dying in WWII as a .    GOALS OF CARE:    Goals of Care - \"Keep having those cancer numbers going down. I've got to keep doing those treatments.\"    Meaning/Sense-Making - He also talked about when it is \"his time,\" that he'll be ready to die. He continued, \"There's a reason for everything. That's what it says. That's what I believe.\"     PLAN: I will inform unit  of care provided.    Jose Wyatt M.Div., [facility]  Staff   Pager [phone]     "

## 2017-06-06 NOTE — PLAN OF CARE
Problem: Goal Outcome Summary  Goal: Goal Outcome Summary  Occupational Therapy Discharge Summary     Reason for therapy discharge:    Discharged to transitional care facility.     Progress towards therapy goal(s). See goals on Care Plan in [facility] electronic health record for goal details.  Goals partially met.  Barriers to achieving goals:   discharge from facility.     Therapy recommendation(s):    Continued therapy is recommended.  Rationale/Recommendations:  more edema therapy at TCU.

## 2017-06-06 NOTE — PLAN OF CARE
Problem: Goal Outcome Summary  Goal: Goal Outcome Summary  Outcome: No Change  Pt is A&O, but with intermittent confusion. AVSS on 2L NC. Pt has bilateral lymphedema wraps in place, and even though the left is very wet, would not let the writer change the dressings. IV abx infused. No acute concerns.     Plan is to d/c to a TCU, possibly tomorrow. Continue to monitor and follow the POC.

## 2017-06-08 NOTE — PROGRESS NOTES
[facility]  PRIMARY CARE PROVIDER AND CLINIC:  Ekaterina Lozano [facility] [address] / [place]   Chief Complaint   Patient presents with     Hospital F/U     HPI:    Tomas Grey is a 72 year old  (1945), re-admitted to the [facility] from Hospital  [facility].  Hospital stay 6/1/17 through 6/6/17. Re-admitted to this facility for  medical management and nursing care. Hospital course per review of discharge summary:    This is a 72-year-old male, with a past medical history significant for severe COPD on supplemental Oxygen, prostate cancer metastatic to bone, chronic pain syndrome, type 2 diabetes mellitus, chronic kidney disease stage III, chronic diastolic heart failure with an EF 55-60% on 4/28/15, coronary artery disease, hypertension and lymphedema, who was admitted to the [facility] for fever and delirium. Of note, was recently hospitalized at [facility] 5/14/17 through 5/16/17 for dehydration. During this hospitalization, labs revealed WBC 15.7 and Creatinine 2.40. Antibiotics were initiated for sepsis. 1 of 2 blood cultures 6/1/17 positive for Group B Strep with the right shin wound thought to be the source. Although the wound did not look infected, it was weeping from chronic leg edema. Discharged back to long term care on oral antibiotics.     Current issues are:     Is resting his head on the controls of his electric scooter. States he is feeling a lot better than prior to hospitalization. Reports his eyes are bugging him. Was seen by the eye doctor last week and reports drops being put in his eyes. Has no further details regarding the appointment. Denies fever or chills.     CODE STATUS/ADVANCE DIRECTIVES DISCUSSION:   CPR/Full code   Patient's living condition: lives in a nursing home    ALLERGIES:Morphine  PAST MEDICAL HISTORY:  has a past medical  history of Anemia; Anxiety; Aspiration pneumonia (H) (2014); C. difficile colitis; CAD (coronary artery disease); Chronic pain; CKD (chronic kidney disease); COPD (chronic obstructive pulmonary disease) (H); Depressive disorder; Diabetes mellitus (H); Hypertension; Insomnia; ALEXIS (obstructive sleep apnea); Osteoporosis; and Prostate cancer metastatic to multiple sites (H).  PAST SURGICAL HISTORY:  has a past surgical history that includes Abdomen surgery; Arthroscopy knee; and Eye surgery.  FAMILY HISTORY: family history includes CANCER in his mother; DIABETES in his mother.  SOCIAL HISTORY:  reports that he has quit smoking. He has never used smokeless tobacco. He reports that he does not drink alcohol or use illicit drugs.    Post Discharge Medication Reconciliation Status: discharge medications reconciled and changed, per note/orders (see AVS).  Current Outpatient Prescriptions   Medication Sig Dispense Refill     Nutritional Supplements (GLUCERNA) LIQD Take 8 oz by mouth 2 times daily       amoxicillin-clavulanate (AUGMENTIN) 875-125 MG per tablet Take 1 tablet by mouth 2 times daily       saccharomyces boulardii (FLORASTOR) 250 MG capsule Take 250 mg by mouth 2 times daily       HYDROmorphone (DILAUDID) 2 MG tablet Take 1 tablet (2 mg) by mouth every 4 hours as needed for moderate to severe pain       furosemide (LASIX) 20 MG tablet Take 1 tablet (20 mg) by mouth 2 times daily 30 tablet      abiraterone (ZYTIGA) 250 MG tablet Take 1,000 mg by mouth daily       tobramycin-dexamethasone (TOBRADEX) ophthalmic ointment Place 1 Application into both eyes 4 times daily       bacitracin ointment Apply to Right great toe topically in the evening for Ingrown toenail       carboxymethylcellulose (REFRESH PLUS) 0.5 % SOLN ophthalmic solution Place 1 drop into both eyes 3 times daily       budesonide-formoterol (SYMBICORT) 160-4.5 MCG/ACT Inhaler Inhale 2 puffs into the lungs 2 times daily       predniSONE (DELTASONE) 5 MG  tablet Take 1 tablet (5 mg) by mouth 2 times daily 60 tablet 0     ranitidine (RANITIDINE) 75 MG tablet Take 75 mg by mouth 2 times daily       diclofenac (VOLTAREN) 1 % GEL topical gel Apply 4 grams to knees four times daily as needed using enclosed dosing card. 100 g 0     senna-docusate (SENOKOT-S;PERICOLACE) 8.6-50 MG per tablet Take 1 tablet by mouth 2 times daily 100 tablet      insulin glargine (LANTUS) 100 UNIT/ML injection Inject 25 Units Subcutaneous 2 times daily        omeprazole (PRILOSEC) 10 MG CR capsule Take 1 capsule (10 mg) by mouth daily       isosorbide mononitrate (IMDUR) 60 MG 24 hr tablet Take 1 tablet (60 mg) by mouth daily       atorvastatin (LIPITOR) 10 MG tablet Take 10 mg by mouth At Bedtime        tiotropium (SPIRIVA) 18 MCG inhalation capsule Inhale 18 mcg into the lungs daily       polyvinyl alcohol (LIQUIFILM TEARS) 1.4 % ophthalmic solution Place 1 drop into both eyes 3 times daily       acetaminophen (TYLENOL) 500 MG tablet Take 500 mg by mouth every 4 hours as needed for pain Not to exceed 3000 mg/24 hours       nitroglycerin (NITROSTAT) 0.4 MG SL tablet Place 0.4 mg under the tongue every 5 minutes as needed for chest pain if you are still having symptoms after 3 doses (15 minutes) call 911.       ipratropium - albuterol 0.5 mg/2.5 mg/3 mL (DUONEB) 0.5-2.5 (3) MG/3ML nebulization Take 1 vial (3 mLs) by nebulization every 4 hours as needed for shortness of breath / dyspnea or wheezing 360 mL 3     aspirin 81 MG EC tablet Take 1 tablet (81 mg) by mouth daily 5 tablet 0     metoprolol (TOPROL XL) 50 MG 24 hr tablet Take 1 tablet (50 mg) by mouth daily 5 tablet 0     calcium carb 1250 mg, 500 mg Algaaciq,/vitamin D 200 units (OSCAL WITH D) 500-200 MG-UNIT per tablet Take 1 tablet by mouth 2 times daily (with meals) 10 tablet 0     tamsulosin (FLOMAX) 0.4 MG 24 hr capsule Take 1 capsule (0.4 mg) by mouth daily 5 capsule 0     ammonium lactate (LAC-HYDRIN) 12 % lotion Apply topically 2  times daily as needed for dry skin To all extremities for dry skin       [DISCONTINUED] enzalutamide (XTANDI) 40 MG capsule Take 4 capsules (160 mg) by mouth daily 120 capsule 0     ROS:  4 point ROS including Respiratory, CV, GI and , other than that noted in the HPI,  is negative    Exam:  /62  Pulse 80  Temp 97.2  F (36.2  C)  Resp 20  Wt 240 lb (108.9 kg)  SpO2 96%  BMI 33.47 kg/m2  GENERAL APPEARANCE: Alert, in no distress  ENT: Mouth and posterior oropharynx normal, moist mucous membranes  EYES: EOM, conjunctivae, lids, pupils and irises normal  RESP: respiratory effort and palpation of chest normal, diminished lung sounds, no respiratory distress  CV: Palpation and auscultation of heart done, regular rate and rhythm, no murmur, rub, or gallop  ABDOMEN: normal bowel sounds, soft, nontender, no hepatosplenomegaly or other masses  M/S: Active movement of bilateral upper and lower extremities. Kerlix on BLE.   SKIN: Inspection of skin and subcutaneous tissue baseline, Palpation of skin and subcutaneous tissue baseline, abrasion on right shin  NEURO: Cranial nerves 2-12 are normal tested and grossly at patient's baseline  PSYCH: affect and mood normal    Lab/Diagnostic data:    Last Basic Metabolic Panel:  Lab Results   Component Value Date     06/04/2017      Lab Results   Component Value Date    POTASSIUM 4.7 06/04/2017     Lab Results   Component Value Date    CHLORIDE 109 06/04/2017     Lab Results   Component Value Date    JORDAN 7.5 06/04/2017     Lab Results   Component Value Date    CO2 26 06/04/2017     Lab Results   Component Value Date    BUN 28 06/04/2017     Lab Results   Component Value Date    CR 1.61 06/06/2017     Lab Results   Component Value Date     06/04/2017       Lab Results   Component Value Date    WBC 7.5 06/03/2017     Lab Results   Component Value Date    RBC 2.97 06/03/2017     Lab Results   Component Value Date    HGB 8.0 06/03/2017     Lab Results   Component  Value Date    HCT 27.5 06/03/2017     Lab Results   Component Value Date    MCV 93 06/03/2017     Lab Results   Component Value Date    MCH 26.9 06/03/2017     Lab Results   Component Value Date    MCHC 29.1 06/03/2017     Lab Results   Component Value Date    RDW 16.5 06/03/2017     Lab Results   Component Value Date     06/03/2017     ASSESSMENT/PLAN:  Group B Streptococcus Bacteremia. Likely secondary to right shin wound. 1/2 blood cultures positive 6/1/17. Repeat blood cultures NGTD. Continue Augmentin and Florastor to complete a 2 week course.      Lymphedema of Both Lower Extremities with Venous Stasis Ulcers. Occupational Therapy ordered for lymphedema treatment. Dressing change to right great toe as instructed. Previously on Furosemide 40 mg BID which was on hold due to NATTY secondary to dehydration and gradually reinitiated. Continue Furosemide 20 mg PO BID. Adjust treatment accordingly.    Chronic Kidney Disease Stage III. Baseline Creatinine upper 1s. Last Creatinine 1.61 on 6/6/17. Repeat BMP on 6/12/17 to ensure stability.     Recent Treatment of Bacterial Conjunctivitis. Seen by outside Ophthalmologist and continues on treatment with TobraDex ointment. Last seen 5/22/17 and was due to be seen 6/1/17, but appointment was cancelled due to hospitalization. Complains of eye irritation today. Will have facility reschedule appointment with [facility].     Chronic Obstructive Pulmonary Disease Stage 4 on Chronic Oxygen Supplementation/Obstructive Sleep Apnea. Follow-up with Dr. Mcmahon at the end of August as recommended. Should have Z-Pack dose available PRN for exacerbation. Continue Tiotropium and Budesonide as ordered. DuoNebs available PRN. Also on Prednisone for Zytiga supplement. BIPAP to be worn at night for sleep apnea.       Prostate Cancer Metastatic to Bone. Follow-up with Dr. Oviedo and Dr. Callahan as recommended. Previous followed by Dr. Gallagher in Palliative Care. Taking Zytiga  and Prednisone as well as Radium 223 monthly x 6 doses. PSA continues to rise despite palliative treatment. Continue Hydromorphone PRN as ordered. Resumed previous dose of Hydromorphone 2 mg as resident has been on this dose for some time without confusion.  Also takes Tamsulosin.      Urinary Retention in the Setting of Metastatic Lymph Node Dissection. Evaluated by Urology, Dr. Graf, on 3/22/17. Creatinine continues to rise and this may be due to bladder outlet obstruction. Resistant to surgical options. Instructed how to self-catheterize, but resistant. Told Urology his main goal is avoiding pain. Voids as able. Continue Tamsulosin as ordered.     Chronic Pain Syndrome. Likely Secondary to Above. History of diverting medication to family members in the past. Continue Hydromorphone and Acetaminophen as ordered. Resumed previous dose of Hydromorphone 2 mg as resident has been on this dose for some time without confusion. Voltaren gel ordered for knee pain.       Type 2 Diabetes Mellitus. Last A1C 6.8 on 5/2/17, down from 7.7 on 12/20/16.Continue Glargine as ordered.      Coronary Artery Disease/Hypertension/Chronic Diastolic Heart Failure with EF 55-60%. Monitor blood pressure weekly. Continue Aspirin, Isosorbide Mononitrate, Metoprolol, Atorvastatin and Nitroglycerin as ordered.       Gastroesophageal Reflux. Continue Omeprazole and Ranitidine as ordered.      Anxiety and Depression. Patient refused antidepressant and in-house therapist visits during previous conversations.     Information reviewed:  Medications, vital signs, orders, nursing notes, problem list, hospital information. Total time spent with patient visit was 45 min including patient visit and review of past records. Greater than 50% of total time spent with counseling and coordinating care.    Electronically signed by:  SAL Acevedo CNP

## 2017-06-14 NOTE — PROGRESS NOTES
[facility]  HEMATOLOGY ONCOLOGY    ONCOLOGY FOLLOWUP NOTE      DIAGNOSIS:    1- Metastatic prostate cancer with extensive metastases to bone.   2- Advanced COPD and multiple other co-morbidities.      TREATMENT:     Lupron injection monthly, discontinued 6/2016.  Casodex 50 mg daily. discontinued 2/9/2015  - Xgeva monthly  - Xtandi 4/15/15  - Zytgea and prednisone 9/19/2016-present  - Xofigo 1/4/17     Subjective:  Mr. Tomas Grey comes in for followup today. He was recently admitted with bacteremia. He is recovered now. He does not have any significant bone pain. He is tolerating Xofigo fairly well.     REVIEW OF SYSTEMS:  A complete review of systems was performed and found to be negative other than pertinent positives mentioned in history of present illness.     Past medical, social histories reviewed.    Meds- Reviewed.     PHYSICAL EXAMINATION:   VITAL SIGNS:/61 (BP Location: Left arm, Patient Position: Chair, Cuff Size: Adult Large)  Pulse 89  Temp 97.5  F (36.4  C) (Oral)  Resp 18  SpO2 94%  GENERAL: Sitting comfortably.   HEENT: Pupils are equal. Oropharynx is clear.   NECK: No cervical or supraclavicular lymphadenopathy.   LUNGS: Clear bilaterally.   HEART: S1, S2, regular.   ABDOMEN: Soft, nontender, nondistended, no hepatosplenomegaly.   EXTREMITIES: Warm, well perfused.   NEUROLOGIC: Alert, awake.   SKIN: No rash.   LYMPHATICS: Bilateral edema he has dressing on his left leg.     DATA:  Recent Labs   Lab Test 06/12/17 06/06/17   0714   06/04/17   0725   12/26/16   1851   05/27/16   1410   05/28/15   0715   NA  145   --    --   142   < >  146*   < >  143   < >  139   POTASSIUM  4.6   --    --   4.7   < >  3.8   < >  3.8   < >  5.5*   CHLORIDE  109   --    --   109   < >  99   < >  106   < >  106   CO2  30   --    --   26   < >  41*   < >  33*   < >  27   ANIONGAP  6.0   --    --   7   < >  5   < >  4   < >  6   BUN  40*   --    --   28   < >  48*   < >  24   < >  26    CR  2.12*  1.61*   < >  1.93*   < >  1.80*   < >  1.64*   < >  1.66*   GLC  104   --    --   100*   < >  61*   < >  130*   < >  121*   JORDAN  8.4*   --    --   7.5*   < >  9.2   < >  8.8   < >  8.5   MAG   --    --    --    --    --   1.7   --   1.9   < >   --    PHOS   --    --    --    --    --    --    --    --    --   3.3    < > = values in this interval not displayed.     Recent Labs   Lab Test 06/12/17 06/03/17   0754  06/01/17   0043   05/17/17   1325  04/19/17   1255   WBC  6.5  7.5  15.7*   < >  6.0  8.0   HGB  8.9*  8.0*  9.8*   < >  9.0*  10.1*   PLT  245  191  282   < >  237  227   MCV  97  93  93   < >  96  98   NEUTROPHIL   --    --   89.3   --   66.3  65.9    < > = values in this interval not displayed.     Recent Labs   Lab Test  06/02/17   0627  06/01/17   0043  05/17/17   1325   BILITOTAL  0.4  0.4  0.3   ALKPHOS  90  122  99   ALT  13  12  15   AST  14  11  16   ALBUMIN  2.4*  3.0*  2.8*     Results for HERMILO KING WILL (MRN [id number]) as of 6/14/2017 13:24   Ref. Range 12/28/2016 13:00 2/20/2017 12:08 3/22/2017 13:05 4/19/2017 12:55 5/17/2017 13:25   PSA Latest Ref Range: 0 - 4 ug/L 46.26 (H) 163.58 (H) 210.71 (H) 249.00 (H) 300.00 (H)     ECOG  performance status 3     ASSESSMENT:   1.  Metastatic prostate cancer with multiple sclerotic bone metastases.  The patient continues on monthly Lupron with Casodex.  He has been on this regimen since 05/2014.  He continues to do well symptomatically.  The patient switched care to us after having initial diagnosis and treatment in [place], [place]. He started Xtandi 4/15/15.   He is not able to keep up with his follow ups due to multiple other medical issues and repeated hospital admissions due to COPD exacerbations.   Started Zytega 9/19/16.  He saw Dr. Callahan as a second opinion and had Xofigo 1/4/17. We have continued zytega after that.   - His PSA is getting worse. He is not a candidate of chemotherapy, due to poor performance status. Repeated  hospital admissions for COPD and infections.   - We discussed about his difficult situation, worsening PSA and no other reasonable treatment options afterwards. He wants to continue current treatment as long as he is asymptomatic. Discussed advanced directives. He wants to be a full code.   2.  Extensive bone metastases.   Bone scan 8/31/16 showed progression in bones.   He will continue to take calcium and vitamin D.  Will continue Xgeva.     PLAN:   1- Continue Zytega and prednsione.  2- RTC MD 2 months  3- Labs before next visit- CBC diff, CMP, PSA  4- Continue Xgeva  5- PSA today  TAYLOR PETERS MD  6/14/2017    CC: Balgobin, Christopher Lennox Paul, MD

## 2017-06-14 NOTE — PROGRESS NOTES
"Oncology Rooming Note    June 14, 2017 1:14 PM   Tomas Grey is a 72 year old male who presents for:    Chief Complaint   Patient presents with     Oncology Clinic Visit     Initial Vitals: /61 (BP Location: Left arm, Patient Position: Chair, Cuff Size: Adult Large)  Pulse 89  Temp 97.5  F (36.4  C) (Oral)  Resp 18  SpO2 94% Estimated body mass index is 33.47 kg/(m^2) as calculated from the following:    Height as of 6/1/17: 1.803 m (5' 11\").    Weight as of 6/8/17: 108.9 kg (240 lb). There is no height or weight on file to calculate BSA.  No Pain (0) Comment: Data Unavailable   No LMP for male patient.  Allergies reviewed: Yes  Medications reviewed: Yes    Medications: Medication refills not needed today.  Pharmacy name entered into EPIC:    [facility] - [place], [place] - [address] [place] [address] [id number] - [place] [address] [place] [address] [address] [place] [facility] - [place] - [address] [facility] - [place], [place] - [address] [facility] - [address] [facility] - [place], [place] - [address] - [address] [place] [address]    Clinical concerns: None     5 minutes for nursing intake (face to face time)     Tanika Hudson CMA    1- Continue Zytiga and prednisone.  2- RTC MD 2 months  3- Labs before next visit- CBC diff, CMP, PSA  4- Continue Xgeva  5- PSA today  Pt also get Xofigo treatments every 28days at the [facility]--Per Pt he gets his Xgeva at the same appts.   Called & left message as well as sent a staff message to Gage GUERRA RN at [facility]--Pt last had Xofigo tx & Xgeva on 5-24-17:  Next due: 6-21-17 & 7-17-17      DISCHARGE PLAN:  Next appointments: See patient instruction section  Departure Mode: W/C  Accompanied by: " Self  5 minutes for nursing discharge (face to face time)   Meli Solorio, RN        Medical Assistant Note:  Tomas Grey presents today for labs.    Patient seen by provider today: Yes   present during visit today: Not Applicable.    Concerns: No Concerns.    Procedure:  Lab draw site: LAC, Needle type: BF, Gauge: 21.    Post Assessment:  Labs drawn without difficulty: Yes.    Discharge Plan:  Departure Mode: Ambulatory.    Face to Face Time: 5 minutes.    Tanika Hudson MA

## 2017-06-14 NOTE — MR AVS SNAPSHOT
After Visit Summary   6/14/2017    Tomas Grey    MRN: [id number]           Patient Information     Date Of Birth          1945        Visit Information        Provider Department      6/14/2017 1:15 PM Manpreet Oviedo MD [facility]        Today's Diagnoses     Prostate cancer metastatic to multiple sites (H)    -  1      Care Instructions    1- Continue Zytiga and prednisone.  2- RTC MD 2 months  3- Labs before next visit- CBC diff, CMP, PSA  4- Continue Xgeva  5- PSA today          Follow-ups after your visit        Future tests that were ordered for you today     Open Future Orders        Priority Expected Expires Ordered    CBC with platelets differential Routine 8/14/2017 3/14/2018 6/14/2017    Comprehensive metabolic panel Routine 8/14/2017 3/14/2018 6/14/2017    PSA tumor marker Routine  6/14/2018 6/14/2017            Who to contact     If you have questions or need follow up information about today's clinic visit or your schedule please contact [facility] directly at [phone].  Normal or non-critical lab and imaging results will be communicated to you by MyChart, letter or phone within 4 business days after the clinic has received the results. If you do not hear from us within 7 days, please contact the clinic through MyChart or phone. If you have a critical or abnormal lab result, we will notify you by phone as soon as possible.  Submit refill requests through [employer] or call your pharmacy and they will forward the refill request to us. Please allow 3 business days for your refill to be completed.          Additional Information About Your Visit        Care EveryWhere ID     This is your Care EveryWhere ID. This could be used by other organizations to access your [facility] medical records  [id number]        Your Vitals Were     Pulse Temperature Respirations Pulse Oximetry          89 97.5  F (36.4  C) (Oral) 18 94%         Blood Pressure from Last 3  Encounters:   06/14/17 105/61   06/08/17 122/62   06/06/17 145/66    Weight from Last 3 Encounters:   06/08/17 108.9 kg (240 lb)   06/05/17 111.4 kg (245 lb 9.6 oz)   05/24/17 108.4 kg (239 lb)              We Performed the Following     PSA tumor marker        Primary Care Provider Office Phone # Fax #    Ekaterina Lozano, SAL [phone]       [facility] [address]  [place] [address]        Thank you!     Thank you for choosing [facility]  for your care. Our goal is always to provide you with excellent care. Hearing back from our patients is one way we can continue to improve our services. Please take a few minutes to complete the written survey that you may receive in the mail after your visit with us. Thank you!             Your Updated Medication List - Protect others around you: Learn how to safely use, store and throw away your medicines at www.disposemymeds.org.          This list is accurate as of: 6/14/17  4:42 PM.  Always use your most recent med list.                   Brand Name Dispense Instructions for use    abiraterone 250 MG tablet    ZYTIGA     Take 1,000 mg by mouth daily       acetaminophen 500 MG tablet    TYLENOL     Take 500 mg by mouth every 4 hours as needed for pain Not to exceed 3000 mg/24 hours       ammonium lactate 12 % lotion    LAC-HYDRIN     Apply topically 2 times daily as needed for dry skin To all extremities for dry skin       amoxicillin-clavulanate 875-125 MG per tablet    AUGMENTIN     Take 1 tablet by mouth 2 times daily       aspirin 81 MG EC tablet     5 tablet    Take 1 tablet (81 mg) by mouth daily       bacitracin ointment      Apply to Right great toe topically in the evening for Ingrown toenail       budesonide-formoterol 160-4.5 MCG/ACT Inhaler    SYMBICORT     Inhale 2 puffs into the lungs 2 times daily       calcium carb 1250 mg (500 mg [place])/vitamin D 200 units 500-200 MG-UNIT per tablet    OSCAL with D     10 tablet    Take 1 tablet by mouth 2 times daily (with meals)       carboxymethylcellulose 0.5 % Soln ophthalmic solution    REFRESH PLUS     Place 1 drop into both eyes 3 times daily       diclofenac 1 % Gel topical gel    VOLTAREN    100 g    Apply 4 grams to knees four times daily as needed using enclosed dosing card.       furosemide 20 MG tablet    LASIX    30 tablet    Take 1 tablet (20 mg) by mouth 2 times daily       GLUCERNA Liqd      Take 8 oz by mouth 2 times daily       HYDROmorphone 2 MG tablet    DILAUDID     Take 1 tablet (2 mg) by mouth every 4 hours as needed for moderate to severe pain       insulin glargine 100 UNIT/ML injection    LANTUS     Inject 25 Units Subcutaneous 2 times daily       ipratropium - albuterol 0.5 mg/2.5 mg/3 mL 0.5-2.5 (3) MG/3ML neb solution    DUONEB    360 mL    Take 1 vial (3 mLs) by nebulization every 4 hours as needed for shortness of breath / dyspnea or wheezing       isosorbide mononitrate 60 MG 24 hr tablet    IMDUR     Take 1 tablet (60 mg) by mouth daily       LIPITOR 10 MG tablet   Generic drug:  atorvastatin      Take 10 mg by mouth At Bedtime       metoprolol 50 MG 24 hr tablet    TOPROL XL    5 tablet    Take 1 tablet (50 mg) by mouth daily       nitroglycerin 0.4 MG sublingual tablet    NITROSTAT     Place 0.4 mg under the tongue every 5 minutes as needed for chest pain if you are still having symptoms after 3 doses (15 minutes) call 911.       omeprazole 10 MG CR capsule    priLOSEC     Take 1 capsule (10 mg) by mouth daily       polyvinyl alcohol 1.4 % ophthalmic solution    LIQUIFILM TEARS     Place 1 drop into both eyes 3 times daily       predniSONE 5 MG tablet    DELTASONE    60 tablet    Take 1 tablet (5 mg) by mouth 2 times daily       ranitidine 75 MG tablet   Generic drug:  ranitidine      Take 75 mg by mouth 2 times daily       saccharomyces boulardii 250 MG capsule    FLORASTOR     Take 250 mg by mouth 2 times daily       senna-docusate 8.6-50  MG per tablet    SENOKOT-S;PERICOLACE    100 tablet    Take 1 tablet by mouth 2 times daily       tamsulosin 0.4 MG capsule    FLOMAX    5 capsule    Take 1 capsule (0.4 mg) by mouth daily       tiotropium 18 MCG capsule    SPIRIVA     Inhale 18 mcg into the lungs daily       tobramycin-dexamethasone ophthalmic ointment    TOBRADEX     Place 1 Application into both eyes 4 times daily

## 2017-06-14 NOTE — PATIENT INSTRUCTIONS
1- Continue Zytiga and prednisone.  2- RTC MD 2 months  3- Labs before next visit- CBC diff, CMP, PSA  4- Continue Xgeva  5- PSA today

## 2017-06-19 NOTE — PROGRESS NOTES
This patient's medication list and chart were reviewed as part of the service provided by [facility].    Assessment/Recommendations:  1. (GERD):  Pt on both PPI and H2RA.  Duplicate treatment, and may benefit from taper/d'c of Ranitidine.  CrCl ([place]) <50ml/min, increases risk of side effects with Ranitidine further, such as confusion, constipation, falls (anticholinergic se's), etc. Please consider reduction of Ranitidine to 75mg once daily & monitor, with goal to change to prn use in future.  2. (HTN):  If no angina symptoms, please consider reduction in Imdur dose.  BP goal <140/90mmHg, and best to keep DBP >60-65 as well to avoid tissue/cerebral hypoperfusion, and DBPs have been quite low.  3. (Diabetes):  HgA1c goal <8% appropriate given pt age, co-morbidities, function.  Declining renal function, which may also put pt at risk for hypglycemia given insulin use.  May benefit from reduction in Lantus dose.      Monica Garrett, Pharm.D.,[facility]  Board Certified Geriatric Pharmacist  Medication Therapy Management Pharmacist  [phone]

## 2017-06-20 NOTE — PROGRESS NOTES
"[facility]    Chief Complaint   Patient presents with     [facility] Regulatory     HPI:    Tomas Grey is a 72 year old  (1945), who is being seen today for a federally mandated E/M visit at [facility].  HPI information obtained from: facility chart records, facility staff, patient report and [facility] chart review. Today's concerns are:    Type 2 Diabetes Mellitus. Last A1C 6.8 on 5/2/17. Upon review of blood sugars over the past week, range is as follows:    Breakfast:   Lunch: 109-152  Dinner: 117-245  Bedtime: 139-212    Coronary Artery Disease. Complains of generalized pain due to bone cancer. Upon review of blood pressures over the past week, systolic range from 118-142. Diastolic range from 64-74.     Gastroesophageal Reflux Disease. Denies heartburn after eating. Had brought in a bottle of Zantac from outside the facility and self-administered. Order for Zantac 75 mg PO BID formally given by on-call NP 2/3/17. Also on Omeprazole.    Bilateral Conjunctivitis. Seen by outside Ophthalmologist and placed on treatment with Erythromycin and TobraDex ointment 5/22/17. Seen in follow-up on 6/15/17. Antibiotic discontinued. Refresh gel initiated.     Advanced Directives, Counseling/Discussion. Discussed recent hospitalization and appointment with Oncologist. States there is nothing more his Oncologist can do as the prostate cancer has spread to the bone. Will continue to receive palliative treatment to help with his pain. Initially states trying to resuscitate him is \"a waste of time\" as he would still have \"a problem\". States he wants to live right now in the moment. \"Keep doing what I'm doing\" and \"Someday I may not wake up\". Would not want to live with brain damage. When asked about discussing this with his son, questions why his son needs to know. Then states he wants to try at life and doesn't want to give up. Is frustrated he keeps getting asked this " question.     ALLERGIES: Morphine  PAST MEDICAL HISTORY:  has a past medical history of Anemia; Anxiety; Aspiration pneumonia (H) (2014); C. difficile colitis; CAD (coronary artery disease); Chronic pain; CKD (chronic kidney disease); COPD (chronic obstructive pulmonary disease) (H); Depressive disorder; Diabetes mellitus (H); Hypertension; Insomnia; ALEXIS (obstructive sleep apnea); Osteoporosis; and Prostate cancer metastatic to multiple sites (H).  PAST SURGICAL HISTORY:  has a past surgical history that includes Abdomen surgery; Arthroscopy knee; and Eye surgery.  FAMILY HISTORY: family history includes CANCER in his mother; DIABETES in his mother.  SOCIAL HISTORY:  reports that he has quit smoking. He has never used smokeless tobacco. He reports that he does not drink alcohol or use illicit drugs.    MEDICATIONS:  Current Outpatient Prescriptions   Medication Sig Dispense Refill     isosorbide mononitrate (IMDUR) 30 MG 24 hr tablet Take 1 tablet (30 mg) by mouth daily       HYDROmorphone (DILAUDID) 2 MG tablet Take 1 tablet (2 mg) by mouth every 4 hours as needed for moderate to severe pain       furosemide (LASIX) 20 MG tablet Take 1 tablet (20 mg) by mouth 2 times daily 30 tablet      abiraterone (ZYTIGA) 250 MG tablet Take 1,000 mg by mouth daily       bacitracin ointment Apply to Right great toe topically in the evening for Ingrown toenail       carboxymethylcellulose (REFRESH PLUS) 0.5 % SOLN ophthalmic solution Place 1 drop into both eyes 4 times daily And 1 drop in both eyes as needed.       budesonide-formoterol (SYMBICORT) 160-4.5 MCG/ACT Inhaler Inhale 2 puffs into the lungs 2 times daily       predniSONE (DELTASONE) 5 MG tablet Take 1 tablet (5 mg) by mouth 2 times daily 60 tablet 0     ranitidine (RANITIDINE) 75 MG tablet Take 75 mg by mouth daily       diclofenac (VOLTAREN) 1 % GEL topical gel Apply 4 grams to knees four times daily as needed using enclosed dosing card. 100 g 0     senna-docusate  (SENOKOT-S;PERICOLACE) 8.6-50 MG per tablet Take 1 tablet by mouth 2 times daily 100 tablet      insulin glargine (LANTUS) 100 UNIT/ML injection Inject 22 Units Subcutaneous 2 times daily       omeprazole (PRILOSEC) 10 MG CR capsule Take 1 capsule (10 mg) by mouth daily       atorvastatin (LIPITOR) 10 MG tablet Take 10 mg by mouth At Bedtime        tiotropium (SPIRIVA) 18 MCG inhalation capsule Inhale 18 mcg into the lungs daily       polyvinyl alcohol (LIQUIFILM TEARS) 1.4 % ophthalmic solution Place 1 drop into both eyes 3 times daily       acetaminophen (TYLENOL) 500 MG tablet Take 500 mg by mouth every 4 hours as needed for pain Not to exceed 3000 mg/24 hours       nitroglycerin (NITROSTAT) 0.4 MG SL tablet Place 0.4 mg under the tongue every 5 minutes as needed for chest pain if you are still having symptoms after 3 doses (15 minutes) call 911.       ipratropium - albuterol 0.5 mg/2.5 mg/3 mL (DUONEB) 0.5-2.5 (3) MG/3ML nebulization Take 1 vial (3 mLs) by nebulization every 4 hours as needed for shortness of breath / dyspnea or wheezing 360 mL 3     aspirin 81 MG EC tablet Take 1 tablet (81 mg) by mouth daily 5 tablet 0     metoprolol (TOPROL XL) 50 MG 24 hr tablet Take 1 tablet (50 mg) by mouth daily 5 tablet 0     calcium carb 1250 mg, 500 mg [place],/vitamin D 200 units (OSCAL WITH D) 500-200 MG-UNIT per tablet Take 1 tablet by mouth 2 times daily (with meals) 10 tablet 0     tamsulosin (FLOMAX) 0.4 MG 24 hr capsule Take 1 capsule (0.4 mg) by mouth daily 5 capsule 0     ammonium lactate (LAC-HYDRIN) 12 % lotion Apply topically 2 times daily as needed for dry skin To all extremities for dry skin       Nutritional Supplements (GLUCERNA) LIQD Take 8 oz by mouth 2 times daily       [DISCONTINUED] isosorbide mononitrate (IMDUR) 60 MG 24 hr tablet Take 1 tablet (60 mg) by mouth daily       [DISCONTINUED] enzalutamide (XTANDI) 40 MG capsule Take 4 capsules (160 mg) by mouth daily 120 capsule 0     Medications  reviewed:  Medications reconciled to facility chart and changes were made to reflect current medications as identified as above med list. Below are the changes that were made:   Medications stopped since last EPIC medication reconciliation:   Medications Discontinued During This Encounter   Medication Reason     abiraterone (ZYTIGA) 250 MG tablet Medication Reconciliation Clean Up     amoxicillin-clavulanate (AUGMENTIN) 875-125 MG per tablet Medication Reconciliation Clean Up     predniSONE (DELTASONE) 5 MG tablet Medication Reconciliation Clean Up     saccharomyces boulardii (FLORASTOR) 250 MG capsule Medication Reconciliation Clean Up     tobramycin-dexamethasone (TOBRADEX) ophthalmic ointment Medication Reconciliation Clean Up       Medications started since last [facility] medication reconciliation:  No orders of the defined types were placed in this encounter.      Case Management:  I have reviewed the care plan and MDS and do agree with the plan. Patient's desire to return to the community is present, but is not able due to care needs .  Information reviewed:  Medications, vital signs, orders, and nursing notes.    ROS:  4 point ROS including Respiratory, CV, GI and , other than that noted in the HPI,  is negative    Exam:  Vitals: /74  Pulse 72  Temp 97.8  F (36.6  C)  Resp 20  Wt 240 lb (108.9 kg)  SpO2 95%  BMI 33.47 kg/m2  BMI= Body mass index is 33.47 kg/(m^2).  GENERAL APPEARANCE:  Alert, in no distress  ENT:  Mouth and posterior oropharynx normal, moist mucous membranes  EYES:  EOM, conjunctivae, lids, pupils and irises normal  RESP:  respiratory effort and palpation of chest normal, lungs clear to auscultation , diminished breath sounds throughout  CV:  Palpation and auscultation of heart done , regular rate and rhythm, no murmur, rub, or gallop  ABDOMEN:  normal bowel sounds, soft, nontender, no hepatosplenomegaly or other masses  M/S:   Active movement of bilateral upper and lower  extremities. ACE wraps on BLE.  SKIN:  Inspection of skin and subcutaneous tissue baseline, Palpation of skin and subcutaneous tissue baseline  NEURO:   Cranial nerves 2-12 are normal tested and grossly at patient's baseline  PSYCH:  affect and mood normal    Lab/Diagnostic data:    Last Basic Metabolic Panel:  Lab Results   Component Value Date     06/12/2017      Lab Results   Component Value Date    POTASSIUM 4.6 06/12/2017     Lab Results   Component Value Date    CHLORIDE 109 06/12/2017     Lab Results   Component Value Date    JORDAN 8.4 06/12/2017     Lab Results   Component Value Date    CO2 30 06/12/2017     Lab Results   Component Value Date    BUN 40 06/12/2017     Lab Results   Component Value Date    CR 2.12 06/12/2017     Lab Results   Component Value Date     06/12/2017     Lab Results   Component Value Date    WBC 6.5 06/12/2017     Lab Results   Component Value Date    RBC 3.14 06/12/2017     Lab Results   Component Value Date    HGB 8.9 06/12/2017     Lab Results   Component Value Date    HCT 30.4 06/12/2017     Lab Results   Component Value Date    MCV 97 06/12/2017     Lab Results   Component Value Date    MCH 28 06/12/2017     Lab Results   Component Value Date    MCHC 29 06/12/2017     Lab Results   Component Value Date    RDW 17.1 06/12/2017     Lab Results   Component Value Date     06/12/2017     ASSESSMENT/PLAN  Type 2 Diabetes Mellitus. Last A1C 6.8 on 5/2/17. Based on most blood sugars <150, will decrease Glargine from 25 U BID to 22 U BID. Continue to monitor blood sugars and adjust treatment accordingly.     Coronary Artery Disease. Due to no complaints of angina, will decrease Isosorbide Mononitrate from 60 mg to 30 mg. Monitor for s/s of angina.     Gastroesophageal Reflux. Due to no complaints of heartburn, will decrease Ranitidine from 75 mg BID to QD. If no s/s of GERD with reduction in Ranitidine, consider discontinuation. Remains on Omeprazole.     Bilateral  "Conjunctivitis. Completed treatment with Erythromycin and Tobradex. Follow-up with [facility] as per them.    Advanced Directives, Counseling/Discussion. Reviewed DNR versus CPR status and conflicting statements noted above. Confirms FULL CODE status. Requests not to be asked the question again as it sounds like \"I'm going to die tomorrow\". Will continue to facilitate conversation as resident tolerates.      Electronically signed by:  SAL Acevedo CNP    "

## 2017-06-21 NOTE — ED NOTES
Blood sugar checked prior to discharge: 65.  Patient given courtesy meal and 2 containers of apple juice.  Will recheck sugar after eating.

## 2017-06-21 NOTE — TELEPHONE ENCOUNTER
Patient with recent reduction of Imdur now with CP treated in ED  Dr Ruiz wants to let you know the Imdur was increased to 60 mg and asks if you would f/u

## 2017-06-21 NOTE — ED AVS SNAPSHOT
[facility], [place], Emergency Department    [address] [place] [address]    Phone:  [phone]                                       Tomas Grey   MRN: [id number]    Department:  [facility], Emergency Department   Date of Visit:  6/21/2017           After Visit Summary Signature Page     I have received my discharge instructions, and my questions have been answered. I have discussed any challenges I see with this plan with the nurse or doctor.    ..........................................................................................................................................  Patient/Patient Representative Signature      ..........................................................................................................................................  Patient Representative Print Name and Relationship to Patient    ..................................................               ................................................  Date                                            Time    ..........................................................................................................................................  Reviewed by Signature/Title    ...................................................              ..............................................  Date                                                            Time

## 2017-06-21 NOTE — ED NOTES
Patient stated he was more short of breath; nasal cannula found to be incorrectly placed.  Patient stated he would feel better if he were sitting up; patient placed in a large rocking chair; has call light by his side.

## 2017-06-21 NOTE — ED AVS SNAPSHOT
[facility], Emergency Department    [address] [facility] [address]    Phone:  [phone]                                       Tomas Grey   MRN: [id number]    Department:  [facility], Emergency Department   Date of Visit:  6/21/2017           Patient Information     Date Of Birth          1945        Your diagnoses for this visit were:     Chest pain, unspecified type        You were seen by Monica Hartmann MD.        Discharge Instructions       Please make an appointment to follow up with Your Primary Care Provider as soon as possible for further work-up of chest pain.  Resume imdur at 60 mg daily from 30 mg.  If you have increasing shortness of breath, chest pain, fevers, increased cough or other concerns, return to the emergency department for re-evaluation.           *CHEST PAIN, UNCERTAIN CAUSE    Based on your exam today, the exact cause of your chest pain is not certain. Your condition does not seem serious at this time, and your pain does not appear to be coming from your heart. However, sometimes the signs of a serious problem take more time to appear. Therefore, watch for the warning signs listed below.  HOME CARE:  1. Rest today and avoid strenuous activity.  2. Take any prescribed medicine as directed.  FOLLOW UP with your doctor in 1-3 days.   GET PROMPT MEDICAL ATTENTION if any of the following occur:    A change in the type of pain: if it feels different, becomes more severe, lasts longer, or begins to spread into your shoulder, arm, neck, jaw or back    Shortness of breath or increased pain with breathing    Weakness, dizziness, or fainting    Cough with blood or dark colored sputum (phlegm)    Fever over 101  F (38.3  C)    Swelling, pain or redness in one leg    3229-4987 [facility], [address] [place] [address]. All rights reserved. This information is not intended as a substitute for professional medical care. Always follow your healthcare  professional's instructions.        Future Appointments        Provider Department Dept Phone Center    7/17/2017 1:00 PM RH Infusion Chair 5 [facility] [phone] [place] [facility]    8/14/2017 10:00 AM RH Infusion Chair 8 [facility] [phone] [place] [facility]    8/14/2017 10:30 AM Manpreet Oviedo MD [facility] [phone] [place] [facility]    9/11/2017 1:30 PM RH Infusion Chair 10 [facility] [phone] [place] [facility]    10/9/2017 1:30 PM RH Infusion Chair 8 [facility] [phone] [place] [facility]      24 Hour Appointment Hotline       To make an appointment at any [place] clinic, call [phone] ([phone]). If you don't have a family doctor or clinic, we will help you find one. [place] clinics are conveniently located to serve the needs of you and your family.             Review of your medicines      Our records show that you are taking the medicines listed below. If these are incorrect, please call your family doctor or clinic.        Dose / Directions Last dose taken    abiraterone 250 MG tablet   Commonly known as:  ZYTIGA   Dose:  1000 mg        Take 1,000 mg by mouth daily   Refills:  0        acetaminophen 500 MG tablet   Commonly known as:  TYLENOL   Dose:  500 mg        Take 500 mg by mouth every 4 hours as needed for pain Not to exceed 3000 mg/24 hours   Refills:  0        ammonium lactate 12 % lotion   Commonly known as:  LAC-HYDRIN   Indication:  Abnormal Dryness of Skin, Eyes or Mucous Membranes        Apply topically 2 times daily as needed for dry skin To all extremities for dry skin   Refills:  0        aspirin 81 MG EC tablet   Dose:  81 mg   Quantity:  5 tablet        Take 1 tablet (81 mg) by mouth daily   Refills:  0        bacitracin ointment        Apply to Right great toe topically in the evening for Ingrown toenail   Refills:  0         budesonide-formoterol 160-4.5 MCG/ACT Inhaler   Commonly known as:  SYMBICORT   Dose:  2 puff        Inhale 2 puffs into the lungs 2 times daily   Refills:  0        calcium carb 1250 mg (500 mg [place])/vitamin D 200 units 500-200 MG-UNIT per tablet   Commonly known as:  OSCAL with D   Dose:  1 tablet   Quantity:  10 tablet        Take 1 tablet by mouth 2 times daily (with meals)   Refills:  0        carboxymethylcellulose 0.5 % Soln ophthalmic solution   Commonly known as:  REFRESH PLUS   Dose:  1 drop        Place 1 drop into both eyes 4 times daily And 1 drop in both eyes as needed.   Refills:  0        diclofenac 1 % Gel topical gel   Commonly known as:  VOLTAREN   Quantity:  100 g        Apply 4 grams to knees four times daily as needed using enclosed dosing card.   Refills:  0        furosemide 20 MG tablet   Commonly known as:  LASIX   Dose:  20 mg   Quantity:  30 tablet        Take 1 tablet (20 mg) by mouth 2 times daily   Refills:  0        GLUCERNA Liqd   Dose:  8 oz        Take 8 oz by mouth 2 times daily   Refills:  0        HYDROmorphone 2 MG tablet   Commonly known as:  DILAUDID   Dose:  2 mg        Take 1 tablet (2 mg) by mouth every 4 hours as needed for moderate to severe pain   Refills:  0        insulin glargine 100 UNIT/ML injection   Commonly known as:  LANTUS   Dose:  22 Units   Indication:  Type 2 Diabetes        Inject 22 Units Subcutaneous 2 times daily   Refills:  0        ipratropium - albuterol 0.5 mg/2.5 mg/3 mL 0.5-2.5 (3) MG/3ML neb solution   Commonly known as:  DUONEB   Dose:  1 vial   Quantity:  360 mL        Take 1 vial (3 mLs) by nebulization every 4 hours as needed for shortness of breath / dyspnea or wheezing   Refills:  3        isosorbide mononitrate 30 MG 24 hr tablet   Commonly known as:  IMDUR   Dose:  30 mg        Take 1 tablet (30 mg) by mouth daily   Refills:  0        LIPITOR 10 MG tablet   Dose:  10 mg   Generic drug:  atorvastatin        Take 10 mg by mouth At  Bedtime   Refills:  0        metoprolol 50 MG 24 hr tablet   Commonly known as:  TOPROL XL   Dose:  50 mg   Quantity:  5 tablet        Take 1 tablet (50 mg) by mouth daily   Refills:  0        nitroglycerin 0.4 MG sublingual tablet   Commonly known as:  NITROSTAT   Dose:  0.4 mg        Place 0.4 mg under the tongue every 5 minutes as needed for chest pain if you are still having symptoms after 3 doses (15 minutes) call 911.   Refills:  0        omeprazole 10 MG CR capsule   Commonly known as:  priLOSEC   Dose:  10 mg        Take 1 capsule (10 mg) by mouth daily   Refills:  0        polyvinyl alcohol 1.4 % ophthalmic solution   Commonly known as:  LIQUIFILM TEARS   Dose:  1 drop        Place 1 drop into both eyes 3 times daily   Refills:  0        predniSONE 5 MG tablet   Commonly known as:  DELTASONE   Dose:  5 mg   Quantity:  60 tablet        Take 1 tablet (5 mg) by mouth 2 times daily   Refills:  0        ranitidine 75 MG tablet   Dose:  75 mg   Indication:  Gastroesophageal Reflux Disease   Generic drug:  ranitidine        Take 75 mg by mouth daily   Refills:  0        senna-docusate 8.6-50 MG per tablet   Commonly known as:  SENOKOT-S;PERICOLACE   Dose:  1 tablet   Quantity:  100 tablet        Take 1 tablet by mouth 2 times daily   Refills:  0        tamsulosin 0.4 MG capsule   Commonly known as:  FLOMAX   Dose:  0.4 mg   Quantity:  5 capsule        Take 1 capsule (0.4 mg) by mouth daily   Refills:  0        tiotropium 18 MCG capsule   Commonly known as:  SPIRIVA   Dose:  18 mcg   Indication:  Chronic Obstructive Lung Disease        Inhale 18 mcg into the lungs daily   Refills:  0                Procedures and tests performed during your visit     Procedure/Test Number of Times Performed    Basic metabolic panel 1    CBC with platelets differential 1    Cardiac Continuous Monitoring 1    EKG 12-lead, tracing only 1    Glucose by meter 1    ISTAT CG4 gases lactate abdiel nursing POCT 1    ISTAT gases lactate abdiel  POCT 1    ISTAT troponin nursing POCT 1    Peripheral IV: Standard 1    Pulse oximetry nursing 1    Troponin I 2    Troponin POCT 1    Vascular Access Care Adult IP Consult 1    XR Chest Port 1 View 1      Orders Needing Specimen Collection     None      Pending Results     No orders found from 6/19/2017 to 6/22/2017.            Pending Culture Results     No orders found from 6/19/2017 to 6/22/2017.            Pending Results Instructions     If you had any lab results that were not finalized at the time of your Discharge, you can call the ED Lab Result RN at [phone]. You will be contacted by this team for any positive Lab results or changes in treatment. The nurses are available 7 days a week from 10A to 6:30P.  You can leave a message 24 hours per day and they will return your call.        Thank you for choosing [facility]       Thank you for choosing [facility] for your care. Our goal is always to provide you with excellent care. Hearing back from our patients is one way we can continue to improve our services. Please take a few minutes to complete the written survey that you may receive in the mail after you visit with us. Thank you!        Care EveryWhere ID     This is your Care EveryWhere ID. This could be used by other organizations to access your [facility] medical records  [id number]        Equal Access to Services     PATRICIA BAUM : Elaina Mcclelland, stanley alvarez, aryan pierson, jorje musa. So [facility] [phone].    ATENCIÓN: Si habla español, tiene a soria disposición servicios gratuitos de asistencia lingüística. Llame al [phone].    We comply with applicable federal civil rights laws and [place] laws. We do not discriminate on the basis of race, color, national origin, age, disability sex, sexual orientation or gender identity.            After Visit Summary       This is your record. Keep this with you and show to your community  pharmacist(s) and doctor(s) at your next visit.

## 2017-06-21 NOTE — DISCHARGE INSTRUCTIONS
Please make an appointment to follow up with Your Primary Care Provider as soon as possible for further work-up of chest pain.  Resume imdur at 60 mg daily from 30 mg.  If you have increasing shortness of breath, chest pain, fevers, increased cough or other concerns, return to the emergency department for re-evaluation.           *CHEST PAIN, UNCERTAIN CAUSE    Based on your exam today, the exact cause of your chest pain is not certain. Your condition does not seem serious at this time, and your pain does not appear to be coming from your heart. However, sometimes the signs of a serious problem take more time to appear. Therefore, watch for the warning signs listed below.  HOME CARE:  1. Rest today and avoid strenuous activity.  2. Take any prescribed medicine as directed.  FOLLOW UP with your doctor in 1-3 days.   GET PROMPT MEDICAL ATTENTION if any of the following occur:    A change in the type of pain: if it feels different, becomes more severe, lasts longer, or begins to spread into your shoulder, arm, neck, jaw or back    Shortness of breath or increased pain with breathing    Weakness, dizziness, or fainting    Cough with blood or dark colored sputum (phlegm)    Fever over 101  F (38.3  C)    Swelling, pain or redness in one leg    [id number] [address], [place] [id number]. All rights reserved. This information is not intended as a substitute for professional medical care. Always follow your healthcare professional's instructions.

## 2017-06-21 NOTE — ED NOTES
71 year old male with history of DM and COPD, presents by EMS with complaints of chest pain since last evening.  Pain started at about 1800 and patient was given NTG with relief of pain.  Pain recurred this morning and EMS was called.  Patient was given NTG and 324 mg of ASA en route with some relief of pain. Patient has also noted a productive cough of yellow sputum.

## 2017-06-21 NOTE — ED NOTES
Nursing Report given to Nurse Martínez at [facility] facility.  Transport arranged with [facility] to transport patient back to [facility] Emergency Room.

## 2017-06-21 NOTE — ED PROVIDER NOTES
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 Patient states that he began experiencing left sided chest pain the night before last, with worsening in severity since then. He states that the pain comes and goes, radiating down his left arm and into his back. The pain feels as though \"somebody is pounding on my chest\". Pain worsens with deep breaths but is not affected by changes in position. He states that he doesn't walk much, so he is unsure if the chest pain worsens with physical activity. He also notes recent hypertension. Patient reports having a productive cough, but states that this is chronic and has not worsened of late. He denies production of blood with cough. He is on 2 L of O2 24/7 with increases in concentration to 3-5 L if participating in physical activity. He states that he always feels short of breath, but he hasn't noted an increase in this with the chest pain. He denies any recent fevers. He denies increased pain or swelling his legs, stating that his legs are always somewhat swollen. He notes a recent increase in weight from 236 to 240 over the last four weeks. He reports having abdominal pain, but states that this is always present. He reports receiving radiation for his cancer in his left arm on the 1st of every month. Patient later states that he always has chest pain but he came into the ED today because it seems to have worsened in severity.     PAST MEDICAL HISTORY  Past Medical History:   Diagnosis Date     Anemia      Anxiety      Aspiration pneumonia (H) 2014     C. difficile colitis      CAD (coronary artery disease)      Chronic pain      " CKD (chronic kidney disease)      COPD (chronic obstructive pulmonary disease) (H)      Depressive disorder      Diabetes mellitus (H)      Hypertension      Insomnia      ALEXIS (obstructive sleep apnea)      Osteoporosis      Prostate cancer metastatic to multiple sites (H)      PAST SURGICAL HISTORY  Past Surgical History:   Procedure Laterality Date     ABDOMEN SURGERY       ARTHROSCOPY KNEE       EYE SURGERY       FAMILY HISTORY  Family History   Problem Relation Age of Onset     DIABETES Mother      CANCER Mother      stomach     SOCIAL HISTORY  Social History   Substance Use Topics     Smoking status: Former Smoker     Smokeless tobacco: Never Used     Alcohol use No     MEDICATIONS  Current Facility-Administered Medications   Medication     lidocaine 1 % 1 mL     HYDROmorphone (PF) (DILAUDID) injection 0.5 mg     nitroglycerin (NITROSTAT) sublingual tablet 0.4 mg     Current Outpatient Prescriptions   Medication     isosorbide mononitrate (IMDUR) 30 MG 24 hr tablet     furosemide (LASIX) 20 MG tablet     abiraterone (ZYTIGA) 250 MG tablet     carboxymethylcellulose (REFRESH PLUS) 0.5 % SOLN ophthalmic solution     budesonide-formoterol (SYMBICORT) 160-4.5 MCG/ACT Inhaler     predniSONE (DELTASONE) 5 MG tablet     ranitidine (RANITIDINE) 75 MG tablet     diclofenac (VOLTAREN) 1 % GEL topical gel     senna-docusate (SENOKOT-S;PERICOLACE) 8.6-50 MG per tablet     insulin glargine (LANTUS) 100 UNIT/ML injection     omeprazole (PRILOSEC) 10 MG CR capsule     atorvastatin (LIPITOR) 10 MG tablet     tiotropium (SPIRIVA) 18 MCG inhalation capsule     polyvinyl alcohol (LIQUIFILM TEARS) 1.4 % ophthalmic solution     nitroglycerin (NITROSTAT) 0.4 MG SL tablet     aspirin 81 MG EC tablet     metoprolol (TOPROL XL) 50 MG 24 hr tablet     calcium carb 1250 mg, 500 mg [place],/vitamin D 200 units (OSCAL WITH D) 500-200 MG-UNIT per tablet     tamsulosin (FLOMAX) 0.4 MG 24 hr capsule     ammonium lactate (LAC-HYDRIN) 12 % lotion  "    Nutritional Supplements (GLUCERNA) LIQD     HYDROmorphone (DILAUDID) 2 MG tablet     bacitracin ointment     acetaminophen (TYLENOL) 500 MG tablet     [DISCONTINUED] enzalutamide (XTANDI) 40 MG capsule     ipratropium - albuterol 0.5 mg/2.5 mg/3 mL (DUONEB) 0.5-2.5 (3) MG/3ML nebulization     ALLERGIES  Allergies   Allergen Reactions     Morphine      Pt states not an allergy         I have reviewed the Medications, Allergies, Past Medical and Surgical History, and Social History in the Epic system.    Review of Systems   Constitutional: Positive for unexpected weight change (increase in weight). Negative for fever.   Respiratory: Positive for cough (productive) and shortness of breath.    Cardiovascular: Positive for chest pain. Negative for leg swelling.   Gastrointestinal: Positive for abdominal pain.   All other systems reviewed and are negative.      Physical Exam   BP: 135/60  Pulse: 70  Heart Rate: 117  Temp: 98.8  F (37.1  C)  Resp: 18  Height: 180.3 cm (5' 11\")  Weight: 108.9 kg (240 lb 1.3 oz)  SpO2: 96 %  Physical Exam   General: patient is alert and oriented and in no acute distress, on his home 2 L supplemental oxygen, in no respiratory distress and speaking in full sentences  Head: atraumatic and normocephalic   EENT: moist mucus membranes without tonsillar erythema or exudates, pupils round and reactive   Neck: supple   Cardiovascular: regular rate and rhythm, extremities warm and well perfused, bilateral lower extremity wrapping for edema with 2+ edema to the knees  Pulmonary: Symmetric breath sounds with bilateral wheezing, no rhonchi or crackles, slightly diminished at the bases  Abdomen: soft, non-tender   Musculoskeletal: normal range of motion   Neurological: alert and oriented, moving all extremities symmetrically  Skin: warm, dry       ED Course     ED Course     Procedures             EKG Interpretation:      Interpreted by Monica Hartmann  Time reviewed: 0900  Symptoms at time of " EKG: chest pain   Rhythm: normal sinus   Rate: normal  Axis: normal  Ectopy: PACs  Conduction: normal  ST Segments/ T Waves: No ST-T wave changes  Q Waves: none  Comparison to prior: Unchanged    Clinical Impression: normal EKG          Critical Care time:  none               Labs Ordered and Resulted from Time of ED Arrival Up to the Time of Departure from the ED   CBC WITH PLATELETS DIFFERENTIAL - Abnormal; Notable for the following:        Result Value    RBC Count 3.29 (*)     Hemoglobin 9.0 (*)     Hematocrit 31.4 (*)     MCHC 28.7 (*)     RDW 17.9 (*)     All other components within normal limits   BASIC METABOLIC PANEL - Abnormal; Notable for the following:     Sodium 146 (*)     Carbon Dioxide 37 (*)     Creatinine 1.59 (*)     GFR Estimate 43 (*)     GFR Estimate If Black 52 (*)     All other components within normal limits   TROPONIN I   PERIPHERAL IV CATHETER   CARDIAC CONTINUOUS MONITORING   PULSE OXIMETRY NURSING   ISTAT TROPONIN NURSING POCT   TROPONIN POCT            Assessments & Plan (with Medical Decision Making)   Mr. Grey is a 72-year-old male with a history of COPD, coronary artery disease, diabetes mellitus, metastatic prostate cancer with chronic pain who presents with left-sided chest pain.  Differential diagnosis includes but is not limited to acute on chronic exacerbation of pain, COPD exacerbation, ACS, PE, pain secondary to bony metastases, pneumonia.  He reports that he has had this left-sided chest pain  forever , however, in the last few days it has been a bit worse.  He feels that it is most likely related to his bony metastases.  He does report a cough which is longstanding but has not had any increased sputum production or any increase in his oxygen requirement.  He does have bilateral wheezing on exam and may have a mild COPD exacerbation, but at this time is in no respiratory distress.  He was given a DuoNeb and on reevaluation is much improved.  He is supposed to do nebs four  times a day at home and has not done any today.  VBG does show any elevated CO2 however appears chronic with similar elevations previously and a normal pH.  He is awake and alert does not have any alteration in mental status to suggest this is an acute process for him.  An ECG was obtained which shows sinus rhythm with PACs and no acute ischemic changes.  His initial troponin is negative as well as repeat troponin.  Given the duration of his symptoms my suspicion for cardiac etiology is relatively low.  I do note that he was recently decreased from 60 mg Imdur to 30 mg Imdur.  Potentially this may be contributing to his episodes of chest pain.  Have recommended that he go back to his previous dose.  Chest x-ray shows no evidence of focal consolidation to suggest infection, pulmonary edema or pneumothorax.  Certainly he is at risk for PE, however, he has had multiple previous evaluations for PE including 5 negative VQ scans.  Given that the pain does seem to be paroxysmal in nature and very similar to his long-standing chronic pain, but just slightly worse, my suspicion for PE at this time is low.  Bedside cardiac ultrasound was obtained which does not show any RV dilation, septal bowing, pericardial effusion or significant change in global function.  He does have an Emergency Department care plan due to his frequent visits for chronic chest pain, however, does not have recommendations for pain management under his chest pain care plan.  He did already take aspirin prior to arrival.  He was given a DuoNeb in the emergency department as he has not yet taken any nebs today with improvement in wheezing.  He was given 1 sublingual nitro and 1 dose of IV analgesics in the Emergency Department and on reassessment is feeling significantly improved.  He does have a history of homelessness, however, is currently staying at a nursing care facility.  I did speak with his primary care practitioner on-call the patient will follow  up likely tomorrow in clinic for reevaluation.  He is sitting in his chair and has no further ongoing symptoms and reports feeling quite good and is requesting to go home.  Will discharge to home with close return instructions.    I have reviewed the nursing notes.    I have reviewed the findings, diagnosis, plan and need for follow up with the patient.  This part of the document was transcribed by Safia Rushing, Medical Scribe.   Discharge Medication List as of 6/21/2017  1:34 PM          Final diagnoses:   Chest pain, unspecified type   I, Nancy Gustafson, am serving as a trained medical scribe to document services personally performed by Monica Hartmann MD, based on the provider's statements to me.   I, Monica Hartmann MD, was physically present and have reviewed and verified the accuracy of this note documented by Nancy Gustafson.      6/21/2017   [facility], [place], EMERGENCY DEPARTMENT     Monica Hartmann MD  06/21/17 1521

## 2017-06-21 NOTE — ED NOTES
Bed: ED09  Expected date: 6/21/17  Expected time: 8:38 AM  Means of arrival: Ambulance  Comments:  77 M CP, triaged yellow

## 2017-06-22 NOTE — PROGRESS NOTES
"[facility]    Chief Complaint   Patient presents with     RECHECK     HPI:    Tomas Grey is a 72 year old  (1945), who is being seen today for an episodic care visit at [facility].  HPI information obtained from: facility chart records.Today's concern is:    Chronic Pain Secondary to Prostate Cancer Metastatic to Bone. Resident complained of chest pain on 6/21/17 and was sent to [facility] ED for evaluation. Troponin negative. ECG revealed no acute findings. Chest x-ray revealed no acute findings. Pain improved with DuoNeb, Nitro and IV Hydromorphone. Pain attributed to recent reduction in Isosorbide Mononitrate dose from 60 mg to 30 mg on 6/20/17, but reduced dosage was never administered prior to resident going to ED. Medication dose increased from 30 mg to 60 mg on 11/23/16 due to chest pain. Has had numerous ED visits since that time, some of which related to chest pain. Today, denies pain initially. Later states he has pain radiating across his entire front side, from shoulder to shoulder. Denies shortness of breath.     Depression. When discussing chest pain and ED visit, resident goes on to say he doesn't want to go into all the details of what is going on with him. Describes diagnosis of cancer spreading to the bones, but not the organs. Explains there is nothing more he can do. States his family is aware. Is living \"every day to the fullest\". Of note, had nearly identical conversation on 6/20/17.    Chronic Diastolic Heart Failure with EF 55-60% on 4/28/15. Weight 241.5 lbs on 6/22/17, 238 lbs on 6/15/17 and 240.5 lbs on 6/8/17. Weights between 240-250 lbs since 2/4/17.      ALLERGIES: Morphine  Past Medical, Surgical, Family and Social History reviewed and updated in EPIC.    Current Outpatient Prescriptions   Medication Sig Dispense Refill     HYDROmorphone (DILAUDID) 2 MG tablet Take 1 tablet (2 mg) by mouth 3 times daily And q4h PRN       isosorbide mononitrate " (IMDUR) 30 MG 24 hr tablet Take 1 tablet (30 mg) by mouth daily       Nutritional Supplements (GLUCERNA) LIQD Take 8 oz by mouth 2 times daily       furosemide (LASIX) 20 MG tablet Take 1 tablet (20 mg) by mouth 2 times daily 30 tablet      abiraterone (ZYTIGA) 250 MG tablet Take 1,000 mg by mouth daily       bacitracin ointment Apply to Right great toe topically in the evening for Ingrown toenail       carboxymethylcellulose (REFRESH PLUS) 0.5 % SOLN ophthalmic solution Place 1 drop into both eyes 4 times daily And 1 drop in both eyes as needed.       budesonide-formoterol (SYMBICORT) 160-4.5 MCG/ACT Inhaler Inhale 2 puffs into the lungs 2 times daily       predniSONE (DELTASONE) 5 MG tablet Take 1 tablet (5 mg) by mouth 2 times daily 60 tablet 0     ranitidine (RANITIDINE) 75 MG tablet Take 75 mg by mouth daily       diclofenac (VOLTAREN) 1 % GEL topical gel Apply 4 grams to knees four times daily as needed using enclosed dosing card. 100 g 0     senna-docusate (SENOKOT-S;PERICOLACE) 8.6-50 MG per tablet Take 1 tablet by mouth 2 times daily 100 tablet      insulin glargine (LANTUS) 100 UNIT/ML injection Inject 22 Units Subcutaneous 2 times daily       omeprazole (PRILOSEC) 10 MG CR capsule Take 1 capsule (10 mg) by mouth daily       atorvastatin (LIPITOR) 10 MG tablet Take 10 mg by mouth At Bedtime        tiotropium (SPIRIVA) 18 MCG inhalation capsule Inhale 18 mcg into the lungs daily       polyvinyl alcohol (LIQUIFILM TEARS) 1.4 % ophthalmic solution Place 1 drop into both eyes 3 times daily       acetaminophen (TYLENOL) 500 MG tablet Take 500 mg by mouth every 4 hours as needed for pain Not to exceed 3000 mg/24 hours       nitroglycerin (NITROSTAT) 0.4 MG SL tablet Place 0.4 mg under the tongue every 5 minutes as needed for chest pain if you are still having symptoms after 3 doses (15 minutes) call 911.       ipratropium - albuterol 0.5 mg/2.5 mg/3 mL (DUONEB) 0.5-2.5 (3) MG/3ML nebulization Take 1 vial (3 mLs)  "by nebulization every 4 hours as needed for shortness of breath / dyspnea or wheezing 360 mL 3     aspirin 81 MG EC tablet Take 1 tablet (81 mg) by mouth daily 5 tablet 0     metoprolol (TOPROL XL) 50 MG 24 hr tablet Take 1 tablet (50 mg) by mouth daily 5 tablet 0     calcium carb 1250 mg, 500 mg [place],/vitamin D 200 units (OSCAL WITH D) 500-200 MG-UNIT per tablet Take 1 tablet by mouth 2 times daily (with meals) 10 tablet 0     tamsulosin (FLOMAX) 0.4 MG 24 hr capsule Take 1 capsule (0.4 mg) by mouth daily 5 capsule 0     ammonium lactate (LAC-HYDRIN) 12 % lotion Apply topically 2 times daily as needed for dry skin To all extremities for dry skin       [DISCONTINUED] enzalutamide (XTANDI) 40 MG capsule Take 4 capsules (160 mg) by mouth daily 120 capsule 0     Medications reviewed:  Medications reconciled to facility chart and changes were made to reflect current medications as identified as above med list. Below are the changes that were made:   Medications stopped since last EPIC medication reconciliation:   There are no discontinued medications.    Medications started since last [facility] medication reconciliation:  No orders of the defined types were placed in this encounter.    REVIEW OF SYSTEMS:  4 point ROS including Respiratory, CV, GI and , other than that noted in the HPI,  is negative    Physical Exam:  /78  Pulse 80  Temp 97  F (36.1  C)  Resp 20  Ht 5' 11\" (1.803 m)  Wt 240 lb (108.9 kg)  SpO2 94%  BMI 33.47 kg/m2  GENERAL APPEARANCE:  Alert, in no distress  ENT:  Mouth and posterior oropharynx normal, moist mucous membranes  EYES:  EOM, conjunctivae, lids, pupils and irises normal  RESP:  respiratory effort and palpation of chest normal, lungs clear to auscultation , diminished breath sounds throughout  CV:  Palpation and auscultation of heart done , regular rate and rhythm, no murmur, rub, or gallop  ABDOMEN:  normal bowel sounds, soft, nontender, no hepatosplenomegaly or other masses  M/S:   " Active movement of bilateral upper and lower extremities. ACE wraps on BLE.  SKIN:  Inspection of skin and subcutaneous tissue baseline, Palpation of skin and subcutaneous tissue baseline  NEURO:   Cranial nerves 2-12 are normal tested and grossly at patient's baseline  PSYCH:  affect and mood normal    Recent Labs:   Last Basic Metabolic Panel:  Lab Results   Component Value Date     06/21/2017      Lab Results   Component Value Date    POTASSIUM 3.8 06/21/2017     Lab Results   Component Value Date    CHLORIDE 106 06/21/2017     Lab Results   Component Value Date    JODRAN 9.2 06/21/2017     Lab Results   Component Value Date    CO2 37 06/21/2017     Lab Results   Component Value Date    BUN 28 06/21/2017     Lab Results   Component Value Date    CR 1.59 06/21/2017     Lab Results   Component Value Date    GLC 72 06/21/2017     Lab Results   Component Value Date    WBC 7.4 06/21/2017     Lab Results   Component Value Date    RBC 3.29 06/21/2017     Lab Results   Component Value Date    HGB 9.0 06/21/2017     Lab Results   Component Value Date    HCT 31.4 06/21/2017     Lab Results   Component Value Date    MCV 95 06/21/2017     Lab Results   Component Value Date    MCH 27.4 06/21/2017     Lab Results   Component Value Date    MCHC 28.7 06/21/2017     Lab Results   Component Value Date    RDW 17.9 06/21/2017     Lab Results   Component Value Date     06/21/2017     Assessment/Plan:  Chronic Pain Secondary to Prostate Cancer Metastatic to Bone. As complaint of chest pain is chronic with no acute findings during most recent ED visit, and decreased dose of Isosorbide Mononitrate was not administered prior to ED visit for chest pain, will continue Isosorbide Mononitrate at 30 mg. Due to complaints of generalized pain and minimal use of Hydromorphone PRN, discussed with resident scheduling pain medications to which he was agreeable. Ordered Hydromorphone 2 mg PO TID and q4h PRN. Hold for lethargy and/or  "decreased respirations.     Depression. Most recent PHQ-9 Score 0. Discussed challenges surrounding cancer diagnosis. Offered to arrange visit by in-house therapist, but resident refused. States he is not depressed. Son got him a machine that \"goes back and forth\" to help with anxiety and pain. Feels he has support from his family and .     Chronic Diastolic Heart Failure with EF 55-60% on 4/28/15. Weights gradually trending down. Lymphedema present on BLE, but no other s/s of fluid overload. Continue to monitor weights.    Electronically signed by  SAL Acevedo CNP                  "

## 2017-07-01 NOTE — PROGRESS NOTES
Nursing called this morning asking for an order for a UA/UC, they had already collected a clean catch.  Low grade fever 99.5, chills, back pain.  Yesterday staff had called about scrotum pain and they were told to watch him.    ORDERS:  UA/UC  Rocephin 1GM IM daily x2 days.  Mix with Lidocaine.    Electronically signed by Abigail Knight RN, CNP

## 2017-07-03 NOTE — PROGRESS NOTES
[facility]    Chief Complaint   Patient presents with     RECHECK     HPI:    Tomas Grey is a 72 year old  (1945), who is being seen today for an episodic care visit at [facility].  HPI information obtained from: facility chart records, facility staff, patient report and [facility] chart review.    Today's concern is:  Urinary Tract Infection. On-call NP updated 7/1/17 with resident complaints of chills and back pain with temperature 99.5 F. On 6/30/17, resident complained of scrotal pain. Treated with Rocephin 1 g x 2 days. Urine culture revealed > 100,000 cfu/ml Providencia rettgeri susceptible to Ciprofloxacin, TMP/Sulfa, Gentamicin and Ceftriaxone. Today, resident reports scrotal pain has resolved.   Metastatic Prostate Cancer to Bone with Chronic Pain. Scheduled Hydromorphone 2 mg PO TID for pain on 6/22/17. Since that time, staff reports resident is complaining of pain slightly less. Still reports abdominal pain at times.     Type 2 Diabetes Mellitus. Glargine decreased from 25 U BID to 22 U BID on 6/20/17. Upon review of blood sugars over the past week, range is as follows:    Breakfast:   Lunch:   Dinner:   Bedtime: 116-187    ALLERGIES: Morphine  Past Medical, Surgical, Family and Social History reviewed and updated in [facility].    Current Outpatient Prescriptions   Medication Sig Dispense Refill     sulfamethoxazole-trimethoprim (BACTRIM DS) 800-160 MG per tablet Take 1 tablet by mouth 2 times daily For 10 administrations       HYDROmorphone (DILAUDID) 2 MG tablet Take 1 tablet (2 mg) by mouth 3 times daily And q4h PRN       isosorbide mononitrate (IMDUR) 30 MG 24 hr tablet Take 1 tablet (30 mg) by mouth daily       Nutritional Supplements (GLUCERNA) LIQD Take 8 oz by mouth 2 times daily       furosemide (LASIX) 20 MG tablet Take 1 tablet (20 mg) by mouth 2 times daily 30 tablet      abiraterone (ZYTIGA) 250 MG tablet Take 1,000 mg by mouth  daily       bacitracin ointment Apply to Right great toe topically in the evening for Ingrown toenail       carboxymethylcellulose (REFRESH PLUS) 0.5 % SOLN ophthalmic solution Place 1 drop into both eyes 4 times daily And 1 drop in both eyes as needed.       budesonide-formoterol (SYMBICORT) 160-4.5 MCG/ACT Inhaler Inhale 2 puffs into the lungs 2 times daily       predniSONE (DELTASONE) 5 MG tablet Take 1 tablet (5 mg) by mouth 2 times daily 60 tablet 0     ranitidine (RANITIDINE) 75 MG tablet Take 75 mg by mouth daily       diclofenac (VOLTAREN) 1 % GEL topical gel Apply 4 grams to knees four times daily as needed using enclosed dosing card. 100 g 0     senna-docusate (SENOKOT-S;PERICOLACE) 8.6-50 MG per tablet Take 1 tablet by mouth 2 times daily 100 tablet      insulin glargine (LANTUS) 100 UNIT/ML injection Inject 22 Units Subcutaneous 2 times daily       omeprazole (PRILOSEC) 10 MG CR capsule Take 1 capsule (10 mg) by mouth daily       atorvastatin (LIPITOR) 10 MG tablet Take 10 mg by mouth At Bedtime        tiotropium (SPIRIVA) 18 MCG inhalation capsule Inhale 18 mcg into the lungs daily       polyvinyl alcohol (LIQUIFILM TEARS) 1.4 % ophthalmic solution Place 1 drop into both eyes 3 times daily       acetaminophen (TYLENOL) 500 MG tablet Take 500 mg by mouth every 4 hours as needed for pain Not to exceed 3000 mg/24 hours       nitroglycerin (NITROSTAT) 0.4 MG SL tablet Place 0.4 mg under the tongue every 5 minutes as needed for chest pain if you are still having symptoms after 3 doses (15 minutes) call 911.       ipratropium - albuterol 0.5 mg/2.5 mg/3 mL (DUONEB) 0.5-2.5 (3) MG/3ML nebulization Take 1 vial (3 mLs) by nebulization every 4 hours as needed for shortness of breath / dyspnea or wheezing 360 mL 3     aspirin 81 MG EC tablet Take 1 tablet (81 mg) by mouth daily 5 tablet 0     metoprolol (TOPROL XL) 50 MG 24 hr tablet Take 1 tablet (50 mg) by mouth daily 5 tablet 0     calcium carb 1250 mg, 500 mg  [place],/vitamin D 200 units (OSCAL WITH D) 500-200 MG-UNIT per tablet Take 1 tablet by mouth 2 times daily (with meals) 10 tablet 0     tamsulosin (FLOMAX) 0.4 MG 24 hr capsule Take 1 capsule (0.4 mg) by mouth daily 5 capsule 0     ammonium lactate (LAC-HYDRIN) 12 % lotion Apply topically 2 times daily as needed for dry skin To all extremities for dry skin       [DISCONTINUED] enzalutamide (XTANDI) 40 MG capsule Take 4 capsules (160 mg) by mouth daily 120 capsule 0     Medications reviewed:  Medications reconciled to facility chart and changes were made to reflect current medications as identified as above med list. Below are the changes that were made:   Medications stopped since last EPIC medication reconciliation:   There are no discontinued medications.    Medications started since last [facility] medication reconciliation:  Orders Placed This Encounter   Medications     sulfamethoxazole-trimethoprim (BACTRIM DS) 800-160 MG per tablet     Sig: Take 1 tablet by mouth 2 times daily For 10 administrations     REVIEW OF SYSTEMS:  4 point ROS including Respiratory, CV, GI and , other than that noted in the HPI,  is negative    Physical Exam:  /71  Pulse 78  Temp 99.5  F (37.5  C)  Resp 20  Wt 234 lb (106.1 kg)  SpO2 94%  BMI 32.64 kg/m2  GENERAL APPEARANCE:  Alert, in no distress  ENT:  Mouth and posterior oropharynx normal, moist mucous membranes  EYES:  EOM, conjunctivae, lids, pupils and irises normal  RESP:  no respiratory distress  M/S:   Active movement of bilateral upper and lower extremities.  SKIN:  Inspection of skin and subcutaneous tissue baseline, Palpation of skin and subcutaneous tissue baseline  NEURO:   Cranial nerves 2-12 are normal tested and grossly at patient's baseline  PSYCH:  affect and mood normal    Recent Labs:    Last Basic Metabolic Panel:  Lab Results   Component Value Date     06/21/2017      Lab Results   Component Value Date    POTASSIUM 3.8 06/21/2017     Lab Results    Component Value Date    CHLORIDE 106 06/21/2017     Lab Results   Component Value Date    JORDAN 9.2 06/21/2017     Lab Results   Component Value Date    CO2 37 06/21/2017     Lab Results   Component Value Date    BUN 28 06/21/2017     Lab Results   Component Value Date    CR 1.59 06/21/2017     Lab Results   Component Value Date    GLC 72 06/21/2017     Lab Results   Component Value Date    WBC 7.4 06/21/2017     Lab Results   Component Value Date    RBC 3.29 06/21/2017     Lab Results   Component Value Date    HGB 9.0 06/21/2017     Lab Results   Component Value Date    HCT 31.4 06/21/2017     Lab Results   Component Value Date    MCV 95 06/21/2017     Lab Results   Component Value Date    MCH 27.4 06/21/2017     Lab Results   Component Value Date    MCHC 28.7 06/21/2017     Lab Results   Component Value Date    RDW 17.9 06/21/2017     Lab Results   Component Value Date     06/21/2017     Assessment/Plan:  Urinary Tract Infection, Providencia rettgeri. Complicated. Ordered Bactrim DS q12 x 5 days. With 2 days of Rocephin administered prior, will complete 7 day course of antibiotics.    Prostate Cancer Metastatic to Bone with Chronic Pain. Pain improved per staff report with Hydromorphone TID scheduled. Also available q4h PRN. Mentation remains at baseline on medication. Follow-up with Dr. Oviedo and Dr. Callahan as per them. Continue Zytiga, Xgeva and Prednisone as ordered. Also receiving Radium 223.    Type 2 Diabetes Mellitus. Last A1C 6.8 on 5/2/17. Due to most blood sugars < 150, will decrease Glargine to 20 U SQ BID. Monitor blood sugars daily and adjust treatment accordingly.     Electronically signed by  SAL Acevedo CNP

## 2017-07-19 NOTE — ED NOTES
Patient has history of bone cancer and he was got an infusion at the infusion center today. Patient was trying to get into van to go home and sustained a laceration to his left leg. Bleeding controlled at this time. ABCDs intact. Alert and oriented x 4.

## 2017-07-19 NOTE — ED NOTES
Patient discharged to home. Patient received follow-up with PCP in 1 week.  Patient received discharge instructions and has no other questions at this time.

## 2017-07-19 NOTE — MR AVS SNAPSHOT
After Visit Summary   7/19/2017    Tomas Grey    MRN: [id number]           Patient Information     Date Of Birth          1945        Visit Information        Provider Department      7/19/2017 1:30 PM RH INFUSION CHAIR 9 [facility]        Today's Diagnoses     Prostate cancer metastatic to bone (H)    -  1    Metastasis to bone (H)        Prostate cancer metastatic to multiple sites (H)           Follow-ups after your visit        Your next 10 appointments already scheduled     Aug 14, 2017 10:00 AM CDT   Level 1 with RH INFUSION CHAIR 5   [facility] ([facility])    [facility]  [address]  [place] [address]   [phone]            Aug 14, 2017 10:30 AM CDT   Return Visit with Manpreet Oviedo MD   [facility] ([facility])    [facility]  [address]  [place] [address]   [phone]            Sep 11, 2017  1:30 PM CDT   Level 1 with RH INFUSION CHAIR 10   [facility] ([facility])    [facility]  [address]  [facility] [address] [phone]            Oct 09, 2017  1:30 PM CDT   Level 1 with RH INFUSION CHAIR 8   [facility] ([facility])    [facility]  [address]  [facility] [id number] [phone]              Who to contact     If you have questions or need follow up information about today's clinic visit or your schedule please contact [facility] directly at [phone].  Normal or non-critical lab and imaging results will be communicated to you by MyChart, letter or phone within 4 business days after the clinic has received the results. If you do not hear from us within  7 days, please contact the clinic through [employer] or phone. If you have a critical or abnormal lab result, we will notify you by phone as soon as possible.  Submit refill requests through [employer] or call your pharmacy and they will forward the refill request to us. Please allow 3 business days for your refill to be completed.          Additional Information About Your Visit        Care EveryWhere ID     This is your Care EveryWhere ID. This could be used by other organizations to access your [place] medical records  [id number]        Your Vitals Were     Pulse Temperature Respirations Pulse Oximetry          105 99.1  F (37.3  C) (Tympanic) 16 97%         Blood Pressure from Last 3 Encounters:   07/19/17 108/73   07/03/17 125/71   06/22/17 145/78    Weight from Last 3 Encounters:   07/03/17 106.1 kg (234 lb)   06/22/17 108.9 kg (240 lb)   06/21/17 108.9 kg (240 lb 1.3 oz)              We Performed the Following     Comprehensive metabolic panel        Primary Care Provider Office Phone # Fax #    SAL Acevedo [phone]       [facility] [address]  [place] [address]        Equal Access to Services     PATRICIA BAUM : Hadii aad ku hadasho Soomaali, waaxda luqadaha, qaybta kaalmada adeegyada, jorje prather . So [facility] [phone].    ATENCIÓN: Si habla español, tiene a soria disposición servicios gratuitos de asistencia lingüística. Llame al [phone].    We comply with applicable federal civil rights laws and [place] laws. We do not discriminate on the basis of race, color, national origin, age, disability sex, sexual orientation or gender identity.            Thank you!     Thank you for choosing [facility]  for your care. Our goal is always to provide you with excellent care. Hearing back from our patients is one way we can continue to improve our services. Please take a few minutes to complete  the written survey that you may receive in the mail after your visit with us. Thank you!             Your Updated Medication List - Protect others around you: Learn how to safely use, store and throw away your medicines at www.disposemymeds.org.          This list is accurate as of: 7/19/17  2:17 PM.  Always use your most recent med list.                   Brand Name Dispense Instructions for use Diagnosis    abiraterone 250 MG tablet    ZYTIGA     Take 1,000 mg by mouth daily        acetaminophen 500 MG tablet    TYLENOL     Take 500 mg by mouth every 4 hours as needed for pain Not to exceed 3000 mg/24 hours        ammonium lactate 12 % lotion    LAC-HYDRIN     Apply topically 2 times daily as needed for dry skin To all extremities for dry skin        aspirin 81 MG EC tablet     5 tablet    Take 1 tablet (81 mg) by mouth daily    ASCVD (arteriosclerotic cardiovascular disease)       bacitracin ointment      Apply to Right great toe topically in the evening for Ingrown toenail        BACTRIM -160 MG per tablet   Generic drug:  sulfamethoxazole-trimethoprim      Take 1 tablet by mouth 2 times daily For 10 administrations        budesonide-formoterol 160-4.5 MCG/ACT Inhaler    SYMBICORT     Inhale 2 puffs into the lungs 2 times daily        calcium carb 1250 mg (500 mg [place])/vitamin D 200 units 500-200 MG-UNIT per tablet    OSCAL with D    10 tablet    Take 1 tablet by mouth 2 times daily (with meals)    Malignant neoplasm of prostate (H)       carboxymethylcellulose 0.5 % Soln ophthalmic solution    REFRESH PLUS     Place 1 drop into both eyes 4 times daily And 1 drop in both eyes as needed.        diclofenac 1 % Gel topical gel    VOLTAREN    100 g    Apply 4 grams to knees four times daily as needed using enclosed dosing card.    Chronic pain of both knees       furosemide 20 MG tablet    LASIX    30 tablet    Take 1 tablet (20 mg) by mouth 2 times daily    Chronic diastolic congestive heart failure (H),  Lymphedema of both lower extremities       GLUCERNA Liqd      Take 8 oz by mouth 2 times daily        HYDROmorphone 2 MG tablet    DILAUDID     Take 1 tablet (2 mg) by mouth 3 times daily And q4h PRN    Metastasis to bone (H)       insulin glargine 100 UNIT/ML injection    LANTUS     Inject 20 Units Subcutaneous 2 times daily        ipratropium - albuterol 0.5 mg/2.5 mg/3 mL 0.5-2.5 (3) MG/3ML neb solution    DUONEB    360 mL    Take 1 vial (3 mLs) by nebulization every 4 hours as needed for shortness of breath / dyspnea or wheezing        isosorbide mononitrate 30 MG 24 hr tablet    IMDUR     Take 1 tablet (30 mg) by mouth daily    ASCVD (arteriosclerotic cardiovascular disease)       LIPITOR 10 MG tablet   Generic drug:  atorvastatin      Take 10 mg by mouth At Bedtime        metoprolol 50 MG 24 hr tablet    TOPROL XL    5 tablet    Take 1 tablet (50 mg) by mouth daily    ASCVD (arteriosclerotic cardiovascular disease)       nitroGLYcerin 0.4 MG sublingual tablet    NITROSTAT     Place 0.4 mg under the tongue every 5 minutes as needed for chest pain if you are still having symptoms after 3 doses (15 minutes) call 911.        omeprazole 10 MG CR capsule    priLOSEC     Take 1 capsule (10 mg) by mouth daily    Gastric reflux       polyvinyl alcohol 1.4 % ophthalmic solution    LIQUIFILM TEARS     Place 1 drop into both eyes 3 times daily        predniSONE 5 MG tablet    DELTASONE    60 tablet    Take 1 tablet (5 mg) by mouth 2 times daily    Prostate cancer metastatic to bone (H), Metastasis to bone (H), Prostate cancer metastatic to multiple sites (H)       ranitidine 75 MG tablet   Generic drug:  ranitidine      Take 75 mg by mouth daily        senna-docusate 8.6-50 MG per tablet    SENOKOT-S;PERICOLACE    100 tablet    Take 1 tablet by mouth 2 times daily    Abdominal pain, generalized       tamsulosin 0.4 MG capsule    FLOMAX    5 capsule    Take 1 capsule (0.4 mg) by mouth daily    Benign non-nodular prostatic  hyperplasia without lower urinary tract symptoms       tiotropium 18 MCG capsule    SPIRIVA     Inhale 18 mcg into the lungs daily

## 2017-07-19 NOTE — ED PROVIDER NOTES
History     Chief Complaint:  Laceration    HPI   Tomas Grey is a 72 year old male with metastatic prostate cancer and history of diabetes who presents to the emergency department today for evaluation of a left leg laceration. The patient reports he was leaving his infusion at the hospital around 5 minutes prior to arrival in the emergency department when he hit his lower left leg on the side of a van, sustaining a laceration. He had immediate onset of mild, non-radiating constant pain. He denies any other injuries or pain. His tetanus is up to date. The patient has no other concerns at this time.      Allergies:  No Known Drug Allergies     Medications:    sulfamethoxazole-trimethoprim (BACTRIM DS) 800-160 MG per tablet  HYDROmorphone (DILAUDID) 2 MG tablet  isosorbide mononitrate (IMDUR) 30 MG 24 hr tablet  furosemide (LASIX) 20 MG tablet  abiraterone (ZYTIGA) 250 MG tablet  carboxymethylcellulose (REFRESH PLUS) 0.5 % SOLN ophthalmic solution  budesonide-formoterol (SYMBICORT) 160-4.5 MCG/ACT Inhaler  predniSONE (DELTASONE) 5 MG tablet  ranitidine (RANITIDINE) 75 MG tablet  senna-docusate (SENOKOT-S;PERICOLACE) 8.6-50 MG per tablet  insulin glargine (LANTUS) 100 UNIT/ML injection  omeprazole (PRILOSEC) 10 MG CR capsule  atorvastatin (LIPITOR) 10 MG tablet  tiotropium (SPIRIVA) 18 MCG inhalation capsule  nitroglycerin (NITROSTAT) 0.4 MG SL tablet  ipratropium - albuterol 0.5 mg/2.5 mg/3 mL (DUONEB) 0.5-2.5 (3) MG/3ML nebulization  aspirin 81 MG EC tablet  metoprolol (TOPROL XL) 50 MG 24 hr tablet  tamsulosin (FLOMAX) 0.4 MG 24 hr capsule    Past Medical History:    Anemia   Anxiety   Aspiration pneumonia   C. difficile colitis   CAD (coronary artery disease)   Chronic pain   CKD (chronic kidney disease)   COPD (chronic obstructive pulmonary disease)   Depressive disorder   Diabetes mellitus    Hypertension   Insomnia   ALEXIS (obstructive sleep apnea)   Osteoporosis   Prostate cancer metastatic to multiple  sites      Past Surgical History:    Abdomen surgery  Arthroscopy knee  Eye surgery     Family History:    Diabetes   Stomach cancer     Social History:  The patient was accompanied to the ED by himself.  Smoking Status: Former smoker  Smokeless Tobacco: Never used   Alcohol Use: No    Marital Status:        Review of Systems   Musculoskeletal: Negative for myalgias.   Skin: Positive for wound (left lowe leg).   All other systems reviewed and are negative.    Physical Exam     Patient Vitals for the past 24 hrs:   BP Temp Temp src Heart Rate Resp SpO2   07/19/17 1446 (!) 154/97 99.7  F (37.6  C) Oral 102 22 95 %      Physical Exam    General:   Pleasant, age appropriate male.  EYES:   Conjunctiva normal.  NECK:    Supple, no meningismus.   CV:     Regular rate and rhythm     No murmurs, rubs or gallops.    PULM:    Clear to auscultation bilateral.       No respiratory distress.      No wheezing, rales or stridor.  ABD:    Soft, non-tender, non-distended.       No rebound or guarding.  MSK:     Left lower extremity : No gross deformity.       Lymphedema present      No focal bony tenderness      2 cm full thickness skin laceration over anterior, proximal lower leg       Significant soft tissue loss       Thin, devascularized skin flap over minority of wound       No exposed muscle, bone or tendon.       Wound explored to its full depth  LYMPH:   No cervical lymphadenopathy.  NEURO:   Left lower extremity :Strength is 5/5      Sensation intact.  SKIN:    Warm, dry=  PSYCH:    Mood is good and affect is appropriate.      Emergency Department Course     Interventions:  Marcaine 0.5% injection      Emergency Department Course:  Nursing notes and vitals reviewed.  1458 I performed an exam of the patient as documented above.   1500 I numbed the patient's left lower extremity.   1535 Patient rechecked and updated on plan to not repair the laceration and for discharge.  I discussed the treatment plan with the patient.  They expressed understanding of this plan and consented to discharge. They will be discharged home with instructions for care and follow up. In addition, the patient will return to the emergency department if their symptoms persist, worsen, if new symptoms arise or if there is any concern.  All questions were answered.     Impression & Plan      Medical Decision Making:  Tomas Grey is a 72 year old male presenting in the emergency department with an acute traumatic left leg laceration. There is no evidence of deep structure involvement. Tetanus is up to date. Upon close exposure of the wound, there is significant tissue loss and inadequate coverage to allow for a surgical closure. This is further complicated by his significant lymphedema and would result in unnecessary tension on the thin devascularized minimal skin flap available. The wound will have to heal through secondary intention. Wound care instructions given. Patient to follow up with PCP in one week.     Diagnosis:    ICD-10-CM    1. Laceration of leg, left, initial encounter S81.812A       Disposition:   Discharged to home      Scribe Disclosure:  I, Jeovanny Walkre, am serving as a scribe at 3:02 PM on 7/19/2017 to document services personally performed by Lencho Puentes MD, based on my observations and the provider's statements to me.   Jeovanny Walker  7/19/2017   [facility] EMERGENCY DEPARTMENT       Lencho Puentes MD  07/19/17 [id number]

## 2017-07-19 NOTE — ED AVS SNAPSHOT
[facility]    [address] [place] [address]    Phone:  [phone]    Fax:  [phone]                                       Tomas Grey   MRN: [id number]    Department:  [facility]   Date of Visit:  7/19/2017           Patient Information     Date Of Birth          1945        Your diagnoses for this visit were:     Laceration of leg, left, initial encounter        You were seen by Lencho Puentes MD.      Follow-up Information     Follow up with Ekaterina Lozano APRN CNP. Schedule an appointment as soon as possible for a visit in 1 week.    Specialty:  Nurse Practitioner    Contact information:    [facility]  [address]  [place] [address]  [phone]          Discharge Instructions       Discharge Instructions  Laceration (Cut)    You were seen today for a laceration (cut).  Your doctor examined your laceration for any problems such a buried foreign body (like glass, a splinter, or gravel), or injury to blood vessels, tendons, and nerves.  Your doctor may have also rinsed and/or scrubbed your laceration to help prevent an infection.  Your laceration may have been closed with glue, staples or sutures (stitches).      It may not be possible to find all problems with your laceration on the first visit, and we can't always prevent infections.  Antibiotics are only given when the benefit is more than the risk, and don't prevent all infections. Some lacerations are too high risk to close, and are left open to heal.  All lacerations, no matter how expertly repaired, will cause scarring.    Return to the Emergency Department right away if:    You have more redness, swelling, pain, drainage (pus), a bad smell, or red streaking from your laceration.      You have a fever of 101oF or more.    You have bleeding that you can t stop at home. If your cut starts to bleed, hold pressure on  the bleeding area with a clean cloth or put pressure over the bandage.  If the bleeding doesn t stop after using constant pressure for 30 minutes, you should return to the Emergency Department for further treatment.    An area past the laceration is cool, pale, or blue compared with the other side, or has a slower return of color when squeezed.    Your dressing seems too tight or starts to get uncomfortable or painful.    You have loss of normal function or use of an area, such as being unable to straighten or bend a finger normally.    You have a numb area past the laceration.    Return to the Emergency Department or see your regular doctor if:    The laceration starts to come open.     You have something coming out of the cut or a feeling that there is something in the laceration.    Your wound will not heal, or keeps breaking open. There can always be glass, wood, dirt or other things in any wound.  They won t always show up, even on x-rays.  If a wound doesn t heal, this may be why, and it is important to follow-up with your regular doctor.    Home Care:    Take your dressing off in 12 hours, or as instructed by your doctor, to check your laceration. Remove the dressing sooner if it seems too tight or painful, or if it is getting numb, tingly, or pale past the dressing.    Gently wash your laceration 2 times a day with clean cloth and soap.     It is okay to shower, but do not let the laceration soak in water.      If your laceration was closed with wound adhesive or strips: pat it dry and leave it open to the air.     For all other repairs: after you wash your laceration, or at least 2 times a day, apply bacitracin or other antibiotic ointment to the laceration, then cover it with a Band-Aid  or gauze.    Keep the laceration clean. Wear gloves or other protective clothing if you are around dirt.    Follow-up:    You need to follow-up with your regular doctor in 7-10 days.    Scars:  To help minimize  "scarring:    Wear sunscreen over the healed laceration when out in the sun.    Massage the area regularly.    You may use Vitamin E oil.    Wait a year.  Most scars will start to fade within a year.    Probiotics: If you have been given an antibiotic, you may want to also take a probiotic pill or eat yogurt with live cultures. Probiotics have \"good bacteria\" to help your intestines stay healthy. Studies have shown that probiotics help prevent diarrhea and other intestine problems (including C. diff infection) when you take antibiotics. You can buy these without a prescription in the pharmacy section of the store.     If you were given a prescription for medicine here today, be sure to read all of the information (including the package insert) that comes with your prescription.  This will include important information about the medicine, its side effects, and any warnings that you need to know about.  The pharmacist who fills the prescription can provide more information and answer questions you may have about the medicine.  If you have questions or concerns that the pharmacist cannot address, please call or return to the Emergency Department.         Future Appointments        Provider Department Dept Phone Center    8/14/2017 10:00 AM  Infusion Chair 5 [facility] [phone] [facility]    8/14/2017 10:30 AM Manpreet Oviedo MD [facility] [phone] [facility]    9/11/2017 1:30 PM  Infusion Chair 10 [facility] [phone] [facility]    10/9/2017 1:30 PM  Infusion Chair 8 [facility] [phone] [facility]      24 Hour Appointment Hotline       To make an appointment at any [facility] clinic, call [phone] ([phone]). If you don't have a family doctor or clinic, we will help you find one. [facility] clinics are conveniently located to serve the needs of you and your " family.             Review of your medicines      Our records show that you are taking the medicines listed below. If these are incorrect, please call your family doctor or clinic.        Dose / Directions Last dose taken    abiraterone 250 MG tablet   Commonly known as:  ZYTIGA   Dose:  1000 mg        Take 1,000 mg by mouth daily   Refills:  0        acetaminophen 500 MG tablet   Commonly known as:  TYLENOL   Dose:  500 mg        Take 500 mg by mouth every 4 hours as needed for pain Not to exceed 3000 mg/24 hours   Refills:  0        ammonium lactate 12 % lotion   Commonly known as:  LAC-HYDRIN   Indication:  Abnormal Dryness of Skin, Eyes or Mucous Membranes        Apply topically 2 times daily as needed for dry skin To all extremities for dry skin   Refills:  0        aspirin 81 MG EC tablet   Dose:  81 mg   Quantity:  5 tablet        Take 1 tablet (81 mg) by mouth daily   Refills:  0        bacitracin ointment        Apply to Right great toe topically in the evening for Ingrown toenail   Refills:  0        BACTRIM -160 MG per tablet   Dose:  1 tablet   Generic drug:  sulfamethoxazole-trimethoprim        Take 1 tablet by mouth 2 times daily For 10 administrations   Refills:  0        budesonide-formoterol 160-4.5 MCG/ACT Inhaler   Commonly known as:  SYMBICORT   Dose:  2 puff        Inhale 2 puffs into the lungs 2 times daily   Refills:  0        calcium carb 1250 mg (500 mg Saxman)/vitamin D 200 units 500-200 MG-UNIT per tablet   Commonly known as:  OSCAL with D   Dose:  1 tablet   Quantity:  10 tablet        Take 1 tablet by mouth 2 times daily (with meals)   Refills:  0        carboxymethylcellulose 0.5 % Soln ophthalmic solution   Commonly known as:  REFRESH PLUS   Dose:  1 drop        Place 1 drop into both eyes 4 times daily And 1 drop in both eyes as needed.   Refills:  0        diclofenac 1 % Gel topical gel   Commonly known as:  VOLTAREN   Quantity:  100 g        Apply 4 grams to knees four times  daily as needed using enclosed dosing card.   Refills:  0        furosemide 20 MG tablet   Commonly known as:  LASIX   Dose:  20 mg   Quantity:  30 tablet        Take 1 tablet (20 mg) by mouth 2 times daily   Refills:  0        GLUCERNA Liqd   Dose:  8 oz        Take 8 oz by mouth 2 times daily   Refills:  0        HYDROmorphone 2 MG tablet   Commonly known as:  DILAUDID   Dose:  2 mg        Take 1 tablet (2 mg) by mouth 3 times daily And q4h PRN   Refills:  0        insulin glargine 100 UNIT/ML injection   Commonly known as:  LANTUS   Dose:  20 Units   Indication:  Type 2 Diabetes        Inject 20 Units Subcutaneous 2 times daily   Refills:  0        ipratropium - albuterol 0.5 mg/2.5 mg/3 mL 0.5-2.5 (3) MG/3ML neb solution   Commonly known as:  DUONEB   Dose:  1 vial   Quantity:  360 mL        Take 1 vial (3 mLs) by nebulization every 4 hours as needed for shortness of breath / dyspnea or wheezing   Refills:  3        isosorbide mononitrate 30 MG 24 hr tablet   Commonly known as:  IMDUR   Dose:  30 mg        Take 1 tablet (30 mg) by mouth daily   Refills:  0        LIPITOR 10 MG tablet   Dose:  10 mg   Generic drug:  atorvastatin        Take 10 mg by mouth At Bedtime   Refills:  0        metoprolol 50 MG 24 hr tablet   Commonly known as:  TOPROL XL   Dose:  50 mg   Quantity:  5 tablet        Take 1 tablet (50 mg) by mouth daily   Refills:  0        nitroGLYcerin 0.4 MG sublingual tablet   Commonly known as:  NITROSTAT   Dose:  0.4 mg        Place 0.4 mg under the tongue every 5 minutes as needed for chest pain if you are still having symptoms after 3 doses (15 minutes) call 911.   Refills:  0        omeprazole 10 MG CR capsule   Commonly known as:  priLOSEC   Dose:  10 mg        Take 1 capsule (10 mg) by mouth daily   Refills:  0        polyvinyl alcohol 1.4 % ophthalmic solution   Commonly known as:  LIQUIFILM TEARS   Dose:  1 drop        Place 1 drop into both eyes 3 times daily   Refills:  0        predniSONE 5  MG tablet   Commonly known as:  DELTASONE   Dose:  5 mg   Quantity:  60 tablet        Take 1 tablet (5 mg) by mouth 2 times daily   Refills:  0        ranitidine 75 MG tablet   Dose:  75 mg   Indication:  Gastroesophageal Reflux Disease   Generic drug:  ranitidine        Take 75 mg by mouth daily   Refills:  0        senna-docusate 8.6-50 MG per tablet   Commonly known as:  SENOKOT-S;PERICOLACE   Dose:  1 tablet   Quantity:  100 tablet        Take 1 tablet by mouth 2 times daily   Refills:  0        tamsulosin 0.4 MG capsule   Commonly known as:  FLOMAX   Dose:  0.4 mg   Quantity:  5 capsule        Take 1 capsule (0.4 mg) by mouth daily   Refills:  0        tiotropium 18 MCG capsule   Commonly known as:  SPIRIVA   Dose:  18 mcg   Indication:  Chronic Obstructive Lung Disease        Inhale 18 mcg into the lungs daily   Refills:  0                Orders Needing Specimen Collection     None      Pending Results     No orders found from 7/17/2017 to 7/20/2017.            Pending Culture Results     No orders found from 7/17/2017 to 7/20/2017.            Pending Results Instructions     If you had any lab results that were not finalized at the time of your Discharge, you can call the ED Lab Result RN at [phone]. You will be contacted by this team for any positive Lab results or changes in treatment. The nurses are available 7 days a week from 10A to 6:30P.  You can leave a message 24 hours per day and they will return your call.        Test Results From Your Hospital Stay               Clinical Quality Measure: Blood Pressure Screening     Your blood pressure was checked while you were in the emergency department today. The last reading we obtained was  BP: (!) 154/97 . Please read the guidelines below about what these numbers mean and what you should do about them.  If your systolic blood pressure (the top number) is less than 120 and your diastolic blood pressure (the bottom number) is less than 80, then your  blood pressure is normal. There is nothing more that you need to do about it.  If your systolic blood pressure (the top number) is 120-139 or your diastolic blood pressure (the bottom number) is 80-89, your blood pressure may be higher than it should be. You should have your blood pressure rechecked within a year by a primary care provider.  If your systolic blood pressure (the top number) is 140 or greater or your diastolic blood pressure (the bottom number) is 90 or greater, you may have high blood pressure. High blood pressure is treatable, but if left untreated over time it can put you at risk for heart attack, stroke, or kidney failure. You should have your blood pressure rechecked by a primary care provider within the next 4 weeks.  If your provider in the emergency department today gave you specific instructions to follow-up with your doctor or provider even sooner than that, you should follow that instruction and not wait for up to 4 weeks for your follow-up visit.        Thank you for choosing [facility]       Thank you for choosing [facility] for your care. Our goal is always to provide you with excellent care. Hearing back from our patients is one way we can continue to improve our services. Please take a few minutes to complete the written survey that you may receive in the mail after you visit with us. Thank you!        Care EveryWhere ID     This is your Care EveryWhere ID. This could be used by other organizations to access your [facility] medical records  [id number]        Equal Access to Services     PATRICIA BAUM : Elaina Mcclelland, waaxda luperri, qaybta kaaljorje cool. So [facility] [phone].    ATENCIÓN: Si habla español, tiene a soria disposición servicios gratuitos de asistencia lingüística. Llame al [phone].    We comply with applicable federal civil rights laws and [place] laws. We do not discriminate on the basis of race, color,  national origin, age, disability sex, sexual orientation or gender identity.            After Visit Summary       This is your record. Keep this with you and show to your community pharmacist(s) and doctor(s) at your next visit.

## 2017-07-19 NOTE — ED AVS SNAPSHOT
[facility]    [address]    [facility] [address]    Phone:  [phone]    Fax:  [phone]                                       Tomas Grey   MRN: [id number]    Department:  [facility]   Date of Visit:  7/19/2017           After Visit Summary Signature Page     I have received my discharge instructions, and my questions have been answered. I have discussed any challenges I see with this plan with the nurse or doctor.    ..........................................................................................................................................  Patient/Patient Representative Signature      ..........................................................................................................................................  Patient Representative Print Name and Relationship to Patient    ..................................................               ................................................  Date                                            Time    ..........................................................................................................................................  Reviewed by Signature/Title    ...................................................              ..............................................  Date                                                            Time

## 2017-07-19 NOTE — PROGRESS NOTES
Infusion Nursing Note:  Tomas Grey presents today for Xgeva.    Patient seen by provider today: No   present during visit today: Not Applicable.    Note:   Patient reports feeling lowsy, having lots of body aches and bone pain. Patient taking prn pain meds at nursing home, said he gets them every 4 hours. Nurse instructed patient to ask for more pain meds when he gets back home today.  Patient hasn't gotten Xgeva since May. Patient had UTI that was treated and is resolved. Also, c/o constipation. He said he takes a stool softener at the nursing home. Patient will ask the NP at facility about laxative if needed.  Patient didn't want to move up his appt with Dr Oviedo, but keep it scheduled for 8/14.     Intravenous Access:  Lab draw site LAC, Needle type butterfly, Gauge 23.  Labs drawn without difficulty.    Treatment Conditions:  Corrected calcium 9.4     Results reviewed, labs MET treatment parameters, ok to proceed with treatment.        Post Infusion Assessment:  Patient tolerated injection without incident.    Discharge Plan:   Copy of AVS reviewed with patient and/or family.  Patient will return 8/14/17 for next appointment.  Patient discharged in stable condition accompanied by: self and via wheelchair. Ride waiting downstairs. SBAR note written on AVS and given to patient.     Mihaela Camejo RN

## 2017-07-25 NOTE — PROGRESS NOTES
"[facility]    Chief Complaint   Patient presents with     RECHECK     HPI:    Tomas Grey is a 72 year old  (1945), who is being seen today for an episodic care visit at [facility].  HPI information obtained from: facility chart records, facility staff, patient report and [facility] chart review. Today's concern is:    Left Leg Laceration. Presented to [facility] ED after hitting lower left leg on the side of a van. No evidence of deep tissue involvement. Given lymphedema, wound to heal through secondary intention. Today, resident recalls events leading up to ED visit. Staff has been treating with Bacitracin BID, covering with ABD and kerlix.      Nausea. Initially seen in therapy gym while receiving Occupational Therapy. Had no complaints besides vague description of not feeling great due to cancer. Later in the day, son at facility and requested something be done as his father was not feeling well. States he took the day off work because his father called him and said he didn't feel well. Unhappy his father receives the \"same meal every day\" of toast and eggs. Per resident report, stomach doesn't feel good. Is nauseous and has not taken his pills this morning nor had breakfast. Has pain in his shoulders and chest. Reports the pain medication he has doesn't work. States he needs to go to the ER. Per review of documentation, has utilized Hydromorphone PRN 2 times in the month of July.     Type 2 Diabetes Mellitus. Upon review of blood sugars over the past month, range is as follows:    Breakfast: 102-177  Lunch: 114-187  Dinner:   Bedtime: 132-193    ALLERGIES: Morphine  Past Medical, Surgical, Family and Social History reviewed and updated in [facility].    Current Outpatient Prescriptions   Medication Sig Dispense Refill     ONDANSETRON PO Take 4 mg by mouth every 8 hours as needed for nausea       HYDROmorphone (DILAUDID) 2 MG tablet Take 1 tablet (2 mg) by " mouth 3 times daily And q4h PRN       isosorbide mononitrate (IMDUR) 30 MG 24 hr tablet Take 1 tablet (30 mg) by mouth daily       furosemide (LASIX) 20 MG tablet Take 1 tablet (20 mg) by mouth 2 times daily 30 tablet      abiraterone (ZYTIGA) 250 MG tablet Take 1,000 mg by mouth daily       bacitracin ointment Apply to Right great toe topically in the evening for Ingrown toenail       carboxymethylcellulose (REFRESH PLUS) 0.5 % SOLN ophthalmic solution Place 1 drop into both eyes 4 times daily And 1 drop in both eyes as needed.       budesonide-formoterol (SYMBICORT) 160-4.5 MCG/ACT Inhaler Inhale 2 puffs into the lungs 2 times daily       predniSONE (DELTASONE) 5 MG tablet Take 1 tablet (5 mg) by mouth 2 times daily 60 tablet 0     ranitidine (RANITIDINE) 75 MG tablet Take 75 mg by mouth daily       diclofenac (VOLTAREN) 1 % GEL topical gel Apply 4 grams to knees four times daily as needed using enclosed dosing card. 100 g 0     senna-docusate (SENOKOT-S;PERICOLACE) 8.6-50 MG per tablet Take 1 tablet by mouth 2 times daily 100 tablet      insulin glargine (LANTUS) 100 UNIT/ML injection Inject 20 Units Subcutaneous 2 times daily        omeprazole (PRILOSEC) 10 MG CR capsule Take 1 capsule (10 mg) by mouth daily       atorvastatin (LIPITOR) 10 MG tablet Take 10 mg by mouth At Bedtime        tiotropium (SPIRIVA) 18 MCG inhalation capsule Inhale 18 mcg into the lungs daily       polyvinyl alcohol (LIQUIFILM TEARS) 1.4 % ophthalmic solution Place 1 drop into both eyes 3 times daily       acetaminophen (TYLENOL) 500 MG tablet Take 500 mg by mouth every 4 hours as needed for pain Not to exceed 3000 mg/24 hours       nitroglycerin (NITROSTAT) 0.4 MG SL tablet Place 0.4 mg under the tongue every 5 minutes as needed for chest pain if you are still having symptoms after 3 doses (15 minutes) call 911.       ipratropium - albuterol 0.5 mg/2.5 mg/3 mL (DUONEB) 0.5-2.5 (3) MG/3ML nebulization Take 1 vial (3 mLs) by nebulization  every 4 hours as needed for shortness of breath / dyspnea or wheezing 360 mL 3     aspirin 81 MG EC tablet Take 1 tablet (81 mg) by mouth daily 5 tablet 0     metoprolol (TOPROL XL) 50 MG 24 hr tablet Take 1 tablet (50 mg) by mouth daily 5 tablet 0     calcium carb 1250 mg, 500 mg [place],/vitamin D 200 units (OSCAL WITH D) 500-200 MG-UNIT per tablet Take 1 tablet by mouth 2 times daily (with meals) 10 tablet 0     tamsulosin (FLOMAX) 0.4 MG 24 hr capsule Take 1 capsule (0.4 mg) by mouth daily 5 capsule 0     ammonium lactate (LAC-HYDRIN) 12 % lotion Apply topically 2 times daily as needed for dry skin To all extremities for dry skin       [DISCONTINUED] enzalutamide (XTANDI) 40 MG capsule Take 4 capsules (160 mg) by mouth daily 120 capsule 0     Medications reviewed:  Medications reconciled to facility chart and changes were made to reflect current medications as identified as above med list. Below are the changes that were made:   Medications stopped since last EPIC medication reconciliation:   Medications Discontinued During This Encounter   Medication Reason     Nutritional Supplements (GLUCERNA) LIQD Medication Reconciliation Clean Up     sulfamethoxazole-trimethoprim (BACTRIM DS) 800-160 MG per tablet Medication Reconciliation Clean Up       Medications started since last [facility] medication reconciliation:  Orders Placed This Encounter   Medications     ONDANSETRON PO     Sig: Take 4 mg by mouth every 8 hours as needed for nausea     REVIEW OF SYSTEMS:  4 point ROS including Respiratory, CV, GI and , other than that noted in the HPI,  is negative    Physical Exam:  Pulse 78  Temp 97.6  F (36.4  C)  Resp 18  Wt 232 lb 8 oz (105.5 kg)  SpO2 96%  BMI 32.43 kg/m2  GENERAL APPEARANCE:  Alert, in no distress  ENT:  Mouth and posterior oropharynx normal, moist mucous membranes  EYES:  EOM, conjunctivae, lids, pupils and irises normal  RESP:  respiratory effort and palpation of chest normal, no respiratory distress,  diminished breath sounds throughout  CV:  Palpation and auscultation of heart done , regular rate and rhythm, no murmur, rub, or gallop  ABDOMEN:  normal bowel sounds, soft, nontender, no hepatosplenomegaly or other masses  M/S:   Active movement of bilateral upper and lower extremities.  SKIN:  Left lower leg laceration round with minmal slough and moderate amount of drainage.  NEURO:   Cranial nerves 2-12 are normal tested and grossly at patient's baseline  PSYCH:  affect and mood normal    Recent Labs:    Last Basic Metabolic Panel:  Lab Results   Component Value Date     07/19/2017      Lab Results   Component Value Date    POTASSIUM 4.8 07/19/2017     Lab Results   Component Value Date    CHLORIDE 104 07/19/2017     Lab Results   Component Value Date    JORDAN 8.4 07/19/2017     Lab Results   Component Value Date    CO2 34 07/19/2017     Lab Results   Component Value Date    BUN 37 07/19/2017     Lab Results   Component Value Date    CR 1.77 07/19/2017     Lab Results   Component Value Date     07/19/2017     Lab Results   Component Value Date    WBC 7.4 06/21/2017     Lab Results   Component Value Date    RBC 3.29 06/21/2017     Lab Results   Component Value Date    HGB 9.0 06/21/2017     Lab Results   Component Value Date    HCT 31.4 06/21/2017     Lab Results   Component Value Date    MCV 95 06/21/2017     Lab Results   Component Value Date    MCH 27.4 06/21/2017     Lab Results   Component Value Date    MCHC 28.7 06/21/2017     Lab Results   Component Value Date    RDW 17.9 06/21/2017     Lab Results   Component Value Date     06/21/2017     Assessment/Plan:  Left Lower Leg Laceration. Given moderate amount of drainage, changed dressing change to: Clean with wound cleanser. Apply Alginate. Cover with Kerlix. Change every 3 days and PRN as indicated. Update NP next week on wound healing.    Nausea. May be secondary to medications versus cancer versus viral although unlikely due to stable  VSS. Has had 7 bowel movements in the past week. Ordered Zofran 4 mg q8h PRN for nausea. Encouraged to take medications for pain when/if nausea improved. Educated on indications for transportation to the ED which is not warranted at this time. Can re-evaluate pain control after pain medications are administered more frequently.      Type 2 Diabetes Mellitus. On chronic Prednisone. Most blood sugars > 150. Last A1C 6.8 on 5/2/17. Will recheck A1C on 8/8/17. Continue Glargine as ordered for now.     Electronically signed by  SAL Acevedo CNP

## 2017-07-26 NOTE — TELEPHONE ENCOUNTER
Patient agreeable to stay in NH for treatment after DON involved.  CBC, BMP drawn, UA sent.  Patient requests pain meds but prn dilaudid is q 4 hour and not due  Orders  Change diladid to 2 mg q 3 hour prn and update PCP   Ok to treat in NH

## 2017-07-26 NOTE — TELEPHONE ENCOUNTER
Patient with known cancer with mets, full code status insisting on going to hospital. T 101.    States it takes too long to get treatment in NH.  Refusing meds.  Bowels moving with large bowel movement yesterday.  Nursing unsure about UO.  Poor to little po intake this past two days.  Patient c/o nausea but refuses zofran.  Patient c/o pain but refuses pain meds.  No compazine supp available at NH.  Nursing thinks patient is anxious but also concerned about fever r/t tendency of turning septic.  Nursing cannot convince patient to accept treatment in NH  Orders  Transport to ED non emergent transport per patient request

## 2017-07-27 NOTE — ORAL ONC MGMT
Oral Chemotherapy Monitoring Program    Primary Oncologist: Dr. Oviedo  Primary Oncology Clinic: [facility]  Cancer Diagnosis: MCRPC    Therapy History:  zytiga 1000 mg PO every day started on 9/20/16    Drug Interaction Assessment: No DI    Lab Monitoring Plan  Monthly CMP/BP  Subjective/Objective:  Tomas Grey is a 72 year old male contacted by phone for a follow-up visit for oral chemotherapy.  I spoke with his nurse at the nursing home.  She said that he likes to complain but is doing well.  He has peripheral edema so they are wrapping his legs.  He has a history of peripheral edema and the nurse thought it may be d/t sitting on his scooter for long periods of time.  He doesn't like to sleep in his bed where his feet would be up.    ORAL CHEMOTHERAPY 7/27/2016 8/22/2016 10/31/2016 2/13/2017 3/22/2017 5/25/2017 7/27/2017   Drug Name Xtandi (Enzalutamide) Xtandi (Enzalutamide) Zytiga (Abiraterone) Zytiga (Abiraterone) Zytiga (Abiraterone) Zytiga (Abiraterone) Zytiga (Abiraterone)   Current Dosage 160mg 160mg 1000mg 1000mg 1000mg 1000mg 1000mg   Current Schedule Daily Daily Daily Daily Daily Daily Daily   Cycle Details Continuous Continuous Continuous Continuous Continuous Continuous Continuous   Start Date of Last Cycle 6/27/2016 - 10/17/2016 - - - -   Planned next cycle start date - - 11/14/2016 2/26/2017 - 6/15/2017 -   Doses missed in last 2 weeks 0 0 0 0 0 0 0   Adherence Assessment - - - Adherent Adherent Adherent Adherent   Adverse Effects None - - No AE identified during assessment No AE identified during assessment No AE identified during assessment No AE identified during assessment   Home BPs not needed - all BPs<140/90 all BPs<140/90 all BPs<140/90 all BPs<140/90 -   Any new drug interactions? - - No No No No No   Is the dose as ordered appropriate for the patient? - - - Yes Yes Yes Yes   Is the patient currently in pain? - - Assessed in last 30 days. Yes Assessed in last 30 days. Assessed in last 30  "days. Yes   Does the patient feel the pain is currently being managed by a provider? - - - Yes - - Yes   Has the patient been assessed within the past 6 months for depression? - - - Yes Yes Yes No   Has the patient missed any days of school, work, or other routine activity? - - - No No No No       Last PHQ-2 Score on record:   PHQ-2 ( 1999 [employer]) 4/10/2017 3/22/2017   Q1: Little interest or pleasure in doing things 0 0   Q2: Feeling down, depressed or hopeless 0 0   PHQ-2 Score 0 0       Patient does not report depression symptoms.      Vitals:  BP:   BP Readings from Last 1 Encounters:   07/19/17 127/78     Wt Readings from Last 1 Encounters:   07/25/17 105.5 kg (232 lb 8 oz)     Estimated body surface area is 2.3 meters squared as calculated from the following:    Height as of 6/22/17: 1.803 m (5' 11\").    Weight as of 7/25/17: 105.5 kg (232 lb 8 oz).    Labs:  Lab Results   Component Value Date     07/26/2017      Lab Results   Component Value Date    POTASSIUM 4.2 07/26/2017     Lab Results   Component Value Date    JORDAN 9.2 07/26/2017     Lab Results   Component Value Date    ALBUMIN 2.7 07/19/2017     Lab Results   Component Value Date    MAG 1.7 12/26/2016     Lab Results   Component Value Date    PHOS 3.3 05/28/2015     Lab Results   Component Value Date    BUN 38 07/26/2017     Lab Results   Component Value Date    CR 1.79 07/26/2017       Lab Results   Component Value Date    AST 31 07/19/2017     Lab Results   Component Value Date    ALT 13 07/19/2017     Lab Results   Component Value Date    BILITOTAL 0.4 07/19/2017       Lab Results   Component Value Date    WBC 8.7 07/26/2017     Lab Results   Component Value Date    HGB 8.1 07/26/2017     Lab Results   Component Value Date     07/26/2017     Lab Results   Component Value Date    ANEU 5.0 06/21/2017       Labs stable.  His PSA is creeping up.to 405 since 6/14/17.    Assessment:  Doing well on zytiga.  The nurse had counted 196 tablets last " Thursday when my student had called.  The nurse denies missing any doses and attributes the excess to accumulation when he was off the medication and then was restarted.    Plan:  Continue with currrent regimen.    Follow-Up:  F/u 08/14/17 for labs and appt    Refill Due:  9/7/17 release 8/31/17    Martin Meese, [facility]

## 2017-07-29 NOTE — TELEPHONE ENCOUNTER
Patient with urinary symptoms, UC back and multiple organisms. All appear sensitive to Cipro per nurse. No fevers, VSS.   Lab Results   Component Value Date    WBC 8.7 07/26/2017    WBC 7.4 06/21/2017     Lab Results   Component Value Date    CR 1.79 07/26/2017    CR 1.77 07/19/2017     CrCL>50    PLAN  Start Cipro 500 mg PO BID x 10 days diagnosis UTI    Electronically signed by SAL Underwood, GNP

## 2017-07-31 NOTE — PROGRESS NOTES
[facility]  Nursing Home Regulatory Visit  July 31, 2017    Chief Complaint   Patient presents with     snf Regulatory       HPI:    Tomas Grey is a 72 year old  (1945), who is being seen today for a federally mandated E/M visit at [facility]. Today's concerns are:    1) UTI -- Culture with >100k Pseudomonas and >100k Klebsiella. Started on cipro on 7/29 with plan for 10 day course.   2) COPD -- No recent exacerbations. Managed with budesonide-formoterol 160-4.5 mcg BID, tiotropium 18 mcg daily and DuoNebs q4h PRN.   3) Metastatic Prostate Cancer -- follows with Dr. Oviedo and Dr. Callahan with oncology. Has progressive disease despite treatment with extensive bone metastasis    ALLERGIES: Morphine    PAST MEDICAL HISTORY:   Past Medical History:   Diagnosis Date     Anemia      Anxiety      Aspiration pneumonia (H) 2014     C. difficile colitis      CAD (coronary artery disease)      Chronic pain      CKD (chronic kidney disease)      COPD (chronic obstructive pulmonary disease) (H)      Depressive disorder      Diabetes mellitus (H)      Hypertension      Insomnia      ALEXIS (obstructive sleep apnea)      Osteoporosis      Prostate cancer metastatic to multiple sites (H)        PAST SURGICAL HISTORY:   Past Surgical History:   Procedure Laterality Date     ABDOMEN SURGERY       ARTHROSCOPY KNEE       EYE SURGERY         FAMILY HISTORY:   Family History   Problem Relation Age of Onset     DIABETES Mother      CANCER Mother      stomach       SOCIAL HISTORY:   Lives in a SNF    MEDICATIONS:  Current Outpatient Prescriptions   Medication Sig Dispense Refill     CIPROFLOXACIN PO Take 500 mg by mouth 2 times daily       ONDANSETRON PO Take 4 mg by mouth every 8 hours as needed for nausea       HYDROmorphone (DILAUDID) 2 MG tablet Take 1 tablet (2 mg) by mouth 3 times daily And q4h PRN       isosorbide mononitrate (IMDUR) 30 MG 24 hr tablet Take 1 tablet (30 mg) by mouth  daily       furosemide (LASIX) 20 MG tablet Take 1 tablet (20 mg) by mouth 2 times daily 30 tablet      abiraterone (ZYTIGA) 250 MG tablet Take 1,000 mg by mouth daily       carboxymethylcellulose (REFRESH PLUS) 0.5 % SOLN ophthalmic solution Place 1 drop into both eyes 4 times daily And 1 drop in both eyes as needed.       budesonide-formoterol (SYMBICORT) 160-4.5 MCG/ACT Inhaler Inhale 2 puffs into the lungs 2 times daily       predniSONE (DELTASONE) 5 MG tablet Take 1 tablet (5 mg) by mouth 2 times daily 60 tablet 0     ranitidine (RANITIDINE) 75 MG tablet Take 75 mg by mouth daily       diclofenac (VOLTAREN) 1 % GEL topical gel Apply 4 grams to knees four times daily as needed using enclosed dosing card. 100 g 0     senna-docusate (SENOKOT-S;PERICOLACE) 8.6-50 MG per tablet Take 1 tablet by mouth 2 times daily 100 tablet      insulin glargine (LANTUS) 100 UNIT/ML injection Inject 20 Units Subcutaneous 2 times daily        omeprazole (PRILOSEC) 10 MG CR capsule Take 1 capsule (10 mg) by mouth daily       atorvastatin (LIPITOR) 10 MG tablet Take 10 mg by mouth At Bedtime        tiotropium (SPIRIVA) 18 MCG inhalation capsule Inhale 18 mcg into the lungs daily       polyvinyl alcohol (LIQUIFILM TEARS) 1.4 % ophthalmic solution Place 1 drop into both eyes 3 times daily       acetaminophen (TYLENOL) 500 MG tablet Take 500 mg by mouth every 4 hours as needed for pain Not to exceed 3000 mg/24 hours       nitroglycerin (NITROSTAT) 0.4 MG SL tablet Place 0.4 mg under the tongue every 5 minutes as needed for chest pain if you are still having symptoms after 3 doses (15 minutes) call 911.       ipratropium - albuterol 0.5 mg/2.5 mg/3 mL (DUONEB) 0.5-2.5 (3) MG/3ML nebulization Take 1 vial (3 mLs) by nebulization every 4 hours as needed for shortness of breath / dyspnea or wheezing 360 mL 3     aspirin 81 MG EC tablet Take 1 tablet (81 mg) by mouth daily 5 tablet 0     metoprolol (TOPROL XL) 50 MG 24 hr tablet Take 1 tablet  (50 mg) by mouth daily 5 tablet 0     calcium carb 1250 mg, 500 mg [place],/vitamin D 200 units (OSCAL WITH D) 500-200 MG-UNIT per tablet Take 1 tablet by mouth 2 times daily (with meals) 10 tablet 0     tamsulosin (FLOMAX) 0.4 MG 24 hr capsule Take 1 capsule (0.4 mg) by mouth daily 5 capsule 0     ammonium lactate (LAC-HYDRIN) 12 % lotion Apply topically 2 times daily as needed for dry skin To all extremities for dry skin       [DISCONTINUED] enzalutamide (XTANDI) 40 MG capsule Take 4 capsules (160 mg) by mouth daily 120 capsule 0       Medications reviewed:  Medications reconciled to facility chart and changes were made to reflect current medications as identified as above med list. Below are the changes that were made:   Medications stopped since last EPIC medication reconciliation:   Medications Discontinued During This Encounter   Medication Reason     bacitracin ointment Medication Reconciliation Clean Up     Medications started since last [facility] medication reconciliation:  Orders Placed This Encounter   Medications     CIPROFLOXACIN PO     Sig: Take 500 mg by mouth 2 times daily     Case Management:  I have reviewed the care plan and MDS and do agree with the plan.   Information reviewed:  Medications, vital signs, orders, and nursing notes.    ROS:  4 point ROS neg other than the symptoms noted above in the HPI.    PHYSICAL EXAM:  /58  Pulse 94  Temp 98.7  F (37.1  C)  Resp 18  Wt 223 lb 14.4 oz (101.6 kg)  SpO2 96%  BMI 31.23 kg/m2  Gen: sitting in wheelchair, alert, cooperative and in no acute distress  Card: RRR, S1, S2, no murmurs  Resp: lungs clear to auscultation bilaterally, no crackles or wheezes; moving good air   GI: abdomen soft, not-tender  Ext: bilateral dependent LE edema  Neuro: CX II-XII grossly in tact; ROM in all four extremities grossly in tact    Lab/Diagnostic data:  Reviewed as per Epic    ASSESSMENT/PLAN    1) UTI   Culture with >100k Pseudomonas and >100k Klebsiella. Started on  cipro on 7/29 with plan for 10 day course. Afebrile.   -- continues on cipro 500 mg BID  -- follow clinically    2) COPD -  No recent exacerbations.   -- continues on  budesonide-formoterol 160-4.5 mcg BID, tiotropium 18 mcg daily and DuoNebs q4h PRN    3) Metastatic Prostate Cancer   Follows with Dr. Oviedo and Dr. Callahan with oncology. Has progressive disease despite treatment with extensive bone metastasis  -- continues on abiraterone 1000 mg daily, hydromorphone 2 mg TID and q4h PRN and prednisone 5 mg BID  -- ongoing management per oncology    Tomas Grey is stable. No concerns per nursing. No changes to plan of care today.    Electronically signed by:  Annika Ortiz MD

## 2017-08-02 NOTE — TELEPHONE ENCOUNTER
Called by nursing staff at [facility] about patient who is having CP and requesting to go to ER.   Reviewed Emergency Care plan and most recent ER visit for CP 6/21/17       Chronic CP:  I did not give order to send patient to ER    Use prns available for pain management and follow up with primary team.   Patricia Polanco MD

## 2017-08-08 NOTE — PROGRESS NOTES
"[facility]    Chief Complaint   Patient presents with     Nursing Home Acute     HPI:    Tomas Grey is a 72 year old  (1945), who is being seen today for an episodic care visit at [facility].  HPI information obtained from: facility chart records, facility staff, patient report and [facility] chart review.Today's concern is:    Chest Pain. Complained of chest pain 8/1/17. On-call MD did not give orders to send to hospital given chronic complaint so resident called 911 himself. Went to [facility] 8/1/17 through 8/2/17. Serial Troponins negative x 4. Cardiac monitoring unremarkable overnight. Received IV Dilaudid in the ED and felt pain was adequately treated. Discharged back to long term care. Today, patient describes how much better hospitals make him feel. Likes IV pain medication. When discussing better pain control at the facility, resident states \"I don't want anymore medications\".     Left Leg Laceration. Presented to [facility] ED after hitting lower left leg on the side of a van on 7/19/17. Ordered Alginate dressing covered with Kerlix to be changed every 3 days. Today, laceration is scabbed over. Denies pain.     Abdominal Discomfort. Resident states \"My stomach don't feel too good\". Does not describe as far as dull or sharp. Feels bladder empties out. Discusses how pain radiates into his chest and shoulders. Received medications this morning including narcotics. Has not eaten breakfast. Complains about food at facility. States his son brought him in greens to eat. Was resting in power wheelchair upon arrival in room. Reports his last bowel movement was 2 days ago. Staff reports resident had a bowel movement yesterday.     Pseudomonas and Klebsiella Urinary Tract Infection. Started on Ciprofloxacin 500 mg BID x 14 days on 7/29/17. During hospitalization, Ciprofloxacin decreased to QD dosing due to CKD.    ALLERGIES: Morphine  Past " Medical, Surgical, Family and Social History reviewed and updated in [facility].    Current Outpatient Prescriptions   Medication Sig Dispense Refill     senna-docusate (SENOKOT-S;PERICOLACE) 8.6-50 MG per tablet Take 2 tablets by mouth 2 times daily       isosorbide mononitrate (IMDUR) 30 MG 24 hr tablet Take 2 tablets (60 mg) by mouth daily 30 tablet      HYDROmorphone (DILAUDID) 2 MG tablet Take 1 tablet (2 mg) by mouth 3 times daily And q3h PRN       CIPROFLOXACIN PO Take 500 mg by mouth daily        ONDANSETRON PO Take 4 mg by mouth every 8 hours as needed for nausea       furosemide (LASIX) 20 MG tablet Take 1 tablet (20 mg) by mouth 2 times daily 30 tablet      abiraterone (ZYTIGA) 250 MG tablet Take 1,000 mg by mouth daily       carboxymethylcellulose (REFRESH PLUS) 0.5 % SOLN ophthalmic solution Place 1 drop into both eyes 4 times daily And 1 drop in both eyes as needed.       budesonide-formoterol (SYMBICORT) 160-4.5 MCG/ACT Inhaler Inhale 2 puffs into the lungs 2 times daily       predniSONE (DELTASONE) 5 MG tablet Take 1 tablet (5 mg) by mouth 2 times daily 60 tablet 0     ranitidine (RANITIDINE) 75 MG tablet Take 75 mg by mouth daily       diclofenac (VOLTAREN) 1 % GEL topical gel Apply 4 grams to knees four times daily as needed using enclosed dosing card. 100 g 0     insulin glargine (LANTUS) 100 UNIT/ML injection Inject 18 Units Subcutaneous 2 times daily        omeprazole (PRILOSEC) 10 MG CR capsule Take 1 capsule (10 mg) by mouth daily       atorvastatin (LIPITOR) 10 MG tablet Take 10 mg by mouth At Bedtime        tiotropium (SPIRIVA) 18 MCG inhalation capsule Inhale 18 mcg into the lungs daily       polyvinyl alcohol (LIQUIFILM TEARS) 1.4 % ophthalmic solution Place 1 drop into both eyes 3 times daily       acetaminophen (TYLENOL) 500 MG tablet Take 500 mg by mouth every 4 hours as needed for pain Not to exceed 3000 mg/24 hours       nitroglycerin (NITROSTAT) 0.4 MG SL tablet Place 0.4 mg under the  tongue every 5 minutes as needed for chest pain if you are still having symptoms after 3 doses (15 minutes) call 911.       ipratropium - albuterol 0.5 mg/2.5 mg/3 mL (DUONEB) 0.5-2.5 (3) MG/3ML nebulization Take 1 vial (3 mLs) by nebulization every 4 hours as needed for shortness of breath / dyspnea or wheezing 360 mL 3     aspirin 81 MG EC tablet Take 1 tablet (81 mg) by mouth daily 5 tablet 0     metoprolol (TOPROL XL) 50 MG 24 hr tablet Take 1 tablet (50 mg) by mouth daily 5 tablet 0     calcium carb 1250 mg, 500 mg [facility],/vitamin D 200 units (OSCAL WITH D) 500-200 MG-UNIT per tablet Take 1 tablet by mouth 2 times daily (with meals) 10 tablet 0     tamsulosin (FLOMAX) 0.4 MG 24 hr capsule Take 1 capsule (0.4 mg) by mouth daily 5 capsule 0     ammonium lactate (LAC-HYDRIN) 12 % lotion Apply topically 2 times daily as needed for dry skin To all extremities for dry skin       [DISCONTINUED] isosorbide mononitrate (IMDUR) 30 MG 24 hr tablet Take 1 tablet (30 mg) by mouth daily       [DISCONTINUED] enzalutamide (XTANDI) 40 MG capsule Take 4 capsules (160 mg) by mouth daily 120 capsule 0     Medications reviewed:  Medications reconciled to facility chart and changes were made to reflect current medications as identified as above med list. Below are the changes that were made:   Medications stopped since last EPIC medication reconciliation:   There are no discontinued medications.    Medications started since last [facility] medication reconciliation:  No orders of the defined types were placed in this encounter.    REVIEW OF SYSTEMS:  4 point ROS including Respiratory, CV, GI and , other than that noted in the HPI,  is negative    Physical Exam:  /60  Pulse 78  Temp 97.6  F (36.4  C)  Resp 18  Wt 225 lb (102.1 kg)  SpO2 96%  BMI 31.38 kg/m2  GENERAL APPEARANCE:  Alert, in no distress  ENT:  Mouth and posterior oropharynx normal, moist mucous membranes  EYES:  EOM, conjunctivae, lids, pupils and irises  normal  RESP:  respiratory effort and palpation of chest normal, lungs clear to auscultation , no respiratory distress  CV:  Palpation and auscultation of heart done , regular rate and rhythm, no murmur, rub, or gallop  ABDOMEN:  normal bowel sounds, soft, nontender, no hepatosplenomegaly or other masses  M/S:   Lymphedema on BLE  SKIN:  Scab on left shin.   NEURO:   Cranial nerves 2-12 are normal tested and grossly at patient's baseline  PSYCH:  affect and mood normal    Recent Labs:    Last Basic Metabolic Panel:  Lab Results   Component Value Date     07/26/2017      Lab Results   Component Value Date    POTASSIUM 4.2 07/26/2017     Lab Results   Component Value Date    CHLORIDE 103 07/26/2017     Lab Results   Component Value Date    JORDAN 9.2 07/26/2017     Lab Results   Component Value Date    CO2 37 07/26/2017     Lab Results   Component Value Date    BUN 38 07/26/2017     Lab Results   Component Value Date    CR 1.79 07/26/2017     Lab Results   Component Value Date     07/26/2017     Lab Results   Component Value Date    WBC 8.7 07/26/2017     Lab Results   Component Value Date    RBC 2.93 07/26/2017     Lab Results   Component Value Date    HGB 8.1 07/26/2017     Lab Results   Component Value Date    HCT 28.4 07/26/2017     Lab Results   Component Value Date    MCV 97 07/26/2017     Lab Results   Component Value Date    MCH 28 07/26/2017     Lab Results   Component Value Date    MCHC 29 07/26/2017     Lab Results   Component Value Date    RDW 17.0 07/26/2017     Lab Results   Component Value Date     07/26/2017     Assessment/Plan:  Chest Pain. Chronic complaint likely secondary to bone metastasis. Most recent ED visit for chest pain 6/21/17. Isosorbide Mononitrate decreased from 60 mg to 30 mg on 6/20/17. As chest pain is likely related to bone metastasis given improvement with IV pain medication, better treated with narcotic pain medication, but resident is adamant he is does not want  any more medications. May consider Fentanyl Patch, but has used in the past causing hospital admission for AMS at 25 mcg dose. As it is possible chest pain is angina, will attempt increasing Isosorbide Mononitrate back to 60 mg. Reviewed with resident importance of attempting to treat issues at facility rather than sending to ED.     Laceration of Left Leg. Improved. Scab present with no drainage. Will discontinue Alginate dressing change. Continue to monitor.     Abdominal Discomfort. Chronic.Multiple imaging and work-ups negative in the past. Will increase Senna-S to 2 tabs PO BID to determine if this makes resident feel like he empties out completely. Also wrote order for staff to wait until resident has breakfast before morning medications are administered as he currently takes his medications on an empty stomach. Further plans pending results.      Pseudomonas and Klebsiella Urinary Tract Infection. Discussed with [facility] pharmacist. Agree appropriate dosing for Ciprofloxacin is 500 mg BID given CrCl. Will resume BID dosing as previously ordered 7/29/17.     Electronically signed by  SAL Acevedo CNP

## 2017-08-14 NOTE — PROGRESS NOTES
[facility]  HEMATOLOGY ONCOLOGY    ONCOLOGY FOLLOWUP NOTE      DIAGNOSIS:    1- Metastatic prostate cancer with extensive metastases to bone.   2- Advanced COPD and multiple other co-morbidities.      TREATMENT:     Lupron injection monthly, discontinued 6/2016.  Casodex 50 mg daily. discontinued 2/9/2015  - Xgeva monthly  - Xtandi 4/15/15  - Zytgea and prednisone 9/19/2016-present  - Xofigo 1/4/17     Subjective:  Mr. Tomas Grey comes in for followup today. He is tolerating treatment well. He does not have any significant pain. Overall, thinks that his quality of life is good. He went fishing last week.     REVIEW OF SYSTEMS:  A complete review of systems was performed and found to be negative other than pertinent positives mentioned in history of present illness.     Past medical, social histories reviewed.    Meds- Reviewed.     PHYSICAL EXAMINATION:   VITAL SIGNS:BP 91/48 (BP Location: Right arm, Patient Position: Chair, Cuff Size: Adult Large)  Pulse 92  Temp 98.7  F (37.1  C) (Tympanic)  Resp 20  Wt 100.6 kg (221 lb 11.2 oz)  SpO2 95%  BMI 30.92 kg/m2  CONSTITUTIONAL: Appears fatigued.    HEENT: Pupils are equal. Oropharynx is clear.   NECK: No cervical or supraclavicular lymphadenopathy.   RESPIRATORY: Clear bilaterally.   CARD/VASC: S1, S2, regular.   GI: Soft, nontender, nondistended, no hepatosplenomegaly.   MUSKULOSKELETAL: Warm, well perfused.   NEUROLOGIC: Alert, awake.   INTEGUMENT: No rash.   LYMPHATICS: Bilateral edema.   PSYCH: Mood and affect was normal.    LABORATORY DATA AND IMAGING REVIEWED DURING THIS VISIT:  Recent Labs   Lab Test 07/26/17 07/19/17   1335   12/26/16   1851   05/27/16   1410   05/28/15   0715   NA  142  140   < >  146*   < >  143   < >  139   POTASSIUM  4.2  4.8   < >  3.8   < >  3.8   < >  5.5*   CHLORIDE  103  104   < >  99   < >  106   < >  106   CO2  37*  34*   < >  41*   < >  33*   < >  27   ANIONGAP  2.0  2*   < >  5   < >  4   < >  6    BUN  38*  37*   < >  48*   < >  24   < >  26   CR  1.79*  1.77*   < >  1.80*   < >  1.64*   < >  1.66*   GLC  125*  155*   < >  61*   < >  130*   < >  121*   JORDAN  9.2  8.4*   < >  9.2   < >  8.8   < >  8.5   MAG   --    --    --   1.7   --   1.9   < >   --    PHOS   --    --    --    --    --    --    --   3.3    < > = values in this interval not displayed.     Recent Labs   Lab Test 07/26/17 06/21/17   0922 06/12/17 06/01/17   0043   05/17/17   1325   WBC  8.7  7.4  6.5   < >  15.7*   < >  6.0   HGB  8.1*  9.0*  8.9*   < >  9.8*   < >  9.0*   PLT  317  192  245   < >  282   < >  237   MCV  97  95  97   < >  93   < >  96   NEUTROPHIL   --   67.2   --    --   89.3   --   66.3    < > = values in this interval not displayed.     Recent Labs   Lab Test  07/19/17   1335  06/02/17   0627  06/01/17   0043   BILITOTAL  0.4  0.4  0.4   ALKPHOS  152*  90  122   ALT  13  13  12   AST  31  14  11   ALBUMIN  2.7*  2.4*  3.0*   Results for HERMILO KING WILL (MRN [id number]) as of 8/14/2017 10:42   Ref. Range 3/22/2017 13:05 4/19/2017 12:55 5/17/2017 13:25 6/14/2017 13:55   PSA Latest Ref Range: 0 - 4 ug/L 210.71 (H) 249.00 (H) 300.00 (H) 405.00 (H)     ECOG  performance status 3     ASSESSMENT:   1.  Metastatic prostate cancer with multiple sclerotic bone metastases.  The patient continues on monthly Lupron with Casodex.  He has been on this regimen since 05/2014.  He continues to do well symptomatically.  The patient switched care to us after having initial diagnosis and treatment in [place], [place]. He started Xtandi 4/15/15.   He is not able to keep up with his follow ups due to multiple other medical issues and repeated hospital admissions due to COPD exacerbations.   Started Zytega 9/19/16.  He saw Dr. Callahan as a second opinion and had Xofigo 1/4/17. We have continued zytega after that.   - His PSA is  worse. I will try to obatin prostate biopsy tissue from [place] for MSI staining to see if he can qualify for  immunotherapy.   - Anemias is worse due to disease progression as well.   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  - He is not a candidate of chemotherapy, due to poor performance status. Repeated hospital admissions for COPD and infections.   - He is aware of his difficult situation, worsening PSA and no other reasonable treatment options afterwards. He wants to continue current treatment as long as he is asymptomatic.   2.  Extensive bone metastases.   Bone scan 8/31/16 showed progression in bones.   He will continue to take calcium and vitamin D.  Will continue Xgeva.     PLAN:   1- Continue Zytega and prednsione.  2- RTC MD 2 months  3- Labs before next visit- CBC diff, CMP, PSA  4- Continue Xgeva  5- PSA today  6- Please obtain prostate biopsy tissue from [place], tissue to be stained for MSI.  TAYLOR PETERS MD  8/14/2017    CC: Balgobin, Christopher Lennox Paul, MD

## 2017-08-14 NOTE — PROGRESS NOTES
Infusion Nursing Note:  Tomas Grey presents today for labs, xgeva.    Patient seen by provider today:DR Christianson   present during visit today: Not Applicable.    Note: N/A.    Intravenous Access:  Lab draw site R AC, Needle type butterfly, Gauge 23  Labs drawn without difficulty.    Treatment Conditions:  Calcium 8.8    Post Infusion Assessment:  Patient tolerated injection without incident.  Blood return noted pre and post infusion.  Site patent and intact, free from redness, edema or discomfort.  No evidence of extravasations.    Discharge Plan:   Discharge instructions reviewed with: Patient.  AVS to patient via [employer].  Patient will return 9/11 for next appointment.   Patient discharged in stable condition accompanied by: self.  Departure Mode: Ambulatory.    Alicia Tiwari RN

## 2017-08-14 NOTE — LETTER
2017      TO:  [facility]           [address] [place]  [address]    ATTEN:  [facility] Pathology Dept.    RE: Tomas Grey  : 1945      To Whom it May Concern:    Tomas Grey is a patient under the care of Dr. Manpreet Oviedo MD.  Our office is requesting the slides/blocks collected on 2014 at your hospital.  Enclosed is the signed Release of Information from our mutual patient, Mr. Grey.  I understand that the requested materials must be sent via [employer] and I have enclosed our Fed Ex number for your convenience.     If you are in need of any additional information, please do not hesitate to contact me, Gage Yang RN; Patient Care Coordinator at [phone].     Please send all materials to:   [facility]   ATTEN: LAB; Pathology Dept.   [address] [place] [address]    [phone]   (K) [phone]       Fed Ex# [phone].    Thank you for your assistance.  Sincerely,      Gage Yang RN, BSN, OCN  [facility]  Patient Care Coordinator

## 2017-08-14 NOTE — MR AVS SNAPSHOT
After Visit Summary   8/14/2017    Tomas rGey    MRN: [id number]           Patient Information     Date Of Birth          1945        Visit Information        Provider Department      8/14/2017 10:30 AM Manpreet Oviedo MD [facility] RH Oncology [facility]      Today's Diagnoses     Prostate cancer metastatic to multiple sites (H)    -  1      Care Instructions    1- Continue Zytega and prednsione.  2- RTC MD 2 months Scheduled  Dacia SANCHEZ    3- Labs before next visit- CBC diff, CMP, PSA Scheduled  Dacia SANCHEZ    4- Continue Xgeva Scheduled  Dacia SANCHEZ    5- PSA today-Alicia  6- Please obtain prostate biopsy tissue from [place], tissue to be stained for MSI.          Follow-ups after your visit        Your next 10 appointments already scheduled     Sep 11, 2017  1:30 PM CDT   Level 1 with RH INFUSION CHAIR 10   [facility] ([facility])    [facility]  [address]  [facility] [address]   [phone]            Oct 09, 2017  1:30 PM CDT   Level 1 with RH INFUSION CHAIR 8   [facility] ([facility])    [facility]  [address]  [facility] [address]   [phone]            Oct 09, 2017  2:00 PM CDT   Return Visit with Manpreet Oviedo MD   [facility] ([facility])    [facility]  [address]  [place] [address]   [phone]            Nov 06, 2017  1:30 PM CST   Level 1 with RH INFUSION CHAIR 10   [facility] ([facility])    [facility]  [address]  [facility] [address]   [phone]              Future tests that were ordered for you today     Open Future Orders        Priority Expected Expires Ordered    CBC with platelets differential Routine  10/14/2017 12/14/2017 8/14/2017    Comprehensive metabolic panel Routine 10/14/2017 12/14/2017 8/14/2017    PSA tumor marker Routine 10/14/2017 12/14/2017 8/14/2017            Who to contact     If you have questions or need follow up information about today's clinic visit or your schedule please contact [facility] directly at [phone].  Normal or non-critical lab and imaging results will be communicated to you by MyChart, letter or phone within 4 business days after the clinic has received the results. If you do not hear from us within 7 days, please contact the clinic through MyChart or phone. If you have a critical or abnormal lab result, we will notify you by phone as soon as possible.  Submit refill requests through [employer] or call your pharmacy and they will forward the refill request to us. Please allow 3 business days for your refill to be completed.          Additional Information About Your Visit        Care EveryWhere ID     This is your Care EveryWhere ID. This could be used by other organizations to access your [place] medical records  [id number]        Your Vitals Were     Pulse Temperature Respirations Pulse Oximetry BMI (Body Mass Index)       92 98.7  F (37.1  C) (Tympanic) 20 95% 30.92 kg/m2        Blood Pressure from Last 3 Encounters:   08/14/17 91/48   08/08/17 128/60   07/31/17 107/58    Weight from Last 3 Encounters:   08/14/17 100.6 kg (221 lb 11.2 oz)   08/08/17 102.1 kg (225 lb)   07/31/17 101.6 kg (223 lb 14.4 oz)               Primary Care Provider Office Phone # Fax #    Ekaterina SAL Babb [phone]       [address] [place] [address]        Equal Access to Services     PATRICIA BAUM : Elaina Mcclelland, stanley alvarez, jorje torres. So [facility] [phone].    ATENCIÓN: Si habla español, tiene a soria disposición servicios gratuitos de asistencia  lingüísticaDamion Gutierrez al [phone].    We comply with applicable federal civil rights laws and [place] laws. We do not discriminate on the basis of race, color, national origin, age, disability sex, sexual orientation or gender identity.            Thank you!     Thank you for choosing [facility]  for your care. Our goal is always to provide you with excellent care. Hearing back from our patients is one way we can continue to improve our services. Please take a few minutes to complete the written survey that you may receive in the mail after your visit with us. Thank you!             Your Updated Medication List - Protect others around you: Learn how to safely use, store and throw away your medicines at www.disposemymeds.org.          This list is accurate as of: 8/14/17 11:51 AM.  Always use your most recent med list.                   Brand Name Dispense Instructions for use Diagnosis    abiraterone 250 MG tablet    ZYTIGA     Take 1,000 mg by mouth daily        acetaminophen 500 MG tablet    TYLENOL     Take 500 mg by mouth every 4 hours as needed for pain Not to exceed 3000 mg/24 hours        ammonium lactate 12 % lotion    LAC-HYDRIN     Apply topically 2 times daily as needed for dry skin To all extremities for dry skin        aspirin 81 MG EC tablet     5 tablet    Take 1 tablet (81 mg) by mouth daily    ASCVD (arteriosclerotic cardiovascular disease)       budesonide-formoterol 160-4.5 MCG/ACT Inhaler    SYMBICORT     Inhale 2 puffs into the lungs 2 times daily        calcium carb 1250 mg (500 mg [place])/vitamin D 200 units 500-200 MG-UNIT per tablet    OSCAL with D    10 tablet    Take 1 tablet by mouth 2 times daily (with meals)    Malignant neoplasm of prostate (H)       carboxymethylcellulose 0.5 % Soln ophthalmic solution    REFRESH PLUS     Place 1 drop into both eyes 4 times daily And 1 drop in both eyes as needed.        diclofenac 1 % Gel topical gel    VOLTAREN    100 g     Apply 4 grams to knees four times daily as needed using enclosed dosing card.    Chronic pain of both knees       furosemide 20 MG tablet    LASIX    30 tablet    Take 1 tablet (20 mg) by mouth 2 times daily    Chronic diastolic congestive heart failure (H), Lymphedema of both lower extremities       HYDROmorphone 2 MG tablet    DILAUDID     Take 1 tablet (2 mg) by mouth 3 times daily And q3h PRN        insulin glargine 100 UNIT/ML injection    LANTUS     Inject 18 Units Subcutaneous 2 times daily        ipratropium - albuterol 0.5 mg/2.5 mg/3 mL 0.5-2.5 (3) MG/3ML neb solution    DUONEB    360 mL    Take 1 vial (3 mLs) by nebulization every 4 hours as needed for shortness of breath / dyspnea or wheezing        isosorbide mononitrate 30 MG 24 hr tablet    IMDUR    30 tablet    Take 2 tablets (60 mg) by mouth daily        LIPITOR 10 MG tablet   Generic drug:  atorvastatin      Take 10 mg by mouth At Bedtime        metoprolol 50 MG 24 hr tablet    TOPROL XL    5 tablet    Take 1 tablet (50 mg) by mouth daily    ASCVD (arteriosclerotic cardiovascular disease)       nitroGLYcerin 0.4 MG sublingual tablet    NITROSTAT     Place 0.4 mg under the tongue every 5 minutes as needed for chest pain if you are still having symptoms after 3 doses (15 minutes) call 911.        omeprazole 10 MG CR capsule    priLOSEC     Take 1 capsule (10 mg) by mouth daily    Gastric reflux       ONDANSETRON PO      Take 4 mg by mouth every 8 hours as needed for nausea        polyvinyl alcohol 1.4 % ophthalmic solution    LIQUIFILM TEARS     Place 1 drop into both eyes 3 times daily        predniSONE 5 MG tablet    DELTASONE    60 tablet    Take 1 tablet (5 mg) by mouth 2 times daily    Prostate cancer metastatic to bone (H), Metastasis to bone (H), Prostate cancer metastatic to multiple sites (H)       ranitidine 75 MG tablet   Generic drug:  ranitidine      Take 75 mg by mouth daily        senna-docusate 8.6-50 MG per tablet     SENOKOT-S;PERICOLACE     Take 2 tablets by mouth 2 times daily    Abdominal pain, generalized       tamsulosin 0.4 MG capsule    FLOMAX    5 capsule    Take 1 capsule (0.4 mg) by mouth daily    Benign non-nodular prostatic hyperplasia without lower urinary tract symptoms       tiotropium 18 MCG capsule    SPIRIVA     Inhale 18 mcg into the lungs daily

## 2017-08-14 NOTE — MR AVS SNAPSHOT
After Visit Summary   8/14/2017    Tomas Grey    MRN: [id number]           Patient Information     Date Of Birth          1945        Visit Information        Provider Department      8/14/2017 10:00 AM RH INFUSION CHAIR 5 [facility]        Today's Diagnoses     Prostate cancer metastatic to multiple sites (H)    -  1    Metastasis to bone (H)           Follow-ups after your visit        Your next 10 appointments already scheduled     Sep 11, 2017  1:30 PM CDT   Level 1 with RH INFUSION CHAIR 10   [facility] ([facility])    [facility]  [address]  [place] [address]   [phone]            Oct 09, 2017  1:30 PM CDT   Level 1 with RH INFUSION CHAIR 8   [facility] ([facility])    [facility]  [address]  [place] [address]   [phone]            Oct 09, 2017  2:00 PM CDT   Return Visit with Manpreet Oviedo MD   [facility] ([facility])    [facility]  [address]  [place] [address]   [phone]            Nov 06, 2017  1:30 PM CST   Level 1 with RH INFUSION CHAIR 10   [facility] ([facility])    [facility]  [address]  [place] [address]   [phone]              Future tests that were ordered for you today     Open Future Orders        Priority Expected Expires Ordered    CBC with platelets differential Routine 10/14/2017 12/14/2017 8/14/2017    Comprehensive metabolic panel Routine 10/14/2017 12/14/2017 8/14/2017    PSA tumor marker Routine 10/14/2017 12/14/2017 8/14/2017            Who to contact     If you have questions or need follow up information about today's clinic visit or your schedule please  contact [facility] directly at [phone].  Normal or non-critical lab and imaging results will be communicated to you by MyChart, letter or phone within 4 business days after the clinic has received the results. If you do not hear from us within 7 days, please contact the clinic through MyChart or phone. If you have a critical or abnormal lab result, we will notify you by phone as soon as possible.  Submit refill requests through [employer] or call your pharmacy and they will forward the refill request to us. Please allow 3 business days for your refill to be completed.          Additional Information About Your Visit        Care EveryWhere ID     This is your Care EveryWhere ID. This could be used by other organizations to access your [place] medical records  [id number]         Blood Pressure from Last 3 Encounters:   08/14/17 91/48   08/08/17 128/60   07/31/17 107/58    Weight from Last 3 Encounters:   08/14/17 100.6 kg (221 lb 11.2 oz)   08/08/17 102.1 kg (225 lb)   07/31/17 101.6 kg (223 lb 14.4 oz)              We Performed the Following     CBC with platelets differential     Comprehensive metabolic panel     PSA tumor marker        Primary Care Provider Office Phone # Fax #    Ekaterina Sow SAL Lozano [phone]       [address] [place] [address]        Equal Access to Services     PATRICIA BAUM : Hadii shima h[place] hadasho Somissael, waaxda luqadaha, qaybta kaalmada dangelo, jorje musa. So [facility] [phone].    ATENCIÓN: Si habla español, tiene a soria disposición servicios gratuitos de asistencia lingüística. Matt al [phone].    We comply with applicable federal civil rights laws and [place] laws. We do not discriminate on the basis of race, color, national origin, age, disability sex, sexual orientation or gender identity.            Thank you!     Thank you for choosing [facility]  INFUSION SERVICES  for your care. Our goal is always to provide you with excellent care. Hearing back from our patients is one way we can continue to improve our services. Please take a few minutes to complete the written survey that you may receive in the mail after your visit with us. Thank you!             Your Updated Medication List - Protect others around you: Learn how to safely use, store and throw away your medicines at www.disposemymeds.org.          This list is accurate as of: 8/14/17 11:41 AM.  Always use your most recent med list.                   Brand Name Dispense Instructions for use Diagnosis    abiraterone 250 MG tablet    ZYTIGA     Take 1,000 mg by mouth daily        acetaminophen 500 MG tablet    TYLENOL     Take 500 mg by mouth every 4 hours as needed for pain Not to exceed 3000 mg/24 hours        ammonium lactate 12 % lotion    LAC-HYDRIN     Apply topically 2 times daily as needed for dry skin To all extremities for dry skin        aspirin 81 MG EC tablet     5 tablet    Take 1 tablet (81 mg) by mouth daily    ASCVD (arteriosclerotic cardiovascular disease)       budesonide-formoterol 160-4.5 MCG/ACT Inhaler    SYMBICORT     Inhale 2 puffs into the lungs 2 times daily        calcium carb 1250 mg (500 mg [place])/vitamin D 200 units 500-200 MG-UNIT per tablet    OSCAL with D    10 tablet    Take 1 tablet by mouth 2 times daily (with meals)    Malignant neoplasm of prostate (H)       carboxymethylcellulose 0.5 % Soln ophthalmic solution    REFRESH PLUS     Place 1 drop into both eyes 4 times daily And 1 drop in both eyes as needed.        diclofenac 1 % Gel topical gel    VOLTAREN    100 g    Apply 4 grams to knees four times daily as needed using enclosed dosing card.    Chronic pain of both knees       furosemide 20 MG tablet    LASIX    30 tablet    Take 1 tablet (20 mg) by mouth 2 times daily    Chronic diastolic congestive heart failure (H), Lymphedema of both lower extremities        HYDROmorphone 2 MG tablet    DILAUDID     Take 1 tablet (2 mg) by mouth 3 times daily And q3h PRN        insulin glargine 100 UNIT/ML injection    LANTUS     Inject 18 Units Subcutaneous 2 times daily        ipratropium - albuterol 0.5 mg/2.5 mg/3 mL 0.5-2.5 (3) MG/3ML neb solution    DUONEB    360 mL    Take 1 vial (3 mLs) by nebulization every 4 hours as needed for shortness of breath / dyspnea or wheezing        isosorbide mononitrate 30 MG 24 hr tablet    IMDUR    30 tablet    Take 2 tablets (60 mg) by mouth daily        LIPITOR 10 MG tablet   Generic drug:  atorvastatin      Take 10 mg by mouth At Bedtime        metoprolol 50 MG 24 hr tablet    TOPROL XL    5 tablet    Take 1 tablet (50 mg) by mouth daily    ASCVD (arteriosclerotic cardiovascular disease)       nitroGLYcerin 0.4 MG sublingual tablet    NITROSTAT     Place 0.4 mg under the tongue every 5 minutes as needed for chest pain if you are still having symptoms after 3 doses (15 minutes) call 911.        omeprazole 10 MG CR capsule    priLOSEC     Take 1 capsule (10 mg) by mouth daily    Gastric reflux       ONDANSETRON PO      Take 4 mg by mouth every 8 hours as needed for nausea        polyvinyl alcohol 1.4 % ophthalmic solution    LIQUIFILM TEARS     Place 1 drop into both eyes 3 times daily        predniSONE 5 MG tablet    DELTASONE    60 tablet    Take 1 tablet (5 mg) by mouth 2 times daily    Prostate cancer metastatic to bone (H), Metastasis to bone (H), Prostate cancer metastatic to multiple sites (H)       ranitidine 75 MG tablet   Generic drug:  ranitidine      Take 75 mg by mouth daily        senna-docusate 8.6-50 MG per tablet    SENOKOT-S;PERICOLACE     Take 2 tablets by mouth 2 times daily    Abdominal pain, generalized       tamsulosin 0.4 MG capsule    FLOMAX    5 capsule    Take 1 capsule (0.4 mg) by mouth daily    Benign non-nodular prostatic hyperplasia without lower urinary tract symptoms       tiotropium 18 MCG capsule    SPIRIVA      Inhale 18 mcg into the lungs daily

## 2017-08-14 NOTE — PATIENT INSTRUCTIONS
1- Continue Zytega and prednsione.  2- RTC MD 2 months Scheduled  Dacia SANCHEZ    3- Labs before next visit- CBC diff, CMP, PSA Scheduled  Dacia SANCHEZ    4- Continue Xgeva Scheduled  Dacia SANCHEZ    5- PSA today-Alicia done 8/14/17  6- Please obtain prostate biopsy tissue from [place], tissue to be stained for MSI. Letter of request faxed to [facility] Pathology.  Orders placed for MSI and faxed to  Pathology per NISHA Gill 8/16/17

## 2017-08-14 NOTE — LETTER
2017      RE: Tomas Grey  : 1945      To Whom it May Concern:    Tomas Grey was seen in our ER today, 2017. I expect his condition to improve over the next *** days.  He may return to work/school, without restriction, when improved. If not improved during the above expected time period,  then I have encouraged him to be rechecked in his clinic for further evaluation.      Please contact me for questions or concerns.    Sincerely,      Les Franklin MD

## 2017-08-16 NOTE — PROGRESS NOTES
Faxed request to [phone] Pathology dept at [facility] for slides/blocks collected on 5/29/14.  Requested they be sent to [facility] Pathology for MSI staining.  Orders are in for staining and sent to [facility].  [employer] Ex number given for mailing of materials.  Will contact writer with any questions or concerns.  Gage Yang, RN, BSN, OCN  [facility]  Patient Care Coordinator

## 2017-08-22 NOTE — TELEPHONE ENCOUNTER
Received pt's CBC results from 8/21/2017.  Hgb is 6.9 so pt will need blood transfusion per conditional blood orders.  Dr Oviedo also reviewed pt's labs yesterday.  Per Yarelis in infusion, OK for pt to come on 8/24 anytime between 8 and 10.  10:00 at the latest in order to have type and screen done and blood transfusion.  Talked with Madelyn at [facility] where pt lives.  Madelyn states that pt can come on 8/24 at 10 am for blood transfusion.  She will arrange for transportation for pt.  Appt has been scheduled.  Per Dr Oviedo, pt should have repeat CBC 2 weeks after the blood transfusion.  Will arrange for this to happen.  No further action needed at this time.  Velma Ruffin RN BSN

## 2017-08-22 NOTE — PROGRESS NOTES
"[facility]    Chief Complaint   Patient presents with     RECHECK     HPI:    Tomas Grey is a 72 year old  (1945), who is being seen today for an episodic care visit at [facility].  HPI information obtained from: facility chart records, facility staff, patient report and [facility] chart review.Today's concern is:    Chronic Abdominal Pain. On 8/10/17, resident called 911 himself and was sent to [facility] ED for a stomach ache. Received IV Dilaudid and IV fluid bolus. An abdominal CT revealed \"..Nonobstructing 3 mm right renal calculus. Fusiform infrarenal abdominal aorta and aortic aneurysm measuring 3.2 cm. Bladder diverticulum. Moderate-sized fat-containing umbilical hernia, stable\". Labs revealed Hemoglobin 7.7. Discharged back to long term care with no new orders. Today, resident complains of right-sided pain slightly above hip. Reports his son massaged it and it went away.     Prostate Cancer Metastatic to Bone. Seen by Dr. Oviedo on 8/14/17. PSA worsening. Anemia worsening due to disease progression. Not a candidate for chemotherapy due to poor performance status. Obtaining outside medical records to determine if candidate for immunotherapy. Notes indicate resident wants to continue current treatment of Zytega, Prednisone and Xgeva as long as he is asymptomatic. Today, son is present and questions cancer treatment and progression. Would like to know if his father is improving.     Type 2 Diabetes Mellitus. Upon review of blood sugars over the past week, range is as follows:    Breakfast: 100-189  Lunch: 104-182  Dinner: 102-235  Bedtime: 102-206    ALLERGIES: Morphine  Past Medical, Surgical, Family and Social History reviewed and updated in [facility].    Current Outpatient Prescriptions   Medication Sig Dispense Refill     senna-docusate (SENOKOT-S;PERICOLACE) 8.6-50 MG per tablet Take 2 tablets by mouth 2 times daily       isosorbide " mononitrate (IMDUR) 30 MG 24 hr tablet Take 2 tablets (60 mg) by mouth daily 30 tablet      HYDROmorphone (DILAUDID) 2 MG tablet Take 1 tablet (2 mg) by mouth 3 times daily And q3h PRN       ONDANSETRON PO Take 4 mg by mouth every 8 hours as needed for nausea       furosemide (LASIX) 20 MG tablet Take 1 tablet (20 mg) by mouth 2 times daily 30 tablet      abiraterone (ZYTIGA) 250 MG tablet Take 1,000 mg by mouth daily       carboxymethylcellulose (REFRESH PLUS) 0.5 % SOLN ophthalmic solution Place 1 drop into both eyes 4 times daily And 1 drop in both eyes as needed.       budesonide-formoterol (SYMBICORT) 160-4.5 MCG/ACT Inhaler Inhale 2 puffs into the lungs 2 times daily       predniSONE (DELTASONE) 5 MG tablet Take 1 tablet (5 mg) by mouth 2 times daily 60 tablet 0     ranitidine (RANITIDINE) 75 MG tablet Take 75 mg by mouth daily       diclofenac (VOLTAREN) 1 % GEL topical gel Apply 4 grams to knees four times daily as needed using enclosed dosing card. 100 g 0     insulin glargine (LANTUS) 100 UNIT/ML injection Inject 18 Units Subcutaneous 2 times daily        omeprazole (PRILOSEC) 10 MG CR capsule Take 1 capsule (10 mg) by mouth daily       atorvastatin (LIPITOR) 10 MG tablet Take 10 mg by mouth At Bedtime        tiotropium (SPIRIVA) 18 MCG inhalation capsule Inhale 18 mcg into the lungs daily       polyvinyl alcohol (LIQUIFILM TEARS) 1.4 % ophthalmic solution Place 1 drop into both eyes 3 times daily       acetaminophen (TYLENOL) 500 MG tablet Take 500 mg by mouth every 4 hours as needed for pain Not to exceed 3000 mg/24 hours       nitroglycerin (NITROSTAT) 0.4 MG SL tablet Place 0.4 mg under the tongue every 5 minutes as needed for chest pain if you are still having symptoms after 3 doses (15 minutes) call 911.       ipratropium - albuterol 0.5 mg/2.5 mg/3 mL (DUONEB) 0.5-2.5 (3) MG/3ML nebulization Take 1 vial (3 mLs) by nebulization every 4 hours as needed for shortness of breath / dyspnea or wheezing 360  mL 3     aspirin 81 MG EC tablet Take 1 tablet (81 mg) by mouth daily 5 tablet 0     metoprolol (TOPROL XL) 50 MG 24 hr tablet Take 1 tablet (50 mg) by mouth daily 5 tablet 0     calcium carb 1250 mg, 500 mg [place],/vitamin D 200 units (OSCAL WITH D) 500-200 MG-UNIT per tablet Take 1 tablet by mouth 2 times daily (with meals) 10 tablet 0     tamsulosin (FLOMAX) 0.4 MG 24 hr capsule Take 1 capsule (0.4 mg) by mouth daily 5 capsule 0     ammonium lactate (LAC-HYDRIN) 12 % lotion Apply topically 2 times daily as needed for dry skin To all extremities for dry skin       [DISCONTINUED] enzalutamide (XTANDI) 40 MG capsule Take 4 capsules (160 mg) by mouth daily 120 capsule 0     Medications reviewed:  Medications reconciled to facility chart and changes were made to reflect current medications as identified as above med list. Below are the changes that were made:   Medications stopped since last EPIC medication reconciliation:   There are no discontinued medications.    Medications started since last [facility] medication reconciliation:  No orders of the defined types were placed in this encounter.    REVIEW OF SYSTEMS:  4 point ROS including Respiratory, CV, GI and , other than that noted in the HPI,  is negative    Physical Exam:  /68  Pulse 90  Temp 98.5  F (36.9  C)  Resp 18  Wt 224 lb (101.6 kg)  SpO2 96%  BMI 31.24 kg/m2  GENERAL APPEARANCE:  Alert, in no distress  ENT:  Mouth and posterior oropharynx normal, moist mucous membranes  EYES:  EOM, conjunctivae, lids, pupils and irises normal  RESP:  respiratory effort and palpation of chest normal, no respiratory distress, diminished breath sounds throughout  CV:  Palpation and auscultation of heart done , regular rate and rhythm, no murmur, rub, or gallop  ABDOMEN:  normal bowel sounds, soft, nontender, no hepatosplenomegaly or other masses  M/S:   Active movement of bilateral upper and lower extremities.  SKIN:  Left lower leg laceration round with minmal  slough and moderate amount of drainage.  NEURO:   Cranial nerves 2-12 are normal tested and grossly at patient's baseline  PSYCH:  affect and mood normal    Recent Labs:    Last Basic Metabolic Panel:  Lab Results   Component Value Date     08/14/2017      Lab Results   Component Value Date    POTASSIUM 4.3 08/14/2017     Lab Results   Component Value Date    CHLORIDE 106 08/14/2017     Lab Results   Component Value Date    JORDAN 8.8 08/14/2017     Lab Results   Component Value Date    CO2 31 08/14/2017     Lab Results   Component Value Date    BUN 31 08/14/2017     Lab Results   Component Value Date    CR 1.74 08/14/2017     Lab Results   Component Value Date     08/14/2017     Lab Results   Component Value Date    WBC 8.7 08/14/2017     Lab Results   Component Value Date    RBC 2.62 08/14/2017     Lab Results   Component Value Date    HGB 7.0 08/14/2017     Lab Results   Component Value Date    HCT 24.9 08/14/2017     No components found for: MCT  Lab Results   Component Value Date    MCV 95 08/14/2017     Lab Results   Component Value Date    MCH 26.7 08/14/2017     Lab Results   Component Value Date    MCHC 28.1 08/14/2017     Lab Results   Component Value Date    RDW 18.1 08/14/2017     Lab Results   Component Value Date     08/14/2017     Assessment/Plan:  Chronic Abdominal Pain. No acute findings noted from abdominal CT during ED visit 8/10/17. Would recommend surveillance for aortic aneurysm based on size of 3.2 cm. Having 1-2 bowel movements per day over the past 4 days. No emesis. Hydromorphone scheduled for pain control. Continue to monitor and encourage work-up at facility first as this is the 2nd ED visit this month and 10 ED visit/hospitalization in 6 months at [facility]. Resident has stated in the past he enjoys going to hospitals to receive IV pain medication.     Prostate Cancer Metastatic to Bone. Discussed with son rising PSA despite  treatment and overall goal of pain management. Recommended son contact Oncologist, Dr. Oviedo, to discuss overall prognosis. Number given to contact Dr. Oviedo at [facility]. Also requested facility arrange follow-up appointment with Palliative Care to discuss overall prognosis, pain control and code status. Resident in agreement.     Type 2 Diabetes Mellitus. Last A1C 6.2 on 8/8/17. Based on A1C and low blood sugars, will decrease Glargine to 16 U BID. Continue Accuchecks as ordered and adjust treatment accordingly.     Electronically signed by  SAL Acevedo CNP

## 2017-08-29 NOTE — PROGRESS NOTES
Infusion Nursing Note:  Tomas Grey presents today for 1 unit prbc.    Patient seen by provider today: No   present during visit today: Not Applicable.    Note: reviewed need for blood, potential risks, side effects of transfusion.  Consent signed 8/28/17    Intravenous Access:  Lab draw site L AC, Needle type butterfly, Gauge 23.  Labs drawn without difficulty.  Peripheral IV placed.    Treatment Conditions:  HGB 8/21  6.9 - was to have come in 2 weeks ago for transfusion    Post Infusion Assessment:  Patient tolerated infusion without incident.  Site patent and intact, free from redness, edema or discomfort.  No evidence of extravasations.  Access discontinued per protocol.    Discharge Plan:   Discharge instructions reviewed with: Patient. Papers sent with pt to return to staff at [facility]  Copy of AVS reviewed with patient and/or family.  Patient will return 9/11 for labs and xgeva for next appointment.  Patient discharged in stable condition accompanied by: self.  Departure Mode: Wheelchair.    Alicia Tiwari RN

## 2017-08-29 NOTE — MR AVS SNAPSHOT
After Visit Summary   8/29/2017    Tomas Grey    MRN: [id number]           Patient Information     Date Of Birth          1945        Visit Information        Provider Department      8/29/2017 10:00 AM RH INFUSION CHAIR 4 [facility]        Today's Diagnoses     Prostate cancer metastatic to multiple sites (H)    -  1    Anemia in neoplastic disease           Follow-ups after your visit        Your next 10 appointments already scheduled     Sep 11, 2017  1:30 PM CDT   Level 1 with RH INFUSION CHAIR 5   [facility] ([facility])    [facility]  [address]  [facility] [address]   [phone]            Oct 09, 2017  1:30 PM CDT   Level 1 with RH INFUSION CHAIR 8   [facility] ([facility])    [facility]  [address]  [facility] [address]   [phone]            Oct 09, 2017  2:00 PM CDT   Return Visit with Manpreet Oviedo MD   [facility] ([facility])    [facility]  [address]  [facility] [address]   [phone]            Nov 06, 2017  1:30 PM CST   Level 1 with RH INFUSION CHAIR 10   [facility] ([facility])    [facility]  [address]  [facility] [address] [phone]              Future tests that were ordered for you today     Open Standing Orders        Priority Remaining Interval Expires Ordered    Red blood cell prepare order unit Routine 99/100 CONDITIONAL (SPECIFY) BLOOD  8/25/2017            Who to contact     If you have questions or need follow up information about today's clinic visit or your schedule please contact [facility] directly at [phone].  Normal or  non-critical lab and imaging results will be communicated to you by MyChart, letter or phone within 4 business days after the clinic has received the results. If you do not hear from us within 7 days, please contact the clinic through MyChart or phone. If you have a critical or abnormal lab result, we will notify you by phone as soon as possible.  Submit refill requests through [employer] or call your pharmacy and they will forward the refill request to us. Please allow 3 business days for your refill to be completed.          Additional Information About Your Visit        Care EveryWhere ID     This is your Care EveryWhere ID. This could be used by other organizations to access your [place] medical records  [id number]        Your Vitals Were     Pulse Temperature Respirations Pulse Oximetry          79 98.4  F (36.9  C) (Tympanic) 20 93%         Blood Pressure from Last 3 Encounters:   08/29/17 119/67   08/22/17 137/68   08/14/17 91/48    Weight from Last 3 Encounters:   08/22/17 101.6 kg (224 lb)   08/14/17 100.6 kg (221 lb 11.2 oz)   08/08/17 102.1 kg (225 lb)              We Performed the Following     ABO/Rh type and screen     Blood component     Transfuse red blood cell unit        Primary Care Provider Office Phone # Fax #    Ekaterina SAL Babb [phone]       [address] [address]        Equal Access to Services     [facility]: Hadii aad ku hadasho Soarthurali, waaxda luqadaha, qaybta kaalmada adechelseayada, jorje prather . So [facility] [phone].    ATENCIÓN: Si habla español, tiene a soria disposición servicios gratuitos de asistencia lingüística. Llame al [phone].    We comply with applicable federal civil rights laws and [place] laws. We do not discriminate on the basis of race, color, national origin, age, disability sex, sexual orientation or gender identity.            Thank you!     Thank you for choosing [facility]  for your care. Our goal is always to provide you with excellent care. Hearing back from our patients is one way we can continue to improve our services. Please take a few minutes to complete the written survey that you may receive in the mail after your visit with us. Thank you!             Your Updated Medication List - Protect others around you: Learn how to safely use, store and throw away your medicines at www.disposemymeds.org.          This list is accurate as of: 8/29/17  3:54 PM.  Always use your most recent med list.                   Brand Name Dispense Instructions for use Diagnosis    abiraterone 250 MG tablet    ZYTIGA     Take 1,000 mg by mouth daily        acetaminophen 500 MG tablet    TYLENOL     Take 500 mg by mouth every 4 hours as needed for pain Not to exceed 3000 mg/24 hours        ammonium lactate 12 % lotion    LAC-HYDRIN     Apply topically 2 times daily as needed for dry skin To all extremities for dry skin        aspirin 81 MG EC tablet     5 tablet    Take 1 tablet (81 mg) by mouth daily    ASCVD (arteriosclerotic cardiovascular disease)       budesonide-formoterol 160-4.5 MCG/ACT Inhaler    SYMBICORT     Inhale 2 puffs into the lungs 2 times daily        calcium carb 1250 mg (500 mg [place])/vitamin D 200 units 500-200 MG-UNIT per tablet    OSCAL with D    10 tablet    Take 1 tablet by mouth 2 times daily (with meals)    Malignant neoplasm of prostate (H)       carboxymethylcellulose 0.5 % Soln ophthalmic solution    REFRESH PLUS     Place 1 drop into both eyes 4 times daily And 1 drop in both eyes as needed.        diclofenac 1 % Gel topical gel    VOLTAREN    100 g    Apply 4 grams to knees four times daily as needed using enclosed dosing card.    Chronic pain of both knees       furosemide 20 MG tablet    LASIX    30 tablet    Take 1 tablet (20 mg) by mouth 2 times daily    Chronic diastolic congestive heart failure (H), Lymphedema of both lower extremities        HYDROmorphone 2 MG tablet    DILAUDID     Take 1 tablet (2 mg) by mouth 3 times daily And q3h PRN        insulin glargine 100 UNIT/ML injection    LANTUS     Inject 16 Units Subcutaneous 2 times daily        ipratropium - albuterol 0.5 mg/2.5 mg/3 mL 0.5-2.5 (3) MG/3ML neb solution    DUONEB    360 mL    Take 1 vial (3 mLs) by nebulization every 4 hours as needed for shortness of breath / dyspnea or wheezing        isosorbide mononitrate 30 MG 24 hr tablet    IMDUR    30 tablet    Take 2 tablets (60 mg) by mouth daily        LIPITOR 10 MG tablet   Generic drug:  atorvastatin      Take 10 mg by mouth At Bedtime        metoprolol 50 MG 24 hr tablet    TOPROL XL    5 tablet    Take 1 tablet (50 mg) by mouth daily    ASCVD (arteriosclerotic cardiovascular disease)       nitroGLYcerin 0.4 MG sublingual tablet    NITROSTAT     Place 0.4 mg under the tongue every 5 minutes as needed for chest pain if you are still having symptoms after 3 doses (15 minutes) call 911.        omeprazole 10 MG CR capsule    priLOSEC     Take 1 capsule (10 mg) by mouth daily    Gastric reflux       ONDANSETRON PO      Take 4 mg by mouth every 8 hours as needed for nausea        polyvinyl alcohol 1.4 % ophthalmic solution    LIQUIFILM TEARS     Place 1 drop into both eyes 3 times daily        predniSONE 5 MG tablet    DELTASONE    60 tablet    Take 1 tablet (5 mg) by mouth 2 times daily    Prostate cancer metastatic to bone (H), Metastasis to bone (H), Prostate cancer metastatic to multiple sites (H)       ranitidine 75 MG tablet   Generic drug:  ranitidine      Take 75 mg by mouth daily        senna-docusate 8.6-50 MG per tablet    SENOKOT-S;PERICOLACE     Take 2 tablets by mouth 2 times daily    Abdominal pain, generalized       tamsulosin 0.4 MG capsule    FLOMAX    5 capsule    Take 1 capsule (0.4 mg) by mouth daily    Benign non-nodular prostatic hyperplasia without lower urinary tract symptoms       tiotropium 18 MCG capsule    SPIRIVA      Inhale 18 mcg into the lungs daily

## 2017-09-07 NOTE — PROGRESS NOTES
[facility]  PRIMARY CARE PROVIDER AND CLINIC:  Ekaterina Lozano [address] / [place] [address]  Chief Complaint   Patient presents with     Hospital F/U     HPI:    Tomas Grey is a 72 year old  (1945), re-admitted to the [facility] from Hospital  [facility]. Hospital stay 8/30/17 through 9/1/17.  Re-admitted to this facility for  medical management and nursing care.  HPI information obtained from: facility chart records, facility staff, patient report and [facility] chart review. Hospital course per review of discharge summary:    This is a 72-year-old male, with a past medical history significant for severe COPD on supplemental Oxygen, obstructive sleep apnea, prostate cancer metastatic to bone, type 2 diabetes mellitus, chronic kidney disease stage III, chronic diastolic heart failure with an EF 55-60% on 4/28/15, coronary artery disease, hypertension and lymphedema, who was admitted to [facility] for chills, headache and fever. Of note this is the 11 hospitalization/ED visit in the past 6 months. During this hospitalization, labs revealed WBC 12.9 and Lactic Acid 2.2. A urine culture revealed > 100,000cfu/ml ESBL-producing E. Coli. Blood cultures revealed ESBL-producing E. Coli. Infectious Disease was consulted and IV antibiotics were initiated. Troponin and D. Dimer were elevated. Required increased supplemental Oxygen needs. A CTA was negative for pulmonary embolism. Noted to have brief runs of atrial fibrillation on telemetry. Discharged back to long term care when medically stable.        Current issues are:        Denies issues with urination or fever. Questions why he takes so many medications. Reports it's so hard on his stomach. Discussed episode of atrial fibrillation during hospitalization. Questions if his heart rhythm should be checked at facility. Would be open to anticoagulation. Wants to live  "\"for a long time\". Denies increasing shortness of breath.     CODE STATUS/ADVANCE DIRECTIVES DISCUSSION:   CPR/Full code   Patient's living condition: lives in a skilled nursing facility    ALLERGIES:Morphine  PAST MEDICAL HISTORY:  has a past medical history of Anemia; Anxiety; Aspiration pneumonia (H) (2014); C. difficile colitis; CAD (coronary artery disease); Chronic pain; CKD (chronic kidney disease); COPD (chronic obstructive pulmonary disease) (H); Depressive disorder; Diabetes mellitus (H); Hypertension; Insomnia; ALEXIS (obstructive sleep apnea); Osteoporosis; and Prostate cancer metastatic to multiple sites (H).  PAST SURGICAL HISTORY:  has a past surgical history that includes Abdomen surgery; Arthroscopy knee; and Eye surgery.  FAMILY HISTORY: family history includes CANCER in his mother; DIABETES in his mother.  SOCIAL HISTORY:  reports that he has quit smoking. He has never used smokeless tobacco. He reports that he does not drink alcohol or use illicit drugs.    Post Discharge Medication Reconciliation Status: discharge medications reconciled, continue medications without change.  Current Outpatient Prescriptions   Medication Sig Dispense Refill     predniSONE (DELTASONE) 5 MG tablet Take 1 tablet (5 mg) by mouth 2 times daily 60 tablet 0     abiraterone (ZYTIGA) 250 MG tablet Take 4 tablets (1,000 mg) by mouth daily for 30 doses Take on empty stomach. 120 tablet 0     senna-docusate (SENOKOT-S;PERICOLACE) 8.6-50 MG per tablet Take 2 tablets by mouth 2 times daily       isosorbide mononitrate (IMDUR) 30 MG 24 hr tablet Take 2 tablets (60 mg) by mouth daily 30 tablet      HYDROmorphone (DILAUDID) 2 MG tablet Take 1 tablet (2 mg) by mouth 3 times daily And q3h PRN       ONDANSETRON PO Take 4 mg by mouth every 8 hours as needed for nausea       furosemide (LASIX) 20 MG tablet Take 1 tablet (20 mg) by mouth 2 times daily 30 tablet      abiraterone (ZYTIGA) 250 MG tablet Take 1,000 mg by mouth daily       " carboxymethylcellulose (REFRESH PLUS) 0.5 % SOLN ophthalmic solution Place 1 drop into both eyes 4 times daily And 1 drop in both eyes as needed.       budesonide-formoterol (SYMBICORT) 160-4.5 MCG/ACT Inhaler Inhale 2 puffs into the lungs 2 times daily       predniSONE (DELTASONE) 5 MG tablet Take 1 tablet (5 mg) by mouth 2 times daily 60 tablet 0     ranitidine (RANITIDINE) 75 MG tablet Take 75 mg by mouth daily       diclofenac (VOLTAREN) 1 % GEL topical gel Apply 4 grams to knees four times daily as needed using enclosed dosing card. 100 g 0     insulin glargine (LANTUS) 100 UNIT/ML injection Inject 16 Units Subcutaneous 2 times daily       omeprazole (PRILOSEC) 10 MG CR capsule Take 1 capsule (10 mg) by mouth daily       atorvastatin (LIPITOR) 10 MG tablet Take 10 mg by mouth At Bedtime        tiotropium (SPIRIVA) 18 MCG inhalation capsule Inhale 18 mcg into the lungs daily       polyvinyl alcohol (LIQUIFILM TEARS) 1.4 % ophthalmic solution Place 1 drop into both eyes 3 times daily       acetaminophen (TYLENOL) 500 MG tablet Take 500 mg by mouth every 4 hours as needed for pain Not to exceed 3000 mg/24 hours       [DISCONTINUED] enzalutamide (XTANDI) 40 MG capsule Take 4 capsules (160 mg) by mouth daily 120 capsule 0     nitroglycerin (NITROSTAT) 0.4 MG SL tablet Place 0.4 mg under the tongue every 5 minutes as needed for chest pain if you are still having symptoms after 3 doses (15 minutes) call 911.       ipratropium - albuterol 0.5 mg/2.5 mg/3 mL (DUONEB) 0.5-2.5 (3) MG/3ML nebulization Take 1 vial (3 mLs) by nebulization every 4 hours as needed for shortness of breath / dyspnea or wheezing 360 mL 3     aspirin 81 MG EC tablet Take 1 tablet (81 mg) by mouth daily 5 tablet 0     metoprolol (TOPROL XL) 50 MG 24 hr tablet Take 1 tablet (50 mg) by mouth daily 5 tablet 0     calcium carb 1250 mg, 500 mg [place],/vitamin D 200 units (OSCAL WITH D) 500-200 MG-UNIT per tablet Take 1 tablet by mouth 2 times daily (with  meals) 10 tablet 0     tamsulosin (FLOMAX) 0.4 MG 24 hr capsule Take 1 capsule (0.4 mg) by mouth daily 5 capsule 0     ammonium lactate (LAC-HYDRIN) 12 % lotion Apply topically 2 times daily as needed for dry skin To all extremities for dry skin         ROS:  4 point ROS including Respiratory, CV, GI and , other than that noted in the HPI,  is negative    Exam:  There were no vitals taken for this visit.  GENERAL APPEARANCE:  Alert, in no distress  ENT:  Mouth and posterior oropharynx normal, moist mucous membranes  EYES:  EOM, conjunctivae, lids, pupils and irises normal  RESP:  respiratory effort and palpation of chest normal, no respiratory distress, diminished breath sounds throughout  CV:  Palpation and auscultation of heart done , regular rate and rhythm, no murmur, rub, or gallop  ABDOMEN:  normal bowel sounds, soft, nontender, no hepatosplenomegaly or other masses  M/S:   Active movement of bilateral upper and lower extremities. ACE wraps on BLE.  SKIN:  Inspection of skin and subcutaneous tissue baseline, Palpation of skin and subcutaneous tissue baseline  NEURO:   Cranial nerves 2-12 are normal tested and grossly at patient's baseline  PSYCH:  affect and mood normal    Lab/Diagnostic data:  Last Basic Metabolic Panel:  Lab Results   Component Value Date     08/14/2017      Lab Results   Component Value Date    POTASSIUM 4.3 08/14/2017     Lab Results   Component Value Date    CHLORIDE 106 08/14/2017     Lab Results   Component Value Date    JORDAN 8.8 08/14/2017     Lab Results   Component Value Date    CO2 31 08/14/2017     Lab Results   Component Value Date    BUN 31 08/14/2017     Lab Results   Component Value Date    CR 1.74 08/14/2017     Lab Results   Component Value Date     08/14/2017     Lab Results   Component Value Date    WBC 8.1 08/21/2017     Lab Results   Component Value Date    RBC 2.62 08/21/2017     Lab Results   Component Value Date    HGB 6.9 08/21/2017     Lab Results    Component Value Date    HCT 24.8 08/21/2017     Lab Results   Component Value Date    MCV 95 08/21/2017     Lab Results   Component Value Date    MCH 26 08/21/2017     Lab Results   Component Value Date    MCHC 28 08/21/2017     Lab Results   Component Value Date    RDW 18.3 08/21/2017     Lab Results   Component Value Date     08/21/2017     ASSESSMENT/PLAN:  ESBL E. Coli Urinary Tract Infection with Bacteremia. Follow-up with [facility] Infectious Disease as scheduled. Continue Ertapenem IV as ordered to complete a 2 week course. PICC line in place. Of note, treated for Providencia Rettgeri UTI 7/1/17 with Rocephin and Bactrim DS. Treated for Pseudomonas and Klebsiella UTI 7/29/17 with Ciprofloxacin.     Paroxysmal Atrial Fibrillation. New this hospitalization. CHADS2-VASC Score 5. Discussed risks versus benefits of anticoagulation with resident who is agreeable to initiating. Will send communication to Oncologist, Dr. Oviedo, to ensure he is in agreement with initiating. Further plans pending response. ADDENDUM 9/9/17: Dr. Oviedo is in agreeement with initiating anticoagulation. Will initiate Warfarin 2.5 mg PO QD 9/9/17 with INR 9/11/17.     Elevated Troponin with History of Coronary Artery Disease/Hypertension/Chronic Diastolic Heart Failure with EF 55-60%. Likely demand ischemia in the setting of recent illness. Monitor blood pressure and weights weekly. Continue Aspirin, Isosorbide Mononitrate, Metoprolol, Atorvastatin and Nitroglycerin as ordered.    Chronic Obstructive Pulmonary Disease on Chronic Oxygen Supplementation/Obstructive Sleep Apnea. Due to be seen by Pulmonologist. Z-Pack can be used PRN for exacerbation. Continue Tiotropium and Budesonide as ordered. DuoNebs available PRN. Also on Prednisone for Zytiga supplement. BIPAP to be worn at night for sleep apnea, but resident often refuses.    Anemia. Worsening with metastatic prostate cancer disease progression. Recent baseline Hemoglobin  8-10. Received 1 U of PRBCs as ordered by Oncology on 8/29/17. Last Hemoglobin 8.6 on 8/31/17. Monitor periodically.     Prostate Cancer Metastatic to Bone. Follow-up with Dr. Oviedo on 10/9/17. Previously followed by Dr. Gallagher in Palliative Care. Taking Zytiga and Prednisone as well as Xgeva. PSA continues to rise despite treatment. Continue Hydromorphone as ordered for pain control. Also takes Tamsulosin.       Urinary Retention in the Setting of Metastatic Lymph Node Dissection. Evaluated by Urology, Dr. Graf, on 3/22/17. Creatinine continues to rise and this may be due to bladder outlet obstruction. Resistant to surgical options. Instructed how to self-catheterize, but resident resistant. Told Urology his main goal is avoiding pain. Voids as able. Continue Tamsulosin as ordered.    Lymphedema of Both Lower Extremities with Venous Stasis Ulcers. Physical Therapy ordered for lymphedema wraps. Continue Furosemide as ordered.     Chronic Kidney Disease Stage III. Baseline Creatinine upper 1s. Last Creatinine 1.28 on 9/1/17. Monitor BMP periodically to ensure stability.       Type 2 Diabetes Mellitus. Last A1C 6.2 on 8/8/17. Glargine dose last reduced 8/22/17.      Gastroesophageal Reflux. Continue Omeprazole and Ranitidine as ordered.      Anxiety and Depression. Resident previously refused antidepressant and in-house therapist visits.    Electronically signed by:  SAL Acevedo CNP

## 2017-09-13 NOTE — PROGRESS NOTES
Infusion Nursing Note:  Tomas Grey presents today for xgeva.    Patient seen by provider today: No   present during visit today: Not Applicable.    Note: N/A.    Intravenous Access:  No Intravenous access/labs at this visit.    Treatment Conditions:  Lab Results   Component Value Date     08/14/2017                   Lab Results   Component Value Date    POTASSIUM 4.3 08/14/2017           Lab Results   Component Value Date    MAG 1.7 12/26/2016            Lab Results   Component Value Date    CR 1.74 08/14/2017                   Lab Results   Component Value Date    JORDAN 8.8 08/14/2017                Lab Results   Component Value Date    BILITOTAL 0.5 08/14/2017           Lab Results   Component Value Date    ALBUMIN 2.3 08/14/2017                    Lab Results   Component Value Date    ALT 9 08/14/2017           Lab Results   Component Value Date    AST 18 08/14/2017           Post Infusion Assessment:  Patient tolerated injection without incident.  Site patent and intact, free from redness, edema or discomfort.    Discharge Plan:   AVS to patient via MYCHART.  Patient will return 10/9 for next appointment.   Patient discharged in stable condition accompanied by: self.  Departure Mode: electric scooter.    Alicia Tiwari RN

## 2017-09-13 NOTE — PROGRESS NOTES
Oral Chemotherapy Monitoring Program  Patient Follow Up    Primary Oncologist: Dr. Oviedo  Primary Oncology Clinic: [facility]  Cancer Diagnosis: Prostate CA    Therapy History:  zytiga 1000 mg PO every day started on 9/20/16     Drug Interaction Assessment: No DI     Lab Monitoring Plan  Monthly CMP/BP  Subjective/Objective:  Tomas Grey is a 72 year old male seen in clinic for a follow-up visit for oral chemotherapy. He does not report any issues or concerns on medication. He reports that he is being given his medication at the nursing home as scheduled.    ORAL CHEMOTHERAPY 7/27/2016 8/22/2016 10/31/2016 2/13/2017 3/22/2017 5/25/2017 7/27/2017   Drug Name Xtandi (Enzalutamide) Xtandi (Enzalutamide) Zytiga (Abiraterone) Zytiga (Abiraterone) Zytiga (Abiraterone) Zytiga (Abiraterone) Zytiga (Abiraterone)   Current Dosage 160mg 160mg 1000mg 1000mg 1000mg 1000mg 1000mg   Current Schedule Daily Daily Daily Daily Daily Daily Daily   Cycle Details Continuous Continuous Continuous Continuous Continuous Continuous Continuous   Start Date of Last Cycle 6/27/2016 - 10/17/2016 - - - -   Planned next cycle start date - - 11/14/2016 2/26/2017 - 6/15/2017 -   Doses missed in last 2 weeks 0 0 0 0 0 0 0   Adherence Assessment - - - Adherent Adherent Adherent Adherent   Adverse Effects None - - No AE identified during assessment No AE identified during assessment No AE identified during assessment No AE identified during assessment   Home BPs not needed - all BPs<140/90 all BPs<140/90 all BPs<140/90 all BPs<140/90 -   Any new drug interactions? - - No No No No No   Is the dose as ordered appropriate for the patient? - - - Yes Yes Yes Yes   Is the patient currently in pain? - - Assessed in last 30 days. Yes Assessed in last 30 days. Assessed in last 30 days. Yes   Does the patient feel the pain is currently being managed by a provider? - - - Yes - - Yes   Has the patient been assessed within the past 6 months for  "depression? - - - Yes Yes Yes No   Has the patient missed any days of school, work, or other routine activity? - - - No No No No       Last PHQ-2 Score on record:   PHQ-2 ( 1999 [employer]) 4/10/2017 3/22/2017   Q1: Little interest or pleasure in doing things 0 0   Q2: Feeling down, depressed or hopeless 0 0   PHQ-2 Score 0 0       Patient does not report depression symptoms.      Vitals:  BP:   BP Readings from Last 1 Encounters:   09/13/17 147/69     Wt Readings from Last 1 Encounters:   09/07/17 99.8 kg (220 lb)     Estimated body surface area is 2.24 meters squared as calculated from the following:    Height as of 6/22/17: 1.803 m (5' 11\").    Weight as of 9/7/17: 99.8 kg (220 lb).    Labs:  Lab Results   Component Value Date     08/14/2017      Lab Results   Component Value Date    POTASSIUM 4.3 08/14/2017     Lab Results   Component Value Date    JORDAN 8.8 08/14/2017     Lab Results   Component Value Date    ALBUMIN 2.3 08/14/2017     Lab Results   Component Value Date    MAG 1.7 12/26/2016     Lab Results   Component Value Date    PHOS 3.3 05/28/2015     Lab Results   Component Value Date    BUN 31 08/14/2017     Lab Results   Component Value Date    CR 1.74 08/14/2017       Lab Results   Component Value Date    AST 18 08/14/2017     Lab Results   Component Value Date    ALT 9 08/14/2017     Lab Results   Component Value Date    BILITOTAL 0.5 08/14/2017       Lab Results   Component Value Date    WBC 8.1 08/21/2017     Lab Results   Component Value Date    HGB 6.9 08/21/2017     Lab Results   Component Value Date     08/21/2017     Lab Results   Component Value Date    ANEU 6.2 08/14/2017         Assessment & Plan:  Patient reports doing well on medication. We obtained pill count from nursing home per Ayla Cam, and patient has 1 new bottle and 56 tablets from previous fill. Will modify refill date to account for pills.    Follow-Up:  In 1 month.    Refill Due:  In 1.5 months    Johana Castillo, " PharmD, BCPS, BCOP  Hematology/Oncology Clinical Pharmacist  [facility]  [email or web address]

## 2017-09-13 NOTE — MR AVS SNAPSHOT
After Visit Summary   9/13/2017    Tomas Grey    MRN: [id number]           Patient Information     Date Of Birth          1945        Visit Information        Provider Department      9/13/2017 12:00 PM RH INFUSION CHAIR 1 [facility]        Today's Diagnoses     Metastasis to bone (H)    -  1    Prostate cancer metastatic to multiple sites (H)           Follow-ups after your visit        Your next 10 appointments already scheduled     Oct 09, 2017  1:30 PM CDT   Level 1 with RH INFUSION CHAIR 8   [facility] ([facility])    [facility]  [address]  [facility] [address]   [phone]            Oct 09, 2017  2:00 PM CDT   Return Visit with Manpreet Oviedo MD   [facility] ([facility])    [facility]  [address]  [facility] [address]   [phone]            Nov 06, 2017  1:30 PM CST   Level 1 with RH INFUSION CHAIR 10   [facility] ([facility])    [facility]  [address]  [facility] [address] [phone]              Who to contact     If you have questions or need follow up information about today's clinic visit or your schedule please contact [facility] directly at [phone].  Normal or non-critical lab and imaging results will be communicated to you by MyChart, letter or phone within 4 business days after the clinic has received the results. If you do not hear from us within 7 days, please contact the clinic through MyChart or phone. If you have a critical or abnormal lab result, we will notify you by phone as soon as possible.  Submit refill requests through [employer] or call your pharmacy and they will forward the refill request to us. Please allow 3 business days  "for your refill to be completed.          Additional Information About Your Visit        [employer] Information     [employer] lets you send messages to your doctor, view your test results, renew your prescriptions, schedule appointments and more. To sign up, go to [email or web address] . Click on \"Log in\" on the left side of the screen, which will take you to the Welcome page. Then click on \"Sign up Now\" on the right side of the page.     You will be asked to enter the access code listed below, as well as some personal information. Please follow the directions to create your username and password.     Your access code is: [email or web address]  Expires: 2017 12:54 PM     Your access code will  in 90 days. If you need help or a new code, please call your [place] clinic or [phone].        Care EveryWhere ID     This is your Care EveryWhere ID. This could be used by other organizations to access your [place] medical records  [id number]        Your Vitals Were     Pulse Temperature Respirations             102 98.1  F (36.7  C) (Tympanic) 22          Blood Pressure from Last 3 Encounters:   17 147/69   17 130/70   17 119/67    Weight from Last 3 Encounters:   17 99.8 kg (220 lb)   17 101.6 kg (224 lb)   17 100.6 kg (221 lb 11.2 oz)              Today, you had the following     No orders found for display       Primary Care Provider Office Phone # Fax #    SAL Acevedo [phone]       [address] [address]        Equal Access to Services     [facility] : Hadii shima Mcclelland, waarleneda luqadaha, qaybta kaalmada dangelo, jorje prather . So [facility] [phone].    ATENCIÓN: Si habla español, tiene a soria disposición servicios gratuitos de asistencia lingüística. Llame al [phone].    We comply with applicable federal civil rights laws and [place] laws. We do not discriminate on the basis " of race, color, national origin, age, disability sex, sexual orientation or gender identity.            Thank you!     Thank you for choosing [facility]  for your care. Our goal is always to provide you with excellent care. Hearing back from our patients is one way we can continue to improve our services. Please take a few minutes to complete the written survey that you may receive in the mail after your visit with us. Thank you!             Your Updated Medication List - Protect others around you: Learn how to safely use, store and throw away your medicines at www.disposemymeds.org.          This list is accurate as of: 9/13/17 12:54 PM.  Always use your most recent med list.                   Brand Name Dispense Instructions for use Diagnosis    abiraterone 250 MG tablet    ZYTIGA    120 tablet    Take 4 tablets (1,000 mg) by mouth daily for 30 doses Take on empty stomach.    Prostate cancer metastatic to bone (H), Metastasis to bone (H), Prostate cancer metastatic to multiple sites (H)       acetaminophen 500 MG tablet    TYLENOL     Take 500 mg by mouth every 4 hours as needed for pain Not to exceed 3000 mg/24 hours        ammonium lactate 12 % lotion    LAC-HYDRIN     Apply topically 2 times daily as needed for dry skin To all extremities for dry skin        aspirin 81 MG EC tablet     5 tablet    Take 1 tablet (81 mg) by mouth daily    ASCVD (arteriosclerotic cardiovascular disease)       budesonide-formoterol 160-4.5 MCG/ACT Inhaler    SYMBICORT     Inhale 2 puffs into the lungs 2 times daily        calcium carbonate 1500 (600 CA) MG tablet    OS-JORDAN 600 mg [place]. Ca     Take 1,500 mg by mouth 2 times daily (with meals)        carboxymethylcellulose 0.5 % Soln ophthalmic solution    REFRESH PLUS     Place 1 drop into both eyes 4 times daily And 1 drop in both eyes as needed.        diclofenac 1 % Gel topical gel    VOLTAREN    100 g    Apply 4 grams to knees four times daily  as needed using enclosed dosing card.    Chronic pain of both knees       ertapenem 1 GM vial    INVanz     Inject 1 g into the vein every 24 hours x12 days        furosemide 20 MG tablet    LASIX    30 tablet    Take 1 tablet (20 mg) by mouth 2 times daily    Chronic diastolic congestive heart failure (H), Lymphedema of both lower extremities       HYDROmorphone 2 MG tablet    DILAUDID     Take 1 tablet (2 mg) by mouth 3 times daily And q3h PRN        insulin glargine 100 UNIT/ML injection    LANTUS     Inject 16 Units Subcutaneous 2 times daily        ipratropium - albuterol 0.5 mg/2.5 mg/3 mL 0.5-2.5 (3) MG/3ML neb solution    DUONEB    360 mL    Take 1 vial (3 mLs) by nebulization every 4 hours as needed for shortness of breath / dyspnea or wheezing        isosorbide mononitrate 30 MG 24 hr tablet    IMDUR    30 tablet    Take 2 tablets (60 mg) by mouth daily        LIPITOR 10 MG tablet   Generic drug:  atorvastatin      Take 10 mg by mouth At Bedtime        metoprolol 50 MG 24 hr tablet    TOPROL XL    5 tablet    Take 1 tablet (50 mg) by mouth daily    ASCVD (arteriosclerotic cardiovascular disease)       nitroGLYcerin 0.4 MG sublingual tablet    NITROSTAT     Place 0.4 mg under the tongue every 5 minutes as needed for chest pain if you are still having symptoms after 3 doses (15 minutes) call 911.        omeprazole 10 MG CR capsule    priLOSEC     Take 1 capsule (10 mg) by mouth daily    Gastric reflux       ONDANSETRON PO      Take 4 mg by mouth every 8 hours as needed for nausea        polyvinyl alcohol 1.4 % ophthalmic solution    LIQUIFILM TEARS     Place 1 drop into both eyes 3 times daily        predniSONE 5 MG tablet    DELTASONE    60 tablet    Take 1 tablet (5 mg) by mouth 2 times daily    Prostate cancer metastatic to bone (H), Metastasis to bone (H), Prostate cancer metastatic to multiple sites (H)       ranitidine 75 MG tablet   Generic drug:  ranitidine      Take 150 mg by mouth daily         senna-docusate 8.6-50 MG per tablet    SENOKOT-S;PERICOLACE     Take 2 tablets by mouth 2 times daily    Abdominal pain, generalized       tamsulosin 0.4 MG capsule    FLOMAX    5 capsule    Take 1 capsule (0.4 mg) by mouth daily    Benign non-nodular prostatic hyperplasia without lower urinary tract symptoms       tiotropium 18 MCG capsule    SPIRIVA     Inhale 18 mcg into the lungs daily        WARFARIN SODIUM PO      Take by mouth daily See facility orders for INR dosing

## 2017-09-15 NOTE — TELEPHONE ENCOUNTER
Received faxed lab report from pt's rehab facility.  CBC/diff/plt that was done on 9/12/2017.  Two weeks post blood transfusion.  Hemoglobin has improved to 8.5.  Will show results to Dr Oviedo when he is in the clinic on 9/18.  Velma Ruffin RN BSN

## 2017-09-18 NOTE — TELEPHONE ENCOUNTER
Results have been reviewed by Dr Oviedo.  Copy has been sent to scanning.  No further action needed at this time.  Velma Ruffin RN BSN

## 2017-09-26 NOTE — ED AVS SNAPSHOT
[facility], [place], Emergency Department    [address] [address]    Phone:  [phone]                                       Tomas Grey   MRN: [id number]    Department:  [facility], Emergency Department   Date of Visit:  9/26/2017           After Visit Summary Signature Page     I have received my discharge instructions, and my questions have been answered. I have discussed any challenges I see with this plan with the nurse or doctor.    ..........................................................................................................................................  Patient/Patient Representative Signature      ..........................................................................................................................................  Patient Representative Print Name and Relationship to Patient    ..................................................               ................................................  Date                                            Time    ..........................................................................................................................................  Reviewed by Signature/Title    ...................................................              ..............................................  Date                                                            Time

## 2017-09-26 NOTE — ED NOTES
Pt BIBA from [facility] for c/o Hgb 5.5 drawn this am. Pt report rectal pain and bleeding. Pt denies SOA or dizziness. + leg swelling.

## 2017-09-26 NOTE — TELEPHONE ENCOUNTER
Received call from Tanya, nurse at [facility] pt had labs today and hgb 5.5.  Pt seen per NP at facility and they are sending pt to [facility] ED for blood transfusion and evaluation.  Message sent to Dr Oviedo with update.  Labs received per fax and sent to scanning.    Jocelin Morales RN, BSN

## 2017-09-26 NOTE — TELEPHONE ENCOUNTER
1.  Date/reason for appt:10/4/17, COPD    2.  Referring provider: JOE SINGER    3.  Call to patient (Yes / No - short description): No, referred     4.  Previous care at / records requested from:   [facility]

## 2017-09-26 NOTE — ED AVS SNAPSHOT
[facility], Emergency Department    [address] [facility] [address]    Phone:  [phone]                                       Tomas Grey   MRN: [id number]    Department:  [facility], Emergency Department   Date of Visit:  9/26/2017           Patient Information     Date Of Birth          1945        Your diagnoses for this visit were:     Anemia, unspecified type        You were seen by Percy Cedeno MD.      Follow-up Information     Schedule an appointment as soon as possible for a visit with Ekaterina Lozano APRN CNP.    Specialty:  Nurse Practitioner    Contact information:    [address]  [facility] [address]  [phone]          Follow up with [facility], Emergency Department.    Specialty:  EMERGENCY MEDICINE    Why:  If symptoms worsen    Contact information:    [address] [facility] [id number] [phone]    Additional information:    The [facility] is located on the corner of [facility] and [facility] on the [facility]. It is easily accessible from virtually any point in the [facility] area, via [employer] and [place].        Discharge Instructions         Anemia During Cancer    When levels of healthy red blood cells (RBCs) in the body drop to levels that are below normal, the condition is called anemia. Anemia can occur during cancer and its treatment for many reasons. Read below to learn more about anemia during cancer and how it s treated.  What is anemia?  RBCs are made in the bone marrow. Normal blood has about 35% to 50% RBCs. Anemic blood has less than 35% RBCs. RBCs carry oxygen around the body. If you have low levels of RBCs, not enough oxygen is delivered to your body. This may cause symptoms of dizziness, weakness, or tiredness. You may have trouble doing daily tasks. You may often feel cold. And you may be short of breath and have a rapid heartbeat. But some people with anemia have no  symptoms.  Why anemia can occur with cancer  Anemia during cancer can have several causes:. These include:    Treatments that destroy bone marrow, such as chemotherapy and radiation therapy    Blood loss during surgery    Low levels of certain B vitamins or iron    Kidney disease leading to low levels of EPO (erythropoietin), a substance the body needs to make RBCs    Wasting (nutritional problems and weight loss) from cancer that lower the body s ability to make RBCs  Testing for anemia  A blood sample is taken from your arm and then tested. Several different types of tests may be done on this blood. Some count the numbers of blood cells. Others test for substances the body uses to make RBCs, such as vitamins and iron.  Treating anemia during cancer  Your treatment will depend on the cause of your anemia. It will also depend on how severe your symptoms are. Your doctor can tell you more about treatment options and their risks and benefits for you. Treatments include the following:    RBC transfusion. A tube (cannula) is put into a vein in the hand or arm. RBCs from a donor are sent through the tube into the body. This increases the number of healthy RBCs in the body. This can reverse anemia very quickly. RBC transfusions are safe. However, there are some risks. Your doctor will discuss them with you. Be sure you understand these risks.You may need to sign a consent form before receiving treatment.    Erythropoiesis stimulating agent (SIVAKUMAR). This is medicine that causes the body to make more RBCs. An SIVAKUMAR is given as a shot. It may be given along with iron (see below). An SIVAKUMAR takes several weeks or months to reverse anemia. There are special risks with SIVAKUMAR treatment. Your doctor will discuss them with you. Be sure you understand these risks.You may need to sign a consent form before receiving treatment.    Intravenous (IV) iron. A liquid medicine containing iron is given by shots or IV line. Several treatments may be  given. IV iron is usually used if you are being given an SIVAKUMAR. IV iron usually takes 1 to 4 weeks to reverse anemia.    Oral supplements. Iron or vitamin B supplements may be given. These come in liquid or pill form, to take by mouth or they may be by injection. Oral supplements can take weeks or months to reverse anemia.  Checking your progress  During the course of your treatment, you ll likely have more blood tests. These are to check your blood levels and your response to the treatment.  Risks and complications  Each treatment has its own risks. Your healthcare provider will tell you what risks apply to you. These may include:    Fever    Hives or other allergic reactions    Iron overload    High or low blood pressure    Nausea    Liver inflammation    Blood clots   Date Last Reviewed: 12/27/2015 2000-2017 [employer]. [address] [place] [address], [place] [address]. All rights reserved. This information is not intended as a substitute for professional medical care. Always follow your healthcare professional's instructions.          Future Appointments        Provider Department Dept Phone Center    10/4/2017 11:30 AM Pulmonary Function Lab [facility] Pulmonary Function Testing [phone] [facility]    10/4/2017 12:35 PM [facility] XRAY ROOM 1 [facility] Xray [phone] [facility]    10/4/2017 12:55 PM Otto Loera MD [facility] [phone] [facility]    10/9/2017 1:30 PM RH Infusion Chair 8 [facility] [phone] [facility]    10/9/2017 2:00 PM Manpreet Oviedo MD [facility] [phone] [facility]    11/6/2017 1:30 PM RH Infusion Chair 10 [facility] [phone] [facility]      24 Hour Appointment Hotline       To make an appointment at any [facility] clinic, call [phone] ([phone]). If you don't have a family doctor or clinic, we will  help you find one. [facility] are conveniently located to serve the needs of you and your family.             Review of your medicines      Our records show that you are taking the medicines listed below. If these are incorrect, please call your family doctor or clinic.        Dose / Directions Last dose taken    abiraterone 250 MG tablet   Commonly known as:  ZYTIGA   Dose:  1000 mg   Quantity:  120 tablet        Take 4 tablets (1,000 mg) by mouth daily for 30 doses Take on empty stomach.   Refills:  0        acetaminophen 500 MG tablet   Commonly known as:  TYLENOL   Dose:  500 mg        Take 500 mg by mouth every 4 hours as needed for pain Not to exceed 3000 mg/24 hours   Refills:  0        ammonium lactate 12 % lotion   Commonly known as:  LAC-HYDRIN   Indication:  Abnormal Dryness of Skin        Apply topically 2 times daily as needed for dry skin To all extremities for dry skin   Refills:  0        aspirin 81 MG EC tablet   Dose:  81 mg   Quantity:  5 tablet        Take 1 tablet (81 mg) by mouth daily   Refills:  0        budesonide-formoterol 160-4.5 MCG/ACT Inhaler   Commonly known as:  SYMBICORT   Dose:  2 puff        Inhale 2 puffs into the lungs 2 times daily   Refills:  0        calcium carbonate 1500 (600 CA) MG tablet   Commonly known as:  OS-JORDAN 600 mg [place]. Ca   Dose:  1500 mg        Take 1,500 mg by mouth 2 times daily (with meals)   Refills:  0        carboxymethylcellulose 0.5 % Soln ophthalmic solution   Commonly known as:  REFRESH PLUS   Dose:  1 drop        Place 1 drop into both eyes 4 times daily And 1 drop in both eyes as needed.   Refills:  0        diclofenac 1 % Gel topical gel   Commonly known as:  VOLTAREN   Quantity:  100 g        Apply 4 grams to knees four times daily as needed using enclosed dosing card.   Refills:  0        furosemide 20 MG tablet   Commonly known as:  LASIX   Dose:  20 mg   Quantity:  30 tablet        Take 1 tablet (20 mg) by mouth 2 times daily   Refills:   0        HYDROmorphone 2 MG tablet   Commonly known as:  DILAUDID   Dose:  2 mg        Take 1 tablet (2 mg) by mouth 3 times daily And q3h PRN   Refills:  0        insulin glargine 100 UNIT/ML injection   Commonly known as:  LANTUS   Dose:  16 Units   Indication:  Type 2 Diabetes        Inject 16 Units Subcutaneous 2 times daily   Refills:  0        ipratropium - albuterol 0.5 mg/2.5 mg/3 mL 0.5-2.5 (3) MG/3ML neb solution   Commonly known as:  DUONEB   Dose:  1 vial   Quantity:  360 mL        Take 1 vial (3 mLs) by nebulization every 4 hours as needed for shortness of breath / dyspnea or wheezing   Refills:  3        isosorbide mononitrate 30 MG 24 hr tablet   Commonly known as:  IMDUR   Dose:  60 mg   Quantity:  30 tablet        Take 2 tablets (60 mg) by mouth daily   Refills:  0        LIPITOR 10 MG tablet   Dose:  10 mg   Generic drug:  atorvastatin        Take 10 mg by mouth At Bedtime   Refills:  0        metoprolol 50 MG 24 hr tablet   Commonly known as:  TOPROL XL   Dose:  50 mg   Quantity:  5 tablet        Take 1 tablet (50 mg) by mouth daily   Refills:  0        nitroGLYcerin 0.4 MG sublingual tablet   Commonly known as:  NITROSTAT   Dose:  0.4 mg        Place 0.4 mg under the tongue every 5 minutes as needed for chest pain if you are still having symptoms after 3 doses (15 minutes) call 911.   Refills:  0        omeprazole 10 MG CR capsule   Commonly known as:  priLOSEC   Dose:  10 mg        Take 1 capsule (10 mg) by mouth daily   Refills:  0        ONDANSETRON PO   Dose:  4 mg        Take 4 mg by mouth every 8 hours as needed for nausea   Refills:  0        polyvinyl alcohol 1.4 % ophthalmic solution   Commonly known as:  LIQUIFILM TEARS   Dose:  1 drop        Place 1 drop into both eyes 3 times daily   Refills:  0        predniSONE 5 MG tablet   Commonly known as:  DELTASONE   Dose:  5 mg   Quantity:  60 tablet        Take 1 tablet (5 mg) by mouth 2 times daily   Refills:  0        ranitidine 75 MG  tablet   Dose:  150 mg   Indication:  Gastroesophageal Reflux Disease   Generic drug:  ranitidine        Take 150 mg by mouth daily   Refills:  0        senna-docusate 8.6-50 MG per tablet   Commonly known as:  SENOKOT-S;PERICOLACE   Dose:  2 tablet        Take 2 tablets by mouth 2 times daily   Refills:  0        tamsulosin 0.4 MG capsule   Commonly known as:  FLOMAX   Dose:  0.4 mg   Quantity:  5 capsule        Take 1 capsule (0.4 mg) by mouth daily   Refills:  0        tiotropium 18 MCG capsule   Commonly known as:  SPIRIVA   Dose:  18 mcg   Indication:  Chronic Obstructive Lung Disease        Inhale 18 mcg into the lungs daily   Refills:  0        WARFARIN SODIUM PO        Take by mouth daily See facility orders for INR dosing   Refills:  0                Procedures and tests performed during your visit     CBC with platelets differential    Comprehensive metabolic panel    EKG 12-lead, tracing only    INR    Lactic acid whole blood    Troponin I      Orders Needing Specimen Collection     None      Pending Results     Date and Time Order Name Status Description    9/26/2017 1659 EKG 12-lead, tracing only Preliminary             Pending Culture Results     No orders found from 9/24/2017 to 9/27/2017.            Pending Results Instructions     If you had any lab results that were not finalized at the time of your Discharge, you can call the ED Lab Result RN at [phone]. You will be contacted by this team for any positive Lab results or changes in treatment. The nurses are available 7 days a week from 10A to 6:30P.  You can leave a message 24 hours per day and they will return your call.        Thank you for choosing [facility]       Thank you for choosing [facility] for your care. Our goal is always to provide you with excellent care. Hearing back from our patients is one way we can continue to improve our services. Please take a few minutes to complete the written survey that you may receive in the mail after  "you visit with us. Thank you!        [employer] Information     [employer] lets you send messages to your doctor, view your test results, renew your prescriptions, schedule appointments and more. To sign up, go to [email or web address] . Click on \"Log in\" on the left side of the screen, which will take you to the Welcome page. Then click on \"Sign up Now\" on the right side of the page.     You will be asked to enter the access code listed below, as well as some personal information. Please follow the directions to create your username and password.     Your access code is: [email or web address]  Expires: 2017 12:54 PM     Your access code will  in 90 days. If you need help or a new code, please call your [facility] clinic or [phone].        Care EveryWhere ID     This is your Care EveryWhere ID. This could be used by other organizations to access your [facility] medical records  [id number]        Equal Access to Services     ZAKIA BAUM : Hadii aad ku hadasho Soarthurali, waaxda luqadaha, qaybta kaalmada adechelseayaanita, jorje prather . So [facility] [phone].    ATENCIÓN: Si habla español, tiene a soria disposición servicios gratuitos de asistencia lingüística. Llame al [phone].    We comply with applicable federal civil rights laws and [place] laws. We do not discriminate on the basis of race, color, national origin, age, disability sex, sexual orientation or gender identity.            After Visit Summary       This is your record. Keep this with you and show to your community pharmacist(s) and doctor(s) at your next visit.                  "

## 2017-09-26 NOTE — ED PROVIDER NOTES
"  History     Chief Complaint   Patient presents with     Abnormal Labs     Pt BIBA from [facility] for c/o Hgb 5.5 drawn this am. Pt report rectal pain and bleeding. Pt denies SOA or dizziness. + leg swelling.     HPI  Tomas Grey is a 72 year old male with a history of hypertension, diabetes, anemia, CAD, COPD, CKD, and metastatic prostate cancer currently on chemotherapy who presents via ambulance from his nursing facility for evaluation of abnormal laboratory results. Patient was sent by his nursing facility staff after having laboratory studies done this morning return with low hemoglobin at 5.5. Here, patient complains of generalized fatigue, rectal \"soreness\" as well as perioral soreness. He does report having intermittent \"spotting\" rectal bleeding over the past 3-4 days. He denies hemoptysis, hematemesis, or hematuria. He has not noticed his gums bleeding, just that they are \"sore\". He denies chest pain or shortness of breath changed from baseline. Patient denies abdominal pain, but states he has not eaten anything since he left home this morning. Patient currently lives at [facility]. He is anticoagulated on Coumadin.      I have reviewed the Medications, Allergies, Past Medical and Surgical History, and Social History in the [employer] system.  Past Medical History:   Diagnosis Date     Anemia      Anxiety      Aspiration pneumonia (H) 2014     C. difficile colitis      CAD (coronary artery disease)      Chronic pain      CKD (chronic kidney disease)      COPD (chronic obstructive pulmonary disease) (H)      Depressive disorder      Diabetes mellitus (H)      Hypertension      Insomnia      ALEXIS (obstructive sleep apnea)      Osteoporosis      Prostate cancer metastatic to multiple sites (H)        Past Surgical History:   Procedure Laterality Date     ABDOMEN SURGERY       ARTHROSCOPY KNEE       EYE SURGERY         Family History   Problem Relation Age of Onset     DIABETES " "Mother      CANCER Mother      stomach       Social History   Substance Use Topics     Smoking status: Former Smoker     Smokeless tobacco: Never Used     Alcohol use No       No current facility-administered medications for this encounter.      Current Outpatient Prescriptions   Medication     WARFARIN SODIUM PO     calcium carbonate (OS-JORDAN 600 MG [place]. CA) 1500 (600 CA) MG tablet     abiraterone (ZYTIGA) 250 MG tablet     senna-docusate (SENOKOT-S;PERICOLACE) 8.6-50 MG per tablet     isosorbide mononitrate (IMDUR) 30 MG 24 hr tablet     HYDROmorphone (DILAUDID) 2 MG tablet     ONDANSETRON PO     furosemide (LASIX) 20 MG tablet     carboxymethylcellulose (REFRESH PLUS) 0.5 % SOLN ophthalmic solution     budesonide-formoterol (SYMBICORT) 160-4.5 MCG/ACT Inhaler     predniSONE (DELTASONE) 5 MG tablet     ranitidine (RANITIDINE) 75 MG tablet     diclofenac (VOLTAREN) 1 % GEL topical gel     insulin glargine (LANTUS) 100 UNIT/ML injection     omeprazole (PRILOSEC) 10 MG CR capsule     atorvastatin (LIPITOR) 10 MG tablet     tiotropium (SPIRIVA) 18 MCG inhalation capsule     polyvinyl alcohol (LIQUIFILM TEARS) 1.4 % ophthalmic solution     acetaminophen (TYLENOL) 500 MG tablet     [DISCONTINUED] enzalutamide (XTANDI) 40 MG capsule     nitroglycerin (NITROSTAT) 0.4 MG SL tablet     ipratropium - albuterol 0.5 mg/2.5 mg/3 mL (DUONEB) 0.5-2.5 (3) MG/3ML nebulization     aspirin 81 MG EC tablet     metoprolol (TOPROL XL) 50 MG 24 hr tablet     tamsulosin (FLOMAX) 0.4 MG 24 hr capsule     ammonium lactate (LAC-HYDRIN) 12 % lotion        Allergies   Allergen Reactions     Morphine      Pt states not an allergy       Review of Systems  All other systems negative except as noted in the HPI.    Physical Exam   BP: 132/50  Heart Rate: 76  Temp: 97.6  F (36.4  C)  Resp: 16  Height: 180.3 cm (5' 11\")  Weight: 100.2 kg (221 lb)  SpO2: 94 %  Physical Exam  Gen: NAD, sitting on stretcher, conversant and pleasant, non-toxic " appearing  HEENT: NCAT, PERRL, EOMI, MMM  Neck: trachea midline, supple with FROM  Cardio: normal rate regular rhythm, no R/M/G, normally perfused and warm  Pulm: steady, non-labored respirations, normal WOB, CTAB, no w/r/r  Abd: soft nt/nd, no organomegaly, no CVA tenderness. Rectal normal. NO MELENA, GUAIAC NEG  Ext: normal peripheral pulses, no edema, neurovascularly intact distally.  Skin: no rashes or signs of trauma  Neuro: no focal deficits, 5/5 strength in all ext    ED Course     ED Course     Procedures       5:29 PM  The patient was seen and examined by Dr. Cedeno in Room 6.         Labs Ordered and Resulted from Time of ED Arrival Up to the Time of Departure from the ED   CBC WITH PLATELETS DIFFERENTIAL - Abnormal; Notable for the following:        Result Value    RBC Count 2.80 (*)     Hemoglobin 7.4 (*)     Hematocrit 26.1 (*)     MCH 26.4 (*)     MCHC 28.4 (*)     RDW 19.1 (*)     Absolute Lymphocytes 0.6 (*)     All other components within normal limits   INR - Abnormal; Notable for the following:     INR 2.25 (*)     All other components within normal limits   COMPREHENSIVE METABOLIC PANEL - Abnormal; Notable for the following:     Glucose 125 (*)     Urea Nitrogen 47 (*)     Creatinine 1.83 (*)     GFR Estimate 37 (*)     GFR Estimate If Black 44 (*)     Calcium 7.4 (*)     Albumin 2.5 (*)     Alkaline Phosphatase 191 (*)     All other components within normal limits   LACTIC ACID WHOLE BLOOD   TROPONIN I            Assessments & Plan (with Medical Decision Making)   This is a 72-year-old male with a history of COPD, CKD, CHF, diabetes, chronic pain and metastatic prostate cancer with known anemia, secondary to malignancy, who presents to the emergency department for abnormal labs.  He was told that he had a hemoglobin of 5.5 as an outpatient today, and was sent in for evaluation and possible blood product transfusion. He did have a recent transfusion a few months ago due to low blood counts which  they suspected was secondary to his malignancy. Patient specifically denies any ischemic symptoms, no fatigue, shortness of breath or chest pain. On exam he is remarkably well-appearing and in good spirits. His rectal exam was unremarkable with a negative guaiac. His repeat labs were unremarkable. His INR was within his parameters as he is newly on Coumadin, and his hemoglobin was actually normal for him at 7.64, which is right around his baseline. Given that he had no anemia symptoms and his hemoglobin is at baseline, patient was reassured and discharged back to his nursing home; he will follow up with oncology in the next couple of days for outpatient management of his chronic anemia secondary to malignancy. Patient reassured, discharged home at this time in good condition with return to emergency department precautions.     This part of the medical record was transcribed by Tobi García Medical Scribe.    I have reviewed the nursing notes.    I have reviewed the findings, diagnosis, plan and need for follow up with the patient.    Discharge Medication List as of 9/26/2017  7:23 PM          Final diagnoses:   Anemia, unspecified type   I, Safia Rushing, am serving as a trained medical scribe to document services personally performed by Percy Cedeno MD, based on the provider's statements to me.   I, Percy Cedeno MD, was physically present and have reviewed and verified the accuracy of this note documented by Safia Rushing.      9/26/2017   [facility] [place], EMERGENCY DEPARTMENT     Percy Cedeno MD  10/01/17 2858

## 2017-09-26 NOTE — PROGRESS NOTES
[facility]    Chief Complaint   Patient presents with     halfway Regulatory     HPI:    Tomas Grey is a 72 year old  (1945), who is being seen today for a federally mandated E/M visit at [facility].  HPI information obtained from: facility chart records, facility staff, patient report and [facility] chart review.Today's concerns are:    Anemia. Received report from facility staff later in the day, after visit, of critical lab value of Hemoglobin 5.5. Previous Hemoglobin 8.6 on 9/19/17. Hemoglobin 6.9 on 8/21/17 and 1 U PRBC transfusion arranged by Oncology. Last seen by Dr. Oviedo on 8/14/17 and anemia noted to be worsening due to disease progression. Of note, resident did complain of blood from his bottom last week while the aides were cleaning him up. Received no report from nursing.    Paroxysmal Atrial Fibrillation. First noted during hospitalization 8/30/17 through 9/1/17 at [facility] for ESBL E. Coli UTI. Communicated with Oncologist, Dr. Oviedo, and initiated on Warfarin 2.5 mg PO QD on 9/9/17. INR 1.0 on 9/11/17. Warfarin increased to 5 mg. INR 1.1 on 9/13/17. Warfarin increased to 6 mg. INR 1.5 mg on 9/15/17. Warfarin increased to 7 mg. INR. INR 3.4 on 9/18/17. Warfarin 5 mg PO QD ordered. INR 4 on 9/21/17. Warfarin held until 1 mg administered 9/24/17.  INR 2.8 on 9/25/17.     Recurrent UTI. Hospitalized for bacteremia secondary to ESBL E. Coli UTI 8/30/17. Treated with IV Ertapenem. Scheduled to follow-up with [facility] 1 week after hospitalization, but missed appointment. Discussed recurrent UTIs with ID nurse via telephone who recommended follow-up with Urology per ID MD. Treated for Providencia Rettgeri UTI 7/1/17 with Rocephin and Bactrim DS. Treated for Pseudomonas and Klebsiella UTI 7/29/17 with Ciprofloxacin.     ALLERGIES: Morphine  PAST MEDICAL HISTORY:  has a past medical history of Anemia; Anxiety; Aspiration  pneumonia (H) (2014); C. difficile colitis; CAD (coronary artery disease); Chronic pain; CKD (chronic kidney disease); COPD (chronic obstructive pulmonary disease) (H); Depressive disorder; Diabetes mellitus (H); Hypertension; Insomnia; ALEXIS (obstructive sleep apnea); Osteoporosis; and Prostate cancer metastatic to multiple sites (H).  PAST SURGICAL HISTORY:  has a past surgical history that includes Abdomen surgery; Arthroscopy knee; and Eye surgery.  FAMILY HISTORY: family history includes CANCER in his mother; DIABETES in his mother.  SOCIAL HISTORY:  reports that he has quit smoking. He has never used smokeless tobacco. He reports that he does not drink alcohol or use illicit drugs.    MEDICATIONS:  Current Outpatient Prescriptions   Medication Sig Dispense Refill     WARFARIN SODIUM PO Take by mouth daily See facility orders for INR dosing       calcium carbonate (OS-JORDAN 600 MG [place]. CA) 1500 (600 CA) MG tablet Take 1,500 mg by mouth 2 times daily (with meals)       abiraterone (ZYTIGA) 250 MG tablet Take 4 tablets (1,000 mg) by mouth daily for 30 doses Take on empty stomach. 120 tablet 0     senna-docusate (SENOKOT-S;PERICOLACE) 8.6-50 MG per tablet Take 2 tablets by mouth 2 times daily       isosorbide mononitrate (IMDUR) 30 MG 24 hr tablet Take 2 tablets (60 mg) by mouth daily 30 tablet      HYDROmorphone (DILAUDID) 2 MG tablet Take 1 tablet (2 mg) by mouth 3 times daily And q3h PRN       ONDANSETRON PO Take 4 mg by mouth every 8 hours as needed for nausea       furosemide (LASIX) 20 MG tablet Take 1 tablet (20 mg) by mouth 2 times daily 30 tablet      carboxymethylcellulose (REFRESH PLUS) 0.5 % SOLN ophthalmic solution Place 1 drop into both eyes 4 times daily And 1 drop in both eyes as needed.       budesonide-formoterol (SYMBICORT) 160-4.5 MCG/ACT Inhaler Inhale 2 puffs into the lungs 2 times daily       predniSONE (DELTASONE) 5 MG tablet Take 1 tablet (5 mg) by mouth 2 times daily 60 tablet 0      ranitidine (RANITIDINE) 75 MG tablet Take 150 mg by mouth daily        diclofenac (VOLTAREN) 1 % GEL topical gel Apply 4 grams to knees four times daily as needed using enclosed dosing card. 100 g 0     insulin glargine (LANTUS) 100 UNIT/ML injection Inject 16 Units Subcutaneous 2 times daily        omeprazole (PRILOSEC) 10 MG CR capsule Take 1 capsule (10 mg) by mouth daily       atorvastatin (LIPITOR) 10 MG tablet Take 10 mg by mouth At Bedtime        tiotropium (SPIRIVA) 18 MCG inhalation capsule Inhale 18 mcg into the lungs daily       polyvinyl alcohol (LIQUIFILM TEARS) 1.4 % ophthalmic solution Place 1 drop into both eyes 3 times daily       acetaminophen (TYLENOL) 500 MG tablet Take 500 mg by mouth every 4 hours as needed for pain Not to exceed 3000 mg/24 hours       nitroglycerin (NITROSTAT) 0.4 MG SL tablet Place 0.4 mg under the tongue every 5 minutes as needed for chest pain if you are still having symptoms after 3 doses (15 minutes) call 911.       ipratropium - albuterol 0.5 mg/2.5 mg/3 mL (DUONEB) 0.5-2.5 (3) MG/3ML nebulization Take 1 vial (3 mLs) by nebulization every 4 hours as needed for shortness of breath / dyspnea or wheezing 360 mL 3     aspirin 81 MG EC tablet Take 1 tablet (81 mg) by mouth daily 5 tablet 0     metoprolol (TOPROL XL) 50 MG 24 hr tablet Take 1 tablet (50 mg) by mouth daily 5 tablet 0     tamsulosin (FLOMAX) 0.4 MG 24 hr capsule Take 1 capsule (0.4 mg) by mouth daily 5 capsule 0     ammonium lactate (LAC-HYDRIN) 12 % lotion Apply topically 2 times daily as needed for dry skin To all extremities for dry skin       [DISCONTINUED] enzalutamide (XTANDI) 40 MG capsule Take 4 capsules (160 mg) by mouth daily 120 capsule 0     Medications reviewed:  Medications reconciled to facility chart and changes were made to reflect current medications as identified as above med list. Below are the changes that were made:   Medications stopped since last EPIC medication reconciliation:   There  are no discontinued medications.    Medications started since last [facility] medication reconciliation:  No orders of the defined types were placed in this encounter.    Case Management:  I have reviewed the care plan and MDS and do agree with the plan. Patient's desire to return to the community is not present.  Information reviewed:  Medications, vital signs, orders, and nursing notes.    ROS:  4 point ROS including Respiratory, CV, GI and , other than that noted in the HPI,  is negative    Exam:  Vitals: /64  Pulse 90  Temp 98  F (36.7  C)  Resp 18  Wt 221 lb 4.8 oz (100.4 kg)  SpO2 94%  BMI 30.87 kg/m2  BMI= Body mass index is 30.87 kg/(m^2).  GENERAL APPEARANCE:  Alert, in no distress  ENT:  Mouth and posterior oropharynx normal, moist mucous membranes  EYES:  EOM, conjunctivae, lids, pupils and irises normal  RESP:  Respiratory effort and palpation of chest normal, no respiratory distress, diminished breath sounds throughout  CV:  Palpation and auscultation of heart done , regular rate and rhythm, no murmur, rub, or gallop  ABDOMEN:  normal bowel sounds, soft, nontender, no hepatosplenomegaly or other masses  M/S:   Active movement of bilateral upper and lower extremities. ACE wraps on BLE.  SKIN:  Inspection of skin and subcutaneous tissue baseline, Palpation of skin and subcutaneous tissue baseline  NEURO:   Cranial nerves 2-12 are normal tested and grossly at patient's baseline  PSYCH:  affect and mood normal    Lab/Diagnostic data:    Last Basic Metabolic Panel:  Lab Results   Component Value Date     08/14/2017      Lab Results   Component Value Date    POTASSIUM 4.3 08/14/2017     Lab Results   Component Value Date    CHLORIDE 106 08/14/2017     Lab Results   Component Value Date    JORDAN 8.8 08/14/2017     Lab Results   Component Value Date    CO2 31 08/14/2017     Lab Results   Component Value Date    BUN 31 08/14/2017     Lab Results   Component Value Date    CR 1.74 08/14/2017     Lab  "Results   Component Value Date     08/14/2017     Lab Results   Component Value Date    WBC 8.1 08/21/2017     Lab Results   Component Value Date    RBC 2.62 08/21/2017     Lab Results   Component Value Date    HGB 6.9 08/21/2017     Lab Results   Component Value Date    HCT 24.8 08/21/2017     Lab Results   Component Value Date    MCV 95 08/21/2017     Lab Results   Component Value Date    MCH 26 08/21/2017     Lab Results   Component Value Date    MCHC 28 08/21/2017     Lab Results   Component Value Date    RDW 18.3 08/21/2017     Lab Results   Component Value Date     08/21/2017     ASSESSMENT/PLAN  Anemia. Hemoglobin 5.5 per report from staff. May be secondary to loss of blood from bottom as noted by resident with no confirmation from staff versus progression of metastatic prostate cancer versus recent initiation of Warfarin although previous drop in Hemoglobin to 6.9 prior to initiation of Warfarin. Resident is due for a colonoscopy, but when discussed in the past, reported he would \"think about it\". Staff reports resident seems more pale today, but is participating in his usual activities. No recent vital signs.   Due to critical lab value of Hemoglobin 5.5, instructed staff to send resident to [facility] ED for further evaluation.     Paroxysmal Atrial Fibrillation. INR therapeutic. Ordered Warfarin 2.5 mg daily. Repeat INR on 9/28/17. Continue Metoprolol as ordered. Of note, resident will not receive 9/26/17 dose of Warfarin at the facility until the evening at which time he will likely be in the ED.     Recurrent Urinary Tract Infection.  Previously seen by Dr. Graf for urinary retention in the setting of metastatic lymph node dissection. This may be contributing to recurrent UTIs. Resident instructed on how to straight catheterize, but resistant. Will refer back to Urology for further evaluation of recurrent UTIs as noted above.     Electronically signed by:  SAL Acevedo " CNP

## 2017-09-27 NOTE — TELEPHONE ENCOUNTER
Called and spoke with [facility] facility nurse where patient resides ~ patient status. Patient did not receive blood transfusion yesterday after being transported to the .  Repeat Hgb was 7.4 which is patient's baseline.  Patient had first test completed by [facility] lab.  Facility nurse reports patient feels well, is out on his scooter this am visiting, is eating, and sleeping w/o incident.  Has had no c/o and had no c/o yesterday when they received the lab results.  Patient has had no SOB or experienced any of the usual symptoms he demonstrates when requiring blood product.  Staff will contact clinic with any changes or concerns.  MD notified  Gage Yang RN, BSN, OCN  [facility]  Patient Care Coordinator

## 2017-09-27 NOTE — DISCHARGE INSTRUCTIONS
Anemia During Cancer    When levels of healthy red blood cells (RBCs) in the body drop to levels that are below normal, the condition is called anemia. Anemia can occur during cancer and its treatment for many reasons. Read below to learn more about anemia during cancer and how it s treated.  What is anemia?  RBCs are made in the bone marrow. Normal blood has about 35% to 50% RBCs. Anemic blood has less than 35% RBCs. RBCs carry oxygen around the body. If you have low levels of RBCs, not enough oxygen is delivered to your body. This may cause symptoms of dizziness, weakness, or tiredness. You may have trouble doing daily tasks. You may often feel cold. And you may be short of breath and have a rapid heartbeat. But some people with anemia have no symptoms.  Why anemia can occur with cancer  Anemia during cancer can have several causes:. These include:    Treatments that destroy bone marrow, such as chemotherapy and radiation therapy    Blood loss during surgery    Low levels of certain B vitamins or iron    Kidney disease leading to low levels of EPO (erythropoietin), a substance the body needs to make RBCs    Wasting (nutritional problems and weight loss) from cancer that lower the body s ability to make RBCs  Testing for anemia  A blood sample is taken from your arm and then tested. Several different types of tests may be done on this blood. Some count the numbers of blood cells. Others test for substances the body uses to make RBCs, such as vitamins and iron.  Treating anemia during cancer  Your treatment will depend on the cause of your anemia. It will also depend on how severe your symptoms are. Your doctor can tell you more about treatment options and their risks and benefits for you. Treatments include the following:    RBC transfusion. A tube (cannula) is put into a vein in the hand or arm. RBCs from a donor are sent through the tube into the body. This increases the number of healthy RBCs in the body. This  can reverse anemia very quickly. RBC transfusions are safe. However, there are some risks. Your doctor will discuss them with you. Be sure you understand these risks.You may need to sign a consent form before receiving treatment.    Erythropoiesis stimulating agent (SIVAKUMAR). This is medicine that causes the body to make more RBCs. An SIVAKUMAR is given as a shot. It may be given along with iron (see below). An SIVAKUMAR takes several weeks or months to reverse anemia. There are special risks with SIVAKUMAR treatment. Your doctor will discuss them with you. Be sure you understand these risks.You may need to sign a consent form before receiving treatment.    Intravenous (IV) iron. A liquid medicine containing iron is given by shots or IV line. Several treatments may be given. IV iron is usually used if you are being given an SIVAKUMAR. IV iron usually takes 1 to 4 weeks to reverse anemia.    Oral supplements. Iron or vitamin B supplements may be given. These come in liquid or pill form, to take by mouth or they may be by injection. Oral supplements can take weeks or months to reverse anemia.  Checking your progress  During the course of your treatment, you ll likely have more blood tests. These are to check your blood levels and your response to the treatment.  Risks and complications  Each treatment has its own risks. Your healthcare provider will tell you what risks apply to you. These may include:    Fever    Hives or other allergic reactions    Iron overload    High or low blood pressure    Nausea    Liver inflammation    Blood clots   Date Last Reviewed: 12/27/2015 2000-2017 [employer]. [address] [place] [address], [place] [address]. All rights reserved. This information is not intended as a substitute for professional medical care. Always follow your healthcare professional's instructions.

## 2017-10-04 NOTE — MR AVS SNAPSHOT
After Visit Summary   10/4/2017    Tomas Grey    MRN: [id number]           Patient Information     Date Of Birth          1945        Visit Information        Provider Department      10/4/2017 12:55 PM Otto Loera MD [facility]        Today's Diagnoses     Chronic bronchitis, unspecified chronic bronchitis type (H)    -  1    Sleep apnea, unspecified type           Follow-ups after your visit        Additional Services     SLEEP EVALUATION & MANAGEMENT REFERRAL - ADULT       Please be aware that coverage of these services is subject to the terms and limitations of your health insurance plan.  Call member services at your health plan with any benefit or coverage questions.      Please bring the following to your appointment:    >>   List of current medications   >>   This referral request   >>   Any documents/labs given to you for this referral    [facility]  Ph [phone] (Age 15 and up)                  Follow-up notes from your care team     Return in about 6 months (around 4/4/2018).      Your next 10 appointments already scheduled     Oct 09, 2017  1:30 PM CDT   Level 1 with RH INFUSION CHAIR 8   [facility] ([facility])    [facility]  [address]  [facility] [address]   [phone]            Oct 09, 2017  2:00 PM CDT   Return Visit with Manpreet Oviedo MD   [facility] ([facility])    [facility]  [address]  [facility] [address]   [phone]            Nov 06, 2017  1:30 PM CST   Level 1 with RH INFUSION CHAIR 10   [facility] ([facility])    [facility]  [address]  [facility] [address]   [phone]            Nov 07, 2017  4:00 PM CST   (Arrive by 3:45 PM)  "  New Patient Visit with JASON Macedo   [facility] ([facility])    [address]  [address] [facility] [address] [phone]              Future tests that were ordered for you today     Open Future Orders        Priority Expected Expires Ordered    SLEEP EVALUATION & MANAGEMENT REFERRAL - ADULT Routine  10/4/2018 10/4/2017            Who to contact     If you have questions or need follow up information about today's clinic visit or your schedule please contact [facility] directly at [phone].  Normal or non-critical lab and imaging results will be communicated to you by [email or web address], letter or phone within 4 business days after the clinic has received the results. If you do not hear from us within 7 days, please contact the clinic through [email or web address] or phone. If you have a critical or abnormal lab result, we will notify you by phone as soon as possible.  Submit refill requests through [email or web address] or call your pharmacy and they will forward the refill request to us. Please allow 3 business days for your refill to be completed.          Additional Information About Your Visit        [email or web address] Information     [email or web address] lets you send messages to your doctor, view your test results, renew your prescriptions, schedule appointments and more. To sign up, go to [email or web address] . Click on \"Log in\" on the left side of the screen, which will take you to the Welcome page. Then click on \"Sign up Now\" on the right side of the page.     You will be asked to enter the access code listed below, as well as some personal information. Please follow the directions to create your username and password.     Your access code is: [email or web address]  Expires: 2017 12:54 PM     Your access code will  in 90 days. If you need help or a new code, please call your [place] clinic or [phone].        Care " "EveryWhere ID     This is your Care EveryWhere ID. This could be used by other organizations to access your [facility] medical records  [id number]        Your Vitals Were     Pulse Temperature Respirations Height Pulse Oximetry BMI (Body Mass Index)    114 98.3  F (36.8  C) (Oral) 18 1.803 m (5' 11\") 93% 33.05 kg/m2       Blood Pressure from Last 3 Encounters:   10/04/17 123/68   09/26/17 134/77   09/26/17 124/64    Weight from Last 3 Encounters:   10/04/17 107.5 kg (237 lb)   09/26/17 100.2 kg (221 lb)   09/26/17 100.4 kg (221 lb 4.8 oz)               Primary Care Provider Office Phone # Fax #    Ekaterina SAL Babb [phone]       [address]  [place] [address]        Equal Access to Services     [facility] : Hadii aad ku hadasho Soomaali, waaxda luqadaha, qaybta kaalmada adeegyada, waxay evensin hayshanian ian prather . So [facility] [phone].    ATENCIÓN: Si habla español, tiene a soria disposición servicios gratuitos de asistencia lingüística. Llame al [phone].    We comply with applicable federal civil rights laws and [place] laws. We do not discriminate on the basis of race, color, national origin, age, disability, sex, sexual orientation, or gender identity.            Thank you!     Thank you for choosing [facility]  for your care. Our goal is always to provide you with excellent care. Hearing back from our patients is one way we can continue to improve our services. Please take a few minutes to complete the written survey that you may receive in the mail after your visit with us. Thank you!             Your Updated Medication List - Protect others around you: Learn how to safely use, store and throw away your medicines at www.disposemymeds.org.          This list is accurate as of: 10/4/17  2:47 PM.  Always use your most recent med list.                   Brand Name Dispense Instructions for use Diagnosis    abiraterone 250 MG " tablet    ZYTIGA    120 tablet    Take 4 tablets (1,000 mg) by mouth daily for 30 doses Take on empty stomach.    Prostate cancer metastatic to bone (H), Metastasis to bone (H), Prostate cancer metastatic to multiple sites (H)       acetaminophen 500 MG tablet    TYLENOL     Take 500 mg by mouth every 4 hours as needed for pain Not to exceed 3000 mg/24 hours        ammonium lactate 12 % lotion    LAC-HYDRIN     Apply topically 2 times daily as needed for dry skin To all extremities for dry skin        aspirin 81 MG EC tablet     5 tablet    Take 1 tablet (81 mg) by mouth daily    ASCVD (arteriosclerotic cardiovascular disease)       budesonide-formoterol 160-4.5 MCG/ACT Inhaler    SYMBICORT     Inhale 2 puffs into the lungs 2 times daily        calcium carbonate 1500 (600 CA) MG tablet    OS-JORDAN 600 mg [place]. Ca     Take 1,500 mg by mouth 2 times daily (with meals)        carboxymethylcellulose 0.5 % Soln ophthalmic solution    REFRESH PLUS     Place 1 drop into both eyes 4 times daily And 1 drop in both eyes as needed.        diclofenac 1 % Gel topical gel    VOLTAREN    100 g    Apply 4 grams to knees four times daily as needed using enclosed dosing card.    Chronic pain of both knees       furosemide 20 MG tablet    LASIX    30 tablet    Take 1 tablet (20 mg) by mouth 2 times daily    Chronic diastolic congestive heart failure (H), Lymphedema of both lower extremities       HYDROmorphone 2 MG tablet    DILAUDID     Take 1 tablet (2 mg) by mouth 3 times daily And q3h PRN        insulin glargine 100 UNIT/ML injection    LANTUS     Inject 16 Units Subcutaneous 2 times daily        ipratropium - albuterol 0.5 mg/2.5 mg/3 mL 0.5-2.5 (3) MG/3ML neb solution    DUONEB    360 mL    Take 1 vial (3 mLs) by nebulization every 4 hours as needed for shortness of breath / dyspnea or wheezing        isosorbide mononitrate 30 MG 24 hr tablet    IMDUR    30 tablet    Take 2 tablets (60 mg) by mouth daily        LIPITOR 10 MG  tablet   Generic drug:  atorvastatin      Take 10 mg by mouth At Bedtime        metoprolol 50 MG 24 hr tablet    TOPROL XL    5 tablet    Take 1 tablet (50 mg) by mouth daily    ASCVD (arteriosclerotic cardiovascular disease)       nitroGLYcerin 0.4 MG sublingual tablet    NITROSTAT     Place 0.4 mg under the tongue every 5 minutes as needed for chest pain if you are still having symptoms after 3 doses (15 minutes) call 911.        omeprazole 10 MG CR capsule    priLOSEC     Take 1 capsule (10 mg) by mouth daily    Gastric reflux       ONDANSETRON PO      Take 4 mg by mouth every 8 hours as needed for nausea        polyvinyl alcohol 1.4 % ophthalmic solution    LIQUIFILM TEARS     Place 1 drop into both eyes 3 times daily        predniSONE 5 MG tablet    DELTASONE    60 tablet    Take 1 tablet (5 mg) by mouth 2 times daily    Prostate cancer metastatic to bone (H), Metastasis to bone (H), Prostate cancer metastatic to multiple sites (H)       ranitidine 75 MG tablet   Generic drug:  ranitidine      Take 150 mg by mouth daily        senna-docusate 8.6-50 MG per tablet    SENOKOT-S;PERICOLACE     Take 2 tablets by mouth 2 times daily    Abdominal pain, generalized       tamsulosin 0.4 MG capsule    FLOMAX    5 capsule    Take 1 capsule (0.4 mg) by mouth daily    Benign non-nodular prostatic hyperplasia without lower urinary tract symptoms       tiotropium 18 MCG capsule    SPIRIVA     Inhale 18 mcg into the lungs daily        WARFARIN SODIUM PO      Take by mouth daily See facility orders for INR dosing

## 2017-10-04 NOTE — PROGRESS NOTES
Pulmonary Clinic  Followup Visit    Name: Tomas Grey MRN: [id number]     Age: 72 year old   YOB: 1945            Assessment and Plan:     Tomas Grey is a 72 year old male with a history of COPD (on chronic 2L), ALEXIS, diastolic heart failure, CAD3, HTN, HLD, CKD, DM2 and stage IV prostate cancer w/ spinal and bony metastases who presents for further evaluation of his lung disease.     ## COPD, Very Severe  ## Chronic hypoxemic respiratory failure  On 2L supplemental oxygen, Spiriva, albuterol, and Symbicort. Currently denies respiratory symptoms, though he states that the oxygen has fixed his breathing problems. Has had frequent hospitalizations over several local health systems for a variety of disorders including anemia, bacteremia, abd pain, and CHF exacerbation, however, does not seem to have frequent COPD related admissions.    - Continue inhalers  - Continue oxygen  - Flu vaccine given today        ## ALEXIS with CO2 retention  Has a CPAP but is not using it. Unclear why he stopped using it. At one point he seemed to be saying that he doesn't need it because he has his nasal canula, but at another point he said he didn't need it since he has no trouble sleeping. In fact he pointed out that he doesn't need the CPAP to help him sleep as evidenced by the fact that he fell asleep multiple times in our clinic visit.  I explained that his excessive daytime sleepiness is likely due to poor overnight sleep and that the CPAP could help with this. I advised him to start using his CPAP again and to follow up with sleep medicine.     - Restart CPAP therapy  - Sleep medicine referral        Patient staffed with Dr. India Loera M.D.  Pulmonary & Critical Care Fellow  [phone]            HPI:   CC: Follow up very severe COPD    Tomas Grey is a 72 year old male with a history of COPD (on chronic 2L), ALEXIS, diastolic heart failure, CAD3, HTN, HLD, CKD, DM2 and  "stage IV prostate cancer w/ spinal and bony metastases who presents for further evaluation of his lung disease.     He reports feeling overall well and denies dyspnea, cough, or wheeze. He reports frequent hospitalizations for various issues, but has not been hospitalized recently for respiratory exacerbation. He is currently using his inhalers as prescribed and using oxygen at 2L 24h a day.    He is not currently using his CPAP \"since he sleeps so well\". However, he also states that \"he always feels sleepy, all the time\" and he was late for his appointment today because he fell asleep in a back fuentes of the clinic building and couldn't be found. Additionally, he fell asleep multiple times during his interview.     Not currently using CPAP. Uses oxygen cannula, but this often falls off during the night.                Medications:     Current Outpatient Prescriptions on File Prior to Visit:  WARFARIN SODIUM PO Take by mouth daily See facility orders for INR dosing   calcium carbonate (OS-JORDAN 600 MG [place]. CA) 1500 (600 CA) MG tablet Take 1,500 mg by mouth 2 times daily (with meals)   abiraterone (ZYTIGA) 250 MG tablet Take 4 tablets (1,000 mg) by mouth daily for 30 doses Take on empty stomach.   senna-docusate (SENOKOT-S;PERICOLACE) 8.6-50 MG per tablet Take 2 tablets by mouth 2 times daily   isosorbide mononitrate (IMDUR) 30 MG 24 hr tablet Take 2 tablets (60 mg) by mouth daily   HYDROmorphone (DILAUDID) 2 MG tablet Take 1 tablet (2 mg) by mouth 3 times daily And q3h PRN   ONDANSETRON PO Take 4 mg by mouth every 8 hours as needed for nausea   furosemide (LASIX) 20 MG tablet Take 1 tablet (20 mg) by mouth 2 times daily   carboxymethylcellulose (REFRESH PLUS) 0.5 % SOLN ophthalmic solution Place 1 drop into both eyes 4 times daily And 1 drop in both eyes as needed.   budesonide-formoterol (SYMBICORT) 160-4.5 MCG/ACT Inhaler Inhale 2 puffs into the lungs 2 times daily   predniSONE (DELTASONE) 5 MG tablet Take 1 tablet " (5 mg) by mouth 2 times daily   ranitidine (RANITIDINE) 75 MG tablet Take 150 mg by mouth daily    diclofenac (VOLTAREN) 1 % GEL topical gel Apply 4 grams to knees four times daily as needed using enclosed dosing card.   insulin glargine (LANTUS) 100 UNIT/ML injection Inject 16 Units Subcutaneous 2 times daily    omeprazole (PRILOSEC) 10 MG CR capsule Take 1 capsule (10 mg) by mouth daily   atorvastatin (LIPITOR) 10 MG tablet Take 10 mg by mouth At Bedtime    tiotropium (SPIRIVA) 18 MCG inhalation capsule Inhale 18 mcg into the lungs daily   polyvinyl alcohol (LIQUIFILM TEARS) 1.4 % ophthalmic solution Place 1 drop into both eyes 3 times daily   acetaminophen (TYLENOL) 500 MG tablet Take 500 mg by mouth every 4 hours as needed for pain Not to exceed 3000 mg/24 hours   [DISCONTINUED] enzalutamide (XTANDI) 40 MG capsule Take 4 capsules (160 mg) by mouth daily   nitroglycerin (NITROSTAT) 0.4 MG SL tablet Place 0.4 mg under the tongue every 5 minutes as needed for chest pain if you are still having symptoms after 3 doses (15 minutes) call 911.   ipratropium - albuterol 0.5 mg/2.5 mg/3 mL (DUONEB) 0.5-2.5 (3) MG/3ML nebulization Take 1 vial (3 mLs) by nebulization every 4 hours as needed for shortness of breath / dyspnea or wheezing   aspirin 81 MG EC tablet Take 1 tablet (81 mg) by mouth daily   metoprolol (TOPROL XL) 50 MG 24 hr tablet Take 1 tablet (50 mg) by mouth daily   tamsulosin (FLOMAX) 0.4 MG 24 hr capsule Take 1 capsule (0.4 mg) by mouth daily   ammonium lactate (LAC-HYDRIN) 12 % lotion Apply topically 2 times daily as needed for dry skin To all extremities for dry skin     No current facility-administered medications on file prior to visit.          Review of Systems:     Review of Systems   Constitutional: Negative for chills and fever.   HENT: Negative for congestion and sinus pain.    Eyes: Negative for pain.   Respiratory: Negative for cough, hemoptysis, sputum production, shortness of breath and  "wheezing.    Cardiovascular: Positive for leg swelling. Negative for chest pain.   Gastrointestinal: Negative for heartburn, nausea and vomiting.   Genitourinary: Positive for frequency.   Musculoskeletal: Positive for back pain, joint pain, myalgias and neck pain.   Skin: Negative for rash.   Neurological: Negative for loss of consciousness.   Endo/Heme/Allergies: Does not bruise/bleed easily.              Exam:   /68  Pulse 114  Temp 98.3  F (36.8  C) (Oral)  Resp 18  Ht 1.803 m (5' 11\")  Wt 107.5 kg (237 lb)  SpO2 93%  BMI 33.05 kg/m2    Physical Exam   Constitutional: He is oriented to person, place, and time and well-developed, well-nourished, and in no distress. No distress.   HENT:   Head: Normocephalic and atraumatic.   Right Ear: External ear normal.   Left Ear: External ear normal.   Mouth/Throat: No oropharyngeal exudate.   Eyes: Conjunctivae and EOM are normal. Pupils are equal, round, and reactive to light. No scleral icterus.   Neck: Neck supple.   Cardiovascular: Normal rate and regular rhythm.    Pulmonary/Chest: Effort normal.   Rare crackles, no wheezes. Surprisingly good volumes and air movement given his PFT's   Abdominal: Soft.   Musculoskeletal: He exhibits edema.   Tight pitting edema in BLE   Neurological: He is alert and oriented to person, place, and time.   Skin: Skin is warm and dry. He is not diaphoretic.   Clubbing of fingernails    Nursing note and vitals reviewed.               Data:     PFT 10/4/2017:      Very severe airflow obstruction with diffusion defect.      Physician Attestation   I, Andre Mr Osborne, saw this patient with  and agree with the findings and plan of care as documented in the above note.      I personally reviewed vital signs, medications, labs and imaging.    Andre Mr Osborne  Date of Service (when I saw the patient): Oct 4, 2017    "

## 2017-10-04 NOTE — LETTER
10/4/2017       RE: Tomas Grey  [facility]  [address]  [facility] [address]     Dear Colleague,    Thank you for referring your patient, Tomas Grey, to the [facility] LUNG SCIENCE AND HEALTH at [facility]. Please see a copy of my visit note below.              Pulmonary Clinic  Followup Visit    Name: Tomas Grey MRN: [id number]     Age: 72 year old   YOB: 1945            Assessment and Plan:     Tomas Grey is a 72 year old male with a history of COPD (on chronic 2L), ALEXIS, diastolic heart failure, CAD3, HTN, HLD, CKD, DM2 and stage IV prostate cancer w/ spinal and bony metastases who presents for further evaluation of his lung disease.     ## COPD, Very Severe  ## Chronic hypoxemic respiratory failure  On 2L supplemental oxygen, Spiriva, albuterol, and Symbicort. Currently denies respiratory symptoms, though he states that the oxygen has fixed his breathing problems. Has had frequent hospitalizations over several local health systems for a variety of disorders including anemia, bacteremia, abd pain, and CHF exacerbation, however, does not seem to have frequent COPD related admissions.    - Continue inhalers  - Continue oxygen  - Flu vaccine given today        ## ALEXIS with CO2 retention  Has a CPAP but is not using it. Unclear why he stopped using it. At one point he seemed to be saying that he doesn't need it because he has his nasal canula, but at another point he said he didn't need it since he has no trouble sleeping. In fact he pointed out that he doesn't need the CPAP to help him sleep as evidenced by the fact that he fell asleep multiple times in our clinic visit.  I explained that his excessive daytime sleepiness is likely due to poor overnight sleep and that the CPAP could help with this. I advised him to start using his CPAP again and to follow up with sleep medicine.     - Restart CPAP  "therapy  - Sleep medicine referral        Patient staffed with Dr. India Loera M.D.  Pulmonary & Critical Care Fellow  [phone]            HPI:   CC: Follow up very severe COPD    Tomas Grey is a 72 year old male with a history of COPD (on chronic 2L), ALEXIS, diastolic heart failure, CAD3, HTN, HLD, CKD, DM2 and stage IV prostate cancer w/ spinal and bony metastases who presents for further evaluation of his lung disease.     He reports feeling overall well and denies dyspnea, cough, or wheeze. He reports frequent hospitalizations for various issues, but has not been hospitalized recently for respiratory exacerbation. He is currently using his inhalers as prescribed and using oxygen at 2L 24h a day.    He is not currently using his CPAP \"since he sleeps so well\". However, he also states that \"he always feels sleepy, all the time\" and he was late for his appointment today because he fell asleep in a back fuentes of the clinic building and couldn't be found. Additionally, he fell asleep multiple times during his interview.     Not currently using CPAP. Uses oxygen cannula, but this often falls off during the night.                Medications:     Current Outpatient Prescriptions on File Prior to Visit:  WARFARIN SODIUM PO Take by mouth daily See facility orders for INR dosing   calcium carbonate (OS-JORDAN 600 MG [place]. [place]) 1500 (600 CA) MG tablet Take 1,500 mg by mouth 2 times daily (with meals)   abiraterone (ZYTIGA) 250 MG tablet Take 4 tablets (1,000 mg) by mouth daily for 30 doses Take on empty stomach.   senna-docusate (SENOKOT-S;PERICOLACE) 8.6-50 MG per tablet Take 2 tablets by mouth 2 times daily   isosorbide mononitrate (IMDUR) 30 MG 24 hr tablet Take 2 tablets (60 mg) by mouth daily   HYDROmorphone (DILAUDID) 2 MG tablet Take 1 tablet (2 mg) by mouth 3 times daily And q3h PRN   ONDANSETRON PO Take 4 mg by mouth every 8 hours as needed for nausea   furosemide (LASIX) 20 MG tablet Take " 1 tablet (20 mg) by mouth 2 times daily   carboxymethylcellulose (REFRESH PLUS) 0.5 % SOLN ophthalmic solution Place 1 drop into both eyes 4 times daily And 1 drop in both eyes as needed.   budesonide-formoterol (SYMBICORT) 160-4.5 MCG/ACT Inhaler Inhale 2 puffs into the lungs 2 times daily   predniSONE (DELTASONE) 5 MG tablet Take 1 tablet (5 mg) by mouth 2 times daily   ranitidine (RANITIDINE) 75 MG tablet Take 150 mg by mouth daily    diclofenac (VOLTAREN) 1 % GEL topical gel Apply 4 grams to knees four times daily as needed using enclosed dosing card.   insulin glargine (LANTUS) 100 UNIT/ML injection Inject 16 Units Subcutaneous 2 times daily    omeprazole (PRILOSEC) 10 MG CR capsule Take 1 capsule (10 mg) by mouth daily   atorvastatin (LIPITOR) 10 MG tablet Take 10 mg by mouth At Bedtime    tiotropium (SPIRIVA) 18 MCG inhalation capsule Inhale 18 mcg into the lungs daily   polyvinyl alcohol (LIQUIFILM TEARS) 1.4 % ophthalmic solution Place 1 drop into both eyes 3 times daily   acetaminophen (TYLENOL) 500 MG tablet Take 500 mg by mouth every 4 hours as needed for pain Not to exceed 3000 mg/24 hours   [DISCONTINUED] enzalutamide (XTANDI) 40 MG capsule Take 4 capsules (160 mg) by mouth daily   nitroglycerin (NITROSTAT) 0.4 MG SL tablet Place 0.4 mg under the tongue every 5 minutes as needed for chest pain if you are still having symptoms after 3 doses (15 minutes) call 911.   ipratropium - albuterol 0.5 mg/2.5 mg/3 mL (DUONEB) 0.5-2.5 (3) MG/3ML nebulization Take 1 vial (3 mLs) by nebulization every 4 hours as needed for shortness of breath / dyspnea or wheezing   aspirin 81 MG EC tablet Take 1 tablet (81 mg) by mouth daily   metoprolol (TOPROL XL) 50 MG 24 hr tablet Take 1 tablet (50 mg) by mouth daily   tamsulosin (FLOMAX) 0.4 MG 24 hr capsule Take 1 capsule (0.4 mg) by mouth daily   ammonium lactate (LAC-HYDRIN) 12 % lotion Apply topically 2 times daily as needed for dry skin To all extremities for dry skin  "    No current facility-administered medications on file prior to visit.          Review of Systems:     Review of Systems   Constitutional: Negative for chills and fever.   HENT: Negative for congestion and sinus pain.    Eyes: Negative for pain.   Respiratory: Negative for cough, hemoptysis, sputum production, shortness of breath and wheezing.    Cardiovascular: Positive for leg swelling. Negative for chest pain.   Gastrointestinal: Negative for heartburn, nausea and vomiting.   Genitourinary: Positive for frequency.   Musculoskeletal: Positive for back pain, joint pain, myalgias and neck pain.   Skin: Negative for rash.   Neurological: Negative for loss of consciousness.   Endo/Heme/Allergies: Does not bruise/bleed easily.              Exam:   /68  Pulse 114  Temp 98.3  F (36.8  C) (Oral)  Resp 18  Ht 1.803 m (5' 11\")  Wt 107.5 kg (237 lb)  SpO2 93%  BMI 33.05 kg/m2    Physical Exam   Constitutional: He is oriented to person, place, and time and well-developed, well-nourished, and in no distress. No distress.   HENT:   Head: Normocephalic and atraumatic.   Right Ear: External ear normal.   Left Ear: External ear normal.   Mouth/Throat: No oropharyngeal exudate.   Eyes: Conjunctivae and EOM are normal. Pupils are equal, round, and reactive to light. No scleral icterus.   Neck: Neck supple.   Cardiovascular: Normal rate and regular rhythm.    Pulmonary/Chest: Effort normal.   Rare crackles, no wheezes. Surprisingly good volumes and air movement given his PFT's   Abdominal: Soft.   Musculoskeletal: He exhibits edema.   Tight pitting edema in BLE   Neurological: He is alert and oriented to person, place, and time.   Skin: Skin is warm and dry. He is not diaphoretic.   Clubbing of fingernails    Nursing note and vitals reviewed.               Data:     PFT 10/4/2017:      Very severe airflow obstruction with diffusion defect.          Again, thank you for allowing me to participate in the care of your " patient.      Sincerely,    Otto Loera MD

## 2017-10-06 NOTE — PROGRESS NOTES
Erroneous Encounter - Please Disregard. Attempted to see Mr. Grey for a regulatory visit but he was unable to be located in the facility.     Annika Ortiz MD

## 2017-10-09 NOTE — PROGRESS NOTES
"Oncology Social Work Brief Psychosocial Assessment    Assessment completed on 10/9/2017 of living situation, support system, financial status, functional status, coping, stressors, need for resources and social work intervention provided as needed. Information for this assessment was provided by pt report in addition to medical chart review and consultation with medical team.     Present at assessment: Mr. Grey, \"Armin" was alone for this assessment conducted by Tammy Chaney, clinical .     Presenting Information: Patient is a 72 year old man diagnosed with metastatic prostate cancer (with mets to bone) and advanced COPD. Tomas comes to clinic today for routine follow-up appointment with Dr. Oviedo. Tomas had been receiving Lupron injections monthly, which were discontinued 2016, as well as Casodex daily, which was discontinued in 2015. Most recently receiving Zytgea and prednisone 2016-present and Xofigo starting in . This clinician was consulted to see Tomas due to care team's concerns about his ability to care for self at nursing home.  Decision Making: Self. Tomas reports today that oldest son Les is his child that is most aware of medical situation. Per medical record Tomas completed a Health Care Proxy 2015 that designates his cousin Melissa Mccormack and his son Jeevan Grey as his HCP.    Health Care Directive: Yes. Currently scanned into the EMR.     Relationship Status: Tomas reports that he is . Wife  8 years ago from [place].    Family/Support System: Pt endorsed feeling that he had a robust support system including family and aids at [facility] ([place], [place]) who have been and will continue to be available to support pt throughout diagnosis and treatment of cancer.   Spouse:   Children: 9 boys, 2 girls. Pt reports Les is oldest son who is most involved in pt's life.  Grandchildren: 54 grandchildren, 28 great grandchildren   Siblings: 1 " "sister     Permanent Living Situation: Tomas reports for past 1.5 yrs he has resided at [facility]. Tomas receives LTC at this facility.     Transportation Mode: Tomas uses [employer] for medical visits, arriving to today's appointment using [employer] ([phone]). Tomas reports that he is aware that he has transportation assistance through his insurance for medical visits, and that he uses [employer] for alternative transportation needs.     Insurance: Pt has [employer] health insurance.?Pt denied specific insurance concerns at this time. SW reiterated that if he has concerns or needs regarding insurance, that SW is available for ongoing concerns.     Sources of Income: Pt is supported by SSDI income. Tomas reports that the majority of his SSDI goes directly to paying for room and board at LTC facility, however he does have $190 a month coming in, and he reports that this income is sufficient for his additional needs. Tomas denies financial concerns at this time.     Employment: Tomas is retired, worked at [employer] and for [employer] for much of adult life.     Mental Health: Pt denied concern regarding mental health at this time and acknowledged that he feels that he is coping well.     Chemical Use: Based on the information provided, there appear to be no specific risks or concerns identified at this time.     Trauma/Loss/Abuse History: Tomas has coped with multiple medical issues for many years that have impacted his physical functioning, currently using a scooter to ambulate, also oxygen dependent.      Coping: Pt noted that he is currently feeling \"just fine, no concerns\".?Pt shared that playing Bingo, making puzzles, and talking with his extensive family (especially his 23-year-old grandson who wants to be a boxer) are his main coping mechanisms.    Interventions Provided: Psychosocial support and education     Assessment and Recommendations for Team:   Pt is a 72 year " old man diagnosed with metastatic prostate cancer who is here for routine follow-up with Dr. Oviedo. Pt is engaging, pleasant, calm and able to articulate concerns/coping mechanisms in an appropriate manner. Pt feels comfortable communicating with the medical team. Pt has a strong supportive network of family and institutional support. Pt has developed strong coping mechanisms such as making puzzles, playing bingo, and connecting with extended family. Pt appears to be a good self advocate.    This clinician explored with pt concerns about self-care. Pt reported that he receives daily PT and nursing assistance with bathing and clean clothing. Greatest concerns expressed today were that he needed nursing assistance to clip finger nails and would like to have additional oxygen tank to extend the amount of time he spends away from LTC facility, specifically he enjoys going gambling at [employer] Pt reports that he very much appreciates and likes the support from LTC facility, and would like to continue to live there. This clinician spoke with pt's RN at facility, Marycarmen, and expressed request for clipping nails and additional oxygen canister when going out to residence. Marycarmen to follow-up with patient today when he returns to facility.     Follow up Planned:   1) Pt aware that this  is available in the case of additional psychosocial concern or need. Pt has this clinician's contact information in case of need or concern.   2) This clinician would recommend revisiting with patient if he would like to continue to have Salome as HCP or if he is interested in updating documentation.       DAGO Yi, MaineGeneral Medical CenterSW  Phone: [phone]  Pager: [phone]    Pembroke Fanny: M, T  *every other Thursday, 8am-4:30pm  Dayana Stephens: W, F, *every other Thursday, 8am-4:30pm

## 2017-10-09 NOTE — NURSING NOTE
"Oncology Rooming Note    October 9, 2017 2:09 PM   Tomas Grey is a 72 year old male who presents for:    Chief Complaint   Patient presents with     Oncology Clinic Visit     Follow up     Initial Vitals: /65  Pulse 98  Temp 98.5  F (36.9  C) (Tympanic)  Resp 20  Ht 1.803 m (5' 11\")  Wt 107.5 kg (237 lb)  SpO2 91%  BMI 33.05 kg/m2 Estimated body mass index is 33.05 kg/(m^2) as calculated from the following:    Height as of this encounter: 1.803 m (5' 11\").    Weight as of this encounter: 107.5 kg (237 lb). Body surface area is 2.32 meters squared.  Severe Pain (6) Comment: Left shoulder and neck   No LMP for male patient.  Allergies reviewed: Yes  Medications reviewed: Yes    Medications: Medication refills not needed today.  Pharmacy name entered into EPIC:    [facility]  [facility] - [place], [place] - [address]  [facility] - [place] - [address] [place] [address] & [facility] - [place] - [address] [facility] - [place], [place] - [address] [facility] - [place], [place] - [address] [place] [address] PHARMACY - [place], [place] - [address]    Clinical concerns: Follow up- Pain in shoulder/neck, and chest pain    8 minutes for nursing intake (face to face time)     Flor Wolf CMA     DISCHARGE PLAN:  Next appointments: See patient instruction section  Departure Mode: W/C  Accompanied by: self  0 minutes for nursing discharge (face to face time)   Flor Wolf CMA                  "

## 2017-10-09 NOTE — LETTER
10/9/2017      RE: Tomas Grey  [facility]  [address] [place] [id number]       To Whom it May concern:    Mr. Marin is under my care for metastatic prostate cancer. He is not able to tolerate cytotoxic chemotherapy due to co-morbididties but is able to tolerate other treatment modalities including Megace.    He has been through many treatment lines and has a current evidence of disease progression on Zytega and prednisone.     Megace is one of the approved treatment options for metastatic prostate cancer and is part of NCCN guidelines (referene attached) as a subsequent line of treatment in this setting.    We will appreciate if we could get approval for Megace for this patient.    Regards,           Manpreet Oviedo MD

## 2017-10-09 NOTE — PROGRESS NOTES
"[facility]  HEMATOLOGY ONCOLOGY    ONCOLOGY FOLLOWUP NOTE      DIAGNOSIS:    1- Metastatic prostate cancer with extensive metastases to bone.   2- Advanced COPD and multiple other co-morbidities.      TREATMENT:     Lupron injection monthly, discontinued 6/2016.  Casodex 50 mg daily. discontinued 2/9/2015  - Xgeva monthly  - Xtandi 4/15/15  - Zytgea and prednisone 9/19/2016-present  - Xofigo 1/4/17     Subjective:  Mr. Tomas Grey comes in for followup today. He states that he has been feeling well except for off and on issues with shoulder pain. No significant side effects from zytega.     REVIEW OF SYSTEMS:  A complete review of systems was performed and found to be negative other than pertinent positives mentioned in history of present illness.     Past medical, social histories reviewed.    Meds- Reviewed.     PHYSICAL EXAMINATION:   VITAL SIGNS:/65  Pulse 98  Temp 98.5  F (36.9  C) (Tympanic)  Resp 20  Ht 1.803 m (5' 11\")  Wt 107.5 kg (237 lb)  SpO2 91%  BMI 33.05 kg/m2  CONSTITUTIONAL: Appears fatigued.    HEENT: Pupils are equal. Oropharynx is clear.   NECK: No cervical or supraclavicular lymphadenopathy.   RESPIRATORY: Clear bilaterally.   CARD/VASC: S1, S2, regular.   GI: Soft, nontender, nondistended, no hepatosplenomegaly.   MUSKULOSKELETAL: Warm, well perfused.   NEUROLOGIC: Alert, awake.   INTEGUMENT: No rash.   LYMPHATICS: Bilateral edema.   PSYCH: Mood and affect was normal.    LABORATORY DATA AND IMAGING REVIEWED DURING THIS VISIT:  Recent Labs   Lab Test  09/26/17   1715 09/26/17 08/14/17   1030   12/26/16   1851   05/27/16   1410   05/28/15   0715   NA  141   --    --   142   < >  146*   < >  143   < >  139   POTASSIUM  4.7   --    --   4.3   < >  3.8   < >  3.8   < >  5.5*   CHLORIDE  103   --    --   106   < >  99   < >  106   < >  106   CO2  31   --    --   31   < >  41*   < >  33*   < >  27   ANIONGAP  8   --    --   5   < >  5   < >  4   < >  6   BUN "  47*   --    --   31*   < >  48*   < >  24   < >  26   CR  1.83*  1.79*   < >  1.74*   < >  1.80*   < >  1.64*   < >  1.66*   GLC  125*   --    --   115*   < >  61*   < >  130*   < >  121*   JORDAN  7.4*   --    --   8.8   < >  9.2   < >  8.8   < >  8.5   MAG   --    --    --    --    --   1.7   --   1.9   < >   --    PHOS   --    --    --    --    --    --    --    --    --   3.3    < > = values in this interval not displayed.     Recent Labs   Lab Test 10/05/17  09/26/17   1715 09/26/17   08/14/17   1030   06/21/17   0922   WBC  9.7  9.2  12.6*  12.6*   < >  8.7   < >  7.4   HGB  7.8*  7.4*  5.5*  5.5*   < >  7.0*   < >  9.0*   PLT  307  232  267  267   < >  311   < >  192   MCV  94  93  95  95   < >  95   < >  95   NEUTROPHIL   --   84.6   --    --   71.3   --   67.2    < > = values in this interval not displayed.     Recent Labs   Lab Test  09/26/17 1715 09/26/17 09/19/17 08/14/17   1030  07/19/17   1335   BILITOTAL  0.4   --    --   0.5  0.4   ALKPHOS  191*   --    --   123  152*   ALT  15   --    --   9  13   AST  22  34  16  18  31   ALBUMIN  2.5*   --    --   2.3*  2.7*   Results for HERMILO KING (MRN [id number]) as of 10/9/2017 13:45   Ref. Range 4/19/2017 12:55 5/17/2017 13:25 6/14/2017 13:55 8/14/2017 11:00   PSA Latest Ref Range: 0 - 4 ug/L 249.00 (H) 300.00 (H) 405.00 (H) 598.00 (H)     ECOG  performance status 3     ASSESSMENT:   1.  Metastatic prostate cancer with multiple sclerotic bone metastases.  The patient continues on monthly Lupron with Casodex.  He has been on this regimen since 05/2014.  He continues to do well symptomatically.  The patient switched care to us after having initial diagnosis and treatment in [place], [place]. He started Xtandi 4/15/15.   He is not able to keep up with his follow ups due to multiple other medical issues and repeated hospital admissions due to COPD exacerbations.   Started Zytega 9/19/16.  He saw Dr. Callahan as a second opinion and had Xofigo 1/4/17. We  have continued zytega after that.   - His PSA is  worse. I will try to obatin prostate biopsy tissue from [facility] for MSI staining to see if he can qualify for immunotherapy.   - Anemias is worse due to disease progression as well.   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  - He is not a candidate of chemotherapy, due to poor performance status. Repeated hospital admissions for COPD and infections.   - I discussed the option to stopping zytega and starting megace. Discussed potential side effects. He is willing to proceed.   2.  Extensive bone metastases.   Bone scan 8/31/16 showed progression in bones.   He will continue to take calcium and vitamin D.  Will continue Xgeva.     PLAN:   1- Discontinue Zytega and prednsione.  2- RTC MD 1 month  3- Labs before next visit- CBC diff, CMP, PSA  4- Continue Xgeva  5- Start Megace 80 mg BID.   TAYLOR PETERS MD  10/9/2017    CC: Balgobin, Christopher Lennox Paul, MD

## 2017-10-09 NOTE — MR AVS SNAPSHOT
After Visit Summary   10/9/2017    Tomas Grey    MRN: [id number]           Patient Information     Date Of Birth          1945        Visit Information        Provider Department      10/9/2017 1:30 PM RH INFUSION CHAIR 8 [facility]        Today's Diagnoses     Prostate cancer metastatic to multiple sites (H)    -  1    Metastasis to bone (H)           Follow-ups after your visit        Your next 10 appointments already scheduled     Nov 06, 2017  1:30 PM CST   Level 1 with RH INFUSION CHAIR 10   [facility] ([facility])    [facility]  [address]  [facility] [address]   [phone]            Nov 06, 2017  2:30 PM CST   Return Visit with Manpreet Oviedo MD   [facility] ([facility])    [facility]  [address]  [facility] [address]   [phone]            Nov 07, 2017  4:00 PM CST   (Arrive by 3:45 PM)   New Patient Visit with JASON Macedo   [facility]rolog[facility] ([facility])    [address] [place] [id number] [phone]              Future tests that were ordered for you today     Open Future Orders        Priority Expected Expires Ordered    CBC with platelets differential Routine 11/9/2017 7/9/2018 10/9/2017    Comprehensive metabolic panel Routine 11/9/2017 7/9/2018 10/9/2017    PSA tumor marker Routine 11/9/2017 7/9/2018 10/9/2017            Who to contact     If you have questions or need follow up information about today's clinic visit or your schedule please contact [facility] directly at [phone].  Normal or non-critical lab and imaging results will be communicated to you by MyChart, letter or phone within 4 business days after the clinic has  "received the results. If you do not hear from us within 7 days, please contact the clinic through [employer] or phone. If you have a critical or abnormal lab result, we will notify you by phone as soon as possible.  Submit refill requests through [employer]me or call your pharmacy and they will forward the refill request to us. Please allow 3 business days for your refill to be completed.          Additional Information About Your Visit        Xyleme Information     [employer]yleme lets you send messages to your doctor, view your test results, renew your prescriptions, schedule appointments and more. To sign up, go to [email or web address] . Click on \"Log in\" on the left side of the screen, which will take you to the Welcome page. Then click on \"Sign up Now\" on the right side of the page.     You will be asked to enter the access code listed below, as well as some personal information. Please follow the directions to create your username and password.     Your access code is: [email or web address]  Expires: 2017 12:54 PM     Your access code will  in 90 days. If you need help or a new code, please call your [facility] clinic or [phone].        Care EveryWhere ID     This is your Care EveryWhere ID. This could be used by other organizations to access your [facility] medical records  [id number]         Blood Pressure from Last 3 Encounters:   10/09/17 104/65   10/05/17 128/60   10/04/17 123/68    Weight from Last 3 Encounters:   10/09/17 107.5 kg (237 lb)   10/05/17 100.2 kg (221 lb)   10/04/17 107.5 kg (237 lb)              We Performed the Following     CBC with platelets differential     Comprehensive metabolic panel     PSA tumor marker          Today's Medication Changes          These changes are accurate as of: 10/9/17  2:55 PM.  If you have any questions, ask your nurse or doctor.               Start taking these medicines.        Dose/Directions    megestrol 40 MG tablet   Commonly known as:  MEGACE   Used for:  " Prostate cancer metastatic to multiple sites (H)   Started by:  Manpreet Oviedo MD        Dose:  80 mg   Take 2 tablets (80 mg) by mouth 2 times daily   Quantity:  120 tablet   Refills:  3         Stop taking these medicines if you haven't already. Please contact your care team if you have questions.     predniSONE 5 MG tablet   Commonly known as:  DELTASONE   Stopped by:  Manpreet Oviedo MD                Where to get your medicines      These medications [place] [facility] [address] [facility] [place]  [address] [facility] [address]     Phone:  [phone]     megestrol 40 MG tablet                Primary Care Provider Office Phone # Fax #    Ekaterina Lozano, APRN [phone]       [address] [place] [id number]        Equal Access to Services     [facility]: Hadii shima antonyo Somissael, waaxda luqadaha, qaybta kaalmada adechelseayaanita, jorje prather . So [facility] [phone].    ATENCIÓN: Si habla español, tiene a soria disposición servicios gratuitos de asistencia lingüística. [facility] [phone].    We comply with applicable federal civil rights laws and [place] laws. We do not discriminate on the basis of race, color, national origin, age, disability, sex, sexual orientation, or gender identity.            Thank you!     Thank you for choosing [facility]  for your care. Our goal is always to provide you with excellent care. Hearing back from our patients is one way we can continue to improve our services. Please take a few minutes to complete the written survey that you may receive in the mail after your visit with us. Thank you!             Your Updated Medication List - Protect others around you: Learn how to safely use, store and throw away your medicines at www.disposemymeds.org.          This list is accurate as of: 10/9/17  2:55 PM.  Always use your most recent med  list.                   Brand Name Dispense Instructions for use Diagnosis    acetaminophen 500 MG tablet    TYLENOL     Take 500 mg by mouth every 4 hours as needed for pain Not to exceed 3000 mg/24 hours        ammonium lactate 12 % lotion    LAC-HYDRIN     Apply topically 2 times daily as needed for dry skin To all extremities for dry skin        aspirin 81 MG EC tablet     5 tablet    Take 1 tablet (81 mg) by mouth daily    ASCVD (arteriosclerotic cardiovascular disease)       bimatoprost 0.01 % Soln    LUMIGAN     Place 1 drop into both eyes At Bedtime        budesonide-formoterol 160-4.5 MCG/ACT Inhaler    SYMBICORT     Inhale 2 puffs into the lungs 2 times daily        calcium carbonate 1500 (600 CA) MG tablet    OS-JORDAN 600 mg [place]. Ca     Take 1,500 mg by mouth 2 times daily (with meals)        carboxymethylcellulose 0.5 % Soln ophthalmic solution    REFRESH PLUS     Place 1 drop into both eyes 4 times daily And 1 drop in both eyes as needed.        diclofenac 1 % Gel topical gel    VOLTAREN    100 g    Apply 4 grams to knees four times daily as needed using enclosed dosing card.    Chronic pain of both knees       furosemide 20 MG tablet    LASIX    30 tablet    Take 1 tablet (20 mg) by mouth 2 times daily    Chronic diastolic congestive heart failure (H), Lymphedema of both lower extremities       HYDROmorphone 2 MG tablet    DILAUDID     Take 1 tablet (2 mg) by mouth 3 times daily And q3h PRN        insulin glargine 100 UNIT/ML injection    LANTUS     Inject 16 Units Subcutaneous 2 times daily        ipratropium - albuterol 0.5 mg/2.5 mg/3 mL 0.5-2.5 (3) MG/3ML neb solution    DUONEB    360 mL    Take 1 vial (3 mLs) by nebulization every 4 hours as needed for shortness of breath / dyspnea or wheezing        isosorbide mononitrate 30 MG 24 hr tablet    IMDUR    30 tablet    Take 2 tablets (60 mg) by mouth daily        LIPITOR 10 MG tablet   Generic drug:  atorvastatin      Take 10 mg by mouth At Bedtime         megestrol 40 MG tablet    MEGACE    120 tablet    Take 2 tablets (80 mg) by mouth 2 times daily    Prostate cancer metastatic to multiple sites (H)       metoprolol 50 MG 24 hr tablet    TOPROL XL    5 tablet    Take 1 tablet (50 mg) by mouth daily    ASCVD (arteriosclerotic cardiovascular disease)       nitroGLYcerin 0.4 MG sublingual tablet    NITROSTAT     Place 0.4 mg under the tongue every 5 minutes as needed for chest pain if you are still having symptoms after 3 doses (15 minutes) call 911.        omeprazole 10 MG CR capsule    priLOSEC     Take 1 capsule (10 mg) by mouth daily    Gastric reflux       ONDANSETRON PO      Take 4 mg by mouth every 8 hours as needed for nausea        polyvinyl alcohol 1.4 % ophthalmic solution    LIQUIFILM TEARS     Place 1 drop into both eyes 3 times daily        ranitidine 75 MG tablet   Generic drug:  ranitidine      Take 150 mg by mouth daily        senna-docusate 8.6-50 MG per tablet    SENOKOT-S;PERICOLACE     Take 2 tablets by mouth 2 times daily    Abdominal pain, generalized       tamsulosin 0.4 MG capsule    FLOMAX    5 capsule    Take 1 capsule (0.4 mg) by mouth daily    Benign non-nodular prostatic hyperplasia without lower urinary tract symptoms       tiotropium 18 MCG capsule    SPIRIVA     Inhale 18 mcg into the lungs daily        WARFARIN SODIUM PO      Take by mouth daily See facility orders for INR dosing

## 2017-10-09 NOTE — LETTER
"    10/9/2017         RE: Tomas Grey  [facility]  [address]  [facility] [address]        Dear Colleague,    Thank you for referring your patient, Tomas Grey, to the [facility]. Please see a copy of my visit note below.    [facility]  HEMATOLOGY ONCOLOGY    ONCOLOGY FOLLOWUP NOTE      DIAGNOSIS:    1- Metastatic prostate cancer with extensive metastases to bone.   2- Advanced COPD and multiple other co-morbidities.      TREATMENT:     Lupron injection monthly, discontinued 6/2016.  Casodex 50 mg daily. discontinued 2/9/2015  - Xgeva monthly  - Xtandi 4/15/15  - Zytgea and prednisone 9/19/2016-present  - Xofigo 1/4/17     Subjective:  Mr. Tomas Grey comes in for followup today. He states that he has been feeling well except for off and on issues with shoulder pain. No significant side effects from zytega.     REVIEW OF SYSTEMS:  A complete review of systems was performed and found to be negative other than pertinent positives mentioned in history of present illness.     Past medical, social histories reviewed.    Meds- Reviewed.     PHYSICAL EXAMINATION:   VITAL SIGNS:/65  Pulse 98  Temp 98.5  F (36.9  C) (Tympanic)  Resp 20  Ht 1.803 m (5' 11\")  Wt 107.5 kg (237 lb)  SpO2 91%  BMI 33.05 kg/m2  CONSTITUTIONAL: Appears fatigued.    HEENT: Pupils are equal. Oropharynx is clear.   NECK: No cervical or supraclavicular lymphadenopathy.   RESPIRATORY: Clear bilaterally.   CARD/VASC: S1, S2, regular.   GI: Soft, nontender, nondistended, no hepatosplenomegaly.   MUSKULOSKELETAL: Warm, well perfused.   NEUROLOGIC: Alert, awake.   INTEGUMENT: No rash.   LYMPHATICS: Bilateral edema.   PSYCH: Mood and affect was normal.    LABORATORY DATA AND IMAGING REVIEWED DURING THIS VISIT:  Recent Labs   Lab Test  09/26/17   1715 09/26/17 08/14/17   1030   12/26/16   1851   05/27/16   1410   05/28/15   0715   NA  141   --    " --   142   < >  146*   < >  143   < >  139   POTASSIUM  4.7   --    --   4.3   < >  3.8   < >  3.8   < >  5.5*   CHLORIDE  103   --    --   106   < >  99   < >  106   < >  106   CO2  31   --    --   31   < >  41*   < >  33*   < >  27   ANIONGAP  8   --    --   5   < >  5   < >  4   < >  6   BUN  47*   --    --   31*   < >  48*   < >  24   < >  26   CR  1.83*  1.79*   < >  1.74*   < >  1.80*   < >  1.64*   < >  1.66*   GLC  125*   --    --   115*   < >  61*   < >  130*   < >  121*   JORDAN  7.4*   --    --   8.8   < >  9.2   < >  8.8   < >  8.5   MAG   --    --    --    --    --   1.7   --   1.9   < >   --    PHOS   --    --    --    --    --    --    --    --    --   3.3    < > = values in this interval not displayed.     Recent Labs   Lab Test 10/05/17  09/26/17   1715 09/26/17   08/14/17   1030   06/21/17   0922   WBC  9.7  9.2  12.6*  12.6*   < >  8.7   < >  7.4   HGB  7.8*  7.4*  5.5*  5.5*   < >  7.0*   < >  9.0*   PLT  307  232  267  267   < >  311   < >  192   MCV  94  93  95  95   < >  95   < >  95   NEUTROPHIL   --   84.6   --    --   71.3   --   67.2    < > = values in this interval not displayed.     Recent Labs   Lab Test  09/26/17 1715 09/26/17 09/19/17 08/14/17   1030  07/19/17   1335   BILITOTAL  0.4   --    --   0.5  0.4   ALKPHOS  191*   --    --   123  152*   ALT  15   --    --   9  13   AST  22  34  16  18  31   ALBUMIN  2.5*   --    --   2.3*  2.7*   Results for HERMILO KING (MRN [id number]) as of 10/9/2017 13:45   Ref. Range 4/19/2017 12:55 5/17/2017 13:25 6/14/2017 13:55 8/14/2017 11:00   PSA Latest Ref Range: 0 - 4 ug/L 249.00 (H) 300.00 (H) 405.00 (H) 598.00 (H)     ECOG  performance status 3     ASSESSMENT:   1.  Metastatic prostate cancer with multiple sclerotic bone metastases.  The patient continues on monthly Lupron with Casodex.  He has been on this regimen since 05/2014.  He continues to do well symptomatically.  The patient switched care to us after having initial diagnosis  and treatment in [place], [place]. He started Xtandi 4/15/15.   He is not able to keep up with his follow ups due to multiple other medical issues and repeated hospital admissions due to COPD exacerbations.   Started Zytega 9/19/16.  He saw Dr. Callahan as a second opinion and had Xofigo 1/4/17. We have continued zytega after that.   - His PSA is  worse. I will try to obatin prostate biopsy tissue from [place] for MSI staining to see if he can qualify for immunotherapy.   - Anemias is worse due to disease progression as well.   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  - He is not a candidate of chemotherapy, due to poor performance status. Repeated hospital admissions for COPD and infections.   - I discussed the option to stopping zytega and starting megace. Discussed potential side effects. He is willing to proceed.   2.  Extensive bone metastases.   Bone scan 8/31/16 showed progression in bones.   He will continue to take calcium and vitamin D.  Will continue Xgeva.     PLAN:   1- Discontinue Zytega and prednsione.  2- RTC MD 1 month  3- Labs before next visit- CBC diff, CMP, PSA  4- Continue Xgeva  5- Start Megace 80 mg BID.   MANPREET OVIEDO MD  10/9/2017    CC: Balgobin, Christopher Lennox Paul, MD    Again, thank you for allowing me to participate in the care of your patient.        Sincerely,        Manpreet Oviedo MD

## 2017-10-09 NOTE — PATIENT INSTRUCTIONS
1- Discontinue Zytega and prednsione.  2- RTC MD 1 month Scheduled  Dacai SANCHEZ    3- Labs before next visit- CBC diff, CMP, PSA Scheduled  Dacia D    4- Continue Xgeva Already scheduled Scheduled  Dacia D    5- Start Megace 80 mg BID. Please provide information.  Reviewed medication and side effects with pt and pt provided with written sheet from [email or web address].  Pt will  medication at [facility] today. Contacted nurse Tanya at [facility] at [phone] and updated her with new plan with medications as noted per Dr Oviedo and she is aware. Pt will also bring written copy of instructions to facility. JORGITO Morales Rn.  AVS given to patient

## 2017-10-09 NOTE — PROGRESS NOTES
Infusion Nursing Note:  Tomas Grey presents today for xgeva, labs.    Patient seen by provider today: Yes: Dr. Oviedo.   present during visit today: Not Applicable.    Note: corrected calcium is 9.32.    Intravenous Access:  Lab draw site left AC, Needle type butterfly, Gauge 23.  Labs drawn without difficulty.    Treatment Conditions:  Lab Results   Component Value Date     10/09/2017                   Lab Results   Component Value Date    POTASSIUM 4.0 10/09/2017           Lab Results   Component Value Date    MAG 1.7 12/26/2016            Lab Results   Component Value Date    CR 1.54 10/09/2017                   Lab Results   Component Value Date    JORDAN 8.2 10/09/2017                Lab Results   Component Value Date    BILITOTAL 0.4 10/09/2017           Lab Results   Component Value Date    ALBUMIN 2.6 10/09/2017                    Lab Results   Component Value Date    ALT 11 10/09/2017           Lab Results   Component Value Date    AST 17 10/09/2017     Results reviewed, labs MET treatment parameters, ok to proceed with treatment.        Post Infusion Assessment:  Patient tolerated injection without incident.  Blood return noted pre and post infusion.  Site patent and intact, free from redness, edema or discomfort.  No evidence of extravasations.  Access discontinued per protocol.    Discharge Plan:   Patient discharged in stable condition accompanied by: self.  Departure Mode: Wheelchair.    Amanda Simons RN

## 2017-10-11 NOTE — ED AVS SNAPSHOT
[facility], Emergency Department    [address] [address]    Phone:  [phone]                                       Tomas Grey   MRN: [id number]    Department:  [facility], Emergency Department   Date of Visit:  10/11/2017           Patient Information     Date Of Birth          1945        Your diagnoses for this visit were:     Shortness of breath        You were seen by Annika Mayer MD.        Discharge Instructions       FOLLOW-UP:  Please make an appointment to follow up with:  - Your Primary Care Provider as soon as possible this week.  - You should have more frequent INR checks while on this antibiotic.     PRESCRIPTIONS / MEDICATIONS:  - Please take the prescribed antibiotics for the next 5 days.   - You should get your INR checked in 1-2 days to ensure no significant changes while on this medication.       RETURN TO THE EMERGENCY DEPARTMENT  Return to the Emergency Department at any time for new/worsening symptoms.       Shortness of Breath (Dyspnea)  Shortness of breath is the feeling that you can't catch your breath or get enough air. It is also known as dyspnea.  Dyspnea can be caused by many different conditions. They include:    Acute asthma attack.    Worsening of chronic lung diseases such as chronic bronchitis and emphysema.    Heart failure. This is when weak heart muscle allows extra fluid to collect in the lungs.    Panic attacks or anxiety. Fear can cause rapid breathing (hyperventilation).    Pneumonia, or an infection in the lung tissue.    Exposure to toxic substances, fumes, smoke, or certain medicines.    Blood clot in the lung (pulmonary embolism). This is often from a piece of blood clot in a deep vein of the leg (deep vein thrombosis) that breaks off and travels to the lungs.    Heart attack or heart-related chest pain (angina).    Anemia.    Collapsed lung (pneumothorax).    Dehydration.    Pregnancy.  Based on your visit today, the exact  cause of your shortness of breath is not certain. Your tests don t show any of the serious causes of dyspnea. You may need other tests to find out if you have a serious problem. It s important to watch for any new symptoms or symptoms that get worse. Follow up with your healthcare provider as directed.  Home care  Follow these tips to take care of yourself at home:    When your symptoms are better, go back to your usual activities.    If you smoke, you should stop. Join a quit-smoking program or ask your healthcare provider for help.    Eat a healthy diet and get plenty of sleep.    Get regular exercise. Talk with your healthcare provider before starting to exercise, especially if you have other medical problems.    Cut down on the amount of caffeine and stimulants you consume.  Follow-up care  Follow up with your healthcare provider, or as advised.  If tests were done, you will be told if your treatment needs to be changed. You can call as directed for the results.  (Note: If an X-ray was taken, a specialist will review it. You will be notified of any new findings that may affect your care.)  Call 911 or get immediate medical care  Shortness of breath may be a sign of a serious medical problem. For example, it may be a problem with your heart or lungs. Call 911 if you have worsening shortness of breath or trouble breathing, especially with any of the symptoms below:    You are confused or it s difficult to wake you.    You faint or lose consciousness.    You have a fast heartbeat, or your heartbeat is irregular.    You are coughing up blood.    You have pain in your chest, arm, shoulder, neck, or upper back.    You break out in a sweat.  When to seek medical advice  Call your healthcare provider right away if any of these occur:    Slight shortness of breath or wheezing    Redness, pain or swelling in your leg, arm, or other body area    Swelling in both legs or ankles    Fast weight gain    Dizziness or  weakness    Fever of 100.4 F (38 C) or higher, or as directed by your healthcare provider  Date Last Reviewed: 9/13/2015 2000-2017 [employer]. [address] [place] [address], [place] [address]. All rights reserved. This information is not intended as a substitute for professional medical care. Always follow your healthcare professional's instructions.            Future Appointments        Provider Department Dept Phone Center    11/6/2017 2:00 PM  Infusion Chair [facility] [phone] [facility]    11/6/2017 2:30 PM Manpreet Oviedo MD [facility] [phone] [facility]    11/7/2017 4:00 PM JASON Camejo [facility] and [facility] [phone] [facility]      24 Hour Appointment Hotline       To make an appointment at any [facility] clinic, call [phone] ([phone]). If you don't have a family doctor or clinic, we will help you find one. [facility] clinics are conveniently located to serve the needs of you and your family.             Review of your medicines      START taking        Dose / Directions Last dose taken    azithromycin 250 MG tablet   Commonly known as:  ZITHROMAX Z-EDGAR   Quantity:  6 tablet        Two tablets on the first day, then one tablet daily for the next 4 days   Refills:  0          Our records show that you are taking the medicines listed below. If these are incorrect, please call your family doctor or clinic.        Dose / Directions Last dose taken    acetaminophen 500 MG tablet   Commonly known as:  TYLENOL   Dose:  500 mg        Take 500 mg by mouth every 4 hours as needed for pain Not to exceed 3000 mg/24 hours   Refills:  0        ammonium lactate 12 % lotion   Commonly known as:  LAC-HYDRIN   Indication:  Abnormal Dryness of Skin        Apply topically 2 times daily as needed for dry skin To all extremities for dry skin   Refills:  0        amoxicillin-clavulanate  875-125 MG per tablet   Commonly known as:  AUGMENTIN   Dose:  1 tablet   Quantity:  14 tablet        Take 1 tablet by mouth 2 times daily for 7 days   Refills:  0        aspirin 81 MG EC tablet   Dose:  81 mg   Quantity:  5 tablet        Take 1 tablet (81 mg) by mouth daily   Refills:  0        bimatoprost 0.01 % Soln   Commonly known as:  LUMIGAN   Dose:  1 drop        Place 1 drop into both eyes At Bedtime   Refills:  0        budesonide-formoterol 160-4.5 MCG/ACT Inhaler   Commonly known as:  SYMBICORT   Dose:  2 puff        Inhale 2 puffs into the lungs 2 times daily   Refills:  0        calcium carbonate 1500 (600 CA) MG tablet   Commonly known as:  OS-JORDAN 600 mg [place]. Ca   Dose:  1500 mg        Take 1,500 mg by mouth 2 times daily (with meals)   Refills:  0        carboxymethylcellulose 0.5 % Soln ophthalmic solution   Commonly known as:  REFRESH PLUS   Dose:  1 drop        Place 1 drop into both eyes 4 times daily And 1 drop in both eyes as needed.   Refills:  0        diclofenac 1 % Gel topical gel   Commonly known as:  VOLTAREN   Quantity:  100 g        Apply 4 grams to knees four times daily as needed using enclosed dosing card.   Refills:  0        furosemide 20 MG tablet   Commonly known as:  LASIX   Dose:  20 mg   Quantity:  30 tablet        Take 1 tablet (20 mg) by mouth 2 times daily   Refills:  0        HYDROmorphone 2 MG tablet   Commonly known as:  DILAUDID   Dose:  2 mg        Take 1 tablet (2 mg) by mouth 3 times daily And q3h PRN   Refills:  0        insulin glargine 100 UNIT/ML injection   Commonly known as:  LANTUS   Dose:  16 Units   Indication:  Type 2 Diabetes        Inject 16 Units Subcutaneous 2 times daily   Refills:  0        ipratropium - albuterol 0.5 mg/2.5 mg/3 mL 0.5-2.5 (3) MG/3ML neb solution   Commonly known as:  DUONEB   Dose:  1 vial   Quantity:  360 mL        Take 1 vial (3 mLs) by nebulization every 4 hours as needed for shortness of breath / dyspnea or wheezing   Refills:   3        isosorbide mononitrate 30 MG 24 hr tablet   Commonly known as:  IMDUR   Dose:  60 mg   Quantity:  30 tablet        Take 2 tablets (60 mg) by mouth daily   Refills:  0        LIPITOR 10 MG tablet   Dose:  10 mg   Generic drug:  atorvastatin        Take 10 mg by mouth At Bedtime   Refills:  0        megestrol 40 MG tablet   Commonly known as:  MEGACE   Dose:  80 mg   Quantity:  120 tablet        Take 2 tablets (80 mg) by mouth 2 times daily   Refills:  3        metoprolol 50 MG 24 hr tablet   Commonly known as:  TOPROL XL   Dose:  50 mg   Quantity:  5 tablet        Take 1 tablet (50 mg) by mouth daily   Refills:  0        nitroGLYcerin 0.4 MG sublingual tablet   Commonly known as:  NITROSTAT   Dose:  0.4 mg        Place 0.4 mg under the tongue every 5 minutes as needed for chest pain if you are still having symptoms after 3 doses (15 minutes) call 911.   Refills:  0        omeprazole 10 MG CR capsule   Commonly known as:  priLOSEC   Dose:  10 mg        Take 1 capsule (10 mg) by mouth daily   Refills:  0        ONDANSETRON PO   Dose:  4 mg        Take 4 mg by mouth every 8 hours as needed for nausea   Refills:  0        polyvinyl alcohol 1.4 % ophthalmic solution   Commonly known as:  LIQUIFILM TEARS   Dose:  1 drop        Place 1 drop into both eyes 3 times daily   Refills:  0        predniSONE 20 MG tablet   Commonly known as:  DELTASONE   Dose:  40 mg   Quantity:  8 tablet        Take 2 tablets (40 mg) by mouth daily for 4 days   Refills:  0        ranitidine 75 MG tablet   Dose:  150 mg   Indication:  Gastroesophageal Reflux Disease   Generic drug:  ranitidine        Take 150 mg by mouth daily   Refills:  0        senna-docusate 8.6-50 MG per tablet   Commonly known as:  SENOKOT-S;PERICOLACE   Dose:  2 tablet        Take 2 tablets by mouth 2 times daily   Refills:  0        tamsulosin 0.4 MG capsule   Commonly known as:  FLOMAX   Dose:  0.4 mg   Quantity:  5 capsule        Take 1 capsule (0.4 mg) by mouth  daily   Refills:  0        tiotropium 18 MCG capsule   Commonly known as:  SPIRIVA   Dose:  18 mcg   Indication:  Chronic Obstructive Lung Disease        Inhale 18 mcg into the lungs daily   Refills:  0        WARFARIN SODIUM PO        Take by mouth daily See facility orders for INR dosing   Refills:  0                Prescriptions were sent or printed at these locations (1 Prescription)                   Other Prescriptions                Printed at Department/Unit printer (1 of 1)         azithromycin (ZITHROMAX Z-EDGAR) 250 MG tablet                Procedures and tests performed during your visit     Procedure/Test Number of Times Performed    CBC with platelets differential 1    CRP inflammation 1    Comprehensive metabolic panel 1    EKG 12-lead, tracing only 1    Erythrocyte sedimentation rate auto 1    INR 1    ISTAT CG4 gases lactate abdiel nursing POCT 1    ISTAT gases lactate abdiel POCT 1    Nt probnp inpatient (BNP) 1    Troponin I 2    XR Chest 2 Views 1      Orders Needing Specimen Collection     None      Pending Results     Date and Time Order Name Status Description    10/12/2017 0134 EKG 12-lead, tracing only Preliminary     10/11/2017 2230 XR Chest 2 Views Preliminary             Pending Culture Results     No orders found for last 3 day(s).            Pending Results Instructions     If you had any lab results that were not finalized at the time of your Discharge, you can call the ED Lab Result RN at [phone]. You will be contacted by this team for any positive Lab results or changes in treatment. The nurses are available 7 days a week from 10A to 6:30P.  You can leave a message 24 hours per day and they will return your call.        Thank you for choosing [facility]       Thank you for choosing [facility] for your care. Our goal is always to provide you with excellent care. Hearing back from our patients is one way we can continue to improve our services. Please take a few minutes to complete the  "written survey that you may receive in the mail after you visit with us. Thank you!        [facility] Information     [employer] lets you send messages to your doctor, view your test results, renew your prescriptions, schedule appointments and more. To sign up, go to [email or web address] . Click on \"Log in\" on the left side of the screen, which will take you to the Welcome page. Then click on \"Sign up Now\" on the right side of the page.     You will be asked to enter the access code listed below, as well as some personal information. Please follow the directions to create your username and password.     Your access code is: [email or web address]  Expires: 2017 12:54 PM     Your access code will  in 90 days. If you need help or a new code, please call your [facility] clinic or [phone].        Care EveryWhere ID     This is your Care EveryWhere ID. This could be used by other organizations to access your [facility] medical records  [id number]        Equal Access to Services     [facility] : Hadii shima antonyo Somissael, waaxda luqadaha, qaybta kaalmaanita adewilliam, jorje prtaher . So [facility] [phone].    ATENCIÓN: Si habla español, tiene a soria disposición servicios gratuitos de asistencia lingüística. Llame al [phone].    We comply with applicable federal civil rights laws and [place] laws. We do not discriminate on the basis of race, color, national origin, age, disability, sex, sexual orientation, or gender identity.            After Visit Summary       This is your record. Keep this with you and show to your community pharmacist(s) and doctor(s) at your next visit.                  "

## 2017-10-11 NOTE — DISCHARGE INSTRUCTIONS
Take antibiotic for one week as directed.  Take prednisone for 5 days total-initial dose given in the ED.  Follow up with your primary care provider.  Return if persistent symptoms.

## 2017-10-11 NOTE — ED NOTES
Pt BIBA with complaints of substernal chest pain that radiates to the back. Pt states his pain has progressed over the past 24 hours. Pt took home prescribed nitro with little relief. Pt states pain radiates into his back and has a history of chronic back pain.

## 2017-10-11 NOTE — ED AVS SNAPSHOT
[facility], [place], Emergency Department    [address] [address]    Phone:  [phone]                                       Tomas Grey   MRN: [id number]    Department:  [facility], Emergency Department   Date of Visit:  10/11/2017           After Visit Summary Signature Page     I have received my discharge instructions, and my questions have been answered. I have discussed any challenges I see with this plan with the nurse or doctor.    ..........................................................................................................................................  Patient/Patient Representative Signature      ..........................................................................................................................................  Patient Representative Print Name and Relationship to Patient    ..................................................               ................................................  Date                                            Time    ..........................................................................................................................................  Reviewed by Signature/Title    ...................................................              ..............................................  Date                                                            Time

## 2017-10-11 NOTE — TELEPHONE ENCOUNTER
Deana from [facility].  States that prior authorization for megace has been approved.  Authorization #[id number].  They will also send a fax approval.   Po, pharmacist, has been notified.   Per assisted living facility where pt lives, they would like to have the prescription for the megace filled at [facility] pharmacy.  Original Rx was sent to pharmacy downstairs.  [facility]  They state that they cannot transfer the Rx to [facility] pharmacy.  They state that the prior authorization is only valid for the original pharmacy that the Rx was sent to.  Po in pharmacy has been notified.  Po states that he will notify [facility] tomorrow regarding the fact that prior authorization needs to be done for the [facility] pharmacy.  Velma Ruffin RN BSN

## 2017-10-11 NOTE — ED AVS SNAPSHOT
[facility], Emergency Department    [address] [place] [address]    Phone:  [phone]                                       Tomas Grey   MRN: [id number]    Department:  [facility], Emergency Department   Date of Visit:  10/11/2017           Patient Information     Date Of Birth          1945        Your diagnoses for this visit were:     Chronic bronchitis, unspecified chronic bronchitis type (H)        You were seen by Patrick Samuel MD.      Follow-up Information     Follow up with Ekaterina Lozano APRN CNP.    Specialty:  Nurse Practitioner    Contact information:    [address] [place] [address] [address]  [facility] [address] [phone]          Discharge Instructions       Take antibiotic for one week as directed.  Take prednisone for 5 days total-initial dose given in the ED.  Follow up with your primary care provider.  Return if persistent symptoms.    Future Appointments        Provider Department Dept Phone Center    11/6/2017 2:00 PM  Infusion Chair [facility] [phone] [place] [facility]    11/6/2017 2:30 PM Manpreet Oviedo MD [facility] [phone] [place] [facility]    11/7/2017 4:00 PM JASON Camejo [facility] and [facility] [phone] [facility]      24 Hour Appointment Hotline       To make an appointment at any [facility] clinic, call [phone] ([phone]). If you don't have a family doctor or clinic, we will help you find one. [facility] clinics are conveniently located to serve the needs of you and your family.             Review of your medicines      START taking        Dose / Directions Last dose taken    amoxicillin-clavulanate 875-125 MG per tablet   Commonly known as:  AUGMENTIN   Dose:  1 tablet   Quantity:  14 tablet        Take 1 tablet by mouth 2 times daily for 7 days   Refills:  0        predniSONE 20 MG tablet   Commonly known as:  DELTASONE   Dose:  40 mg    Quantity:  8 tablet        Take 2 tablets (40 mg) by mouth daily for 4 days   Refills:  0          Our records show that you are taking the medicines listed below. If these are incorrect, please call your family doctor or clinic.        Dose / Directions Last dose taken    acetaminophen 500 MG tablet   Commonly known as:  TYLENOL   Dose:  500 mg        Take 500 mg by mouth every 4 hours as needed for pain Not to exceed 3000 mg/24 hours   Refills:  0        ammonium lactate 12 % lotion   Commonly known as:  LAC-HYDRIN   Indication:  Abnormal Dryness of Skin        Apply topically 2 times daily as needed for dry skin To all extremities for dry skin   Refills:  0        aspirin 81 MG EC tablet   Dose:  81 mg   Quantity:  5 tablet        Take 1 tablet (81 mg) by mouth daily   Refills:  0        bimatoprost 0.01 % Soln   Commonly known as:  LUMIGAN   Dose:  1 drop        Place 1 drop into both eyes At Bedtime   Refills:  0        budesonide-formoterol 160-4.5 MCG/ACT Inhaler   Commonly known as:  SYMBICORT   Dose:  2 puff        Inhale 2 puffs into the lungs 2 times daily   Refills:  0        calcium carbonate 1500 (600 CA) MG tablet   Commonly known as:  OS-JORDAN 600 mg [place]. Ca   Dose:  1500 mg        Take 1,500 mg by mouth 2 times daily (with meals)   Refills:  0        carboxymethylcellulose 0.5 % Soln ophthalmic solution   Commonly known as:  REFRESH PLUS   Dose:  1 drop        Place 1 drop into both eyes 4 times daily And 1 drop in both eyes as needed.   Refills:  0        diclofenac 1 % Gel topical gel   Commonly known as:  VOLTAREN   Quantity:  100 g        Apply 4 grams to knees four times daily as needed using enclosed dosing card.   Refills:  0        furosemide 20 MG tablet   Commonly known as:  LASIX   Dose:  20 mg   Quantity:  30 tablet        Take 1 tablet (20 mg) by mouth 2 times daily   Refills:  0        HYDROmorphone 2 MG tablet   Commonly known as:  DILAUDID   Dose:  2 mg        Take 1 tablet (2 mg)  by mouth 3 times daily And q3h PRN   Refills:  0        insulin glargine 100 UNIT/ML injection   Commonly known as:  LANTUS   Dose:  16 Units   Indication:  Type 2 Diabetes        Inject 16 Units Subcutaneous 2 times daily   Refills:  0        ipratropium - albuterol 0.5 mg/2.5 mg/3 mL 0.5-2.5 (3) MG/3ML neb solution   Commonly known as:  DUONEB   Dose:  1 vial   Quantity:  360 mL        Take 1 vial (3 mLs) by nebulization every 4 hours as needed for shortness of breath / dyspnea or wheezing   Refills:  3        isosorbide mononitrate 30 MG 24 hr tablet   Commonly known as:  IMDUR   Dose:  60 mg   Quantity:  30 tablet        Take 2 tablets (60 mg) by mouth daily   Refills:  0        LIPITOR 10 MG tablet   Dose:  10 mg   Generic drug:  atorvastatin        Take 10 mg by mouth At Bedtime   Refills:  0        megestrol 40 MG tablet   Commonly known as:  MEGACE   Dose:  80 mg   Quantity:  120 tablet        Take 2 tablets (80 mg) by mouth 2 times daily   Refills:  3        metoprolol 50 MG 24 hr tablet   Commonly known as:  TOPROL XL   Dose:  50 mg   Quantity:  5 tablet        Take 1 tablet (50 mg) by mouth daily   Refills:  0        nitroGLYcerin 0.4 MG sublingual tablet   Commonly known as:  NITROSTAT   Dose:  0.4 mg        Place 0.4 mg under the tongue every 5 minutes as needed for chest pain if you are still having symptoms after 3 doses (15 minutes) call 911.   Refills:  0        omeprazole 10 MG CR capsule   Commonly known as:  priLOSEC   Dose:  10 mg        Take 1 capsule (10 mg) by mouth daily   Refills:  0        ONDANSETRON PO   Dose:  4 mg        Take 4 mg by mouth every 8 hours as needed for nausea   Refills:  0        polyvinyl alcohol 1.4 % ophthalmic solution   Commonly known as:  LIQUIFILM TEARS   Dose:  1 drop        Place 1 drop into both eyes 3 times daily   Refills:  0        ranitidine 75 MG tablet   Dose:  150 mg   Indication:  Gastroesophageal Reflux Disease   Generic drug:  ranitidine        Take  150 mg by mouth daily   Refills:  0        senna-docusate 8.6-50 MG per tablet   Commonly known as:  SENOKOT-S;PERICOLACE   Dose:  2 tablet        Take 2 tablets by mouth 2 times daily   Refills:  0        tamsulosin 0.4 MG capsule   Commonly known as:  FLOMAX   Dose:  0.4 mg   Quantity:  5 capsule        Take 1 capsule (0.4 mg) by mouth daily   Refills:  0        tiotropium 18 MCG capsule   Commonly known as:  SPIRIVA   Dose:  18 mcg   Indication:  Chronic Obstructive Lung Disease        Inhale 18 mcg into the lungs daily   Refills:  0        WARFARIN SODIUM PO        Take by mouth daily See facility orders for INR dosing   Refills:  0                Prescriptions were sent or printed at these locations (2 Prescriptions)                   Other Prescriptions                Printed at Department/Unit printer (2 of 2)         amoxicillin-clavulanate (AUGMENTIN) 875-125 MG per tablet               predniSONE (DELTASONE) 20 MG tablet                Procedures and tests performed during your visit     Basic metabolic panel    CBC with platelets differential    Cardiac Continuous Monitoring    Chest XR,  PA & LAT    EKG 12-lead, tracing only    Pulse oximetry nursing    Troponin I      Orders Needing Specimen Collection     None      Pending Results     Date and Time Order Name Status Description    10/11/2017 0403 Chest XR,  PA & LAT Preliminary     10/11/2017 0248 EKG 12-lead, tracing only Preliminary             Pending Culture Results     No orders found from 10/9/2017 to 10/12/2017.            Pending Results Instructions     If you had any lab results that were not finalized at the time of your Discharge, you can call the ED Lab Result RN at [phone]. You will be contacted by this team for any positive Lab results or changes in treatment. The nurses are available 7 days a week from 10A to 6:30P.  You can leave a message 24 hours per day and they will return your call.        Thank you for choosing [facility]      "  Thank you for choosing [facility] for your care. Our goal is always to provide you with excellent care. Hearing back from our patients is one way we can continue to improve our services. Please take a few minutes to complete the written survey that you may receive in the mail after you visit with us. Thank you!        [facility] Information     [employer] lets you send messages to your doctor, view your test results, renew your prescriptions, schedule appointments and more. To sign up, go to [email or web address] . Click on \"Log in\" on the left side of the screen, which will take you to the Welcome page. Then click on \"Sign up Now\" on the right side of the page.     You will be asked to enter the access code listed below, as well as some personal information. Please follow the directions to create your username and password.     Your access code is: [email or web address]  Expires: 2017 12:54 PM     Your access code will  in 90 days. If you need help or a new code, please call your [facility] clinic or [phone].        Care EveryWhere ID     This is your Care EveryWhere ID. This could be used by other organizations to access your [facility] medical records  [id number]        Equal Access to Services     PATRICIA BAUM : Elaina antonyo Somissael, waaxda luqadaha, qaybta kaalmada adechelseayada, jorje musa. So [facility] [phone].    ATENCIÓN: Si habla español, tiene a soria disposición servicios gratuitos de asistencia lingüística. Llame al [phone].    We comply with applicable federal civil rights laws and [place] laws. We do not discriminate on the basis of race, color, national origin, age, disability, sex, sexual orientation, or gender identity.            After Visit Summary       This is your record. Keep this with you and show to your community pharmacist(s) and doctor(s) at your next visit.                  "

## 2017-10-12 NOTE — DISCHARGE INSTRUCTIONS
FOLLOW-UP:  Please make an appointment to follow up with:  - Your Primary Care Provider as soon as possible this week.  - You should have more frequent INR checks while on this antibiotic.     PRESCRIPTIONS / MEDICATIONS:  - Please take the prescribed antibiotics for the next 5 days.   - You should get your INR checked in 1-2 days to ensure no significant changes while on this medication.       RETURN TO THE EMERGENCY DEPARTMENT  Return to the Emergency Department at any time for new/worsening symptoms.       Shortness of Breath (Dyspnea)  Shortness of breath is the feeling that you can't catch your breath or get enough air. It is also known as dyspnea.  Dyspnea can be caused by many different conditions. They include:    Acute asthma attack.    Worsening of chronic lung diseases such as chronic bronchitis and emphysema.    Heart failure. This is when weak heart muscle allows extra fluid to collect in the lungs.    Panic attacks or anxiety. Fear can cause rapid breathing (hyperventilation).    Pneumonia, or an infection in the lung tissue.    Exposure to toxic substances, fumes, smoke, or certain medicines.    Blood clot in the lung (pulmonary embolism). This is often from a piece of blood clot in a deep vein of the leg (deep vein thrombosis) that breaks off and travels to the lungs.    Heart attack or heart-related chest pain (angina).    Anemia.    Collapsed lung (pneumothorax).    Dehydration.    Pregnancy.  Based on your visit today, the exact cause of your shortness of breath is not certain. Your tests don t show any of the serious causes of dyspnea. You may need other tests to find out if you have a serious problem. It s important to watch for any new symptoms or symptoms that get worse. Follow up with your healthcare provider as directed.  Home care  Follow these tips to take care of yourself at home:    When your symptoms are better, go back to your usual activities.    If you smoke, you should stop. Join a  quit-smoking program or ask your healthcare provider for help.    Eat a healthy diet and get plenty of sleep.    Get regular exercise. Talk with your healthcare provider before starting to exercise, especially if you have other medical problems.    Cut down on the amount of caffeine and stimulants you consume.  Follow-up care  Follow up with your healthcare provider, or as advised.  If tests were done, you will be told if your treatment needs to be changed. You can call as directed for the results.  (Note: If an X-ray was taken, a specialist will review it. You will be notified of any new findings that may affect your care.)  Call 911 or get immediate medical care  Shortness of breath may be a sign of a serious medical problem. For example, it may be a problem with your heart or lungs. Call 911 if you have worsening shortness of breath or trouble breathing, especially with any of the symptoms below:    You are confused or it s difficult to wake you.    You faint or lose consciousness.    You have a fast heartbeat, or your heartbeat is irregular.    You are coughing up blood.    You have pain in your chest, arm, shoulder, neck, or upper back.    You break out in a sweat.  When to seek medical advice  Call your healthcare provider right away if any of these occur:    Slight shortness of breath or wheezing    Redness, pain or swelling in your leg, arm, or other body area    Swelling in both legs or ankles    Fast weight gain    Dizziness or weakness    Fever of 100.4 F (38 C) or higher, or as directed by your healthcare provider  Date Last Reviewed: 9/13/2015 2000-2017 [employer]. [address] [place] [address], [place] [address]. All rights reserved. This information is not intended as a substitute for professional medical care. Always follow your healthcare professional's instructions.

## 2017-10-12 NOTE — ED NOTES
Pt BIBA with c/o chest pain. Pt in ED earlier today with same symptoms. Pain started as soon as pt got home from ED around 1700. Given 2 sprays nitro and 324mg aspirin in route. Nitro helped pain a little, resulted in h/a. Baseline on 2L O2.

## 2017-10-12 NOTE — ED PROVIDER NOTES
[place] EMERGENCY DEPARTMENT ([facility])  10/11/17   History     Chief Complaint   Patient presents with     Chest Pain     HPI  Tomas Grey is a 72 year old male with a medical history of COPD, ASCVD, prostate cancer metastatic to multiple sites, type 2 diabetes mellitus, stage 3 CKD, right heart failure, irregular heart rhythm, FTT, CAD, respiratory failure, hyperlipidemia, physical deconditioning, primary pulmonary hypertension who presents to the Emergency Department for evaluation of left-sided chest pain.    Patient reports having similar pain in the past, essentially daily for all of the recent past that he can remember, but reports the pain is worse today. Same location and quality, but a bit worse today, present constantly all day. He notes having abdominal pain as well in the past that has radiated up into the chest, but reports the abdominal component of the symptoms is improved and was not present today. Patient also complains of dark/yellow urine, but denies any changes to the stool or any bright red blood in the stool/tarry stools. He also complains of a cough, consistent w/ previous, which exacerbates his chest pain. He denies any blood in the sputum of his cough. He notes having a subjective fever at some point in the recent past, but says he does not have one now. Patient also complains of bilateral lower extremity swelling, says this is chronic and unchanged. No pain. He denies any medication changes aside from his cancer treatments. Patient uses a nebulizer at home every 4 hours. He denies any nausea or vomiting. No other new symptoms or complaints at this time. Please see ROS for further details.     I have reviewed the Medications, Allergies, Past Medical and Surgical History, and Social History in the [employer] system.    Past Medical History:   Diagnosis Date     Anemia      Anxiety      Aspiration pneumonia (H) 2014     C. difficile colitis      CAD (coronary artery disease)       Chronic pain      CKD (chronic kidney disease)      COPD (chronic obstructive pulmonary disease) (H)      Depressive disorder      Diabetes mellitus (H)      Hypertension      Insomnia      ALEXIS (obstructive sleep apnea)      Osteoporosis      Prostate cancer metastatic to multiple sites (H)        Past Surgical History:   Procedure Laterality Date     ABDOMEN SURGERY       ARTHROSCOPY KNEE       EYE SURGERY         Family History   Problem Relation Age of Onset     DIABETES Mother      CANCER Mother      stomach       Social History   Substance Use Topics     Smoking status: Former Smoker     Smokeless tobacco: Never Used     Alcohol use No       No current facility-administered medications for this encounter.      Current Outpatient Prescriptions   Medication     amoxicillin-clavulanate (AUGMENTIN) 875-125 MG per tablet     predniSONE (DELTASONE) 20 MG tablet     megestrol (MEGACE) 40 MG tablet     bimatoprost (LUMIGAN) 0.01 % SOLN     WARFARIN SODIUM PO     calcium carbonate (OS-JORDAN 600 MG [place]. CA) 1500 (600 CA) MG tablet     senna-docusate (SENOKOT-S;PERICOLACE) 8.6-50 MG per tablet     isosorbide mononitrate (IMDUR) 30 MG 24 hr tablet     HYDROmorphone (DILAUDID) 2 MG tablet     ONDANSETRON PO     furosemide (LASIX) 20 MG tablet     carboxymethylcellulose (REFRESH PLUS) 0.5 % SOLN ophthalmic solution     budesonide-formoterol (SYMBICORT) 160-4.5 MCG/ACT Inhaler     ranitidine (RANITIDINE) 75 MG tablet     diclofenac (VOLTAREN) 1 % GEL topical gel     insulin glargine (LANTUS) 100 UNIT/ML injection     omeprazole (PRILOSEC) 10 MG CR capsule     atorvastatin (LIPITOR) 10 MG tablet     tiotropium (SPIRIVA) 18 MCG inhalation capsule     polyvinyl alcohol (LIQUIFILM TEARS) 1.4 % ophthalmic solution     acetaminophen (TYLENOL) 500 MG tablet     [DISCONTINUED] enzalutamide (XTANDI) 40 MG capsule     nitroglycerin (NITROSTAT) 0.4 MG SL tablet     ipratropium - albuterol 0.5 mg/2.5 mg/3 mL (DUONEB) 0.5-2.5 (3)  "MG/3ML nebulization     aspirin 81 MG EC tablet     metoprolol (TOPROL XL) 50 MG 24 hr tablet     tamsulosin (FLOMAX) 0.4 MG 24 hr capsule     ammonium lactate (LAC-HYDRIN) 12 % lotion        Allergies   Allergen Reactions     Morphine      Pt states not an allergy         Review of Systems   Constitutional: Positive for fever (subjective, at some point last few days). Negative for chills, diaphoresis and fatigue.   HENT: Negative for ear pain, sore throat, tinnitus, trouble swallowing and voice change.    Eyes: Negative for pain and visual disturbance.   Respiratory: Positive for cough (chronic, unchanged) and chest tightness (chronic, unchanged). Negative for shortness of breath.    Cardiovascular: Positive for chest pain and leg swelling (bilateral). Negative for palpitations.   Gastrointestinal: Positive for blood in stool (dark/yellow). Negative for abdominal pain, constipation, diarrhea, nausea and vomiting.   Endocrine: Negative for polydipsia and polyuria.   Genitourinary: Negative for dysuria, frequency, hematuria and urgency.   Musculoskeletal: Negative for back pain and neck pain.   Skin: Negative for color change and rash.   Allergic/Immunologic: Negative for immunocompromised state.   Neurological: Negative for dizziness, seizures, syncope, weakness, light-headedness, numbness and headaches.   Hematological: Negative for adenopathy. Does not bruise/bleed easily.   Psychiatric/Behavioral: Negative for dysphoric mood. The patient is not nervous/anxious.        Physical Exam   BP: (!) 158/98  Pulse: 93  Temp: 98.3  F (36.8  C)  Resp: 18  Height: 180.3 cm (5' 11\")  SpO2: 96 %       Physical Exam   CONSTITUTIONAL: Well-developed and well-nourished. Awake and alert. Non-toxic appearance. No acute distress.   HENT:   - Head: Normocephalic and atraumatic.   - Ears: Hearing and external ear grossly normal.   - Nose: Nose normal. No rhinorrhea. No epistaxis.   - Mouth/Throat: Oropharynx is clear and MMM  EYES: " Conjunctivae and lids are normal. No scleral icterus.   NECK: Normal range of motion and phonation normal. Neck supple.  No tracheal deviation, no stridor. No edema or erythema noted.  CARDIOVASCULAR: Normal rate, regular rhythm and no appreciable abnormal heart sounds.  PULMONARY/CHEST: Effort normal. No accessory muscle usage or stridor. No respiratory distress.  Some decreased air movement bilaterally. Some prolonged expiratory phase. No significant wheezes, no crackles. No focality.   ABDOMEN: Soft, non-distended. No tenderness. No rigidity, rebound or guarding.   MUSCULOSKELETAL: Extremities warm and seemingly well perfused. Bilateral LE edema with chronic skin thickening from the chronic edema.  NEUROLOGIC: Awake, alert. Not disoriented.  Normal tone. No seizure activity. Coordination normal. GCS 15  SKIN: Skin is warm and dry. No rash noted. No diaphoresis. No pallor.   PSYCHIATRIC: Normal mood and affect. Speech and behavior normal. Thought processes linear. Cognition and memory are normal.       ED Course   10:22 PM  The patient was seen and examined by Annika Mayer MD in Room ED18.     ED Course   Comment By Time   Discussed case with Pharmacy.  They recommended azithromycin and is more regular INR checks. Annika Mayer MD 10/12 0259     Procedures             EKG Interpretation:      Interpreted by Annika Mayer MD  Time reviewed: 21:02  Symptoms at time of EKG: Chest Pain   Rhythm: normal sinus   Rate: 84 bpm  Axis: Left Axis Deviation  Ectopy: none  Conduction: normal  ST Segments/ T Waves: No acute ST-T wave changes  Comparison to prior: Compared to previous (9/26/2017): LAD noted; No significant change  Clinical Impression: Sinus, LAD       Labs Ordered and Resulted from Time of ED Arrival Up to the Time of Departure from the ED - No data to display         Assessments & Plan (with Medical Decision Making)   IMPRESSION: 72-year-old male with PMH notable for COPD, CAD, CHF, CKD, chronic dyspnea,  previous HCAP, amongst others who has ED care plan in place for chronic COPD, chronic abdominal pain, chronic chest pain, lower extremity edema, etc., who presents today for repeat visit, complaining of ongoing chest discomfort, having received 2 sprays of nitro as well as appropriately dosed aspirin prior to arrival.  Other than that, patient is reporting some ongoing chest discomfort as per before, slightly worsening of his baseline shortness of breath but otherwise no acute changes as described further above in the HPI/ROS.  Clinically, patient appears nontoxic.  He has a lower SPO2 on RA but that looks like it was likely consistent with his COPD.  This is improved on nasal cannula.  He has poor air movement throughout both lungs with prolonged expiratory phase but no james of rhonchi, crackles or wheezes.  He has chronic appearing lower extremity edema.    DDX includes but not limited to ongoing chronic chest discomfort, COPD exacerbation, new infectious process, occult abdominal process such as gastritis/PUD.  He s had no vomiting to make me think this would be Rafia-Hairston or Boerhaave syndrome.  Does not sound consistent with dissection, and given consistency with previous I think the likelihood of this being PTX, dissection, pericardial effusion/pericarditis to be quite unlikely. ACS thought to be less likely given constant symptoms and negative troponins in the past. Has had symptoms for > 6 hrs constantly prior to arrival, and so will recheck troponin and ensure negative.    PLAN: Laboratory studies, ECG, CXR, symptom management.    RESULTS:  - Labs: Initial and serial troponin both negative, BNP slightly elevated at 1090, CRP and sed rate somewhat elevated, s creatinine improved, Hgb also up from previous.  Normal lactate.  VBG appears appropriate.  --- See ED Course section above for particular pertinent findings and comments  - Imaging: Images and written preliminary reports reviewed by myself and  revealed:  --- CXR: unchanged dependent interstitial opacities, increased patchy bibasilar opacities    INTERVENTIONS:   - DuoNeb  - IV dexamethasone    RE-EVALUATION:  See ED Course section above for particular pertinent findings and comments  - The patient's symptoms were improved during stay, now resolved and to his normal baseline.  - Patient observed for multiple hours with multiple repeat exams and remains hemodynamically stable and respiratorily improved.     DISCUSSIONS:  - w/ Patient's NH: They are comfortable taking pt back and can execute our plan as discussed  - w/ Patient: I have reviewed the available findings, plan, need for close follow up, closer INR monitoring for possible drug interaction and strict return instructions with the patient and his NH. They expressed understanding and agreement with this plan. All questions answered to the best of our ability at this time.     DISPOSITION/PLANNING:  - FINAL IMPRESSION: Shortness of breath, COPD exacervation, possible infectious component  - DISPOSITION: D/C back to NH.   --- Follow-up: w/ PCP in 1-2 days, recheck INR on Friday  --- Recommendations: Conservative symptom management, strict return instructions  --- Rx: Azithromycin      ______________________________________________________________________________    - I have reviewed the available nursing notes.            New Prescriptions    No medications on file       Final diagnoses:   None     IMichael, am serving as a trained medical scribe to document services personally performed by Annika Mayer MD, based on the provider's statements to me.   Annika MCCALLUM MD, was physically present and have reviewed and verified the accuracy of this note documented by Michael Lovell.    10/11/2017   [facility], [place], EMERGENCY DEPARTMENT     Annika Mayer MD  10/13/17 0212

## 2017-10-12 NOTE — TELEPHONE ENCOUNTER
Prior Authorization Approval    Date Range of Authorization: 09/11/201/ thru 09/11/2018  Medication: MEgestrol 40mg  Approved Dose/Quantity  80mg BID  -120/30das  Diagnosis: : Prostate cancer metastatic to multiple sites (H) (C61)  Reference #: [id number]  Insurance Company: [employer]  Insurance I/D #: [id number]

## 2017-10-16 NOTE — PROGRESS NOTES
Thank you for the opportunity to be a part in the care of this patient's oral chemotherapy. The oncology pharmacy will no longer be following this patient for oral chemotherapy. If there are any questions or the plan changes, feel free to contact us.    Lana Oliver, PharmD, MPH, BCOP  10/16/17

## 2017-10-17 NOTE — PROGRESS NOTES
"[facility]    Chief Complaint   Patient presents with     RECHECK     HPI:    Tomas Grey is a 72 year old  (1945), who is being seen today for an episodic care visit at [facility].  HPI information obtained from: facility chart records, facility staff, patient report and [facility] chart review. Today's concern is:    Chronic Obstructive Pulmonary Disease/Metastatic Prostate Cancer with Chronic Pain/Misuse of Medications/Emergency Department. On the evening of 10/10/17, resident complained of chest pain at the facility. Hydromorphone and Nitroglycerin were administered. When staff went to follow-up after medication administration, resident called 911. Went to the [facility] ED. A chest x-ray revealed \"1. Subtle dependent interstitial opacities suggesting interstitial edema or less likely infection 2. Mild bibasilar atelectasis\". Troponin unremarkable. Labs unremarkable. Discharged on Prednisone x 5 days and Amoxicillin-Clavulanate x 7 days. Discharged back to facility. Continued to complain of chest pain with no improvement after Nitroglycerin. Resident called 911 and went back to [facility] ED 10/11/17. Troponins negative. Received DuoNeb and IV Dexamethasone. Given prescription for Azithromycin. Returned to facility. On 10/141/17, resident called 911 for abdominal and knee pain after receiving pain medications at facility. Requested to go to [facility] ED. Troponin negative. Chest x-ray with no acute findings. Ultrasound of BLE negative. Refused Ventilation perfusion scan to rule out PE. Given Nitroglycerin, Dilaudid 1 mg IV and Acetaminophen. Discharged back to long term care. Of note, resident has been to the ED or hospitalized 5 times in a little less than 2 months.    When questioned about going to the ED, resident initially states he won't go there anymore. Later complains that facility is too slow in taking care of him. Wants to " know why it takes so long for his shortness of breath and pain to improve. Describes how IV pain medication in the ED makes him feel so good and like all his pain has gone away. Goes on to talk about how he takes too many pills. Would only like to take 2 pills to treat his cancer. Discusses how his Oncologist is working to cure his cancer. Reports he's healthy other than his cancer.    When discussing seeing in-house psychiatrist given frequent ED visits and overall goals of care, resident become angry. States he is not crazy. Doesn't want to be spoken to like that. Requests he be left alone and family not be contacted.    Paroxysmal Atrial Fibrillation. INR 5.1 on 10/17/17. 4.2 on 10/16/17. INR 2.1 on 10/12/17. Of note, started on Augmentin on 10/11/17 which was changed to Azithromycin until 10/16/17. No signs or symptoms of blood loss.    ALLERGIES: Morphine  Past Medical, Surgical, Family and Social History reviewed and updated in [facility].    Current Outpatient Prescriptions   Medication Sig Dispense Refill     Timolol Hemihydrate (BETIMOL OP) Apply 1 drop to eye every morning       latanoprost (XALATAN) 0.005 % ophthalmic solution Place 1 drop into both eyes At Bedtime       azithromycin (ZITHROMAX Z-EDGAR) 250 MG tablet Two tablets on the first day, then one tablet daily for the next 4 days 6 tablet 0     megestrol (MEGACE) 40 MG tablet Take 2 tablets (80 mg) by mouth 2 times daily 120 tablet 3     bimatoprost (LUMIGAN) 0.01 % SOLN Place 1 drop into both eyes At Bedtime       WARFARIN SODIUM PO Take by mouth daily See facility orders for INR dosing       calcium carbonate (OS-JORDAN 600 MG [place]. CA) 1500 (600 CA) MG tablet Take 1,500 mg by mouth 2 times daily (with meals)       senna-docusate (SENOKOT-S;PERICOLACE) 8.6-50 MG per tablet Take 2 tablets by mouth 2 times daily       isosorbide mononitrate (IMDUR) 30 MG 24 hr tablet Take 2 tablets (60 mg) by mouth daily 30 tablet      HYDROmorphone (DILAUDID) 2 MG tablet  Take 1 tablet (2 mg) by mouth 3 times daily And q3h PRN       ONDANSETRON PO Take 4 mg by mouth every 8 hours as needed for nausea       furosemide (LASIX) 20 MG tablet Take 1 tablet (20 mg) by mouth 2 times daily 30 tablet      carboxymethylcellulose (REFRESH PLUS) 0.5 % SOLN ophthalmic solution Place 1 drop into both eyes 4 times daily And 1 drop in both eyes as needed.       ranitidine (RANITIDINE) 75 MG tablet Take 150 mg by mouth daily        diclofenac (VOLTAREN) 1 % GEL topical gel Apply 4 grams to knees four times daily as needed using enclosed dosing card. 100 g 0     insulin glargine (LANTUS) 100 UNIT/ML injection Inject 16 Units Subcutaneous 2 times daily        omeprazole (PRILOSEC) 10 MG CR capsule Take 1 capsule (10 mg) by mouth daily       atorvastatin (LIPITOR) 10 MG tablet Take 10 mg by mouth At Bedtime        tiotropium (SPIRIVA) 18 MCG inhalation capsule Inhale 18 mcg into the lungs daily       polyvinyl alcohol (LIQUIFILM TEARS) 1.4 % ophthalmic solution Place 1 drop into both eyes 3 times daily       acetaminophen (TYLENOL) 500 MG tablet Take 500 mg by mouth every 4 hours as needed for pain Not to exceed 3000 mg/24 hours       nitroglycerin (NITROSTAT) 0.4 MG SL tablet Place 0.4 mg under the tongue every 5 minutes as needed for chest pain if you are still having symptoms after 3 doses (15 minutes) call 911.       ipratropium - albuterol 0.5 mg/2.5 mg/3 mL (DUONEB) 0.5-2.5 (3) MG/3ML nebulization Take 1 vial (3 mLs) by nebulization every 4 hours as needed for shortness of breath / dyspnea or wheezing 360 mL 3     aspirin 81 MG EC tablet Take 1 tablet (81 mg) by mouth daily 5 tablet 0     metoprolol (TOPROL XL) 50 MG 24 hr tablet Take 1 tablet (50 mg) by mouth daily 5 tablet 0     tamsulosin (FLOMAX) 0.4 MG 24 hr capsule Take 1 capsule (0.4 mg) by mouth daily 5 capsule 0     ammonium lactate (LAC-HYDRIN) 12 % lotion Apply topically 2 times daily as needed for dry skin To all extremities for dry  skin       [DISCONTINUED] enzalutamide (XTANDI) 40 MG capsule Take 4 capsules (160 mg) by mouth daily 120 capsule 0     Medications reviewed:  Medications reconciled to facility chart and changes were made to reflect current medications as identified as above med list. Below are the changes that were made:   Medications stopped since last EPIC medication reconciliation:   Medications Discontinued During This Encounter   Medication Reason     amoxicillin-clavulanate (AUGMENTIN) 875-125 MG per tablet Medication Reconciliation Clean Up     budesonide-formoterol (SYMBICORT) 160-4.5 MCG/ACT Inhaler Medication Reconciliation Clean Up       Medications started since last [facility] medication reconciliation:  Orders Placed This Encounter   Medications     Timolol Hemihydrate (BETIMOL OP)     Sig: Apply 1 drop to eye every morning     latanoprost (XALATAN) 0.005 % ophthalmic solution     Sig: Place 1 drop into both eyes At Bedtime     REVIEW OF SYSTEMS:  4 point ROS including Respiratory, CV, GI and , other than that noted in the HPI,  is negative    Physical Exam:  /88  Pulse 102  Temp 98.4  F (36.9  C)  Resp 20  Wt 213 lb (96.6 kg)  SpO2 94%  BMI 29.71 kg/m2  GENERAL APPEARANCE:  in no distress, Resting head on pillow on power wheelchair  ENT:  Mouth and posterior oropharynx normal, moist mucous membranes  EYES:  Eyes closed most of visit  RESP:  respiratory effort and palpation of chest normal, no respiratory distress, diminished breath sounds throughout  M/S:   Active movement of bilateral upper extremities.  SKIN:  Inspection of skin and subcutaneous tissue baseline, Palpation of skin and subcutaneous tissue baseline  NEURO:   Cranial nerves 2-12 are normal tested and grossly at patient's baseline  PSYCH:  oriented to person and place    Recent Labs:    Last Basic Metabolic Panel:  Lab Results   Component Value Date     10/11/2017      Lab Results   Component Value Date    POTASSIUM 4.6 10/11/2017     Lab  Results   Component Value Date    CHLORIDE 106 10/11/2017     Lab Results   Component Value Date    JORDAN 8.3 10/11/2017     Lab Results   Component Value Date    CO2 28 10/11/2017     Lab Results   Component Value Date    BUN 28 10/11/2017     Lab Results   Component Value Date    CR 1.19 10/11/2017     Lab Results   Component Value Date    GLC 92 10/11/2017     Lab Results   Component Value Date    WBC 8.5 10/11/2017     Lab Results   Component Value Date    RBC 2.97 10/11/2017     Lab Results   Component Value Date    HGB 7.9 10/11/2017     Lab Results   Component Value Date    HCT 26.1 10/11/2017     Lab Results   Component Value Date    MCV 88 10/11/2017     Lab Results   Component Value Date    MCH 26.6 10/11/2017     Lab Results   Component Value Date    MCHC 30.3 10/11/2017     Lab Results   Component Value Date    RDW 19.8 10/11/2017     Lab Results   Component Value Date     10/11/2017     Assessment/Plan:  Chronic Obstructive Pulmonary Disease on Chronic Oxygen Supplementation. Started on Prednisone and Amoxicillin-Clavulanate initially for COPD exacerbation. Changed to Azithromycin with treatment until 10/16/17. No longer on chronic Prednisone for cancer treatment. Continue DuoNebs and Tiotropium as ordered.     Metastatic Prostate Cancer. Given chronic complaints of chest pain with unremarkable EKG and Troponin, pain likely related to bone mets. Have increased Isosorbide Mononitrate dose with no improvement in pain. Will increase Hydromorphone from 2 mg TID to 4 mg BID and 2 mg q3h PRN.     Misuse of Medications/Emergency Department.  Given resident's desire to go to the ED is largely in part due to IV pain medications, WOULD RECOMMEND RESIDENT NOT RECEIVE IV PAIN MEDICATION UNLESS ABSOLUTELY NECESSARY IN THE ED. Educated resident on IV pain medication working faster, but administered in smaller doses and lasting for short periods of time. Discussed with facility staff arranging in-house psychiatry  consult to evaluate overall mood in the setting of frequent ED visit and co-morbid conditions. Also ordered neurocognitive testing to determine if resident has the capacity to be making his own decisions. Reminded staff to arrange follow-up Palliative Care visit with Dr. Gallagher. Given FULL CODE status and goals of care, hesitate to decrease medications at this time. Have previously discussed with multiple family members, but today, resident requests family members not be contacted. Will follow-up at a later date.    Paroxysmal Atrial Fibrillation. Supratherapeutic INR due to recent antibiotic use. Hold Warfarin. Repeat INR on 10/18/17.     Electronically signed by  SAL Acevedo CNP

## 2017-10-24 NOTE — PROGRESS NOTES
[facility]    Chief Complaint   Patient presents with     RECHECK     HPI:    Tomas Grey is a 72 year old  (1945), who is being seen today for an episodic care visit at [facility].  HPI information obtained from: facility chart records, facility staff, patient report and [facility] chart review. Today's concern is:    Type 2 Diabetes Mellitus. Last A1C 6.2 on 8/8/17. Upon review of blood glucose over the past week, range is as follows:    Breakfast:  - Most <150  Lunch: 112-231 Most <200  Dinner:   Bedtime: 128-355    Lymphedema. Denies increased shortness of breath. Staff reports they are applying ACE wraps to bilateral lower extremities nightly.     Paroxysmal Atrial Fibrillation on Anticoagulation.  INR 3.3 on 10/23/17. Previous INR 4.8 on 10/20/17. Warfarin on hold 10/20/17-10/21/17 with 2 mg administered 10/22/17. Of note, was on antibiotics from 10/11/7 - 10/16/17 and INR has been supratherapeutic since.    ALLERGIES: Morphine  Past Medical, Surgical, Family and Social History reviewed and updated in [facility].    Current Outpatient Prescriptions   Medication Sig Dispense Refill     senna-docusate (SENOKOT-S;PERICOLACE) 8.6-50 MG per tablet Take 2 tablets by mouth 2 times daily       Timolol Hemihydrate (BETIMOL OP) Apply 1 drop to eye every morning       latanoprost (XALATAN) 0.005 % ophthalmic solution Place 1 drop into both eyes At Bedtime       HYDROmorphone (DILAUDID) 2 MG tablet Take 2 tablets (4 mg) by mouth 2 times daily And Hydromorphone 2 mg q3h PRN       megestrol (MEGACE) 40 MG tablet Take 2 tablets (80 mg) by mouth 2 times daily 120 tablet 3     bimatoprost (LUMIGAN) 0.01 % SOLN Place 1 drop into both eyes At Bedtime       WARFARIN SODIUM PO Take by mouth daily See facility orders for INR dosing       calcium carbonate (OS-JORDAN 600 MG [place]. CA) 1500 (600 CA) MG tablet Take 1,500 mg by mouth 2 times daily (with meals)       isosorbide  mononitrate (IMDUR) 30 MG 24 hr tablet Take 2 tablets (60 mg) by mouth daily 30 tablet      ONDANSETRON PO Take 4 mg by mouth every 8 hours as needed for nausea       furosemide (LASIX) 20 MG tablet Take 1 tablet (20 mg) by mouth 2 times daily 30 tablet      carboxymethylcellulose (REFRESH PLUS) 0.5 % SOLN ophthalmic solution Place 1 drop into both eyes 4 times daily And 1 drop in both eyes as needed.       ranitidine (RANITIDINE) 75 MG tablet Take 150 mg by mouth daily        diclofenac (VOLTAREN) 1 % GEL topical gel Apply 4 grams to knees four times daily as needed using enclosed dosing card. 100 g 0     insulin glargine (LANTUS) 100 UNIT/ML injection Inject 16 Units Subcutaneous 2 times daily        omeprazole (PRILOSEC) 10 MG CR capsule Take 1 capsule (10 mg) by mouth daily       atorvastatin (LIPITOR) 10 MG tablet Take 10 mg by mouth At Bedtime        tiotropium (SPIRIVA) 18 MCG inhalation capsule Inhale 18 mcg into the lungs daily       polyvinyl alcohol (LIQUIFILM TEARS) 1.4 % ophthalmic solution Place 1 drop into both eyes 3 times daily       acetaminophen (TYLENOL) 500 MG tablet Take 500 mg by mouth every 4 hours as needed for pain Not to exceed 3000 mg/24 hours       nitroglycerin (NITROSTAT) 0.4 MG SL tablet Place 0.4 mg under the tongue every 5 minutes as needed for chest pain if you are still having symptoms after 3 doses (15 minutes) call 911.       aspirin 81 MG EC tablet Take 1 tablet (81 mg) by mouth daily 5 tablet 0     metoprolol (TOPROL XL) 50 MG 24 hr tablet Take 1 tablet (50 mg) by mouth daily 5 tablet 0     tamsulosin (FLOMAX) 0.4 MG 24 hr capsule Take 1 capsule (0.4 mg) by mouth daily 5 capsule 0     ammonium lactate (LAC-HYDRIN) 12 % lotion Apply topically 2 times daily as needed for dry skin To all extremities for dry skin       [DISCONTINUED] enzalutamide (XTANDI) 40 MG capsule Take 4 capsules (160 mg) by mouth daily 120 capsule 0     ipratropium - albuterol 0.5 mg/2.5 mg/3 mL (DUONEB)  0.5-2.5 (3) MG/3ML nebulization Take 1 vial (3 mLs) by nebulization every 4 hours as needed for shortness of breath / dyspnea or wheezing 360 mL 3     Medications reviewed:  Medications reconciled to facility chart and changes were made to reflect current medications as identified as above med list. Below are the changes that were made:   Medications stopped since last EPIC medication reconciliation:   Medications Discontinued During This Encounter   Medication Reason     senna-docusate (SENOKOT-S;PERICOLACE) 8.6-50 MG per tablet Medication Reconciliation Clean Up       Medications started since last [facility] medication reconciliation:  Orders Placed This Encounter   Medications     senna-docusate (SENOKOT-S;PERICOLACE) 8.6-50 MG per tablet     Sig: Take 2 tablets by mouth 2 times daily     REVIEW OF SYSTEMS:  4 point ROS including Respiratory, CV, GI and , other than that noted in the HPI,  is negative    Physical Exam:  /80  Pulse 86  Temp 97  F (36.1  C)  Resp 20  Wt 210 lb 6.4 oz (95.4 kg)  SpO2 94%  BMI 29.34 kg/m2  GENERAL APPEARANCE:  Alert, in no distress  ENT:  Mouth and posterior oropharynx normal, moist mucous membranes  EYES:  EOM, conjunctivae, lids, pupils and irises normal  RESP:  Respiratory effort and palpation of chest normal, no respiratory distress, diminished lung sounds throughout  CV:  Palpation and auscultation of heart done, regular rate and rhythm, no murmur, rub, or gallop  ABDOMEN: Active bowel sounds, soft, nontender, no hepatosplenomegaly or other masses  M/S:   Active movement of bilateral upper and lower extremities. ACE wraps on BLE.  SKIN:  Inspection of skin and subcutaneous tissue baseline, Palpation of skin and subcutaneous tissue baseline  NEURO:   Cranial nerves 2-12 are normal tested and grossly at patient's baseline  PSYCH:  affect and mood normal    Recent Labs:    Last Basic Metabolic Panel:  Lab Results   Component Value Date     10/11/2017      Lab Results    Component Value Date    POTASSIUM 4.6 10/11/2017     Lab Results   Component Value Date    CHLORIDE 106 10/11/2017     Lab Results   Component Value Date    JORDAN 8.3 10/11/2017     Lab Results   Component Value Date    CO2 28 10/11/2017     Lab Results   Component Value Date    BUN 28 10/11/2017     Lab Results   Component Value Date    CR 1.19 10/11/2017     Lab Results   Component Value Date    GLC 92 10/11/2017     Lab Results   Component Value Date    WBC 8.5 10/11/2017     Lab Results   Component Value Date    RBC 2.97 10/11/2017     Lab Results   Component Value Date    HGB 7.9 10/11/2017     Lab Results   Component Value Date    HCT 26.1 10/11/2017     Lab Results   Component Value Date    MCV 88 10/11/2017     Lab Results   Component Value Date    MCH 26.6 10/11/2017     Lab Results   Component Value Date    MCHC 30.3 10/11/2017     Lab Results   Component Value Date    RDW 19.8 10/11/2017     Lab Results   Component Value Date     10/11/2017     Assessment/Plan:  Type 2 Diabetes Mellitus. Last A1C 6.2 on 8/8/17. A1C low. At risk for hypoglycemia, but most recent blood glucose with elevated readings. Will continue to monitor blood glucose QID prior to meals and at bedtime. Continue Glargine as ordered.    Chronic Lymphedema of Bilateral Lower Extremities. Will order Physical Therapy to evaluate and treat for lymphedema wraps. Continue to encourage elevation although resident spends most of his time in his wheelchair. Also taking Furosemide.    Paroxysmal Atrial Fibrillation on Anticoagulation. INR slightly supratherapeutic. Continue Warfarin 2 mg PO QD. Repeat INR on 10/26/17. Also on Metoprolol.    Of note, spoke to daughter in-law, Jaclyn, and son, Les, on the telephone after today's visit. Reviewed plan of care and frequent visits to the ED. They both are aware of frequent visits to the ED. Agree their father likes to receive the IV pain medication. Will follow-up with their dad regarding his pain  control. Have been trying to visit him more often and would like him to come to their house for dinners weekly. Report their father does not like to talk about his cancer diagnosis and it upsets him. Would like to avoid discussion with him. Will continue to re-approach topic as able in the future.     Electronically signed by  SAL Acevedo CNP

## 2017-10-27 NOTE — ED PROVIDER NOTES
History     Chief Complaint   Patient presents with     Chest Pain     HPI  Tomas Grey is a 72 year old male with multiple medical problems including COPD and metastatic prostate cancer who presents with chest pain described as sharp that has been a chronic problem for him. Also reports increased cough with sputum production. Pain is typical of his prior symptoms. It is aggravated by movement and touch. No fever or chills. No nausea or diaphoresis. No abdominal pain. Has wheezing that is improved with nebs.     I have reviewed the Medications, Allergies, Past Medical and Surgical History, and Social History in the [address] system.  Past Medical History:   Diagnosis Date     Anemia      Anxiety      Aspiration pneumonia (H) 2014     C. difficile colitis      CAD (coronary artery disease)      Chronic pain      CKD (chronic kidney disease)      COPD (chronic obstructive pulmonary disease) (H)      Depressive disorder      Diabetes mellitus (H)      Hypertension      Insomnia      ALEXIS (obstructive sleep apnea)      Osteoporosis      Prostate cancer metastatic to multiple sites (H)        Review of Systems   Constitutional: Negative for appetite change, chills, diaphoresis, fatigue and fever.   HENT: Negative for congestion and rhinorrhea.    Respiratory: Positive for cough, shortness of breath and wheezing. Negative for chest tightness.    Cardiovascular: Positive for chest pain. Negative for palpitations and leg swelling.   Gastrointestinal: Negative for abdominal pain, diarrhea, nausea and vomiting.   Musculoskeletal: Negative for arthralgias, back pain, myalgias and neck pain.   Skin: Negative for rash.   Allergic/Immunologic: Negative for immunocompromised state.   Neurological: Negative for dizziness, syncope, weakness, light-headedness and headaches.   Hematological: Does not bruise/bleed easily.   Psychiatric/Behavioral: Negative for confusion.       Physical Exam   BP: 130/66  Pulse: 93  Heart Rate:  "89  Temp: 99.7  F (37.6  C)  Resp: 13  Height: 180.3 cm (5' 11\")  Weight: 104.3 kg (230 lb)  SpO2: 100 %      Physical Exam   Constitutional: He appears well-developed and well-nourished. No distress.   HENT:   Head: Normocephalic and atraumatic.   Mouth/Throat: Oropharynx is clear and moist.   Eyes: Conjunctivae are normal.   Neck: Normal range of motion. Neck supple. No JVD present.   Cardiovascular: Normal rate, regular rhythm, normal heart sounds and intact distal pulses.    Pulmonary/Chest: Effort normal. No respiratory distress. He has wheezes. He has no rales. He exhibits tenderness.   Abdominal: Soft. There is no tenderness.   Musculoskeletal: He exhibits no edema or tenderness.   Neurological: He is alert.   Skin: Skin is warm and dry. No rash noted.   Psychiatric: He has a normal mood and affect. His behavior is normal.   Nursing note and vitals reviewed.      ED Course     ED Course     Procedures             EKG Interpretation:      Interpreted by Patrick Sethi  Time reviewed: 0255  Symptoms at time of EKG: chest pain   Rhythm: normal sinus   Rate: Normal  Axis: Left Axis Deviation  Ectopy: none  Conduction: normal  ST Segments/ T Waves: No acute ischemic changes  Q Waves: none  Comparison to prior: Unchanged    Clinical Impression: left axis deviation                  Critical Care time:  none           Labs Ordered and Resulted from Time of ED Arrival Up to the Time of Departure from the ED   CBC WITH PLATELETS DIFFERENTIAL - Abnormal; Notable for the following:        Result Value    RBC Count 2.77 (*)     Hemoglobin 7.4 (*)     Hematocrit 25.5 (*)     MCHC 29.0 (*)     RDW 19.7 (*)     All other components within normal limits   BASIC METABOLIC PANEL - Abnormal; Notable for the following:     Sodium 145 (*)     Glucose 103 (*)     Creatinine 1.52 (*)     GFR Estimate 45 (*)     GFR Estimate If Black 55 (*)     Calcium 8.0 (*)     All other components within normal limits   TROPONIN I   PULSE " OXIMETRY NURSING   CARDIAC CONTINUOUS MONITORING            Assessments & Plan (with Medical Decision Making)   Chronic chest wall pain and COPD exacerbation. Will treat with antibiotics and steroids. Continue inhalers as needed. Return to nursing care facility. Follow up with primary care provider. Return If persistent symptoms. No evidence of acute cardiovascular event. He is chronically anemic. No dyspnea or distress.    I have reviewed the nursing notes.    I have reviewed the findings, diagnosis, plan and need for follow up with the patient.    Discharge Medication List as of 10/11/2017  7:06 AM      START taking these medications    Details   predniSONE (DELTASONE) 20 MG tablet Take 2 tablets (40 mg) by mouth daily for 4 days, Disp-8 tablet, R-0, Local Print      amoxicillin-clavulanate (AUGMENTIN) 875-125 MG per tablet Take 1 tablet by mouth 2 times daily for 7 days, Disp-14 tablet, R-0, Local Print             Final diagnoses:   Chronic bronchitis, unspecified chronic bronchitis type (H)   Chest wall pain       10/11/2017   [facility], [place], EMERGENCY DEPARTMENT     Patrick Samuel MD  10/27/17 6015

## 2017-11-01 NOTE — PROGRESS NOTES
Infusion Nursing Note:  Tomas Grey presents today for 1 unit PRBC.    Patient seen by provider today: No   present during visit today: Not Applicable.    Note:   THERESA Oviedo/Argentina Hope RN at 1350 on 11/1/17  Give 1 unit of PRBC today and 1 unit PRBC tomorrow.    Due to COPD blood run at 150 mL/hr. Patient tolerated this rate well.    Reviewed plan for patient to return tomorrow at 12:30 with NISHA Cristobal at patient's care center.  Levar stated that it is OK to leave PIV in until tomorrow.    Intravenous Access:  Peripheral IV placed.    Treatment Conditions:  Lab Results   Component Value Date    HGB 5.8 11/01/2017     Lab Results   Component Value Date    WBC 6.7 11/01/2017      Lab Results   Component Value Date    ANEU 4.7 11/01/2017     Lab Results   Component Value Date     11/01/2017      Results reviewed, labs MET treatment parameters, ok to proceed with treatment.  Blood transfusion consent signed 8/29/17.        Post Infusion Assessment:  Patient tolerated infusion without incident.  Blood return noted pre and post infusion.  Site patent and intact, free from redness, edema or discomfort.  No evidence of extravasations.  PIV left in place for tomorrow's infusion.    Discharge Plan:   Patient declined prescription refills.  Discharge instructions reviewed with: Patient.  Patient and/or family verbalized understanding of discharge instructions and all questions answered.  Copy of AVS reviewed with patient and/or family.  Patient will return 11/2/17 for next appointment.  Patient discharged in stable condition accompanied by: self.  Departure Mode: Motorized scooter.    Argentina Hope RN

## 2017-11-01 NOTE — MR AVS SNAPSHOT
After Visit Summary   11/1/2017    Tomas Grey    MRN: [id number]           Patient Information     Date Of Birth          1945        Visit Information        Provider Department      11/1/2017 12:30 PM  30 ATC;  ONCOLOGY INFUSION [facility]        Today's Diagnoses     Prostate cancer metastatic to multiple sites (H)    -  1      Care Instructions    Contact Numbers  [facility]: [phone]  Triage: [phone] (Monday-Friday 8-4:30)  After Hours:  [phone]    Please call the [facility] Triage line if you experience a temperature greater than or equal to 100.5, shaking chills, have uncontrolled nausea, vomiting and/or diarrhea, dizziness, shortness of breath, chest pain, bleeding, unexplained bruising, or if you have any other new/concerning symptoms, questions or concerns.     If it is after hours, weekends, or holidays, please call [phone] to speak to someone regarding your questions or concerns.    If you are having any concerning symptoms or wish to speak to a provider before your next infusion visit, please call your care coordinator or triage to notify them so we can adequately serve you.     If you need a refill on a narcotic prescription or other medication, please call triage before your infusion appointment.             November 2017 Sunday Monday Tuesday Wednesday Thursday Friday Saturday                  1     [facility] LAB DRAW   12:00 PM   (15 min.)   [facility] LAB DRAW   [facility] Lab Draw     [facility] ONC INFUSION 180   12:30 PM   (180 min.)    ONCOLOGY INFUSION   [facility] 2     [facility] ONC INFUSION 180   12:30 PM   (180 min.)    ONCOLOGY INFUSION   [facility] 3     4       5     6     LEVEL 1    2:00 PM   (60 min.)    INFUSION CHAIR 10   [facility] Infusion Services     RETURN    2:30 PM   (30 min.)   Manpreet Oviedo MD   [facility] Cancer [address]  NEW    3:45 PM   (30 min.)   Tara Epstein PA   [facility]state [facility] Urologic C[facility] 8     9     10     11       12     13     14     15     16     [facility] RETURN    8:45 AM   (60 min.)   Lexi Gallagher MD   [facility] 17     18       19     20     21     22     23     24     25       26     27     28     29     30                          December 2017 Sunday Monday Tuesday Wednesday Thursday Friday Saturday                            1     2       3     4     5     6     7     8     9       10     11     12     13     14     15     16       17     18     19     20     21     22     23       24     25     26     27     28     29     30       31                                                Lab Results:  Recent Results (from the past 12 hour(s))   CBC with platelets differential    Collection Time: 11/01/17  1:04 PM   Result Value Ref Range    WBC 6.7 4.0 - 11.0 10e9/L    RBC Count 2.19 (L) 4.4 - 5.9 10e12/L    Hemoglobin 5.8 (LL) 13.3 - 17.7 g/dL    Hematocrit 20.5 (L) 40.0 - 53.0 %    MCV 94 78 - 100 fl    MCH 26.5 26.5 - 33.0 pg    MCHC 28.3 (L) 31.5 - 36.5 g/dL    RDW 20.9 (H) 10.0 - 15.0 %    Platelet Count 338 150 - 450 10e9/L    Diff Method Automated Method     % Neutrophils 70.6 %    % Lymphocytes 13.3 %    % Monocytes 12.1 %    % Eosinophils 2.8 %    % Basophils 0.3 %    % Immature Granulocytes 0.9 %    Nucleated RBCs 1 (H) 0 /100    Absolute Neutrophil 4.7 1.6 - 8.3 10e9/L    Absolute Lymphocytes 0.9 0.8 - 5.3 10e9/L    Absolute Monocytes 0.8 0.0 - 1.3 10e9/L    Absolute Eosinophils 0.2 0.0 - 0.7 10e9/L    Absolute Basophils 0.0 0.0 - 0.2 10e9/L    Abs Immature Granulocytes 0.1 0 - 0.4 10e9/L    Absolute Nucleated RBC 0.0    ABO/Rh type and screen    Collection Time: 11/01/17  1:05 PM   Result Value Ref Range    Units Ordered 1     ABO O     RH(D) Pos     Antibody Screen Neg     Test Valid Only At          [facility]  [place],[facility]    Specimen Expires 11/04/2017     Crossmatch Red Blood Cells    Blood component    Collection Time: 11/01/17  1:05 PM   Result Value Ref Range    Unit Number [id number]     Blood Component Type Red Blood Cells Leukocyte Reduced     Division Number 00     Status of Unit Released to care unit 11/01/2017 1540     Blood Product Code [id number]     Unit Status ISS               Follow-ups after your visit        Your next 10 appointments already scheduled     Nov 02, 2017 12:30 PM CDT   Infusion 180 with UC ONCOLOGY INFUSION, UC 10 ATC   [facility] ([facility])    [address]  2nd [facility] [address] [phone]            Nov 06, 2017  2:00 PM CST   Level 1 with RH INFUSION CHAIR 10   [facility] Infusion Services ([facility])    [facility]  [address]  [facility] [address]   [phone]            Nov 06, 2017  2:30 PM CST   Return Visit with Manpreet Oviedo MD   [facility] ([facility])    [facility]  [address]  [facility] [address]   [phone]            Nov 07, 2017  4:00 PM CST   (Arrive by 3:45 PM)   New Patient Visit with JASON Macedo   [facility]rology [facility] Inst for Prostate and Urologic Cancers ([facility])    [address] [place] [address]  4th [facility] [id number] [phone]            Nov 16, 2017  9:00 AM CST   (Arrive by 8:45 AM)   Return Visit with Lexi Gallagher MD   [facility] ([facility])    [address]  2nd Floor  [facility] [id number] [phone]              Future tests that were ordered for you today     Open Standing Orders        Priority Remaining Interval Expires Ordered    Transfuse red blood cell unit Routine 99/100 TRANSFUSE 1 UNIT   "2017    Red blood cell prepare order unit Routine 99/100 CONDITIONAL (SPECIFY) BLOOD  10/31/2017            Who to contact     If you have questions or need follow up information about today's clinic visit or your schedule please contact [facility] CANCER [facility] directly at [phone].  Normal or non-critical lab and imaging results will be communicated to you by MyChart, letter or phone within 4 business days after the clinic has received the results. If you do not hear from us within 7 days, please contact the clinic through [employer] or phone. If you have a critical or abnormal lab result, we will notify you by phone as soon as possible.  Submit refill requests through [employer] or call your pharmacy and they will forward the refill request to us. Please allow 3 business days for your refill to be completed.          Additional Information About Your Visit        MyChart Information     [employer] lets you send messages to your doctor, view your test results, renew your prescriptions, schedule appointments and more. To sign up, go to [email or web address] . Click on \"Log in\" on the left side of the screen, which will take you to the Welcome page. Then click on \"Sign up Now\" on the right side of the page.     You will be asked to enter the access code listed below, as well as some personal information. Please follow the directions to create your username and password.     Your access code is: [email or web address]  Expires: 2017 12:54 PM     Your access code will  in 90 days. If you need help or a new code, please call your [facility] clinic or [phone].        Care EveryWhere ID     This is your Care EveryWhere ID. This could be used by other organizations to access your [facility] medical records  [id number]        Your Vitals Were     Pulse Temperature Respirations Pulse Oximetry          108 99.7  F (37.6  C) 20 98%         Blood Pressure from Last 3 Encounters:   17 (P) 107/59   10/24/17 " 120/80   10/17/17 144/88    Weight from Last 3 Encounters:   10/24/17 95.4 kg (210 lb 6.4 oz)   10/17/17 96.6 kg (213 lb)   10/11/17 104.3 kg (230 lb)              We Performed the Following     ABO/Rh type and screen     Blood component     CBC with platelets differential     Transfuse red blood cell unit        Primary Care Provider Office Phone # Fax #    Ekaterina Lozano, SAL [facility] [phone]       [address] [facility] [address]  [place] [address]        Equal Access to Services     [facility]: Hadii aad ku hadasho Soomaali, waaxda luqadaha, qaybta kaalmada adeegyaanita, jorje prather . So [facility] [phone].    ATENCIÓN: Si habla español, tiene a soria disposición servicios gratuitos de asistencia lingüística. [facility] [phone].    We comply with applicable federal civil rights laws and [place] laws. We do not discriminate on the basis of race, color, national origin, age, disability, sex, sexual orientation, or gender identity.            Thank you!     Thank you for choosing [facility] CANCER CLINIC  for your care. Our goal is always to provide you with excellent care. Hearing back from our patients is one way we can continue to improve our services. Please take a few minutes to complete the written survey that you may receive in the mail after your visit with us. Thank you!             Your Updated Medication List - Protect others around you: Learn how to safely use, store and throw away your medicines at www.disposemymeds.org.          This list is accurate as of: 11/1/17  5:35 PM.  Always use your most recent med list.                   Brand Name Dispense Instructions for use Diagnosis    acetaminophen 500 MG tablet    TYLENOL     Take 500 mg by mouth every 4 hours as needed for pain Not to exceed 3000 mg/24 hours        ammonium lactate 12 % lotion    LAC-HYDRIN     Apply topically 2 times daily as needed for dry skin To all extremities for dry skin         aspirin 81 MG EC tablet     5 tablet    Take 1 tablet (81 mg) by mouth daily    ASCVD (arteriosclerotic cardiovascular disease)       BETIMOL OP      Apply 1 drop to eye every morning        bimatoprost 0.01 % Soln    LUMIGAN     Place 1 drop into both eyes At Bedtime        budesonide-formoterol 160-4.5 MCG/ACT Inhaler    SYMBICORT     Inhale 2 puffs into the lungs 2 times daily        calcium carbonate 1500 (600 CA) MG tablet    OS-JORDAN 600 mg [place]     Take 1,500 mg by mouth 2 times daily (with meals)        carboxymethylcellulose 0.5 % Soln ophthalmic solution    REFRESH PLUS     Place 1 drop into both eyes 4 times daily And 1 drop in both eyes as needed. On Hold while on Timolol/Bimatoprost        diclofenac 1 % Gel topical gel    VOLTAREN    100 g    Apply 4 grams to knees four times daily as needed using enclosed dosing card.    Chronic pain of both knees       furosemide 20 MG tablet    LASIX    30 tablet    Take 1 tablet (20 mg) by mouth 2 times daily    Chronic diastolic congestive heart failure (H), Lymphedema of both lower extremities       HYDROmorphone 2 MG tablet    DILAUDID     Take 2 tablets (4 mg) by mouth 2 times daily And Hydromorphone 2 mg q3h PRN    Prostate cancer metastatic to bone (H)       insulin glargine 100 UNIT/ML injection    LANTUS     Inject 16 Units Subcutaneous 2 times daily        isosorbide mononitrate 30 MG 24 hr tablet    IMDUR    30 tablet    Take 2 tablets (60 mg) by mouth daily        latanoprost 0.005 % ophthalmic solution    XALATAN     Place 1 drop into both eyes At Bedtime On Hold while on Timolol/Bimatoprost        LIPITOR 10 MG tablet   Generic drug:  atorvastatin      Take 10 mg by mouth At Bedtime        megestrol 40 MG tablet    MEGACE    120 tablet    Take 2 tablets (80 mg) by mouth 2 times daily    Prostate cancer metastatic to multiple sites (H)       metoprolol 50 MG 24 hr tablet    TOPROL XL    5 tablet    Take 1 tablet (50 mg) by mouth daily    ASCVD  (arteriosclerotic cardiovascular disease)       nitroGLYcerin 0.4 MG sublingual tablet    NITROSTAT     Place 0.4 mg under the tongue every 5 minutes as needed for chest pain if you are still having symptoms after 3 doses (15 minutes) call 911.        omeprazole 10 MG CR capsule    priLOSEC     Take 1 capsule (10 mg) by mouth daily    Gastric reflux       ONDANSETRON PO      Take 4 mg by mouth every 8 hours as needed for nausea        polyvinyl alcohol 1.4 % ophthalmic solution    LIQUIFILM TEARS     Place 1 drop into both eyes 3 times daily On Hold while on Timolol/Bimatoprost        ranitidine 75 MG tablet   Generic drug:  ranitidine      Take 150 mg by mouth daily        senna-docusate 8.6-50 MG per tablet    SENOKOT-S;PERICOLACE     Take 2 tablets by mouth 2 times daily        tamsulosin 0.4 MG capsule    FLOMAX    5 capsule    Take 1 capsule (0.4 mg) by mouth daily    Benign non-nodular prostatic hyperplasia without lower urinary tract symptoms       tiotropium 18 MCG capsule    SPIRIVA     Inhale 18 mcg into the lungs daily        WARFARIN SODIUM PO      Take by mouth daily See facility orders for INR dosing

## 2017-11-01 NOTE — PATIENT INSTRUCTIONS
Contact Numbers  [facility]: [phone]  Triage: [phone] (Monday-Friday 8-4:30)  After Hours:  [phone]    Please call the [facility] Triage line if you experience a temperature greater than or equal to 100.5, shaking chills, have uncontrolled nausea, vomiting and/or diarrhea, dizziness, shortness of breath, chest pain, bleeding, unexplained bruising, or if you have any other new/concerning symptoms, questions or concerns.     If it is after hours, weekends, or holidays, please call [phone] to speak to someone regarding your questions or concerns.    If you are having any concerning symptoms or wish to speak to a provider before your next infusion visit, please call your care coordinator or triage to notify them so we can adequately serve you.     If you need a refill on a narcotic prescription or other medication, please call triage before your infusion appointment.             November 2017 Sunday Monday Tuesday Wednesday Thursday Friday Saturday                  1     [facility]   12:00 PM   (15 min.)   [facility] LAB DRAW   [facility] Lab Draw     [facility] ONC INFUSION 180   12:30 PM   (180 min.)    ONCOLOGY INFUSION   [facility] 2     [facility] ONC INFUSION 180   12:30 PM   (180 min.)    ONCOLOGY INFUSION   [facility] 3     4       5     6     LEVEL 1    2:00 PM   (60 min.)    INFUSION CHAIR 10   [facility] Infusion Services     RETURN    2:30 PM   (30 min.)   Manpreet Oviedo MD   [facility] Care 7     [facility] NEW    3:45 PM   (30 min.)   Tara Epstein PA   [facility] Inst for Prostate and Urologic Cancers 8     9     10     11       12     13     14     15     16     [facility] RETURN    8:45 AM   (60 min.)   Lexi Gallagher MD   [facility] 17     18       19     20     21     22     23     24     25       26     27     28     29     30 December  2017 Sunday Monday Tuesday Wednesday Thursday Friday Saturday                            1     2       3     4     5     6     7     8     9       10     11     12     13     14     15     16       17     18     19     20     21     22     23       24     25     26     27     28     29     30       31                                                Lab Results:  Recent Results (from the past 12 hour(s))   CBC with platelets differential    Collection Time: 11/01/17  1:04 PM   Result Value Ref Range    WBC 6.7 4.0 - 11.0 10e9/L    RBC Count 2.19 (L) 4.4 - 5.9 10e12/L    Hemoglobin 5.8 (LL) 13.3 - 17.7 g/dL    Hematocrit 20.5 (L) 40.0 - 53.0 %    MCV 94 78 - 100 fl    MCH 26.5 26.5 - 33.0 pg    MCHC 28.3 (L) 31.5 - 36.5 g/dL    RDW 20.9 (H) 10.0 - 15.0 %    Platelet Count 338 150 - 450 10e9/L    Diff Method Automated Method     % Neutrophils 70.6 %    % Lymphocytes 13.3 %    % Monocytes 12.1 %    % Eosinophils 2.8 %    % Basophils 0.3 %    % Immature Granulocytes 0.9 %    Nucleated RBCs 1 (H) 0 /100    Absolute Neutrophil 4.7 1.6 - 8.3 10e9/L    Absolute Lymphocytes 0.9 0.8 - 5.3 10e9/L    Absolute Monocytes 0.8 0.0 - 1.3 10e9/L    Absolute Eosinophils 0.2 0.0 - 0.7 10e9/L    Absolute Basophils 0.0 0.0 - 0.2 10e9/L    Abs Immature Granulocytes 0.1 0 - 0.4 10e9/L    Absolute Nucleated RBC 0.0    ABO/Rh type and screen    Collection Time: 11/01/17  1:05 PM   Result Value Ref Range    Units Ordered 1     ABO O     RH(D) Pos     Antibody Screen Neg     Test Valid Only At          [facility]    Specimen Expires 11/04/2017     Crossmatch Red Blood Cells    Blood component    Collection Time: 11/01/17  1:05 PM   Result Value Ref Range    Unit Number [id number]     Blood Component Type Red Blood Cells Leukocyte Reduced     Division Number 00     Status of Unit Released to care unit 11/01/2017 1540     Blood Product Code [id number]     Unit Status ISS

## 2017-11-01 NOTE — NURSING NOTE
PIV placed in right arm by RN, labs collected and sent , VS taken and pt checked in for next appt.Alison Springer

## 2017-11-02 NOTE — PROGRESS NOTES
Infusion Nursing Note:  Tomas Grey presents today for 1 unit PRBC.    Patient seen by provider today: No    Pt had one unit of blood yesterday and second unit given today. Pt did not come with paperwork from facility and pt unsure of what medications he is taking, so unable to review these with pt.  Updated POC with nurse Tanya at care facility [facility]. Pt continues to voice pain he is having on his bottom; receiving care for this at his home.    Intravenous Access:  Peripheral IV intact from yesterday.    Treatment Conditions:  Hgb on 11/1 5.8.    Post Infusion Assessment:  Patient tolerated infusion without incident.  Blood return noted pre and post infusion.  Site patent and intact, free from redness, edema or discomfort.  No evidence of extravasations. Access discontinued per protocol.    Discharge Plan:   Copy of AVS reviewed with patient and/or family.  Patient will return 11/6 for next appointment at [facility].  Patient discharged in stable condition accompanied by: self/ride via [employer].  Departure Mode: motorized wheelchair.    Monica Damian RN

## 2017-11-02 NOTE — MR AVS SNAPSHOT
After Visit Summary   11/2/2017    Tomas Grey    MRN: [id number]           Patient Information     Date Of Birth          1945        Visit Information        Provider Department      11/2/2017 12:30 PM  10 ATC;  ONCOLOGY INFUSION [facility]        Today's Diagnoses     Prostate cancer metastatic to multiple sites (H)    -  1      Care Instructions    Contact Numbers  [facility] Nurse Triage: [phone]  After Hours Nurse Line:  [phone]    Please call the [facility] Nurse Triage line or after hours number if you experience a temperature greater than or equal to 100.5, shaking chills, have uncontrolled nausea, vomiting and/or diarrhea, dizziness, shortness of breath, chest pain, bleeding, unexplained bruising, or if you have any other new/concerning symptoms, questions or concerns.     If you are having any concerning symptoms or wish to speak to a provider before your next infusion visit, please call your care coordinator or triage to notify them so we can adequately serve you.     If you need a refill on a narcotic prescription or other medication, please call triage before your infusion appointment.         November 2017 Sunday Monday Tuesday Wednesday Thursday Friday Saturday                  1     [facility] LAB DRAW   12:00 PM   (15 min.)   [facility]   [facility] Lab Draw     [facility] ONC INFUSION 180   12:30 PM   (180 min.)    ONCOLOGY INFUSION   [facility] 2     [facility] ONC INFUSION 180   12:30 PM   (180 min.)    ONCOLOGY INFUSION   [facility] 3     4       5     6     LEVEL 1    2:00 PM   (60 min.)    INFUSION CHAIR 10   [facility] Infusion Services     RETURN    2:30 PM   (30 min.)   Manpreet Oviedo MD   [facility] 7     [facility] NEW    3:45 PM   (30 min.)   Tara Epstein PA   [facility] 8     9      10     11       12     13     14     15     16     UM RETURN    8:45 AM   (60 min.)   Lexi Gallagher MD   [facility] 17     18       19     20     21     22     23     24     25       26     27     28     29     30 December 2017 Sunday Monday Tuesday Wednesday Thursday Friday Saturday                            1     2       3     4     5     6     7     8     9       10     11     12     13     14     15     16       17     18     19     20     21     22     23       24     25     26     27     28     29     30       31                                                Lab Results:  No results found for this or any previous visit (from the past 12 hour(s)).            Follow-ups after your visit        Your next 10 appointments already scheduled     Nov 06, 2017  2:00 PM CST   Level 1 with RH INFUSION CHAIR 10   [facility] ([facility])    [facility]  [address] [place] [address]  [facility] [address]   [phone]            Nov 06, 2017  2:30 PM CST   Return Visit with Manpreet Oviedo MD   [facility] ([facility])    [facility]  [address]  [facility] [address]   [phone]            Nov 07, 2017  4:00 PM CST   (Arrive by 3:45 PM)   New Patient Visit with JASON Macedo   [facility] Urologic Cancers ([facility])    [address]  4th Floor  [facility] [address] [phone]            Nov 16, 2017  9:00 AM CST   (Arrive by 8:45 AM)   Return Visit with Lexi Gallagher MD   [facility] ([facility] [place])    [address]  2nd Floor  [facility] [address] [phone]              Future tests that were ordered for you today     Open Standing Orders        Priority Remaining  "Interval Expires Ordered    Transfuse red blood cell unit Routine 99/100 TRANSFUSE 1 UNIT  2017    Red blood cell prepare order unit Routine 99/100 CONDITIONAL (SPECIFY) BLOOD  2017            Who to contact     If you have questions or need follow up information about today's clinic visit or your schedule please contact [facility] directly at [phone].  Normal or non-critical lab and imaging results will be communicated to you by [employer], letter or phone within 4 business days after the clinic has received the results. If you do not hear from us within 7 days, please contact the clinic through [employer] or phone. If you have a critical or abnormal lab result, we will notify you by phone as soon as possible.  Submit refill requests through [employer] or call your pharmacy and they will forward the refill request to us. Please allow 3 business days for your refill to be completed.          Additional Information About Your Visit        [employer] Information     [employer] lets you send messages to your doctor, view your test results, renew your prescriptions, schedule appointments and more. To sign up, go to [email or web address] . Click on \"Log in\" on the left side of the screen, which will take you to the Welcome page. Then click on \"Sign up Now\" on the right side of the page.     You will be asked to enter the access code listed below, as well as some personal information. Please follow the directions to create your username and password.     Your access code is: [email or web address]  Expires: 2017 12:54 PM     Your access code will  in 90 days. If you need help or a new code, please call your [facility] clinic or [phone].        Care EveryWhere ID     This is your Care EveryWhere ID. This could be used by other organizations to access your [facility] medical records  [id number]        Your Vitals Were     Pulse Temperature Respirations Pulse Oximetry          97 98.6  F (37  C) " (Oral) 18 99%         Blood Pressure from Last 3 Encounters:   11/02/17 120/71   11/01/17 118/71   10/24/17 120/80    Weight from Last 3 Encounters:   10/24/17 95.4 kg (210 lb 6.4 oz)   10/17/17 96.6 kg (213 lb)   10/11/17 104.3 kg (230 lb)              We Performed the Following     Transfuse red blood cell unit        Primary Care Provider Office Phone # Fax #    Ekaterina Lozano, SAL [facility] [phone]       [address] [facility] [address]  [facility] [address]        Equal Access to Services     [facility]: Hadii aad ku hadasho Somissael, waaxda luqadaha, qaybta kaalmada adeegyada, jorje prather . So [facility] [phone].    ATENCIÓN: Si habla español, tiene a soria disposición servicios gratuitos de asistencia lingüística. [facility] [phone].    We comply with applicable federal civil rights laws and [place] laws. We do not discriminate on the basis of race, color, national origin, age, disability, sex, sexual orientation, or gender identity.            Thank you!     Thank you for choosing [facility]  for your care. Our goal is always to provide you with excellent care. Hearing back from our patients is one way we can continue to improve our services. Please take a few minutes to complete the written survey that you may receive in the mail after your visit with us. Thank you!             Your Updated Medication List - Protect others around you: Learn how to safely use, store and throw away your medicines at www.disposemymeds.org.          This list is accurate as of: 11/2/17  2:27 PM.  Always use your most recent med list.                   Brand Name Dispense Instructions for use Diagnosis    acetaminophen 500 MG tablet    TYLENOL     Take 500 mg by mouth every 4 hours as needed for pain Not to exceed 3000 mg/24 hours        ammonium lactate 12 % lotion    LAC-HYDRIN     Apply topically 2 times daily as needed for dry skin To all extremities for dry skin         aspirin 81 MG EC tablet     5 tablet    Take 1 tablet (81 mg) by mouth daily    ASCVD (arteriosclerotic cardiovascular disease)       BETIMOL OP      Apply 1 drop to eye every morning        bimatoprost 0.01 % Soln    LUMIGAN     Place 1 drop into both eyes At Bedtime        budesonide-formoterol 160-4.5 MCG/ACT Inhaler    SYMBICORT     Inhale 2 puffs into the lungs 2 times daily        calcium carbonate 1500 (600 CA) MG tablet    OS-JORDAN 600 mg [place]. Ca     Take 1,500 mg by mouth 2 times daily (with meals)        carboxymethylcellulose 0.5 % Soln ophthalmic solution    REFRESH PLUS     Place 1 drop into both eyes 4 times daily And 1 drop in both eyes as needed. On Hold while on Timolol/Bimatoprost        diclofenac 1 % Gel topical gel    VOLTAREN    100 g    Apply 4 grams to knees four times daily as needed using enclosed dosing card.    Chronic pain of both knees       furosemide 20 MG tablet    LASIX    30 tablet    Take 1 tablet (20 mg) by mouth 2 times daily    Chronic diastolic congestive heart failure (H), Lymphedema of both lower extremities       HYDROmorphone 2 MG tablet    DILAUDID     Take 2 tablets (4 mg) by mouth 2 times daily And Hydromorphone 2 mg q3h PRN    Prostate cancer metastatic to bone (H)       insulin glargine 100 UNIT/ML injection    LANTUS     Inject 16 Units Subcutaneous 2 times daily        isosorbide mononitrate 30 MG 24 hr tablet    IMDUR    30 tablet    Take 2 tablets (60 mg) by mouth daily        latanoprost 0.005 % ophthalmic solution    XALATAN     Place 1 drop into both eyes At Bedtime On Hold while on Timolol/Bimatoprost        LIPITOR 10 MG tablet   Generic drug:  atorvastatin      Take 10 mg by mouth At Bedtime        megestrol 40 MG tablet    MEGACE    120 tablet    Take 2 tablets (80 mg) by mouth 2 times daily    Prostate cancer metastatic to multiple sites (H)       metoprolol 50 MG 24 hr tablet    TOPROL XL    5 tablet    Take 1 tablet (50 mg) by mouth daily    ASCVD  (arteriosclerotic cardiovascular disease)       nitroGLYcerin 0.4 MG sublingual tablet    NITROSTAT     Place 0.4 mg under the tongue every 5 minutes as needed for chest pain if you are still having symptoms after 3 doses (15 minutes) call 911.        omeprazole 10 MG CR capsule    priLOSEC     Take 1 capsule (10 mg) by mouth daily    Gastric reflux       ONDANSETRON PO      Take 4 mg by mouth every 8 hours as needed for nausea        polyvinyl alcohol 1.4 % ophthalmic solution    LIQUIFILM TEARS     Place 1 drop into both eyes 3 times daily On Hold while on Timolol/Bimatoprost        ranitidine 75 MG tablet   Generic drug:  ranitidine      Take 150 mg by mouth daily        senna-docusate 8.6-50 MG per tablet    SENOKOT-S;PERICOLACE     Take 2 tablets by mouth 2 times daily        tamsulosin 0.4 MG capsule    FLOMAX    5 capsule    Take 1 capsule (0.4 mg) by mouth daily    Benign non-nodular prostatic hyperplasia without lower urinary tract symptoms       tiotropium 18 MCG capsule    SPIRIVA     Inhale 18 mcg into the lungs daily        WARFARIN SODIUM PO      Take by mouth daily See facility orders for INR dosing

## 2017-11-02 NOTE — PATIENT INSTRUCTIONS
Contact Numbers  [facility] Nurse Triage: [phone]  After Hours Nurse Line:  [phone]    Please call the [facility] Nurse Triage line or after hours number if you experience a temperature greater than or equal to 100.5, shaking chills, have uncontrolled nausea, vomiting and/or diarrhea, dizziness, shortness of breath, chest pain, bleeding, unexplained bruising, or if you have any other new/concerning symptoms, questions or concerns.     If you are having any concerning symptoms or wish to speak to a provider before your next infusion visit, please call your care coordinator or triage to notify them so we can adequately serve you.     If you need a refill on a narcotic prescription or other medication, please call triage before your infusion appointment.         November 2017 Sunday Monday Tuesday Wednesday Thursday Friday Saturday                  1     [facility] LAB DRAW   12:00 PM   (15 min.)   [facility] LAB DRAW   [facility] Lab Draw     [facility] ONC INFUSION 180   12:30 PM   (180 min.)    ONCOLOGY INFUSION   [facility] 2     [facility] ONC INFUSION 180   12:30 PM   (180 min.)    ONCOLOGY INFUSION   [facility] 3     4       5     6     LEVEL 1    2:00 PM   (60 min.)    INFUSION CHAIR 10   [facility] Infusion Services     RETURN    2:30 PM   (30 min.)   Manpreet Oviedo MD   [facility] 7     [facility] NEW    3:45 PM   (30 min.)   Tara Epstein PA   [facility] Prostate and Urologic Cancers 8     9     10     11       12     13     14     15     16     [facility] RETURN    8:45 AM   (60 min.)   Lexi Gallagher MD   [facility] 17     18       19     20     21     22     23     24     25       26     27     28     29     30 December 2017 Sunday Monday Tuesday Wednesday Thursday Friday Saturday                            1     2       3     4     5      6     7     8     9       10     11     12     13     14     15     16       17     18     19     20     21     22     23       24     25     26     27     28     29     30       31                                                Lab Results:  No results found for this or any previous visit (from the past 12 hour(s)).

## 2017-11-10 NOTE — TELEPHONE ENCOUNTER
[place] [facility] TELEPHONE ENCOUNTER    Chief Complaint   Patient presents with     Lab Result Notice       Tomas Grey is a 72 year old  (1945),Nurse called today to report: preliminary results in.  HGB 7.3. PSA pending    ASSESSMENT/PLAN  Lab results    Asked the nurse to fax lab results to the Oncologist when all are in.    SAL Still CNP

## 2017-11-14 NOTE — PROGRESS NOTES
[place] [facility]    Chief Complaint   Patient presents with     RECHECK     HPI:    Tomas Grey is a 72 year old  (1945), who is being seen today for an episodic care visit at [facility]. HPI information obtained from: facility chart records, facility staff, patient report and [facility] chart review. Today's concern is:    Anemia of Chronic Disease. Hemoglobin 6.4 on 10/26/17 routine blood draw. Per staff report, resident was asymptomatic. Arranged for 1 U of PRBCs on 11/1/17. Hemoglobin 5.8 on 11/1/17 so an additional 1 U of PRBCs was transfused on 11/2/17. Follow-up Hemoglobin 7.3 on 11/10/17 and results faxed to Oncology. Today, resident denies excessive fatigue or dizziness.    Paroxysmal Atrial Fibrillation on Anticoagulation. INR 3.1 on 11/13/17. Previous INR 3.7 on 11/9/17 and  INR 2.6 on 11/2/17. No recent antibiotic use noted.     Cough. Complains of a cough, sore throat and yellow stuff coming up out of his mouth. Reports this has been going on for a few days and nothing has been done about it. States he needs to go to the Emergency Room. Goes on to describe how the ED makes him feel better by administering IV medication. Denies fever or chills. Per review of documentation, staff has been utilizing cough syrup from standing house orders. On 11/12/17, insisted to staff he need to go to the ED due to pain overall and not being able to breath. VSS. Has been refusing to use BiPAP machine at night. Staff and son, Tom, were able to convince resident to stay at facility.     ALLERGIES: Morphine  Past Medical, Surgical, Family and Social History reviewed and updated in [facility].    Current Outpatient Prescriptions   Medication Sig Dispense Refill     MEGESTROL ACETATE PO Take 80 mg by mouth 2 times daily       dextromethorphan-guaiFENesin (MUCINEX DM)  MG per 12 hr tablet Take 1 tablet by mouth every 12 hours       senna-docusate (SENOKOT-S;PERICOLACE) 8.6-50 MG  per tablet Take 2 tablets by mouth 2 times daily       budesonide-formoterol (SYMBICORT) 160-4.5 MCG/ACT Inhaler Inhale 2 puffs into the lungs 2 times daily       Timolol Hemihydrate (BETIMOL OP) Apply 1 drop to eye every morning       HYDROmorphone (DILAUDID) 2 MG tablet Take 2 tablets (4 mg) by mouth 2 times daily And Hydromorphone 2 mg q3h PRN       bimatoprost (LUMIGAN) 0.01 % SOLN Place 1 drop into both eyes At Bedtime       WARFARIN SODIUM PO Take by mouth daily See facility orders for INR dosing       calcium carbonate (OS-JORDAN 600 MG [place]. CA) 1500 (600 CA) MG tablet Take 1,500 mg by mouth 2 times daily (with meals)       isosorbide mononitrate (IMDUR) 30 MG 24 hr tablet Take 2 tablets (60 mg) by mouth daily 30 tablet      ONDANSETRON PO Take 4 mg by mouth every 8 hours as needed for nausea       furosemide (LASIX) 20 MG tablet Take 1 tablet (20 mg) by mouth 2 times daily 30 tablet      ranitidine (RANITIDINE) 75 MG tablet Take 150 mg by mouth daily        diclofenac (VOLTAREN) 1 % GEL topical gel Apply 4 grams to knees four times daily as needed using enclosed dosing card. 100 g 0     insulin glargine (LANTUS) 100 UNIT/ML injection Inject 16 Units Subcutaneous 2 times daily        omeprazole (PRILOSEC) 10 MG CR capsule Take 1 capsule (10 mg) by mouth daily       atorvastatin (LIPITOR) 10 MG tablet Take 10 mg by mouth At Bedtime        tiotropium (SPIRIVA) 18 MCG inhalation capsule Inhale 18 mcg into the lungs daily       acetaminophen (TYLENOL) 500 MG tablet Take 500 mg by mouth every 4 hours as needed for pain Not to exceed 3000 mg/24 hours       nitroglycerin (NITROSTAT) 0.4 MG SL tablet Place 0.4 mg under the tongue every 5 minutes as needed for chest pain if you are still having symptoms after 3 doses (15 minutes) call 911.       aspirin 81 MG EC tablet Take 1 tablet (81 mg) by mouth daily 5 tablet 0     metoprolol (TOPROL XL) 50 MG 24 hr tablet Take 1 tablet (50 mg) by mouth daily 5 tablet 0      tamsulosin (FLOMAX) 0.4 MG 24 hr capsule Take 1 capsule (0.4 mg) by mouth daily 5 capsule 0     ammonium lactate (LAC-HYDRIN) 12 % lotion Apply topically 2 times daily as needed for dry skin To all extremities for dry skin       megestrol (MEGACE) 40 MG tablet Take 2 tablets (80 mg) by mouth 2 times daily 120 tablet 3     [DISCONTINUED] enzalutamide (XTANDI) 40 MG capsule Take 4 capsules (160 mg) by mouth daily 120 capsule 0     Medications reviewed:  Medications reconciled to facility chart and changes were made to reflect current medications as identified as above med list. Below are the changes that were made:   Medications stopped since last EPIC medication reconciliation:   There are no discontinued medications.    Medications started since last [facility] medication reconciliation:  Orders Placed This Encounter   Medications     MEGESTROL ACETATE PO     Sig: Take 80 mg by mouth 2 times daily     REVIEW OF SYSTEMS:  4 point ROS including Respiratory, CV, GI and , other than that noted in the HPI,  is negative    Physical Exam:  /72  Pulse 83  Temp 97.6  F (36.4  C)  Resp 18  Wt 209 lb (94.8 kg)  SpO2 96%  BMI 29.15 kg/m2  GENERAL APPEARANCE: Alert, in no distress  ENT:  Mouth and posterior oropharynx normal, moist mucous membranes  EYES:  EOM, conjunctivae, lids, pupils and irises normal  RESP:  Respiratory effort and palpation of chest normal, no respiratory distress, diminished lung sounds throughout  CV:  Palpation and auscultation of heart done, regular rate and rhythm, no murmur, rub, or gallop  ABDOMEN: Active bowel sounds, soft, nontender, no hepatosplenomegaly or other masses  M/S:   Active movement of bilateral upper and lower extremities. ACE wraps on BLE.  SKIN:  Inspection of skin and subcutaneous tissue baseline, Palpation of skin and subcutaneous tissue baseline  NEURO:   Cranial nerves 2-12 are normal tested and grossly at patient's baseline  PSYCH:  affect and mood normal    Recent Labs:     Last Basic Metabolic Panel:  Lab Results   Component Value Date     11/10/2017      Lab Results   Component Value Date    POTASSIUM 4.8 11/10/2017     Lab Results   Component Value Date    CHLORIDE 111 11/10/2017     Lab Results   Component Value Date    JORDAN 8.3 11/10/2017     Lab Results   Component Value Date    CO2 25 11/10/2017     Lab Results   Component Value Date    BUN 28 11/10/2017     Lab Results   Component Value Date    CR 1.42 11/10/2017     Lab Results   Component Value Date     11/10/2017     Lab Results   Component Value Date    WBC 7.8 11/10/2017     Lab Results   Component Value Date    RBC 2.74 11/10/2017     Lab Results   Component Value Date    HGB 7.3 11/10/2017     Lab Results   Component Value Date    HCT 25.4 11/10/2017     Lab Results   Component Value Date    MCV 93 11/10/2017     Lab Results   Component Value Date    MCH 27 11/10/2017     Lab Results   Component Value Date    MCHC 29 11/10/2017     Lab Results   Component Value Date    RDW 20.3 11/10/2017     Lab Results   Component Value Date     11/10/2017     Assessment/Plan:  Anemia of Chronic Disease. Secondary to metastatic prostate cancer. Has required blood transfusions 8/29/17, 11/1/17 and 11/2/17 in the past 3 months. Relatively asymptomatic. Contacted Dr. Oviedo's office to determine how frequently labs should be monitored and if blood transfusions should be arranged by Oncology or PCP. Further plans pending Dr. Oviedo's recommendations.    Paroxysmal Atrial Fibrillation on Anticoagulation. INR slightly supratherapeutic. Warfarin 1 mg PO QD ordered with repeat INR on 11/15/17. Also on Metoprolol.     Cough with End-Stage COPD. Reviewed importance of utilizing Emergency Department only for emergencies. Explained staff at facility can provide treatment for many of the symptoms resident is experiencing. No indications of an exacerbation at this point. Will order Mucinex DM x 5 days for cough and to help loosen  secretions. Remains on Budesonide-Formoterol and Tiotropium inhalers.     Electronically signed by  SAL Acevedo CNP

## 2017-11-14 NOTE — TELEPHONE ENCOUNTER
Ekaterina, NICOLÁS called ~ handling of patient's lab.  Ekaterina wanted to know if she should handle lab draws and setting up transfusions as needed or if Dr. Oviedo will be following.  Patient has missed several appts ~ transportation issues?  Will contact MD to fine out if he would like patient followed within facility or continue to have patient come into clinic for frequent labs.  Will f/u with ANP.  Ekaterina is in agreement with the updated POC  Gage Yang RN, BSN, OCN  [facility] [place] [facility] [place]  Patient Care Coordinator'

## 2017-11-14 NOTE — LETTER
11/14/2017        RE: Tomas Grey  [facility]  [address]  [facility] [address]        [facility]    Chief Complaint   Patient presents with     RECHECK     HPI:    Tomas Grey is a 72 year old  (1945), who is being seen today for an episodic care visit at [facility]. HPI information obtained from: facility chart records, facility staff, patient report and [facility] chart review. Today's concern is:    Anemia of Chronic Disease. Hemoglobin 6.4 on 10/26/17 routine blood draw. Per staff report, resident was asymptomatic. Arranged for 1 U of PRBCs on 11/1/17. Hemoglobin 5.8 on 11/1/17 so an additional 1 U of PRBCs was transfused on 11/2/17. Follow-up Hemoglobin 7.3 on 11/10/17 and results faxed to Oncology. Today, resident denies excessive fatigue or dizziness.    Paroxysmal Atrial Fibrillation on Anticoagulation. INR 3.1 on 11/13/17. Previous INR 3.7 on 11/9/17 and  INR 2.6 on 11/2/17. No recent antibiotic use noted.     Cough. Complains of a cough, sore throat and yellow stuff coming up out of his mouth. Reports this has been going on for a few days and nothing has been done about it. States he needs to go to the Emergency Room. Goes on to describe how the ED makes him feel better by administering IV medication. Denies fever or chills. Per review of documentation, staff has been utilizing cough syrup from standing house orders. On 11/12/17, insisted to staff he need to go to the ED due to pain overall and not being able to breath. VSS. Has been refusing to use BiPAP machine at night. Staff and son, Tom, were able to convince resident to stay at facility.     ALLERGIES: Morphine  Past Medical, Surgical, Family and Social History reviewed and updated in [facility].    Current Outpatient Prescriptions   Medication Sig Dispense Refill     MEGESTROL ACETATE PO Take 80 mg by mouth 2 times daily       senna-docusate  (SENOKOT-S;PERICOLACE) 8.6-50 MG per tablet Take 2 tablets by mouth 2 times daily       budesonide-formoterol (SYMBICORT) 160-4.5 MCG/ACT Inhaler Inhale 2 puffs into the lungs 2 times daily       Timolol Hemihydrate (BETIMOL OP) Apply 1 drop to eye every morning       latanoprost (XALATAN) 0.005 % ophthalmic solution Place 1 drop into both eyes At Bedtime On Hold while on Timolol/Bimatoprost       HYDROmorphone (DILAUDID) 2 MG tablet Take 2 tablets (4 mg) by mouth 2 times daily And Hydromorphone 2 mg q3h PRN       bimatoprost (LUMIGAN) 0.01 % SOLN Place 1 drop into both eyes At Bedtime       WARFARIN SODIUM PO Take by mouth daily See facility orders for INR dosing       calcium carbonate (OS-JORDAN 600 MG [place]. CA) 1500 (600 CA) MG tablet Take 1,500 mg by mouth 2 times daily (with meals)       isosorbide mononitrate (IMDUR) 30 MG 24 hr tablet Take 2 tablets (60 mg) by mouth daily 30 tablet      ONDANSETRON PO Take 4 mg by mouth every 8 hours as needed for nausea       furosemide (LASIX) 20 MG tablet Take 1 tablet (20 mg) by mouth 2 times daily 30 tablet      carboxymethylcellulose (REFRESH PLUS) 0.5 % SOLN ophthalmic solution Place 1 drop into both eyes 4 times daily And 1 drop in both eyes as needed. On Hold while on Timolol/Bimatoprost       ranitidine (RANITIDINE) 75 MG tablet Take 150 mg by mouth daily        diclofenac (VOLTAREN) 1 % GEL topical gel Apply 4 grams to knees four times daily as needed using enclosed dosing card. 100 g 0     insulin glargine (LANTUS) 100 UNIT/ML injection Inject 16 Units Subcutaneous 2 times daily        omeprazole (PRILOSEC) 10 MG CR capsule Take 1 capsule (10 mg) by mouth daily       atorvastatin (LIPITOR) 10 MG tablet Take 10 mg by mouth At Bedtime        tiotropium (SPIRIVA) 18 MCG inhalation capsule Inhale 18 mcg into the lungs daily       polyvinyl alcohol (LIQUIFILM TEARS) 1.4 % ophthalmic solution Place 1 drop into both eyes 3 times daily On Hold while on Timolol/Bimatoprost        acetaminophen (TYLENOL) 500 MG tablet Take 500 mg by mouth every 4 hours as needed for pain Not to exceed 3000 mg/24 hours       nitroglycerin (NITROSTAT) 0.4 MG SL tablet Place 0.4 mg under the tongue every 5 minutes as needed for chest pain if you are still having symptoms after 3 doses (15 minutes) call 911.       aspirin 81 MG EC tablet Take 1 tablet (81 mg) by mouth daily 5 tablet 0     metoprolol (TOPROL XL) 50 MG 24 hr tablet Take 1 tablet (50 mg) by mouth daily 5 tablet 0     tamsulosin (FLOMAX) 0.4 MG 24 hr capsule Take 1 capsule (0.4 mg) by mouth daily 5 capsule 0     ammonium lactate (LAC-HYDRIN) 12 % lotion Apply topically 2 times daily as needed for dry skin To all extremities for dry skin       megestrol (MEGACE) 40 MG tablet Take 2 tablets (80 mg) by mouth 2 times daily 120 tablet 3     [DISCONTINUED] enzalutamide (XTANDI) 40 MG capsule Take 4 capsules (160 mg) by mouth daily 120 capsule 0     Medications reviewed:  Medications reconciled to facility chart and changes were made to reflect current medications as identified as above med list. Below are the changes that were made:   Medications stopped since last EPIC medication reconciliation:   There are no discontinued medications.    Medications started since last [facility] medication reconciliation:  Orders Placed This Encounter   Medications     MEGESTROL ACETATE PO     Sig: Take 80 mg by mouth 2 times daily     REVIEW OF SYSTEMS:  4 point ROS including Respiratory, CV, GI and , other than that noted in the HPI,  is negative    Physical Exam:  /72  Pulse 83  Temp 97.6  F (36.4  C)  Resp 18  Wt 209 lb (94.8 kg)  SpO2 96%  BMI 29.15 kg/m2  GENERAL APPEARANCE: Alert, in no distress  ENT:  Mouth and posterior oropharynx normal, moist mucous membranes  EYES:  EOM, conjunctivae, lids, pupils and irises normal  RESP:  Respiratory effort and palpation of chest normal, no respiratory distress, diminished lung sounds throughout  CV:  Palpation  and auscultation of heart done, regular rate and rhythm, no murmur, rub, or gallop  ABDOMEN: Active bowel sounds, soft, nontender, no hepatosplenomegaly or other masses  M/S:   Active movement of bilateral upper and lower extremities. ACE wraps on BLE.  SKIN:  Inspection of skin and subcutaneous tissue baseline, Palpation of skin and subcutaneous tissue baseline  NEURO:   Cranial nerves 2-12 are normal tested and grossly at patient's baseline  PSYCH:  affect and mood normal    Recent Labs:    Last Basic Metabolic Panel:  Lab Results   Component Value Date     11/10/2017      Lab Results   Component Value Date    POTASSIUM 4.8 11/10/2017     Lab Results   Component Value Date    CHLORIDE 111 11/10/2017     Lab Results   Component Value Date    JORDAN 8.3 11/10/2017     Lab Results   Component Value Date    CO2 25 11/10/2017     Lab Results   Component Value Date    BUN 28 11/10/2017     Lab Results   Component Value Date    CR 1.42 11/10/2017     Lab Results   Component Value Date     11/10/2017     Lab Results   Component Value Date    WBC 7.8 11/10/2017     Lab Results   Component Value Date    RBC 2.74 11/10/2017     Lab Results   Component Value Date    HGB 7.3 11/10/2017     Lab Results   Component Value Date    HCT 25.4 11/10/2017     Lab Results   Component Value Date    MCV 93 11/10/2017     Lab Results   Component Value Date    MCH 27 11/10/2017     Lab Results   Component Value Date    MCHC 29 11/10/2017     Lab Results   Component Value Date    RDW 20.3 11/10/2017     Lab Results   Component Value Date     11/10/2017     Assessment/Plan:  Anemia of Chronic Disease. Secondary to metastatic prostate cancer. Has required blood transfusions 8/29/17, 11/1/17 and 11/2/17 in the past 3 months. Relatively asymptomatic. Contacted Dr. Oviedo's office to determine how frequently labs should be monitored and if blood transfusions should be arranged by Oncology or PCP. Further plans pending Dr. Oviedo's  recommendations.    Paroxysmal Atrial Fibrillation on Anticoagulation. INR slightly supratherapeutic. Warfarin 1 mg PO QD ordered with repeat INR on 11/15/17. Also on Metoprolol.     Cough with End-Stage COPD. Reviewed importance of utilizing Emergency Department only for emergencies. Explained staff at facility can provide treatment for many of the symptoms resident is experiencing. No indications of an exacerbation at this point. Will order Mucinex DM x 5 days to help loosen secretions. Remains on Budesonide-Formoterol and Tiotropium inhalers.     Electronically signed by  SAL Acevedo CNP                          Sincerely,        SAL Acevedo CNP

## 2017-11-16 NOTE — TELEPHONE ENCOUNTER
Called and spoke with NICOLÁS Maddox ~ patient's labs and MD's concern that patient may have GI bleed.  Explained that MD wants patient watched carefully and Primary MD to be involved.  Patient is to have blood transfusions if Hgb is 8 or less per VORB from Dr. Oviedo to NISHA Gill.  Passed info to NICOLÁS.  She can arrange for patient to have transfusion, PRN by contacting clinic.  Will set patient up for transfusion at 11/20 MD visit.  NICOLÁS is in agreement with updated POC.  Gage Yang RN, BSN, OCN  [facility]  Patient Care Coordinator'

## 2017-11-16 NOTE — LETTER
"11/16/2017       RE: Tomas Grey  [facility]  [address]  [facility] [address]     Dear Colleague,    Thank you for referring your patient, Tomas Grey, to the [facility]. Please see a copy of my visit note below.    Palliative Staff/Outpatient Clinic    CC:  Prostate cancer    Interim Hx:  He is seen alone today.  I reviewed the chart extensively including geriatric services records, Epic records, as well as outside neuropsychiatric testing today.  I have never met the patient, and I am seeing him today to fill in for a colleague who is unavailable due to a medical absence.  Briefly, he has metastatic prostate cancer, chronic pain, severe COPD and resides in a nursing home.  I review neuropsych testing that happened about a year ago which essentially said he lacks decision-making capacity but did not need a guardian as long as he was 'cooperative with family decision making'.      My partner had seen him in the past and I think was hoping to have a family discussion about goals of care with them, but unfortunately, he is here today without any family so I do not really discuss that with him because I think it is inappropriate given his lack of decision making capacity for complex medical decisions.  He did tell me the family member he relies on most is his stepson, Les Bourgeois, who he describes as his \"oldest son.\"  He gave me permission today to call Les and talk about his situation.        His major complaint is chest wall and shoulder pain.  He is not a clear historian.  Reviewing the chart, including ER visits-he has been in the ER several times with this sort of pain, all of which he was found to have a negative cardiac workup-it seems like his pain is longstanding.  It is both of his shoulders and left chest wall in particular.  At times, he describes it as 10/10.  He is aware he is taking " "analgesics-hydromorphone 2 mg I believe.  I have nursing home records but I do not actually have a p.r.n. medication administration record so I do not know if or how much of the Dilaudid he is getting.  The patient does think when he gets 4 mg of Dilaudid it helps without side effects.  He says he is stooling well.  He mentions knowing if he takes too much he \"might go into a coma\" and says that has not happened lately.  He has p.r.n. Tylenol, Senna and Zofran but I am not sure if he is taking those.  He expresses frustration that his pain is permanent and never goes away.  It is worse when he is lying and better when he is sitting up.      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  Mood wise, he says he is generally frustrated with his pain and situation and the nursing home not taking care of him very well.  I reviewed recent nursing home records and it seems like he is calling 911 and going to the ER.  They suspect he is seeking intravenous opioids when he is doing this and have encouraged ERs to not give him intravenous opioids.  He has metastatic cancer and also has a history of aberrant drug behavior, including self titration and being complicit in diversion, I believe, although do not know 100% the full history of that.        SOCIAL HISTORY:  As above.  He says he has 9 sons.  He expresses a lot of support from his family and gratitude for that.  He worked for [employer] for 30 years.      REVIEW OF SYSTEMS:  As above.  Chronic productive cough.  He gets short of breath with talking and moving his arms and transferring.  Urinates okay.  Remainder of comprehensive review of systems is negative.      PHYSICAL EXAMINATION:     GENERAL:  Tired, chronically ill man slumped forward in a scooter.  He is alert and interacts with me with fluent speech and full sentences.  Memory, insight and fund of " knowledge is all grossly and mildly impaired.     HEENT:  Head NC/AT.  Eyes anicteric without injection.  Mouth moist, no lesions.   NECK:  Supple, no adenopathy.   LUNGS:  Diminished but clear bilaterally.  Productive cough as noted.   HEART:  Regular rate and rhythm.  S1 and S2, no rubs, murmurs or gallops.     MUSCULOSKELETAL:  Palpation of the neck, shoulder girdle and upper extremities all grossly normal.  He cannot raise his arms bilaterally above 60 degrees.  Passively he is limited to about that degree as well due to pain.   ABDOMEN:  Slightly obese, soft and benign.   EXTREMITIES:  Trace lower extremity edema.   SKIN:  Dry and warm.      IMPRESSION/RECOMMENDATIONS:  72-year-old man with metastatic prostate cancer, chronic musculoskeletal pain, mild to moderate cognitive impairment as evidence by neurocognitive testing a year ago, history of aberrant drug use, frequent emergency room visits, unclear goals of care and inability to have a proper goals of care conversation without family advocates.  I have pages out to his geriatric nurse practitioner to discuss the situation and find out what he is actually taking for his pain medicine and what options they would be comfortable with.  I am trying to call his son-in-law, Les, as well to talk about the patient's condition-the patient gave me permission for this today.       Late entry:  Spoke with Ekaterina HUERTA. He is taking 4mg of dilaudid bid and rarely 2mg prn. Does not seem sedated by meds. Family visits and is involved particularly his dtr in law Jaclyn [phone] . I spoke with Jaclyn who is Les Bourgeois's wife and she asked me to speak with Les about his situation so I left them my cell phone.     I recommended to his NP trying fentanyl patch 12mcg/hr + scheduling 1gm acetaminophen tid.    Chart data reviewed today:         Past Medical History:   Past Medical History:   Diagnosis Date     Anemia      Anxiety      Aspiration pneumonia (H) 2014      C. difficile colitis      CAD (coronary artery disease)      Chronic pain      CKD (chronic kidney disease)      COPD (chronic obstructive pulmonary disease) (H)      Depressive disorder      Diabetes mellitus (H)      Hypertension      Insomnia      ALEXIS (obstructive sleep apnea)      Osteoporosis      Prostate cancer metastatic to multiple sites (H)               Past Surgical History:   Past Surgical History:   Procedure Laterality Date     ABDOMEN SURGERY       ARTHROSCOPY KNEE       EYE SURGERY                  Family History:   Family History   Problem Relation Age of Onset     DIABETES Mother      CANCER Mother      stomach     Family history reviewed and updated in EPIC           Allergies:   Allergies   Allergen Reactions     Morphine      Pt states not an allergy              Medications:   I have reviewed this patient's medication profile        Recent Labs   Lab Test 11/10/17 10/26/17   NA  145  145   POTASSIUM  4.8  5.4*   CHLORIDE  111  111   CO2  25  28   ANIONGAP  9.0  6.0   GLC  108*  115*   BUN  28*  34*   CR  1.42*  1.27   JORDAN  8.3*  8.1*     CBC RESULTS:   Recent Labs   Lab Test 11/10/17   WBC  7.8   RBC  2.74*   HGB  7.3*   HCT  25.4*   MCV  93   MCH  27   MCHC  29*   RDW  20.3*   PLT  379     Liver Function Studies -   Recent Labs   Lab Test 11/10/17   PROTTOTAL  7.2   ALBUMIN  2.2*   BILITOTAL  0.2   ALKPHOS  96   AST  13   ALT  9*     Bone scans, CTs reviewed    Thank you for involving us in the patient's care. This note was composed with voice recognition software; I review and edit the note but occasional mis-transcriptions can occur. Please alert me if you notice any confusing language.     Camden Rossi MD / Palliative Medicine / Pager [phone] / After-Hours Answering Service [phone] / Main Palliative Clinic - PWB 1C [phone] / [facility] Inpatient Team Consult Pager [phone] (answered 8am-430pm M-F)

## 2017-11-16 NOTE — MR AVS SNAPSHOT
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    [facility]  [address]  [address]  [facility] [address]   [phone]            Nov 20, 2017 11:30 AM CST   Return Visit with Manpreet Oviedo MD   [facility]    [facility]  [address]  [address]  [facility] [address]   [phone]            Nov 20, 2017  1:30 PM CST   Level 3 with RH INFUSION CHAIR 2   [facility] ([facility])    [facility]  [address]  [address]  [facility] [address]   [phone]            Jan 09, 2018  2:00 PM CST   (Arrive by 1:45 PM)   New Patient Visit with JASON Macedo   [facility] ([facility])    [address] [place] [address] [phone]              Who to contact     If you have questions or need follow up information about today's clinic visit or your schedule please contact [facility] directly at [phone].  Normal or non-critical lab and imaging results will be communicated to you by MyChart, letter or phone within 4 business days after the clinic has received the results. If you do not hear from us within 7 days, please contact the clinic  "through [employer] or phone. If you have a critical or abnormal lab result, we will notify you by phone as soon as possible.  Submit refill requests through [employer] or call your pharmacy and they will forward the refill request to us. Please allow 3 business days for your refill to be completed.          Additional Information About Your Visit        [employer] Information     [employer] lets you send messages to your doctor, view your test results, renew your prescriptions, schedule appointments and more. To sign up, go to [email or web address] . Click on \"Log in\" on the left side of the screen, which will take you to the Welcome page. Then click on \"Sign up Now\" on the right side of the page.     You will be asked to enter the access code listed below, as well as some personal information. Please follow the directions to create your username and password.     Your access code is: [email or web address]  Expires: 2017 11:54 AM     Your access code will  in 90 days. If you need help or a new code, please call your [place] clinic or [phone].        Care EveryWhere ID     This is your Care EveryWhere ID. This could be used by other organizations to access your [place] medical records  [id number]        Your Vitals Were     Pulse Temperature Respirations Height Pulse Oximetry       106 98.2  F (36.8  C) (Oral) 16 1.803 m (5' 11\") 97%        Blood Pressure from Last 3 Encounters:   17 120/64   17 122/61   17 138/72    Weight from Last 3 Encounters:   17 94.5 kg (208 lb 6.4 oz)   17 94.8 kg (209 lb)   10/24/17 95.4 kg (210 lb 6.4 oz)              Today, you had the following     No orders found for display       Primary Care Provider Office Phone # Fax #    SAL Acevedo [facility] [phone]       [address]  [place] [address]        Equal Access to Services     PATRICIA BAUM AH: Elaina Mcclelland, waaxda luaryan rayo, " jorje whitesidechelsea rider'aan ah. So [facility] [phone].    ATENCIÓN: Si yoav otoole, tiene a soria disposición servicios gratuitos de asistencia lingüística. Matt peck [phone].    We comply with applicable federal civil rights laws and [place] laws. We do not discriminate on the basis of race, color, national origin, age, disability, sex, sexual orientation, or gender identity.            Thank you!     Thank you for choosing [facility]  for your care. Our goal is always to provide you with excellent care. Hearing back from our patients is one way we can continue to improve our services. Please take a few minutes to complete the written survey that you may receive in the mail after your visit with us. Thank you!             Your Updated Medication List - Protect others around you: Learn how to safely use, store and throw away your medicines at www.disposemymeds.org.          This list is accurate as of: 11/16/17 11:59 PM.  Always use your most recent med list.                   Brand Name Dispense Instructions for use Diagnosis    acetaminophen 500 MG tablet    TYLENOL     Take 500 mg by mouth every 4 hours as needed for pain Not to exceed 3000 mg/24 hours        ammonium lactate 12 % lotion    LAC-HYDRIN     Apply topically 2 times daily as needed for dry skin To all extremities for dry skin        aspirin 81 MG EC tablet     5 tablet    Take 1 tablet (81 mg) by mouth daily    ASCVD (arteriosclerotic cardiovascular disease)       BETIMOL OP      Apply 1 drop to eye every morning        bimatoprost 0.01 % Soln    LUMIGAN     Place 1 drop into both eyes At Bedtime        budesonide-formoterol 160-4.5 MCG/ACT Inhaler    SYMBICORT     Inhale 2 puffs into the lungs 2 times daily        calcium carbonate 1500 (600 CA) MG tablet    OS-JORDAN 600 mg [place]. Ca     Take 1,500 mg by mouth 2 times daily (with meals)        dextromethorphan-guaiFENesin  MG per 12 hr tablet    MUCINEX DM      Take 1 tablet by mouth every 12 hours        diclofenac 1 % Gel topical gel    VOLTAREN    100 g    Apply 4 grams to knees four times daily as needed using enclosed dosing card.    Chronic pain of both knees       furosemide 20 MG tablet    LASIX    30 tablet    Take 1 tablet (20 mg) by mouth 2 times daily    Chronic diastolic congestive heart failure (H), Lymphedema of both lower extremities       HYDROmorphone 2 MG tablet    DILAUDID     Take 2 tablets (4 mg) by mouth 2 times daily And Hydromorphone 2 mg q3h PRN    Prostate cancer metastatic to bone (H)       insulin glargine 100 UNIT/ML injection    LANTUS     Inject 16 Units Subcutaneous 2 times daily        isosorbide mononitrate 30 MG 24 hr tablet    IMDUR    30 tablet    Take 2 tablets (60 mg) by mouth daily        LIPITOR 10 MG tablet   Generic drug:  atorvastatin      Take 10 mg by mouth At Bedtime        MEGESTROL ACETATE PO      Take 80 mg by mouth 2 times daily        metoprolol 50 MG 24 hr tablet    TOPROL XL    5 tablet    Take 1 tablet (50 mg) by mouth daily    ASCVD (arteriosclerotic cardiovascular disease)       nitroGLYcerin 0.4 MG sublingual tablet    NITROSTAT     Place 0.4 mg under the tongue every 5 minutes as needed for chest pain if you are still having symptoms after 3 doses (15 minutes) call 911.        omeprazole 10 MG CR capsule    priLOSEC     Take 1 capsule (10 mg) by mouth daily    Gastric reflux       ONDANSETRON PO      Take 4 mg by mouth every 8 hours as needed for nausea        ranitidine 75 MG tablet   Generic drug:  ranitidine      Take 150 mg by mouth daily        senna-docusate 8.6-50 MG per tablet    SENOKOT-S;PERICOLACE     Take 2 tablets by mouth 2 times daily        tamsulosin 0.4 MG capsule    FLOMAX    5 capsule    Take 1 capsule (0.4 mg) by mouth daily    Benign non-nodular prostatic hyperplasia without lower urinary tract symptoms       tiotropium 18 MCG capsule    SPIRIVA     Inhale 18 mcg into the lungs daily         WARFARIN SODIUM PO      Take by mouth daily See facility orders for INR dosing

## 2017-11-16 NOTE — NURSING NOTE
"Oncology Rooming Note    November 16, 2017 9:11 AM   Tomas Grey is a 72 year old male who presents for:    Chief Complaint   Patient presents with     Oncology Clinic Visit     return patient visit for follow up related to Prostate cancer metastatic to multiple sites (H)      Initial Vitals: /61  Pulse 106  Temp 98.2  F (36.8  C) (Oral)  Resp 16  Ht 1.803 m (5' 11\")  SpO2 97% Estimated body mass index is 29.15 kg/(m^2) as calculated from the following:    Height as of 10/11/17: 1.803 m (5' 11\").    Weight as of 11/14/17: 94.8 kg (209 lb). There is no height or weight on file to calculate BSA.  Worst Pain (10) Comment: Data Unavailable   No LMP for male patient.  Allergies reviewed: No  Medications reviewed: No    Medications: Medication refills not needed today.  Pharmacy name entered into EPIC:    [facility]  [facility] - [place], [place] - [address]  [facility] - [place] - [address] [place] [address] & [facility] [place], [place] - [address] [facility] [place], [place] - [address] [facility] - [address] [facility] - [place], [place] - [address]    Clinical concerns: patient is having left shoulder pain and chest pain 10/10+ Patient takes pain medication for his pain (norco). Patient isn't sure which medications is currently taking (doesn't know them by name) did not reconcile medications  dr. gonzalez was notified.    7 minutes for nursing intake (face to face time)     Brielle Tubbs CMA              "

## 2017-11-16 NOTE — PROGRESS NOTES
"Palliative Staff/Outpatient Clinic    CC:  Prostate cancer    Interim Hx:  He is seen alone today.  I reviewed the chart extensively including geriatric services records, Epic records, as well as outside neuropsychiatric testing today.  I have never met the patient, and I am seeing him today to fill in for a colleague who is unavailable due to a medical absence.  Briefly, he has metastatic prostate cancer, chronic pain, severe COPD and resides in a nursing home.  I review neuropsych testing that happened about a year ago which essentially said he lacks decision-making capacity but did not need a guardian as long as he was 'cooperative with family decision making'.      My partner had seen him in the past and I think was hoping to have a family discussion about goals of care with them, but unfortunately, he is here today without any family so I do not really discuss that with him because I think it is inappropriate given his lack of decision making capacity for complex medical decisions.  He did tell me the family member he relies on most is his stepson, Les Bourgeois, who he describes as his \"oldest son.\"  He gave me permission today to call Les and talk about his situation.        His major complaint is chest wall and shoulder pain.  He is not a clear historian.  Reviewing the chart, including ER visits-he has been in the ER several times with this sort of pain, all of which he was found to have a negative cardiac workup-it seems like his pain is longstanding.  It is both of his shoulders and left chest wall in particular.  At times, he describes it as 10/10.  He is aware he is taking analgesics-hydromorphone 2 mg I believe.  I have nursing home records but I do not actually have a p.r.n. medication administration record so I do not know if or how much of the Dilaudid he is getting.  The patient does think when he gets 4 mg of Dilaudid it helps without side effects.  He says he is stooling well.  He mentions " "knowing if he takes too much he \"might go into a coma\" and says that has not happened lately.  He has p.r.n. Tylenol, Senna and Zofran but I am not sure if he is taking those.  He expresses frustration that his pain is permanent and never goes away.  It is worse when he is lying and better when he is sitting up.      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  Mood wise, he says he is generally frustrated with his pain and situation and the nursing home not taking care of him very well.  I reviewed recent nursing home records and it seems like he is calling 911 and going to the ER.  They suspect he is seeking intravenous opioids when he is doing this and have encouraged ERs to not give him intravenous opioids.  He has metastatic cancer and also has a history of aberrant drug behavior, including self titration and being complicit in diversion, I believe, although do not know 100% the full history of that.        SOCIAL HISTORY:  As above.  He says he has 9 sons.  He expresses a lot of support from his family and gratitude for that.  He worked for [employer] for 30 years.      REVIEW OF SYSTEMS:  As above.  Chronic productive cough.  He gets short of breath with talking and moving his arms and transferring.  Urinates okay.  Remainder of comprehensive review of systems is negative.      PHYSICAL EXAMINATION:     GENERAL:  Tired, chronically ill man slumped forward in a scooter.  He is alert and interacts with me with fluent speech and full sentences.  Memory, insight and fund of knowledge is all grossly and mildly impaired.     HEENT:  Head NC/AT.  Eyes anicteric without injection.  Mouth moist, no lesions.   NECK:  Supple, no adenopathy.   LUNGS:  Diminished but clear bilaterally.  Productive cough as noted.   HEART:  Regular rate and rhythm.  S1 and S2, no rubs, murmurs or gallops.   "   MUSCULOSKELETAL:  Palpation of the neck, shoulder girdle and upper extremities all grossly normal.  He cannot raise his arms bilaterally above 60 degrees.  Passively he is limited to about that degree as well due to pain.   ABDOMEN:  Slightly obese, soft and benign.   EXTREMITIES:  Trace lower extremity edema.   SKIN:  Dry and warm.      IMPRESSION/RECOMMENDATIONS:  72-year-old man with metastatic prostate cancer, chronic musculoskeletal pain, mild to moderate cognitive impairment as evidence by neurocognitive testing a year ago, history of aberrant drug use, frequent emergency room visits, unclear goals of care and inability to have a proper goals of care conversation without family advocates.  I have pages out to his geriatric nurse practitioner to discuss the situation and find out what he is actually taking for his pain medicine and what options they would be comfortable with.  I am trying to call his son-in-law, Les, as well to talk about the patient's condition-the patient gave me permission for this today.       Late entry:  Spoke with Ekaterina HUERTA. He is taking 4mg of dilaudid bid and rarely 2mg prn. Does not seem sedated by meds. Family visits and is involved particularly his dtr in law Jaclyn [phone] . I spoke with Jaclyn who is Les Bourgeois's wife and she asked me to speak with Les about his situation so I left them my cell phone.     I recommended to his NP trying fentanyl patch 12mcg/hr + scheduling 1gm acetaminophen tid.    Chart data reviewed today:         Past Medical History:   Past Medical History:   Diagnosis Date     Anemia      Anxiety      Aspiration pneumonia (H) 2014     C. difficile colitis      CAD (coronary artery disease)      Chronic pain      CKD (chronic kidney disease)      COPD (chronic obstructive pulmonary disease) (H)      Depressive disorder      Diabetes mellitus (H)      Hypertension      Insomnia      ALEXIS (obstructive sleep apnea)      Osteoporosis      Prostate  cancer metastatic to multiple sites (H)               Past Surgical History:   Past Surgical History:   Procedure Laterality Date     ABDOMEN SURGERY       ARTHROSCOPY KNEE       EYE SURGERY                  Family History:   Family History   Problem Relation Age of Onset     DIABETES Mother      CANCER Mother      stomach     Family history reviewed and updated in EPIC           Allergies:   Allergies   Allergen Reactions     Morphine      Pt states not an allergy              Medications:   I have reviewed this patient's medication profile        Recent Labs   Lab Test 11/10/17 10/26/17   NA  145  145   POTASSIUM  4.8  5.4*   CHLORIDE  111  111   CO2  25  28   ANIONGAP  9.0  6.0   GLC  108*  115*   BUN  28*  34*   CR  1.42*  1.27   JORDAN  8.3*  8.1*     CBC RESULTS:   Recent Labs   Lab Test 11/10/17   WBC  7.8   RBC  2.74*   HGB  7.3*   HCT  25.4*   MCV  93   MCH  27   MCHC  29*   RDW  20.3*   PLT  379     Liver Function Studies -   Recent Labs   Lab Test 11/10/17   PROTTOTAL  7.2   ALBUMIN  2.2*   BILITOTAL  0.2   ALKPHOS  96   AST  13   ALT  9*     Bone scans, CTs reviewed    Thank you for involving us in the patient's care. This note was composed with voice recognition software; I review and edit the note but occasional mis-transcriptions can occur. Please alert me if you notice any confusing language.     Camden Rossi MD / Palliative Medicine / Pager [phone] / After-Hours Answering Service [phone] / Main Palliative Clinic - PWB 1C [phone] / [facility] Inpatient Team Consult Pager [phone] (answered 8am-430pm M-F)

## 2017-11-17 NOTE — PROGRESS NOTES
[facility]  Nursing Home Regulatory Visit  November 17, 2017    Chief Complaint   Patient presents with     FPC Regulatory       HPI:    Tomas Grey is a 72 year old  (1945), who is being seen today for a federally mandated E/M visit at [facility]. Today's concerns are:    1) Anemia -- Hgb has drifted down and has required PRBCs on 11/1 and has another transfusion scheduled for 11/20. Per hemc/onc, he's to be tranfused if Hgb <8. No obvious source of blood loss - no melena or hematochezia. Suspect this is anemia of chronic disease, worsened by advancing malignancy.   2) Metastatic Prostate Cancer -- this has progressed despite treatment, currently on Xgeva. Sees Dr. Oviedo on 11/20. Is also followed by palliative care, most recently seen by. Dr. Calvert yesterday.   3) COPD / Chronic Hypoxic Respiratory Failure -- No signs of exacerbation today. Limited endurance from his pulmonary disease. Managed with budesonide-formoterol 160-5.4 mcg BID and tiotropium 18 mcg daily      ALLERGIES: Morphine    PAST MEDICAL HISTORY:   Past Medical History:   Diagnosis Date     Anemia      Anxiety      Aspiration pneumonia (H) 2014     C. difficile colitis      CAD (coronary artery disease)      Chronic pain      CKD (chronic kidney disease)      COPD (chronic obstructive pulmonary disease) (H)      Depressive disorder      Diabetes mellitus (H)      Hypertension      Insomnia      ALEXIS (obstructive sleep apnea)      Osteoporosis      Prostate cancer metastatic to multiple sites (H)        PAST SURGICAL HISTORY:   Past Surgical History:   Procedure Laterality Date     ABDOMEN SURGERY       ARTHROSCOPY KNEE       EYE SURGERY         FAMILY HISTORY:   Family History   Problem Relation Age of Onset     DIABETES Mother      CANCER Mother      stomach       SOCIAL HISTORY:   Lives in a SNF    MEDICATIONS:  Current Outpatient Prescriptions   Medication Sig Dispense Refill     MEGESTROL  ACETATE PO Take 80 mg by mouth 2 times daily       dextromethorphan-guaiFENesin (MUCINEX DM)  MG per 12 hr tablet Take 1 tablet by mouth every 12 hours       senna-docusate (SENOKOT-S;PERICOLACE) 8.6-50 MG per tablet Take 2 tablets by mouth 2 times daily       budesonide-formoterol (SYMBICORT) 160-4.5 MCG/ACT Inhaler Inhale 2 puffs into the lungs 2 times daily       Timolol Hemihydrate (BETIMOL OP) Apply 1 drop to eye every morning       HYDROmorphone (DILAUDID) 2 MG tablet Take 2 tablets (4 mg) by mouth 2 times daily And Hydromorphone 2 mg q3h PRN       megestrol (MEGACE) 40 MG tablet Take 2 tablets (80 mg) by mouth 2 times daily 120 tablet 3     bimatoprost (LUMIGAN) 0.01 % SOLN Place 1 drop into both eyes At Bedtime       WARFARIN SODIUM PO Take by mouth daily See facility orders for INR dosing       calcium carbonate (OS-JORDAN 600 MG [place]. [place]) 1500 (600 [place]) MG tablet Take 1,500 mg by mouth 2 times daily (with meals)       isosorbide mononitrate (IMDUR) 30 MG 24 hr tablet Take 2 tablets (60 mg) by mouth daily 30 tablet      ONDANSETRON PO Take 4 mg by mouth every 8 hours as needed for nausea       furosemide (LASIX) 20 MG tablet Take 1 tablet (20 mg) by mouth 2 times daily 30 tablet      ranitidine (RANITIDINE) 75 MG tablet Take 150 mg by mouth daily        diclofenac (VOLTAREN) 1 % GEL topical gel Apply 4 grams to knees four times daily as needed using enclosed dosing card. 100 g 0     insulin glargine (LANTUS) 100 UNIT/ML injection Inject 16 Units Subcutaneous 2 times daily        omeprazole (PRILOSEC) 10 MG CR capsule Take 1 capsule (10 mg) by mouth daily       atorvastatin (LIPITOR) 10 MG tablet Take 10 mg by mouth At Bedtime        tiotropium (SPIRIVA) 18 MCG inhalation capsule Inhale 18 mcg into the lungs daily       acetaminophen (TYLENOL) 500 MG tablet Take 500 mg by mouth every 4 hours as needed for pain Not to exceed 3000 mg/24 hours       [DISCONTINUED] enzalutamide (XTANDI) 40 MG capsule Take 4  capsules (160 mg) by mouth daily 120 capsule 0     nitroglycerin (NITROSTAT) 0.4 MG SL tablet Place 0.4 mg under the tongue every 5 minutes as needed for chest pain if you are still having symptoms after 3 doses (15 minutes) call 911.       aspirin 81 MG EC tablet Take 1 tablet (81 mg) by mouth daily 5 tablet 0     metoprolol (TOPROL XL) 50 MG 24 hr tablet Take 1 tablet (50 mg) by mouth daily 5 tablet 0     tamsulosin (FLOMAX) 0.4 MG 24 hr capsule Take 1 capsule (0.4 mg) by mouth daily 5 capsule 0     ammonium lactate (LAC-HYDRIN) 12 % lotion Apply topically 2 times daily as needed for dry skin To all extremities for dry skin         Medications reviewed:  Medications reconciled to facility chart and changes were made to reflect current medications as identified as above med list. Below are the changes that were made:   Medications stopped since last EPIC medication reconciliation:   Medications Discontinued During This Encounter   Medication Reason     megestrol (MEGACE) 40 MG tablet Medication Reconciliation Clean Up     Medications started since last [facility] medication reconciliation:  No orders of the defined types were placed in this encounter.      Case Management:  I have reviewed the care plan and MDS and do agree with the plan.   Information reviewed:  Medications, vital signs, orders, and nursing notes.    ROS:  4 point ROS neg other than the symptoms noted above in the HPI.    PHYSICAL EXAM:  /64  Pulse 80  Temp 99  F (37.2  C)  Resp 20  Wt 208 lb 6.4 oz (94.5 kg)  SpO2 95%  BMI 29.07 kg/m2  Gen: sitting in power scooter, alert, cooperative and in no acute distress  Resp: lungs diminished, but clear; no wheezing  GI: abdomen soft, not-tender  Ext: no LE edema  Neuro: CX II-XII grossly in tact; ROM in all four extremities grossly in tact  Psych: alert and oriented to self and general situation, at times anxious affect    Lab/Diagnostic data:  Reviewed as per Epic    ASSESSMENT/PLAN    1)  "Anemia  Presumably this is anemia of chronic disease that has worsened given progressive malignancy. No evidence for GI or other blood loss. Has required 2 tranfusions this month (11/1 and is scheduled for 11/20)  -- per oncology, transfuse for Hgb <8    2) Metastatic Prostate Cancer   This has progressed despite treatment, currently on Xgeva. Is also followed by palliative care, most recently seen by. Dr. Calvert yesterday.   -- follow up with Dr Oviedo on 11/20 as scheduled   -- Given overall clinical course, continue to favor a comfort focused approach for him. He does not have decisional capacity. Family was not present at palliative care appointment yesterday. Did not discuss goals of care with him specifically today, thought he told me he likes to continue to go to the hospital because they make him \"95% better\". He is a shell of his former self today - thin, pale, clearly on a downward trajectory.    3) COPD / Chronic Hypoxic Respiratory Failure   No signs of exacerbation today. Limited endurance from his pulmonary disease. -- continues on budesonide-formoterol 160-5.4 mcg BID and tiotropium 18 mcg daily      Tomas Grey is stable. No concerns per nursing. No changes to plan of care today.    Electronically signed by:  Annika Ortiz MD    "

## 2017-11-20 NOTE — TELEPHONE ENCOUNTER
Patient did not keep appointment today due to confusion with date and time.  Rescheduled patient's transfusion for 12/1 and an appt on 12/4 to see MD.  Called and spoke with [facility] staff who will arrange for transportation.  NICOLÁS Martins at facility is monitoring labs and is aware of MD's VO for transfusing patient when Hgb is 8 or less + bi-weekly lab draws.  Blood Therapy plan has been changed per NISHA Santoyo.  Will continue to monitor and update POC, PRN.

## 2017-11-21 NOTE — PROGRESS NOTES
[facility]    Chief Complaint   Patient presents with     RECHECK     HPI:    Tomas Grey is a 72 year old  (1945), who is being seen today for an episodic care visit at [facility].  HPI information obtained from: facility chart records, facility staff, patient report and [facility] chart review. Today's concern is:    Type 2 Diabetes Mellitus.  A1C 6.0 on 11/18/17. Previous A1C 6.2 on 8/8/17. Upon review of blood sugars over the past 5 days, range is as follows:    Breakfast:   Lunch: 113-144  Dinner: 117-187  Bedtime: 110-164    Prostate Cancer Metastatic to Bone. Seen by Palliative Care on 11/16/17. Nurse Manager at facility contacted sonLes, 2 times to discuss attending appointment and to remind of time/date of appointment. Les stated he would be at the appointment, but did not go to appointment. Palliative MD contacted daughter in-law, Jaclyn and son, Les. Recommended changing pain medication from Dilaudid to a Fentanyl Patch with scheduled Acetaminophen. Today, resident is in his scooter and getting ready to visit a friend on the 3rd floor. Has no complaints.    Anemia. Scheduled for blood transfusion and Xgeva infusion 11/20/17. Facility did not arrange transportation so resident missed transfusion. Last Hemoglobin 7.3 on 11/10/17. Discussed with Dr. Oviedo's nurse, Gage, at [phone] Option 3, management of anemia. Dr. Oviedo recommends biweekly labs with transfusion for Hemoglobin <8. Dr. Oviedo is also questioning GI source of anemia.     Paroxysmal Atrial Fibrillation on Anticoagulation. INR 2.2 on 11/21/17. Last INR 2.0 on 11/16/17. Taking Warfarin 2 mg and 1 mg alternating.    ALLERGIES: Morphine  Past Medical, Surgical, Family and Social History reviewed and [facility] in [facility].    Current Outpatient Prescriptions   Medication Sig Dispense Refill     INSULIN ASPART SC INJECT 16 UNIT SUBCUTANEOUSLY TWO TIMES A DAY       MEGESTROL ACETATE PO Take  80 mg by mouth 2 times daily       senna-docusate (SENOKOT-S;PERICOLACE) 8.6-50 MG per tablet Take 2 tablets by mouth 2 times daily       budesonide-formoterol (SYMBICORT) 160-4.5 MCG/ACT Inhaler Inhale 2 puffs into the lungs 2 times daily       Timolol Hemihydrate (BETIMOL OP) Apply 1 drop to eye every morning       HYDROmorphone (DILAUDID) 2 MG tablet Take 2 tablets (4 mg) by mouth 2 times daily And Hydromorphone 2 mg q3h PRN       bimatoprost (LUMIGAN) 0.01 % SOLN Place 1 drop into both eyes At Bedtime       WARFARIN SODIUM PO Take by mouth daily See facility orders for INR dosing       calcium carbonate (OS-JORDAN 600 MG [place]. [place]) 1500 (600 [place]) MG tablet Take 1,500 mg by mouth 2 times daily (with meals)       isosorbide mononitrate (IMDUR) 30 MG 24 hr tablet Take 2 tablets (60 mg) by mouth daily 30 tablet      ONDANSETRON PO Take 4 mg by mouth every 8 hours as needed for nausea       furosemide (LASIX) 20 MG tablet Take 1 tablet (20 mg) by mouth 2 times daily 30 tablet      ranitidine (RANITIDINE) 75 MG tablet Take 150 mg by mouth daily        diclofenac (VOLTAREN) 1 % GEL topical gel Apply 4 grams to knees four times daily as needed using enclosed dosing card. 100 g 0     insulin glargine (LANTUS) 100 UNIT/ML injection Inject 16 Units Subcutaneous 2 times daily        omeprazole (PRILOSEC) 10 MG CR capsule Take 1 capsule (10 mg) by mouth daily       atorvastatin (LIPITOR) 10 MG tablet Take 10 mg by mouth At Bedtime        tiotropium (SPIRIVA) 18 MCG inhalation capsule Inhale 18 mcg into the lungs daily       acetaminophen (TYLENOL) 500 MG tablet Take 500 mg by mouth every 4 hours as needed for pain Not to exceed 3000 mg/24 hours       nitroglycerin (NITROSTAT) 0.4 MG SL tablet Place 0.4 mg under the tongue every 5 minutes as needed for chest pain if you are still having symptoms after 3 doses (15 minutes) call 911.       aspirin 81 MG EC tablet Take 1 tablet (81 mg) by mouth daily 5 tablet 0     metoprolol (TOPROL  "XL) 50 MG 24 hr tablet Take 1 tablet (50 mg) by mouth daily 5 tablet 0     tamsulosin (FLOMAX) 0.4 MG 24 hr capsule Take 1 capsule (0.4 mg) by mouth daily 5 capsule 0     ammonium lactate (LAC-HYDRIN) 12 % lotion Apply topically 2 times daily as needed for dry skin To all extremities for dry skin       [DISCONTINUED] enzalutamide (XTANDI) 40 MG capsule Take 4 capsules (160 mg) by mouth daily 120 capsule 0     Medications reviewed:  Medications reconciled to facility chart and changes were made to reflect current medications as identified as above med list. Below are the changes that were made:   Medications stopped since last EPIC medication reconciliation:   There are no discontinued medications.    Medications started since last [facility] medication reconciliation:  No orders of the defined types were placed in this encounter.    REVIEW OF SYSTEMS:  4 point ROS including Respiratory, CV, GI and , other than that noted in the HPI,  is negative    Physical Exam:  /64  Pulse 102  Temp 99  F (37.2  C)  Resp 20  Ht 5' 9\" (1.753 m)  Wt 208 lb 4.8 oz (94.5 kg)  SpO2 94%  BMI 30.76 kg/m2  GENERAL APPEARANCE:Lethargic, in no distress  ENT:  Mouth and posterior oropharynx normal, moist mucous membranes  EYES:  EOM, conjunctivae, lids, pupils and irises normal  RESP:  Respiratory effort and palpation of chest normal, no respiratory distress, diminished lung sounds throughout  CV:  Palpation and auscultation of heart done, regular rate and rhythm, no murmur, rub, or gallop  ABDOMEN: Active bowel sounds, soft, nontender, no hepatosplenomegaly or other masses  M/S:   Active movement of bilateral upper and lower extremities. ACE wraps on BLE.  SKIN:  Inspection of skin and subcutaneous tissue baseline, Palpation of skin and subcutaneous tissue baseline  NEURO:   Cranial nerves 2-12 are normal tested and grossly at patient's baseline  PSYCH:  affect and mood normal     Recent Labs:    Last Basic Metabolic Panel:  Lab " Results   Component Value Date     11/10/2017      Lab Results   Component Value Date    POTASSIUM 4.8 11/10/2017     Lab Results   Component Value Date    CHLORIDE 111 11/10/2017     Lab Results   Component Value Date    JORDAN 8.3 11/10/2017     Lab Results   Component Value Date    CO2 25 11/10/2017     Lab Results   Component Value Date    BUN 28 11/10/2017     Lab Results   Component Value Date    CR 1.42 11/10/2017     Lab Results   Component Value Date     11/10/2017     Lab Results   Component Value Date    WBC 7.8 11/10/2017     Lab Results   Component Value Date    RBC 2.74 11/10/2017     Lab Results   Component Value Date    HGB 7.3 11/10/2017     Lab Results   Component Value Date    HCT 25.4 11/10/2017     Lab Results   Component Value Date    MCV 93 11/10/2017     Lab Results   Component Value Date    MCH 27 11/10/2017     Lab Results   Component Value Date    MCHC 29 11/10/2017     Lab Results   Component Value Date    RDW 20.3 11/10/2017     Lab Results   Component Value Date     11/10/2017     Assessment/Plan:  Type 2 Diabetes Mellitus. Due to A1C 6.0 on 11/18/17, will decrease Glargine from 16 U BID to 20 U QD to avoid hypoglycemia in this frail, elderly adult. Continue Accuchecks as ordered. Repeat A1C in 3 months to ensure upward trend.     Prostate Cancer Metastatic to Bone. Followed by Dr. Oviedo. Receives Xgeva infusions and Megace. Per Palliative MD's recommendations, will discontinue Hydromorphone 4 mg BID and 2 mg q3h PRN. Will add Fentanyl Patch 12.5 mcg/hr in addition to scheduled Acetaminophen 1000 mg PO TID. Will attempt to work with facility to encourage more family involvement and assistance with decision making.     Anemia. Please see MD note from 11/17/17 for further details. Transfuse for Hemoglobin <8 per Oncology. Discussed with multiple staff members at facility importance of transfusions and transportation to appointments. Have rescheduled blood transfusion for  11/28/17 as this was the soonest available appointment. Also has appointment scheduled for Xgeva on 12/1/17 and blood transfusion. Will keep appointment for blood transfusion on 12/1/17 at this time as resident may require 2 U of PRBCs based upon lab results next week.     Paroxysmal Atrial Fibrillation on Anticoagulation. INR therapeutic. Continue Warfarin 1 mg on Tu, Th, Sat and 2 mg on Mon, W, Fri, Sun. Repeat INR on 11/27/17. Also on Metoprolol.    Electronically signed by  SAL Acevedo CNP

## 2017-11-21 NOTE — LETTER
11/21/2017        RE: Tomas Grey  [facility]  [address]  [facility] [address]        [facility]    Chief Complaint   Patient presents with     RECHECK     HPI:    Tomas Grey is a 72 year old  (1945), who is being seen today for an episodic care visit at [facility].  HPI information obtained from: facility chart records, facility staff, patient report and [facility] chart review. Today's concern is:    Type 2 Diabetes Mellitus.  A1C 6.0 on 11/18/17. Previous A1C 6.2 on 8/8/17. Upon review of blood sugars over the past 5 days, range is as follows:    Breakfast:   Lunch: 113-144  Dinner: 117-187  Bedtime: 110-164    Prostate Cancer Metastatic to Bone. Seen by Palliative Care on 11/16/17. Nurse Manager at facility contacted sonLes, 2 times to discuss attending appointment and to remind of time/date of appointment. Les stated he would be at the appointment, but did not go to appointment. Palliative MD contacted daughter in-law, Jaclyn and son, Les. Recommended changing pain medication from Dilaudid to a Fentanyl Patch with scheduled Acetaminophen. Today, resident is in his scooter and getting ready to visit a friend on the 3rd floor. Has no complaints.    Anemia. Scheduled for blood transfusion and Xgeva infusion 11/20/17. Facility did not arrange transportation so resident missed transfusion. Last Hemoglobin 7.3 on 11/10/17. Discussed with Dr. Oviedo's nurse, Gage, at [phone] Option 3, management of anemia. Dr. Oviedo recommends biweekly labs with transfusion for Hemoglobin <8. Dr. Oviedo is also questioning GI source of anemia.     Paroxysmal Atrial Fibrillation on Anticoagulation. INR 2.2 on 11/21/17. Last INR 2.0 on 11/16/17. Taking Warfarin 2 mg and 1 mg alternating.    ALLERGIES: Morphine  Past Medical, Surgical, Family and Social History reviewed and updated in [facility].    Current Outpatient Prescriptions    Medication Sig Dispense Refill     INSULIN ASPART SC INJECT 16 UNIT SUBCUTANEOUSLY TWO TIMES A DAY       MEGESTROL ACETATE PO Take 80 mg by mouth 2 times daily       senna-docusate (SENOKOT-S;PERICOLACE) 8.6-50 MG per tablet Take 2 tablets by mouth 2 times daily       budesonide-formoterol (SYMBICORT) 160-4.5 MCG/ACT Inhaler Inhale 2 puffs into the lungs 2 times daily       Timolol Hemihydrate (BETIMOL OP) Apply 1 drop to eye every morning       HYDROmorphone (DILAUDID) 2 MG tablet Take 2 tablets (4 mg) by mouth 2 times daily And Hydromorphone 2 mg q3h PRN       bimatoprost (LUMIGAN) 0.01 % SOLN Place 1 drop into both eyes At Bedtime       WARFARIN SODIUM PO Take by mouth daily See facility orders for INR dosing       calcium carbonate (OS-JORDAN 600 MG [place]. CA) 1500 (600 CA) MG tablet Take 1,500 mg by mouth 2 times daily (with meals)       isosorbide mononitrate (IMDUR) 30 MG 24 hr tablet Take 2 tablets (60 mg) by mouth daily 30 tablet      ONDANSETRON PO Take 4 mg by mouth every 8 hours as needed for nausea       furosemide (LASIX) 20 MG tablet Take 1 tablet (20 mg) by mouth 2 times daily 30 tablet      ranitidine (RANITIDINE) 75 MG tablet Take 150 mg by mouth daily        diclofenac (VOLTAREN) 1 % GEL topical gel Apply 4 grams to knees four times daily as needed using enclosed dosing card. 100 g 0     insulin glargine (LANTUS) 100 UNIT/ML injection Inject 16 Units Subcutaneous 2 times daily        omeprazole (PRILOSEC) 10 MG CR capsule Take 1 capsule (10 mg) by mouth daily       atorvastatin (LIPITOR) 10 MG tablet Take 10 mg by mouth At Bedtime        tiotropium (SPIRIVA) 18 MCG inhalation capsule Inhale 18 mcg into the lungs daily       acetaminophen (TYLENOL) 500 MG tablet Take 500 mg by mouth every 4 hours as needed for pain Not to exceed 3000 mg/24 hours       nitroglycerin (NITROSTAT) 0.4 MG SL tablet Place 0.4 mg under the tongue every 5 minutes as needed for chest pain if you are still having symptoms  "after 3 doses (15 minutes) call 911.       aspirin 81 MG EC tablet Take 1 tablet (81 mg) by mouth daily 5 tablet 0     metoprolol (TOPROL XL) 50 MG 24 hr tablet Take 1 tablet (50 mg) by mouth daily 5 tablet 0     tamsulosin (FLOMAX) 0.4 MG 24 hr capsule Take 1 capsule (0.4 mg) by mouth daily 5 capsule 0     ammonium lactate (LAC-HYDRIN) 12 % lotion Apply topically 2 times daily as needed for dry skin To all extremities for dry skin       [DISCONTINUED] enzalutamide (XTANDI) 40 MG capsule Take 4 capsules (160 mg) by mouth daily 120 capsule 0     Medications reviewed:  Medications reconciled to facility chart and changes were made to reflect current medications as identified as above med list. Below are the changes that were made:   Medications stopped since last EPIC medication reconciliation:   There are no discontinued medications.    Medications started since last [facility] medication reconciliation:  No orders of the defined types were placed in this encounter.    REVIEW OF SYSTEMS:  4 point ROS including Respiratory, CV, GI and , other than that noted in the HPI,  is negative    Physical Exam:  /64  Pulse 102  Temp 99  F (37.2  C)  Resp 20  Ht 5' 9\" (1.753 m)  Wt 208 lb 4.8 oz (94.5 kg)  SpO2 94%  BMI 30.76 kg/m2  GENERAL APPEARANCE:Lethargic, in no distress  ENT:  Mouth and posterior oropharynx normal, moist mucous membranes  EYES:  EOM, conjunctivae, lids, pupils and irises normal  RESP:  Respiratory effort and palpation of chest normal, no respiratory distress, diminished lung sounds throughout  CV:  Palpation and auscultation of heart done, regular rate and rhythm, no murmur, rub, or gallop  ABDOMEN: Active bowel sounds, soft, nontender, no hepatosplenomegaly or other masses  M/S:   Active movement of bilateral upper and lower extremities. ACE wraps on BLE.  SKIN:  Inspection of skin and subcutaneous tissue baseline, Palpation of skin and subcutaneous tissue baseline  NEURO:   Cranial nerves 2-12 " are normal tested and grossly at patient's baseline  PSYCH:  affect and mood normal     Recent Labs:    Last Basic Metabolic Panel:  Lab Results   Component Value Date     11/10/2017      Lab Results   Component Value Date    POTASSIUM 4.8 11/10/2017     Lab Results   Component Value Date    CHLORIDE 111 11/10/2017     Lab Results   Component Value Date    JORDAN 8.3 11/10/2017     Lab Results   Component Value Date    CO2 25 11/10/2017     Lab Results   Component Value Date    BUN 28 11/10/2017     Lab Results   Component Value Date    CR 1.42 11/10/2017     Lab Results   Component Value Date     11/10/2017     Lab Results   Component Value Date    WBC 7.8 11/10/2017     Lab Results   Component Value Date    RBC 2.74 11/10/2017     Lab Results   Component Value Date    HGB 7.3 11/10/2017     Lab Results   Component Value Date    HCT 25.4 11/10/2017     Lab Results   Component Value Date    MCV 93 11/10/2017     Lab Results   Component Value Date    MCH 27 11/10/2017     Lab Results   Component Value Date    MCHC 29 11/10/2017     Lab Results   Component Value Date    RDW 20.3 11/10/2017     Lab Results   Component Value Date     11/10/2017     Assessment/Plan:  Type 2 Diabetes Mellitus. Due to A1C 6.0 on 11/18/17, will decrease Glargine from 16 U BID to 20 U QD to avoid hypoglycemia in this frail, elderly adult. Continue Accuchecks as ordered. Repeat A1C in 3 months to ensure upward trend.     Prostate Cancer Metastatic to Bone. Followed by Dr. Oviedo. Receives Xgeva infusions and Megace. Per Palliative MD's recommendations, will discontinue Hydromorphone 4 mg BID and 2 mg q3h PRN. Will add Fentanyl Patch 12.5 mcg/hr in addition to scheduled Acetaminophen 1000 mg PO TID. Will attempt to work with facility to encourage more family involvement and assistance with decision making.     Anemia. Please see MD note from 11/17/17 for further details. Transfuse for Hemoglobin <8 per Oncology. Discussed with  multiple staff members at facility importance of transfusions and transportation to appointments. Have rescheduled blood transfusion for 11/28/17 as this was the soonest available appointment. Also has appointment scheduled for Xgeva on 12/1/17 and blood transfusion. Will keep appointment for blood transfusion on 12/1/17 at this time as resident may require 2 U of PRBCs based upon lab results next week.     Paroxysmal Atrial Fibrillation on Anticoagulation. INR therapeutic. Continue Warfarin 1 mg on Tu, Th, Sat and 2 mg on Mon, W, Fri, Sun. Repeat INR on 11/27/17. Also on Metoprolol.    Electronically signed by  SAL Acevedo CNP                          Sincerely,        SAL Acevedo CNP

## 2017-11-28 NOTE — LETTER
11/28/2017        RE: Tomas Grey  [facility]  [address]  [facility] [address]        [place] [facility]  PRIMARY CARE PROVIDER AND CLINIC:  Ekaterina Lozano [address] [place] [address] / [facility] [address]  Chief Complaint   Patient presents with     Hospital F/U     HPI:    Tomas Grey is a 72 year old  (1945), re-admitted to the [facility] from [facility]. Hospital stay 11/23/17 through 11/26/17. Re-admitted to this facility for  medical management and nursing care.  HPI information obtained from: facility chart records, facility staff, patient report and [facility] chart review.  Hospital course per review of discharge summary:    This is a 72-year-old male, with a past medical history significant for severe COPD on supplemental Oxygen, obstructive sleep apnea, prostate cancer metastatic to bone, type 2 diabetes mellitus, chronic kidney disease stage III, chronic diastolic heart failure with an EF 55-60% on 4/28/15, coronary artery disease, hypertension, paroxsymal atrial fibrillation and lymphedema, who was admitted to [facility] for worsening shortness of breath. Of note, has had 9 ED visits and 3 hospitalizations in the past 6 months at [facility] and [facility] primarily for shortness of breath, chest pain and abdominal pain. During this hospitalization, respiratory status improved with BiPAP, Prednisone and Doxycycline. Furosemide was adjusted due to slight elevation in BNP. Echocardiogram revealed EF 60-65% on 11/24/17. Received blood transfusion on 11/25/17 for Hemoglobin 6.5. Discharged back to long-term care.     Current issues are:        Leaning forward and resting on front of scooter. Reports he is tired. Stated he was in the hospital for a longer duration than he actually was. Denies shortness of breath. Feels pain is better controlled on the Fentanyl Patch.    CODE  STATUS/ADVANCE DIRECTIVES DISCUSSION:   CPR/Full code   Patient's living condition: lives in a skilled nursing facility    ALLERGIES:Morphine  PAST MEDICAL HISTORY:  has a past medical history of Anemia; Anxiety; Aspiration pneumonia (H) (2014); C. difficile colitis; CAD (coronary artery disease); Chronic pain; CKD (chronic kidney disease); COPD (chronic obstructive pulmonary disease) (H); Depressive disorder; Diabetes mellitus (H); Hypertension; Insomnia; ALEXIS (obstructive sleep apnea); Osteoporosis; and Prostate cancer metastatic to multiple sites (H).  PAST SURGICAL HISTORY:  has a past surgical history that includes Abdomen surgery; Arthroscopy knee; and Eye surgery.  FAMILY HISTORY: family history includes CANCER in his mother; DIABETES in his mother.  SOCIAL HISTORY:  reports that he has quit smoking. He has never used smokeless tobacco. He reports that he does not drink alcohol or use illicit drugs.    Post Discharge Medication Reconciliation Status: discharge medications reconciled and changed, per note/orders (see AVS).  Current Outpatient Prescriptions   Medication Sig Dispense Refill     ipratropium - albuterol 0.5 mg/2.5 mg/3 mL (DUONEB) 0.5-2.5 (3) MG/3ML neb solution Take 1 vial by nebulization every 4 hours as needed for shortness of breath / dyspnea or wheezing       polyvinyl alcohol (LIQUIFILM TEARS) 1.4 % ophthalmic solution Place 1 drop into both eyes 3 times daily       Carboxymethylcellulose Sodium (CARBOXYMETHYLCELLULOSE SOD PF OP) Place 1 drop into both eyes 4 times daily       fentaNYL (DURAGESIC) 12 mcg/hr 72 hr patch Place 1 patch onto the skin every 72 hours       MEGESTROL ACETATE PO Take 80 mg by mouth 2 times daily       senna-docusate (SENOKOT-S;PERICOLACE) 8.6-50 MG per tablet Take 2 tablets by mouth 2 times daily       budesonide-formoterol (SYMBICORT) 160-4.5 MCG/ACT Inhaler Inhale 2 puffs into the lungs 2 times daily       Timolol Hemihydrate (BETIMOL OP) Place 1 drop into both  eyes every morning        bimatoprost (LUMIGAN) 0.01 % SOLN Place 1 drop into both eyes At Bedtime       WARFARIN SODIUM PO Take by mouth daily See facility orders for INR dosing       calcium carbonate (OS-JORDAN 600 MG [place]. CA) 1500 (600 CA) MG tablet Take 1,500 mg by mouth 2 times daily (with meals)       isosorbide mononitrate (IMDUR) 30 MG 24 hr tablet Take 2 tablets (60 mg) by mouth daily 30 tablet      ONDANSETRON PO Take 4 mg by mouth every 8 hours as needed for nausea       furosemide (LASIX) 20 MG tablet Take 1 tablet (20 mg) by mouth 2 times daily 30 tablet      ranitidine (RANITIDINE) 75 MG tablet Take 150 mg by mouth daily        diclofenac (VOLTAREN) 1 % GEL topical gel Apply 4 grams to knees four times daily as needed using enclosed dosing card. 100 g 0     insulin glargine (LANTUS) 100 UNIT/ML injection Inject 20 Units Subcutaneous every morning        omeprazole (PRILOSEC) 10 MG CR capsule Take 1 capsule (10 mg) by mouth daily       atorvastatin (LIPITOR) 10 MG tablet Take 10 mg by mouth At Bedtime        tiotropium (SPIRIVA) 18 MCG inhalation capsule Inhale 18 mcg into the lungs daily       acetaminophen (TYLENOL) 500 MG tablet Take 1,000 mg by mouth 3 times daily Not to exceed 3000 mg/24 hours        nitroglycerin (NITROSTAT) 0.4 MG SL tablet Place 0.4 mg under the tongue every 5 minutes as needed for chest pain if you are still having symptoms after 3 doses (15 minutes) call 911.       aspirin 81 MG EC tablet Take 1 tablet (81 mg) by mouth daily 5 tablet 0     metoprolol (TOPROL XL) 50 MG 24 hr tablet Take 1 tablet (50 mg) by mouth daily 5 tablet 0     tamsulosin (FLOMAX) 0.4 MG 24 hr capsule Take 1 capsule (0.4 mg) by mouth daily 5 capsule 0     ammonium lactate (LAC-HYDRIN) 12 % lotion Apply topically 2 times daily as needed for dry skin To all extremities for dry skin       [DISCONTINUED] enzalutamide (XTANDI) 40 MG capsule Take 4 capsules (160 mg) by mouth daily 120 capsule 0     ROS:  4 point  "ROS including Respiratory, CV, GI and , other than that noted in the HPI,  is negative    Exam:  /60  Pulse 98  Temp 97.2  F (36.2  C)  Resp 18  Wt 208 lb 3.2 oz (94.4 kg)  SpO2 96%  BMI 30.75 kg/m2  GENERAL APPEARANCE:Lethargic, in no distress  ENT:  Mouth and posterior oropharynx normal, moist mucous membranes  EYES:  EOM, conjunctivae, lids, pupils and irises normal  RESP:  Respiratory effort and palpation of chest normal, no respiratory distress, diminished lung sounds throughout  CV:  Palpation and auscultation of heart done, regular rate and rhythm, no murmur, rub, or gallop  ABDOMEN: Active bowel sounds, soft, nontender, no hepatosplenomegaly or other masses  M/S:   Active movement of bilateral upper and lower extremities. ACE wraps on BLE.  SKIN:  Inspection of skin and subcutaneous tissue baseline, Palpation of skin and subcutaneous tissue baseline  NEURO:   Cranial nerves 2-12 are normal tested and grossly at patient's baseline  PSYCH:  affect and mood normal    Lab/Diagnostic data:  Hgb 8.4 11/26/17    CBC / Diff (11/24/2017 7:14 AM)  Only the most recent of 2 results within the time period is included.    CBC / Diff (11/24/2017 7:14 AM)   Component Value Ref Range   WBC 9.9 4.3 - 10.8 K/uL   RBC 2.67 (L) 4.60 - 6.20 M/uL   HEMOGLOBIN 7.0 (L) 14.0 - 18.0 gm/dL   HEMATOCRIT 24.2 (L) 40.0 - 54.0 %   MCV 91 80 - 100 fl   MCH 26 (L) 27 - 33 pg   MCHC 29 (L) 33 - 36 gm/dL   RDW 20.2 (H) 11.5 - 14.5 %   PLATELET COUNT 369 150 - 400 K/uL   MPV 9.8 6.5 - 12.0   PMN % 89.3 %   IG% 1.6 (H)Comment: Immature granulocytes often indicate \"left shift\" when outside of normal limits. <=1.0 %   LYMPH % 6.2 %   MONO % 2.3 %   EOS % 0.3 %   BASO % 0.3 %   PMN ABSOLUTE 8.81 (H) 1.80 - 7.80 K/uL   IG ABSOLUTE 0.16 K/uL   LYMPH ABSOLUTE 0.61 (L) 1.00 - 4.00 K/uL   MONO ABSOLUTE 0.23 0.00 - 1.00 K/uL   EOS ABSOLUTE 0.03 0.00 - 0.45 K/uL   BASO ABSOLUTE 0.03 0.00 - 0.20 K/uL   NUCL RBC % 0.5 (H) 0.0 - 0.0 /100 WBC "   NUCL RBC ABSOLUTE 0.05 K/uL     AM BMP (11/26/2017 5:59 AM)  Only the most recent of 5 results within the time period is included.    AM BMP (11/26/2017 5:59 AM)   Component Value Ref Range   SODIUM 140 136 - 145 mmol/L   POTASSIUM 4.4 3.5 - 5.1 mmol/L   CHLORIDE 104 98 - 112 mmol/L   CARBON DIOXIDE 30 21 - 32 mmol/L   BUN (UREA NITRO) 31 (H) 7 - 24 mg/dL   CREATININE 1.59 (H) 0.70 - 1.30 mg/dL   EST GFR (MDRD) 43 (L) >60 mL/min   EST GFR IF AFRICAN AM 52 (L) >60 mL/min   GLUCOSE 91 74 - 106 mg/dL   CALCIUM, SERUM 9.2 8.5 - 10.1 mg/dL   ANION GAP 6.0 0.0 - 15.0 mmol/L     ASSESSMENT/PLAN:  Chronic Obstructive Pulmonary Disease on Chronic Oxygen Supplementation/Obstructive Sleep Apnea with CO2 Retention. Completed course of Prednisone 2 days outpatient. Seen by Dr. Loera in Pulmonology on 10/4/17. Noted to have very severe COPD and chronic hypoxemic respiratory failure. Often refuses BiPAP at night per staff report, but appears to have been agreeing to wear most recently. Sleep study outpatient recommended. Continue Budesonide-Formoterol, DuoNebs and Tiotropium as ordered.     Anemia. Received 1 U of PRBCs on 11/25/17. Of note, has also been transfused 11/1/17. Last Hemoglobin 8.2 on 11/27/17. Please see MD note from 11/17/17 for further details regarding etiology of anemia. Transfuse for Hemoglobin <8 per Oncology. Scheduled for blood transfusion 12/1/17. Will recheck Hemoglobin on 11/30/17 to determine if transfusion is indicated.      Prostate Cancer Metastatic to Bone. Followed by Dr. Oviedo with next appointment 12/4/17. Receives Xgeva infusions, which is next scheduled for 12/1/17, and Megace. Per Palliative MD's recommendations 11/16/17, Hydromorphone recently discontinued and Fentanyl Patch 12.5 mcg/hr initiated in addition to scheduled Acetaminophen 1000 mg PO TID on 11/21/17. Also on Diclofenac gel.     Weight Loss. Likely secondary to above and overall decline. Weight 208.2 lbs on 11/27/17 -> 220 lbs  on 8/28/17 -> 239 lbs on 5/25/17. Dietary involved.     Paroxysmal Atrial Fibrillation on Chronic Anticoagulation. INR 2.8 on 11/27/17. Continue Warfarin 1 mg on M,W,F and 2 mg all other days as ordered. Repeat INR on 11/30/17. Also on Metoprolol.      Coronary Artery Disease/Hypertension/Chronic Diastolic Heart Failure with EF 60-65% on 11/24/17. Monitor blood pressure and weights weekly. Continue Aspirin, Isosorbide Mononitrate, Metoprolol, Atorvastatin and Nitroglycerin as ordered.     Urinary Retention in the Setting of Metastatic Lymph Node Dissection with Recurrent UTIs. Follow-up with Dr. Epstein on 1/9/18 as scheduled due to recurrent UTIs. Treated for Providencia Rettgeri UTI 7/1/17 with Rocephin and Bactrim DS. Treated for Pseudomonas and Klebsiella UTI 7/29/17 with Ciprofloxacin and ESBL E. Coli 8/30/17 with Ertapenem IV. Previously evaluated by Dr. Graf on 3/22/17 for urinary retention. Elevated Creatinine may be due to bladder outlet obstruction. Resistant to surgical options. Instructed how to self-catheterize, but resident resistant. Told Urology his main goal is avoiding pain. Voids as able. Continue Tamsulosin as ordered.      Lymphedema of Both Lower Extremities with Venous Stasis Ulcers. Physical Therapy ordered for lymphedema wraps. Continue Furosemide as ordered.      Chronic Kidney Disease Stage III. Baseline Creatinine upper 1s. Last Creatinine 1.59 on 11/26/17. Monitor periodically.      Type 2 Diabetes Mellitus. Last A1C 6.0 on 11/18/17. Glargine decreased from 16 U BID to 20 U QD on 11/21/17. Continue to monitor Accuchecks prior to meals and at bedtime.       Gastroesophageal Reflux. Continue Omeprazole and Ranitidine as ordered.      Anxiety and Depression. Resident previously refused antidepressant and in-house therapist visits.    Cognitive Impairment. Mild-to-moderate impairment based on neurocognitive testing ~ 1 year ago. BIMS Score 11/15 on 11/16/17. Given resident lacks the  capacity to make decisions, discussed with  appointing a guardian for resident given lack of capacity to make decisions as goals are inconsistent with overall health status. Of note has 11 children and none of the children attend outside appointments with resident so a guardian will help ensure there is 1 appropriate decision maker. Resident also has metastatic prostate cancer and severe COPD making a FULL CODE situation quite dismal.     Glaucoma. Due for follow-up appointment at [facility]. Staff at facility to arrange appointment. Continue Bimatoprost and Timolol Hemihydrate as ordered. Also on Liquifilm Tears and Carboxymethylcellulose drops.     Electronically signed by:  SAL Acevedo CNP                      Sincerely,        SAL Acevedo CNP

## 2017-11-28 NOTE — PROGRESS NOTES
[facility]  PRIMARY CARE PROVIDER AND CLINIC:  Ekaterina Lozano Magi [address] / [place] [address]  Chief Complaint   Patient presents with     Hospital F/U     HPI:    Tomas Grey is a 72 year old  (1945), re-admitted to the [facility] from [facility]. Hospital stay 11/23/17 through 11/26/17. Re-admitted to this facility for  medical management and nursing care.  HPI information obtained from: facility chart records, facility staff, patient report and [facility] chart review.  Hospital course per review of discharge summary:    This is a 72-year-old male, with a past medical history significant for severe COPD on supplemental Oxygen, obstructive sleep apnea, prostate cancer metastatic to bone, type 2 diabetes mellitus, chronic kidney disease stage III, chronic diastolic heart failure with an EF 55-60% on 4/28/15, coronary artery disease, hypertension, paroxsymal atrial fibrillation and lymphedema, who was admitted to [facility] for worsening shortness of breath. Of note, has had 9 ED visits and 3 hospitalizations in the past 6 months at [facility] and [facility] primarily for shortness of breath, chest pain and abdominal pain. During this hospitalization, respiratory status improved with BiPAP, Prednisone and Doxycycline. Furosemide was adjusted due to slight elevation in BNP. Echocardiogram revealed EF 60-65% on 11/24/17. Received blood transfusion on 11/25/17 for Hemoglobin 6.5. Discharged back to long-term care.     Current issues are:        Leaning forward and resting on front of scooter. Reports he is tired. Stated he was in the hospital for a longer duration than he actually was. Denies shortness of breath. Feels pain is better controlled on the Fentanyl Patch.    CODE STATUS/ADVANCE DIRECTIVES DISCUSSION:   CPR/Full code   Patient's living condition: lives in a skilled nursing  facility    ALLERGIES:Morphine  PAST MEDICAL HISTORY:  has a past medical history of Anemia; Anxiety; Aspiration pneumonia (H) (2014); C. difficile colitis; CAD (coronary artery disease); Chronic pain; CKD (chronic kidney disease); COPD (chronic obstructive pulmonary disease) (H); Depressive disorder; Diabetes mellitus (H); Hypertension; Insomnia; ALEXIS (obstructive sleep apnea); Osteoporosis; and Prostate cancer metastatic to multiple sites (H).  PAST SURGICAL HISTORY:  has a past surgical history that includes Abdomen surgery; Arthroscopy knee; and Eye surgery.  FAMILY HISTORY: family history includes CANCER in his mother; DIABETES in his mother.  SOCIAL HISTORY:  reports that he has quit smoking. He has never used smokeless tobacco. He reports that he does not drink alcohol or use illicit drugs.    Post Discharge Medication Reconciliation Status: discharge medications reconciled and changed, per note/orders (see AVS).  Current Outpatient Prescriptions   Medication Sig Dispense Refill     ipratropium - albuterol 0.5 mg/2.5 mg/3 mL (DUONEB) 0.5-2.5 (3) MG/3ML neb solution Take 1 vial by nebulization every 4 hours as needed for shortness of breath / dyspnea or wheezing       polyvinyl alcohol (LIQUIFILM TEARS) 1.4 % ophthalmic solution Place 1 drop into both eyes 3 times daily       Carboxymethylcellulose Sodium (CARBOXYMETHYLCELLULOSE SOD PF OP) Place 1 drop into both eyes 4 times daily       fentaNYL (DURAGESIC) 12 mcg/hr 72 hr patch Place 1 patch onto the skin every 72 hours       MEGESTROL ACETATE PO Take 80 mg by mouth 2 times daily       senna-docusate (SENOKOT-S;PERICOLACE) 8.6-50 MG per tablet Take 2 tablets by mouth 2 times daily       budesonide-formoterol (SYMBICORT) 160-4.5 MCG/ACT Inhaler Inhale 2 puffs into the lungs 2 times daily       Timolol Hemihydrate (BETIMOL OP) Place 1 drop into both eyes every morning        bimatoprost (LUMIGAN) 0.01 % SOLN Place 1 drop into both eyes At Bedtime       WARFARIN  SODIUM PO Take by mouth daily See facility orders for INR dosing       calcium carbonate (OS-JORDAN 600 MG [place]. CA) 1500 (600 CA) MG tablet Take 1,500 mg by mouth 2 times daily (with meals)       isosorbide mononitrate (IMDUR) 30 MG 24 hr tablet Take 2 tablets (60 mg) by mouth daily 30 tablet      ONDANSETRON PO Take 4 mg by mouth every 8 hours as needed for nausea       furosemide (LASIX) 20 MG tablet Take 1 tablet (20 mg) by mouth 2 times daily 30 tablet      ranitidine (RANITIDINE) 75 MG tablet Take 150 mg by mouth daily        diclofenac (VOLTAREN) 1 % GEL topical gel Apply 4 grams to knees four times daily as needed using enclosed dosing card. 100 g 0     insulin glargine (LANTUS) 100 UNIT/ML injection Inject 20 Units Subcutaneous every morning        omeprazole (PRILOSEC) 10 MG CR capsule Take 1 capsule (10 mg) by mouth daily       atorvastatin (LIPITOR) 10 MG tablet Take 10 mg by mouth At Bedtime        tiotropium (SPIRIVA) 18 MCG inhalation capsule Inhale 18 mcg into the lungs daily       acetaminophen (TYLENOL) 500 MG tablet Take 1,000 mg by mouth 3 times daily Not to exceed 3000 mg/24 hours        nitroglycerin (NITROSTAT) 0.4 MG SL tablet Place 0.4 mg under the tongue every 5 minutes as needed for chest pain if you are still having symptoms after 3 doses (15 minutes) call 911.       aspirin 81 MG EC tablet Take 1 tablet (81 mg) by mouth daily 5 tablet 0     metoprolol (TOPROL XL) 50 MG 24 hr tablet Take 1 tablet (50 mg) by mouth daily 5 tablet 0     tamsulosin (FLOMAX) 0.4 MG 24 hr capsule Take 1 capsule (0.4 mg) by mouth daily 5 capsule 0     ammonium lactate (LAC-HYDRIN) 12 % lotion Apply topically 2 times daily as needed for dry skin To all extremities for dry skin       [DISCONTINUED] enzalutamide (XTANDI) 40 MG capsule Take 4 capsules (160 mg) by mouth daily 120 capsule 0     ROS:  4 point ROS including Respiratory, CV, GI and , other than that noted in the HPI,  is negative    Exam:  /60   "Pulse 98  Temp 97.2  F (36.2  C)  Resp 18  Wt 208 lb 3.2 oz (94.4 kg)  SpO2 96%  BMI 30.75 kg/m2  GENERAL APPEARANCE:Lethargic, in no distress  ENT:  Mouth and posterior oropharynx normal, moist mucous membranes  EYES:  EOM, conjunctivae, lids, pupils and irises normal  RESP:  Respiratory effort and palpation of chest normal, no respiratory distress, diminished lung sounds throughout  CV:  Palpation and auscultation of heart done, regular rate and rhythm, no murmur, rub, or gallop  ABDOMEN: Active bowel sounds, soft, nontender, no hepatosplenomegaly or other masses  M/S:   Active movement of bilateral upper and lower extremities. ACE wraps on BLE.  SKIN:  Inspection of skin and subcutaneous tissue baseline, Palpation of skin and subcutaneous tissue baseline  NEURO:   Cranial nerves 2-12 are normal tested and grossly at patient's baseline  PSYCH:  affect and mood normal    Lab/Diagnostic data:  Hgb 8.4 11/26/17    CBC / Diff (11/24/2017 7:14 AM)  Only the most recent of 2 results within the time period is included.    CBC / Diff (11/24/2017 7:14 AM)   Component Value Ref Range   WBC 9.9 4.3 - 10.8 K/uL   RBC 2.67 (L) 4.60 - 6.20 M/uL   HEMOGLOBIN 7.0 (L) 14.0 - 18.0 gm/dL   HEMATOCRIT 24.2 (L) 40.0 - 54.0 %   MCV 91 80 - 100 fl   MCH 26 (L) 27 - 33 pg   MCHC 29 (L) 33 - 36 gm/dL   RDW 20.2 (H) 11.5 - 14.5 %   PLATELET COUNT 369 150 - 400 K/uL   MPV 9.8 6.5 - 12.0   PMN % 89.3 %   IG% 1.6 (H)Comment: Immature granulocytes often indicate \"left shift\" when outside of normal limits. <=1.0 %   LYMPH % 6.2 %   MONO % 2.3 %   EOS % 0.3 %   BASO % 0.3 %   PMN ABSOLUTE 8.81 (H) 1.80 - 7.80 K/uL   IG ABSOLUTE 0.16 K/uL   LYMPH ABSOLUTE 0.61 (L) 1.00 - 4.00 K/uL   MONO ABSOLUTE 0.23 0.00 - 1.00 K/uL   EOS ABSOLUTE 0.03 0.00 - 0.45 K/uL   BASO ABSOLUTE 0.03 0.00 - 0.20 K/uL   NUCL RBC % 0.5 (H) 0.0 - 0.0 /100 WBC   NUCL RBC ABSOLUTE 0.05 K/uL     AM [facility] (11/26/2017 5:59 AM)  Only the most recent of 5 results within the time " period is included.    AM BMP (11/26/2017 5:59 AM)   Component Value Ref Range   SODIUM 140 136 - 145 mmol/L   POTASSIUM 4.4 3.5 - 5.1 mmol/L   CHLORIDE 104 98 - 112 mmol/L   CARBON DIOXIDE 30 21 - 32 mmol/L   BUN (UREA NITRO) 31 (H) 7 - 24 mg/dL   CREATININE 1.59 (H) 0.70 - 1.30 mg/dL   EST GFR (MDRD) 43 (L) >60 mL/min   EST GFR IF AFRICAN AM 52 (L) >60 mL/min   GLUCOSE 91 74 - 106 mg/dL   CALCIUM, SERUM 9.2 8.5 - 10.1 mg/dL   ANION GAP 6.0 0.0 - 15.0 mmol/L     ASSESSMENT/PLAN:  Chronic Obstructive Pulmonary Disease on Chronic Oxygen Supplementation/Obstructive Sleep Apnea with CO2 Retention. Completed course of Prednisone 2 days outpatient. Seen by Dr. Loera in Pulmonology on 10/4/17. Noted to have very severe COPD and chronic hypoxemic respiratory failure. Often refuses BiPAP at night per staff report, but appears to have been agreeing to wear most recently. Sleep study outpatient recommended. Continue Budesonide-Formoterol, DuoNebs and Tiotropium as ordered.     Anemia. Received 1 U of PRBCs on 11/25/17. Of note, has also been transfused 11/1/17. Last Hemoglobin 8.2 on 11/27/17. Please see MD note from 11/17/17 for further details regarding etiology of anemia. Transfuse for Hemoglobin <8 per Oncology. Scheduled for blood transfusion 12/1/17. Will recheck Hemoglobin on 11/30/17 to determine if transfusion is indicated.      Prostate Cancer Metastatic to Bone. Followed by Dr. Oviedo with next appointment 12/4/17. Receives Xgeva infusions, which is next scheduled for 12/1/17, and Megace. Per Palliative MD's recommendations 11/16/17, Hydromorphone recently discontinued and Fentanyl Patch 12.5 mcg/hr initiated in addition to scheduled Acetaminophen 1000 mg PO TID on 11/21/17. Also on Diclofenac gel.     Weight Loss. Likely secondary to above and overall decline. Weight 208.2 lbs on 11/27/17 -> 220 lbs on 8/28/17 -> 239 lbs on 5/25/17 -> 256.2 lbs on 11/25/16. Dietary involved.     Paroxysmal Atrial Fibrillation  on Chronic Anticoagulation. INR 2.8 on 11/27/17. Continue Warfarin 1 mg on M,W,F and 2 mg all other days as ordered. Repeat INR on 11/30/17. Also on Metoprolol.      Coronary Artery Disease/Hypertension/Chronic Diastolic Heart Failure with EF 60-65% on 11/24/17. Monitor blood pressure and weights weekly. Continue Aspirin, Isosorbide Mononitrate, Metoprolol, Atorvastatin and Nitroglycerin as ordered.     Urinary Retention in the Setting of Metastatic Lymph Node Dissection with Recurrent UTIs. Follow-up with Dr. Epstein on 1/9/18 as scheduled due to recurrent UTIs. Treated for Providencia Rettgeri UTI 7/1/17 with Rocephin and Bactrim DS. Treated for Pseudomonas and Klebsiella UTI 7/29/17 with Ciprofloxacin and ESBL E. Coli 8/30/17 with Ertapenem IV. Previously evaluated by Dr. Graf on 3/22/17 for urinary retention. Elevated Creatinine may be due to bladder outlet obstruction. Resistant to surgical options. Instructed how to self-catheterize, but resident resistant. Told Urology his main goal is avoiding pain. Voids as able. Continue Tamsulosin as ordered.      Lymphedema of Both Lower Extremities with Venous Stasis Ulcers. Physical Therapy ordered for lymphedema wraps. Continue Furosemide as ordered.      Chronic Kidney Disease Stage III. Baseline Creatinine upper 1s. Last Creatinine 1.59 on 11/26/17. Monitor periodically.      Type 2 Diabetes Mellitus. Last A1C 6.0 on 11/18/17. Glargine decreased from 16 U BID to 20 U QD on 11/21/17. Continue to monitor Accuchecks prior to meals and at bedtime.       Gastroesophageal Reflux. Continue Omeprazole and Ranitidine as ordered.      Anxiety and Depression. Resident previously refused antidepressant and in-house therapist visits.    Cognitive Impairment. Mild-to-moderate impairment based on neurocognitive testing ~ 1 year ago. BIMS Score 11/15 on 11/16/17. Given resident lacks the capacity to make decisions, discussed with  appointing a guardian for resident  given lack of capacity to make decisions as goals are inconsistent with overall health status. Of note has 11 children and none of the children attend outside appointments with resident so a guardian will help ensure there is 1 appropriate decision maker. Resident also has metastatic prostate cancer and severe COPD making a FULL CODE situation quite dismal.     Glaucoma. Due for follow-up appointment at [facility]. Staff at facility to arrange appointment. Continue Bimatoprost and Timolol Hemihydrate as ordered. Also on Liquifilm Tears and Carboxymethylcellulose drops.     Electronically signed by:  SAL Acevedo CNP

## 2017-11-30 NOTE — TELEPHONE ENCOUNTER
"Called patient's facility and spoke with ANP, Ekaterina Lozano.  Requested lab results be sent to clinic for MD.  Verified labs were being drawn twice a week, ANP reported patient had been in the hospital and labs were being drawn frequently during the hospitalization.  Verified patient's appt on 12/1/17 and 12/4/17.  ANP inquired if family had been contacted for appt on 12/4/17 for poss \"Comfort Cares\" discussion.  Sent message to MD and will f/u once response received.  Ekaterina will contact clinic with any additional questions or concerns.  Gage Yang, RN, BSN, OCN  [facility]  Patient Care Coordinator    "

## 2017-11-30 NOTE — LETTER
11/30/2017        RE: Tomas Grey  [facility]  [address]  [facility] [address]        [facility] [facility]    Chief Complaint   Patient presents with     RECHECK     HPI:    Tomas Grey is a 72 year old  (1945), who is being seen today for an episodic care visit at [facility].  HPI information obtained from: facility chart records, facility staff, patient report and [facility] chart review. Today's concern is:    Paroxysmal Atrial Fibrillation. INR 2.6 on 11/30/17. Previous INR 2.8 on 11/27/17. Taking Warfarin 1 mg on M, W, F and 2 mg all other days.    Neck Pain. Complains of neck pain. Started within the last couple of hours. Describes as aching. Received a massage from therapy earlier today and this relieved the pain. Wonders what can be taken for pain. Doesn't remember a pain patch being placed on his shoulder today. No reports or numbness or tingling. Of note, prior to visit, resident was sleeping in his scooter with his head tipped to his side.    Anemia. Received 1 U of PRBCs on 11/25/17 during recent hospitalization. Hemoglobin 8.2 on 11/27/17 so 11/28/17 blood transfusion which had been previously rescheduled was cancelled. Instructed staff to repeat Hemoglobin 11/30/17 to ensure scheduled blood transfusion for 12/1/17 was indicated, but lab was not drawn. Per patient, no report of dizziness or increased fatigue.    ALLERGIES: Morphine  Past Medical, Surgical, Family and Social History reviewed and updated in [facility].    Current Outpatient Prescriptions   Medication Sig Dispense Refill     ipratropium - albuterol 0.5 mg/2.5 mg/3 mL (DUONEB) 0.5-2.5 (3) MG/3ML neb solution Take 1 vial by nebulization every 4 hours as needed for shortness of breath / dyspnea or wheezing       polyvinyl alcohol (LIQUIFILM TEARS) 1.4 % ophthalmic solution Place 1 drop into both eyes 3 times daily       Carboxymethylcellulose Sodium  (CARBOXYMETHYLCELLULOSE SOD PF OP) Place 1 drop into both eyes 4 times daily And 1 drop both eyes daily prn       fentaNYL (DURAGESIC) 12 mcg/hr 72 hr patch Place 1 patch onto the skin every 72 hours       MEGESTROL ACETATE PO Take 80 mg by mouth 2 times daily       senna-docusate (SENOKOT-S;PERICOLACE) 8.6-50 MG per tablet Take 2 tablets by mouth 2 times daily       budesonide-formoterol (SYMBICORT) 160-4.5 MCG/ACT Inhaler Inhale 2 puffs into the lungs 2 times daily       Timolol Hemihydrate (BETIMOL OP) Place 1 drop into both eyes every morning        bimatoprost (LUMIGAN) 0.01 % SOLN Place 1 drop into both eyes At Bedtime       WARFARIN SODIUM PO Take by mouth daily See facility orders for INR dosing       calcium carbonate (OS-JORDAN 600 MG [place]. CA) 1500 (600 CA) MG tablet Take 1,500 mg by mouth 2 times daily (with meals)       isosorbide mononitrate (IMDUR) 30 MG 24 hr tablet Take 2 tablets (60 mg) by mouth daily 30 tablet      ONDANSETRON PO Take 4 mg by mouth every 8 hours as needed for nausea       furosemide (LASIX) 20 MG tablet Take 1 tablet (20 mg) by mouth 2 times daily 30 tablet      ranitidine (RANITIDINE) 75 MG tablet Take 150 mg by mouth daily        diclofenac (VOLTAREN) 1 % GEL topical gel Apply 4 grams to knees four times daily as needed using enclosed dosing card. 100 g 0     insulin glargine (LANTUS) 100 UNIT/ML injection Inject 20 Units Subcutaneous every morning        omeprazole (PRILOSEC) 10 MG CR capsule Take 1 capsule (10 mg) by mouth daily       atorvastatin (LIPITOR) 10 MG tablet Take 10 mg by mouth At Bedtime        tiotropium (SPIRIVA) 18 MCG inhalation capsule Inhale 18 mcg into the lungs daily       acetaminophen (TYLENOL) 500 MG tablet Take 1,000 mg by mouth 3 times daily Not to exceed 3000 mg/24 hours        nitroglycerin (NITROSTAT) 0.4 MG SL tablet Place 0.4 mg under the tongue every 5 minutes as needed for chest pain if you are still having symptoms after 3 doses (15 minutes) call  [phone].       aspirin 81 MG EC tablet Take 1 tablet (81 mg) by mouth daily 5 tablet 0     metoprolol (TOPROL XL) 50 MG 24 hr tablet Take 1 tablet (50 mg) by mouth daily 5 tablet 0     tamsulosin (FLOMAX) 0.4 MG 24 hr capsule Take 1 capsule (0.4 mg) by mouth daily 5 capsule 0     ammonium lactate (LAC-HYDRIN) 12 % lotion Apply topically 2 times daily as needed for dry skin To all extremities for dry skin       [DISCONTINUED] enzalutamide (XTANDI) 40 MG capsule Take 4 capsules (160 mg) by mouth daily 120 capsule 0     Medications reviewed:  Medications reconciled to facility chart and changes were made to reflect current medications as identified as above med list. Below are the changes that were made:   Medications stopped since last EPIC medication reconciliation:   There are no discontinued medications.    Medications started since last [facility] medication reconciliation:  No orders of the defined types were placed in this encounter.    REVIEW OF SYSTEMS:  4 point ROS including Respiratory, CV, GI and , other than that noted in the HPI,  is negative    Physical Exam:  /60  Pulse 99  Temp 97.2  F (36.2  C)  Resp 18  Wt 208 lb 4.8 oz (94.5 kg)  SpO2 95%  BMI 30.76 kg/m2  GENERAL APPEARANCE:Lethargic, in no distress  ENT:  Mouth and posterior oropharynx normal, moist mucous membranes  EYES:  EOM, conjunctivae, lids, pupils and irises normal  RESP:  No respiratory distress  M/S:   Active movement of bilateral upper and lower extremities. ACE wraps on BLE.  SKIN:  Inspection of skin and subcutaneous tissue baseline, Palpation of skin and subcutaneous tissue baseline  NEURO:   Cranial nerves 2-12 are normal tested and grossly at patient's baseline  PSYCH:  affect and mood normal    Recent Labs:    Last Basic Metabolic Panel:  Lab Results   Component Value Date     11/10/2017      Lab Results   Component Value Date    POTASSIUM 4.8 11/10/2017     Lab Results   Component Value Date    CHLORIDE 111 11/10/2017      Lab Results   Component Value Date    JORDAN 8.3 11/10/2017     Lab Results   Component Value Date    CO2 25 11/10/2017     Lab Results   Component Value Date    BUN 28 11/10/2017     Lab Results   Component Value Date    CR 1.42 11/10/2017     Lab Results   Component Value Date     11/10/2017     Lab Results   Component Value Date    WBC 7.8 11/10/2017     Lab Results   Component Value Date    RBC 2.74 11/10/2017     Lab Results   Component Value Date    HGB 7.3 11/10/2017     Lab Results   Component Value Date    HCT 25.4 11/10/2017     Lab Results   Component Value Date    MCV 93 11/10/2017     Lab Results   Component Value Date    MCH 27 11/10/2017     Lab Results   Component Value Date    MCHC 29 11/10/2017     Lab Results   Component Value Date    RDW 20.3 11/10/2017     Lab Results   Component Value Date     11/10/2017     Assessment/Plan:  Paroxysmal Atrial Fibrillation on Anticoagulation. INR therapeutic. Continue Warfarin 1 mg on M,W,F and 2 mg on all other days. Repeat INR on 12/7/17.     Neck Pain. Likely secondary to bone mets. No neurological symptoms. Was sleeping with neck tipped to the side. Recommended sleeping in recliner for more neck support or adjusting pillows. Discussed with staff and was told resident's Fentanyl patch is in place. Also on scheduled Acetaminophen. May consider heating pad if pain persists.     Anemia. Recent baseline Hemoglobin 7-8. Has required blood transfusions 11/25/17, 11/1/17 & 11/2/17 in the past month. Unfortunately a repeat Hemoglobin was not drawn today, but given Hemoglobin 8.2 on 11/27/17, will keep appointment for 1 U of PRBCs on 12/1/17 as goal is to transfuse at <8. Discussed with Oncology RN and verified with staff at facility transportation was set-up for transfusions.     Electronically signed by  SAL Acevedo CNP                          Sincerely,        SAL Acevedo CNP

## 2017-11-30 NOTE — PROGRESS NOTES
[place] [facility]    Chief Complaint   Patient presents with     RECHECK     HPI:    Tomas Grey is a 72 year old  (1945), who is being seen today for an episodic care visit at [facility].  HPI information obtained from: facility chart records, facility staff, patient report and [facility] chart review. Today's concern is:    Paroxysmal Atrial Fibrillation. INR 2.6 on 11/30/17. Previous INR 2.8 on 11/27/17. Taking Warfarin 1 mg on M, W, F and 2 mg all other days.    Neck Pain. Complains of neck pain. Started within the last couple of hours. Describes as aching. Received a massage from therapy earlier today and this relieved the pain. Wonders what can be taken for pain. Doesn't remember a pain patch being placed on his shoulder today. No reports or numbness or tingling. Of note, prior to visit, resident was sleeping in his scooter with his head tipped to his side.    Anemia. Received 1 U of PRBCs on 11/25/17 during recent hospitalization. Hemoglobin 8.2 on 11/27/17 so 11/28/17 blood transfusion which had been previously rescheduled was cancelled. Instructed staff to repeat Hemoglobin 11/30/17 to ensure scheduled blood transfusion for 12/1/17 was indicated, but lab was not drawn. Per patient, no report of dizziness or increased fatigue.    ALLERGIES: Morphine  Past Medical, Surgical, Family and Social History reviewed and updated in [facility].    Current Outpatient Prescriptions   Medication Sig Dispense Refill     ipratropium - albuterol 0.5 mg/2.5 mg/3 mL (DUONEB) 0.5-2.5 (3) MG/3ML neb solution Take 1 vial by nebulization every 4 hours as needed for shortness of breath / dyspnea or wheezing       polyvinyl alcohol (LIQUIFILM TEARS) 1.4 % ophthalmic solution Place 1 drop into both eyes 3 times daily       Carboxymethylcellulose Sodium (CARBOXYMETHYLCELLULOSE SOD PF OP) Place 1 drop into both eyes 4 times daily And 1 drop both eyes daily prn       fentaNYL (DURAGESIC) 12 mcg/hr  72 hr patch Place 1 patch onto the skin every 72 hours       MEGESTROL ACETATE PO Take 80 mg by mouth 2 times daily       senna-docusate (SENOKOT-S;PERICOLACE) 8.6-50 MG per tablet Take 2 tablets by mouth 2 times daily       budesonide-formoterol (SYMBICORT) 160-4.5 MCG/ACT Inhaler Inhale 2 puffs into the lungs 2 times daily       Timolol Hemihydrate (BETIMOL OP) Place 1 drop into both eyes every morning        bimatoprost (LUMIGAN) 0.01 % SOLN Place 1 drop into both eyes At Bedtime       WARFARIN SODIUM PO Take by mouth daily See facility orders for INR dosing       calcium carbonate (OS-JORDAN 600 MG [place]. CA) 1500 (600 CA) MG tablet Take 1,500 mg by mouth 2 times daily (with meals)       isosorbide mononitrate (IMDUR) 30 MG 24 hr tablet Take 2 tablets (60 mg) by mouth daily 30 tablet      ONDANSETRON PO Take 4 mg by mouth every 8 hours as needed for nausea       furosemide (LASIX) 20 MG tablet Take 1 tablet (20 mg) by mouth 2 times daily 30 tablet      ranitidine (RANITIDINE) 75 MG tablet Take 150 mg by mouth daily        diclofenac (VOLTAREN) 1 % GEL topical gel Apply 4 grams to knees four times daily as needed using enclosed dosing card. 100 g 0     insulin glargine (LANTUS) 100 UNIT/ML injection Inject 20 Units Subcutaneous every morning        omeprazole (PRILOSEC) 10 MG CR capsule Take 1 capsule (10 mg) by mouth daily       atorvastatin (LIPITOR) 10 MG tablet Take 10 mg by mouth At Bedtime        tiotropium (SPIRIVA) 18 MCG inhalation capsule Inhale 18 mcg into the lungs daily       acetaminophen (TYLENOL) 500 MG tablet Take 1,000 mg by mouth 3 times daily Not to exceed 3000 mg/24 hours        nitroglycerin (NITROSTAT) 0.4 MG SL tablet Place 0.4 mg under the tongue every 5 minutes as needed for chest pain if you are still having symptoms after 3 doses (15 minutes) call 911.       aspirin 81 MG EC tablet Take 1 tablet (81 mg) by mouth daily 5 tablet 0     metoprolol (TOPROL XL) 50 MG 24 hr tablet Take 1 tablet  (50 mg) by mouth daily 5 tablet 0     tamsulosin (FLOMAX) 0.4 MG 24 hr capsule Take 1 capsule (0.4 mg) by mouth daily 5 capsule 0     ammonium lactate (LAC-HYDRIN) 12 % lotion Apply topically 2 times daily as needed for dry skin To all extremities for dry skin       [DISCONTINUED] enzalutamide (XTANDI) 40 MG capsule Take 4 capsules (160 mg) by mouth daily 120 capsule 0     Medications reviewed:  Medications reconciled to facility chart and changes were made to reflect current medications as identified as above med list. Below are the changes that were made:   Medications stopped since last EPIC medication reconciliation:   There are no discontinued medications.    Medications started since last [facility] medication reconciliation:  No orders of the defined types were placed in this encounter.    REVIEW OF SYSTEMS:  4 point ROS including Respiratory, CV, GI and , other than that noted in the HPI,  is negative    Physical Exam:  /60  Pulse 99  Temp 97.2  F (36.2  C)  Resp 18  Wt 208 lb 4.8 oz (94.5 kg)  SpO2 95%  BMI 30.76 kg/m2  GENERAL APPEARANCE:Lethargic, in no distress  ENT:  Mouth and posterior oropharynx normal, moist mucous membranes  EYES:  EOM, conjunctivae, lids, pupils and irises normal  RESP:  No respiratory distress  M/S:   Active movement of bilateral upper and lower extremities. ACE wraps on BLE.  SKIN:  Inspection of skin and subcutaneous tissue baseline, Palpation of skin and subcutaneous tissue baseline  NEURO:   Cranial nerves 2-12 are normal tested and grossly at patient's baseline  PSYCH:  affect and mood normal    Recent Labs:    Last Basic Metabolic Panel:  Lab Results   Component Value Date     11/10/2017      Lab Results   Component Value Date    POTASSIUM 4.8 11/10/2017     Lab Results   Component Value Date    CHLORIDE 111 11/10/2017     Lab Results   Component Value Date    JORDAN 8.3 11/10/2017     Lab Results   Component Value Date    CO2 25 11/10/2017     Lab Results    Component Value Date    BUN 28 11/10/2017     Lab Results   Component Value Date    CR 1.42 11/10/2017     Lab Results   Component Value Date     11/10/2017     Lab Results   Component Value Date    WBC 7.8 11/10/2017     Lab Results   Component Value Date    RBC 2.74 11/10/2017     Lab Results   Component Value Date    HGB 7.3 11/10/2017     Lab Results   Component Value Date    HCT 25.4 11/10/2017     Lab Results   Component Value Date    MCV 93 11/10/2017     Lab Results   Component Value Date    MCH 27 11/10/2017     Lab Results   Component Value Date    MCHC 29 11/10/2017     Lab Results   Component Value Date    RDW 20.3 11/10/2017     Lab Results   Component Value Date     11/10/2017     Assessment/Plan:  Paroxysmal Atrial Fibrillation on Anticoagulation. INR therapeutic. Continue Warfarin 1 mg on M,W,F and 2 mg on all other days. Repeat INR on 12/7/17.     Neck Pain. Likely secondary to bone mets. No neurological symptoms. Was sleeping with neck tipped to the side. Recommended sleeping in recliner for more neck support or adjusting pillows. Discussed with staff and was told resident's Fentanyl patch is in place. Also on scheduled Acetaminophen. May consider heating pad if pain persists.     Anemia. Recent baseline Hemoglobin 7-8. Has required blood transfusions 11/25/17, 11/1/17 & 11/2/17 in the past month. Unfortunately a repeat Hemoglobin was not drawn today, but given Hemoglobin 8.2 on 11/27/17, will keep appointment for 1 U of PRBCs on 12/1/17 as goal is to transfuse at <8. Discussed with Oncology RN and verified with staff at facility transportation was set-up for transfusions.     Electronically signed by  SAL Acevedo CNP

## 2017-12-01 NOTE — PROGRESS NOTES
Infusion Nursing Note:  Tomas Grey presents today for 1 unit prbc, xgeva.    Patient seen by provider today: No   present during visit today: Not Applicable.    Note: complaining about pain in neck, which was noted in notes from CNP 11/30.  Says he's uncomfortable. Arrives with O2 on @ 2LPM.  BP 88/41, typically not this low.    Intravenous Access:  Lab draw site R lower arm, Needle type piv, Gauge 22.  Labs drawn without difficulty.  Peripheral IV placed.    Treatment Conditions:  Lab Results   Component Value Date    HGB 7.6 12/01/2017     Lab Results   Component Value Date    WBC 11.8 12/01/2017      Lab Results   Component Value Date    ANEU 8.8 12/01/2017     Lab Results   Component Value Date     12/01/2017    calcium:  8.2    Results reviewed, labs MET treatment parameters, ok to proceed with treatment.  Blood transfusion consent signed 8/29/17      Post Infusion Assessment:  Site patent and intact, free from redness, edema or discomfort.  No evidence of extravasations.  Access discontinued per protocol.    Discharge Plan:   Discharge instructions reviewed with: Patient.   Copy of AVS reviewed with patient . To be given to staff when he arrives back at [facility].    Patient will return 12/4 for next appointment with Dr Oviedo  Patient discharged in stable condition accompanied by: self.  Departure Mode: motorized cart, TCT transportation ([phone]).    Alicia Tiwari RN

## 2017-12-01 NOTE — MR AVS SNAPSHOT
After Visit Summary   12/1/2017    Tomas Grey    MRN: [id number]           Patient Information     Date Of Birth          1945        Visit Information        Provider Department      12/1/2017 10:00 AM [facility] INFUSION CHAIR [facility]        Today's Diagnoses     Chronic obstructive pulmonary disease with severity to be determined (H)    -  1    Anemia in neoplastic disease        Prostate cancer metastatic to multiple sites (H)        Metastasis to bone (H)           Follow-ups after your visit        Your next 10 appointments already scheduled     Dec 04, 2017 10:30 AM CST   Return Visit with Manpreet Oviedo MD   [facility] ([facility])    [facility]  [address]  [address]  [facility] [address] [phone]            Jan 09, 2018  2:00 PM CST   (Arrive by 1:45 PM)   New Patient Visit with JASON Macedo   [facility] ([facility] [employer])    [address] [place] [address]  4th [facility] [id number] [phone]              Future tests that were ordered for you today     Open Standing Orders        Priority Remaining Interval Expires Ordered    Transfuse red blood cell unit Routine 99/100 TRANSFUSE 1 UNIT  12/1/2017    Red blood cell prepare order unit Routine 99/100 CONDITIONAL (SPECIFY) BLOOD  12/1/2017            Who to contact     If you have questions or need follow up information about today's clinic visit or your schedule please contact [facility] directly at [phone].  Normal or non-critical lab and imaging results will be communicated to you by MyChart, letter or phone within 4 business days after the clinic has received the results. If you do not hear from us within 7 days, please contact the clinic through MyChart or phone. If you have a critical or abnormal lab  "result, we will notify you by phone as soon as possible.  Submit refill requests through [employer] or call your pharmacy and they will forward the refill request to us. Please allow 3 business days for your refill to be completed.          Additional Information About Your Visit        [employer] Information     [employer] lets you send messages to your doctor, view your test results, renew your prescriptions, schedule appointments and more. To sign up, go to [email or web address] . Click on \"Log in\" on the left side of the screen, which will take you to the Welcome page. Then click on \"Sign up Now\" on the right side of the page.     You will be asked to enter the access code listed below, as well as some personal information. Please follow the directions to create your username and password.     Your access code is: [email or web address]  Expires: 2017 11:54 AM     Your access code will  in 90 days. If you need help or a new code, please call your [facility] clinic or [phone].        Care EveryWhere ID     This is your Care EveryWhere ID. This could be used by other organizations to access your [facility] medical records  [id number]        Your Vitals Were     Pulse Temperature Respirations Pulse Oximetry          97 97.7  F (36.5  C) (Oral) 18 97%         Blood Pressure from Last 3 Encounters:   17 105/48   17 111/60   17 111/60    Weight from Last 3 Encounters:   17 94.5 kg (208 lb 4.8 oz)   17 94.4 kg (208 lb 3.2 oz)   17 94.5 kg (208 lb 4.8 oz)              We Performed the Following     ABO/Rh type and screen     Blood component     Calcium     CBC with platelets differential     Transfuse red blood cell unit        Primary Care Provider Office Phone # Fax #    SAL Acevedo [phone]       [address]  [place] [address]        Equal Access to Services     PATRICIA BAUM : Hadii shima Mcclelland, sheliada luqadalani, qaybta " jorje phillipschelsea prather ah. So [facility] [phone].    ATENCIÓN: Si yoav otoole, tiene a soria disposición servicios gratuitos de asistencia lingüística. Matt al [phone].    We comply with applicable federal civil rights laws and [place] laws. We do not discriminate on the basis of race, color, national origin, age, disability, sex, sexual orientation, or gender identity.            Thank you!     Thank you for choosing [facility]  for your care. Our goal is always to provide you with excellent care. Hearing back from our patients is one way we can continue to improve our services. Please take a few minutes to complete the written survey that you may receive in the mail after your visit with us. Thank you!             Your Updated Medication List - Protect others around you: Learn how to safely use, store and throw away your medicines at www.disposemymeds.org.          This list is accurate as of: 12/1/17  1:13 PM.  Always use your most recent med list.                   Brand Name Dispense Instructions for use Diagnosis    acetaminophen 500 MG tablet    TYLENOL     Take 1,000 mg by mouth 3 times daily Not to exceed 3000 mg/24 hours        ammonium lactate 12 % lotion    LAC-HYDRIN     Apply topically 2 times daily as needed for dry skin To all extremities for dry skin        aspirin 81 MG EC tablet     5 tablet    Take 1 tablet (81 mg) by mouth daily    ASCVD (arteriosclerotic cardiovascular disease)       BETIMOL OP      Place 1 drop into both eyes every morning        bimatoprost 0.01 % Soln    LUMIGAN     Place 1 drop into both eyes At Bedtime        budesonide-formoterol 160-4.5 MCG/ACT Inhaler    SYMBICORT     Inhale 2 puffs into the lungs 2 times daily        calcium carbonate 1500 (600 CA) MG tablet    OS-JORDAN 600 mg [place]. Ca     Take 1,500 mg by mouth 2 times daily (with meals)        CARBOXYMETHYLCELLULOSE SOD PF OP      Place 1  drop into both eyes 4 times daily And 1 drop both eyes daily prn        diclofenac 1 % Gel topical gel    VOLTAREN    100 g    Apply 4 grams to knees four times daily as needed using enclosed dosing card.    Chronic pain of both knees       fentaNYL 12 mcg/hr 72 hr patch    DURAGESIC     Place 1 patch onto the skin every 72 hours        furosemide 20 MG tablet    LASIX    30 tablet    Take 1 tablet (20 mg) by mouth 2 times daily    Chronic diastolic congestive heart failure (H), Lymphedema of both lower extremities       insulin glargine 100 UNIT/ML injection    LANTUS     Inject 20 Units Subcutaneous every morning        ipratropium - albuterol 0.5 mg/2.5 mg/3 mL 0.5-2.5 (3) MG/3ML neb solution    DUONEB     Take 1 vial by nebulization every 4 hours as needed for shortness of breath / dyspnea or wheezing        isosorbide mononitrate 30 MG 24 hr tablet    IMDUR    30 tablet    Take 2 tablets (60 mg) by mouth daily        LIPITOR 10 MG tablet   Generic drug:  atorvastatin      Take 10 mg by mouth At Bedtime        MEGESTROL ACETATE PO      Take 80 mg by mouth 2 times daily        metoprolol 50 MG 24 hr tablet    TOPROL XL    5 tablet    Take 1 tablet (50 mg) by mouth daily    ASCVD (arteriosclerotic cardiovascular disease)       nitroGLYcerin 0.4 MG sublingual tablet    NITROSTAT     Place 0.4 mg under the tongue every 5 minutes as needed for chest pain if you are still having symptoms after 3 doses (15 minutes) call 911.        omeprazole 10 MG CR capsule    priLOSEC     Take 1 capsule (10 mg) by mouth daily    Gastric reflux       ONDANSETRON PO      Take 4 mg by mouth every 8 hours as needed for nausea        polyvinyl alcohol 1.4 % ophthalmic solution    LIQUIFILM TEARS     Place 1 drop into both eyes 3 times daily        PREDNISONE PO           ranitidine 75 MG tablet   Generic drug:  ranitidine      Take 150 mg by mouth daily        senna-docusate 8.6-50 MG per tablet    SENOKOT-S;PERICOLACE     Take 2 tablets  by mouth 2 times daily        tamsulosin 0.4 MG capsule    FLOMAX    5 capsule    Take 1 capsule (0.4 mg) by mouth daily    Benign non-nodular prostatic hyperplasia without lower urinary tract symptoms       tiotropium 18 MCG capsule    SPIRIVA     Inhale 18 mcg into the lungs daily        WARFARIN SODIUM PO      Take by mouth daily See facility orders for INR dosing

## 2017-12-01 NOTE — TELEPHONE ENCOUNTER
Spoke with patient's son,Les, regarding appt on Monday and need to have a decision making family member present.  Explained that  would like someone from the family who can make decisions on behalf of Tomas to be present at the 12/4/17 appt when discussing care going forward.  Les is not sure who can be present, but will reach out to family and contact writer.    Gage Yang, RN, BSN, OCN  [facility]  Patient Care Coordinator

## 2017-12-04 NOTE — PROGRESS NOTES
"Met with patient while in the clinic as did Tammy GIMENEZ.  Patient arrived for appt unaccompanied and discussion for terminating chemothrapy and receiving \"[facility]\"/Hospice was discussed with the patient.  Patient did state Hospice was OK, but he did not want to be a DNR/DNI.  Did hear from son,Les ~ 75 minutes after appt and he showed up to clinic ~ 1145 and spoke with PERNELL Munoz.  Les reported he and his siblings did not realize that patient was so ill.  Asking questions ~ how much time he has, if they should bring him home, etc.  Writer spoke with SW and explained background. Ideal plan would be for patient to remain in facility ~ Vulnerable Adult status.  TCT transportation was contacted and patient returned to Nursing Facility .  MD will contact family member by telephone, per family request, to discuss patient status.  Gage Yang, RN, BSN, OCN  [facility]  Patient Care Coordinator      "

## 2017-12-04 NOTE — PATIENT INSTRUCTIONS
1- Labs today to see if we can find any additional causes of anemia - drawn ljt and justin    2- Stop cancer directed therapy.     3- Refer to hospice Referral placed and both LSW and RN spoke with patient and family once they arrived. NISHA Gill.   No need for scheduling to make any appointments for patient. Dacia SANCHEZ

## 2017-12-04 NOTE — LETTER
"    12/4/2017        RE: Tomas Grey  [facility]  [address] [place] [id number]        [facility] PHYSICIANS  HEMATOLOGY ONCOLOGY    ONCOLOGY FOLLOWUP NOTE      DIAGNOSIS:    1- Metastatic prostate cancer with extensive metastases to bone.   2- Advanced COPD and multiple other co-morbidities.      TREATMENT:     Lupron injection monthly, discontinued 6/2016.  Casodex 50 mg daily. discontinued 2/9/2015  - Xgeva monthly  - Xtandi 4/15/15  - Zytgea and prednisone 9/19/2016-present  - Xofigo 1/4/17  - 10/2017 discontinued zytega and started Megace.      Subjective:  Mr. Tomas Grey comes in for followup today. He has become transfusion dependent. He states that he feels better as long as he is getting transfusions. Pain is stable.     REVIEW OF SYSTEMS:  A complete review of systems was performed and found to be negative other than pertinent positives mentioned in history of present illness.     Past medical, social histories reviewed.    Meds- Reviewed.     PHYSICAL EXAMINATION:   VITAL SIGNS:Resp 20  Ht 1.753 m (5' 9\")  Wt 91.6 kg (202 lb)  BMI 29.83 kg/m2  CONSTITUTIONAL: Appears fatigued.    HEENT: Pupils are equal. Oropharynx is clear.   NECK: No cervical or supraclavicular lymphadenopathy.   RESPIRATORY: Clear bilaterally.   CARD/VASC: S1, S2, regular.   GI: Soft, nontender, nondistended, no hepatosplenomegaly.   MUSKULOSKELETAL: Warm, well perfused.   NEUROLOGIC: Alert, awake.   INTEGUMENT: No rash.   LYMPHATICS: Bilateral edema.   PSYCH: Mood and affect was normal.    LABORATORY DATA AND IMAGING REVIEWED DURING THIS VISIT:  Recent Labs   Lab Test  12/01/17   1015 11/10/17 10/26/17   12/26/16   1851   05/27/16   1410   05/28/15   0715   NA   --   145  145   < >  146*   < >  143   < >  139   POTASSIUM   --   4.8  5.4*   < >  3.8   < >  3.8   < >  5.5*   CHLORIDE   --   111  111   < >  99   < >  106   < >  106   CO2   --   25  28   < >  41*   < >  33*   < " >  27   ANIONGAP   --   9.0  6.0   < >  5   < >  4   < >  6   BUN   --   28*  34*   < >  48*   < >  24   < >  26   CR   --   1.42*  1.27   < >  1.80*   < >  1.64*   < >  1.66*   GLC   --   108*  115*   < >  61*   < >  130*   < >  121*   JORDAN  8.2*  8.3*  8.1*   < >  9.2   < >  8.8   < >  8.5   MAG   --    --    --    --   1.7   --   1.9   < >   --    PHOS   --    --    --    --    --    --    --    --   3.3    < > = values in this interval not displayed.     Recent Labs   Lab Test  12/01/17   1015 11/10/17  11/01/17   1304   10/11/17   2106   WBC  11.8*  7.8  6.7   < >  8.5   HGB  7.6*  7.3*  5.8*   < >  7.9*   PLT  314  379  338   < >  312   MCV  89  93  94   < >  88   NEUTROPHIL  75.0   --   70.6   --   81.8    < > = values in this interval not displayed.     Recent Labs   Lab Test 11/10/17  10/11/17   2106  10/09/17   1345   BILITOTAL  0.2  0.5  0.4   ALKPHOS  96  159*  164*   ALT  9*  11  11   AST  13  Unsatisfactory specimen - hemolyzed  17   ALBUMIN  2.2*  2.4*  2.6*       ECOG  performance status 3     ASSESSMENT:   1.  Metastatic prostate cancer with multiple sclerotic bone metastases.  The patient continues on monthly Lupron with Casodex.  He has been on this regimen since 05/2014.  He continues to do well symptomatically.  The patient switched care to us after having initial diagnosis and treatment in [place], [place]. He started Xtandi 4/15/15.   He is not able to keep up with his follow ups due to multiple other medical issues and repeated hospital admissions due to COPD exacerbations.   Started Zytega 9/19/16.  He saw Dr. Callahan as a second opinion and had Xofigo 1/4/17. We continued zytega after that.     He is not a candidate of chemotherapy, due to poor performance status. Repeated hospital admissions for COPD and infections and now transfusion dependent as well.    - 10/9/17 lisa and started megace.   - He has persistent issues with anemia and has become transfusion dependent. Anemia can be related  to GI blood loss or bone marrow infiltration by the cancer. I will order some labs today.   - Extensive bone metastases. Bone scan 8/31/16 showed progression in bones.   - I have discussed his situation with his primary care Dr. Ortiz on the phone. Due to his compromised performance status, advanced cancer for which he used all the reasonable treatment options. He is not a candidate for cytotoxic chemotherapy. It would be a reasonable approach to focus on comfort care and stop cancer directed treatment.   He agrees to enroll in hospice but still wants to be full code.      PLAN:   1- Labs today to see if we can find any additional causes of anemia  2- Stop cancer directed therapy.   3- Refer to hospice.   MANPREET OVIEDO MD  12/4/2017  CC: Balgobin, Christopher Lennox Paul, MD      Sincerely,        Manpreet Oviedo MD

## 2017-12-04 NOTE — LETTER
"    12/4/2017         RE: Tomas Grey  [facility]  [address]  [facility] [address]        Dear Colleague,    Thank you for referring your patient, Tomas Grey, to the [facility]. Please see a copy of my visit note below.    [facility] PHYSICIANS  HEMATOLOGY ONCOLOGY    ONCOLOGY FOLLOWUP NOTE      DIAGNOSIS:    1- Metastatic prostate cancer with extensive metastases to bone.   2- Advanced COPD and multiple other co-morbidities.      TREATMENT:     Lupron injection monthly, discontinued 6/2016.  Casodex 50 mg daily. discontinued 2/9/2015  - Xgeva monthly  - Xtandi 4/15/15  - Zytgea and prednisone 9/19/2016-present  - Xofigo 1/4/17  - 10/2017 discontinued zytega and started Megace.      Subjective:  Mr. Tomas Grey comes in for followup today. He has become transfusion dependent. He states that he feels better as long as he is getting transfusions. Pain is stable.     REVIEW OF SYSTEMS:  A complete review of systems was performed and found to be negative other than pertinent positives mentioned in history of present illness.     Past medical, social histories reviewed.    Meds- Reviewed.     PHYSICAL EXAMINATION:   VITAL SIGNS:Resp 20  Ht 1.753 m (5' 9\")  Wt 91.6 kg (202 lb)  BMI 29.83 kg/m2  CONSTITUTIONAL: Appears fatigued.    HEENT: Pupils are equal. Oropharynx is clear.   NECK: No cervical or supraclavicular lymphadenopathy.   RESPIRATORY: Clear bilaterally.   CARD/VASC: S1, S2, regular.   GI: Soft, nontender, nondistended, no hepatosplenomegaly.   MUSKULOSKELETAL: Warm, well perfused.   NEUROLOGIC: Alert, awake.   INTEGUMENT: No rash.   LYMPHATICS: Bilateral edema.   PSYCH: Mood and affect was normal.    LABORATORY DATA AND IMAGING REVIEWED DURING THIS VISIT:  Recent Labs   Lab Test  12/01/17   1015 11/10/17 10/26/17   12/26/16   1851   05/27/16   1410   05/28/15   0715   NA   --   145  145   < >  146*   < >  143   < >  139 "   POTASSIUM   --   4.8  5.4*   < >  3.8   < >  3.8   < >  5.5*   CHLORIDE   --   111  111   < >  99   < >  106   < >  106   CO2   --   25  28   < >  41*   < >  33*   < >  27   ANIONGAP   --   9.0  6.0   < >  5   < >  4   < >  6   BUN   --   28*  34*   < >  48*   < >  24   < >  26   CR   --   1.42*  1.27   < >  1.80*   < >  1.64*   < >  1.66*   GLC   --   108*  115*   < >  61*   < >  130*   < >  121*   JORDAN  8.2*  8.3*  8.1*   < >  9.2   < >  8.8   < >  8.5   MAG   --    --    --    --   1.7   --   1.9   < >   --    PHOS   --    --    --    --    --    --    --    --   3.3    < > = values in this interval not displayed.     Recent Labs   Lab Test  12/01/17   1015 11/10/17  11/01/17   1304   10/11/17   2106   WBC  11.8*  7.8  6.7   < >  8.5   HGB  7.6*  7.3*  5.8*   < >  7.9*   PLT  314  379  338   < >  312   MCV  89  93  94   < >  88   NEUTROPHIL  75.0   --   70.6   --   81.8    < > = values in this interval not displayed.     Recent Labs   Lab Test 11/10/17  10/11/17   2106  10/09/17   1345   BILITOTAL  0.2  0.5  0.4   ALKPHOS  96  159*  164*   ALT  9*  11  11   AST  13  Unsatisfactory specimen - hemolyzed  17   ALBUMIN  2.2*  2.4*  2.6*       ECOG  performance status 3     ASSESSMENT:   1.  Metastatic prostate cancer with multiple sclerotic bone metastases.  The patient continues on monthly Lupron with Casodex.  He has been on this regimen since 05/2014.  He continues to do well symptomatically.  The patient switched care to us after having initial diagnosis and treatment in [place], [place]. He started Xtandi 4/15/15.   He is not able to keep up with his follow ups due to multiple other medical issues and repeated hospital admissions due to COPD exacerbations.   Started Zytega 9/19/16.  He saw Dr. Callahan as a second opinion and had Xofigo 1/4/17. We continued lisa after that.     He is not a candidate of chemotherapy, due to poor performance status. Repeated hospital admissions for COPD and infections and now  transfusion dependent as well.    - 10/9/17 zytega and started megace.   - He has persistent issues with anemia and has become transfusion dependent. Anemia can be related to GI blood loss or bone marrow infiltration by the cancer. I will order some labs today.   - Extensive bone metastases. Bone scan 8/31/16 showed progression in bones.   - I have discussed his situation with his primary care Dr. Ortiz on the phone. Due to his compromised performance status, advanced cancer for which he used all the reasonable treatment options. He is not a candidate for cytotoxic chemotherapy. It would be a reasonable approach to focus on comfort care and stop cancer directed treatment.   He agrees to enroll in hospice but still wants to be full code.      PLAN:   1- Labs today to see if we can find any additional causes of anemia  2- Stop cancer directed therapy.   3- Refer to hospice.   MANPREET OVIEDO MD  12/4/2017  CC: Balgobin, Christopher Lennox Paul, MD    Hi Gage,   Mr. Grey has folate deficiency and iron deficiency. Please have the care facility start him of folic acid 5 mg batres for 1 month and then switch to 1 mg daily. He should also start iron sulfate 325 mg BID. Thanks    Again, thank you for allowing me to participate in the care of your patient.        Sincerely,        Manpreet Oviedo MD

## 2017-12-04 NOTE — PROGRESS NOTES
"Social Work Progress Note      Data/Intervention:  Patient Name:  Tomas Grey  /Age:  1945 (72 year old)    Reason for Follow-Up: Tomas is a 72-year-old gentleman who comes alone to clinic today for scheduled visit with Dr. Oviedo to discuss goals of care. This clinician met individually with pt after pt's visit with Dr. Oviedo, and later this clinician met with pt's son and daughter-in-law who arrived late for visit with Dr. Oviedo.     Intervention:   During individual visit with Tomas, pt reported that he understood that there is \"nothing else the doctors can do\" and reported feeling that he has \"little time left on this earth.\" Pt reports that he is in agreement with hospice plan of care and more of a supportive approach. Pt acknowledged feeling somewhat discouraged today by son's not being at visit despite phone calls from office and pt for reminders.    Pt's son (Les) and pt's daughter-in-law arrived after pt's scheduled visit with Dr. Oviedo. Pt gave this permission to speak directly to son and daughter-in-law about pt's care, and reported that he did not want to be a part of discussion as he was too fatigued. Son reported that he understood pt to have been referred to hospice care and has questions today about pt's timeframe. Provided education to pt and daughter-in-law about approach of hospice care, members of hospice care team, frequency of visits, and goal of supporting pt's quality of life. Family tearful during discussion, Les processing the number of family members he needs to inform about pt's health condition (multiple family members living in [facility]). This clinician encouraged pt's son to inform family and encourage family members to come sooner so as not to miss an opportunity to see pt. Provided emotional support to son and daughter-in-law surrounding understandably difficult time, encouraging family to share stories together.     Plan:  1) This clinician called pt's ANP " and informed her of pt's transitioning to hospice care. ANP plans to visit pt 12/5/17   2) This clinician spoke with [facility], who reported that they would reach out to pt about scheduling intake, likely for 12/6/17 in the afternoon (son available after 3:15pm).   3) Family has this clinician's contact information and knows to reach out in the case of psychosocial distress or concern.     Please call or page if needs or concerns arise.     DAGO Yi, [facility]  Direct Phone: [phone]  Pager: [phone]

## 2017-12-04 NOTE — MR AVS SNAPSHOT
"              After Visit Summary   12/4/2017    Tomas Grey    MRN: [id number]           Patient Information     Date Of Birth          1945        Visit Information        Provider Department      12/4/2017 1:02 PM Tammy Barriga LICSW [facility] [place]        Today's Diagnoses     Counseling NOS(V65.40)    -  1       Follow-ups after your visit        Your next 10 appointments already scheduled     Jan 09, 2018  2:00 PM CST   (Arrive by 1:45 PM)   New Patient Visit with JASON Macedo   [facility] ([facility])    [address] [place] [address]  4th Floor  [facility] [address] [phone]              Future tests that were ordered for you today     Open Future Orders        Priority Expected Expires Ordered    HOSPICE REFERRAL Routine 12/4/2017 12/4/2018 12/4/2017            Who to contact     If you have questions or need follow up information about today's clinic visit or your schedule please contact [facility] directly at [phone].  Normal or non-critical lab and imaging results will be communicated to you by XYZEhart, letter or phone within 4 business days after the clinic has received the results. If you do not hear from us within 7 days, please contact the clinic through XYZEhart or phone. If you have a critical or abnormal lab result, we will notify you by phone as soon as possible.  Submit refill requests through [employer] or call your pharmacy and they will forward the refill request to us. Please allow 3 business days for your refill to be completed.          Additional Information About Your Visit        MyChart Information     [employer] lets you send messages to your doctor, view your test results, renew your prescriptions, schedule appointments and more. To sign up, go to [email or web address] . Click on \"Log in\" on the left side of the screen, which will take you to the Welcome page. Then " "click on \"Sign up Now\" on the right side of the page.     You will be asked to enter the access code listed below, as well as some personal information. Please follow the directions to create your username and password.     Your access code is: [email or web address]  Expires: 2017 11:54 AM     Your access code will  in 90 days. If you need help or a new code, please call your [place] clinic or [phone].        Care EveryWhere ID     This is your Care EveryWhere ID. This could be used by other organizations to access your [place] medical records  [id number]         Blood Pressure from Last 3 Encounters:   17 120/69   17 105/48   17 111/60    Weight from Last 3 Encounters:   17 91.6 kg (202 lb)   17 94.5 kg (208 lb 4.8 oz)   17 94.4 kg (208 lb 3.2 oz)              Today, you had the following     No orders found for display       Primary Care Provider Office Phone # Fax #    Ekaterina SAL Babb [phone]       [address] [address]        Equal Access to Services     ZAKIA BAUM : Hadii aad ku hadasho Soomaali, waaxda luqadaha, qaybta kaalmada adeegyada, waxay nam prather . So [facility] [phone].    ATENCIÓN: Si habla español, tiene a soria disposición servicios gratuitos de asistencia lingüística. Llame al [phone].    We comply with applicable federal civil rights laws and [place] laws. We do not discriminate on the basis of race, color, national origin, age, disability, sex, sexual orientation, or gender identity.            Thank you!     Thank you for choosing [facility]  for your care. Our goal is always to provide you with excellent care. Hearing back from our patients is one way we can continue to improve our services. Please take a few minutes to complete the written survey that you may receive in the mail after your visit with us. Thank you!             Your Updated Medication " List - Protect others around you: Learn how to safely use, store and throw away your medicines at www.disposemymeds.org.          This list is accurate as of: 12/4/17  1:16 PM.  Always use your most recent med list.                   Brand Name Dispense Instructions for use Diagnosis    acetaminophen 500 MG tablet    TYLENOL     Take 1,000 mg by mouth 3 times daily Not to exceed 3000 mg/24 hours        ammonium lactate 12 % lotion    LAC-HYDRIN     Apply topically 2 times daily as needed for dry skin To all extremities for dry skin        aspirin 81 MG EC tablet     5 tablet    Take 1 tablet (81 mg) by mouth daily    ASCVD (arteriosclerotic cardiovascular disease)       BETIMOL OP      Place 1 drop into both eyes every morning        bimatoprost 0.01 % Soln    LUMIGAN     Place 1 drop into both eyes At Bedtime        budesonide-formoterol 160-4.5 MCG/ACT Inhaler    SYMBICORT     Inhale 2 puffs into the lungs 2 times daily        calcium carbonate 1500 (600 CA) MG tablet    OS-JORDAN 600 mg [place]. Ca     Take 1,500 mg by mouth 2 times daily (with meals)        CARBOXYMETHYLCELLULOSE SOD PF OP      Place 1 drop into both eyes 4 times daily And 1 drop both eyes daily prn        diclofenac 1 % Gel topical gel    VOLTAREN    100 g    Apply 4 grams to knees four times daily as needed using enclosed dosing card.    Chronic pain of both knees       fentaNYL 12 mcg/hr 72 hr patch    DURAGESIC     Place 1 patch onto the skin every 72 hours        furosemide 20 MG tablet    LASIX    30 tablet    Take 1 tablet (20 mg) by mouth 2 times daily    Chronic diastolic congestive heart failure (H), Lymphedema of both lower extremities       insulin glargine 100 UNIT/ML injection    LANTUS     Inject 20 Units Subcutaneous every morning        ipratropium - albuterol 0.5 mg/2.5 mg/3 mL 0.5-2.5 (3) MG/3ML neb solution    DUONEB     Take 1 vial by nebulization every 4 hours as needed for shortness of breath / dyspnea or wheezing         isosorbide mononitrate 30 MG 24 hr tablet    IMDUR    30 tablet    Take 2 tablets (60 mg) by mouth daily        LIPITOR 10 MG tablet   Generic drug:  atorvastatin      Take 10 mg by mouth At Bedtime        MEGESTROL ACETATE PO      Take 80 mg by mouth 2 times daily        metoprolol 50 MG 24 hr tablet    TOPROL XL    5 tablet    Take 1 tablet (50 mg) by mouth daily    ASCVD (arteriosclerotic cardiovascular disease)       nitroGLYcerin 0.4 MG sublingual tablet    NITROSTAT     Place 0.4 mg under the tongue every 5 minutes as needed for chest pain if you are still having symptoms after 3 doses (15 minutes) call 911.        omeprazole 10 MG CR capsule    priLOSEC     Take 1 capsule (10 mg) by mouth daily    Gastric reflux       ONDANSETRON PO      Take 4 mg by mouth every 8 hours as needed for nausea        polyvinyl alcohol 1.4 % ophthalmic solution    LIQUIFILM TEARS     Place 1 drop into both eyes 3 times daily        PREDNISONE PO           ranitidine 75 MG tablet   Generic drug:  ranitidine      Take 150 mg by mouth daily        senna-docusate 8.6-50 MG per tablet    SENOKOT-S;PERICOLACE     Take 2 tablets by mouth 2 times daily        tamsulosin 0.4 MG capsule    FLOMAX    5 capsule    Take 1 capsule (0.4 mg) by mouth daily    Benign non-nodular prostatic hyperplasia without lower urinary tract symptoms       tiotropium 18 MCG capsule    SPIRIVA     Inhale 18 mcg into the lungs daily        WARFARIN SODIUM PO      Take by mouth daily See facility orders for INR dosing

## 2017-12-04 NOTE — PROGRESS NOTES
"[facility] PHYSICIANS  HEMATOLOGY ONCOLOGY    ONCOLOGY FOLLOWUP NOTE      DIAGNOSIS:    1- Metastatic prostate cancer with extensive metastases to bone.   2- Advanced COPD and multiple other co-morbidities.      TREATMENT:     Lupron injection monthly, discontinued 6/2016.  Casodex 50 mg daily. discontinued 2/9/2015  - Xgeva monthly  - Xtandi 4/15/15  - Zytgea and prednisone 9/19/2016-present  - Xofigo 1/4/17  - 10/2017 discontinued zytega and started Megace.      Subjective:  Mr. Tomas Grey comes in for followup today. He has become transfusion dependent. He states that he feels better as long as he is getting transfusions. Pain is stable.     REVIEW OF SYSTEMS:  A complete review of systems was performed and found to be negative other than pertinent positives mentioned in history of present illness.     Past medical, social histories reviewed.    Meds- Reviewed.     PHYSICAL EXAMINATION:   VITAL SIGNS:Resp 20  Ht 1.753 m (5' 9\")  Wt 91.6 kg (202 lb)  BMI 29.83 kg/m2  CONSTITUTIONAL: Appears fatigued.    HEENT: Pupils are equal. Oropharynx is clear.   NECK: No cervical or supraclavicular lymphadenopathy.   RESPIRATORY: Clear bilaterally.   CARD/VASC: S1, S2, regular.   GI: Soft, nontender, nondistended, no hepatosplenomegaly.   MUSKULOSKELETAL: Warm, well perfused.   NEUROLOGIC: Alert, awake.   INTEGUMENT: No rash.   LYMPHATICS: Bilateral edema.   PSYCH: Mood and affect was normal.    LABORATORY DATA AND IMAGING REVIEWED DURING THIS VISIT:  Recent Labs   Lab Test  12/01/17   1015 11/10/17 10/26/17   12/26/16   1851   05/27/16   1410   05/28/15   0715   NA   --   145  145   < >  146*   < >  143   < >  139   POTASSIUM   --   4.8  5.4*   < >  3.8   < >  3.8   < >  5.5*   CHLORIDE   --   111  111   < >  99   < >  106   < >  106   CO2   --   25  28   < >  41*   < >  33*   < >  27   ANIONGAP   --   9.0  6.0   < >  5   < >  4   < >  6   BUN   --   28*  34*   < >  48*   < >  24   < >  26   CR   --   " 1.42*  1.27   < >  1.80*   < >  1.64*   < >  1.66*   GLC   --   108*  115*   < >  61*   < >  130*   < >  121*   JORDAN  8.2*  8.3*  8.1*   < >  9.2   < >  8.8   < >  8.5   MAG   --    --    --    --   1.7   --   1.9   < >   --    PHOS   --    --    --    --    --    --    --    --   3.3    < > = values in this interval not displayed.     Recent Labs   Lab Test  12/01/17   1015 11/10/17  11/01/17   1304   10/11/17   2106   WBC  11.8*  7.8  6.7   < >  8.5   HGB  7.6*  7.3*  5.8*   < >  7.9*   PLT  314  379  338   < >  312   MCV  89  93  94   < >  88   NEUTROPHIL  75.0   --   70.6   --   81.8    < > = values in this interval not displayed.     Recent Labs   Lab Test 11/10/17  10/11/17   2106  10/09/17   1345   BILITOTAL  0.2  0.5  0.4   ALKPHOS  96  159*  164*   ALT  9*  11  11   AST  13  Unsatisfactory specimen - hemolyzed  17   ALBUMIN  2.2*  2.4*  2.6*       ECOG  performance status 3     ASSESSMENT:   1.  Metastatic prostate cancer with multiple sclerotic bone metastases.  The patient continues on monthly Lupron with Casodex.  He has been on this regimen since 05/2014.  He continues to do well symptomatically.  The patient switched care to us after having initial diagnosis and treatment in [place], [place]. He started Xtandi 4/15/15.   He is not able to keep up with his follow ups due to multiple other medical issues and repeated hospital admissions due to COPD exacerbations.   Started Zytega 9/19/16.  He saw Dr. Callahan as a second opinion and had Xofigo 1/4/17. We continued zytega after that.     He is not a candidate of chemotherapy, due to poor performance status. Repeated hospital admissions for COPD and infections and now transfusion dependent as well.    - 10/9/17 zytega and started megace.   - He has persistent issues with anemia and has become transfusion dependent. Anemia can be related to GI blood loss or bone marrow infiltration by the cancer. I will order some labs today.   - Extensive bone metastases. Bone  scan 8/31/16 showed progression in bones.   - I have discussed his situation with his primary care Dr. Ortiz on the phone. Due to his compromised performance status, advanced cancer for which he used all the reasonable treatment options. He is not a candidate for cytotoxic chemotherapy. It would be a reasonable approach to focus on comfort care and stop cancer directed treatment.   He agrees to enroll in hospice but still wants to be full code.      PLAN:   1- Labs today to see if we can find any additional causes of anemia  2- Stop cancer directed therapy.   3- Refer to hospice.   TAYLOR PETERS MD  12/4/2017  CC: Balgobin, Christopher Lennox Paul, MD

## 2017-12-04 NOTE — NURSING NOTE
"Oncology Rooming Note    December 4, 2017 10:22 AM   Tomas Grey is a 72 year old male who presents for:    Chief Complaint   Patient presents with     Oncology Clinic Visit     Follow up      Initial Vitals: Resp 20  Ht 1.753 m (5' 9\")  Wt 91.6 kg (202 lb)  BMI 29.83 kg/m2 Estimated body mass index is 29.83 kg/(m^2) as calculated from the following:    Height as of this encounter: 1.753 m (5' 9\").    Weight as of this encounter: 91.6 kg (202 lb). Body surface area is 2.11 meters squared.  No Pain (0) Comment: Data Unavailable   No LMP for male patient.  Allergies reviewed: Yes  Medications reviewed: Yes    Medications: Medication refills not needed today.  Pharmacy name entered into EPIC:    [facility]  [facility] - [place], [place] - [address]  [facility] - [place] - [address] [place] [address] [facility] - [place] [address] [facility] [place] - [address] [facility] [place], [place] - [address] [place] [address] PHARMACY - [place], [place] - [address] - [address] [place] [address]    Clinical concerns: Follow up    8 minutes for nursing intake (face to face time)     Flor Wolf CMA    DISCHARGE PLAN:  Next appointments: See patient instruction section  Departure Mode: Cart  Accompanied by: self  0 minutes for nursing discharge (face to face time)   Flor Wolf CMA                "

## 2017-12-05 NOTE — PROGRESS NOTES
David Almonte,    Mr. Grey has folate deficiency and iron deficiency. Please have the care facility start him of folic acid 5 mg batres for 1 month and then switch to 1 mg daily. He should also start iron sulfate 325 mg BID. Thanks

## 2017-12-12 NOTE — PROGRESS NOTES
[facility]    Chief Complaint   Patient presents with     RECHECK     HPI:    Tomas Grey is a 72 year old  (1945), who is being seen today for an episodic care visit at [facility].  HPI information obtained from: facility chart records, facility staff, patient report and [facility] chart review. Today's concern is:    Prostate Cancer Metastatic to Bone. Seen by Dr. Oviedo on 12/4/17. Given compromised performance status and advanced cancer, has used all treatment options. Resident and multiple family members told it was reasonable to stop cancer directed care and focus on comfort care. Referral placed to [facility]. Hospice met with resident and multiple family members last week. Elected not to enroll in hospice. Remains FULL CODE. Today, resident is sitting in electric wheelchair with head resting on pillow on front controls. Reports hospice sounded like it was for the end and he's not ready to die. States he has been thinking about it for pain control. Reports Dr. Oviedo told him he can't do anything for him anymore and he will need to switch to another doctor. Reports his cancer must not be that advanced as he only has pain on the left side of his body which is relieved with neck massage. States the blood transfusions he is receiving is working for his cancer. If unable to make decisions for himself, would like cousin Melissa to make decisions. Upon review of documentation, weight 205.3 lbs on 12/12/17 -> 256.8 lbs on 12/18/16. Resident reports 50 lbs weight loss was intentional.     Paroxysmal Atrial Fibrillation on Anticoagulation. INR > 8 on 12/10/17 while on Warfarin 1 mg PO QD. Previous INR 3.3 on 12/7/17. No new medications ordered or acute illness noted. Vitamin K 2.5 mg administered with repeat INR 2.3 on 12/11/17.     Anemia. Hemoglobin 7.1 on 12/11/17. Previous Hemoglobin 8.0 on 12/4/17. Received 1 U of PRBCs on 12/1/17.     ALLERGIES: Morphine  Past  Medical, Surgical, Family and Social History reviewed and updated in [facility].    Current Outpatient Prescriptions   Medication Sig Dispense Refill     FOLIC ACID PO Take 5 mg by mouth daily       ipratropium - albuterol 0.5 mg/2.5 mg/3 mL (DUONEB) 0.5-2.5 (3) MG/3ML neb solution Take 1 vial by nebulization every 4 hours as needed for shortness of breath / dyspnea or wheezing       Carboxymethylcellulose Sodium (CARBOXYMETHYLCELLULOSE SOD PF OP) Place 1 drop into both eyes 4 times daily And 1 drop both eyes daily prn       fentaNYL (DURAGESIC) 12 mcg/hr 72 hr patch Place 1 patch onto the skin every 72 hours       MEGESTROL ACETATE PO Take 80 mg by mouth 2 times daily       senna-docusate (SENOKOT-S;PERICOLACE) 8.6-50 MG per tablet Take 2 tablets by mouth 2 times daily       budesonide-formoterol (SYMBICORT) 160-4.5 MCG/ACT Inhaler Inhale 2 puffs into the lungs 2 times daily       Timolol Hemihydrate (BETIMOL OP) Place 1 drop into both eyes every morning        bimatoprost (LUMIGAN) 0.01 % SOLN Place 1 drop into both eyes At Bedtime       WARFARIN SODIUM PO Take by mouth daily See facility orders for INR dosing       calcium carbonate (OS-JORDAN 600 MG [place]. [place]) 1500 (600 CA) MG tablet Take 1,500 mg by mouth 2 times daily (with meals)       isosorbide mononitrate (IMDUR) 30 MG 24 hr tablet Take 2 tablets (60 mg) by mouth daily 30 tablet      ONDANSETRON PO Take 4 mg by mouth every 8 hours as needed for nausea       furosemide (LASIX) 20 MG tablet Take 1 tablet (20 mg) by mouth 2 times daily 30 tablet      ranitidine (RANITIDINE) 75 MG tablet Take 150 mg by mouth daily        diclofenac (VOLTAREN) 1 % GEL topical gel Apply 4 grams to knees four times daily as needed using enclosed dosing card. 100 g 0     insulin glargine (LANTUS) 100 UNIT/ML injection Inject 20 Units Subcutaneous every morning        omeprazole (PRILOSEC) 10 MG CR capsule Take 1 capsule (10 mg) by mouth daily       atorvastatin (LIPITOR) 10 MG tablet Take  10 mg by mouth At Bedtime        tiotropium (SPIRIVA) 18 MCG inhalation capsule Inhale 18 mcg into the lungs daily       acetaminophen (TYLENOL) 500 MG tablet Take 1,000 mg by mouth 3 times daily Not to exceed 3000 mg/24 hours        nitroglycerin (NITROSTAT) 0.4 MG SL tablet Place 0.4 mg under the tongue every 5 minutes as needed for chest pain if you are still having symptoms after 3 doses (15 minutes) call 911.       aspirin 81 MG EC tablet Take 1 tablet (81 mg) by mouth daily 5 tablet 0     metoprolol (TOPROL XL) 50 MG 24 hr tablet Take 1 tablet (50 mg) by mouth daily 5 tablet 0     tamsulosin (FLOMAX) 0.4 MG 24 hr capsule Take 1 capsule (0.4 mg) by mouth daily 5 capsule 0     ammonium lactate (LAC-HYDRIN) 12 % lotion Apply topically 2 times daily as needed for dry skin To all extremities for dry skin       [DISCONTINUED] enzalutamide (XTANDI) 40 MG capsule Take 4 capsules (160 mg) by mouth daily 120 capsule 0     Medications reviewed:  Medications reconciled to facility chart and changes were made to reflect current medications as identified as above med list. Below are the changes that were made:   Medications stopped since last EPIC medication reconciliation:   Medications Discontinued During This Encounter   Medication Reason     polyvinyl alcohol (LIQUIFILM TEARS) 1.4 % ophthalmic solution Medication Reconciliation Clean Up     PREDNISONE PO Medication Reconciliation Clean Up     Medications started since last [facility] medication reconciliation:  Orders Placed This Encounter   Medications     FOLIC ACID PO     Sig: Take 5 mg by mouth daily     REVIEW OF SYSTEMS:  4 point ROS including Respiratory, CV, GI and , other than that noted in the HPI,  is negative    Physical Exam:  /78  Pulse 82  Temp 97.4  F (36.3  C)  Resp 18  Wt 205 lb 4.8 oz (93.1 kg)  SpO2 97%  BMI 30.32 kg/m2  GENERAL APPEARANCE: In no distress  ENT:  Mouth and posterior oropharynx normal, moist mucous membranes  EYES:  EOM,  conjunctivae, lids, pupils and irises normal  RESP:  Respiratory effort and palpation of chest normal, no respiratory distress, diminished lung sounds throughout  CV:  Palpation and auscultation of heart done, regular rate and rhythm, no murmur, rub, or gallop  ABDOMEN: Active bowel sounds, soft, nontender, no hepatosplenomegaly or other masses  M/S:   Active movement of bilateral upper and lower extremities. ACE wraps on BLE.  SKIN:  Inspection of skin and subcutaneous tissue baseline, Palpation of skin and subcutaneous tissue baseline  NEURO:   Cranial nerves 2-12 are normal tested and grossly at patient's baseline  PSYCH:  affect and mood normal    Recent Labs:    Last Basic Metabolic Panel:  Lab Results   Component Value Date     12/04/2017      Lab Results   Component Value Date    POTASSIUM 4.1 12/04/2017     Lab Results   Component Value Date    CHLORIDE 108 12/04/2017     Lab Results   Component Value Date    JORDAN 7.8 12/04/2017     Lab Results   Component Value Date    CO2 25 12/04/2017     Lab Results   Component Value Date    BUN 40 12/04/2017     Lab Results   Component Value Date    CR 1.67 12/04/2017     Lab Results   Component Value Date    GLC 97 12/04/2017     Lab Results   Component Value Date    WBC 9.9 12/04/2017     Lab Results   Component Value Date    RBC 3.01 12/04/2017     Lab Results   Component Value Date    HGB 8.0 12/04/2017     Lab Results   Component Value Date    HCT 26.7 12/04/2017     Lab Results   Component Value Date    MCV 89 12/04/2017     Lab Results   Component Value Date    MCH 26.6 12/04/2017     Lab Results   Component Value Date    MCHC 30.0 12/04/2017     Lab Results   Component Value Date    RDW 19.9 12/04/2017     Lab Results   Component Value Date     12/04/2017     Assessment/Plan:  Prostate Cancer Metastatic to Bone. Comfort care recommended by Dr. Oviedo on 12/4/17, but resident and family refused [facility] enrollment. Remains FULL CODE. PSA  continues to rise despite treatment. Metastatic bone pain continues to worsen. Has had 50 lbs weight loss in 1 year which resident never reported to be intentional until today. Received 3 blood transfusions in the past month partly due to progression of malignancy. Left message with daughter in-law, Jaclyn, who is listed as first contact at facility to discuss goals of care further. Given resident's lack of capacity to make decisions,  at facility is working to pursue guardianship. Fentanyl Patch and Acetaminophen ordered for pain control.    Paroxysmal Atrial Fibrillation on Anticoagulation. INR labile. Likely related to above. Also noted to have an Albumin of 2.2 on 12/4/17. Ordered Warfarin 0.5 mg PO QD. Repeat INR on 12/14/17.    Anemia. Noted by Oncology to be folate and iron deficiency. Chronic disease and bone marrow suppression from above also likely a factor. Folic Acid and Ferrous Sulfate initiated by Oncology. Ordered 1 U of PRBCs to be transfused outpatient due to Hemoglobin <8.     Electronically signed by  SAL Acevedo CNP

## 2017-12-13 PROBLEM — D64.9 ANEMIA REQUIRING TRANSFUSIONS: Status: ACTIVE | Noted: 2017-01-01

## 2017-12-13 NOTE — ED PROVIDER NOTES
History     Chief Complaint   Patient presents with     Chest Pain     HPI  Tomas Grey is a 72 year old male with a history of metastatic prostate cancer, HTN, diabetes, COPD, CKD, CAD, and chronic pain who presents to the Emergency Department via EMS for evaluation of chest pain. Patient is here from [facility] and reports constant chest pain starting 4 days ago. This pain is left sided and radiates to his head, neck, and shoulders. He denies recent trauma to the chest. He notes that he has had a fever of 101 F in the last few days. He has had some nausea, but no vomiting. Patient has chronic shortness of breath and is on 2L of O2 at home 24 hours/day.   Per review of Care Everywhere, patient was seen by a nurse practitioner yesterday at his nursing home for a re-check, but the patient denies this. He notes that he was supposed to be transfused yesterday, but was not.  Review of oncology notes indicates that the patient has been advised to change his goal of care to comfort care.  He met with hospice staff last week and he declined hospice stating that he was not ready to die.  Oncology indicated that they have no further treatment offered to him for his prostate cancer.    Past Medical History:   Diagnosis Date     Anemia      Anxiety      Aspiration pneumonia (H) 2014     C. difficile colitis      CAD (coronary artery disease)      Chronic pain      CKD (chronic kidney disease)      COPD (chronic obstructive pulmonary disease) (H)      Depressive disorder      Diabetes mellitus (H)      Hypertension      Insomnia      ALEXIS (obstructive sleep apnea)      Osteoporosis      Prostate cancer metastatic to multiple sites (H)        Past Surgical History:   Procedure Laterality Date     ABDOMEN SURGERY       ARTHROSCOPY KNEE       EYE SURGERY         Family History   Problem Relation Age of Onset     DIABETES Mother      CANCER Mother      stomach       Social History   Substance Use Topics      Smoking status: Former Smoker     Smokeless tobacco: Never Used     Alcohol use No       Current Facility-Administered Medications   Medication     naloxone (NARCAN) injection 0.1-0.4 mg     ondansetron (ZOFRAN-ODT) ODT tab 4 mg    Or     ondansetron (ZOFRAN) injection 4 mg     lidocaine 1 % 1 mL     lidocaine (LMX4) kit     sodium chloride (PF) 0.9% PF flush 3 mL     sodium chloride (PF) 0.9% PF flush 3 mL     [START ON 12/14/2017] predniSONE (DELTASONE) tablet 60 mg     doxycycline (VIBRAMYCIN) capsule 100 mg     albuterol neb solution 2.5 mg     senna-docusate (SENOKOT-S;PERICOLACE) 8.6-50 MG per tablet 1 tablet    Or     senna-docusate (SENOKOT-S;PERICOLACE) 8.6-50 MG per tablet 2 tablet     [START ON 12/14/2017] ipratropium - albuterol 0.5 mg/2.5 mg/3 mL (DUONEB) neb solution 3 mL     [START ON 12/14/2017] fentaNYL (DURAGESIC) 12 mcg/hr 72 hr patch 1 patch     ferrous sulfate (IRON) tablet 325 mg     [START ON 12/14/2017] aspirin EC EC tablet 81 mg     atorvastatin (LIPITOR) tablet 10 mg     bimatoprost (LUMIGAN) 0.01 % ophthalmic drops 1 drop     furosemide (LASIX) tablet 20 mg     [START ON 12/14/2017] insulin glargine (LANTUS) injection 20 Units     isosorbide mononitrate (IMDUR) 24 hr tablet 60 mg     megestrol (MEGACE) tablet 80 mg     metoprolol (TOPROL-XL) 24 hr tablet 50 mg     omeprazole (priLOSEC) CR capsule 10 mg     ranitidine (ZANTAC) tablet 150 mg     tamsulosin (FLOMAX) capsule 0.4 mg     [START ON 12/14/2017] timolol hemihydrate (BETIMOL) 0.25 % ophthalmic solution SOLN 1 drop     glucose 40 % gel 15-30 g    Or     dextrose 50 % injection 25-50 mL    Or     glucagon injection 1 mg     insulin aspart (NovoLOG) inj (RAPID ACTING)     insulin aspart (NovoLOG) inj (RAPID ACTING)     budesonide (PULMICORT) neb solution 1 mg     [START ON 12/14/2017] fentaNYL (DURAGESIC) patch REMOVAL     fentaNYL (DURAGESIC) Patch in Place     acetaminophen (TYLENOL) tablet 1,000 mg        Allergies   Allergen  "Reactions     Morphine Other (See Comments)     Patient reports makes him almost go into a coma         I have reviewed the Medications, Allergies, Past Medical and Surgical History, and Social History in the Epic system.    Review of Systems   Constitutional: Positive for fever.   Respiratory: Positive for shortness of breath (chronic).    Cardiovascular: Positive for chest pain and leg swelling (chronic).   Gastrointestinal: Positive for nausea. Negative for abdominal pain and vomiting.   All other systems reviewed and are negative.      Physical Exam   BP: 96/53  Pulse: 108  Heart Rate: 107  Temp: 98.2  F (36.8  C)  Resp: 18  Height: 180.3 cm (5' 11\")  Weight: 93.1 kg (205 lb 4 oz)  SpO2: 97 %      Physical Exam    GEN:  Alert, well developed, no acute distress  HEENT:  PERRL, EOMI, Mucous membranes are moist.   Cardio:  RRR, no murmur, radial pulses equal bilaterally  PULM:  Lungs have air movement, no wheezes, rales   Abd:  Soft, normal bowel sounds, no focal tenderness  Back exam:  No CVA tenderness  Musculoskeletal:  There is bilateral lower extremity swelling without calf tenderness, legs are wrapped with ACE wrap.   Neuro:  Alert and oriented X3, Follows commands, moving all extremities spontaneously   Skin:  Warm, dry   ED Course   1:03 PM  The patient was seen and examined by Kirstie Harris MD in Room 19.     ED Course     Procedures             EKG Interpretation:      Interpreted by Tanya Harris  Time reviewed: 13:18  Symptoms at time of EKG: chest pain   Rhythm:  sinus tachycardia  Rate: 111  Axis: normal  Ectopy: none  Conduction: normal  ST Segments/ T Waves: No ST-T wave changes  Q Waves: none  Comparison to prior: Unchanged from 10/12/17, except previous LAD is not present today.     Clinical Impression: Sinus tachycardia      Critical Care time:  none  Labs are normal except as shown.  Previous hemoglobin was 8.0 on December 4.  Patient was given an albuterol nebulizer treatment in the " emergency department.  He reported significant improvement in his symptoms after this.  He was also given a dose of p.o. prednisone in the ED.   Chest x-ray was reviewed by me and results are shown here.   Results for orders placed or performed during the hospital encounter of 12/13/17   XR Chest Port 1 View    Narrative    Exam:  Chest X-ray 12/13/2017 1:41 PM    History: chest pain;     Comparison: Chest x-ray dated 11/20/2017    Findings: AP portable upright chest x-ray. The cardiomediastinal  silhouette is within normal limits. No pleural effusion. No  pneumothorax. No focal opacity. Left greater than right streaky  bibasilar opacities. The upper abdomen is unremarkable. No acute bony  abnormality.      Impression    Impression: Left greater than right streaky basilar opacities favored  to represent atelectasis.    I have personally reviewed the examination and initial interpretation  and I agree with the findings.    BERNICE LYNN MD     *Note: Due to a large number of results and/or encounters for the requested time period, some results have not been displayed. A complete set of results can be found in Results Review.          Labs Ordered and Resulted from Time of ED Arrival Up to the Time of Departure from the ED   BASIC METABOLIC PANEL - Abnormal; Notable for the following:        Result Value    Urea Nitrogen 36 (*)     Creatinine 1.55 (*)     GFR Estimate 44 (*)     GFR Estimate If Black 54 (*)     Calcium 7.9 (*)     All other components within normal limits   CBC WITH PLATELETS DIFFERENTIAL - Abnormal; Notable for the following:     WBC 11.4 (*)     RBC Count 2.44 (*)     Hemoglobin 6.4 (*)     Hematocrit 21.5 (*)     MCH 26.2 (*)     MCHC 29.8 (*)     RDW 20.1 (*)     Absolute Neutrophil 8.6 (*)     All other components within normal limits   INR - Abnormal; Notable for the following:     INR 1.92 (*)     All other components within normal limits   TROPONIN I   GLUCOSE BY METER            Assessments  & Plan (with Medical Decision Making)   Patient presents with chest pain from his nursing home.  His chart review reveals a history of chronic chest pain and multiple workups for chest pain in the past.  Since he reports constant pain for the last 4 days, I do not think he needs further workup since his troponin is negative and EKG is negative as well.  I do not suspect PE given that he is already anticoagulated.  Patient has COPD and has poor air movement and he had significant improvement in his chest discomfort after treatment with a nebulizer.  I suspect COPD exacerbation is contributing to his symptoms.  Patient is also very anemic and is known to be transfusion dependent per oncology notes.  I will plan to admit him to the observation unit for blood transfusion and to treat for acute COPD exacerbation.  I do not think he needs workup for his anemia since he has known to be transfusion dependent.  I do not think he needs further cardiac evaluation given that he is known to have chronic chest pain.    I have reviewed the nursing notes.    I have reviewed the findings, diagnosis, plan and need for follow up with the patient.    Current Discharge Medication List          Final diagnoses:   Other chest pain   COPD exacerbation (H)   Anemia in neoplastic disease   Prostate cancer (H)   Anemia requiring transfusions   I, Alexandra Celestin, am serving as a trained medical scribe to document services personally performed by Kirstie Harris MD, based on the provider's statements to me.      I, Kirstie Harris MD, was physically present and have reviewed and verified the accuracy of this note documented by Alexandra Celestin.       12/13/2017   [facility], [place], EMERGENCY DEPARTMENT     Tanya Harris MD  12/13/17 [id number]

## 2017-12-13 NOTE — H&P
"[facility] ED Observation Admission Note    Chief Complaint   Patient presents with     Chest Pain       Assessment/Plan:  Tomas Grey is a 72 year old male with a history of metastatic prostate cancer, HTN, diabetes, COPD, CKD, CAD, transfusion dependent anemia, and chronic pain who presents to the Emergency Department via EMS for evaluation of chest pain.     1. Anemia requiring transfusion. Patient is transfusion dependent.  Hemoglobin today 6.4. Care plan is to transfuse for Hgb 8 or less.  Typed and screened in ED. Last transfusion 12/1/2017 at which time Hgb was 7.6. Hematology is no longer working up reasons for anemia.  - admit to ED obs  - consent to transfusion  - transfuse 2 units PRBCs  - vitals during transfusion per nursing orders, then q2h thereafter  - recheck hemoglobin after transfusion  - repeat CBC in AM  - continue daily iron supplement    2. Mild COPD exacerbation in setting of end-stage COPD on chronic O2, chronic respiratory failure and hypercapnia. No increased work of breathing nor hypoxia on home O2 flow. No infiltrate on CXR. Received 1 albuterol neb in ED with improvement in air movement. Also received 1 dose oral prednisone 60mg. At home, on Spiriva and budesonide-fprmotorol inhalers, duonebs prn. Does not have home inhalers.  - prednisone 60mg daily with plan to discharge on prednisone burst  - Duonebs q4h scheduled  - Pulmicort nebs BID  - albuterol nebs q2h prn  - O2 per nasal cannula at current home settings (2L)  - doxycycline 100mg BID  - continuous pulse oximetry  - continous telemetry  - add on CRP  - repeat CBC in AM    3. Chest pain. Chronic chest pain. Does have a history of CAD, but also history of frequent ED visits for chest pain.  Per care plan, recommended workup based on level of suspicion: \"This is felt to be related to bony metastases, pulmonary hypertension, and/or possible primary chronic pain syndrome. Given his history of cancer and CAD, he has undergone multiple " "workups for chest pain with concern for ACS and PE. VQ scans x 5 have been negative, 1 indeterminate, and he was not felt to have PE. Evaluate according to degree of suspicion understanding he has chronic chest pain.\"    Today, history and ED workup are not suggestive of acute MI, aortic dissection, PE, acute heart failure, or any other emergent condition. EKG in ED showed sinus tachycardia with rate 111, no ischemic changes. CXR showed left greater than right streaky basilar opacities favored to represent atelectasis. Troponin x 1 negative. ED physician recommending no further workup for chest pain this admission.  - continuous telemetry  - continue PTA ASA 81mg      4. Left buttock wound. Patient reports history of pressure ulcers. Wound noted by nursing on skin exam.  - wound care consult tomorrow    5. Disposition. Currently lives at [facility]. Per care plan, \"The patient frequently resides with one of his sons, but at times is homeless. His housing concerns are resolved at this time. Historically, the patient has requested admission / observation overnight when his housing situation is in jeopardy. He does have significant hours of home care via PCA / Home health RN but has declined placement repeatedly.\"  - Care coordination/SW consult for discharge planning        Chronic Medical Issues:  # Metastatic prostate cancer. No longer following with oncology as there are no further treatments to be offered. Patient was recommended for hospice last month but refused.     # Chronic pain. Abdominal, chest.  - continue fentanyl patch for pain, due for replacement tomorrow  - Tylenol 1000mg TID scheduled    # CKD stage III. Creatinine in ED 1.55, BUN 36, at his baseline.   - recheck BMP in AM    # HTN, HLD.  - continue PTA metoprolol and atorvastatin      # Paroxysmal atrial fibrillation on anticoagulation, rate controlled. INR in ED 1.92. Rate mildly elevated in ED but not compatible with RVR.  - " "pharmacy to dose warfarin  - continue PTA metoprolol  - continuous telemetry    # T2DM. Glucose normal in ED. Last A1C on 11/16/2017 at 6.0. On Lantus 100U in AM daily. There is no notation of any other antidiabetic medications on his updated med record, updated as recently as 12/4/2017 with his PCP.  - blood glucose checks QID  - hypoglycemia protocol  - low consistent carb diet  - continue PTA Lantus 100U daily in AM  - correction SSI moderate intensity     # Chronic lymphedema, venous insufficiency. Nursing removed lymphedema wraps.  - OT consult tomorrow for re-wrapping  - continue PTA Lasix 20mg BID    # GERD.   - continue PTA omeprazole and ranitidine        Consults: SW, care coordination  FEN: Low consistent carb diet  DVT prophylaxis: on warfarin  Code Status: Full  Disposition: discharge back to SNF in AM                  HPI:    Tomas Grey is a 72 year old male with a history of metastatic prostate cancer, HTN, diabetes, COPD, CKD, CAD, transfusion dependent anemia, and chronic pain who presents to the Emergency Department via EMS for evaluation of chest pain. Patient is here from [facility] and reports constant chest pain starting 4 days ago.   Per ED provider note, \"This pain is left-sided and radiates to his head, neck, and shoulders. He denies recent trauma to the chest. He notes that he has had a fever of 101 F in the last few days. He has had some nausea, but no vomiting. Patient has chronic shortness of breath and is on 2L of O2 at home 24 hours/day.   Per review of Care Everywhere, patient was seen by a nurse practitioner yesterday at his nursing home for a re-check, but the patient denies this. He notes that he was supposed to be transfused yesterday, but was not.\"    History limited by patient's ability to provide accurate details.    In the ED, VS: , BP 96/53, RR 18, O2 sats 97% on 2 L per nasal cannula, afebrile. Workup significant for hemoglobin 6.4.  EKG showed " afib with rate 111. BMP significant for elevated creatinine at 1.55, BUN at 36. CXR showed bibasilar streaky opacities likely atelectasis. Troponin x 1 negative. He received an albuterol neb x 1, oral prednisone 60mg. He was admitted to ED obs for blood transfusion and COPD exacerbation treatment.    On admission to the observation unit the patient was stable.      PCP: Ekaterina Lozano    History:    Past Medical History:   Diagnosis Date     Anemia      Anxiety      Aspiration pneumonia (H) 2014     C. difficile colitis      CAD (coronary artery disease)      Chronic pain      CKD (chronic kidney disease)      COPD (chronic obstructive pulmonary disease) (H)      Depressive disorder      Diabetes mellitus (H)      Hypertension      Insomnia      ALEXIS (obstructive sleep apnea)      Osteoporosis      Prostate cancer metastatic to multiple sites (H)        Past Surgical History:   Procedure Laterality Date     ABDOMEN SURGERY       ARTHROSCOPY KNEE       EYE SURGERY         Family History   Problem Relation Age of Onset     DIABETES Mother      CANCER Mother      stomach       Social History     Social History     Marital status:      Spouse name: N/A     Number of children: N/A     Years of education: N/A     Occupational History     Not on file.     Social History Main Topics     Smoking status: Former Smoker     Smokeless tobacco: Never Used     Alcohol use No     Drug use: No     Sexual activity: No     Other Topics Concern     Not on file     Social History Narrative         No current facility-administered medications on file prior to encounter.   Current Outpatient Prescriptions on File Prior to Encounter:  FOLIC ACID PO Take 5 mg by mouth daily Take 5 mg PO QD x 1 month starting 12/7/17 then 1 mg PO QD   ferrous sulfate (IRON) 325 (65 FE) MG tablet Take 325 mg by mouth 2 times daily   ipratropium - albuterol 0.5 mg/2.5 mg/3 mL (DUONEB) 0.5-2.5 (3) MG/3ML neb solution Take 1 vial by nebulization every 4  hours as needed for shortness of breath / dyspnea or wheezing   Carboxymethylcellulose Sodium (CARBOXYMETHYLCELLULOSE SOD PF OP) Place 1 drop into both eyes 4 times daily And 1 drop both eyes daily prn   fentaNYL (DURAGESIC) 12 mcg/hr 72 hr patch Place 1 patch onto the skin every 72 hours   MEGESTROL ACETATE PO Take 80 mg by mouth 2 times daily   senna-docusate (SENOKOT-S;PERICOLACE) 8.6-50 MG per tablet Take 2 tablets by mouth 2 times daily   budesonide-formoterol (SYMBICORT) 160-4.5 MCG/ACT Inhaler Inhale 2 puffs into the lungs 2 times daily   Timolol Hemihydrate (BETIMOL OP) Place 1 drop into both eyes every morning    bimatoprost (LUMIGAN) 0.01 % SOLN Place 1 drop into both eyes At Bedtime   WARFARIN SODIUM PO Take by mouth daily See facility orders for INR dosing   calcium carbonate (OS-JORDAN 600 MG [place]. CA) 1500 (600 CA) MG tablet Take 1,500 mg by mouth 2 times daily (with meals)   isosorbide mononitrate (IMDUR) 30 MG 24 hr tablet Take 2 tablets (60 mg) by mouth daily   ONDANSETRON PO Take 4 mg by mouth every 8 hours as needed for nausea   furosemide (LASIX) 20 MG tablet Take 1 tablet (20 mg) by mouth 2 times daily   ranitidine (RANITIDINE) 75 MG tablet Take 150 mg by mouth daily    diclofenac (VOLTAREN) 1 % GEL topical gel Apply 4 grams to knees four times daily as needed using enclosed dosing card.   insulin glargine (LANTUS) 100 UNIT/ML injection Inject 20 Units Subcutaneous every morning    omeprazole (PRILOSEC) 10 MG CR capsule Take 1 capsule (10 mg) by mouth daily   atorvastatin (LIPITOR) 10 MG tablet Take 10 mg by mouth At Bedtime    tiotropium (SPIRIVA) 18 MCG inhalation capsule Inhale 18 mcg into the lungs daily   acetaminophen (TYLENOL) 500 MG tablet Take 1,000 mg by mouth 3 times daily Not to exceed 3000 mg/24 hours    [DISCONTINUED] enzalutamide (XTANDI) 40 MG capsule Take 4 capsules (160 mg) by mouth daily   nitroglycerin (NITROSTAT) 0.4 MG SL tablet Place 0.4 mg under the tongue every 5 minutes as  needed for chest pain if you are still having symptoms after 3 doses (15 minutes) call 911.   aspirin 81 MG EC tablet Take 1 tablet (81 mg) by mouth daily   metoprolol (TOPROL XL) 50 MG 24 hr tablet Take 1 tablet (50 mg) by mouth daily   tamsulosin (FLOMAX) 0.4 MG 24 hr capsule Take 1 capsule (0.4 mg) by mouth daily   ammonium lactate (LAC-HYDRIN) 12 % lotion Apply topically 2 times daily as needed for dry skin To all extremities for dry skin       Data:    Results for orders placed or performed during the hospital encounter of 12/13/17   XR Chest Port 1 View    Narrative    Exam:  Chest X-ray 12/13/2017 1:41 PM    History: chest pain;     Comparison: Chest x-ray dated 11/20/2017    Findings: AP portable upright chest x-ray. The cardiomediastinal  silhouette is within normal limits. No pleural effusion. No  pneumothorax. No focal opacity. Left greater than right streaky  bibasilar opacities. The upper abdomen is unremarkable. No acute bony  abnormality.      Impression    Impression: Left greater than right streaky basilar opacities favored  to represent atelectasis.    I have personally reviewed the examination and initial interpretation  and I agree with the findings.    BERNICE LYNN MD   Basic metabolic panel   Result Value Ref Range    Sodium 143 133 - 144 mmol/L    Potassium 5.0 3.4 - 5.3 mmol/L    Chloride 109 94 - 109 mmol/L    Carbon Dioxide 26 20 - 32 mmol/L    Anion Gap 8 3 - 14 mmol/L    Glucose 87 70 - 99 mg/dL    Urea Nitrogen 36 (H) 7 - 30 mg/dL    Creatinine 1.55 (H) 0.66 - 1.25 mg/dL    GFR Estimate 44 (L) >60 mL/min/1.7m2    GFR Estimate If Black 54 (L) >60 mL/min/1.7m2    Calcium 7.9 (L) 8.5 - 10.1 mg/dL   CBC with platelets differential   Result Value Ref Range    WBC 11.4 (H) 4.0 - 11.0 10e9/L    RBC Count 2.44 (L) 4.4 - 5.9 10e12/L    Hemoglobin 6.4 (LL) 13.3 - 17.7 g/dL    Hematocrit 21.5 (L) 40.0 - 53.0 %    MCV 88 78 - 100 fl    MCH 26.2 (L) 26.5 - 33.0 pg    MCHC 29.8 (L) 31.5 - 36.5 g/dL     RDW 20.1 (H) 10.0 - 15.0 %    Platelet Count 244 150 - 450 10e9/L    Diff Method Automated Method     % Neutrophils 75.0 %    % Lymphocytes 13.7 %    % Monocytes 8.7 %    % Eosinophils 1.5 %    % Basophils 0.2 %    % Immature Granulocytes 0.9 %    Nucleated RBCs 0 0 /100    Absolute Neutrophil 8.6 (H) 1.6 - 8.3 10e9/L    Absolute Lymphocytes 1.6 0.8 - 5.3 10e9/L    Absolute Monocytes 1.0 0.0 - 1.3 10e9/L    Absolute Eosinophils 0.2 0.0 - 0.7 10e9/L    Absolute Basophils 0.0 0.0 - 0.2 10e9/L    Abs Immature Granulocytes 0.1 0 - 0.4 10e9/L    Absolute Nucleated RBC 0.0    Troponin I   Result Value Ref Range    Troponin I ES <0.015 0.000 - 0.045 ug/L   INR   Result Value Ref Range    INR 1.92 (H) 0.86 - 1.14   Glucose by meter   Result Value Ref Range    Glucose 79 70 - 99 mg/dL   EKG 12-lead, tracing only   Result Value Ref Range    Interpretation ECG Click View Image link to view waveform and result    ABO/Rh type and screen   Result Value Ref Range    ABO O     RH(D) Pos     Antibody Screen Neg     Test Valid Only At          [facility]    Specimen Expires 12/16/2017      *Note: Due to a large number of results and/or encounters for the requested time period, some results have not been displayed. A complete set of results can be found in Results Review.             EKG Interpretation:        Symptoms at time of EKG: chest pain, shortness of breath  Rhythm: Sinus tachycardia  Rate: 111  Axis: normal  Ectopy: none  Conduction: normal  ST Segments/ T Waves: No ST-T wave changes and No acute ischemic changes  Comparison to prior: no significant change from 10/11/2017    Clinical Impression: sinus tachycardia    ROS:    Respiratory: Positive for shortness of breath.    Cardiovascular: Positive for chest pain and leg swelling.   Gastrointestinal: Positive for nausea. Negative for vomiting.   Musculoskeletal: Positive for myalgias (shoulder pain) and neck pain.    Neurological: Positive for headaches.         Exam:    Vitals:  B/P: 112/53, T: 98.3, P: 108, R: 20    General: alert, laying in bed, NAD. Afebrile.  Skin: no jaundice. Mild pallor.  Head: Normocephalic.  EENT: Oropharynx: MMM.   Neck: Neck supple.   Respiratory: No distress. Equally diminished breath sounds bilaterally. Some anterior expiratory wheezes heard. No rales.  Cardiovascular: Mildly tachycardic but normal S1/S2. No murmurs, clicks gallops or rub heard. Peripheral edema of bilateral LEs, wrapped.  Gastrointestinal: Abdomen soft, nontender. BS normal.   Musculoskeletal: bilateral LEs wrapped.  Neurologic: Follows commands. Speech sometimes nonsensical or tangential. Moves extremities equally.               Signed:  Lise Mcdonald PA-C  December 13, 2017 at 5:42 PM      Patient was seen and evaluated by ER Staff during the OBS Unit stay.  The medical decision making and plan of care was discussed with the OBS Care Team and was supervised by ER Staff.  The documentation above accurately reflects the patient's initial evaluation, care and disposition under my supervision in the OBS Unit.    Abelino Joel MD, FACEP  [facility] Staff Emergency Physician

## 2017-12-13 NOTE — PROGRESS NOTES
Emergency Social Work Services Note    Date of  Intervention: 12/13/17  Last Emergency Department Visit:  10/11/17  Care Plan:  None  Collaborated with:  Admissions at [facility]    Data:  Pt is from [facility] and was sent to the hospital due to chest pain. Pt will be admitted for blood transfusion. Per chart, Pt's provider discussed hospice with Pt and family, however they declined at this time. Pt has a history of metastatic prostate cancer and is not a candidate for further treatment.     Intervention:  Writer contacted Joyce in Admissions at [facility] ([phone]), who confirmed that Pt is able to return when medically cleared. She states that Pt had not been receiving blood transfusions in the facility, but was transferred out for them.    Assessment:  Pt is from [facility] and will return when diischarged.    Plan:    Anticipated Disposition:  Facility:  [facility], [phone]    Barriers to d/c plan:  Medical clearance    Follow Up:  Unit SW to contact Admissions for return to facility when medically cleared.      Terese An [facility]  Emergency Department   Pager: [phone]

## 2017-12-13 NOTE — IP AVS SNAPSHOT
MRN:[id number]                      After Visit Summary   12/13/2017    Tomas Grey    MRN: [id number]           Thank you!     Thank you for choosing [facility] for your care. Our goal is always to provide you with excellent care. Hearing back from our patients is one way we can continue to improve our services. Please take a few minutes to complete the written survey that you may receive in the mail after you visit with us. Thank you!        Patient Information     Date Of Birth          1945        Designated Caregiver       Most Recent Value    Caregiver    Will someone help with your care after discharge? yes    Name of designated caregiver [facility]    Phone number of caregiver see chart    Caregiver address see chart      About your hospital stay     You were admitted on:  December 13, 2017 You last received care in the:  Unit 6D Observation [facility]    You were discharged on:  December 14, 2017       Who to Call     For medical emergencies, please call 911.  For non-urgent questions about your medical care, please call your primary care provider or clinic, [phone]          Attending Provider     Provider Specialty    Tanya Harris MD Emergency Medicine    [facility]       Primary Care Provider Office Phone # Fax #    Ekaterina LozanoSAL [phone]      Your next 10 appointments already scheduled     Jan 09, 2018  2:00 PM CST   (Arrive by 1:45 PM)   New Patient Visit with JASON Macedo   [facility] ([facility])    [address] [place] [id number] [phone]              Further instructions from your care team       Resume your current activities and medications.  Please check INR within the next 5-7 days while you are on the antibiotic doxycycline.  Follow-up with physician  "or your skilled care facility.    Pending Results     No orders found for last 3 day(s).            Statement of Approval     Ordered          17 1130  I have reviewed and agree with all the recommendations and orders detailed in this document.  EFFECTIVE NOW     Approved and electronically signed by:  Rubio Alberts MD             Admission Information     Date & Time Provider Department Dept. Phone    2017 Abelino Joel MD Unit 6D Observation [facility] [phone]      Your Vitals Were     Blood Pressure Pulse Temperature Respirations Height Weight    139/59 108 97.4  F (36.3  C) (Oral) 16 1.803 m (5' 11\") 93.1 kg (205 lb 4 oz)    Pulse Oximetry BMI (Body Mass Index)                98% 28.63 kg/m2          [employer] Information     [facility] lets you send messages to your doctor, view your test results, renew your prescriptions, schedule appointments and more. To sign up, go to [email or web address] . Click on \"Log in\" on the left side of the screen, which will take you to the Welcome page. Then click on \"Sign up Now\" on the right side of the page.     You will be asked to enter the access code listed below, as well as some personal information. Please follow the directions to create your username and password.     Your access code is: [email or web address]  Expires: 3/14/2018 10:17 AM     Your access code will  in 90 days. If you need help or a new code, please call your [facility] clinic or [phone].        Care EveryWhere ID     This is your Care EveryWhere ID. This could be used by other organizations to access your [facility] medical records  [id number]        Equal Access to Services     [facility]: Hadii shima Mcclelland, sheliada antonio, qaybta jannyalmajorje contreras . So [place] [phone].    ATENCIÓN: Si habla español, tiene a soria disposición servicios gratuitos de asistencia lingüística. Llame al [phone].    We comply with " applicable federal civil rights laws and [place] laws. We do not discriminate on the basis of race, color, national origin, age, disability, sex, sexual orientation, or gender identity.               Review of your medicines      START taking        Dose / Directions    doxycycline 100 MG capsule   Commonly known as:  VIBRAMYCIN   Indication:  copd exacerbation   Used for:  COPD exacerbation (H)        Dose:  100 mg   Take 1 capsule (100 mg) by mouth every 12 hours for 7 days   Quantity:  14 capsule   Refills:  0       predniSONE 20 MG tablet   Commonly known as:  DELTASONE   Used for:  COPD exacerbation (H)        Dose:  40 mg   Start taking on:  12/15/2017   Take 2 tablets (40 mg) by mouth daily for 5 days   Quantity:  10 tablet   Refills:  0         CONTINUE these medicines which have NOT CHANGED        Dose / Directions    acetaminophen 500 MG tablet   Commonly known as:  TYLENOL        Dose:  1000 mg   Take 1,000 mg by mouth 3 times daily Not to exceed 3000 mg/24 hours   Refills:  0       ammonium lactate 12 % lotion   Commonly known as:  LAC-HYDRIN   Indication:  Abnormal Dryness of Skin        Apply topically 2 times daily as needed for dry skin To all extremities for dry skin   Refills:  0       aspirin 81 MG EC tablet   Used for:  ASCVD (arteriosclerotic cardiovascular disease)        Dose:  81 mg   Take 1 tablet (81 mg) by mouth daily   Quantity:  5 tablet   Refills:  0       BETIMOL OP        Dose:  1 drop   Place 1 drop into both eyes every morning   Refills:  0       bimatoprost 0.01 % Soln   Commonly known as:  LUMIGAN        Dose:  1 drop   Place 1 drop into both eyes At Bedtime   Refills:  0       budesonide-formoterol 160-4.5 MCG/ACT Inhaler   Commonly known as:  SYMBICORT        Dose:  2 puff   Inhale 2 puffs into the lungs 2 times daily   Refills:  0       calcium carbonate 1500 (600 CA) MG tablet   Commonly known as:  OS-JORDAN 600 mg [place]. Ca        Dose:  1500 mg   Take 1,500 mg by mouth 2 times  daily (with meals)   Refills:  0       CARBOXYMETHYLCELLULOSE SOD PF OP        Dose:  1 drop   Place 1 drop into both eyes 4 times daily And 1 drop both eyes daily prn   Refills:  0       diclofenac 1 % Gel topical gel   Commonly known as:  VOLTAREN   Used for:  Chronic pain of both knees        Apply 4 grams to knees four times daily as needed using enclosed dosing card.   Quantity:  100 g   Refills:  0       fentaNYL 12 mcg/hr 72 hr patch   Commonly known as:  DURAGESIC        Dose:  1 patch   Place 1 patch onto the skin every 72 hours   Refills:  0       ferrous sulfate 325 (65 FE) MG tablet   Commonly known as:  IRON        Dose:  325 mg   Take 325 mg by mouth 2 times daily   Refills:  0       FOLIC ACID PO        Dose:  5 mg   Take 5 mg by mouth daily Take 5 mg PO QD x 1 month starting 12/7/17 then 1 mg PO QD   Refills:  0       furosemide 20 MG tablet   Commonly known as:  LASIX   Used for:  Chronic diastolic congestive heart failure (H), Lymphedema of both lower extremities        Dose:  20 mg   Take 1 tablet (20 mg) by mouth 2 times daily   Quantity:  30 tablet   Refills:  0       insulin glargine 100 UNIT/ML injection   Commonly known as:  LANTUS   Indication:  Type 2 Diabetes        Dose:  20 Units   Inject 20 Units Subcutaneous every morning   Refills:  0       ipratropium - albuterol 0.5 mg/2.5 mg/3 mL 0.5-2.5 (3) MG/3ML neb solution   Commonly known as:  DUONEB        Dose:  1 vial   Take 1 vial by nebulization every 4 hours as needed for shortness of breath / dyspnea or wheezing   Refills:  0       isosorbide mononitrate 30 MG 24 hr tablet   Commonly known as:  IMDUR        Dose:  60 mg   Take 2 tablets (60 mg) by mouth daily   Quantity:  30 tablet   Refills:  0       LIPITOR 10 MG tablet   Generic drug:  atorvastatin        Dose:  10 mg   Take 10 mg by mouth At Bedtime   Refills:  0       MEGESTROL ACETATE PO        Dose:  80 mg   Take 80 mg by mouth 2 times daily   Refills:  0       metoprolol 50 MG  24 hr tablet   Commonly known as:  TOPROL XL   Used for:  ASCVD (arteriosclerotic cardiovascular disease)        Dose:  50 mg   Take 1 tablet (50 mg) by mouth daily   Quantity:  5 tablet   Refills:  0       nitroGLYcerin 0.4 MG sublingual tablet   Commonly known as:  NITROSTAT        Dose:  0.4 mg   Place 0.4 mg under the tongue every 5 minutes as needed for chest pain if you are still having symptoms after 3 doses (15 minutes) call 911.   Refills:  0       omeprazole 10 MG CR capsule   Commonly known as:  priLOSEC   Used for:  Gastric reflux        Dose:  10 mg   Take 1 capsule (10 mg) by mouth daily   Refills:  0       ONDANSETRON PO        Dose:  4 mg   Take 4 mg by mouth every 8 hours as needed for nausea   Refills:  0       ranitidine 75 MG tablet   Indication:  Gastroesophageal Reflux Disease   Generic drug:  ranitidine        Dose:  150 mg   Take 150 mg by mouth daily   Refills:  0       senna-docusate 8.6-50 MG per tablet   Commonly known as:  SENOKOT-S;PERICOLACE        Dose:  2 tablet   Take 2 tablets by mouth 2 times daily   Refills:  0       tamsulosin 0.4 MG capsule   Commonly known as:  FLOMAX   Used for:  Benign non-nodular prostatic hyperplasia without lower urinary tract symptoms        Dose:  0.4 mg   Take 1 capsule (0.4 mg) by mouth daily   Quantity:  5 capsule   Refills:  0       tiotropium 18 MCG capsule   Commonly known as:  SPIRIVA   Indication:  Chronic Obstructive Lung Disease        Dose:  18 mcg   Inhale 18 mcg into the lungs daily   Refills:  0       WARFARIN SODIUM PO        Take by mouth daily See facility orders for INR dosing   Refills:  0            Where to get your medicines      Some of these will need a paper prescription and others can be bought over the counter. Ask your nurse if you have questions.     You don't need a prescription for these medications     doxycycline 100 MG capsule    predniSONE 20 MG tablet               ANTIBIOTIC INSTRUCTION     You've Been Prescribed an  Antibiotic - Now What?  Your healthcare team thinks that you or your loved one might have an infection. Some infections can be treated with antibiotics, which are powerful, life-saving drugs. Like all medications, antibiotics have side effects and should only be used when necessary. There are some important things you should know about your antibiotic treatment.      Your healthcare team may run tests before you start taking an antibiotic.    Your team may take samples (e.g., from your blood, urine or other areas) to run tests to look for bacteria. These test can be important to determine if you need an antibiotic at all and, if you do, which antibiotic will work best.      Within a few days, your healthcare team might change or even stop your antibiotic.    Your team may start you on an antibiotic while they are working to find out what is making you sick.    Your team might change your antibiotic because test results show that a different antibiotic would be better to treat your infection.    In some cases, once your team has more information, they learn that you do not need an antibiotic at all. They may find out that you don't have an infection, or that the antibiotic you're taking won't work against your infection. For example, an infection caused by a virus can't be treated with antibiotics. Staying on an antibiotic when you don't need it is more likely to be harmful than helpful.      You may experience side effects from your antibiotic.    Like all medications, antibiotics have side effects. Some of these can be serious.    Let you healthcare team know if you have any known allergies when you are admitted to the hospital.    One significant side effect of nearly all antibiotics is the risk of severe and sometimes deadly diarrhea caused by Clostridium difficile (C. Difficile). This occurs when a person takes antibiotics because some good germs are destroyed. Antibiotic use allows C. diificile to take over,  putting patients at high risk for this serious infection.    As a patient or caregiver, it is important to understand your or your loved one's antibiotic treatment. It is especially important for caregivers to speak up when patients can't speak for themselves. Here are some important questions to ask your healthcare team.    What infection is this antibiotic treating and how do you know I have that infection?    What side effects might occur from this antibiotic?    How long will I need to take this antibiotic?    Is it safe to take this antibiotic with other medications or supplements (e.g., vitamins) that I am taking?     Are there any special directions I need to know about taking this antibiotic? For example, should I take it with food?    How will I be monitored to know whether my infection is responding to the antibiotic?    What tests may help to make sure the right antibiotic is prescribed for me?      Information provided by:  www.cdc.gov/getsmart  U.S. Department of Health and Human Services  Centers for disease Control and Prevention  National Center for Emerging and Zoonotic Infectious Diseases  Division of Healthcare Quality Promotion         Protect others around you: Learn how to safely use, store and throw away your medicines at www.disposemymeds.org.             Medication List: This is a list of all your medications and when to take them. Check marks below indicate your daily home schedule. Keep this list as a reference.      Medications           Morning Afternoon Evening Bedtime As Needed    acetaminophen 500 MG tablet   Commonly known as:  TYLENOL   Take 1,000 mg by mouth 3 times daily Not to exceed 3000 mg/24 hours   Last time this was given:  1,000 mg on 12/14/2017  9:18 AM                                ammonium lactate 12 % lotion   Commonly known as:  LAC-HYDRIN   Apply topically 2 times daily as needed for dry skin To all extremities for dry skin                                aspirin 81  MG EC tablet   Take 1 tablet (81 mg) by mouth daily   Last time this was given:  81 mg on 12/14/2017  9:18 AM                                BETIMOL OP   Place 1 drop into both eyes every morning                                bimatoprost 0.01 % Soln   Commonly known as:  LUMIGAN   Place 1 drop into both eyes At Bedtime                                budesonide-formoterol 160-4.5 MCG/ACT Inhaler   Commonly known as:  SYMBICORT   Inhale 2 puffs into the lungs 2 times daily                                calcium carbonate 1500 (600 CA) MG tablet   Commonly known as:  OS-JORDAN 600 mg [place]. Ca   Take 1,500 mg by mouth 2 times daily (with meals)                                CARBOXYMETHYLCELLULOSE SOD PF OP   Place 1 drop into both eyes 4 times daily And 1 drop both eyes daily prn                                diclofenac 1 % Gel topical gel   Commonly known as:  VOLTAREN   Apply 4 grams to knees four times daily as needed using enclosed dosing card.                                doxycycline 100 MG capsule   Commonly known as:  VIBRAMYCIN   Take 1 capsule (100 mg) by mouth every 12 hours for 7 days   Last time this was given:  100 mg on 12/14/2017  9:18 AM                                fentaNYL 12 mcg/hr 72 hr patch   Commonly known as:  DURAGESIC   Place 1 patch onto the skin every 72 hours   Last time this was given:  1 patch on 12/14/2017  9:18 AM                                ferrous sulfate 325 (65 FE) MG tablet   Commonly known as:  IRON   Take 325 mg by mouth 2 times daily   Last time this was given:  325 mg on 12/14/2017  9:18 AM                                FOLIC ACID PO   Take 5 mg by mouth daily Take 5 mg PO QD x 1 month starting 12/7/17 then 1 mg PO QD                                furosemide 20 MG tablet   Commonly known as:  LASIX   Take 1 tablet (20 mg) by mouth 2 times daily   Last time this was given:  20 mg on 12/14/2017  9:18 AM                                insulin glargine 100 UNIT/ML injection    Commonly known as:  LANTUS   Inject 20 Units Subcutaneous every morning   Last time this was given:  20 Units on 12/14/2017  9:27 AM                                ipratropium - albuterol 0.5 mg/2.5 mg/3 mL 0.5-2.5 (3) MG/3ML neb solution   Commonly known as:  DUONEB   Take 1 vial by nebulization every 4 hours as needed for shortness of breath / dyspnea or wheezing   Last time this was given:  3 mLs on 12/14/2017  7:55 AM                                isosorbide mononitrate 30 MG 24 hr tablet   Commonly known as:  IMDUR   Take 2 tablets (60 mg) by mouth daily   Last time this was given:  60 mg on 12/14/2017  9:18 AM                                LIPITOR 10 MG tablet   Take 10 mg by mouth At Bedtime   Last time this was given:  10 mg on 12/13/2017 10:02 PM   Generic drug:  atorvastatin                                MEGESTROL ACETATE PO   Take 80 mg by mouth 2 times daily   Last time this was given:  80 mg on 12/14/2017  9:27 AM                                metoprolol 50 MG 24 hr tablet   Commonly known as:  TOPROL XL   Take 1 tablet (50 mg) by mouth daily   Last time this was given:  50 mg on 12/14/2017  9:18 AM                                nitroGLYcerin 0.4 MG sublingual tablet   Commonly known as:  NITROSTAT   Place 0.4 mg under the tongue every 5 minutes as needed for chest pain if you are still having symptoms after 3 doses (15 minutes) call 911.                                omeprazole 10 MG CR capsule   Commonly known as:  priLOSEC   Take 1 capsule (10 mg) by mouth daily   Last time this was given:  10 mg on 12/14/2017  9:27 AM                                ONDANSETRON PO   Take 4 mg by mouth every 8 hours as needed for nausea                                predniSONE 20 MG tablet   Commonly known as:  DELTASONE   Take 2 tablets (40 mg) by mouth daily for 5 days   Start taking on:  12/15/2017   Last time this was given:  60 mg on 12/14/2017  9:18 AM                                ranitidine 75 MG  tablet   Take 150 mg by mouth daily   Last time this was given:  150 mg on 12/14/2017  9:18 AM   Generic drug:  ranitidine                                senna-docusate 8.6-50 MG per tablet   Commonly known as:  SENOKOT-S;PERICOLACE   Take 2 tablets by mouth 2 times daily                                tamsulosin 0.4 MG capsule   Commonly known as:  FLOMAX   Take 1 capsule (0.4 mg) by mouth daily   Last time this was given:  0.4 mg on 12/14/2017  9:18 AM                                tiotropium 18 MCG capsule   Commonly known as:  SPIRIVA   Inhale 18 mcg into the lungs daily                                WARFARIN SODIUM PO   Take by mouth daily See facility orders for INR dosing

## 2017-12-13 NOTE — LETTER
[employer]               Recipient:    [facility]       Sender:    Laureen Stallings, LICSW [phone]      Date: December 14, 2017  Patient Name:  Tomas Grey  Routing Message:      Transport Time: 1pm  RN station: [phone]      The documents accompanying this notice contain confidential information belonging to the sender.  This information is intended only for the use of the individual or entity named above.  The authorized recipient of this information is prohibited from disclosing this information to any other party and is required to destroy the information after its stated need has been fulfilled, unless otherwise required by state law.      If you are not the intended recipient, you are hereby notified that any disclosure, copying, distribution or action taken in reliance on the contents of these documents is strictly prohibited. If you have received this document in error, please notify [facility] immediately at [phone].  You may return the document via fax ([phone]) or return mail  (Health Information Management, , [address] [place] [address]).

## 2017-12-13 NOTE — IP AVS SNAPSHOT
Tomas Grey #[id number] (CSN: [id number])  (72 year old M)  (Adm: 17)     [id number]               UNIT 6D OBSERVATION [facility] BANK: [phone]            Patient Demographics     Patient Name Sex          Age SSN Address Phone    Que Tomas WILL Male 1945 (72 year old) [id number] [facility]  [address]  [facility] [address] [phone] (Home) *Preferred*  [phone] (Work)  [phone] (Mobile)      Emergency Contact(s)     Name Relation Home Work Mobile    Les Bourgeois [phone]      Admission Information     Attending Provider Admitting Provider Admission Type Admission Date/Time    Abelino Joel MD Godin, Elizabeth Marquis, MD Emergency 17  1219    Discharge Date Hospital Service Auth/Cert Status Service Area     Emergency Medicine [facility]    Unit Room/Bed Admission Status       UU U6D OBSERVATION 6508/6508-01 Admission (Confirmed)       Admission     Complaint    Anemia requiring transfusions      Hospital Account     Name Acct ID Class Status Primary Coverage    Tomas Grey [id number] Observation [facility]/ [facility]            Guarantor Account (for Hospital Account #[id number])     Name Relation to Pt Service Area Active? Acct Type    Tomas Grey  FCS Yes Personal/Family    Address Phone          [facility]  [address]  [place] [address] [phone](H)  [phone](O)              Coverage Information (for Hospital Account #[id number])     F/O Payor/Plan Precert #    [employer] [facility]/[employer]     Subscriber Subscriber #    Tomas Grey [id number]    Address Phone    [address]  [place] [address] [phone]                                                INTERAGENCY TRANSFER FORM - PHYSICIAN ORDERS   2017                       UNIT 6D OBSERVATION [facility]  "BANK: [phone]            Attending Provider: Abelino Joel MD     Allergies:  Morphine    Infection:  ESBL, MRSA   Service:  EMERGENCY ME    Ht:  1.803 m (5' 11\")   Wt:  93.1 kg (205 lb 4 oz)   Admission Wt:  93.1 kg (205 lb 4 oz)    BMI:  28.63 kg/m 2   BSA:  2.16 m 2            ED Clinical Impression     Diagnosis Description Comment Added By Time Added    Other chest pain [R07.89] Other chest pain [R07.89]  Tanya Harris MD 12/13/2017  3:23 PM    COPD exacerbation (H) [J44.1] COPD exacerbation (H) [J44.1]  Tanya Harris MD 12/13/2017  3:23 PM    Anemia in neoplastic disease [D63.0] Anemia in neoplastic disease [D63.0]  Tanya Harris MD 12/13/2017  3:23 PM    Prostate cancer (H) [C61] Prostate cancer (H) [C61]  Tanya Harris MD 12/13/2017  3:24 PM    Anemia requiring transfusions [D64.9] Anemia requiring transfusions [D64.9]  Lise Mcdonald PA-C 12/13/2017  5:41 PM      Hospital Problems as of 12/14/2017              Priority Class Noted POA    Anemia in neoplastic disease Medium  12/13/2017 Yes      Non-Hospital Problems as of 12/14/2017              Priority Class Noted    Insomnia Medium  Unknown    COPD (chronic obstructive pulmonary disease) (H) Medium  Unknown    Anemia Medium  Unknown    Osteoporosis Medium  Unknown    ASCVD (arteriosclerotic cardiovascular disease) Medium  Unknown    Prostate cancer metastatic to multiple sites (H) Medium  Unknown    Venous stasis dermatitis Medium  7/8/2014    Fecal incontinence Medium  7/25/2014    CKD (chronic kidney disease) stage 3, GFR 30-59 ml/min Medium  8/22/2014    Serum albumin decreased Medium  8/27/2014    Right heart failure Medium  8/27/2014    Type 2 diabetes, HbA1C goal < 8% (H) Medium  9/5/2014    Impaired mobility Medium  9/5/2014    Knee pain Medium  9/5/2014    Irregular heart rhythm Medium  10/10/2014    Sleep apnea Medium  12/10/2014    Metastasis to bone (H) Medium  12/15/2014 "    SOB (shortness of breath) Medium  6/2/2015    FTT (failure to thrive) in adult Medium  6/23/2015    Dyspnea Medium  6/24/2015    Atypical chest pain Medium  7/19/2015    Prostate cancer metastatic to bone (H) Medium  7/23/2015    Chronic pain Medium  7/31/2015    ACP (advance care planning) Medium  8/4/2015    Respiratory failure (H) Medium  9/1/2015    CAD (coronary artery disease) Medium  9/1/2015    Physical deconditioning Medium  9/1/2015    Hyperlipidemia Medium  9/10/2015    Renal insufficiency Medium  10/2/2015    Epigastric pain Medium  10/2/2015    Lymphedema of both lower extremities Medium  11/2/2015    Primary pulmonary hypertension (H) Medium  11/2/2015    Redness of eye, left Medium  11/9/2015    Chest wall pain Medium  12/21/2015    Chronic obstructive pulmonary disease, unspecified COPD type (H) Medium  12/24/2015    FH: chronic lung disease requiring oxygen Medium  12/24/2015    COPD exacerbation (H) Medium  1/3/2016    History of heart attack Medium  1/26/2016    Nocturnal dyspnea Medium  1/26/2016    Dyspnea on exertion Medium  1/26/2016    Chronic obstructive pulmonary disease with severity to be determined (H) Medium  1/26/2016    Leg pain Medium  2/25/2016    Adjustment disorder with mixed anxiety and depressed mood Medium  4/28/2016    Noncompliance with medication regimen Medium  6/2/2016    Type 2 diabetes mellitus with stage 3 chronic kidney disease (H) Medium  6/5/2016    Chronic respiratory failure with hypoxia and hypercapnia (H) Medium  6/24/2016    Misuse of drugs (H) Medium  6/24/2016    Pneumonia of left lower lobe due to infectious organism (H) Medium  7/2/2016    Pneumonia Medium  7/2/2016    Hypercapnia Medium  7/18/2016    Health Care Home Medium  8/17/2016    HTN (hypertension) Medium  10/12/2016    CHF (congestive heart failure) (H) Medium  10/12/2016    Chronic respiratory failure with hypoxia (H) Medium  12/27/2016    Morbid obesity (H) High  5/17/2017    Altered mental  status Medium  6/1/2017    Bacterial sepsis (H) Medium  6/1/2017      Code Status History     Date Active Date Inactive Code Status Order ID Comments User Context    6/6/2017 10:33 AM 12/13/2017  5:31 PM Full Code [id number]  Viktoria Gunderson MD Outpatient    6/1/2017  5:14 AM 6/6/2017 10:33 AM Full Code [id number]  Shahid Chávez MD Inpatient    12/27/2016 12:10 AM 12/27/2016  8:37 PM Full Code [id number]  Inocencio Tenorio MD Inpatient    10/11/2016  9:57 AM 12/27/2016 12:10 AM Full Code [id number]  Meek Lindo MD Outpatient    10/4/2016 10:59 PM 10/11/2016  9:57 AM Full Code [id number]  Junior Headley MD Inpatient    7/17/2016  5:08 PM 7/20/2016  7:02 PM Full Code [id number]  Lucero Harris PA-C ED    7/8/2016 12:29 PM 7/17/2016  5:08 PM Full Code [id number]  Jim Barillas MD Outpatient    7/2/2016  7:04 AM 7/8/2016 12:29 PM Full Code [id number]  Jmi Arellano MD ED    6/24/2016 10:02 AM 7/2/2016  7:04 AM Full Code [id number]  Lucero Harris PA-C Outpatient    6/23/2016  4:00 PM 6/24/2016 10:02 AM Full Code [id number]  Altagracia Perez PA-C ED    5/27/2016  6:23 PM 5/31/2016  8:04 PM Full Code [id number]  Brant Harvey MD Inpatient    5/12/2016  3:18 PM 5/27/2016  6:23 PM Full Code [id number]  Jeovanny Quinn DO Outpatient    5/3/2016  6:32 PM 5/12/2016  3:18 PM Full Code [id number]  Meek Lindo MD Inpatient    4/19/2016 11:05 PM 4/24/2016  8:24 PM Full Code [id number]  Lucero Harris PA-C ED    3/23/2016 10:42 AM 4/19/2016 11:05 PM Full Code [id number]  Rafia Ortiz MD Outpatient    3/20/2016 10:01 PM 3/23/2016 10:42 AM Full Code [id number]  Ly Butcher MD Inpatient    3/6/2016 11:01 AM 3/20/2016 10:01 PM Full Code [id number]  John Graff MD Outpatient    2/27/2016 10:36 PM 3/6/2016 11:01 AM Full Code [id number]  Jim Arellano MD Inpatient    2/26/2016 10:30 AM 2/27/2016 10:36 PM Full Code [id number]   Xenia Conley, DO Outpatient    2/25/2016  4:48 PM 2/26/2016 10:30 AM Full Code [id number]  Xenia Conley, DO ED    1/26/2016 12:53 PM 2/25/2016  4:48 PM Full Code [id number]  Melody PerezSAL dejesus CNP Outpatient    12/30/2015  1:44 AM 1/3/2016  5:51 PM Full Code [id number]  Jim Barillas MD Inpatient    12/20/2015 10:28 AM 12/30/2015  1:44 AM Full Code [id number]  Caren Stevens PA-C Outpatient    12/20/2015  3:17 AM 12/20/2015 10:28 AM Full Code [id number]  Rafia Ortiz MD ED    11/18/2015  9:13 AM 12/20/2015  3:17 AM Full Code [id number]  StevensCaren crystal PA-C Outpatient    11/17/2015  4:49 PM 11/18/2015  9:13 AM Full Code [id number]  Caren Stevens PA-C ED    10/25/2015  9:12 PM 10/30/2015  2:14 PM Full Code [id number]  Preston Scott MD Inpatient    8/13/2015  8:04 AM 10/25/2015  9:12 PM Full Code [id number]  Gt Moody,  Outpatient    8/9/2015  7:58 PM 8/13/2015  8:04 AM Full Code [id number]  Jim Arellano MD Inpatient    7/19/2015  8:04 PM 7/21/2015  7:59 PM Full Code [id number]  Juan Antonio Oneal MD ED    7/15/2015  2:39 PM 7/19/2015  8:04 PM Full Code [id number]  Juan Antonio Oneal MD Outpatient    7/8/2015 11:07 PM 7/15/2015  2:39 PM Full Code [id number]  Scarlett Burt PA-C Inpatient    6/28/2015  9:54 AM 7/8/2015 11:07 PM Full Code [id number]  Josh Garrett, DO Outpatient    6/23/2015  1:56 AM 6/28/2015  9:54 AM Full Code [id number]  Gt Moody DO Inpatient    6/4/2015 11:47 AM 6/23/2015  1:56 AM Full Code [id number]  Dasia Marc MD Outpatient    6/2/2015  1:50 AM 6/4/2015 11:47 AM Full Code [id number]  Kalee Moreno MD Inpatient    5/31/2015  9:39 AM 6/2/2015  1:50 AM Full Code [id number]  Josh Garrett DO Outpatient    5/17/2015 11:10 PM 5/31/2015  9:39 AM Full Code [id number]  Geovany Hamilton MD Inpatient    4/27/2015 11:59 PM 5/3/2015  3:22 PM Full Code [id number]  Jim Arellano MD Inpatient    3/6/2015  1:00  PM 3/12/2015  3:03 PM Full Code [id number]  Argentina Partida PA-C Inpatient    2/22/2015 10:48 AM 3/6/2015  1:00 PM Full Code [id number]  Kevin Lozada MD Outpatient    2/18/2015  6:59 AM 2/22/2015 10:48 AM Full Code [id number]  Ronn Funez MD Inpatient    11/28/2014 12:18 PM 2/18/2015  6:59 AM Full Code [id number]  Jim Arellano MD Outpatient    11/23/2014  1:29 AM 11/28/2014 12:18 PM Full Code [id number]  John Graff MD Inpatient    10/15/2014 10:26 AM 11/23/2014  1:29 AM Full Code [id number]  John Graff MD Outpatient    10/10/2014  7:03 PM 10/15/2014 10:26 AM Full Code [id number]  Garcia Mcfarlane MD Inpatient    7/22/2014  9:54 AM 10/10/2014  7:03 PM Full Code [id number]  Jeovanny Quinn DO Outpatient    7/16/2014 10:23 PM 7/22/2014  9:54 AM Full Code [id number]  Argentina Partida PA-C Inpatient      Current Code Status     Date Active Code Status Order ID Comments User Context       12/13/2017  5:31 PM Full Code [id number]  Lise Mcdonald PA-C Inpatient          Medication Review      START taking        Dose / Directions Comments    doxycycline 100 MG capsule   Commonly known as:  VIBRAMYCIN   Indication:  copd exacerbation   Used for:  COPD exacerbation (H)        Dose:  100 mg   Take 1 capsule (100 mg) by mouth every 12 hours for 7 days   Quantity:  14 capsule   Refills:  0        predniSONE 20 MG tablet   Commonly known as:  DELTASONE   Used for:  COPD exacerbation (H)        Dose:  40 mg   Start taking on:  12/15/2017   Take 2 tablets (40 mg) by mouth daily for 5 days   Quantity:  10 tablet   Refills:  0          CONTINUE these medications which have NOT CHANGED        Dose / Directions Comments    acetaminophen 500 MG tablet   Commonly known as:  TYLENOL        Dose:  1000 mg   Take 1,000 mg by mouth 3 times daily Not to exceed 3000 mg/24 hours   Refills:  0        ammonium lactate 12 % lotion   Commonly known as:  LAC-HYDRIN   Indication:   Abnormal Dryness of Skin        Apply topically 2 times daily as needed for dry skin To all extremities for dry skin   Refills:  0        aspirin 81 MG EC tablet   Used for:  ASCVD (arteriosclerotic cardiovascular disease)        Dose:  81 mg   Take 1 tablet (81 mg) by mouth daily   Quantity:  5 tablet   Refills:  0        BETIMOL OP        Dose:  1 drop   Place 1 drop into both eyes every morning   Refills:  0        bimatoprost 0.01 % Soln   Commonly known as:  LUMIGAN        Dose:  1 drop   Place 1 drop into both eyes At Bedtime   Refills:  0        budesonide-formoterol 160-4.5 MCG/ACT Inhaler   Commonly known as:  SYMBICORT        Dose:  2 puff   Inhale 2 puffs into the lungs 2 times daily   Refills:  0        calcium carbonate 1500 (600 CA) MG tablet   Commonly known as:  OS-JORDAN 600 mg [place]. Ca        Dose:  1500 mg   Take 1,500 mg by mouth 2 times daily (with meals)   Refills:  0        CARBOXYMETHYLCELLULOSE SOD PF OP        Dose:  1 drop   Place 1 drop into both eyes 4 times daily And 1 drop both eyes daily prn   Refills:  0        diclofenac 1 % Gel topical gel   Commonly known as:  VOLTAREN   Used for:  Chronic pain of both knees        Apply 4 grams to knees four times daily as needed using enclosed dosing card.   Quantity:  100 g   Refills:  0        fentaNYL 12 mcg/hr 72 hr patch   Commonly known as:  DURAGESIC        Dose:  1 patch   Place 1 patch onto the skin every 72 hours   Refills:  0        ferrous sulfate 325 (65 FE) MG tablet   Commonly known as:  IRON        Dose:  325 mg   Take 325 mg by mouth 2 times daily   Refills:  0        FOLIC ACID PO        Dose:  5 mg   Take 5 mg by mouth daily Take 5 mg PO QD x 1 month starting 12/7/17 then 1 mg PO QD   Refills:  0        furosemide 20 MG tablet   Commonly known as:  LASIX   Used for:  Chronic diastolic congestive heart failure (H), Lymphedema of both lower extremities        Dose:  20 mg   Take 1 tablet (20 mg) by mouth 2 times daily   Quantity:   30 tablet   Refills:  0        insulin glargine 100 UNIT/ML injection   Commonly known as:  LANTUS   Indication:  Type 2 Diabetes        Dose:  20 Units   Inject 20 Units Subcutaneous every morning   Refills:  0        ipratropium - albuterol 0.5 mg/2.5 mg/3 mL 0.5-2.5 (3) MG/3ML neb solution   Commonly known as:  DUONEB        Dose:  1 vial   Take 1 vial by nebulization every 4 hours as needed for shortness of breath / dyspnea or wheezing   Refills:  0        isosorbide mononitrate 30 MG 24 hr tablet   Commonly known as:  IMDUR        Dose:  60 mg   Take 2 tablets (60 mg) by mouth daily   Quantity:  30 tablet   Refills:  0        LIPITOR 10 MG tablet   Generic drug:  atorvastatin        Dose:  10 mg   Take 10 mg by mouth At Bedtime   Refills:  0        MEGESTROL ACETATE PO        Dose:  80 mg   Take 80 mg by mouth 2 times daily   Refills:  0        metoprolol 50 MG 24 hr tablet   Commonly known as:  TOPROL XL   Used for:  ASCVD (arteriosclerotic cardiovascular disease)        Dose:  50 mg   Take 1 tablet (50 mg) by mouth daily   Quantity:  5 tablet   Refills:  0        nitroGLYcerin 0.4 MG sublingual tablet   Commonly known as:  NITROSTAT        Dose:  0.4 mg   Place 0.4 mg under the tongue every 5 minutes as needed for chest pain if you are still having symptoms after 3 doses (15 minutes) call 911.   Refills:  0        omeprazole 10 MG CR capsule   Commonly known as:  priLOSEC   Used for:  Gastric reflux        Dose:  10 mg   Take 1 capsule (10 mg) by mouth daily   Refills:  0        ONDANSETRON PO        Dose:  4 mg   Take 4 mg by mouth every 8 hours as needed for nausea   Refills:  0        ranitidine 75 MG tablet   Indication:  Gastroesophageal Reflux Disease   Generic drug:  ranitidine        Dose:  150 mg   Take 150 mg by mouth daily   Refills:  0        senna-docusate 8.6-50 MG per tablet   Commonly known as:  SENOKOT-S;PERICOLACE        Dose:  2 tablet   Take 2 tablets by mouth 2 times daily   Refills:  0  "       tamsulosin 0.4 MG capsule   Commonly known as:  FLOMAX   Used for:  Benign non-nodular prostatic hyperplasia without lower urinary tract symptoms        Dose:  0.4 mg   Take 1 capsule (0.4 mg) by mouth daily   Quantity:  5 capsule   Refills:  0        tiotropium 18 MCG capsule   Commonly known as:  SPIRIVA   Indication:  Chronic Obstructive Lung Disease        Dose:  18 mcg   Inhale 18 mcg into the lungs daily   Refills:  0        WARFARIN SODIUM PO        Take by mouth daily See facility orders for INR dosing   Refills:  0                  Further instructions from your care team       Resume your current activities and medications.  Please check INR within the next 5-7 days while you are on the antibiotic doxycycline.  Follow-up with physician or your skilled care facility.    Your next 10 appointments already scheduled     Jan 09, 2018  2:00 PM CST   (Arrive by 1:45 PM)   New Patient Visit with JASON Macedo   [facility] ([facility])    [address] [place] [address] [phone]              Statement of Approval     Ordered          12/14/17 1130  I have reviewed and agree with all the recommendations and orders detailed in this document.  EFFECTIVE NOW     Approved and electronically signed by:  Rubio Alberts MD                                                 INTERAGENCY TRANSFER FORM - NURSING   12/13/2017                       UNIT 6D OBSERVATION [facility] BANK: [phone]            Attending Provider: Abelino Joel MD     Allergies:  Morphine    Infection:  ESBL, MRSA   Service:  EMERGENCY ME    Ht:  1.803 m (5' 11\")   Wt:  93.1 kg (205 lb 4 oz)   Admission Wt:  93.1 kg (205 lb 4 oz)    BMI:  28.63 kg/m 2   BSA:  2.16 m 2            Advance Directives        Does patient have a scanned Advance Directive/ACP document in EPIC?           Yes        Immunizations     Name Date  "     Influenza (High Dose) 3 valent vaccine 10/04/17     Influenza (High Dose) 3 valent vaccine 10/11/16     Influenza (High Dose) 3 valent vaccine 10/27/15     Influenza (IIV3) PF 11/04/15     Pneumococcal (PCV 13) 11/04/15     Pneumococcal 23 valent 02/08/12     Tdap (Adacel,Boostrix) 11/04/15       ASSESSMENT     Discharge Profile Flowsheet     DISCHARGE NEEDS ASSESSMENT     Other Resources  Home Care 10/05/16 1431    Concerns To Be Addressed  discharge planning concerns 05/29/16 1110   PAS Number  [id number] (10/11/16) 10/11/16 0959    Equipment Currently Used at Home  wheelchair;respiratory supplies 06/02/17 1236   Existing Resources/Services  Home Care;DME 04/28/15 1505    # of Referrals Placed by CTS  Communication hand-offs to next level of Care Providers 10/17/16 1501   SKIN      Does Patient Need a Referral for Clinic CC  Yes 12/30/15 1137   Inspection of bony prominences  Full 12/14/17 0946    Equipment Used at Home  walker, rolling 03/23/16 1247   Inspection under devices  Full 12/14/17 0946    GASTROINTESTINAL (ADULT,PEDIATRIC,OB)     Skin WDL  ex;all 12/14/17 0946    GI WDL  ex;appearance/characteristics 12/14/17 0946   Skin Color/Characteristics  bruised (ecchymotic);carlos 12/14/17 0946    Abdominal Appearance  rounded 12/14/17 0946   Skin Temperature  warm 12/14/17 0946    Last Bowel Movement  12/13/17 12/14/17 0946   Skin Moisture  flaky;dry 12/14/17 0946    Passing flatus  yes 12/14/17 0946   Skin Integrity  wound(s);cracked 12/14/17 0946    COMMUNICATION ASSESSMENT     SAFETY      Patient's communication style  spoken language (English or Bilingual) 12/13/17 1215   Safety WDL  ex;safety factors 12/14/17 0946    FINAL RESOURCES     Safety Factors  bed in low position;wheels locked;call light in reach;upper side rails raised x 2;ID band on 12/14/17 0946    Resources List  Skilled Nursing Facility 06/01/17 0835   All Alarms  alarm(s) activated and audible 12/14/17 0946                 Assessment WDL  "(Within Defined Limits) Definitions           Safety WDL     Effective: 09/28/15    Row Information: <b>WDL Definition:</b> Bed in low position, wheels locked; call light in reach; upper side rails up x 2; ID band on<br> <font color=\"gray\"><i>Item=AS safety wdl>>List=AS safety wdl>>Version=F14</i></font>      Skin WDL     Effective: 09/28/15    Row Information: <b>WDL Definition:</b> Warm; dry; intact; elastic; without discoloration; pressure points without redness<br> <font color=\"gray\"><i>Item=AS skin wdl>>List=AS skin wdl>>Version=F14</i></font>      Vitals     Vital Signs Flowsheet     VITAL SIGNS     Weight  93.1 kg (205 lb 4 oz) 12/13/17 1220    Temp  97.4  F (36.3  C) 12/14/17 1050   BSA (Calculated - sq m)  2.16 12/13/17 1220    Temp src  Oral 12/14/17 1049   BMI (Calculated)  28.69 12/13/17 1220    Resp  16 12/14/17 0614   EKG MONITORING      Pulse  108 12/13/17 1220   Cardiac Regularity  Regular 12/13/17 1419    Heart Rate  92 12/14/17 1049   Cardiac Rhythm  ST 12/13/17 1419    Pulse/Heart Rate Source  Monitor 12/14/17 1049   KATIA COMA SCALE      BP  139/59 12/14/17 1049   Best Eye Response  4-->(E4) spontaneous 12/14/17 0946    OXYGEN THERAPY     Best Motor Response  6-->(M6) obeys commands 12/14/17 0946    SpO2  98 % 12/14/17 1049   Best Verbal Response  4-->(V4) confused 12/14/17 0946    O2 Device  None (Room air) 12/14/17 1049   [place] Coma Scale Score  14 12/14/17 0946    Oxygen Delivery  2 LPM 12/13/17 2148   Assessment Qualifiers  patient not sedated/intubated 12/13/17 1418    PAIN/COMFORT     POSITIONING      Patient Currently in Pain  denies 12/14/17 0942   Body Position  independently positioning 12/14/17 0946    Preferred Pain Scale  CAPA (Clinically Aligned Pain Assessment) ([facility] Adults Only) 12/14/17 0942   Head of Bed (HOB)  HOB at 30 degrees 12/14/17 0946    CLINICALLY ALIGNED PAIN ASSESSMENT (CAPA) ([facility] ADULTS ONLY)     DAILY CARE      Comfort  " "comfortably manageable 12/13/17 1613   Activity Management  activity adjusted per tolerance;activity encouraged 12/14/17 0946    Change in Pain  about the same 12/13/17 1420   Activity Assistance Provided  assistance, 2 people 12/14/17 0946    HEIGHT AND WEIGHT     ECG      Height  1.803 m (5' 11\") 12/13/17 1220   ECG Rhythm  Sinus tachycardia 12/14/17 0942            Patient Lines/Drains/Airways Status    Active LINES/DRAINS/AIRWAYS     Name: Placement date: Placement time: Site: Days: Last dressing change:    Pressure Injury Left Leg             Wound 02/27/16 Lower;Right Leg Ulceration RLE Venous stasis open wounds 02/27/16   2300   Leg   655     Wound 02/26/16 Lower;Left Leg Ulceration LLE open wounds 02/26/16   1114   Leg   657     Wound 05/09/16 Right Leg whie,weeping serous fluid  05/09/16      Leg   584     Wound 12/13/17 Lower;Right Leg 12/13/17   1800   Leg   less than 1     Wound 12/13/17 Right Buttocks 12/13/17   1800   Buttocks   less than 1             Patient Lines/Drains/Airways Status    Active PICC/CVC     None            Intake/Output Detail Report     Date Intake       Output Net    Shift P.O. I.V. IV Piggyback Blood Components Total Urine Total       Day 12/13/17 0000 - 12/13/17 0659 -- -- -- -- -- -- -- 0    Sharon 12/13/17 0700 - 12/13/17 1459 -- -- -- -- -- -- -- 0    Noc 12/13/17 1500 - 12/13/17 2359 120 -- -- -- 120 170 170 -50    Day 12/14/17 0000 - 12/14/17 0659 -- -- -- 300 300 200 200 100    Sharon 12/14/17 0700 - 12/14/17 1459 360 -- -- -- 360 -- -- 360      Last Void/BM       Most Recent Value    Urine Occurrence 1 at 12/13/2017 1800    Stool Occurrence 1 at 12/13/2017 1627      Case Management/Discharge Planning     Case Management/Discharge Planning Flowsheet     REFERRAL INFORMATION     Equipment Used at Home  walker, rolling 03/23/16 1247    Arrived From  home or self-care 07/20/16 1529   FINAL RESOURCES      # of Referrals Placed by CTS  Communication hand-offs to next level of Care " Providers 10/17/16 1501   Equipment Currently Used at Home  wheelchair;respiratory supplies 06/02/17 1236    Primary Care Clinic Name  [facility] 10/05/16 1428   Resources List  Skilled Nursing Facility 06/01/17 0835    Primary Care MD Name  Kemal Miller 10/05/16 1428   Other Resources  Home Care 10/05/16 1431    LIVING ENVIRONMENT     PAS Number  [id number] (10/11/16) 10/11/16 0959    Lives With  facility resident 12/13/17 1730   Existing Resources/Services  Home Care;DME 04/28/15 1505    Living Arrangements  -- (SNF with rehab services) 06/02/17 1236   ABUSE RISK SCREEN      COPING/STRESS     QUESTION TO PATIENT:  Has a member of your family or a partner(now or in the past) intimidated, hurt, manipulated, or controlled you in any way?  no 12/13/17 1217    Major Change/Loss/Stressor  family/significant other illness 12/13/17 1730   QUESTION TO PATIENT: Do you feel safe going back to the place where you are living?  yes 12/13/17 1217    Patient Personal Strengths  able to adapt;expressive of needs 05/21/15 1418   OBSERVATION: Is there reason to believe there has been maltreatment of a vulnerable adult (ie. Physical/Sexual/Emotional abuse, self neglect, lack of adequate food, shelter, medical care, or financial exploitation)?  no 12/13/17 1217    ASSESSMENT/CONCERNS TO BE ADDRESSED     (R) MENTAL HEALTH SUICIDE RISK      Concerns To Be Addressed  discharge planning concerns 05/29/16 1110   Are you depressed or being treated for depression?  No 12/13/17 1730    DISCHARGE PLANNING     HOMICIDE RISK      Does Patient Need a Referral for Clinic CC  Yes 12/30/15 1137   Feels Like Hurting Others  no 12/13/17 1217                  UNIT 6D OBSERVATION [facility]: [phone]            Medication Administration Report for Tomas Grey as of 12/14/17 1211   Legend:    Given Hold Not Given Due Canceled Entry Other Actions    Time Time (Time) Time  Time-Action       Inactive    Active    Linked         Medications 12/08/17 12/09/17 12/10/17 12/11/17 12/12/17 12/13/17 12/14/17    acetaminophen (TYLENOL) tablet 1,000 mg  Dose: 1,000 mg Freq: 3 TIMES DAILY Route: PO  Start: 12/13/17 2000   Admin Instructions: Maximum acetaminophen dose from all sources = 75 mg/kg/day not to exceed 4 gram          1901 (1,000 mg)-Given        0918 (1,000 mg)-Given       [ ] 1400       [ ] 2000           albuterol neb solution 2.5 mg  Dose: 2.5 mg Freq: EVERY 2 HOURS PRN Route: NEBULIZATION  PRN Reason: other  PRN Comment: dyspnea  Start: 12/13/17 1731         2208 (2.5 mg)-Given            alum & mag hydroxide-simethicone (MYLANTA ES/MAALOX  ES) suspension 30 mL  Dose: 30 mL Freq: EVERY 4 HOURS PRN Route: PO  PRN Reason: indigestion  Start: 12/13/17 4059   Admin Instructions: Shake well.               aspirin EC EC tablet 81 mg  Dose: 81 mg Freq: DAILY Route: PO  Start: 12/14/17 0800   Admin Instructions: DO NOT CRUSH.           0918 (81 mg)-Given           atorvastatin (LIPITOR) tablet 10 mg  Dose: 10 mg Freq: AT BEDTIME Route: PO  Start: 12/13/17 2200         2202 (10 mg)-Given        [ ] 2200           budesonide (PULMICORT) neb solution 1 mg  Dose: 1 mg Freq: 2 TIMES DAILY Route: NEBULIZATION  Start: 12/13/17 2000         (2128)-Not Given        0755 (1 mg)-Given       [ ] 2000           doxycycline (VIBRAMYCIN) capsule 100 mg  Dose: 100 mg Freq: EVERY 12 HOURS SCHEDULED Route: PO  Indications Comment: copd exacerbation  Start: 12/13/17 2000   Admin Instructions: Administer at least 2 hours before or after aluminum, calcium, iron, zinc or magnesium containing products.          2001 (100 mg)-Given        0918 (100 mg)-Given       [ ] 2000           fentaNYL (DURAGESIC) 12 mcg/hr 72 hr patch 1 patch  Dose: 12 mcg Freq: EVERY 72 HOURS Route: TD  Start: 12/14/17 0800   Admin Instructions: Used fentaNYL patches must be disposed of by sticking sides together and flushing down a toilet.           0918 (1 patch)-Given [C]            fentaNYL (DURAGESIC) Patch in Place  Freq: EVERY 8 HOURS Route: TD  Start: 12/13/17 1830   Admin Instructions: Chart every shift, confirming that patch is still in place on patient (no barcode scan needed). See patch order for dose information.          1838 ( )-Patch in Place [C]        0230 ( )-Patch in Place       0931 ( )-Patch in Place       [ ] 1830           fentaNYL (DURAGESIC) patch REMOVAL  Freq: EVERY 72 HOURS Route: TD  Start: 12/14/17 0800   Admin Instructions: Remove patch when new patch is applied or patch is discontinued.  Used fentaNYL patches must be disposed of by sticking sides together and flushing down a toilet.           0918 ( )-Patch Removed           ferrous sulfate (IRON) tablet 325 mg  Dose: 325 mg Freq: 2 TIMES DAILY Route: PO  Start: 12/13/17 2000   Admin Instructions: Absorbed best on an empty stomach. If stomach upset occurs, can take with meals.          2001 (325 mg)-Given        0918 (325 mg)-Given       [ ] 2000           furosemide (LASIX) tablet 20 mg  Dose: 20 mg Freq: 2 TIMES DAILY Route: PO  Start: 12/13/17 2000         1901 (20 mg)-Given        0918 (20 mg)-Given       [ ] 2000           glucose 40 % gel 15-30 g  Dose: 15-30 g Freq: EVERY 15 MIN PRN Route: PO  PRN Reason: low blood sugar  Start: 12/13/17 1827   Admin Instructions: Give 15 g for BG 51 to 69 mg/dL IF patient is conscious and able to swallow. Give 30 g for BG less than or equal to 50 mg/dL IF patient is conscious and able to swallow. Do NOT give glucose gel via enteral tube.  IF patient has enteral tube: give apple juice 120 mL (4 oz or 15 g of CHO) via enteral tube for BG 51 to 69 mg/dL.  Give apple juice 240 mL (8 oz or 30 g of CHO) via enteral tube for BG less than or equal to 50 mg/dL.    ~Oral gel is preferable for conscious and able to swallow patient.   ~IF gel unavailable or patient refuses may provide apple juice 120 mL (4 oz or 15 g of CHO). Document juice on I and O flowsheet.               Or  dextrose 50 % injection 25-50 mL  Dose: 25-50 mL Freq: EVERY 15 MIN PRN Route: IV  PRN Reason: low blood sugar  Start: 12/13/17 1827   Admin Instructions: Use if have IV access, BG less than 70 mg/dL and meet dose criteria below:  Dose if conscious and alert (or disorientated) and NPO = 25 mL  Dose if unconscious / not alert = 50 mL  Vesicant. For ordered doses up to 25 mg, give IV Push undiluted. Give each 5g over 1 minute.              Or  glucagon injection 1 mg  Dose: 1 mg Freq: EVERY 15 MIN PRN Route: SC  PRN Reason: low blood sugar  PRN Comment: May repeat x 1 only  Start: 12/13/17 1827   Admin Instructions: May give SQ or IM. ONLY use glucagon IF patient has NO IV access AND is UNABLE to swallow AND blood glucose is LESS than or EQUAL to 50 mg/dL.  Give IV Push over 1 minute. Reconstitute with 1mL sterile water.               insulin aspart (NovoLOG) inj (RAPID ACTING)  Dose: 1-5 Units Freq: AT BEDTIME Route: SC  Start: 12/13/17 2200   Admin Instructions: MEDIUM INSULIN RESISTANCE DOSING    Do Not give Bedtime Correction Insulin if BG less than  200.   For  - 249 give 1 units.   For  - 299 give 2 units.   For  - 349 give 3 units.   For  -399 give 4 units.   For BG greater than or equal to 400 give 5 units.  Notify provider if glucose greater than or equal to 350 mg/dL after administration of correction dose.  If given at mealtime, must be administered 5 min before meal or immediately after.          2305 (1 Units)-Given        [ ] 2200           insulin aspart (NovoLOG) inj (RAPID ACTING)  Dose: 1-7 Units Freq: 3 TIMES DAILY BEFORE MEALS Route: SC  Start: 12/13/17 1830   Admin Instructions: Correction Scale - MEDIUM INSULIN RESISTANCE DOSING     Do Not give Correction Insulin if Pre-Meal BG less than 140.   For Pre-Meal  - 189 give 1 unit.   For Pre-Meal  - 239 give 2 units.   For Pre-Meal  - 289 give 3 units.   For Pre-Meal  - 339 give 4 units.   For  "Pre-Meal - 399 give 5 units.   For Pre-Meal -449 give 6 units  For Pre-Meal BG greater than or equal to 450 give 7 units.   To be given with prandial insulin, and based on pre-meal blood glucose.    Notify provider if glucose greater than or equal to 350 mg/dL after administration of correction dose.  If given at mealtime, must be administered 5 min before meal or immediately after.          2001 (1 Units)-Given        0927 (1 Units)-Given       1159 (2 Units)-Given       [ ] 1700           insulin glargine (LANTUS) injection 20 Units  Dose: 20 Units Freq: EVERY MORNING Route: SC  Indications of Use: TYPE 2 DIABETES MELLITUS  Start: 12/14/17 0800          0927 (20 Units)-Given           ipratropium - albuterol 0.5 mg/2.5 mg/3 mL (DUONEB) neb solution 3 mL  Dose: 3 mL Freq: 2 TIMES DAILY Route: NEBULIZATION  Start: 12/14/17 0800          0755 (3 mL)-Given       [ ] 2000           isosorbide mononitrate (IMDUR) 24 hr tablet 60 mg  Dose: 60 mg Freq: DAILY Route: PO  Start: 12/13/17 1830   Admin Instructions: DO NOT CRUSH. Can split tablet in half along score anai.          (2868)-Not Given        0918 (60 mg)-Given           lidocaine (LMX4) kit  Freq: EVERY 1 HOUR PRN Route: Top  PRN Reason: pain  PRN Comment: with VAD insertion or accessing implanted port.  Start: 12/13/17 1731   Admin Instructions: Do NOT give if patient has a history of allergy to any local anesthetic or any \"gareth\" product.  Apply 30 minutes prior to VAD insertion or port access. MAX Dose: 2.5 g (  of 5 g tube).               lidocaine 1 % 1 mL  Dose: 1 mL Freq: EVERY 1 HOUR PRN Route: OTHER  PRN Comment: mild pain with VAD insertion or accessing implanted port  Start: 12/13/17 1731   Admin Instructions: Do NOT give if patient has a history of allergy to any local anesthetic or any \"gareth\" product. MAX dose 1 mL subcutaneous OR intradermal in divided doses.               megestrol (MEGACE) tablet 80 mg  Dose: 80 mg Freq: 2 TIMES DAILY " Route: PO  Start: 12/13/17 2000         2202 (80 mg)-Given        0927 (80 mg)-Given       [ ] 2000           metoprolol (TOPROL-XL) 24 hr tablet 50 mg  Dose: 50 mg Freq: DAILY Route: PO  Start: 12/13/17 1830   Admin Instructions: DO NOT CRUSH. Tablet may be split in half along score line.          (1900)-Not Given        0918 (50 mg)-Given           naloxone (NARCAN) injection 0.1-0.4 mg  Dose: 0.1-0.4 mg Freq: EVERY 2 MIN PRN Route: IV  PRN Reason: opioid reversal  Start: 12/13/17 1731   Admin Instructions: For respiratory rate LESS than or EQUAL to 8.  Partial reversal dose:  0.1 mg titrated q 2 minutes for Analgesia Side Effects Monitoring Sedation Level of 3 (frequently drowsy, arousable, drifts to sleep during conversation).Full reversal dose:  0.4 mg bolus for Analgesia Side Effects Monitoring Sedation Level of 4 (somnolent, minimal or no response to stimulation).  For ordered doses up to 2mg give IVP. Give each 0.4mg over 15 seconds in emergency situations. For non-emergent situations further dilute in 9mL of NS to facilitate titration of response.               omeprazole (priLOSEC) CR capsule 10 mg  Dose: 10 mg Freq: DAILY Route: PO  Start: 12/13/17 1830         (1900)-Not Given        0927 (10 mg)-Given           ondansetron (ZOFRAN-ODT) ODT tab 4 mg  Dose: 4 mg Freq: EVERY 6 HOURS PRN Route: PO  PRN Reasons: nausea,vomiting  Start: 12/13/17 1731   Admin Instructions: This is Step 1 of nausea and vomiting management.  If nausea not resolved in 15 minutes, go to Step 2 prochlorperazine (COMPAZINE). Do not push through foil backing. Peel back foil and gently remove. Place on tongue immediately. Administration with liquid unnecessary              Or  ondansetron (ZOFRAN) injection 4 mg  Dose: 4 mg Freq: EVERY 6 HOURS PRN Route: IV  PRN Reasons: nausea,vomiting  Start: 12/13/17 1731   Admin Instructions: This is Step 1 of nausea and vomiting management.  If nausea not resolved in 15 minutes, go to Step 2  prochlorperazine (COMPAZINE).  Irritant. For ordered doses up to 4 mg, give IV Push undiluted over 2-5 minutes.               predniSONE (DELTASONE) tablet 60 mg  Dose: 60 mg Freq: DAILY Route: PO  Start: 12/14/17 0800          0918 (60 mg)-Given           ranitidine (ZANTAC) tablet 150 mg  Dose: 150 mg Freq: DAILY Route: PO  Indications of Use: GASTROESOPHAGEAL REFLUX DISEASE  Start: 12/13/17 1830         (1900)-Not Given        0918 (150 mg)-Given           senna-docusate (SENOKOT-S;PERICOLACE) 8.6-50 MG per tablet 1 tablet  Dose: 1 tablet Freq: 2 TIMES DAILY PRN Route: PO  PRN Reason: constipation  Start: 12/13/17 1731   Admin Instructions: If no bowel movement in 24 hours, increase to 2 tablets PO.  Hold for loose stools.  This is the first step of a three step constipation treatment.              Or  senna-docusate (SENOKOT-S;PERICOLACE) 8.6-50 MG per tablet 2 tablet  Dose: 2 tablet Freq: 2 TIMES DAILY PRN Route: PO  PRN Reason: constipation  Start: 12/13/17 1731   Admin Instructions: Hold for loose stools.  This is the first step of a three step constipation treatment.               sodium chloride (PF) 0.9% PF flush 3 mL  Dose: 3 mL Freq: EVERY 8 HOURS Route: IK  Start: 12/13/17 1745   Admin Instructions: And Q1H PRN, to lock peripheral IV dormant line.          1735 (3 mL)-Given        (0145)-Not Given       (0919)-Not Given       [ ] 1745           sodium chloride (PF) 0.9% PF flush 3 mL  Dose: 3 mL Freq: EVERY 1 HOUR PRN Route: IK  PRN Reason: line flush  Start: 12/13/17 1731   Admin Instructions: for peripheral IV flush post IV meds               tamsulosin (FLOMAX) capsule 0.4 mg  Dose: 0.4 mg Freq: DAILY Route: PO  Start: 12/13/17 1830   Admin Instructions: Administer 30 minutes after the same meal each day.  Capsules should be swallowed whole; do not crush chew or open.          (1900)-Not Given        0918 (0.4 mg)-Given          Completed Medications  Medications 12/08/17 12/09/17 12/10/17 12/11/17  12/12/17 12/13/17 12/14/17         Dose: 2.5 mg Freq: ONCE Route: NEBULIZATION  Start: 12/13/17 1314   End: 12/13/17 1317         1317 (2.5 mg)-Given              Dose: 60 mg Freq: ONCE Route: PO  Start: 12/13/17 1316   End: 12/13/17 1334         1334 (60 mg)-Given           Discontinued Medications  Medications 12/08/17 12/09/17 12/10/17 12/11/17 12/12/17 12/13/17 12/14/17         Dose: 650 mg Freq: EVERY 4 HOURS PRN Route: RE  PRN Reason: mild pain  Start: 12/13/17 1731   End: 12/13/17 1833   Admin Instructions: Alternate ibuprofen (if ordered) with acetaminophen.  Maximum acetaminophen dose from all sources = 75 mg/kg/day not to exceed 4 grams/day.          1833-Med Discontinued          Dose: 650 mg Freq: EVERY 4 HOURS PRN Route: PO  PRN Reason: mild pain  Start: 12/13/17 1731   End: 12/13/17 1833   Admin Instructions: Alternate ibuprofen (if ordered) with acetaminophen.  Maximum acetaminophen dose from all sources = 75 mg/kg/day not to exceed 4 grams/day.          1833-Med Discontinued          Dose: 1 drop Freq: AT BEDTIME Route: Both Eyes  Start: 12/13/17 2200   End: 12/13/17 1924         1924-Med Discontinued          Dose: 3 mL Freq: 4 TIMES DAILY Route: NEBULIZATION  Start: 12/13/17 1745   End: 12/13/17 1819         1808 (3 mL)-Given       1819-Med Discontinued          Dose: 1 drop Freq: EVERY MORNING Route: Both Eyes  Start: 12/14/17 0800   End: 12/13/17 1924         1924-Med Discontinued     Medications 12/08/17 12/09/17 12/10/17 12/11/17 12/12/17 12/13/17 12/14/17               INTERAGENCY TRANSFER FORM - NOTES (H&P, Discharge Summary, Consults, Procedures, Therapies)   12/13/2017                       UNIT 6D OBSERVATION [facility] BANK: [phone]               History & Physicals      H&P by Lise Mcdonald PA-C at 12/13/2017  5:42 PM     Author:  Lise Mcdonald PA-C Service:  Emergency Medicine Author Type:  Physician Assistant - C    Filed:  12/13/2017  6:59 PM Date of Service:   12/13/2017  5:42 PM Creation Time:  12/13/2017  5:42 PM    Status:  Cosign Needed :  Lise Mcdonald PA-C (Physician Assistant - TIFFANI)    Cosign Required:  Yes             [facility] ED Observation Admission Note    Chief Complaint   Patient presents with     Chest Pain       Assessment/Plan:  Tomas Grey is a 72 year old male with a history of metastatic prostate cancer, HTN, diabetes, COPD, CKD, CAD,[AT1.1] transfusion dependent anemia,[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     1. Anemia requiring transfusion. Patient is transfusion dependent.  Hemoglobin today 6.4. Care plan is to transfuse for Hgb 8 or less.  Typed and screened in ED. Last transfusion 12/1/2017 at which time Hgb was 7.6. Hematology is no longer working up reasons for anemia.  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 No increased work of breathing nor hypoxia on home O2 flow. No infiltrate on CXR. Received 1 albuterol neb in ED with improvement in air movement. Also received 1 dose oral prednisone 60mg.[AT1.1] At home, on Spiriva and budesonide-[AT1.2]fprmotorol[AT1.3] inhalers, duonebs prn. Does not have home inhalers.[AT1.2]  - prednisone 60mg daily with plan to discharge on prednisone burst  - Duonebs q4h scheduled[AT1.1]  - Pulmicort nebs BID[AT1.2]  - albuterol nebs q2h prn  - O2[AT1.1] per nasal cannula[AT1.2] at current home settings (2L)  - doxycycline 100mg BID  - continuous pulse oximetry  - continous telemetry  - add on CRP  - repeat CBC in AM    3. Chest pain. Chronic chest pain.[AT1.1] Does have a history of  "CAD, but also history of frequent ED visits for chest pain.[AT1.2]  Per care plan, recommended workup based on level of suspicion[AT1.1]: \"This is felt to be related to bony metastases, pulmonary hypertension, and/or possible primary chronic pain syndrome. Given his history of cancer and CAD, he has undergone multiple workups for chest pain with concern for ACS and PE. VQ scans x 5 have been negative, 1 indeterminate, and he was not felt to have PE. Evaluate according to degree of suspicion understanding he has chronic chest pain.\"    Today, h[AT1.2]istory[AT1.1] and ED workup are[AT1.2] not suggestive of acute MI[AT1.1], aortic dissection, PE, acute heart failure, or any other emergent condition[AT1.2]. EKG in ED showed sinus tachycardia with rate 111, no ischemic changes. CXR showed left greater than right streaky basilar opacities favored to represent atelectasis. Troponin x 1 negative. ED physician recommending no further workup for chest pain this admission.[AT1.1]  - continuous telemetry  - continue PTA ASA 81mg      4. Left buttock wound. Patient reports history of pressure ulcers. Wound noted by nursing on skin exam.  - wound care consult tomorrow    5[AT1.2]. Disposition.[AT1.1] Currently l[AT1.2]regi at [facility].[AT1.1] Per care plan, \"The patient frequently resides with one of his sons, but at times is homeless. His housing concerns are resolved at this time. Historically, the patient has requested admission / observation overnight when his housing situation is in jeopardy. He does have significant hours of home care via PCA / Home health RN but has declined placement repeatedly.\"[AT1.2]  - Care coordination/SW consult for discharge planning        Chronic Medical Issues:  # Metastatic prostate cancer. No longer following with oncology as there are no further treatments to be offered. Patient was recommended for hospice last month but refused.[AT1.1]     # Chronic pain. Abdominal, " "chest.  - continue fentanyl patch for pain, due for replacement tomorrow  - Tylenol 1000mg TID scheduled[AT1.2]    # CKD stage III. Creatinine in ED 1.55, BUN 36, at his baseline.   - recheck BMP in AM[AT1.1]    # HTN, HLD.  - continue PTA metoprolol and atorvastatin[AT1.2]      # Paroxysmal atrial fibrillation on anticoagulation, rate controlled. INR in ED 1.92.[AT1.1] Rate mildly elevated in ED but not compatible with RVR.[AT1.2]  - pharmacy to dose warfarin[AT1.1]  - continue PTA metoprolol[AT1.2]  - continuous telemetry    # T2DM. Glucose normal in ED. Last A1C on 11/16/2017 at 6.0.[AT1.1] On Lantus 100U in AM daily. There is no notation of any other antidiabetic medications on his updated med record, updated as recently as 12/4/2017 with his PCP.[AT1.2]  - blood glucose checks[AT1.1] QID[AT1.2]  - hypoglycemia protocol  - low consistent carb diet  -[AT1.1] continue PTA Lantus 100U daily in AM  - correction SSI moderate intensity     # Chronic lymphedema, venous insufficiency. Nursing removed lymphedema wraps.  - OT consult tomorrow for re-wrapping  - continue PTA Lasix 20mg BID    # GERD.   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 Patient is here from [facility] and reports constant chest pain starting 4 days ago.[AT1.1]   Per ED provider note, \"[AT1.2]This pain i[AT1.1]s[AT1.2] left[AT1.1]-[AT1.2]sided and radiates to his head, neck, and shoulders. He denies recent trauma to the chest. He notes that he has had a fever of 101 F in the last few days. He " "has had some nausea, but no vomiting. Patient has chronic shortness of breath and is on 2L of O2 at home 24 hours/day.   Per review of Care Everywhere, patient was seen by a nurse practitioner yesterday at his nursing home for a re-check, but the patient denies this. He notes that he was supposed to be transfused yesterday, but was not.[AT1.1]\"    History limited by patient's ability to provide accurate details.[AT1.2]    In the ED, VS: , BP 96/53, RR 18, O2 sats 97% on 2 L per nasal cannula, afebrile. Workup significant for hemoglobin 6.4.[AT1.1]  EKG showed afib with rate 111. BMP significant for elevated creatinine at 1.55, BUN at 36. CXR showed bibasilar streaky opacities likely atelectasis. Troponin x 1 negative.[AT1.2] He received an albuterol neb x 1, oral prednisone 60mg[AT1.1]. He was admitted to ED obs for blood transfusion and COPD exacerbation treatment.[AT1.2]    On admission to the observation unit the patient was stable[AT1.1].[AT1.2]      PCP: Ekaterina Lozano    History:    Past Medical History:   Diagnosis Date     Anemia      Anxiety      Aspiration pneumonia (H) 2014     C. difficile colitis      CAD (coronary artery disease)      Chronic pain      CKD (chronic kidney disease)      COPD (chronic obstructive pulmonary disease) (H)      Depressive disorder      Diabetes mellitus (H)      Hypertension      Insomnia      ALEXIS (obstructive sleep apnea)      Osteoporosis      Prostate cancer metastatic to multiple sites (H)        Past Surgical History:   Procedure Laterality Date     ABDOMEN SURGERY       ARTHROSCOPY KNEE       EYE SURGERY         Family History   Problem Relation Age of Onset     DIABETES Mother      CANCER Mother      stomach       Social History     Social History     Marital status:      Spouse name: N/A     Number of children: N/A     Years of education: N/A     Occupational History     Not on file.     Social History Main Topics     Smoking status: Former Smoker     " Smokeless tobacco: Never Used     Alcohol use No     Drug use: No     Sexual activity: No     Other Topics Concern     Not on file     Social History Narrative         No current facility-administered medications on file prior to encounter.   Current Outpatient Prescriptions on File Prior to Encounter:  FOLIC ACID PO Take 5 mg by mouth daily Take 5 mg PO QD x 1 month starting 12/7/17 then 1 mg PO QD   ferrous sulfate (IRON) 325 (65 FE) MG tablet Take 325 mg by mouth 2 times daily   ipratropium - albuterol 0.5 mg/2.5 mg/3 mL (DUONEB) 0.5-2.5 (3) MG/3ML neb solution Take 1 vial by nebulization every 4 hours as needed for shortness of breath / dyspnea or wheezing   Carboxymethylcellulose Sodium (CARBOXYMETHYLCELLULOSE SOD PF OP) Place 1 drop into both eyes 4 times daily And 1 drop both eyes daily prn   fentaNYL (DURAGESIC) 12 mcg/hr 72 hr patch Place 1 patch onto the skin every 72 hours   MEGESTROL ACETATE PO Take 80 mg by mouth 2 times daily   senna-docusate (SENOKOT-S;PERICOLACE) 8.6-50 MG per tablet Take 2 tablets by mouth 2 times daily   budesonide-formoterol (SYMBICORT) 160-4.5 MCG/ACT Inhaler Inhale 2 puffs into the lungs 2 times daily   Timolol Hemihydrate (BETIMOL OP) Place 1 drop into both eyes every morning    bimatoprost (LUMIGAN) 0.01 % SOLN Place 1 drop into both eyes At Bedtime   WARFARIN SODIUM PO Take by mouth daily See facility orders for INR dosing   calcium carbonate (OS-JORDAN 600 MG [place]. [place]) 1500 (600 [place]) MG tablet Take 1,500 mg by mouth 2 times daily (with meals)   isosorbide mononitrate (IMDUR) 30 MG 24 hr tablet Take 2 tablets (60 mg) by mouth daily   ONDANSETRON PO Take 4 mg by mouth every 8 hours as needed for nausea   furosemide (LASIX) 20 MG tablet Take 1 tablet (20 mg) by mouth 2 times daily   ranitidine (RANITIDINE) 75 MG tablet Take 150 mg by mouth daily    diclofenac (VOLTAREN) 1 % GEL topical gel Apply 4 grams to knees four times daily as needed using enclosed dosing card.   insulin  glargine (LANTUS) 100 UNIT/ML injection Inject 20 Units Subcutaneous every morning    omeprazole (PRILOSEC) 10 MG CR capsule Take 1 capsule (10 mg) by mouth daily   atorvastatin (LIPITOR) 10 MG tablet Take 10 mg by mouth At Bedtime    tiotropium (SPIRIVA) 18 MCG inhalation capsule Inhale 18 mcg into the lungs daily   acetaminophen (TYLENOL) 500 MG tablet Take 1,000 mg by mouth 3 times daily Not to exceed 3000 mg/24 hours    [DISCONTINUED] enzalutamide (XTANDI) 40 MG capsule Take 4 capsules (160 mg) by mouth daily   nitroglycerin (NITROSTAT) 0.4 MG SL tablet Place 0.4 mg under the tongue every 5 minutes as needed for chest pain if you are still having symptoms after 3 doses (15 minutes) call 911.   aspirin 81 MG EC tablet Take 1 tablet (81 mg) by mouth daily   metoprolol (TOPROL XL) 50 MG 24 hr tablet Take 1 tablet (50 mg) by mouth daily   tamsulosin (FLOMAX) 0.4 MG 24 hr capsule Take 1 capsule (0.4 mg) by mouth daily   ammonium lactate (LAC-HYDRIN) 12 % lotion Apply topically 2 times daily as needed for dry skin To all extremities for dry skin       Data:    Results for orders placed or performed during the hospital encounter of 12/13/17   XR Chest Port 1 View    Narrative    Exam:  Chest X-ray 12/13/2017 1:41 PM    History: chest pain;     Comparison: Chest x-ray dated 11/20/2017    Findings: AP portable upright chest x-ray. The cardiomediastinal  silhouette is within normal limits. No pleural effusion. No  pneumothorax. No focal opacity. Left greater than right streaky  bibasilar opacities. The upper abdomen is unremarkable. No acute bony  abnormality.      Impression    Impression: Left greater than right streaky basilar opacities favored  to represent atelectasis.    I have personally reviewed the examination and initial interpretation  and I agree with the findings.    BERNICE LYNN MD   Basic metabolic panel   Result Value Ref Range    Sodium 143 133 - 144 mmol/L    Potassium 5.0 3.4 - 5.3 mmol/L    Chloride  109 94 - 109 mmol/L    Carbon Dioxide 26 20 - 32 mmol/L    Anion Gap 8 3 - 14 mmol/L    Glucose 87 70 - 99 mg/dL    Urea Nitrogen 36 (H) 7 - 30 mg/dL    Creatinine 1.55 (H) 0.66 - 1.25 mg/dL    GFR Estimate 44 (L) >60 mL/min/1.7m2    GFR Estimate If Black 54 (L) >60 mL/min/1.7m2    Calcium 7.9 (L) 8.5 - 10.1 mg/dL   CBC with platelets differential   Result Value Ref Range    WBC 11.4 (H) 4.0 - 11.0 10e9/L    RBC Count 2.44 (L) 4.4 - 5.9 10e12/L    Hemoglobin 6.4 (LL) 13.3 - 17.7 g/dL    Hematocrit 21.5 (L) 40.0 - 53.0 %    MCV 88 78 - 100 fl    MCH 26.2 (L) 26.5 - 33.0 pg    MCHC 29.8 (L) 31.5 - 36.5 g/dL    RDW 20.1 (H) 10.0 - 15.0 %    Platelet Count 244 150 - 450 10e9/L    Diff Method Automated Method     % Neutrophils 75.0 %    % Lymphocytes 13.7 %    % Monocytes 8.7 %    % Eosinophils 1.5 %    % Basophils 0.2 %    % Immature Granulocytes 0.9 %    Nucleated RBCs 0 0 /100    Absolute Neutrophil 8.6 (H) 1.6 - 8.3 10e9/L    Absolute Lymphocytes 1.6 0.8 - 5.3 10e9/L    Absolute Monocytes 1.0 0.0 - 1.3 10e9/L    Absolute Eosinophils 0.2 0.0 - 0.7 10e9/L    Absolute Basophils 0.0 0.0 - 0.2 10e9/L    Abs Immature Granulocytes 0.1 0 - 0.4 10e9/L    Absolute Nucleated RBC 0.0    Troponin I   Result Value Ref Range    Troponin I ES <0.015 0.000 - 0.045 ug/L   INR   Result Value Ref Range    INR 1.92 (H) 0.86 - 1.14   Glucose by meter   Result Value Ref Range    Glucose 79 70 - 99 mg/dL   EKG 12-lead, tracing only   Result Value Ref Range    Interpretation ECG Click View Image link to view waveform and result    ABO/Rh type and screen   Result Value Ref Range    ABO O     RH(D) Pos     Antibody Screen Neg     Test Valid Only At          [facility]    Specimen Expires 12/16/2017      *Note: Due to a large number of results and/or encounters for the requested time period, some results have not been displayed. A complete set of results can be found in Results Review.              EKG Interpretation:        Symptoms at time of EKG: chest pain, shortness of breath  Rhythm: Sinus tachycardia  Rate: 111  Axis: normal  Ectopy: none  Conduction: normal  ST Segments/ T Waves: No ST-T wave changes and No acute ischemic changes  Comparison to prior: no significant change from 10/11/2017    Clinical Impression: sinus tachycardia    ROS:    Respiratory: Positive for shortness of breath.    Cardiovascular: Positive for chest pain and leg swelling.   Gastrointestinal: Positive for nausea. Negative for vomiting.   Musculoskeletal: Positive for myalgias (shoulder pain) and neck pain.   Neurological: Positive for headaches.         Exam:    Vitals:  B/P: 112/53, T: 98.3, P: 108, R: 20    General: alert,[AT1.1] laying in bed[AT1.2], NAD. Afebrile.  Skin: no[AT1.1] jaundice. Mild pallor.[AT1.2]  Head: Normocephal[AT1.1]ic.[AT1.2]  EENT:[AT1.1] Oropharynx[AT1.2]: MMM.   Neck: Neck supple.   Respiratory: No distress.[AT1.1] Equally diminished breath sounds bilaterally. Some anterior expiratory wheezes heard. No rales.[AT1.2]  Cardiovascular:[AT1.1] Mildly tachycardic but normal S1/S2.[AT1.2] No murmurs, clicks gallops or rub[AT1.1] heard. Peripheral edema of bilateral LEs, wrapped.[AT1.2]  Gastrointestinal: Abdomen soft[AT1.1], nontender.[AT1.2] BS normal.   Musculoskeletal:[AT1.1] bilateral LEs wrapped.[AT1.2]  Neurologic: Follows commands.[AT1.1] Speech sometimes nonsensical or tangential. Moves extremities equally.[AT1.2]               Signed:  Lise Mcdonald PA-C  December 13, 2017 at 5:42 PM[AT1.1]       Revision History        User Key Date/Time User Provider Type Action    > AT1.3 12/13/2017  6:59 PM Lise Mcdonald PA-C Physician Assistant Geni NIXON Sign     [N/A] 12/13/2017  6:42 PM Lise Mcdonald PA-C Physician Assistant Geni NIXON Sign     AT1.2 12/13/2017  6:40 PM Lise Mcdonald PA-C Physician Assistant - C Sign     AT1.1 12/13/2017  5:42 PM Lise Mcdonald PA-C Physician  Assistant - C                      Discharge Summaries      Discharge Summaries by Rubio Alberts MD at 12/14/2017 11:42 AM     Author:  Rubio Alberts MD Service:  Emergency Medicine Author Type:  Physician    Filed:  12/14/2017 11:42 AM Date of Service:  12/14/2017 11:42 AM Creation Time:  12/14/2017 11:30 AM    Status:  Signed :  Rubio Alberts MD (Physician)         Physician Discharge Summary     Patient ID:  Tomas Grey  [id number]  72 year old  1945    Admit date: 12/13/2017    Discharge date and time: 12/14/2017     Admitting Physician: Tanya Harris MD     Discharge Physician: Rubio Alberts MD    Admission Diagnoses: Other chest pain [R07.89]  Anemia in neoplastic disease [D63.0]  Prostate cancer (H) [C61]  COPD exacerbation (H) [J44.1]    Discharge Diagnoses: Anemia in neoplastic disease        COPD exacerbation        Other Chest pain, chronic    Admission Condition: fair    Discharged Condition: fair    Indication for Admission: Symptomatic anemia with need for transfusion    Hospital Course: Patient with multiple medical problems including metastatic prostate cancer for which he is no longer receiving treatment, 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  He presented to the emergency department due to chest pain and shortness of breath.  He was evaluated there and his chest pain was deemed noncardiac, however he was noted to be remarkably anemic and in the range that generally requires a transfusion.  He also was wheezing and was diagnosed with COPD exacerbation with negative chest x-ray.  He was admitted to the observation unit for blood transfusion, repeat hemoglobin, treatment of COPD exacerbation, and continuation of meds for chronic chest pain syndrome.  With regards to his anemia, he was transfused with appropriate improvement in hemoglobin to above baseline.  His shortness of breath improved with nebs and  "prednisone.  He was initiated on prednisone and doxycycline and is currently at his baseline pulmonary status.  His chest pain was treated with Tylenol and his regular fentanyl patch, and is believed to be part of his chronic pain syndrome related to bony metastasis, and pulmonary hypertension.  No further cardiac workup was recommended upon admission does not appear to be indicated.  He is in agreement that this pain is chronic.  Patient himself reports subjective improvement and feels he is ready to return to his skilled care facility.  Although his advanced cancer and multiple problems give him relatively poor long-term prognosis he does appear stable at this time to return to his baseline living situation.  His regular medications will be continued with the addition of doxycycline and prednisone, and he has planned follow-up with the skilled care facility physician and the geriatric clinic at the [facility].    Consults: none    Significant Diagnostic Studies: labs: Serial hemoglobin improving    Treatments: antibiotics: Doxycycline, analgesia: acetaminophen and fentanyl patch, anticoagulation: warfarin, steroids: prednisone, respiratory therapy: O2 and albuterol/atropine nebulizer and transfusion of packed red blood cells    Discharge Exam:[DG1.1]  /59  Pulse 108  Temp 97.4  F (36.3  C) (Oral)  Resp 16  Ht 1.803 m (5' 11\")  Wt 93.1 kg (205 lb 4 oz)  SpO2 98%  BMI 28.63 kg/m2[DG1.2]     EXAM:  HEENT: Normal.  Oropharynx clear and moist.  Patient is wearing a nasal cannula at baseline 2 L.  Neck: Supple, trachea midline, normal voice  Chest:  No respiratory distress, speaks in complete sentences, chest wall tender but no crepitance or deformity, lungs some diminished air movement and scattered expiratory wheezes consistent with known history of COPD  CV: Regular rate and rhythm (heart rate in the 90s), no murmur, normal pulse, no jugular venous distention  Abdomen: Nondistended, soft " nontender.  No hepatosplenomegaly.  Extremities: No tenderness, pressure areas on buttocks does not reveal any full-thickness ulcer      Disposition: [facility]    Patient Instructions:   Current Discharge Medication List      START taking these medications    Details   predniSONE (DELTASONE) 20 MG tablet Take 2 tablets (40 mg) by mouth daily for 5 days  Qty: 10 tablet, Refills: 0    Associated Diagnoses: COPD exacerbation (H)      doxycycline (VIBRAMYCIN) 100 MG capsule Take 1 capsule (100 mg) by mouth every 12 hours for 7 days  Qty: 14 capsule, Refills: 0    Associated Diagnoses: COPD exacerbation (H)         CONTINUE these medications which have NOT CHANGED    Details   FOLIC ACID PO Take 5 mg by mouth daily Take 5 mg PO QD x 1 month starting 12/7/17 then 1 mg PO QD      ferrous sulfate (IRON) 325 (65 FE) MG tablet Take 325 mg by mouth 2 times daily      ipratropium - albuterol 0.5 mg/2.5 mg/3 mL (DUONEB) 0.5-2.5 (3) MG/3ML neb solution Take 1 vial by nebulization every 4 hours as needed for shortness of breath / dyspnea or wheezing      Carboxymethylcellulose Sodium (CARBOXYMETHYLCELLULOSE SOD PF OP) Place 1 drop into both eyes 4 times daily And 1 drop both eyes daily prn      fentaNYL (DURAGESIC) 12 mcg/hr 72 hr patch Place 1 patch onto the skin every 72 hours      MEGESTROL ACETATE PO Take 80 mg by mouth 2 times daily      senna-docusate (SENOKOT-S;PERICOLACE) 8.6-50 MG per tablet Take 2 tablets by mouth 2 times daily      budesonide-formoterol (SYMBICORT) 160-4.5 MCG/ACT Inhaler Inhale 2 puffs into the lungs 2 times daily      Timolol Hemihydrate (BETIMOL OP) Place 1 drop into both eyes every morning       bimatoprost (LUMIGAN) 0.01 % SOLN Place 1 drop into both eyes At Bedtime      WARFARIN SODIUM PO Take by mouth daily See facility orders for INR dosing      calcium carbonate (OS-JORDAN 600 MG [place]. CA) 1500 (600 CA) MG tablet Take 1,500 mg by mouth 2 times daily (with meals)      isosorbide mononitrate (IMDUR) 30 MG  24 hr tablet Take 2 tablets (60 mg) by mouth daily  Qty: 30 tablet      ONDANSETRON PO Take 4 mg by mouth every 8 hours as needed for nausea      furosemide (LASIX) 20 MG tablet Take 1 tablet (20 mg) by mouth 2 times daily  Qty: 30 tablet    Associated Diagnoses: Chronic diastolic congestive heart failure (H); Lymphedema of both lower extremities      ranitidine (RANITIDINE) 75 MG tablet Take 150 mg by mouth daily       diclofenac (VOLTAREN) 1 % GEL topical gel Apply 4 grams to knees four times daily as needed using enclosed dosing card.  Qty: 100 g, Refills: 0    Associated Diagnoses: Chronic pain of both knees      insulin glargine (LANTUS) 100 UNIT/ML injection Inject 20 Units Subcutaneous every morning       omeprazole (PRILOSEC) 10 MG CR capsule Take 1 capsule (10 mg) by mouth daily    Associated Diagnoses: Gastric reflux      atorvastatin (LIPITOR) 10 MG tablet Take 10 mg by mouth At Bedtime       tiotropium (SPIRIVA) 18 MCG inhalation capsule Inhale 18 mcg into the lungs daily      acetaminophen (TYLENOL) 500 MG tablet Take 1,000 mg by mouth 3 times daily Not to exceed 3000 mg/24 hours       nitroglycerin (NITROSTAT) 0.4 MG SL tablet Place 0.4 mg under the tongue every 5 minutes as needed for chest pain if you are still having symptoms after 3 doses (15 minutes) call 911.      aspirin 81 MG EC tablet Take 1 tablet (81 mg) by mouth daily  Qty: 5 tablet, Refills: 0    Associated Diagnoses: ASCVD (arteriosclerotic cardiovascular disease)      metoprolol (TOPROL XL) 50 MG 24 hr tablet Take 1 tablet (50 mg) by mouth daily  Qty: 5 tablet, Refills: 0    Associated Diagnoses: ASCVD (arteriosclerotic cardiovascular disease)      tamsulosin (FLOMAX) 0.4 MG 24 hr capsule Take 1 capsule (0.4 mg) by mouth daily  Qty: 5 capsule, Refills: 0    Associated Diagnoses: Benign non-nodular prostatic hyperplasia without lower urinary tract symptoms      ammonium lactate (LAC-HYDRIN) 12 % lotion Apply topically 2 times daily as  needed for dry skin To all extremities for dry skin           Activity: activity as tolerated  Diet: regular diet  Wound Care: none needed    Follow-up with physician at skilled care facility in 2-3 days, and with appointment at geriatric clinic on January 9, 2018.    Signed:  Rubio Alberts  12/14/2017  11:30 AM[DG1.1]     Revision History        User Key Date/Time User Provider Type Action    > DG1.2 12/14/2017 11:42 AM Rubio Alberts MD Physician Sign     DG1.1 12/14/2017 11:30 AM Rubio Alberts MD Physician                   Consult Notes     No notes of this type exist for this encounter.         Progress Notes - Physician (Notes for yesterday and today)      ED Provider Notes by Tanya Harris MD at 12/13/2017 12:15 PM     Author:  Tanya Harris MD Service:  Emergency Medicine Author Type:  Physician    Filed:  12/13/2017  6:40 PM Date of Service:  12/13/2017 12:15 PM Creation Time:  12/13/2017  1:03 PM    Status:  Signed :  Tanya Harris MD (Physician)           History[KT1.1]     Chief Complaint   Patient presents with     Chest Pain[EG1.1]     HPI  Tomas Grey is a 72 year old male[KT1.1] with a history of metastatic prostate cancer, HTN, diabetes, COPD, CKD, CAD, and chronic pain who presents to the Emergency Department via EMS for evaluation of chest pain. Patient is here from [facility] and reports constant chest pain starting 4 days ago. This pain i[KT1.2]s[EG1.2] left sided and radiates to his head, neck, and shoulders. He denies recent trauma to the chest. He notes that he has had a fever of 101 F in the last few days. He has had some nausea, but no vomiting. Patient has chronic shortness of breath and is on 2L of O2 at home 24 hours/day.   Per review of Care Everywhere, patient was seen by a nurse practitioner yesterday at his nursing home for a re-check, but the patient denies this. He notes that he was supposed to be transfused  yesterday, but was not.[KT1.2]  Review of oncology notes indicates that the patient has been advised to change his goal of care to comfort care.  He met with hospice staff last week and he declined hospice stating that he was not ready to die.  Oncology indicated that they have no further treatment offered to him for his prostate cancer.[EG1.2]    Past Medical History:   Diagnosis Date     Anemia      Anxiety      Aspiration pneumonia (H) 2014     C. difficile colitis      CAD (coronary artery disease)      Chronic pain      CKD (chronic kidney disease)      COPD (chronic obstructive pulmonary disease) (H)      Depressive disorder      Diabetes mellitus (H)      Hypertension      Insomnia      ALEXIS (obstructive sleep apnea)      Osteoporosis      Prostate cancer metastatic to multiple sites (H)        Past Surgical History:   Procedure Laterality Date     ABDOMEN SURGERY       ARTHROSCOPY KNEE       EYE SURGERY         Family History   Problem Relation Age of Onset     DIABETES Mother      CANCER Mother      stomach       Social History   Substance Use Topics     Smoking status: Former Smoker     Smokeless tobacco: Never Used     Alcohol use No       Current Facility-Administered Medications   Medication     naloxone (NARCAN) injection 0.1-0.4 mg     ondansetron (ZOFRAN-ODT) ODT tab 4 mg    Or     ondansetron (ZOFRAN) injection 4 mg     lidocaine 1 % 1 mL     lidocaine (LMX4) kit     sodium chloride (PF) 0.9% PF flush 3 mL     sodium chloride (PF) 0.9% PF flush 3 mL     [START ON 12/14/2017] predniSONE (DELTASONE) tablet 60 mg     doxycycline (VIBRAMYCIN) capsule 100 mg     albuterol neb solution 2.5 mg     senna-docusate (SENOKOT-S;PERICOLACE) 8.6-50 MG per tablet 1 tablet    Or     senna-docusate (SENOKOT-S;PERICOLACE) 8.6-50 MG per tablet 2 tablet     [START ON 12/14/2017] ipratropium - albuterol 0.5 mg/2.5 mg/3 mL (DUONEB) neb solution 3 mL     [START ON 12/14/2017] fentaNYL (DURAGESIC) 12 mcg/hr 72 hr patch 1  "patch     ferrous sulfate (IRON) tablet 325 mg     [START ON 12/14/2017] aspirin EC EC tablet 81 mg     atorvastatin (LIPITOR) tablet 10 mg     bimatoprost (LUMIGAN) 0.01 % ophthalmic drops 1 drop     furosemide (LASIX) tablet 20 mg     [START ON 12/14/2017] insulin glargine (LANTUS) injection 20 Units     isosorbide mononitrate (IMDUR) 24 hr tablet 60 mg     megestrol (MEGACE) tablet 80 mg     metoprolol (TOPROL-XL) 24 hr tablet 50 mg     omeprazole (priLOSEC) CR capsule 10 mg     ranitidine (ZANTAC) tablet 150 mg     tamsulosin (FLOMAX) capsule 0.4 mg     [START ON 12/14/2017] timolol hemihydrate (BETIMOL) 0.25 % ophthalmic solution SOLN 1 drop     glucose 40 % gel 15-30 g    Or     dextrose 50 % injection 25-50 mL    Or     glucagon injection 1 mg     insulin aspart (NovoLOG) inj (RAPID ACTING)     insulin aspart (NovoLOG) inj (RAPID ACTING)     budesonide (PULMICORT) neb solution 1 mg     [START ON 12/14/2017] fentaNYL (DURAGESIC) patch REMOVAL     fentaNYL (DURAGESIC) Patch in Place     acetaminophen (TYLENOL) tablet 1,000 mg        Allergies   Allergen Reactions     Morphine Other (See Comments)     Patient reports makes him almost go into a coma[EG1.1]         I have reviewed the Medications, Allergies, Past Medical and Surgical History, and Social History in the Epic system.    Review of Systems   Constitutional: Positive for[KT1.1] fever[EG1.2].   Respiratory: Positive for[KT1.1] shortness of breath (chronic)[EG1.2].    Cardiovascular: Positive for[KT1.1] chest pain[KT1.2] and[KT1.1] leg swelling (chronic)[EG1.2].   Gastrointestinal: Positive for[KT1.1] nausea[KT1.2]. Negative for[KT1.1] abdominal pain[EG1.2] and[KT1.1] vomiting[KT1.2].[KT1.1]   All other systems reviewed and are negative[EG1.2].      Physical Exam[KT1.1]   BP: 96/53  Pulse: 108  Heart Rate: 107  Temp: 98.2  F (36.8  C)  Resp: 18  Height: 180.3 cm (5' 11\")  Weight: 93.1 kg (205 lb 4 oz)  SpO2: 97 %[EG1.1]      Physical Exam[KT1.1]    GEN:  " Alert, well developed, no acute distress  HEENT:  PERRL, EOMI, Mucous membranes are moist.   Cardio:  RRR, no murmur, radial pulses equal bilaterally  PULM:  Lungs have air movement, no wheezes, rales   Abd:  Soft, normal bowel sounds, no focal tenderness  Back exam:  No CVA tenderness  Musculoskeletal:  There is bilateral lower extremity swelling without calf tenderness, legs are wrapped with ACE wrap.   Neuro:  Alert and oriented X3, Follows commands, moving all extremities spontaneously   Skin:  Warm, dry[EG1.2]   ED Course[KT1.1]   1:03 PM  The patient was seen and examined by Kirstie Harris MD in Room 19.[KT1.2]     ED Course[EG1.1]     Procedures[KT1.1]             EKG Interpretation:      Interpreted by Tanya Harris  Time reviewed: 13:18  Symptoms at time of EKG: chest pain   Rhythm:  sinus tachycardia  Rate: 111  Axis: normal  Ectopy: none  Conduction: normal  ST Segments/ T Waves: No ST-T wave changes  Q Waves: none  Comparison to prior: Unchanged from 10/12/17, except previous LAD is not present today.     Clinical Impression: Sinus tachycardia[EG1.2]      Critical Care time:[KT1.1]  none  Labs are normal except as shown.  Previous hemoglobin was 8.0 on December 4.  Patient was given an albuterol nebulizer treatment in the emergency department.  He reported significant improvement in his symptoms after this.  He was also given a dose of p.o. prednisone in the ED.   Chest x-ray was reviewed by me and results are shown here.[EG1.2]   Results for orders placed or performed during the hospital encounter of 12/13/17   XR Chest Port 1 View    Narrative    Exam:  Chest X-ray 12/13/2017 1:41 PM    History: chest pain;     Comparison: Chest x-ray dated 11/20/2017    Findings: AP portable upright chest x-ray. The cardiomediastinal  silhouette is within normal limits. No pleural effusion. No  pneumothorax. No focal opacity. Left greater than right streaky  bibasilar opacities. The upper abdomen is  unremarkable. No acute bony  abnormality.      Impression    Impression: Left greater than right streaky basilar opacities favored  to represent atelectasis.    I have personally reviewed the examination and initial interpretation  and I agree with the findings.    BERNICE LYNN MD     *Note: Due to a large number of results and/or encounters for the requested time period, some results have not been displayed. A complete set of results can be found in Results Review.[EG1.3]          Labs Ordered and Resulted from Time of ED Arrival Up to the Time of Departure from the ED   BASIC METABOLIC PANEL - Abnormal; Notable for the following:        Result Value    Urea Nitrogen 36 (*)     Creatinine 1.55 (*)     GFR Estimate 44 (*)     GFR Estimate If Black 54 (*)     Calcium 7.9 (*)     All other components within normal limits   CBC WITH PLATELETS DIFFERENTIAL - Abnormal; Notable for the following:     WBC 11.4 (*)     RBC Count 2.44 (*)     Hemoglobin 6.4 (*)     Hematocrit 21.5 (*)     MCH 26.2 (*)     MCHC 29.8 (*)     RDW 20.1 (*)     Absolute Neutrophil 8.6 (*)     All other components within normal limits   INR - Abnormal; Notable for the following:     INR 1.92 (*)     All other components within normal limits   TROPONIN I   GLUCOSE BY METER[EG1.1]            Assessments & Plan (with Medical Decision Making)[KT1.1]   Patient presents with chest pain from his nursing home.  His chart review reveals a history of chronic chest pain and multiple workups for chest pain in the past.  Since he reports constant pain for the last 4 days, I do not think he needs further workup since his troponin is negative and EKG is negative as well.  I do not suspect PE given that he is already anticoagulated.  Patient has COPD and has poor air movement and he had significant improvement in his chest discomfort after treatment with a nebulizer.  I suspect COPD exacerbation is contributing to his symptoms.  Patient is also very anemic and  is known to be transfusion dependent per oncology notes.  I will plan to admit him to the observation unit for blood transfusion and to treat for acute COPD exacerbation.  I do not think he needs workup for his anemia since he has known to be transfusion dependent.  I do not think he needs further cardiac evaluation given that he is known to have chronic chest pain.[EG1.3]    I have reviewed the nursing notes.    I have reviewed the findings, diagnosis, plan and need for follow up with the patient.[KT1.1]    Current Discharge Medication List          Final diagnoses:   Other chest pain   COPD exacerbation (H)   Anemia in neoplastic disease   Prostate cancer (H)   Anemia requiring transfusions[EG1.1]   I, Alexandra Celestin, am serving as a trained medical scribe to document services personally performed by Kirstie Harris MD, based on the provider's statements to me.      IKirstie MD, was physically present and have reviewed and verified the accuracy of this note documented by Alexandra Celestin.[KT1.2]       12/13/2017   [facility], EMERGENCY DEPARTMENT[KT1.1]     Tanya Harris MD  12/13/17 1840  [EG1.4]     Revision History        User Key Date/Time User Provider Type Action    > EG1.4 12/13/2017  6:40 PM Tanya Harris MD Physician Sign     EG1.3 12/13/2017  6:38 PM Tanya Harris MD Physician Share     EG1.1 12/13/2017  6:36 PM Tanya Harris MD Physician      EG1.2 12/13/2017  6:30 PM Tanya Harris MD Physician      KT1.2 12/13/2017  1:21 PM Alexandra Celestin     KT1.1 12/13/2017  1:08 PM Alexandra Celestin                  Procedure Notes     No notes of this type exist for this encounter.      Progress Notes - Therapies (Notes from 12/11/17 through 12/14/17)     No notes of this type exist for this encounter.                                          INTERAGENCY TRANSFER FORM - LAB / IMAGING / EKG / EMG RESULTS   12/13/2017                        UNIT 6D OBSERVATION [facility]: [phone]            Unresulted Labs (24h ago through future)    Start       Ordered    12/13/17 2330  UA with Microscopic reflex to Culture  (LAB Urine Testing ADULT PANEL - UU,UR,RH,SH,PH,WY,HI)  ROUTINE,   Routine      12/13/17 2315         Lab Results - 3 Days      Glucose by meter [[id number]] (Abnormal)  Resulted: 12/14/17 1156, Result status: Final result    Ordering provider: Tanya Harris MD  12/14/17 1150 Resulting lab: POINT OF CARE TEST, GLUCOSE    Specimen Information    Type Source Collected On     12/14/17 1150          Components       Value Reference Range Flag Lab   Glucose 218 70 - 99 mg/dL H 170            Glucose by meter [[id number]] (Abnormal)  Resulted: 12/14/17 0830, Result status: Final result    Ordering provider: Tanya Harris MD  12/14/17 0823 Resulting lab: POINT OF CARE TEST, GLUCOSE    Specimen Information    Type Source Collected On     12/14/17 0823          Components       Value Reference Range Flag Lab   Glucose 151 70 - 99 mg/dL H 170            CRP inflammation [[id number]] (Abnormal)  Resulted: 12/14/17 0816, Result status: Final result    Ordering provider: Lise Mcdonald PA-C  12/14/17 0002 Resulting lab: [facility]    Specimen Information    Type Source Collected On   Blood  12/14/17 0719          Components       Value Reference Range Flag Lab   CRP Inflammation 240.0 0.0 - 8.0 mg/L H 51            Basic metabolic panel [[id number]] (Abnormal)  Resulted: 12/14/17 0812, Result status: Final result    Ordering provider: Lise Mcdonald PA-C  12/14/17 0002 Resulting lab: [facility]    Specimen Information    Type Source Collected On   Blood  12/14/17 0719          Components       Value Reference Range Flag Lab   Sodium 145 133 - 144 mmol/L H 51   Potassium 5.1 3.4 - 5.3 mmol/L  51   Chloride 111 94 - 109 mmol/L H 51   Carbon Dioxide 27  20 - 32 mmol/L  51   Anion Gap 7 3 - 14 mmol/L  51   Glucose 126 70 - 99 mg/dL H 51   Urea Nitrogen 35 7 - 30 mg/dL H 51   Creatinine 1.41 0.66 - 1.25 mg/dL H 51   GFR Estimate 49 >60 mL/min/1.7m2 L 51   Comment:  Non  GFR Calc   GFR Estimate If Black 60 >60 mL/min/1.7m2 L 51   Comment:  African American GFR Calc   Calcium 8.2 8.5 - 10.1 mg/dL L 51            CBC with platelets differential [[id number]] (Abnormal)  Resulted: 12/14/17 0743, Result status: Final result    Ordering provider: Lsie Mcdonald PA-C  12/14/17 0002 Resulting lab: [facility]    Specimen Information    Type Source Collected On   Blood  12/14/17 0719          Components       Value Reference Range Flag Lab   WBC 10.8 4.0 - 11.0 10e9/L  51   RBC Count 3.35 4.4 - 5.9 10e12/L L 51   Hemoglobin 8.7 13.3 - 17.7 g/dL L 51   Hematocrit 29.9 40.0 - 53.0 % L 51   MCV 89 78 - 100 fl  51   MCH 26.0 26.5 - 33.0 pg L 51   MCHC 29.1 31.5 - 36.5 g/dL L 51   RDW 19.1 10.0 - 15.0 % H 51   Platelet Count 246 150 - 450 10e9/L  51   Diff Method Automated Method   51   % Neutrophils 86.7 %  51   % Lymphocytes 6.4 %  51   % Monocytes 6.0 %  51   % Eosinophils 0.0 %  51   % Basophils 0.1 %  51   % Immature Granulocytes 0.8 %  51   Nucleated RBCs 0 0 /100  51   Absolute Neutrophil 9.4 1.6 - 8.3 10e9/L H 51   Absolute Lymphocytes 0.7 0.8 - 5.3 10e9/L L 51   Absolute Monocytes 0.7 0.0 - 1.3 10e9/L  51   Absolute Eosinophils 0.0 0.0 - 0.7 10e9/L  51   Absolute Basophils 0.0 0.0 - 0.2 10e9/L  51   Abs Immature Granulocytes 0.1 0 - 0.4 10e9/L  51   Absolute Nucleated RBC 0.0   51            Glucose by meter [[id number]] (Abnormal)  Resulted: 12/14/17 0240, Result status: Final result    Ordering provider: Tanya Harris MD  12/14/17 0216 Resulting lab: POINT OF CARE TEST, GLUCOSE    Specimen Information    Type Source Collected On     12/14/17 0216          Components       Value Reference Range Flag Lab    Glucose 211 70 - 99 mg/dL H 170            Glucose by meter [[id number]] (Abnormal)  Resulted: 12/13/17 2241, Result status: Final result    Ordering provider: Tanya Harris MD  12/13/17 2235 Resulting lab: POINT OF CARE TEST, GLUCOSE    Specimen Information    Type Source Collected On     12/13/17 2235          Components       Value Reference Range Flag Lab   Glucose 240 70 - 99 mg/dL H 170            Hemoglobin [[id number]] (Abnormal)  Resulted: 12/13/17 2147, Result status: Final result    Ordering provider: Abelino Joel MD  12/13/17 2126 Resulting lab: [facility]    Specimen Information    Type Source Collected On   Blood  12/13/17 2134          Components       Value Reference Range Flag Lab   Hemoglobin 7.4 13.3 - 17.7 g/dL L 51            Glucose by meter [[id number]] (Abnormal)  Resulted: 12/13/17 1855, Result status: Final result    Ordering provider: Tanya Harris MD  12/13/17 1826 Resulting lab: POINT OF CARE TEST, GLUCOSE    Specimen Information    Type Source Collected On     12/13/17 1826          Components       Value Reference Range Flag Lab   Glucose 176 70 - 99 mg/dL H 170            CRP inflammation [[id number]] (Abnormal)  Resulted: 12/13/17 1827, Result status: Final result    Ordering provider: Tanya Harris MD  12/13/17 1325 Resulting lab: [facility]    Specimen Information    Type Source Collected On     12/13/17 1325          Components       Value Reference Range Flag Lab   CRP Inflammation 240.0 0.0 - 8.0 mg/L H 51            Glucose by meter [[id number]]  Resulted: 12/13/17 1405, Result status: Final result    Ordering provider: Tanya Harris MD  12/13/17 1401 Resulting lab: POINT OF CARE TEST, GLUCOSE    Specimen Information    Type Source Collected On     12/13/17 1401          Components       Value Reference Range Flag Lab   Glucose 79 70 - 99 mg/dL  170             INR [[id number]] (Abnormal)  Resulted: 12/13/17 1402, Result status: Final result    Ordering provider: Tanya Harris MD  12/13/17 1315 Resulting lab: [facility]    Specimen Information    Type Source Collected On   Blood  12/13/17 1325          Components       Value Reference Range Flag Lab   INR 1.92 0.86 - 1.14 H 51            Troponin I [[id number]]  Resulted: 12/13/17 1357, Result status: Final result    Ordering provider: Tanya Harris MD  12/13/17 1314 Resulting lab: [facility]    Specimen Information    Type Source Collected On   Blood  12/13/17 1325          Components       Value Reference Range Flag Lab   Troponin I ES <0.015 0.000 - 0.045 ug/L  51   Comment:         The 99th percentile for upper reference range is 0.045 ug/L.  Troponin values   in the range of 0.045 - 0.120 ug/L may be associated with risks of adverse   clinical events.              Basic metabolic panel [[id number]] (Abnormal)  Resulted: 12/13/17 1357, Result status: Final result    Ordering provider: Tanya Harris MD  12/13/17 1314 Resulting lab: [facility]    Specimen Information    Type Source Collected On   Blood  12/13/17 1325          Components       Value Reference Range Flag Lab   Sodium 143 133 - 144 mmol/L  51   Potassium 5.0 3.4 - 5.3 mmol/L  51   Chloride 109 94 - 109 mmol/L  51   Carbon Dioxide 26 20 - 32 mmol/L  51   Anion Gap 8 3 - 14 mmol/L  51   Glucose 87 70 - 99 mg/dL  51   Urea Nitrogen 36 7 - 30 mg/dL H 51   Creatinine 1.55 0.66 - 1.25 mg/dL H 51   GFR Estimate 44 >60 mL/min/1.7m2 L 51   Comment:  Non  GFR Calc   GFR Estimate If Black 54 >60 mL/min/1.7m2 L 51   Comment:  African American GFR Calc   Calcium 7.9 8.5 - 10.1 mg/dL L 51            CBC with platelets differential [[id number]] (Abnormal)  Resulted: 12/13/17 1355, Result status: Final result    Ordering  provider: Tanya Harris MD  12/13/17 3885 Resulting lab: [facility]    Specimen Information    Type Source Collected On   Blood  12/13/17 1325          Components       Value Reference Range Flag Lab   WBC 11.4 4.0 - 11.0 10e9/L H 51   RBC Count 2.44 4.4 - 5.9 10e12/L L 51   Hemoglobin 6.4 13.3 - 17.7 g/dL LL 51   Comment:         This result has been called to EMMANUEL PFEIFFER by Avani Larson on 12 13 2017   at 1354, and has been read back.      Hematocrit 21.5 40.0 - 53.0 % L 51   MCV 88 78 - 100 fl  51   MCH 26.2 26.5 - 33.0 pg L 51   MCHC 29.8 31.5 - 36.5 g/dL L 51   RDW 20.1 10.0 - 15.0 % H 51   Platelet Count 244 150 - 450 10e9/L  51   Diff Method Automated Method   51   % Neutrophils 75.0 %  51   % Lymphocytes 13.7 %  51   % Monocytes 8.7 %  51   % Eosinophils 1.5 %  51   % Basophils 0.2 %  51   % Immature Granulocytes 0.9 %  51   Nucleated RBCs 0 0 /100  51   Absolute Neutrophil 8.6 1.6 - 8.3 10e9/L H 51   Absolute Lymphocytes 1.6 0.8 - 5.3 10e9/L  51   Absolute Monocytes 1.0 0.0 - 1.3 10e9/L  51   Absolute Eosinophils 0.2 0.0 - 0.7 10e9/L  51   Absolute Basophils 0.0 0.0 - 0.2 10e9/L  51   Abs Immature Granulocytes 0.1 0 - 0.4 10e9/L  51   Absolute Nucleated RBC 0.0   51            Testing Performed By     Lab - Abbreviation Name Director Address Valid Date Range    51 - Unknown [facility] Unknown [address] [facility] [address] 12/31/14 1010 - Present    170 - Unknown POINT OF CARE TEST, GLUCOSE Unknown Unknown 10/31/11 1114 - Present               Imaging Results - 3 Days      XR Chest Port 1 View [[id number]]  Resulted: 12/13/17 1506, Result status: Final result    Ordering provider: Tanya Harris MD  12/13/17 1313 Resulted by: Thiago Dietz MD Christensen, Isaac Mike,     Performed: 12/13/17 1318 - 12/13/17 1341 Resulting lab: RADIOLOGY RESULTS    Narrative:       Exam:  Chest X-ray 12/13/2017 1:41  PM    History: chest pain;     Comparison: Chest x-ray dated 11/20/2017    Findings: AP portable upright chest x-ray. The cardiomediastinal  silhouette is within normal limits. No pleural effusion. No  pneumothorax. No focal opacity. Left greater than right streaky  bibasilar opacities. The upper abdomen is unremarkable. No acute bony  abnormality.      Impression:       Impression: Left greater than right streaky basilar opacities favored  to represent atelectasis.    I have personally reviewed the examination and initial interpretation  and I agree with the findings.    BERNICE LYNN MD      Testing Performed By     Lab - Abbreviation Name Director Address Valid Date Range    104 - Rad Rslts RADIOLOGY RESULTS Unknown Unknown 02/16/05 1553 - Present            Encounter-Level Documents:     There are no encounter-level documents.      Order-Level Documents:     There are no order-level documents.

## 2017-12-13 NOTE — IP AVS SNAPSHOT
Unit 6D Observation [address] [address]    Phone:  [phone]    Fax:  [phone]                                       After Visit Summary   12/13/2017    Tomas Grey    MRN: [id number]           After Visit Summary Signature Page     I have received my discharge instructions, and my questions have been answered. I have discussed any challenges I see with this plan with the nurse or doctor.    ..........................................................................................................................................  Patient/Patient Representative Signature      ..........................................................................................................................................  Patient Representative Print Name and Relationship to Patient    ..................................................               ................................................  Date                                            Time    ..........................................................................................................................................  Reviewed by Signature/Title    ...................................................              ..............................................  Date                                                            Time

## 2017-12-13 NOTE — ED NOTES
Patient arrived via EMS with complaints of chest pain since yesterday. Per EMS report nurse administered Nitro x 3 to patient yesterday which helped. Patient refused Nitro from nurse/EMS today,  mg administered. Patient is on normal home oxygen via [place] @ 2 L

## 2017-12-13 NOTE — ED NOTES
Bed: IN  Expected date: 12/13/17  Expected time: 12:10 PM  Means of arrival: Ambulance  Comments:  [id number]  71 yr male  Chest pain  Yellow

## 2017-12-14 PROBLEM — D63.0 ANEMIA IN NEOPLASTIC DISEASE: Status: ACTIVE | Noted: 2017-01-01

## 2017-12-14 NOTE — PROGRESS NOTES
WOC asked to see wound on buttocks.  (consult specified Left, but area on concern is actually on right confirmed by pt and RN)    History per MD notes: Tomas Grey is a 72 year old male with a history of metastatic prostate cancer, HTN, diabetes, COPD, CKD, CAD, transfusion dependent anemia, and chronic pain who presents to the Emergency Department via EMS for evaluation of chest pain.     A/I/P: Pt had previous wound on R IT.  Depigmented with dry scaly center.  Intact and blanchable.  Not a pressure injury at this time.  No treatment needed.      No further follow up needed.      Face to face time: 20 minutes.

## 2017-12-14 NOTE — PROGRESS NOTES
Pt stated to nurse that his IV site was bleeding. Blood was dripping from the area. Nurse assessed site and hub was loose that was connected to IV. Fixed and tried to flush site and pt stated that is was stinging. New IV site ordered. Will restart blood when new IV is put in.

## 2017-12-14 NOTE — DISCHARGE SUMMARY
Physician Discharge Summary     Patient ID:  Tomas Grey  [id number]  72 year old  1945    Admit date: 12/13/2017    Discharge date and time: 12/14/2017     Admitting Physician: Tanya Harris MD     Discharge Physician: Rubio Alberts MD    Admission Diagnoses: Other chest pain [R07.89]  Anemia in neoplastic disease [D63.0]  Prostate cancer (H) [C61]  COPD exacerbation (H) [J44.1]    Discharge Diagnoses: Anemia in neoplastic disease        COPD exacerbation        Other Chest pain, chronic    Admission Condition: fair    Discharged Condition: fair    Indication for Admission: Symptomatic anemia with need for transfusion    Hospital Course: Patient with multiple medical problems including metastatic prostate cancer for which he is no longer receiving treatment, 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  He presented to the emergency department due to chest pain and shortness of breath.  He was evaluated there and his chest pain was deemed noncardiac, however he was noted to be remarkably anemic and in the range that generally requires a transfusion.  He also was wheezing and was diagnosed with COPD exacerbation with negative chest x-ray.  He was admitted to the observation unit for blood transfusion, repeat hemoglobin, treatment of COPD exacerbation, and continuation of meds for chronic chest pain syndrome.  With regards to his anemia, he was transfused with appropriate improvement in hemoglobin to above baseline.  His shortness of breath improved with nebs and prednisone.  He was initiated on prednisone and doxycycline and is currently at his baseline pulmonary status.  His chest pain was treated with Tylenol and his regular fentanyl patch, and is believed to be part of his chronic pain syndrome related to bony metastasis, and pulmonary hypertension.  No further cardiac workup was recommended upon admission does not appear to be indicated.   "He is in agreement that this pain is chronic.  Patient himself reports subjective improvement and feels he is ready to return to his skilled care facility.  Although his advanced cancer and multiple problems give him relatively poor long-term prognosis he does appear stable at this time to return to his baseline living situation.  His regular medications will be continued with the addition of doxycycline and prednisone, and he has planned follow-up with the skilled care facility physician and the geriatric clinic at the [facility].    Consults: none    Significant Diagnostic Studies: labs: Serial hemoglobin improving    Treatments: antibiotics: Doxycycline, analgesia: acetaminophen and fentanyl patch, anticoagulation: warfarin, steroids: prednisone, respiratory therapy: O2 and albuterol/atropine nebulizer and transfusion of packed red blood cells    Discharge Exam:  /59  Pulse 108  Temp 97.4  F (36.3  C) (Oral)  Resp 16  Ht 1.803 m (5' 11\")  Wt 93.1 kg (205 lb 4 oz)  SpO2 98%  BMI 28.63 kg/m2     EXAM:  HEENT: Normal.  Oropharynx clear and moist.  Patient is wearing a nasal cannula at baseline 2 L.  Neck: Supple, trachea midline, normal voice  Chest:  No respiratory distress, speaks in complete sentences, chest wall tender but no crepitance or deformity, lungs some diminished air movement and scattered expiratory wheezes consistent with known history of COPD  CV: Regular rate and rhythm (heart rate in the 90s), no murmur, normal pulse, no jugular venous distention  Abdomen: Nondistended, soft nontender.  No hepatosplenomegaly.  Extremities: No tenderness, pressure areas on buttocks does not reveal any full-thickness ulcer      Disposition: SNF    Patient Instructions:   Current Discharge Medication List      START taking these medications    Details   predniSONE (DELTASONE) 20 MG tablet Take 2 tablets (40 mg) by mouth daily for 5 days  Qty: 10 tablet, Refills: 0    Associated Diagnoses: " COPD exacerbation (H)      doxycycline (VIBRAMYCIN) 100 MG capsule Take 1 capsule (100 mg) by mouth every 12 hours for 7 days  Qty: 14 capsule, Refills: 0    Associated Diagnoses: COPD exacerbation (H)         CONTINUE these medications which have NOT CHANGED    Details   FOLIC ACID PO Take 5 mg by mouth daily Take 5 mg PO QD x 1 month starting 12/7/17 then 1 mg PO QD      ferrous sulfate (IRON) 325 (65 FE) MG tablet Take 325 mg by mouth 2 times daily      ipratropium - albuterol 0.5 mg/2.5 mg/3 mL (DUONEB) 0.5-2.5 (3) MG/3ML neb solution Take 1 vial by nebulization every 4 hours as needed for shortness of breath / dyspnea or wheezing      Carboxymethylcellulose Sodium (CARBOXYMETHYLCELLULOSE SOD PF OP) Place 1 drop into both eyes 4 times daily And 1 drop both eyes daily prn      fentaNYL (DURAGESIC) 12 mcg/hr 72 hr patch Place 1 patch onto the skin every 72 hours      MEGESTROL ACETATE PO Take 80 mg by mouth 2 times daily      senna-docusate (SENOKOT-S;PERICOLACE) 8.6-50 MG per tablet Take 2 tablets by mouth 2 times daily      budesonide-formoterol (SYMBICORT) 160-4.5 MCG/ACT Inhaler Inhale 2 puffs into the lungs 2 times daily      Timolol Hemihydrate (BETIMOL OP) Place 1 drop into both eyes every morning       bimatoprost (LUMIGAN) 0.01 % SOLN Place 1 drop into both eyes At Bedtime      WARFARIN SODIUM PO Take by mouth daily See facility orders for INR dosing      calcium carbonate (OS-JORDAN 600 MG [place]. CA) 1500 (600 CA) MG tablet Take 1,500 mg by mouth 2 times daily (with meals)      isosorbide mononitrate (IMDUR) 30 MG 24 hr tablet Take 2 tablets (60 mg) by mouth daily  Qty: 30 tablet      ONDANSETRON PO Take 4 mg by mouth every 8 hours as needed for nausea      furosemide (LASIX) 20 MG tablet Take 1 tablet (20 mg) by mouth 2 times daily  Qty: 30 tablet    Associated Diagnoses: Chronic diastolic congestive heart failure (H); Lymphedema of both lower extremities      ranitidine (RANITIDINE) 75 MG tablet Take 150  mg by mouth daily       diclofenac (VOLTAREN) 1 % GEL topical gel Apply 4 grams to knees four times daily as needed using enclosed dosing card.  Qty: 100 g, Refills: 0    Associated Diagnoses: Chronic pain of both knees      insulin glargine (LANTUS) 100 UNIT/ML injection Inject 20 Units Subcutaneous every morning       omeprazole (PRILOSEC) 10 MG CR capsule Take 1 capsule (10 mg) by mouth daily    Associated Diagnoses: Gastric reflux      atorvastatin (LIPITOR) 10 MG tablet Take 10 mg by mouth At Bedtime       tiotropium (SPIRIVA) 18 MCG inhalation capsule Inhale 18 mcg into the lungs daily      acetaminophen (TYLENOL) 500 MG tablet Take 1,000 mg by mouth 3 times daily Not to exceed 3000 mg/24 hours       nitroglycerin (NITROSTAT) 0.4 MG SL tablet Place 0.4 mg under the tongue every 5 minutes as needed for chest pain if you are still having symptoms after 3 doses (15 minutes) call 911.      aspirin 81 MG EC tablet Take 1 tablet (81 mg) by mouth daily  Qty: 5 tablet, Refills: 0    Associated Diagnoses: ASCVD (arteriosclerotic cardiovascular disease)      metoprolol (TOPROL XL) 50 MG 24 hr tablet Take 1 tablet (50 mg) by mouth daily  Qty: 5 tablet, Refills: 0    Associated Diagnoses: ASCVD (arteriosclerotic cardiovascular disease)      tamsulosin (FLOMAX) 0.4 MG 24 hr capsule Take 1 capsule (0.4 mg) by mouth daily  Qty: 5 capsule, Refills: 0    Associated Diagnoses: Benign non-nodular prostatic hyperplasia without lower urinary tract symptoms      ammonium lactate (LAC-HYDRIN) 12 % lotion Apply topically 2 times daily as needed for dry skin To all extremities for dry skin           Activity: activity as tolerated  Diet: regular diet  Wound Care: none needed    Follow-up with physician at skilled care facility in 2-3 days, and with appointment at geriatric clinic on January 9, 2018.    Signed:  Rubio Alberts  12/14/2017  11:30 AM

## 2017-12-14 NOTE — PROGRESS NOTES
Obs goals:    -diagnostic tests and consults completed and resulted: labs ordered for morning.  Wound care and OT consult in a.m.  Wound care for open area on RLE and R gluteal fold non-blanchable erythema.  OT for lymph wraps.  -vital signs normal or at patient baseline: Tachycardic 100-119, sat'ing well on home 02. First of two units of PRBC infusing.  -tolerating oral antibiotics or has plans for home infusion setup: on oral doxycycline  -dyspnea improved and O2 sats greater than 88% on room air or prior home oxygen levels: sat'ing mid-90's with home 02  -safe disposition plan has been identified: Care coordinator to see in a.m.  Currently resides at [facility].      Nurse to notify provider when observation goals have been met and patient is ready for discharge.

## 2017-12-14 NOTE — PROGRESS NOTES
Social Work Services Discharge Note      Patient Name:  Tomas Grey     Anticipated Discharge Date:  12/14/2017    Discharge Disposition:   LTC:  Return to [facility] ([phone] f: [phone])    Following MD:  per facility     Pre-Admission Screening (PAS) online form has been completed.  The Level of Care (LOC) is:  Determined  Confirmation Code is:  N/A as pt is returning  Patient/caregiver informed of referral to [facility] for Pre-Admission Screening for skilled nursing facility (SNF) placement and to expect a phone call post discharge from SNF.     Additional Services/Equipment Arranged:  HE w/c transport scheduled for 1300. Transport O2 tank arranged w/ [facility] ([phone]) and will bring this morning.      Patient / Family response to discharge plan:  Pt agreeable to returning to LTC facility.      Persons notified of above discharge plan:  Pt, LTC facility, Charge RN, bedside RN    Staff Discharge Instructions:  Please print a packet and send with patient.     CTS Handoff completed:  NO    Medicare Notice of Rights provided to the patient/family:  NO

## 2017-12-14 NOTE — PROGRESS NOTES
Obs goals:     -diagnostic tests and consults completed and resulted: labs ordered for morning.  OT consult deferred. Wound care for open area on RLE and R gluteal fold non-blanchable erythema.  -vital signs normal or at patient baseline: Tachycardic 100-110, sat'ing well on home 02. Hgb stable at 8.7. /79, morning meds to be given.   -tolerating oral antibiotics or has plans for home infusion setup: on oral doxycycline  -dyspnea improved and O2 sats greater than 88% on room air or prior home oxygen levels: sat'ing mid-90's with home 02. Denies SOB  -safe disposition plan has been identified: Care coordinator to see in a.m.  Currently resides at [facility].       Nurse to notify provider when observation goals have been met and patient is ready for discharge.

## 2017-12-14 NOTE — DISCHARGE INSTRUCTIONS
Resume your current activities and medications.  Please check INR within the next 5-7 days while you are on the antibiotic doxycycline.  Follow-up with physician or your skilled care facility.

## 2017-12-14 NOTE — PROGRESS NOTES
Discharge instructions reviewed with patient and all questions answered. Patient able to verbalize understanding of instructions. Packet sent with transport to LTC. PIV discontinued. All belongings with patient. Medical transport here to provide ride to [facility]. Patient discharge to LTC.

## 2017-12-14 NOTE — PLAN OF CARE
Problem: Patient Care Overview  Goal: Plan of Care/Patient Progress Review  Outpatient/Observation goals to be met before discharge home:      Diagnostic tests and consults completed and resulted: No, pt has labs ordered in AM for monitoring; second unit of blood being transfused.   Vital signs normal or at patient baseline: Yes.  Tolerating oral antibiotics or has plans for home infusion setup: Yes, pt on oral antibiotics.  Dyspnea improved and O2 sats >88% on RA or prior home oxygen levels: Yes, pt on home O2  Safe disposition plan has been identified: No-yet to be determined.  *Nurse to notify MD when observation goals have been met and patient is ready for discharge.

## 2017-12-14 NOTE — PLAN OF CARE
Problem: Patient Care Overview  Goal: Plan of Care/Patient Progress Review  OT/6C:  OT/lymphedema orders received and acknowledged.  Per chart review and discussion with multi-disciplinary team, pt was admitted from a NH/TCU - was getting lymphedema treatment at previous facility and will be able to continue treatment upon DC.  Pt with tentative plans to return to prior living environment this date.  Will defer evaluation as needs will be more appropriately addressed at next level of care.  OT to complete orders.

## 2017-12-14 NOTE — PLAN OF CARE
Problem: Patient Care Overview  Goal: Plan of Care/Patient Progress Review  Outpatient/Observation goals to be met before discharge home:    PRIMARY DIAGNOSIS: Chest Pain  OUTPATIENT/OBSERVATION GOALS TO BE MET BEFORE DISCHARGE:  Diagnostic tests and consults completed and resulted: NO-pt has labs ordered in AM for monitoring; pt getting blood transfusion.   Vital signs normal or at patient baseline: YES  Tolerating oral antibiotics or has plans for home infusion setup: YES-pt on oral antibiotics.  Dyspnea improved and O2 sats >88% on RA or prior home oxygen levels: YES-pt on home o2  Safe disposition plan has been identified: NO-yet to be determined.  *Nurse to notify MD when observation goals have been met and patient is ready for discharge.

## 2017-12-14 NOTE — PROGRESS NOTES
Care Coordinator Progress Note     Admission Date/Time:  12/13/2017  Attending MD:  Dr Abelino Joel    Observation Status     Data  Received Care Coordinator consult for disposition & discharge planning. Chart reviewed. Pt is from [facility].      Plan  Social Work will follow for discharge planning and return to [facility]. No Care Coordination needs at this time.         Shani Barry RN Care Coordinator  [address], [facility]

## 2017-12-14 NOTE — PLAN OF CARE
Problem: Patient Care Overview  Goal: Plan of Care/Patient Progress Review  Outpatient/Observation goals to be met before discharge home:      Diagnostic tests and consults completed and resulted: No, hemoglobin to be checked this AM.  Vital signs normal or at patient baseline: Yes.  Tolerating oral antibiotics or has plans for home infusion setup: Yes, pt on oral antibiotics.  Dyspnea improved and O2 sats >88% on RA or prior home oxygen levels: Yes, pt on home O2  Safe disposition plan has been identified: No-yet to be determined.  *Nurse to notify MD when observation goals have been met and patient is ready for discharge.    Pt tolerated the blood transfusion. Hemoglobin to be checked this morning. Voided in the urinal.UA pending,

## 2017-12-18 NOTE — ED PROVIDER NOTES
"  History     Chief Complaint   Patient presents with     Chest Pain     HPI  Tomas Grey is a 72-year-old male with history of metastatic prostate cancer no longer receiving treatment, chronic anemia, COPD, chronic chest pain, CKD, CHF, among multiple other medical problems.  He was recently discharged from the observation unit for a COPD exacerbation and anemia requiring transfusion. He was evaluated for chest pain which was deemed to be noncardiac.  He was also treated for COPD exacerbation with steroids and antibiotics and was discharged at his pulmonary baseline.  Patient returns today with multiple complaints including a crick in his neck, ongoing chest pain, ongoing low extremity pain and bilateral knee cramping.  He has no fevers. He does note some occasional shortness of breath.    He is having difficulty focusing on one specific issue that brought him in this early morning.  Denies trauma  Complaint with his medications  No headache, fevers, chills or new rashes, no lower extremity weeping.     This part of the document was transcribed by Alexandra Celestin Medical Scribe.      I have reviewed the Medications, Allergies, Past Medical and Surgical History, and Social History in the Epic system.    Review of Systems   14 point review of symptoms was performed and is negative except as noted above.  Physical Exam   BP: (!) 202/111  Heart Rate: 95  Temp: 97.6  F (36.4  C)  Resp: 18  Height: 180.3 cm (5' 11\")  Weight: 92.1 kg (203 lb)  SpO2: 95 %      Physical Exam    ED Course   GEN: Well appearing, non toxic, cooperative and conversant.   HEENT: The head is normocephalic and atraumatic. Pupils are equal round and reactive to light. Extraocular motions are intact. There is no facial swelling. The neck is nontender and supple.   CV: Regular rate and rhythm without murmurs rubs or gallops. 2+ radial pulses bilaterally.  PULM: decrease breath sounds diffusely likely chronic   ABD: Soft, nontender, nondistended. " Normal bowel sounds.   EXT: Diffuse lymphedema with chronic skin changes, no weeping, no warmth, or erythema   NEURO: Cranial nerves II through XII are intact and symmetric. Bilateral upper and lower extremities grossly show full range of motion without any focal deficits.   SKIN: No rashes, ecchymosis, or lacerations  PSYCH: Calm and cooperative, interactive.   ED Course     Procedures          EKG at 0020.    Leg cramping, CP at time of ECG.  ECG interpretted by me within 10 minutes of production  NSR at 77 bpm. Left axis deviation.  Normal intervals, exc  Nl R-wave progress. No ST-T elevations or depressions. Pathologic T-wave inversion are absent     Interpretation: abn ECG with no evidence of ischemia and no significant changes compared with prior.          Labs Ordered and Resulted from Time of ED Arrival Up to the Time of Departure from the ED   CBC WITH PLATELETS DIFFERENTIAL - Abnormal; Notable for the following:        Result Value    WBC 15.3 (*)     RBC Count 3.94 (*)     Hemoglobin 10.6 (*)     Hematocrit 35.6 (*)     MCHC 29.8 (*)     RDW 19.1 (*)     Nucleated RBCs 1 (*)     Absolute Neutrophil 12.0 (*)     Absolute Monocytes 1.7 (*)     Abs Immature Granulocytes 0.6 (*)     All other components within normal limits   INR - Abnormal; Notable for the following:     INR 1.68 (*)     All other components within normal limits   BASIC METABOLIC PANEL - Abnormal; Notable for the following:     Glucose 108 (*)     Urea Nitrogen 33 (*)     GFR Estimate 57 (*)     All other components within normal limits   TROPONIN I            Assessments & Plan (with Medical Decision Making)   72-year-old male with multiple medical problems including chronic chest pain, metastatic prostate cancer, COPD, among others  Clinically he is actually fairly well appearing. He has multiple complaints, which are difficult to unify.  His vital signs were unremarkable, he has a SPO2 of 94  His EKG shows normal sinus rhythm with  left axis deviation which is at its baseline without evidence of ischemic abnormality  His labs are unremarkable including a negative troponin. He has been very well appearing throughout his ED stay.     Given his recent extensive workup an excellent clinical appearance today, my suspicion for an acute cardiopulmonary abnormality explaining his chest pain or knee cramping is quite low. He is being discharged to a safe facility where he ll be able to follow-up  Patient is agreeable to discharge and follow-up with strict return ED return precautions     - Patient agrees to our plan and is ready and eager for discharge. Care plan, follow up plan, and reasons to return immediately to the ED were dicussed in detail and summarized as noted in the discharge instructions.      This part of the document was transcribed by Svetlana Antony [place].      I have reviewed the nursing notes.    I have reviewed the findings, diagnosis, plan and need for follow up with the patient.    Discharge Medication List as of 12/18/2017  3:18 AM          Final diagnoses:   Chest pain, unspecified type       12/18/2017   [facility], [place], EMERGENCY DEPARTMENT     Yordan Burnett MD  12/18/17 0565

## 2017-12-18 NOTE — ED AVS SNAPSHOT
[facility], [place], Emergency Department    [address] [address]    Phone:  [phone]                                       Tomas Grey   MRN: [id number]    Department:  [facility], Emergency Department   Date of Visit:  12/18/2017           After Visit Summary Signature Page     I have received my discharge instructions, and my questions have been answered. I have discussed any challenges I see with this plan with the nurse or doctor.    ..........................................................................................................................................  Patient/Patient Representative Signature      ..........................................................................................................................................  Patient Representative Print Name and Relationship to Patient    ..................................................               ................................................  Date                                            Time    ..........................................................................................................................................  Reviewed by Signature/Title    ...................................................              ..............................................  Date                                                            Time

## 2017-12-18 NOTE — ED AVS SNAPSHOT
[facility], Emergency Department    [address] [address]    Phone:  [phone]                                       Tomas Grey   MRN: [id number]    Department:  [facility], Emergency Department   Date of Visit:  12/18/2017           Patient Information     Date Of Birth          1945        Your diagnoses for this visit were:     Chest pain, unspecified type        You were seen by Yordan Burnett MD.        Discharge Instructions       Please be sure to follow-up with your primary care team.    Your INR today is 1.68.  This is below therapeutic.  Please discuss this with your care team at the nursing facility for retesting.    Return to the emergency department immediately for any chest pain, back pain, shortness of breath, weakness, abdominal pain nausea, vomiting, or any other concerns as given or discussed    The cause of your chest and leg pain is not certain today.  Please be sure to continue working with your primary care team.     Future Appointments        Provider Department Dept Phone Center    1/9/2018 2:00 PM JASON Camejo [facility] and [facility] [phone] [facility]      24 Hour Appointment Hotline       To make an appointment at any [facility], call [phone] ([phone]). If you don't have a family doctor or clinic, we will help you find one. [facility] clinics are conveniently located to serve the needs of you and your family.             Review of your medicines      Our records show that you are taking the medicines listed below. If these are incorrect, please call your family doctor or clinic.        Dose / Directions Last dose taken    acetaminophen 500 MG tablet   Commonly known as:  TYLENOL   Dose:  1000 mg        Take 1,000 mg by mouth 3 times daily Not to exceed 3000 mg/24 hours   Refills:  0        ammonium lactate 12 % lotion   Commonly known as:  LAC-HYDRIN   Indication:  Abnormal Dryness  of Skin        Apply topically 2 times daily as needed for dry skin To all extremities for dry skin   Refills:  0        aspirin 81 MG EC tablet   Dose:  81 mg   Quantity:  5 tablet        Take 1 tablet (81 mg) by mouth daily   Refills:  0        BETIMOL OP   Dose:  1 drop        Place 1 drop into both eyes every morning   Refills:  0        bimatoprost 0.01 % Soln   Commonly known as:  LUMIGAN   Dose:  1 drop        Place 1 drop into both eyes At Bedtime   Refills:  0        budesonide-formoterol 160-4.5 MCG/ACT Inhaler   Commonly known as:  SYMBICORT   Dose:  2 puff        Inhale 2 puffs into the lungs 2 times daily   Refills:  0        calcium carbonate 1500 (600 CA) MG tablet   Commonly known as:  OS-JORDAN 600 mg [place]. Ca   Dose:  1500 mg        Take 1,500 mg by mouth 2 times daily (with meals)   Refills:  0        CARBOXYMETHYLCELLULOSE SOD PF OP   Dose:  1 drop        Place 1 drop into both eyes 4 times daily And 1 drop both eyes daily prn   Refills:  0        diclofenac 1 % Gel topical gel   Commonly known as:  VOLTAREN   Quantity:  100 g        Apply 4 grams to knees four times daily as needed using enclosed dosing card.   Refills:  0        doxycycline 100 MG capsule   Commonly known as:  VIBRAMYCIN   Dose:  100 mg   Indication:  copd exacerbation   Quantity:  14 capsule        Take 1 capsule (100 mg) by mouth every 12 hours for 7 days   Refills:  0        fentaNYL 12 mcg/hr 72 hr patch   Commonly known as:  DURAGESIC   Dose:  1 patch        Place 1 patch onto the skin every 72 hours   Refills:  0        ferrous sulfate 325 (65 FE) MG tablet   Commonly known as:  IRON   Dose:  325 mg        Take 325 mg by mouth 2 times daily   Refills:  0        FOLIC ACID PO   Dose:  5 mg        Take 5 mg by mouth daily Take 5 mg PO QD x 1 month starting 12/7/17 then 1 mg PO QD   Refills:  0        furosemide 20 MG tablet   Commonly known as:  LASIX   Dose:  20 mg   Quantity:  30 tablet        Take 1 tablet (20 mg) by mouth  2 times daily   Refills:  0        insulin glargine 100 UNIT/ML injection   Commonly known as:  LANTUS   Dose:  20 Units   Indication:  Type 2 Diabetes        Inject 20 Units Subcutaneous every morning   Refills:  0        ipratropium - albuterol 0.5 mg/2.5 mg/3 mL 0.5-2.5 (3) MG/3ML neb solution   Commonly known as:  DUONEB   Dose:  1 vial        Take 1 vial by nebulization every 4 hours as needed for shortness of breath / dyspnea or wheezing   Refills:  0        isosorbide mononitrate 30 MG 24 hr tablet   Commonly known as:  IMDUR   Dose:  60 mg   Quantity:  30 tablet        Take 2 tablets (60 mg) by mouth daily   Refills:  0        LIPITOR 10 MG tablet   Dose:  10 mg   Generic drug:  atorvastatin        Take 10 mg by mouth At Bedtime   Refills:  0        MEGESTROL ACETATE PO   Dose:  80 mg        Take 80 mg by mouth 2 times daily   Refills:  0        metoprolol 50 MG 24 hr tablet   Commonly known as:  TOPROL XL   Dose:  50 mg   Quantity:  5 tablet        Take 1 tablet (50 mg) by mouth daily   Refills:  0        nitroGLYcerin 0.4 MG sublingual tablet   Commonly known as:  NITROSTAT   Dose:  0.4 mg        Place 0.4 mg under the tongue every 5 minutes as needed for chest pain if you are still having symptoms after 3 doses (15 minutes) call 911.   Refills:  0        omeprazole 10 MG CR capsule   Commonly known as:  priLOSEC   Dose:  10 mg        Take 1 capsule (10 mg) by mouth daily   Refills:  0        ONDANSETRON PO   Dose:  4 mg        Take 4 mg by mouth every 8 hours as needed for nausea   Refills:  0        predniSONE 20 MG tablet   Commonly known as:  DELTASONE   Dose:  40 mg   Quantity:  10 tablet        Take 2 tablets (40 mg) by mouth daily for 5 days   Refills:  0        ranitidine 75 MG tablet   Dose:  150 mg   Indication:  Gastroesophageal Reflux Disease   Generic drug:  ranitidine        Take 150 mg by mouth daily   Refills:  0        senna-docusate 8.6-50 MG per tablet   Commonly known as:   SENOKOT-S;PERICOLACE   Dose:  2 tablet        Take 2 tablets by mouth 2 times daily   Refills:  0        tamsulosin 0.4 MG capsule   Commonly known as:  FLOMAX   Dose:  0.4 mg   Quantity:  5 capsule        Take 1 capsule (0.4 mg) by mouth daily   Refills:  0        tiotropium 18 MCG capsule   Commonly known as:  SPIRIVA   Dose:  18 mcg   Indication:  Chronic Obstructive Lung Disease        Inhale 18 mcg into the lungs daily   Refills:  0        WARFARIN SODIUM PO        Take by mouth daily See facility orders for INR dosing   Refills:  0                Procedures and tests performed during your visit     Basic metabolic panel    CBC with platelets differential    Chest  XR, 1 view portable    EKG 12 lead    INR    Troponin I      Orders Needing Specimen Collection     None      Pending Results     Date and Time Order Name Status Description    12/18/2017 0135 Chest  XR, 1 view portable Preliminary             Pending Culture Results     No orders found from 12/16/2017 to 12/19/2017.            Pending Results Instructions     If you had any lab results that were not finalized at the time of your Discharge, you can call the ED Lab Result RN at [phone]. You will be contacted by this team for any positive Lab results or changes in treatment. The nurses are available 7 days a week from 10A to 6:30P.  You can leave a message 24 hours per day and they will return your call.        Thank you for choosing [facility]       Thank you for choosing [facility] for your care. Our goal is always to provide you with excellent care. Hearing back from our patients is one way we can continue to improve our services. Please take a few minutes to complete the written survey that you may receive in the mail after you visit with us. Thank you!        [employer] Information     [employer] lets you send messages to your doctor, view your test results, renew your prescriptions, schedule appointments and more. To sign up, go to  [email or web address] . Click on \"Log in\" on the left side of the screen, which will take you to the Welcome page. Then click on \"Sign up Now\" on the right side of the page.     You will be asked to enter the access code listed below, as well as some personal information. Please follow the directions to create your username and password.     Your access code is: [email or web address]  Expires: 3/14/2018 10:17 AM     Your access code will  in 90 days. If you need help or a new code, please call your [facility] clinic or [phone].        Care EveryWhere ID     This is your Care EveryWhere ID. This could be used by other organizations to access your [facility] medical records  [id number]        Equal Access to Services     PATRICIA BAUM : Elaina Mcclelland, stanley alvarez, aryan solimanalcurt pierson, jorje musa. So [facility] [phone].    ATENCIÓN: Si habla español, tiene a soria disposición servicios gratuitos de asistencia lingüística. Matt al [phone].    We comply with applicable federal civil rights laws and [place] laws. We do not discriminate on the basis of race, color, national origin, age, disability, sex, sexual orientation, or gender identity.            After Visit Summary       This is your record. Keep this with you and show to your community pharmacist(s) and doctor(s) at your next visit.                  "

## 2017-12-27 NOTE — PROGRESS NOTES
[address] [facility]  PRIMARY CARE PROVIDER AND CLINIC:  Ekaterina Lozano Magi [address] / [place] [address]  Chief Complaint   Patient presents with     Hospital F/U     HPI:    Tomas Grey is a 72 year old  (1945),admitted to the [facility] from Hospital  [facility].  Hospital stay 12/13/17 through 12/14/17 and ED visit 12/18/17. Admitted to this facility for  medical management and nursing care.  HPI information obtained from: facility chart records, facility staff, patient report and [facility] chart review. Hospital course per review of discharge summary:    This is a 72-year-old male, with a past medical history significant for severe COPD on supplemental Oxygen, obstructive sleep apnea, prostate cancer metastatic to bone, transfusion dependent anemia, type 2 diabetes mellitus, chronic kidney disease stage III, chronic diastolic heart failure with an EF 55-60% on 4/28/15, coronary artery disease, hypertension, paroxsymal atrial fibrillation on anticoagulation and lymphedema, who was admitted to the observation unit at the  [facility] for chest pain and shortness of breath. Labs revealed Hemoglobin 6.4 and blood was transfused. Prednisone, Doxycycline and Nebulizers were administered for shortness of breath. A chest x-ray was negative for acute findings. Chest pain was determined to be non-cardiac in nature. Discharged back to long term care in stable condition.     On 12/18/17, returned to [facility] with complaints of neck pain, chest pain, lower extremity pain and bilateral knee cramping. An ECG revealed no evidence of ischemia or changes compared to previous readings. Discharged back to long term care with no intervention in the ED.     Current issues are:        Complains of neck pain. States it is improved with massage. Wonders where the lady is who massages his neck for  him. Denies increased shortness of breath.     CODE STATUS/ADVANCE DIRECTIVES DISCUSSION:   CPR/Full code   Patient's living condition: lives in a skilled nursing facility    ALLERGIES:Morphine  PAST MEDICAL HISTORY:  has a past medical history of Anemia; Anxiety; Aspiration pneumonia (H) (2014); C. difficile colitis; CAD (coronary artery disease); Chronic pain; CKD (chronic kidney disease); COPD (chronic obstructive pulmonary disease) (H); Depressive disorder; Diabetes mellitus (H); Hypertension; Insomnia; ALEXIS (obstructive sleep apnea); Osteoporosis; and Prostate cancer metastatic to multiple sites (H).  PAST SURGICAL HISTORY:  has a past surgical history that includes Abdomen surgery; Arthroscopy knee; and Eye surgery.  FAMILY HISTORY: family history includes CANCER in his mother; DIABETES in his mother.  SOCIAL HISTORY:  reports that he has quit smoking. He has never used smokeless tobacco. He reports that he does not drink alcohol or use illicit drugs.    Post Discharge Medication Reconciliation Status: discharge medications reconciled, continue medications without change.  Current Outpatient Prescriptions   Medication Sig Dispense Refill     FOLIC ACID PO Take 5 mg by mouth daily Take 5 mg PO QD x 1 month starting 12/7/17 then 1 mg PO QD       ferrous sulfate (IRON) 325 (65 FE) MG tablet Take 325 mg by mouth 2 times daily       ipratropium - albuterol 0.5 mg/2.5 mg/3 mL (DUONEB) 0.5-2.5 (3) MG/3ML neb solution Take 1 vial by nebulization every 4 hours as needed for shortness of breath / dyspnea or wheezing       Carboxymethylcellulose Sodium (CARBOXYMETHYLCELLULOSE SOD PF OP) Place 1 drop into both eyes 4 times daily And 1 drop both eyes daily prn       fentaNYL (DURAGESIC) 12 mcg/hr 72 hr patch Place 1 patch onto the skin every 72 hours       MEGESTROL ACETATE PO Take 80 mg by mouth 2 times daily       senna-docusate (SENOKOT-S;PERICOLACE) 8.6-50 MG per tablet Take 2 tablets by mouth 2 times daily        budesonide-formoterol (SYMBICORT) 160-4.5 MCG/ACT Inhaler Inhale 2 puffs into the lungs 2 times daily       Timolol Hemihydrate (BETIMOL OP) Place 1 drop into both eyes every morning        bimatoprost (LUMIGAN) 0.01 % SOLN Place 1 drop into both eyes At Bedtime       WARFARIN SODIUM PO Take by mouth daily See facility orders for INR dosing       calcium carbonate (OS-JORDAN 600 MG [place]. CA) 1500 (600 CA) MG tablet Take 1,500 mg by mouth 2 times daily (with meals)       isosorbide mononitrate (IMDUR) 30 MG 24 hr tablet Take 2 tablets (60 mg) by mouth daily 30 tablet      ONDANSETRON PO Take 4 mg by mouth every 8 hours as needed for nausea       furosemide (LASIX) 20 MG tablet Take 1 tablet (20 mg) by mouth 2 times daily 30 tablet      ranitidine (RANITIDINE) 75 MG tablet Take 150 mg by mouth daily        diclofenac (VOLTAREN) 1 % GEL topical gel Apply 4 grams to knees four times daily as needed using enclosed dosing card. 100 g 0     insulin glargine (LANTUS) 100 UNIT/ML injection Inject 20 Units Subcutaneous every morning        omeprazole (PRILOSEC) 10 MG CR capsule Take 1 capsule (10 mg) by mouth daily       atorvastatin (LIPITOR) 10 MG tablet Take 10 mg by mouth At Bedtime        tiotropium (SPIRIVA) 18 MCG inhalation capsule Inhale 18 mcg into the lungs daily       acetaminophen (TYLENOL) 500 MG tablet Take 1,000 mg by mouth 3 times daily Not to exceed 3000 mg/24 hours        nitroglycerin (NITROSTAT) 0.4 MG SL tablet Place 0.4 mg under the tongue every 5 minutes as needed for chest pain if you are still having symptoms after 3 doses (15 minutes) call 911.       aspirin 81 MG EC tablet Take 1 tablet (81 mg) by mouth daily 5 tablet 0     metoprolol (TOPROL XL) 50 MG 24 hr tablet Take 1 tablet (50 mg) by mouth daily 5 tablet 0     tamsulosin (FLOMAX) 0.4 MG 24 hr capsule Take 1 capsule (0.4 mg) by mouth daily 5 capsule 0     ammonium lactate (LAC-HYDRIN) 12 % lotion Apply topically 2 times daily as needed for dry  skin To all extremities for dry skin       [DISCONTINUED] enzalutamide (XTANDI) 40 MG capsule Take 4 capsules (160 mg) by mouth daily 120 capsule 0       ROS:  4 point ROS including Respiratory, CV, GI and , other than that noted in the HPI,  is negative    Exam:  /55  Pulse 100  Temp 98.9  F (37.2  C)  Resp 18  Wt 204 lb (92.5 kg)  SpO2 98%  BMI 28.45 kg/m2  GENERAL APPEARANCE: In no distress  ENT:  Mouth and posterior oropharynx normal, moist mucous membranes  EYES:  EOM, conjunctivae, lids, pupils and irises normal  RESP:  Respiratory effort and palpation of chest normal, no respiratory distress, diminished lung sounds throughout  CV:  Palpation and auscultation of heart done, regular rate and rhythm, no murmur, rub, or gallop  ABDOMEN: Active bowel sounds, soft, nontender, no hepatosplenomegaly or other masses  M/S:   Active movement of bilateral upper and lower extremities. ACE wrap on RLE. Lymphedema on LLE.   SKIN:  Inspection of skin and subcutaneous tissue baseline, Palpation of skin and subcutaneous tissue baseline  NEURO:   Cranial nerves 2-12 are normal tested and grossly at patient's baseline  PSYCH:  affect and mood normal     Lab/Diagnostic data:  Last Complete Blood Count:  Lab Results   Component Value Date    WBC 15.3 12/18/2017     Lab Results   Component Value Date    RBC 3.94 12/18/2017     Lab Results   Component Value Date    HGB 10.6 12/18/2017     Lab Results   Component Value Date    HCT 35.6 12/18/2017     Lab Results   Component Value Date    MCV 90 12/18/2017     Lab Results   Component Value Date    MCH 26.9 12/18/2017     Lab Results   Component Value Date    MCHC 29.8 12/18/2017     Lab Results   Component Value Date    RDW 19.1 12/18/2017     Lab Results   Component Value Date     12/18/2017     .Last Basic Metabolic Panel:  Lab Results   Component Value Date     12/18/2017      Lab Results   Component Value Date    POTASSIUM 4.6 12/18/2017     Lab Results    Component Value Date    CHLORIDE 102 12/18/2017     Lab Results   Component Value Date    JORDAN 9.3 12/18/2017     Lab Results   Component Value Date    CO2 29 12/18/2017     Lab Results   Component Value Date    BUN 33 12/18/2017     Lab Results   Component Value Date    CR 1.24 12/18/2017     Lab Results   Component Value Date     12/18/2017     ASSESSMENT/PLAN:  Chronic Obstructive Pulmonary Disease on Chronic Oxygen Supplementation/Obstructive Sleep Apnea with CO2 Retention. Completed course of Prednisone and Doxycycline. Seen by Dr. Loera in Pulmonology on 10/4/17. Noted to have very severe COPD and chronic hypoxemic respiratory failure. Often refuses BiPAP at night per staff report. Sleep study outpatient recommended. Continue Budesonide-Formoterol, DuoNebs and Tiotropium as ordered.      Transfusion Dependent Anemia. Received blood transfusions 11/1/17, 11/25/17. 12/1/17 and 12/13/17. Oncology recommends blood transfusions for Hemoglobin < 8. Noted by Oncology to be folate and iron deficiency. Chronic disease and bone marrow suppression from metastatic prostate cancer likely contributing. Folic Acid and Ferrous Sulfate initiated by Oncology. Repeat Hemoglobin on 12/28/17 to ensure stability.       Prostate Cancer Metastatic to Bone. Seen by Palliative Care 11/16/17. Comfort care recommended by Dr. Oviedo on 12/4/17 due to compromised performance status, advanced cancer for which he used all reasonable treatments and not being a candidate for cytotoxic chemotherapy. Has had 50 lbs weight loss in 1 year.  Resident and family refused [facility] enrollment. Remains FULL CODE. Given resident's lack of capacity to make decisions,  at facility is working to pursue guardianship, but reportedly is having difficulty finding the numbers and addresses of all resident's family members. Fentanyl Patch and Acetaminophen ordered for pain control. Will discontinue Megesterol at next visit due to  no further treatment.      Left-Sided Neck Pain. Likely related to above. Reports cream, ice and heat do not help for long. No neurological symptoms. Sits slumped forward wheelchair throughout the day with no back or neck support. Recommended resident sit in his recliner during the day and not just the wheelchair. Acetaminophen and Fentanyl Patch available for pain control.     Leukocytosis. WBC 15.3 on 12/18/17. Likely secondary to Prednisone treatment for a COPD exacerbation. No obvious signs or symptoms of infection at that time. Will repeat CBC with differential on next lab day to ensure stability.     Weight Loss. Secondary to above and overall decline. Weight 204.1 lbs on 12/27/17 -> 208.2 lbs on 11/27/17 -> 220 lbs on 8/28/17 -> 239 lbs on 5/25/17 -> 256.2 lbs on 11/25/16. Dietary involved. Refuses Hospice enrollment.      Paroxysmal Atrial Fibrillation on Chronic Anticoagulation. INR 1.8 on 12/26/17 Repeat INR on 12/28/17. Also on Metoprolol.       Coronary Artery Disease/Hypertension/Chronic Diastolic Heart Failure with EF 60-65% on 11/24/17. Monitor blood pressure and weights weekly. Continue Aspirin, Isosorbide Mononitrate, Metoprolol, Atorvastatin and Nitroglycerin as ordered.      Urinary Retention in the Setting of Metastatic Lymph Node Dissection with Recurrent UTIs. Follow-up with Dr. Epstein on 1/9/18 as scheduled due to recurrent UTIs. Treated for Providencia Rettgeri UTI 7/1/17 with Rocephin and Bactrim DS. Treated for Pseudomonas and Klebsiella UTI 7/29/17 with Ciprofloxacin and ESBL E. Coli 8/30/17 with Ertapenem IV. Previously evaluated by Dr. Graf on 3/22/17 for urinary retention. Elevated Creatinine may be due to bladder outlet obstruction. Resistant to surgical options. Instructed how to self-catheterize, but resident resistant. Told Urology his main goal is avoiding pain. Voids as able. Continue Tamsulosin as ordered.       Lymphedema of Both Lower Extremities with Venous Stasis Ulcers.  Physical Therapy ordered for lymphedema wraps. Continue Furosemide as ordered.      Chronic Kidney Disease Stage III. Baseline Creatinine mid 1s. Last Creatinine 1.24 on 12/18/17. Monitor periodically.      Type 2 Diabetes Mellitus. Last A1C 6.0 on 11/18/17. Glargine decreased from 16 U BID to 20 U QD on 11/21/17. Continue to monitor Accuchecks prior to meals and at bedtime.       Gastroesophageal Reflux. Continue Omeprazole and Ranitidine as ordered.      Anxiety and Depression. Resident previously refused antidepressant and in-house therapist visits.     Cognitive Impairment. Mild-to-moderate impairment based on neurocognitive testing ~ 1 year ago. BIMS Score 11/15 on 11/16/17. Given resident lacks the capacity to make decisions,  working to appoint a guardian for resident given lack of capacity to make decisions as goals are inconsistent with overall health status. Of note has 11 children and none of the children attend outside appointments with resident so a guardian will help ensure there is 1 appropriate decision maker. Resident also has metastatic prostate cancer and severe COPD making a FULL CODE situation quite dismal.      Glaucoma. Followed by [facility]. Continue Bimatoprost and Timolol Hemihydrate as ordered. Also on Carboxymethylcellulose drops.     Electronically signed by:  SAL Acevedo CNP

## 2017-12-27 NOTE — PROGRESS NOTES
[place] [facility]  PRIMARY CARE PROVIDER AND CLINIC:  Ekaterina Lozano [address] [facility] [address] / [place] [address]  Chief Complaint   Patient presents with     Nursing Home Acute       HPI:    Tomas Grey is a 72 year old  (1945),admitted to the [facility] from Emergency Room  [facility].  Hospital stay 12/18/17.  Admitted to this facility for  rehab, medical management and nursing care.  HPI information obtained from: facility chart records, facility staff, patient report and [facility] chart review.      Current issues are:      {FGS DX:496527}    CODE STATUS/ADVANCE DIRECTIVES DISCUSSION:   CPR/Full code   Patient's living condition: {LIVES WITH (NURSING HOME):394905}    ALLERGIES:Morphine  PAST MEDICAL HISTORY:  has a past medical history of Anemia; Anxiety; Aspiration pneumonia (H) (2014); C. difficile colitis; CAD (coronary artery disease); Chronic pain; CKD (chronic kidney disease); COPD (chronic obstructive pulmonary disease) (H); Depressive disorder; Diabetes mellitus (H); Hypertension; Insomnia; ALEXIS (obstructive sleep apnea); Osteoporosis; and Prostate cancer metastatic to multiple sites (H).  PAST SURGICAL HISTORY:  has a past surgical history that includes Abdomen surgery; Arthroscopy knee; and Eye surgery.  FAMILY HISTORY: family history includes CANCER in his mother; DIABETES in his mother.  SOCIAL HISTORY:  reports that he has quit smoking. He has never used smokeless tobacco. He reports that he does not drink alcohol or use illicit drugs.    Post Discharge Medication Reconciliation Status: {ACO Med Rec (Provider):314158}.  Current Outpatient Prescriptions   Medication Sig Dispense Refill     FOLIC ACID PO Take 5 mg by mouth daily Take 5 mg PO QD x 1 month starting 12/7/17 then 1 mg PO QD       ferrous sulfate (IRON) 325 (65 FE) MG tablet Take 325 mg by mouth 2 times daily       ipratropium - albuterol 0.5 mg/2.5 mg/3 mL  (DUONEB) 0.5-2.5 (3) MG/3ML neb solution Take 1 vial by nebulization every 4 hours as needed for shortness of breath / dyspnea or wheezing       Carboxymethylcellulose Sodium (CARBOXYMETHYLCELLULOSE SOD PF OP) Place 1 drop into both eyes 4 times daily And 1 drop both eyes daily prn       fentaNYL (DURAGESIC) 12 mcg/hr 72 hr patch Place 1 patch onto the skin every 72 hours       MEGESTROL ACETATE PO Take 80 mg by mouth 2 times daily       senna-docusate (SENOKOT-S;PERICOLACE) 8.6-50 MG per tablet Take 2 tablets by mouth 2 times daily       budesonide-formoterol (SYMBICORT) 160-4.5 MCG/ACT Inhaler Inhale 2 puffs into the lungs 2 times daily       Timolol Hemihydrate (BETIMOL OP) Place 1 drop into both eyes every morning        bimatoprost (LUMIGAN) 0.01 % SOLN Place 1 drop into both eyes At Bedtime       WARFARIN SODIUM PO Take by mouth daily See facility orders for INR dosing       calcium carbonate (OS-JORDAN 600 MG [place]. CA) 1500 (600 CA) MG tablet Take 1,500 mg by mouth 2 times daily (with meals)       isosorbide mononitrate (IMDUR) 30 MG 24 hr tablet Take 2 tablets (60 mg) by mouth daily 30 tablet      ONDANSETRON PO Take 4 mg by mouth every 8 hours as needed for nausea       furosemide (LASIX) 20 MG tablet Take 1 tablet (20 mg) by mouth 2 times daily 30 tablet      ranitidine (RANITIDINE) 75 MG tablet Take 150 mg by mouth daily        diclofenac (VOLTAREN) 1 % GEL topical gel Apply 4 grams to knees four times daily as needed using enclosed dosing card. 100 g 0     insulin glargine (LANTUS) 100 UNIT/ML injection Inject 20 Units Subcutaneous every morning        omeprazole (PRILOSEC) 10 MG CR capsule Take 1 capsule (10 mg) by mouth daily       atorvastatin (LIPITOR) 10 MG tablet Take 10 mg by mouth At Bedtime        tiotropium (SPIRIVA) 18 MCG inhalation capsule Inhale 18 mcg into the lungs daily       acetaminophen (TYLENOL) 500 MG tablet Take 1,000 mg by mouth 3 times daily Not to exceed 3000 mg/24 hours         nitroglycerin (NITROSTAT) 0.4 MG SL tablet Place 0.4 mg under the tongue every 5 minutes as needed for chest pain if you are still having symptoms after 3 doses (15 minutes) call 911.       aspirin 81 MG EC tablet Take 1 tablet (81 mg) by mouth daily 5 tablet 0     metoprolol (TOPROL XL) 50 MG 24 hr tablet Take 1 tablet (50 mg) by mouth daily 5 tablet 0     tamsulosin (FLOMAX) 0.4 MG 24 hr capsule Take 1 capsule (0.4 mg) by mouth daily 5 capsule 0     ammonium lactate (LAC-HYDRIN) 12 % lotion Apply topically 2 times daily as needed for dry skin To all extremities for dry skin       [DISCONTINUED] enzalutamide (XTANDI) 40 MG capsule Take 4 capsules (160 mg) by mouth daily 120 capsule 0       ROS:  {ROS FGS:304597}    Exam:  /55  Pulse 100  Temp 98.9  F (37.2  C)  Resp 18  Wt 204 lb (92.5 kg)  SpO2 98%  BMI 28.45 kg/m2  {Nursing home physical exam :127482}    Lab/Diagnostic data:    CBC RESULTS:   Recent Labs   Lab Test  12/18/17   0041  12/14/17   0719   WBC  15.3*  10.8   RBC  3.94*  3.35*   HGB  10.6*  8.7*   HCT  35.6*  29.9*   MCV  90  89   MCH  26.9  26.0*   MCHC  29.8*  29.1*   RDW  19.1*  19.1*   PLT  336  246       Last Basic Metabolic Panel:  Recent Labs   Lab Test  12/18/17   0041  12/14/17   0719   NA  139  145*   POTASSIUM  4.6  5.1   CHLORIDE  102  111*   JORDAN  9.3  8.2*   CO2  29  27   BUN  33*  35*   CR  1.24  1.41*   GLC  108*  126*       Liver Function Studies -   Recent Labs   Lab Test  12/04/17   1040 11/10/17   PROTTOTAL  7.5  7.2   ALBUMIN  2.2*  2.2*   BILITOTAL  0.6  0.2   ALKPHOS  157*  96   AST  29  13   ALT  17  9*       TSH   Date Value Ref Range Status   10/26/2017 0.57 0.36 - 3.74 UIU/mL Final   11/30/2016 0.84 mcU/mL Final   ]    Lab Results   Component Value Date    A1C 6.0 11/16/2017    A1C 6.2 08/08/2017       ASSESSMENT/PLAN:  {FGS DX INITIAL:143678}    Orders:  ***    {FGS TIME SPENT:655765}    Electronically signed by:  SAL Acevedo CNP  [facility]

## 2017-12-29 NOTE — PROGRESS NOTES
Infusion Nursing Note:  Tomas rGey presents today for 1 Unit prbc with Xgeva.    Patient seen by provider today: No   present during visit today: Not Applicable.    Note: Arrived from [facility] with scooter and O2 tank less than half filled at 2 Liters/cannula. Clinic O2 was used.  Pt was typed and Crossed and 1 hour later Unit transfused over 2 hours @150ml/hr.    Intravenous Access:  Labs drawn without difficulty.  Peripheral IV placed.    Treatment Conditions:  Results reviewed, labs MET treatment parameters, ok to proceed with treatment.  Blood transfusion consent signed 8/29/17.      Post Infusion Assessment:  Patient tolerated infusion without incident.  Blood return noted pre and post infusion.  Site patent and intact, free from redness, edema or discomfort.  Access discontinued per protocol.    Discharge Plan:   Discharge instructions reviewed with: transport company.  Patient and/or family verbalized understanding of discharge instructions and all questions answered.  Copy of AVS reviewed with patient and/or family.  Patient will return as needed or monthly for xgeva for next appointment.  Patient discharged in stable condition accompanied by: self and attendant for transport.  Departure Mode: scooter.    Stormy Gramajo RN

## 2017-12-29 NOTE — MR AVS SNAPSHOT
After Visit Summary   12/29/2017    Tomas Grey    MRN: [id number]           Patient Information     Date Of Birth          1945        Visit Information        Provider Department      12/29/2017 12:00 PM [facility] INFUSION CHAIR 3 [facility]        Today's Diagnoses     Prostate cancer metastatic to multiple sites (H)    -  1    Metastasis to bone (H)          Care Instructions    Pt received 1 unit PRBC  And Xgeva(given monthly)          Follow-ups after your visit        Your next 10 appointments already scheduled     Jan 09, 2018  2:00 PM CST   (Arrive by 1:45 PM)   New Patient Visit with JASON Macedo   [facility]rolog[facility] ([facility])    [address]  4th Floor  [facility] [address] [phone]              Future tests that were ordered for you today     Open Standing Orders        Priority Remaining Interval Expires Ordered    Transfuse red blood cell unit Routine 99/100 TRANSFUSE 1 UNIT  12/29/2017    Red blood cell prepare order unit Routine 99/100 CONDITIONAL (SPECIFY) BLOOD  12/28/2017            Who to contact     If you have questions or need follow up information about today's clinic visit or your schedule please contact [facility] directly at [phone].  Normal or non-critical lab and imaging results will be communicated to you by MyChart, letter or phone within 4 business days after the clinic has received the results. If you do not hear from us within 7 days, please contact the clinic through MyChart or phone. If you have a critical or abnormal lab result, we will notify you by phone as soon as possible.  Submit refill requests through [employer] or call your pharmacy and they will forward the refill request to us. Please allow 3 business days for your refill to be completed.          Additional Information About Your  "Visit        [employer] Information     [employer] lets you send messages to your doctor, view your test results, renew your prescriptions, schedule appointments and more. To sign up, go to [email or web address] . Click on \"Log in\" on the left side of the screen, which will take you to the Welcome page. Then click on \"Sign up Now\" on the right side of the page.     You will be asked to enter the access code listed below, as well as some personal information. Please follow the directions to create your username and password.     Your access code is: [email or web address]  Expires: 3/14/2018 10:17 AM     Your access code will  in 90 days. If you need help or a new code, please call your [facility] clinic or [phone].        Care EveryWhere ID     This is your Care EveryWhere ID. This could be used by other organizations to access your [facility] medical records  [id number]        Your Vitals Were     Pulse Temperature                102 98.1  F (36.7  C) (Tympanic)           Blood Pressure from Last 3 Encounters:   17 112/58   17 116/55   17 (!) 178/93    Weight from Last 3 Encounters:   17 92.5 kg (204 lb)   17 92.1 kg (203 lb)   17 93.1 kg (205 lb 4 oz)              We Performed the Following     ABO/Rh type and screen     Blood component     Transfuse red blood cell unit        Primary Care Provider Office Phone # Fax #    SAL Acevedo [facility] [phone]       [address] [address]        Equal Access to Services     [facility] BAUTISTA : Hadii shima Mcclelland, waarleneda antonio, qaybaj solimanalcurt pierson, jorje musa. So [facility] [phone].    ATENCIÓN: Si habla español, tiene a soria disposición servicios gratuitos de asistencia lingüística. Llame al [phone].    We comply with applicable federal civil rights laws and [place] laws. We do not discriminate on the basis of race, color, national origin, age, " disability, sex, sexual orientation, or gender identity.            Thank you!     Thank you for choosing [facility]  for your care. Our goal is always to provide you with excellent care. Hearing back from our patients is one way we can continue to improve our services. Please take a few minutes to complete the written survey that you may receive in the mail after your visit with us. Thank you!             Your Updated Medication List - Protect others around you: Learn how to safely use, store and throw away your medicines at www.disposemymeds.org.          This list is accurate as of: 12/29/17  3:51 PM.  Always use your most recent med list.                   Brand Name Dispense Instructions for use Diagnosis    acetaminophen 500 MG tablet    TYLENOL     Take 1,000 mg by mouth 3 times daily Not to exceed 3000 mg/24 hours        ammonium lactate 12 % lotion    LAC-HYDRIN     Apply topically 2 times daily as needed for dry skin To all extremities for dry skin        aspirin 81 MG EC tablet     5 tablet    Take 1 tablet (81 mg) by mouth daily    ASCVD (arteriosclerotic cardiovascular disease)       BETIMOL OP      Place 1 drop into both eyes every morning        bimatoprost 0.01 % Soln    LUMIGAN     Place 1 drop into both eyes At Bedtime        budesonide-formoterol 160-4.5 MCG/ACT Inhaler    SYMBICORT     Inhale 2 puffs into the lungs 2 times daily        calcium carbonate 1500 (600 CA) MG tablet    OS-JORDAN 600 mg [place]. Ca     Take 1,500 mg by mouth 2 times daily (with meals)        CARBOXYMETHYLCELLULOSE SOD PF OP      Place 1 drop into both eyes 4 times daily And 1 drop both eyes daily prn        diclofenac 1 % Gel topical gel    VOLTAREN    100 g    Apply 4 grams to knees four times daily as needed using enclosed dosing card.    Chronic pain of both knees       fentaNYL 12 mcg/hr 72 hr patch    DURAGESIC     Place 1 patch onto the skin every 72 hours        ferrous sulfate 325  (65 FE) MG tablet    IRON     Take 325 mg by mouth 2 times daily        FOLIC ACID PO      Take 5 mg by mouth daily Take 5 mg PO QD x 1 month starting 12/7/17 then 1 mg PO QD        furosemide 20 MG tablet    LASIX    30 tablet    Take 1 tablet (20 mg) by mouth 2 times daily    Chronic diastolic congestive heart failure (H), Lymphedema of both lower extremities       insulin glargine 100 UNIT/ML injection    LANTUS     Inject 20 Units Subcutaneous every morning        ipratropium - albuterol 0.5 mg/2.5 mg/3 mL 0.5-2.5 (3) MG/3ML neb solution    DUONEB     Take 1 vial by nebulization every 4 hours as needed for shortness of breath / dyspnea or wheezing        isosorbide mononitrate 30 MG 24 hr tablet    IMDUR    30 tablet    Take 2 tablets (60 mg) by mouth daily        LIPITOR 10 MG tablet   Generic drug:  atorvastatin      Take 10 mg by mouth At Bedtime        MEGESTROL ACETATE PO      Take 80 mg by mouth 2 times daily        metoprolol 50 MG 24 hr tablet    TOPROL XL    5 tablet    Take 1 tablet (50 mg) by mouth daily    ASCVD (arteriosclerotic cardiovascular disease)       nitroGLYcerin 0.4 MG sublingual tablet    NITROSTAT     Place 0.4 mg under the tongue every 5 minutes as needed for chest pain if you are still having symptoms after 3 doses (15 minutes) call 911.        omeprazole 10 MG CR capsule    priLOSEC     Take 1 capsule (10 mg) by mouth daily    Gastric reflux       ONDANSETRON PO      Take 4 mg by mouth every 8 hours as needed for nausea        ranitidine 75 MG tablet   Generic drug:  ranitidine      Take 150 mg by mouth daily        senna-docusate 8.6-50 MG per tablet    SENOKOT-S;PERICOLACE     Take 2 tablets by mouth 2 times daily        tamsulosin 0.4 MG capsule    FLOMAX    5 capsule    Take 1 capsule (0.4 mg) by mouth daily    Benign non-nodular prostatic hyperplasia without lower urinary tract symptoms       tiotropium 18 MCG capsule    SPIRIVA     Inhale 18 mcg into the lungs daily         WARFARIN SODIUM PO      Take by mouth daily See facility orders for INR dosing

## 2018-01-01 ENCOUNTER — APPOINTMENT (OUTPATIENT)
Dept: OCCUPATIONAL THERAPY | Facility: CLINIC | Age: 73
DRG: 377 | End: 2018-01-01
Payer: COMMERCIAL

## 2018-01-01 ENCOUNTER — ALLIED HEALTH/NURSE VISIT (OUTPATIENT)
Dept: ONCOLOGY | Facility: CLINIC | Age: 73
End: 2018-01-01

## 2018-01-01 ENCOUNTER — NURSING HOME VISIT (OUTPATIENT)
Dept: GERIATRICS | Facility: CLINIC | Age: 73
End: 2018-01-01
Payer: COMMERCIAL

## 2018-01-01 ENCOUNTER — APPOINTMENT (OUTPATIENT)
Dept: PHYSICAL THERAPY | Facility: CLINIC | Age: 73
DRG: 378 | End: 2018-01-01
Attending: PHYSICIAN ASSISTANT
Payer: COMMERCIAL

## 2018-01-01 ENCOUNTER — INFUSION THERAPY VISIT (OUTPATIENT)
Dept: INFUSION THERAPY | Facility: CLINIC | Age: 73
End: 2018-01-01
Attending: INTERNAL MEDICINE
Payer: COMMERCIAL

## 2018-01-01 ENCOUNTER — NURSING HOME VISIT (OUTPATIENT)
Dept: GERIATRICS | Facility: CLINIC | Age: 73
End: 2018-01-01
Payer: MEDICARE

## 2018-01-01 ENCOUNTER — CARE COORDINATION (OUTPATIENT)
Dept: ONCOLOGY | Facility: CLINIC | Age: 73
End: 2018-01-01

## 2018-01-01 ENCOUNTER — TRANSFERRED RECORDS (OUTPATIENT)
Dept: HEALTH INFORMATION MANAGEMENT | Facility: CLINIC | Age: 73
End: 2018-01-01

## 2018-01-01 ENCOUNTER — TELEPHONE (OUTPATIENT)
Dept: ONCOLOGY | Facility: CLINIC | Age: 73
End: 2018-01-01

## 2018-01-01 ENCOUNTER — APPOINTMENT (OUTPATIENT)
Dept: GENERAL RADIOLOGY | Facility: CLINIC | Age: 73
DRG: 378 | End: 2018-01-01
Attending: INTERNAL MEDICINE
Payer: COMMERCIAL

## 2018-01-01 ENCOUNTER — HOSPITAL ENCOUNTER (INPATIENT)
Facility: CLINIC | Age: 73
LOS: 5 days | Discharge: NURSING, REHAB OR LONG-TERM CARE | DRG: 377 | End: 2018-01-30
Attending: EMERGENCY MEDICINE | Admitting: STUDENT IN AN ORGANIZED HEALTH CARE EDUCATION/TRAINING PROGRAM
Payer: COMMERCIAL

## 2018-01-01 ENCOUNTER — HOSPITAL ENCOUNTER (OUTPATIENT)
Facility: CLINIC | Age: 73
Setting detail: SPECIMEN
Discharge: HOME | End: 2018-02-02
Attending: INTERNAL MEDICINE | Admitting: INTERNAL MEDICINE
Payer: COMMERCIAL

## 2018-01-01 ENCOUNTER — APPOINTMENT (OUTPATIENT)
Dept: GENERAL RADIOLOGY | Facility: CLINIC | Age: 73
DRG: 378 | End: 2018-01-01
Attending: PHYSICIAN ASSISTANT
Payer: COMMERCIAL

## 2018-01-01 ENCOUNTER — APPOINTMENT (OUTPATIENT)
Dept: CT IMAGING | Facility: CLINIC | Age: 73
DRG: 378 | End: 2018-01-01
Attending: INTERNAL MEDICINE
Payer: COMMERCIAL

## 2018-01-01 ENCOUNTER — APPOINTMENT (OUTPATIENT)
Dept: GENERAL RADIOLOGY | Facility: CLINIC | Age: 73
DRG: 377 | End: 2018-01-01
Attending: EMERGENCY MEDICINE
Payer: COMMERCIAL

## 2018-01-01 ENCOUNTER — APPOINTMENT (OUTPATIENT)
Dept: PHYSICAL THERAPY | Facility: CLINIC | Age: 73
DRG: 377 | End: 2018-01-01
Payer: COMMERCIAL

## 2018-01-01 ENCOUNTER — APPOINTMENT (OUTPATIENT)
Dept: PHYSICAL THERAPY | Facility: CLINIC | Age: 73
DRG: 378 | End: 2018-01-01
Payer: COMMERCIAL

## 2018-01-01 ENCOUNTER — APPOINTMENT (OUTPATIENT)
Dept: SPEECH THERAPY | Facility: CLINIC | Age: 73
DRG: 377 | End: 2018-01-01
Payer: COMMERCIAL

## 2018-01-01 ENCOUNTER — PRE VISIT (OUTPATIENT)
Dept: UROLOGY | Facility: CLINIC | Age: 73
End: 2018-01-01

## 2018-01-01 ENCOUNTER — HOSPITAL ENCOUNTER (OUTPATIENT)
Facility: CLINIC | Age: 73
Discharge: HOME | End: 2018-02-15
Attending: INTERNAL MEDICINE | Admitting: INTERNAL MEDICINE
Payer: COMMERCIAL

## 2018-01-01 ENCOUNTER — HOSPITAL ENCOUNTER (OUTPATIENT)
Facility: CLINIC | Age: 73
Setting detail: SPECIMEN
Discharge: HOME | End: 2018-02-26
Attending: INTERNAL MEDICINE | Admitting: INTERNAL MEDICINE
Payer: COMMERCIAL

## 2018-01-01 ENCOUNTER — CARE COORDINATION (OUTPATIENT)
Dept: CARE COORDINATION | Facility: CLINIC | Age: 73
End: 2018-01-01

## 2018-01-01 ENCOUNTER — ONCOLOGY VISIT (OUTPATIENT)
Dept: ONCOLOGY | Facility: CLINIC | Age: 73
End: 2018-01-01
Attending: INTERNAL MEDICINE
Payer: COMMERCIAL

## 2018-01-01 ENCOUNTER — ALLIED HEALTH/NURSE VISIT (OUTPATIENT)
Dept: INFUSION THERAPY | Facility: CLINIC | Age: 73
End: 2018-01-01

## 2018-01-01 ENCOUNTER — HOSPITAL ENCOUNTER (OUTPATIENT)
Facility: CLINIC | Age: 73
Setting detail: SPECIMEN
Discharge: HOME | End: 2018-02-14
Attending: INTERNAL MEDICINE | Admitting: INTERNAL MEDICINE
Payer: COMMERCIAL

## 2018-01-01 ENCOUNTER — MEDICAL CORRESPONDENCE (OUTPATIENT)
Dept: HEALTH INFORMATION MANAGEMENT | Facility: CLINIC | Age: 73
End: 2018-01-01

## 2018-01-01 ENCOUNTER — APPOINTMENT (OUTPATIENT)
Dept: ULTRASOUND IMAGING | Facility: CLINIC | Age: 73
DRG: 377 | End: 2018-01-01
Attending: EMERGENCY MEDICINE
Payer: COMMERCIAL

## 2018-01-01 ENCOUNTER — APPOINTMENT (OUTPATIENT)
Dept: GENERAL RADIOLOGY | Facility: CLINIC | Age: 73
DRG: 377 | End: 2018-01-01
Payer: COMMERCIAL

## 2018-01-01 ENCOUNTER — HOSPITAL ENCOUNTER (OUTPATIENT)
Dept: GENERAL RADIOLOGY | Facility: CLINIC | Age: 73
Discharge: HOME | End: 2018-02-26
Attending: INTERNAL MEDICINE | Admitting: INTERNAL MEDICINE
Payer: COMMERCIAL

## 2018-01-01 ENCOUNTER — HOSPITAL ENCOUNTER (INPATIENT)
Facility: CLINIC | Age: 73
LOS: 5 days | Discharge: NURSING, REHAB OR LONG-TERM CARE | DRG: 378 | End: 2018-01-13
Attending: INTERNAL MEDICINE | Admitting: INTERNAL MEDICINE
Payer: COMMERCIAL

## 2018-01-01 ENCOUNTER — APPOINTMENT (OUTPATIENT)
Dept: CT IMAGING | Facility: CLINIC | Age: 73
DRG: 377 | End: 2018-01-01
Payer: COMMERCIAL

## 2018-01-01 ENCOUNTER — TELEPHONE (OUTPATIENT)
Dept: UROLOGY | Facility: CLINIC | Age: 73
End: 2018-01-01

## 2018-01-01 VITALS — DIASTOLIC BLOOD PRESSURE: 68 MMHG | HEART RATE: 112 BPM | TEMPERATURE: 99 F | SYSTOLIC BLOOD PRESSURE: 107 MMHG

## 2018-01-01 VITALS
WEIGHT: 196.4 LBS | HEART RATE: 93 BPM | RESPIRATION RATE: 18 BRPM | TEMPERATURE: 96.4 F | BODY MASS INDEX: 27.5 KG/M2 | HEIGHT: 71 IN | SYSTOLIC BLOOD PRESSURE: 112 MMHG | DIASTOLIC BLOOD PRESSURE: 64 MMHG | OXYGEN SATURATION: 99 %

## 2018-01-01 VITALS
HEART RATE: 90 BPM | OXYGEN SATURATION: 93 % | HEIGHT: 71 IN | DIASTOLIC BLOOD PRESSURE: 53 MMHG | TEMPERATURE: 98 F | BODY MASS INDEX: 27.12 KG/M2 | RESPIRATION RATE: 18 BRPM | SYSTOLIC BLOOD PRESSURE: 109 MMHG | WEIGHT: 193.7 LBS

## 2018-01-01 VITALS
BODY MASS INDEX: 28.45 KG/M2 | OXYGEN SATURATION: 96 % | RESPIRATION RATE: 18 BRPM | WEIGHT: 204 LBS | DIASTOLIC BLOOD PRESSURE: 72 MMHG | SYSTOLIC BLOOD PRESSURE: 128 MMHG | TEMPERATURE: 97.8 F | HEART RATE: 86 BPM

## 2018-01-01 VITALS
DIASTOLIC BLOOD PRESSURE: 68 MMHG | RESPIRATION RATE: 18 BRPM | SYSTOLIC BLOOD PRESSURE: 109 MMHG | TEMPERATURE: 100.4 F | OXYGEN SATURATION: 97 % | HEIGHT: 71 IN | HEART RATE: 108 BPM

## 2018-01-01 VITALS
OXYGEN SATURATION: 95 % | SYSTOLIC BLOOD PRESSURE: 102 MMHG | RESPIRATION RATE: 16 BRPM | DIASTOLIC BLOOD PRESSURE: 76 MMHG | TEMPERATURE: 97.6 F | HEART RATE: 80 BPM | WEIGHT: 194.8 LBS | BODY MASS INDEX: 27.17 KG/M2

## 2018-01-01 VITALS
SYSTOLIC BLOOD PRESSURE: 101 MMHG | DIASTOLIC BLOOD PRESSURE: 62 MMHG | HEART RATE: 101 BPM | OXYGEN SATURATION: 96 % | TEMPERATURE: 99 F | RESPIRATION RATE: 16 BRPM

## 2018-01-01 VITALS
TEMPERATURE: 97.8 F | DIASTOLIC BLOOD PRESSURE: 74 MMHG | SYSTOLIC BLOOD PRESSURE: 140 MMHG | HEART RATE: 102 BPM | OXYGEN SATURATION: 96 % | RESPIRATION RATE: 20 BRPM

## 2018-01-01 VITALS
SYSTOLIC BLOOD PRESSURE: 118 MMHG | TEMPERATURE: 96 F | OXYGEN SATURATION: 99 % | WEIGHT: 193 LBS | RESPIRATION RATE: 16 BRPM | DIASTOLIC BLOOD PRESSURE: 72 MMHG | BODY MASS INDEX: 26.92 KG/M2 | HEART RATE: 94 BPM

## 2018-01-01 VITALS
SYSTOLIC BLOOD PRESSURE: 116 MMHG | HEART RATE: 116 BPM | RESPIRATION RATE: 18 BRPM | TEMPERATURE: 97 F | WEIGHT: 196 LBS | OXYGEN SATURATION: 96 % | BODY MASS INDEX: 27.34 KG/M2 | DIASTOLIC BLOOD PRESSURE: 69 MMHG

## 2018-01-01 VITALS
OXYGEN SATURATION: 94 % | RESPIRATION RATE: 18 BRPM | TEMPERATURE: 98.1 F | WEIGHT: 204.5 LBS | DIASTOLIC BLOOD PRESSURE: 74 MMHG | SYSTOLIC BLOOD PRESSURE: 138 MMHG | BODY MASS INDEX: 28.52 KG/M2 | HEART RATE: 104 BPM

## 2018-01-01 VITALS
RESPIRATION RATE: 18 BRPM | HEIGHT: 71 IN | TEMPERATURE: 97.5 F | HEART RATE: 89 BPM | OXYGEN SATURATION: 100 % | SYSTOLIC BLOOD PRESSURE: 146 MMHG | WEIGHT: 198 LBS | DIASTOLIC BLOOD PRESSURE: 96 MMHG | BODY MASS INDEX: 27.72 KG/M2

## 2018-01-01 VITALS
SYSTOLIC BLOOD PRESSURE: 128 MMHG | HEART RATE: 110 BPM | TEMPERATURE: 97.5 F | RESPIRATION RATE: 20 BRPM | DIASTOLIC BLOOD PRESSURE: 72 MMHG

## 2018-01-01 DIAGNOSIS — I89.0 LYMPHEDEMA: ICD-10-CM

## 2018-01-01 DIAGNOSIS — N18.30 CKD (CHRONIC KIDNEY DISEASE) STAGE 3, GFR 30-59 ML/MIN (H): ICD-10-CM

## 2018-01-01 DIAGNOSIS — R33.9 URINARY RETENTION: ICD-10-CM

## 2018-01-01 DIAGNOSIS — D64.9 CHRONIC ANEMIA: ICD-10-CM

## 2018-01-01 DIAGNOSIS — C61 PROSTATE CANCER METASTATIC TO MULTIPLE SITES (H): Primary | ICD-10-CM

## 2018-01-01 DIAGNOSIS — R13.10 DYSPHAGIA, UNSPECIFIED TYPE: ICD-10-CM

## 2018-01-01 DIAGNOSIS — R63.4 LOSS OF WEIGHT: ICD-10-CM

## 2018-01-01 DIAGNOSIS — I25.119 CORONARY ARTERY DISEASE INVOLVING NATIVE HEART WITH ANGINA PECTORIS, UNSPECIFIED VESSEL OR LESION TYPE (H): ICD-10-CM

## 2018-01-01 DIAGNOSIS — I50.32 CHRONIC DIASTOLIC CONGESTIVE HEART FAILURE (H): ICD-10-CM

## 2018-01-01 DIAGNOSIS — K59.01 SLOW TRANSIT CONSTIPATION: ICD-10-CM

## 2018-01-01 DIAGNOSIS — N39.0 RECURRENT UTI: ICD-10-CM

## 2018-01-01 DIAGNOSIS — I25.10 ASCVD (ARTERIOSCLEROTIC CARDIOVASCULAR DISEASE): ICD-10-CM

## 2018-01-01 DIAGNOSIS — K59.00 CONSTIPATION, UNSPECIFIED CONSTIPATION TYPE: ICD-10-CM

## 2018-01-01 DIAGNOSIS — I48.0 PAROXYSMAL ATRIAL FIBRILLATION (H): ICD-10-CM

## 2018-01-01 DIAGNOSIS — K29.71 GASTROINTESTINAL HEMORRHAGE ASSOCIATED WITH GASTRITIS, UNSPECIFIED GASTRITIS TYPE: ICD-10-CM

## 2018-01-01 DIAGNOSIS — I87.2 VENOUS STASIS DERMATITIS OF BOTH LOWER EXTREMITIES: ICD-10-CM

## 2018-01-01 DIAGNOSIS — F32.A ANXIETY AND DEPRESSION: ICD-10-CM

## 2018-01-01 DIAGNOSIS — Z16.12 UTI DUE TO EXTENDED-SPECTRUM BETA LACTAMASE (ESBL) PRODUCING ESCHERICHIA COLI: ICD-10-CM

## 2018-01-01 DIAGNOSIS — C79.51 PROSTATE CANCER METASTATIC TO BONE (H): ICD-10-CM

## 2018-01-01 DIAGNOSIS — J44.9 CHRONIC OBSTRUCTIVE PULMONARY DISEASE, UNSPECIFIED COPD TYPE (H): ICD-10-CM

## 2018-01-01 DIAGNOSIS — C61 PROSTATE CANCER METASTATIC TO BONE (H): ICD-10-CM

## 2018-01-01 DIAGNOSIS — F41.8 DEPRESSION WITH ANXIETY: ICD-10-CM

## 2018-01-01 DIAGNOSIS — R14.1 FLATULENCE, ERUCTATION AND GAS PAIN: ICD-10-CM

## 2018-01-01 DIAGNOSIS — K92.1 GASTROINTESTINAL HEMORRHAGE WITH MELENA: ICD-10-CM

## 2018-01-01 DIAGNOSIS — I89.0 LYMPHEDEMA OF BOTH LOWER EXTREMITIES: ICD-10-CM

## 2018-01-01 DIAGNOSIS — D64.9 ANEMIA, UNSPECIFIED TYPE: Primary | ICD-10-CM

## 2018-01-01 DIAGNOSIS — R41.89 COGNITIVE IMPAIRMENT: ICD-10-CM

## 2018-01-01 DIAGNOSIS — Z87.898 HISTORY OF URINARY RETENTION: ICD-10-CM

## 2018-01-01 DIAGNOSIS — R14.2 FLATULENCE, ERUCTATION AND GAS PAIN: ICD-10-CM

## 2018-01-01 DIAGNOSIS — K21.9 GASTROESOPHAGEAL REFLUX DISEASE, ESOPHAGITIS PRESENCE NOT SPECIFIED: ICD-10-CM

## 2018-01-01 DIAGNOSIS — N40.0 BENIGN NON-NODULAR PROSTATIC HYPERPLASIA WITHOUT LOWER URINARY TRACT SYMPTOMS: ICD-10-CM

## 2018-01-01 DIAGNOSIS — M25.561 CHRONIC PAIN OF BOTH KNEES: ICD-10-CM

## 2018-01-01 DIAGNOSIS — F32.A DEPRESSION, UNSPECIFIED DEPRESSION TYPE: ICD-10-CM

## 2018-01-01 DIAGNOSIS — C61 PROSTATE CANCER METASTATIC TO MULTIPLE SITES (H): ICD-10-CM

## 2018-01-01 DIAGNOSIS — I10 ESSENTIAL HYPERTENSION: Primary | ICD-10-CM

## 2018-01-01 DIAGNOSIS — J96.11 CHRONIC RESPIRATORY FAILURE WITH HYPOXIA (H): ICD-10-CM

## 2018-01-01 DIAGNOSIS — I50.32 CHRONIC DIASTOLIC HEART FAILURE (H): ICD-10-CM

## 2018-01-01 DIAGNOSIS — R11.2 NAUSEA AND VOMITING, INTRACTABILITY OF VOMITING NOT SPECIFIED, UNSPECIFIED VOMITING TYPE: ICD-10-CM

## 2018-01-01 DIAGNOSIS — N18.3 TYPE 2 DIABETES MELLITUS WITH STAGE 3 CHRONIC KIDNEY DISEASE, UNSPECIFIED LONG TERM INSULIN USE STATUS: ICD-10-CM

## 2018-01-01 DIAGNOSIS — F41.9 ANXIETY AND DEPRESSION: ICD-10-CM

## 2018-01-01 DIAGNOSIS — E11.22 TYPE 2 DIABETES MELLITUS WITH STAGE 3 CHRONIC KIDNEY DISEASE, WITHOUT LONG-TERM CURRENT USE OF INSULIN (H): ICD-10-CM

## 2018-01-01 DIAGNOSIS — N39.0 UTI DUE TO EXTENDED-SPECTRUM BETA LACTAMASE (ESBL) PRODUCING ESCHERICHIA COLI: ICD-10-CM

## 2018-01-01 DIAGNOSIS — J44.9 CHRONIC OBSTRUCTIVE PULMONARY DISEASE, UNSPECIFIED COPD TYPE (H): Primary | ICD-10-CM

## 2018-01-01 DIAGNOSIS — Z79.01 CHRONIC ANTICOAGULATION: ICD-10-CM

## 2018-01-01 DIAGNOSIS — D62 ANEMIA DUE TO BLOOD LOSS, ACUTE: Primary | ICD-10-CM

## 2018-01-01 DIAGNOSIS — K21.9 GASTRIC REFLUX: ICD-10-CM

## 2018-01-01 DIAGNOSIS — Z51.5 HOSPICE CARE PATIENT: Primary | ICD-10-CM

## 2018-01-01 DIAGNOSIS — H40.9 GLAUCOMA, UNSPECIFIED GLAUCOMA TYPE, UNSPECIFIED LATERALITY: ICD-10-CM

## 2018-01-01 DIAGNOSIS — I25.10 CORONARY ARTERY DISEASE INVOLVING NATIVE HEART, ANGINA PRESENCE UNSPECIFIED, UNSPECIFIED VESSEL OR LESION TYPE: ICD-10-CM

## 2018-01-01 DIAGNOSIS — G89.29 CHRONIC PAIN OF BOTH KNEES: ICD-10-CM

## 2018-01-01 DIAGNOSIS — Q15.9 EYE ABNORMALITIES: ICD-10-CM

## 2018-01-01 DIAGNOSIS — E78.00 PURE HYPERCHOLESTEROLEMIA: ICD-10-CM

## 2018-01-01 DIAGNOSIS — G89.3 CHRONIC PAIN DUE TO NEOPLASM: ICD-10-CM

## 2018-01-01 DIAGNOSIS — D63.0 ANEMIA IN NEOPLASTIC DISEASE: ICD-10-CM

## 2018-01-01 DIAGNOSIS — J18.9 PNEUMONIA DUE TO INFECTIOUS ORGANISM, UNSPECIFIED LATERALITY, UNSPECIFIED PART OF LUNG: ICD-10-CM

## 2018-01-01 DIAGNOSIS — N18.30 CHRONIC KIDNEY DISEASE, STAGE 3 (MODERATE): ICD-10-CM

## 2018-01-01 DIAGNOSIS — C61 MALIGNANT NEOPLASM OF PROSTATE METASTATIC TO BONE (H): ICD-10-CM

## 2018-01-01 DIAGNOSIS — Z71.9 COUNSELING NOS(V65.40): Primary | ICD-10-CM

## 2018-01-01 DIAGNOSIS — I10 BENIGN ESSENTIAL HYPERTENSION: ICD-10-CM

## 2018-01-01 DIAGNOSIS — R14.3 FLATULENCE, ERUCTATION AND GAS PAIN: ICD-10-CM

## 2018-01-01 DIAGNOSIS — B96.29 UTI DUE TO EXTENDED-SPECTRUM BETA LACTAMASE (ESBL) PRODUCING ESCHERICHIA COLI: ICD-10-CM

## 2018-01-01 DIAGNOSIS — N18.30 TYPE 2 DIABETES MELLITUS WITH STAGE 3 CHRONIC KIDNEY DISEASE, WITHOUT LONG-TERM CURRENT USE OF INSULIN (H): ICD-10-CM

## 2018-01-01 DIAGNOSIS — G47.33 OSA (OBSTRUCTIVE SLEEP APNEA): ICD-10-CM

## 2018-01-01 DIAGNOSIS — M54.2 NECK PAIN: ICD-10-CM

## 2018-01-01 DIAGNOSIS — Z79.4 TYPE 2 DIABETES MELLITUS WITH STAGE 3 CHRONIC KIDNEY DISEASE, WITH LONG-TERM CURRENT USE OF INSULIN (H): ICD-10-CM

## 2018-01-01 DIAGNOSIS — D64.9 ANEMIA, UNSPECIFIED TYPE: ICD-10-CM

## 2018-01-01 DIAGNOSIS — I25.119 CORONARY ARTERY DISEASE INVOLVING NATIVE CORONARY ARTERY OF NATIVE HEART WITH ANGINA PECTORIS (H): ICD-10-CM

## 2018-01-01 DIAGNOSIS — E11.22 TYPE 2 DIABETES MELLITUS WITH STAGE 3 CHRONIC KIDNEY DISEASE, WITH LONG-TERM CURRENT USE OF INSULIN (H): ICD-10-CM

## 2018-01-01 DIAGNOSIS — R60.0 BILATERAL LEG EDEMA: ICD-10-CM

## 2018-01-01 DIAGNOSIS — D64.9 NORMOCYTIC ANEMIA: Primary | ICD-10-CM

## 2018-01-01 DIAGNOSIS — I25.10 CORONARY ARTERY DISEASE INVOLVING NATIVE CORONARY ARTERY OF NATIVE HEART WITHOUT ANGINA PECTORIS: ICD-10-CM

## 2018-01-01 DIAGNOSIS — J18.9 PNEUMONIA OF LOWER LOBE DUE TO INFECTIOUS ORGANISM, UNSPECIFIED LATERALITY: Primary | ICD-10-CM

## 2018-01-01 DIAGNOSIS — N18.30 TYPE 2 DIABETES MELLITUS WITH STAGE 3 CHRONIC KIDNEY DISEASE, WITH LONG-TERM CURRENT USE OF INSULIN (H): ICD-10-CM

## 2018-01-01 DIAGNOSIS — C79.51 MALIGNANT NEOPLASM OF PROSTATE METASTATIC TO BONE (H): ICD-10-CM

## 2018-01-01 DIAGNOSIS — N39.0 URINARY TRACT INFECTION WITHOUT HEMATURIA, SITE UNSPECIFIED: ICD-10-CM

## 2018-01-01 DIAGNOSIS — K59.03 DRUG INDUCED CONSTIPATION: ICD-10-CM

## 2018-01-01 DIAGNOSIS — M25.562 CHRONIC PAIN OF BOTH KNEES: ICD-10-CM

## 2018-01-01 DIAGNOSIS — E11.22 TYPE 2 DIABETES MELLITUS WITH STAGE 3 CHRONIC KIDNEY DISEASE, UNSPECIFIED LONG TERM INSULIN USE STATUS: ICD-10-CM

## 2018-01-01 DIAGNOSIS — I71.40 ABDOMINAL AORTIC ANEURYSM (AAA) WITHOUT RUPTURE (H): ICD-10-CM

## 2018-01-01 DIAGNOSIS — H04.123 DRY EYES: ICD-10-CM

## 2018-01-01 LAB
ABO + RH BLD: NORMAL
ALBUMIN SERPL-MCNC: 2.3 G/DL (ref 3.4–5)
ALBUMIN SERPL-MCNC: 2.4 G/DL (ref 3.4–5)
ALBUMIN SERPL-MCNC: 2.5 G/DL (ref 3.4–5)
ALBUMIN UR-MCNC: 30 MG/DL
ALBUMIN UR-MCNC: 30 MG/DL
ALBUMIN UR-MCNC: NEGATIVE MG/DL
ALP SERPL-CCNC: 150 U/L (ref 40–150)
ALP SERPL-CCNC: 222 U/L (ref 40–150)
ALP SERPL-CCNC: 227 U/L (ref 40–150)
ALT SERPL W P-5'-P-CCNC: 10 U/L (ref 0–70)
ALT SERPL W P-5'-P-CCNC: 10 U/L (ref 0–70)
ALT SERPL W P-5'-P-CCNC: 11 U/L (ref 0–70)
ANION GAP SERPL CALCULATED.3IONS-SCNC: 10 MMOL/L (ref 3–14)
ANION GAP SERPL CALCULATED.3IONS-SCNC: 11 MMOL/L (ref 3–14)
ANION GAP SERPL CALCULATED.3IONS-SCNC: 12 MMOL/L (ref 3–14)
ANION GAP SERPL CALCULATED.3IONS-SCNC: 12 MMOL/L (ref 3–14)
ANION GAP SERPL CALCULATED.3IONS-SCNC: 7 MMOL/L (ref 3–14)
ANION GAP SERPL CALCULATED.3IONS-SCNC: 8 MMOL/L (ref 3–14)
ANION GAP SERPL CALCULATED.3IONS-SCNC: 9 MMOL/L (ref 3–14)
APPEARANCE UR: ABNORMAL
APPEARANCE UR: CLEAR
APPEARANCE UR: CLEAR
APTT PPP: 38 SEC (ref 22–37)
AST SERPL W P-5'-P-CCNC: 13 U/L (ref 0–45)
AST SERPL W P-5'-P-CCNC: 24 U/L (ref 0–45)
AST SERPL W P-5'-P-CCNC: 39 U/L (ref 0–45)
BACTERIA #/AREA URNS HPF: ABNORMAL /HPF
BACTERIA #/AREA URNS HPF: ABNORMAL /HPF
BACTERIA SPEC CULT: ABNORMAL
BACTERIA SPEC CULT: ABNORMAL
BACTERIA SPEC CULT: NO GROWTH
BASOPHILS # BLD AUTO: 0 10E9/L (ref 0–0.2)
BASOPHILS NFR BLD AUTO: 0.2 %
BASOPHILS NFR BLD AUTO: 0.3 %
BILIRUB SERPL-MCNC: 0.3 MG/DL (ref 0.2–1.3)
BILIRUB SERPL-MCNC: 0.4 MG/DL (ref 0.2–1.3)
BILIRUB SERPL-MCNC: 0.4 MG/DL (ref 0.2–1.3)
BILIRUB UR QL STRIP: NEGATIVE
BLD GP AB SCN SERPL QL: NORMAL
BLD PROD TYP BPU: NORMAL
BLD UNIT ID BPU: 0
BLOOD BANK CMNT PATIENT-IMP: NORMAL
BLOOD PRODUCT CODE: NORMAL
BPU ID: NORMAL
BUN SERPL-MCNC: 12 MG/DL (ref 7–30)
BUN SERPL-MCNC: 13 MG/DL (ref 7–30)
BUN SERPL-MCNC: 14 MG/DL (ref 7–30)
BUN SERPL-MCNC: 14 MG/DL (ref 7–30)
BUN SERPL-MCNC: 18 MG/DL (ref 7–30)
BUN SERPL-MCNC: 22 MG/DL (ref 7–30)
BUN SERPL-MCNC: 23 MG/DL (ref 7–30)
BUN SERPL-MCNC: 26 MG/DL (ref 7–30)
BUN SERPL-MCNC: 30 MG/DL (ref 7–30)
CALCIUM SERPL-MCNC: 7.6 MG/DL (ref 8.5–10.1)
CALCIUM SERPL-MCNC: 7.9 MG/DL (ref 8.5–10.1)
CALCIUM SERPL-MCNC: 8 MG/DL (ref 8.5–10.1)
CALCIUM SERPL-MCNC: 8 MG/DL (ref 8.5–10.1)
CALCIUM SERPL-MCNC: 8.3 MG/DL (ref 8.5–10.1)
CALCIUM SERPL-MCNC: 8.5 MG/DL (ref 8.5–10.1)
CALCIUM SERPL-MCNC: 8.6 MG/DL (ref 8.5–10.1)
CHLORIDE SERPL-SCNC: 106 MMOL/L (ref 94–109)
CHLORIDE SERPL-SCNC: 107 MMOL/L (ref 94–109)
CHLORIDE SERPL-SCNC: 107 MMOL/L (ref 94–109)
CHLORIDE SERPL-SCNC: 108 MMOL/L (ref 94–109)
CHLORIDE SERPL-SCNC: 108 MMOL/L (ref 94–109)
CHLORIDE SERPL-SCNC: 110 MMOL/L (ref 94–109)
CHLORIDE SERPL-SCNC: 111 MMOL/L (ref 94–109)
CHLORIDE SERPL-SCNC: 111 MMOL/L (ref 94–109)
CHLORIDE SERPL-SCNC: 112 MMOL/L (ref 94–109)
CO2 SERPL-SCNC: 22 MMOL/L (ref 20–32)
CO2 SERPL-SCNC: 23 MMOL/L (ref 20–32)
CO2 SERPL-SCNC: 24 MMOL/L (ref 20–32)
CO2 SERPL-SCNC: 26 MMOL/L (ref 20–32)
CO2 SERPL-SCNC: 27 MMOL/L (ref 20–32)
CO2 SERPL-SCNC: 28 MMOL/L (ref 20–32)
COLOR UR AUTO: YELLOW
CREAT SERPL-MCNC: 1.01 MG/DL (ref 0.7–1.3)
CREAT SERPL-MCNC: 1.08 MG/DL (ref 0.66–1.25)
CREAT SERPL-MCNC: 1.13 MG/DL (ref 0.66–1.25)
CREAT SERPL-MCNC: 1.17 MG/DL (ref 0.66–1.25)
CREAT SERPL-MCNC: 1.17 MG/DL (ref 0.66–1.25)
CREAT SERPL-MCNC: 1.23 MG/DL (ref 0.66–1.25)
CREAT SERPL-MCNC: 1.3 MG/DL (ref 0.66–1.25)
CREAT SERPL-MCNC: 1.31 MG/DL (ref 0.66–1.25)
CREAT SERPL-MCNC: 1.35 MG/DL (ref 0.66–1.25)
CREAT SERPL-MCNC: 1.38 MG/DL (ref 0.66–1.25)
CREAT SERPL-MCNC: 1.42 MG/DL (ref 0.66–1.25)
CREAT SERPL-MCNC: 1.43 MG/DL (ref 0.66–1.25)
CREAT SERPL-MCNC: 1.54 MG/DL (ref 0.66–1.25)
DIFFERENTIAL METHOD BLD: ABNORMAL
EOSINOPHIL # BLD AUTO: 0.1 10E9/L (ref 0–0.7)
EOSINOPHIL # BLD AUTO: 0.1 10E9/L (ref 0–0.7)
EOSINOPHIL # BLD AUTO: 0.5 10E9/L (ref 0–0.7)
EOSINOPHIL # BLD AUTO: 0.6 10E9/L (ref 0–0.7)
EOSINOPHIL NFR BLD AUTO: 0.8 %
EOSINOPHIL NFR BLD AUTO: 1.4 %
EOSINOPHIL NFR BLD AUTO: 4.8 %
EOSINOPHIL NFR BLD AUTO: 5.7 %
EPO SERPL-ACNC: 35 MU/ML (ref 4–27)
ERYTHROCYTE [DISTWIDTH] IN BLOOD BY AUTOMATED COUNT: 17.7 % (ref 10–15)
ERYTHROCYTE [DISTWIDTH] IN BLOOD BY AUTOMATED COUNT: 18.3 % (ref 10–15)
ERYTHROCYTE [DISTWIDTH] IN BLOOD BY AUTOMATED COUNT: 18.4 % (ref 10–15)
ERYTHROCYTE [DISTWIDTH] IN BLOOD BY AUTOMATED COUNT: 18.5 % (ref 10–15)
ERYTHROCYTE [DISTWIDTH] IN BLOOD BY AUTOMATED COUNT: 18.6 % (ref 10–15)
ERYTHROCYTE [DISTWIDTH] IN BLOOD BY AUTOMATED COUNT: 18.7 % (ref 10–15)
ERYTHROCYTE [DISTWIDTH] IN BLOOD BY AUTOMATED COUNT: 18.7 % (ref 10–15)
ERYTHROCYTE [DISTWIDTH] IN BLOOD BY AUTOMATED COUNT: 19 % (ref 10–15)
ERYTHROCYTE [DISTWIDTH] IN BLOOD BY AUTOMATED COUNT: 19.2 % (ref 10–15)
ERYTHROCYTE [DISTWIDTH] IN BLOOD BY AUTOMATED COUNT: 19.4 % (ref 10–15)
FERRITIN SERPL-MCNC: 3499 NG/ML (ref 26–388)
FLUAV+FLUBV RNA SPEC QL NAA+PROBE: NEGATIVE
FLUAV+FLUBV RNA SPEC QL NAA+PROBE: NEGATIVE
FOLATE SERPL-MCNC: 17.7 NG/ML
GFR SERPL CREATININE-BSD FRML MDRD: 45 ML/MIN/1.7M2
GFR SERPL CREATININE-BSD FRML MDRD: 49 ML/MIN/1.7M2
GFR SERPL CREATININE-BSD FRML MDRD: 49 ML/MIN/1.7M2
GFR SERPL CREATININE-BSD FRML MDRD: 51 ML/MIN/1.7M2
GFR SERPL CREATININE-BSD FRML MDRD: 52 ML/MIN/1.7M2
GFR SERPL CREATININE-BSD FRML MDRD: 54 ML/MIN/1.7M2
GFR SERPL CREATININE-BSD FRML MDRD: 54 ML/MIN/1.7M2
GFR SERPL CREATININE-BSD FRML MDRD: 58 ML/MIN/1.7M2
GFR SERPL CREATININE-BSD FRML MDRD: 61 ML/MIN/1.7M2
GFR SERPL CREATININE-BSD FRML MDRD: 61 ML/MIN/1.7M2
GFR SERPL CREATININE-BSD FRML MDRD: 64 ML/MIN/1.7M2
GFR SERPL CREATININE-BSD FRML MDRD: 67 ML/MIN/1.7M2
GFR SERPL CREATININE-BSD FRML MDRD: >60 ML/MIN/1.73M2
GLUCOSE BLDC GLUCOMTR-MCNC: 104 MG/DL (ref 70–99)
GLUCOSE BLDC GLUCOMTR-MCNC: 105 MG/DL (ref 70–99)
GLUCOSE BLDC GLUCOMTR-MCNC: 105 MG/DL (ref 70–99)
GLUCOSE BLDC GLUCOMTR-MCNC: 107 MG/DL (ref 70–99)
GLUCOSE BLDC GLUCOMTR-MCNC: 107 MG/DL (ref 70–99)
GLUCOSE BLDC GLUCOMTR-MCNC: 108 MG/DL (ref 70–99)
GLUCOSE BLDC GLUCOMTR-MCNC: 108 MG/DL (ref 70–99)
GLUCOSE BLDC GLUCOMTR-MCNC: 109 MG/DL (ref 70–99)
GLUCOSE BLDC GLUCOMTR-MCNC: 110 MG/DL (ref 70–99)
GLUCOSE BLDC GLUCOMTR-MCNC: 110 MG/DL (ref 70–99)
GLUCOSE BLDC GLUCOMTR-MCNC: 111 MG/DL (ref 70–99)
GLUCOSE BLDC GLUCOMTR-MCNC: 111 MG/DL (ref 70–99)
GLUCOSE BLDC GLUCOMTR-MCNC: 112 MG/DL (ref 70–99)
GLUCOSE BLDC GLUCOMTR-MCNC: 113 MG/DL (ref 70–99)
GLUCOSE BLDC GLUCOMTR-MCNC: 117 MG/DL (ref 70–99)
GLUCOSE BLDC GLUCOMTR-MCNC: 118 MG/DL (ref 70–99)
GLUCOSE BLDC GLUCOMTR-MCNC: 118 MG/DL (ref 70–99)
GLUCOSE BLDC GLUCOMTR-MCNC: 119 MG/DL (ref 70–99)
GLUCOSE BLDC GLUCOMTR-MCNC: 122 MG/DL (ref 70–99)
GLUCOSE BLDC GLUCOMTR-MCNC: 123 MG/DL (ref 70–99)
GLUCOSE BLDC GLUCOMTR-MCNC: 124 MG/DL (ref 70–99)
GLUCOSE BLDC GLUCOMTR-MCNC: 124 MG/DL (ref 70–99)
GLUCOSE BLDC GLUCOMTR-MCNC: 126 MG/DL (ref 70–99)
GLUCOSE BLDC GLUCOMTR-MCNC: 127 MG/DL (ref 70–99)
GLUCOSE BLDC GLUCOMTR-MCNC: 144 MG/DL (ref 70–99)
GLUCOSE BLDC GLUCOMTR-MCNC: 159 MG/DL (ref 70–99)
GLUCOSE BLDC GLUCOMTR-MCNC: 170 MG/DL (ref 70–99)
GLUCOSE BLDC GLUCOMTR-MCNC: 173 MG/DL (ref 70–99)
GLUCOSE BLDC GLUCOMTR-MCNC: 71 MG/DL (ref 70–99)
GLUCOSE BLDC GLUCOMTR-MCNC: 75 MG/DL (ref 70–99)
GLUCOSE BLDC GLUCOMTR-MCNC: 77 MG/DL (ref 70–99)
GLUCOSE BLDC GLUCOMTR-MCNC: 81 MG/DL (ref 70–99)
GLUCOSE BLDC GLUCOMTR-MCNC: 82 MG/DL (ref 70–99)
GLUCOSE BLDC GLUCOMTR-MCNC: 88 MG/DL (ref 70–99)
GLUCOSE BLDC GLUCOMTR-MCNC: 89 MG/DL (ref 70–99)
GLUCOSE BLDC GLUCOMTR-MCNC: 90 MG/DL (ref 70–99)
GLUCOSE BLDC GLUCOMTR-MCNC: 91 MG/DL (ref 70–99)
GLUCOSE BLDC GLUCOMTR-MCNC: 93 MG/DL (ref 70–99)
GLUCOSE BLDC GLUCOMTR-MCNC: 93 MG/DL (ref 70–99)
GLUCOSE BLDC GLUCOMTR-MCNC: 95 MG/DL (ref 70–99)
GLUCOSE BLDC GLUCOMTR-MCNC: 98 MG/DL (ref 70–99)
GLUCOSE BLDC GLUCOMTR-MCNC: 99 MG/DL (ref 70–99)
GLUCOSE SERPL-MCNC: 108 MG/DL (ref 70–99)
GLUCOSE SERPL-MCNC: 111 MG/DL (ref 70–99)
GLUCOSE SERPL-MCNC: 114 MG/DL (ref 70–99)
GLUCOSE SERPL-MCNC: 115 MG/DL (ref 70–99)
GLUCOSE SERPL-MCNC: 129 MG/DL (ref 70–99)
GLUCOSE SERPL-MCNC: 72 MG/DL (ref 70–99)
GLUCOSE SERPL-MCNC: 72 MG/DL (ref 74–106)
GLUCOSE SERPL-MCNC: 81 MG/DL (ref 70–99)
GLUCOSE SERPL-MCNC: 84 MG/DL (ref 70–99)
GLUCOSE SERPL-MCNC: 91 MG/DL (ref 70–99)
GLUCOSE SERPL-MCNC: 98 MG/DL (ref 70–99)
GLUCOSE SERPL-MCNC: 98 MG/DL (ref 70–99)
GLUCOSE UR STRIP-MCNC: NEGATIVE MG/DL
GRAN CASTS #/AREA URNS LPF: 1 /LPF
HCT VFR BLD AUTO: 11 % (ref 40–53)
HCT VFR BLD AUTO: 24.4 % (ref 40–53)
HCT VFR BLD AUTO: 25.1 % (ref 40–53)
HCT VFR BLD AUTO: 25.5 % (ref 40–53)
HCT VFR BLD AUTO: 26.9 % (ref 40–53)
HCT VFR BLD AUTO: 27.4 % (ref 40–53)
HCT VFR BLD AUTO: 29.4 % (ref 40–53)
HCT VFR BLD AUTO: 29.7 % (ref 40–53)
HCT VFR BLD AUTO: 31 % (ref 40–53)
HCT VFR BLD AUTO: 31.4 % (ref 40–53)
HCT VFR BLD AUTO: 32.1 % (ref 40–53)
HCT VFR BLD AUTO: 33.2 % (ref 40–53)
HCT VFR BLD AUTO: 34.3 % (ref 40–53)
HCT VFR BLD AUTO: 34.6 % (ref 40–53)
HCT VFR BLD AUTO: 36.2 % (ref 40–53)
HEMOGLOBIN: 7.7 G/DL (ref 14–18)
HGB BLD-MCNC: 10 G/DL (ref 13.3–17.7)
HGB BLD-MCNC: 10.2 G/DL (ref 13.3–17.7)
HGB BLD-MCNC: 10.3 G/DL (ref 13.3–17.7)
HGB BLD-MCNC: 10.3 G/DL (ref 13.3–17.7)
HGB BLD-MCNC: 10.5 G/DL (ref 13.3–17.7)
HGB BLD-MCNC: 10.5 G/DL (ref 13.3–17.7)
HGB BLD-MCNC: 11 G/DL (ref 13.3–17.7)
HGB BLD-MCNC: 3.2 G/DL (ref 13.3–17.7)
HGB BLD-MCNC: 7.4 G/DL (ref 13.3–17.7)
HGB BLD-MCNC: 7.4 G/DL (ref 13.3–17.7)
HGB BLD-MCNC: 7.6 G/DL (ref 13.3–17.7)
HGB BLD-MCNC: 8.1 G/DL (ref 13.3–17.7)
HGB BLD-MCNC: 8.1 G/DL (ref 13.3–17.7)
HGB BLD-MCNC: 8.4 G/DL (ref 13.3–17.7)
HGB BLD-MCNC: 8.5 G/DL (ref 13.3–17.7)
HGB BLD-MCNC: 9 G/DL (ref 13.3–17.7)
HGB BLD-MCNC: 9 G/DL (ref 13.3–17.7)
HGB BLD-MCNC: 9.2 G/DL (ref 13.3–17.7)
HGB BLD-MCNC: 9.3 G/DL (ref 13.3–17.7)
HGB BLD-MCNC: 9.4 G/DL (ref 13.3–17.7)
HGB BLD-MCNC: 9.5 G/DL (ref 13.3–17.7)
HGB BLD-MCNC: 9.5 G/DL (ref 13.3–17.7)
HGB BLD-MCNC: 9.6 G/DL (ref 13.3–17.7)
HGB BLD-MCNC: 9.7 G/DL (ref 13.3–17.7)
HGB UR QL STRIP: ABNORMAL
HGB UR QL STRIP: NEGATIVE
HGB UR QL STRIP: NEGATIVE
HYALINE CASTS #/AREA URNS LPF: 2 /LPF (ref 0–2)
IMM GRANULOCYTES # BLD: 0.1 10E9/L (ref 0–0.4)
IMM GRANULOCYTES # BLD: 0.2 10E9/L (ref 0–0.4)
IMM GRANULOCYTES NFR BLD: 0.7 %
IMM GRANULOCYTES NFR BLD: 0.8 %
IMM GRANULOCYTES NFR BLD: 1 %
IMM GRANULOCYTES NFR BLD: 2 %
INR PPP: 1.13 (ref 0.86–1.14)
INR PPP: 1.14 (ref 0.86–1.14)
INR PPP: 1.33 (ref 0.86–1.14)
INR PPP: 1.43 (ref 0.86–1.14)
INR PPP: 1.52 (ref 0.86–1.14)
INR PPP: 1.66 (ref 0.86–1.14)
INR PPP: 1.75 (ref 0.86–1.14)
INR PPP: 1.9 (ref 0.86–1.14)
INTERPRETATION ECG - MUSE: NORMAL
IRON SATN MFR SERPL: 23 % (ref 15–46)
IRON SERPL-MCNC: 25 UG/DL (ref 35–180)
KETONES UR STRIP-MCNC: 10 MG/DL
KETONES UR STRIP-MCNC: 5 MG/DL
KETONES UR STRIP-MCNC: 5 MG/DL
LACTATE BLD-SCNC: 0.7 MMOL/L (ref 0.7–2)
LACTATE BLD-SCNC: 1 MMOL/L (ref 0.7–2)
LACTATE BLD-SCNC: 1.2 MMOL/L (ref 0.7–2)
LEUKOCYTE ESTERASE UR QL STRIP: ABNORMAL
LIPASE SERPL-CCNC: 104 U/L (ref 73–393)
LIPASE SERPL-CCNC: 84 U/L (ref 73–393)
LYMPHOCYTES # BLD AUTO: 1 10E9/L (ref 0.8–5.3)
LYMPHOCYTES # BLD AUTO: 1.2 10E9/L (ref 0.8–5.3)
LYMPHOCYTES # BLD AUTO: 1.6 10E9/L (ref 0.8–5.3)
LYMPHOCYTES # BLD AUTO: 2.1 10E9/L (ref 0.8–5.3)
LYMPHOCYTES NFR BLD AUTO: 10.3 %
LYMPHOCYTES NFR BLD AUTO: 10.3 %
LYMPHOCYTES NFR BLD AUTO: 17 %
LYMPHOCYTES NFR BLD AUTO: 18.3 %
Lab: ABNORMAL
Lab: NORMAL
MAGNESIUM SERPL-MCNC: 1.6 MG/DL (ref 1.6–2.3)
MCH RBC QN AUTO: 26 PG (ref 26.5–33)
MCH RBC QN AUTO: 26.8 PG (ref 26.5–33)
MCH RBC QN AUTO: 27 PG (ref 26.5–33)
MCH RBC QN AUTO: 27 PG (ref 26.5–33)
MCH RBC QN AUTO: 27.2 PG (ref 26.5–33)
MCH RBC QN AUTO: 27.2 PG (ref 26.5–33)
MCH RBC QN AUTO: 27.3 PG (ref 26.5–33)
MCH RBC QN AUTO: 27.4 PG (ref 26.5–33)
MCH RBC QN AUTO: 27.6 PG (ref 26.5–33)
MCH RBC QN AUTO: 27.8 PG (ref 26.5–33)
MCH RBC QN AUTO: 28.4 PG (ref 26.5–33)
MCHC RBC AUTO-ENTMCNC: 28.6 G/DL (ref 31.5–36.5)
MCHC RBC AUTO-ENTMCNC: 28.9 G/DL (ref 31.5–36.5)
MCHC RBC AUTO-ENTMCNC: 29 G/DL (ref 31.5–36.5)
MCHC RBC AUTO-ENTMCNC: 29.1 G/DL (ref 31.5–36.5)
MCHC RBC AUTO-ENTMCNC: 29.5 G/DL (ref 31.5–36.5)
MCHC RBC AUTO-ENTMCNC: 29.6 G/DL (ref 31.5–36.5)
MCHC RBC AUTO-ENTMCNC: 29.7 G/DL (ref 31.5–36.5)
MCHC RBC AUTO-ENTMCNC: 29.8 G/DL (ref 31.5–36.5)
MCHC RBC AUTO-ENTMCNC: 30.1 G/DL (ref 31.5–36.5)
MCHC RBC AUTO-ENTMCNC: 30.3 G/DL (ref 31.5–36.5)
MCHC RBC AUTO-ENTMCNC: 30.4 G/DL (ref 31.5–36.5)
MCV RBC AUTO: 89 FL (ref 78–100)
MCV RBC AUTO: 91 FL (ref 78–100)
MCV RBC AUTO: 92 FL (ref 78–100)
MCV RBC AUTO: 93 FL (ref 78–100)
MCV RBC AUTO: 93 FL (ref 78–100)
MCV RBC AUTO: 94 FL (ref 78–100)
MCV RBC AUTO: 95 FL (ref 78–100)
MONOCYTES # BLD AUTO: 0.8 10E9/L (ref 0–1.3)
MONOCYTES # BLD AUTO: 0.9 10E9/L (ref 0–1.3)
MONOCYTES # BLD AUTO: 1.2 10E9/L (ref 0–1.3)
MONOCYTES # BLD AUTO: 1.4 10E9/L (ref 0–1.3)
MONOCYTES NFR BLD AUTO: 11.9 %
MONOCYTES NFR BLD AUTO: 9.1 %
MONOCYTES NFR BLD AUTO: 9.4 %
MONOCYTES NFR BLD AUTO: 9.8 %
MRSA DNA SPEC QL NAA+PROBE: ABNORMAL
MRSA DNA SPEC QL NAA+PROBE: NEGATIVE
MUCOUS THREADS #/AREA URNS LPF: PRESENT /LPF
MUCOUS THREADS #/AREA URNS LPF: PRESENT /LPF
NEUTROPHILS # BLD AUTO: 5.9 10E9/L (ref 1.6–8.3)
NEUTROPHILS # BLD AUTO: 7.8 10E9/L (ref 1.6–8.3)
NEUTROPHILS # BLD AUTO: 7.9 10E9/L (ref 1.6–8.3)
NEUTROPHILS # BLD AUTO: 9 10E9/L (ref 1.6–8.3)
NEUTROPHILS NFR BLD AUTO: 65.3 %
NEUTROPHILS NFR BLD AUTO: 65.4 %
NEUTROPHILS NFR BLD AUTO: 76.8 %
NEUTROPHILS NFR BLD AUTO: 78.3 %
NITRATE UR QL: NEGATIVE
NRBC # BLD AUTO: 0 10*3/UL
NRBC BLD AUTO-RTO: 0 /100
NUM BPU REQUESTED: 1
NUM BPU REQUESTED: 3
PH UR STRIP: 5.5 PH (ref 5–7)
PH UR STRIP: 5.5 PH (ref 5–7)
PH UR STRIP: 6 PH (ref 5–7)
PHOSPHATE SERPL-MCNC: 2.4 MG/DL (ref 2.5–4.5)
PLATELET # BLD AUTO: 205 10E9/L (ref 150–450)
PLATELET # BLD AUTO: 220 10E9/L (ref 150–450)
PLATELET # BLD AUTO: 220 10E9/L (ref 150–450)
PLATELET # BLD AUTO: 222 10E9/L (ref 150–450)
PLATELET # BLD AUTO: 226 10E9/L (ref 150–450)
PLATELET # BLD AUTO: 226 10E9/L (ref 150–450)
PLATELET # BLD AUTO: 231 10E9/L (ref 150–450)
PLATELET # BLD AUTO: 238 10E9/L (ref 150–450)
PLATELET # BLD AUTO: 242 10E9/L (ref 150–450)
PLATELET # BLD AUTO: 243 10E9/L (ref 150–450)
PLATELET # BLD AUTO: 261 10E9/L (ref 150–450)
PLATELET # BLD AUTO: 263 10E9/L (ref 150–450)
PLATELET # BLD AUTO: 267 10E9/L (ref 150–450)
PLATELET # BLD AUTO: 267 10E9/L (ref 150–450)
PLATELET # BLD AUTO: 346 10E9/L (ref 150–450)
POTASSIUM SERPL-SCNC: 3.7 MMOL/L (ref 3.4–5.3)
POTASSIUM SERPL-SCNC: 3.8 MMOL/L (ref 3.4–5.3)
POTASSIUM SERPL-SCNC: 3.8 MMOL/L (ref 3.4–5.3)
POTASSIUM SERPL-SCNC: 4 MMOL/L (ref 3.4–5.3)
POTASSIUM SERPL-SCNC: 4 MMOL/L (ref 3.4–5.3)
POTASSIUM SERPL-SCNC: 4.1 MMOL/L (ref 3.4–5.3)
POTASSIUM SERPL-SCNC: 4.1 MMOL/L (ref 3.5–5.1)
POTASSIUM SERPL-SCNC: 4.2 MMOL/L (ref 3.4–5.3)
POTASSIUM SERPL-SCNC: 4.2 MMOL/L (ref 3.4–5.3)
POTASSIUM SERPL-SCNC: 4.3 MMOL/L (ref 3.4–5.3)
POTASSIUM SERPL-SCNC: 4.3 MMOL/L (ref 3.4–5.3)
POTASSIUM SERPL-SCNC: 4.5 MMOL/L (ref 3.4–5.3)
PROCALCITONIN SERPL-MCNC: 9.03 NG/ML
PROT SERPL-MCNC: 7 G/DL (ref 6.8–8.8)
PROT SERPL-MCNC: 7.1 G/DL (ref 6.8–8.8)
PROT SERPL-MCNC: 7.4 G/DL (ref 6.8–8.8)
RADIOLOGIST FLAGS: ABNORMAL
RBC # BLD AUTO: 1.23 10E12/L (ref 4.4–5.9)
RBC # BLD AUTO: 2.66 10E12/L (ref 4.4–5.9)
RBC # BLD AUTO: 2.68 10E12/L (ref 4.4–5.9)
RBC # BLD AUTO: 2.79 10E12/L (ref 4.4–5.9)
RBC # BLD AUTO: 2.93 10E12/L (ref 4.4–5.9)
RBC # BLD AUTO: 2.96 10E12/L (ref 4.4–5.9)
RBC # BLD AUTO: 3.11 10E12/L (ref 4.4–5.9)
RBC # BLD AUTO: 3.17 10E12/L (ref 4.4–5.9)
RBC # BLD AUTO: 3.31 10E12/L (ref 4.4–5.9)
RBC # BLD AUTO: 3.45 10E12/L (ref 4.4–5.9)
RBC # BLD AUTO: 3.52 10E12/L (ref 4.4–5.9)
RBC # BLD AUTO: 3.54 10E12/L (ref 4.4–5.9)
RBC # BLD AUTO: 3.7 10E12/L (ref 4.4–5.9)
RBC # BLD AUTO: 3.78 10E12/L (ref 4.4–5.9)
RBC # BLD AUTO: 3.96 10E12/L (ref 4.4–5.9)
RBC #/AREA URNS AUTO: 1 /HPF (ref 0–2)
RBC #/AREA URNS AUTO: 2 /HPF (ref 0–2)
RBC #/AREA URNS AUTO: 68 /HPF (ref 0–2)
RSV RNA SPEC NAA+PROBE: NEGATIVE
SODIUM SERPL-SCNC: 142 MMOL/L (ref 133–144)
SODIUM SERPL-SCNC: 143 MMOL/L (ref 133–144)
SODIUM SERPL-SCNC: 144 MMOL/L (ref 133–144)
SODIUM SERPL-SCNC: 145 MMOL/L (ref 133–144)
SOURCE: ABNORMAL
SP GR UR STRIP: 1.01 (ref 1–1.03)
SP GR UR STRIP: 1.02 (ref 1–1.03)
SP GR UR STRIP: 1.02 (ref 1–1.03)
SPECIMEN EXP DATE BLD: NORMAL
SPECIMEN SOURCE: ABNORMAL
SPECIMEN SOURCE: ABNORMAL
SPECIMEN SOURCE: NORMAL
SQUAMOUS #/AREA URNS AUTO: <1 /HPF (ref 0–1)
TIBC SERPL-MCNC: 110 UG/DL (ref 240–430)
TRANS CELLS #/AREA URNS HPF: 1 /HPF (ref 0–1)
TRANSFERRIN SERPL-MCNC: 82 MG/DL (ref 210–360)
TRANSFUSION STATUS PATIENT QL: NORMAL
TROPONIN I SERPL-MCNC: <0.015 UG/L (ref 0–0.04)
UROBILINOGEN UR STRIP-MCNC: 0 MG/DL (ref 0–2)
UROBILINOGEN UR STRIP-MCNC: 2 MG/DL (ref 0–2)
UROBILINOGEN UR STRIP-MCNC: NORMAL MG/DL (ref 0–2)
VIT B12 SERPL-MCNC: 715 PG/ML (ref 193–986)
WBC # BLD AUTO: 10 10E9/L (ref 4–11)
WBC # BLD AUTO: 10.9 10E9/L (ref 4–11)
WBC # BLD AUTO: 11.7 10E9/L (ref 4–11)
WBC # BLD AUTO: 11.8 10E9/L (ref 4–11)
WBC # BLD AUTO: 12.1 10E9/L (ref 4–11)
WBC # BLD AUTO: 8.2 10E9/L (ref 4–11)
WBC # BLD AUTO: 8.6 10E9/L (ref 4–11)
WBC # BLD AUTO: 9 10E9/L (ref 4–11)
WBC # BLD AUTO: 9 10E9/L (ref 4–11)
WBC # BLD AUTO: 9.1 10E9/L (ref 4–11)
WBC # BLD AUTO: 9.2 10E9/L (ref 4–11)
WBC # BLD AUTO: 9.3 10E9/L (ref 4–11)
WBC # BLD AUTO: 9.8 10E9/L (ref 4–11)
WBC #/AREA URNS AUTO: 6 /HPF (ref 0–2)
WBC #/AREA URNS AUTO: 9 /HPF (ref 0–2)
WBC #/AREA URNS AUTO: >182 /HPF (ref 0–2)
WBC CLUMPS #/AREA URNS HPF: PRESENT /HPF

## 2018-01-01 PROCEDURE — 27210995 ZZH RX 272: Performed by: PHYSICIAN ASSISTANT

## 2018-01-01 PROCEDURE — 25000131 ZZH RX MED GY IP 250 OP 636 PS 637: Performed by: PHYSICIAN ASSISTANT

## 2018-01-01 PROCEDURE — 99238 HOSP IP/OBS DSCHRG MGMT 30/<: CPT | Performed by: INTERNAL MEDICINE

## 2018-01-01 PROCEDURE — 25000132 ZZH RX MED GY IP 250 OP 250 PS 637: Performed by: PHYSICIAN ASSISTANT

## 2018-01-01 PROCEDURE — 25000125 ZZHC RX 250: Performed by: INTERNAL MEDICINE

## 2018-01-01 PROCEDURE — P9016 RBC LEUKOCYTES REDUCED: HCPCS | Performed by: INTERNAL MEDICINE

## 2018-01-01 PROCEDURE — 85027 COMPLETE CBC AUTOMATED: CPT | Performed by: STUDENT IN AN ORGANIZED HEALTH CARE EDUCATION/TRAINING PROGRAM

## 2018-01-01 PROCEDURE — 25000132 ZZH RX MED GY IP 250 OP 250 PS 637: Performed by: STUDENT IN AN ORGANIZED HEALTH CARE EDUCATION/TRAINING PROGRAM

## 2018-01-01 PROCEDURE — 85027 COMPLETE CBC AUTOMATED: CPT | Performed by: INTERNAL MEDICINE

## 2018-01-01 PROCEDURE — 94640 AIRWAY INHALATION TREATMENT: CPT | Mod: 76

## 2018-01-01 PROCEDURE — 96365 THER/PROPH/DIAG IV INF INIT: CPT | Performed by: INTERNAL MEDICINE

## 2018-01-01 PROCEDURE — 93010 ELECTROCARDIOGRAM REPORT: CPT | Performed by: INTERNAL MEDICINE

## 2018-01-01 PROCEDURE — 94640 AIRWAY INHALATION TREATMENT: CPT

## 2018-01-01 PROCEDURE — 99309 SBSQ NF CARE MODERATE MDM 30: CPT | Mod: GW | Performed by: NURSE PRACTITIONER

## 2018-01-01 PROCEDURE — 40000556 ZZH STATISTIC PERIPHERAL IV START W US GUIDANCE

## 2018-01-01 PROCEDURE — 87040 BLOOD CULTURE FOR BACTERIA: CPT | Performed by: STUDENT IN AN ORGANIZED HEALTH CARE EDUCATION/TRAINING PROGRAM

## 2018-01-01 PROCEDURE — 36430 TRANSFUSION BLD/BLD COMPNT: CPT

## 2018-01-01 PROCEDURE — 40000275 ZZH STATISTIC RCP TIME EA 10 MIN

## 2018-01-01 PROCEDURE — 86900 BLOOD TYPING SEROLOGIC ABO: CPT | Performed by: PHYSICIAN ASSISTANT

## 2018-01-01 PROCEDURE — 74176 CT ABD & PELVIS W/O CONTRAST: CPT

## 2018-01-01 PROCEDURE — 99285 EMERGENCY DEPT VISIT HI MDM: CPT | Mod: 25 | Performed by: INTERNAL MEDICINE

## 2018-01-01 PROCEDURE — 97530 THERAPEUTIC ACTIVITIES: CPT | Mod: GP | Performed by: REHABILITATION PRACTITIONER

## 2018-01-01 PROCEDURE — 85025 COMPLETE CBC W/AUTO DIFF WBC: CPT | Performed by: INTERNAL MEDICINE

## 2018-01-01 PROCEDURE — 83540 ASSAY OF IRON: CPT | Performed by: INTERNAL MEDICINE

## 2018-01-01 PROCEDURE — 25000125 ZZHC RX 250: Performed by: STUDENT IN AN ORGANIZED HEALTH CARE EDUCATION/TRAINING PROGRAM

## 2018-01-01 PROCEDURE — 20000002 ZZH R&B BMT INTERMEDIATE

## 2018-01-01 PROCEDURE — G0463 HOSPITAL OUTPT CLINIC VISIT: HCPCS | Mod: 25

## 2018-01-01 PROCEDURE — 99233 SBSQ HOSP IP/OBS HIGH 50: CPT | Performed by: INTERNAL MEDICINE

## 2018-01-01 PROCEDURE — 85610 PROTHROMBIN TIME: CPT | Performed by: EMERGENCY MEDICINE

## 2018-01-01 PROCEDURE — 25000128 H RX IP 250 OP 636: Performed by: STUDENT IN AN ORGANIZED HEALTH CARE EDUCATION/TRAINING PROGRAM

## 2018-01-01 PROCEDURE — 99233 SBSQ HOSP IP/OBS HIGH 50: CPT | Mod: GC | Performed by: INTERNAL MEDICINE

## 2018-01-01 PROCEDURE — 82668 ASSAY OF ERYTHROPOIETIN: CPT | Performed by: INTERNAL MEDICINE

## 2018-01-01 PROCEDURE — 99318 ZZC ANNUAL NURSING FAC ASSESSMNT, STABLE: CPT | Performed by: NURSE PRACTITIONER

## 2018-01-01 PROCEDURE — 36430 TRANSFUSION BLD/BLD COMPNT: CPT | Performed by: INTERNAL MEDICINE

## 2018-01-01 PROCEDURE — 25000125 ZZHC RX 250: Performed by: EMERGENCY MEDICINE

## 2018-01-01 PROCEDURE — 85730 THROMBOPLASTIN TIME PARTIAL: CPT | Performed by: EMERGENCY MEDICINE

## 2018-01-01 PROCEDURE — P9040 RBC LEUKOREDUCED IRRADIATED: HCPCS | Performed by: INTERNAL MEDICINE

## 2018-01-01 PROCEDURE — 40000193 ZZH STATISTIC PT WARD VISIT

## 2018-01-01 PROCEDURE — 86901 BLOOD TYPING SEROLOGIC RH(D): CPT | Performed by: PHYSICIAN ASSISTANT

## 2018-01-01 PROCEDURE — 80048 BASIC METABOLIC PNL TOTAL CA: CPT | Performed by: PHYSICIAN ASSISTANT

## 2018-01-01 PROCEDURE — 99239 HOSP IP/OBS DSCHRG MGMT >30: CPT | Mod: GC | Performed by: STUDENT IN AN ORGANIZED HEALTH CARE EDUCATION/TRAINING PROGRAM

## 2018-01-01 PROCEDURE — 25000128 H RX IP 250 OP 636

## 2018-01-01 PROCEDURE — 97530 THERAPEUTIC ACTIVITIES: CPT | Mod: GO

## 2018-01-01 PROCEDURE — 97165 OT EVAL LOW COMPLEX 30 MIN: CPT | Mod: GO | Performed by: OCCUPATIONAL THERAPIST

## 2018-01-01 PROCEDURE — 99285 EMERGENCY DEPT VISIT HI MDM: CPT | Mod: Z6 | Performed by: EMERGENCY MEDICINE

## 2018-01-01 PROCEDURE — 25000128 H RX IP 250 OP 636: Performed by: INTERNAL MEDICINE

## 2018-01-01 PROCEDURE — 71046 X-RAY EXAM CHEST 2 VIEWS: CPT

## 2018-01-01 PROCEDURE — 84100 ASSAY OF PHOSPHORUS: CPT | Performed by: PHYSICIAN ASSISTANT

## 2018-01-01 PROCEDURE — 99232 SBSQ HOSP IP/OBS MODERATE 35: CPT | Performed by: INTERNAL MEDICINE

## 2018-01-01 PROCEDURE — 84484 ASSAY OF TROPONIN QUANT: CPT | Performed by: INTERNAL MEDICINE

## 2018-01-01 PROCEDURE — 27210185 ZZH KIT OPEN ENDED DOUBLE LUMEN

## 2018-01-01 PROCEDURE — 40000133 ZZH STATISTIC OT WARD VISIT

## 2018-01-01 PROCEDURE — G0463 HOSPITAL OUTPT CLINIC VISIT: HCPCS

## 2018-01-01 PROCEDURE — 00000146 ZZHCL STATISTIC GLUCOSE BY METER IP

## 2018-01-01 PROCEDURE — 36415 COLL VENOUS BLD VENIPUNCTURE: CPT | Performed by: STUDENT IN AN ORGANIZED HEALTH CARE EDUCATION/TRAINING PROGRAM

## 2018-01-01 PROCEDURE — 80048 BASIC METABOLIC PNL TOTAL CA: CPT | Performed by: STUDENT IN AN ORGANIZED HEALTH CARE EDUCATION/TRAINING PROGRAM

## 2018-01-01 PROCEDURE — 25000128 H RX IP 250 OP 636: Performed by: PHYSICIAN ASSISTANT

## 2018-01-01 PROCEDURE — 80048 BASIC METABOLIC PNL TOTAL CA: CPT | Performed by: INTERNAL MEDICINE

## 2018-01-01 PROCEDURE — 84145 PROCALCITONIN (PCT): CPT | Performed by: STUDENT IN AN ORGANIZED HEALTH CARE EDUCATION/TRAINING PROGRAM

## 2018-01-01 PROCEDURE — 40000193 ZZH STATISTIC PT WARD VISIT: Performed by: PHYSICAL THERAPIST

## 2018-01-01 PROCEDURE — 82728 ASSAY OF FERRITIN: CPT | Performed by: INTERNAL MEDICINE

## 2018-01-01 PROCEDURE — 74177 CT ABD & PELVIS W/CONTRAST: CPT

## 2018-01-01 PROCEDURE — 40000986 XR CHEST PORT 1 VW

## 2018-01-01 PROCEDURE — 85610 PROTHROMBIN TIME: CPT | Performed by: PHYSICIAN ASSISTANT

## 2018-01-01 PROCEDURE — 76705 ECHO EXAM OF ABDOMEN: CPT

## 2018-01-01 PROCEDURE — 40000225 ZZH STATISTIC SLP WARD VISIT

## 2018-01-01 PROCEDURE — 86850 RBC ANTIBODY SCREEN: CPT | Performed by: INTERNAL MEDICINE

## 2018-01-01 PROCEDURE — 86901 BLOOD TYPING SEROLOGIC RH(D): CPT | Performed by: INTERNAL MEDICINE

## 2018-01-01 PROCEDURE — 40000141 ZZH STATISTIC PERIPHERAL IV START W/O US GUIDANCE

## 2018-01-01 PROCEDURE — 71045 X-RAY EXAM CHEST 1 VIEW: CPT

## 2018-01-01 PROCEDURE — 99221 1ST HOSP IP/OBS SF/LOW 40: CPT | Mod: AI | Performed by: INTERNAL MEDICINE

## 2018-01-01 PROCEDURE — 92526 ORAL FUNCTION THERAPY: CPT | Mod: GN

## 2018-01-01 PROCEDURE — 80053 COMPREHEN METABOLIC PANEL: CPT | Performed by: STUDENT IN AN ORGANIZED HEALTH CARE EDUCATION/TRAINING PROGRAM

## 2018-01-01 PROCEDURE — 84484 ASSAY OF TROPONIN QUANT: CPT | Performed by: EMERGENCY MEDICINE

## 2018-01-01 PROCEDURE — 12000001 ZZH R&B MED SURG/OB UMMC

## 2018-01-01 PROCEDURE — 85018 HEMOGLOBIN: CPT | Performed by: INTERNAL MEDICINE

## 2018-01-01 PROCEDURE — 87086 URINE CULTURE/COLONY COUNT: CPT | Performed by: PHYSICIAN ASSISTANT

## 2018-01-01 PROCEDURE — 97140 MANUAL THERAPY 1/> REGIONS: CPT | Mod: GO | Performed by: OCCUPATIONAL THERAPIST

## 2018-01-01 PROCEDURE — 97535 SELF CARE MNGMENT TRAINING: CPT | Mod: GO

## 2018-01-01 PROCEDURE — 86900 BLOOD TYPING SEROLOGIC ABO: CPT | Performed by: INTERNAL MEDICINE

## 2018-01-01 PROCEDURE — 83605 ASSAY OF LACTIC ACID: CPT | Performed by: INTERNAL MEDICINE

## 2018-01-01 PROCEDURE — 93010 ELECTROCARDIOGRAM REPORT: CPT | Mod: Z6 | Performed by: INTERNAL MEDICINE

## 2018-01-01 PROCEDURE — 86923 COMPATIBILITY TEST ELECTRIC: CPT | Performed by: INTERNAL MEDICINE

## 2018-01-01 PROCEDURE — 97530 THERAPEUTIC ACTIVITIES: CPT | Mod: GP | Performed by: PHYSICAL THERAPIST

## 2018-01-01 PROCEDURE — 83550 IRON BINDING TEST: CPT | Performed by: INTERNAL MEDICINE

## 2018-01-01 PROCEDURE — 36415 COLL VENOUS BLD VENIPUNCTURE: CPT | Performed by: PHYSICIAN ASSISTANT

## 2018-01-01 PROCEDURE — 97535 SELF CARE MNGMENT TRAINING: CPT | Mod: GP

## 2018-01-01 PROCEDURE — 83690 ASSAY OF LIPASE: CPT | Performed by: EMERGENCY MEDICINE

## 2018-01-01 PROCEDURE — 25000125 ZZHC RX 250: Performed by: PHYSICIAN ASSISTANT

## 2018-01-01 PROCEDURE — 81001 URINALYSIS AUTO W/SCOPE: CPT | Performed by: INTERNAL MEDICINE

## 2018-01-01 PROCEDURE — 85025 COMPLETE CBC W/AUTO DIFF WBC: CPT | Performed by: EMERGENCY MEDICINE

## 2018-01-01 PROCEDURE — 99232 SBSQ HOSP IP/OBS MODERATE 35: CPT | Mod: GC | Performed by: INTERNAL MEDICINE

## 2018-01-01 PROCEDURE — 82746 ASSAY OF FOLIC ACID SERUM: CPT | Performed by: INTERNAL MEDICINE

## 2018-01-01 PROCEDURE — 80053 COMPREHEN METABOLIC PANEL: CPT | Performed by: EMERGENCY MEDICINE

## 2018-01-01 PROCEDURE — 85018 HEMOGLOBIN: CPT | Performed by: PHYSICIAN ASSISTANT

## 2018-01-01 PROCEDURE — 85018 HEMOGLOBIN: CPT | Performed by: EMERGENCY MEDICINE

## 2018-01-01 PROCEDURE — 36569 INSJ PICC 5 YR+ W/O IMAGING: CPT

## 2018-01-01 PROCEDURE — 86900 BLOOD TYPING SEROLOGIC ABO: CPT | Performed by: EMERGENCY MEDICINE

## 2018-01-01 PROCEDURE — 92610 EVALUATE SWALLOWING FUNCTION: CPT | Mod: GN

## 2018-01-01 PROCEDURE — 84466 ASSAY OF TRANSFERRIN: CPT | Performed by: INTERNAL MEDICINE

## 2018-01-01 PROCEDURE — 87641 MR-STAPH DNA AMP PROBE: CPT | Performed by: INTERNAL MEDICINE

## 2018-01-01 PROCEDURE — 99223 1ST HOSP IP/OBS HIGH 75: CPT | Mod: AI | Performed by: INTERNAL MEDICINE

## 2018-01-01 PROCEDURE — 97140 MANUAL THERAPY 1/> REGIONS: CPT | Mod: GO

## 2018-01-01 PROCEDURE — 80053 COMPREHEN METABOLIC PANEL: CPT | Performed by: INTERNAL MEDICINE

## 2018-01-01 PROCEDURE — 86850 RBC ANTIBODY SCREEN: CPT | Performed by: PHYSICIAN ASSISTANT

## 2018-01-01 PROCEDURE — 85610 PROTHROMBIN TIME: CPT | Performed by: STUDENT IN AN ORGANIZED HEALTH CARE EDUCATION/TRAINING PROGRAM

## 2018-01-01 PROCEDURE — 99215 OFFICE O/P EST HI 40 MIN: CPT | Performed by: INTERNAL MEDICINE

## 2018-01-01 PROCEDURE — 99285 EMERGENCY DEPT VISIT HI MDM: CPT | Mod: 25 | Performed by: EMERGENCY MEDICINE

## 2018-01-01 PROCEDURE — 85610 PROTHROMBIN TIME: CPT | Performed by: INTERNAL MEDICINE

## 2018-01-01 PROCEDURE — 97165 OT EVAL LOW COMPLEX 30 MIN: CPT | Mod: GO

## 2018-01-01 PROCEDURE — 25000132 ZZH RX MED GY IP 250 OP 250 PS 637: Performed by: INTERNAL MEDICINE

## 2018-01-01 PROCEDURE — 96361 HYDRATE IV INFUSION ADD-ON: CPT | Performed by: INTERNAL MEDICINE

## 2018-01-01 PROCEDURE — 93005 ELECTROCARDIOGRAM TRACING: CPT

## 2018-01-01 PROCEDURE — 82607 VITAMIN B-12: CPT | Performed by: INTERNAL MEDICINE

## 2018-01-01 PROCEDURE — 99310 SBSQ NF CARE HIGH MDM 45: CPT | Performed by: NURSE PRACTITIONER

## 2018-01-01 PROCEDURE — 40000133 ZZH STATISTIC OT WARD VISIT: Performed by: OCCUPATIONAL THERAPIST

## 2018-01-01 PROCEDURE — 97530 THERAPEUTIC ACTIVITIES: CPT | Mod: GP

## 2018-01-01 PROCEDURE — 83690 ASSAY OF LIPASE: CPT | Performed by: INTERNAL MEDICINE

## 2018-01-01 PROCEDURE — 86850 RBC ANTIBODY SCREEN: CPT | Performed by: EMERGENCY MEDICINE

## 2018-01-01 PROCEDURE — 85027 COMPLETE CBC AUTOMATED: CPT | Performed by: PHYSICIAN ASSISTANT

## 2018-01-01 PROCEDURE — 97161 PT EVAL LOW COMPLEX 20 MIN: CPT | Mod: GP | Performed by: PHYSICAL THERAPIST

## 2018-01-01 PROCEDURE — 25000131 ZZH RX MED GY IP 250 OP 636 PS 637: Performed by: STUDENT IN AN ORGANIZED HEALTH CARE EDUCATION/TRAINING PROGRAM

## 2018-01-01 PROCEDURE — 96367 TX/PROPH/DG ADDL SEQ IV INF: CPT | Performed by: INTERNAL MEDICINE

## 2018-01-01 PROCEDURE — 96375 TX/PRO/DX INJ NEW DRUG ADDON: CPT | Performed by: INTERNAL MEDICINE

## 2018-01-01 PROCEDURE — 83735 ASSAY OF MAGNESIUM: CPT | Performed by: PHYSICIAN ASSISTANT

## 2018-01-01 PROCEDURE — 87640 STAPH A DNA AMP PROBE: CPT | Performed by: INTERNAL MEDICINE

## 2018-01-01 PROCEDURE — 40000193 ZZH STATISTIC PT WARD VISIT: Performed by: REHABILITATION PRACTITIONER

## 2018-01-01 PROCEDURE — 99310 SBSQ NF CARE HIGH MDM 45: CPT | Performed by: INTERNAL MEDICINE

## 2018-01-01 PROCEDURE — 93005 ELECTROCARDIOGRAM TRACING: CPT | Performed by: INTERNAL MEDICINE

## 2018-01-01 PROCEDURE — 99291 CRITICAL CARE FIRST HOUR: CPT | Mod: 25 | Performed by: INTERNAL MEDICINE

## 2018-01-01 PROCEDURE — 97162 PT EVAL MOD COMPLEX 30 MIN: CPT | Mod: GP

## 2018-01-01 PROCEDURE — 82565 ASSAY OF CREATININE: CPT | Performed by: STUDENT IN AN ORGANIZED HEALTH CARE EDUCATION/TRAINING PROGRAM

## 2018-01-01 PROCEDURE — 87086 URINE CULTURE/COLONY COUNT: CPT | Performed by: STUDENT IN AN ORGANIZED HEALTH CARE EDUCATION/TRAINING PROGRAM

## 2018-01-01 PROCEDURE — 81001 URINALYSIS AUTO W/SCOPE: CPT | Performed by: STUDENT IN AN ORGANIZED HEALTH CARE EDUCATION/TRAINING PROGRAM

## 2018-01-01 PROCEDURE — 87186 SC STD MICRODIL/AGAR DIL: CPT | Performed by: PHYSICIAN ASSISTANT

## 2018-01-01 PROCEDURE — 86901 BLOOD TYPING SEROLOGIC RH(D): CPT | Performed by: EMERGENCY MEDICINE

## 2018-01-01 PROCEDURE — 87641 MR-STAPH DNA AMP PROBE: CPT | Performed by: STUDENT IN AN ORGANIZED HEALTH CARE EDUCATION/TRAINING PROGRAM

## 2018-01-01 PROCEDURE — 87088 URINE BACTERIA CULTURE: CPT | Performed by: PHYSICIAN ASSISTANT

## 2018-01-01 PROCEDURE — 36415 COLL VENOUS BLD VENIPUNCTURE: CPT

## 2018-01-01 PROCEDURE — 87631 RESP VIRUS 3-5 TARGETS: CPT | Performed by: STUDENT IN AN ORGANIZED HEALTH CARE EDUCATION/TRAINING PROGRAM

## 2018-01-01 RX ORDER — HEPARIN SODIUM,PORCINE 10 UNIT/ML
2-5 VIAL (ML) INTRAVENOUS
Status: DISCONTINUED | OUTPATIENT
Start: 2018-01-01 | End: 2018-01-01 | Stop reason: HOSPADM

## 2018-01-01 RX ORDER — NICOTINE POLACRILEX 4 MG
15-30 LOZENGE BUCCAL
Status: DISCONTINUED | OUTPATIENT
Start: 2018-01-01 | End: 2018-01-01 | Stop reason: HOSPADM

## 2018-01-01 RX ORDER — FUROSEMIDE 20 MG
20 TABLET ORAL 2 TIMES DAILY
Qty: 30 TABLET | DISCHARGE
Start: 2018-01-01

## 2018-01-01 RX ORDER — SUCRALFATE ORAL 1 G/10ML
1 SUSPENSION ORAL
Status: DISCONTINUED | OUTPATIENT
Start: 2018-01-01 | End: 2018-01-01 | Stop reason: HOSPADM

## 2018-01-01 RX ORDER — SIMETHICONE 40MG/0.6ML
40 SUSPENSION, DROPS(FINAL DOSAGE FORM)(ML) ORAL EVERY 6 HOURS PRN
DISCHARGE
Start: 2018-01-01

## 2018-01-01 RX ORDER — FENTANYL 12.5 UG/1
12 PATCH TRANSDERMAL
Status: DISCONTINUED | OUTPATIENT
Start: 2018-01-01 | End: 2018-01-01 | Stop reason: HOSPADM

## 2018-01-01 RX ORDER — SODIUM CHLORIDE 9 MG/ML
INJECTION, SOLUTION INTRAVENOUS CONTINUOUS
Status: DISCONTINUED | OUTPATIENT
Start: 2018-01-01 | End: 2018-01-01

## 2018-01-01 RX ORDER — OXYCODONE HYDROCHLORIDE 5 MG/1
5 TABLET ORAL EVERY 4 HOURS PRN
Qty: 18 TABLET | Refills: 0 | Status: SHIPPED | OUTPATIENT
Start: 2018-01-01 | End: 2018-01-01

## 2018-01-01 RX ORDER — ISOSORBIDE MONONITRATE 60 MG/1
60 TABLET, EXTENDED RELEASE ORAL DAILY
Status: DISCONTINUED | OUTPATIENT
Start: 2018-01-01 | End: 2018-01-01 | Stop reason: HOSPADM

## 2018-01-01 RX ORDER — OMEPRAZOLE 40 MG/1
40 CAPSULE, DELAYED RELEASE ORAL 2 TIMES DAILY
Start: 2018-01-01

## 2018-01-01 RX ORDER — ATROPINE SULFATE 10 MG/ML
2 SOLUTION/ DROPS OPHTHALMIC
COMMUNITY

## 2018-01-01 RX ORDER — FERROUS SULFATE 325(65) MG
325 TABLET ORAL 2 TIMES DAILY
Qty: 100 TABLET | DISCHARGE
Start: 2018-01-01 | End: 2018-01-01

## 2018-01-01 RX ORDER — TIMOLOL MALEATE 2.5 MG/ML
1 SOLUTION/ DROPS OPHTHALMIC DAILY
Status: DISCONTINUED | OUTPATIENT
Start: 2018-01-01 | End: 2018-01-01 | Stop reason: HOSPADM

## 2018-01-01 RX ORDER — HYDROMORPHONE HYDROCHLORIDE 1 MG/ML
0.5 INJECTION, SOLUTION INTRAMUSCULAR; INTRAVENOUS; SUBCUTANEOUS EVERY 4 HOURS PRN
Status: DISCONTINUED | OUTPATIENT
Start: 2018-01-01 | End: 2018-01-01 | Stop reason: HOSPADM

## 2018-01-01 RX ORDER — TAMSULOSIN HYDROCHLORIDE 0.4 MG/1
0.4 CAPSULE ORAL DAILY
Status: DISCONTINUED | OUTPATIENT
Start: 2018-01-01 | End: 2018-01-01 | Stop reason: HOSPADM

## 2018-01-01 RX ORDER — FENTANYL 12.5 UG/1
1 PATCH TRANSDERMAL
Qty: 5 PATCH | Refills: 0 | Status: SHIPPED | OUTPATIENT
Start: 2018-01-01 | End: 2018-01-01

## 2018-01-01 RX ORDER — PROCHLORPERAZINE MALEATE 5 MG
5 TABLET ORAL EVERY 6 HOURS PRN
Status: DISCONTINUED | OUTPATIENT
Start: 2018-01-01 | End: 2018-01-01 | Stop reason: HOSPADM

## 2018-01-01 RX ORDER — HEPARIN SODIUM,PORCINE 10 UNIT/ML
5-10 VIAL (ML) INTRAVENOUS EVERY 24 HOURS
Status: DISCONTINUED | OUTPATIENT
Start: 2018-01-01 | End: 2018-01-01 | Stop reason: HOSPADM

## 2018-01-01 RX ORDER — ONDANSETRON 2 MG/ML
4 INJECTION INTRAMUSCULAR; INTRAVENOUS EVERY 6 HOURS PRN
Status: DISCONTINUED | OUTPATIENT
Start: 2018-01-01 | End: 2018-01-01 | Stop reason: HOSPADM

## 2018-01-01 RX ORDER — FOLIC ACID 1 MG/1
1 TABLET ORAL DAILY
Qty: 30 TABLET | DISCHARGE
Start: 2018-01-01 | End: 2018-01-01

## 2018-01-01 RX ORDER — LATANOPROST 50 UG/ML
1 SOLUTION/ DROPS OPHTHALMIC AT BEDTIME
COMMUNITY

## 2018-01-01 RX ORDER — ATORVASTATIN CALCIUM 10 MG/1
10 TABLET, FILM COATED ORAL AT BEDTIME
Status: DISCONTINUED | OUTPATIENT
Start: 2018-01-01 | End: 2018-01-01 | Stop reason: HOSPADM

## 2018-01-01 RX ORDER — FENTANYL 12.5 UG/1
12 PATCH TRANSDERMAL
Status: DISCONTINUED | OUTPATIENT
Start: 2018-01-01 | End: 2018-01-01

## 2018-01-01 RX ORDER — AMOXICILLIN 250 MG
1-2 CAPSULE ORAL AT BEDTIME
Status: DISCONTINUED | OUTPATIENT
Start: 2018-01-01 | End: 2018-01-01 | Stop reason: HOSPADM

## 2018-01-01 RX ORDER — TIOTROPIUM BROMIDE 18 UG/1
18 CAPSULE ORAL; RESPIRATORY (INHALATION) DAILY
Qty: 30 CAPSULE | DISCHARGE
Start: 2018-01-01 | End: 2018-01-01

## 2018-01-01 RX ORDER — LEVOFLOXACIN 250 MG/1
250 TABLET, FILM COATED ORAL DAILY
Qty: 5 TABLET | Refills: 0 | Status: SHIPPED | OUTPATIENT
Start: 2018-01-01 | End: 2018-01-01

## 2018-01-01 RX ORDER — OXYCODONE HYDROCHLORIDE 5 MG/1
5 TABLET ORAL EVERY 4 HOURS PRN
Qty: 18 TABLET | Refills: 0 | Status: ON HOLD | OUTPATIENT
Start: 2018-01-01 | End: 2018-01-01

## 2018-01-01 RX ORDER — LIDOCAINE 40 MG/G
CREAM TOPICAL
Status: DISCONTINUED | OUTPATIENT
Start: 2018-01-01 | End: 2018-01-01 | Stop reason: HOSPADM

## 2018-01-01 RX ORDER — DEXTROSE MONOHYDRATE 25 G/50ML
25-50 INJECTION, SOLUTION INTRAVENOUS
Status: DISCONTINUED | OUTPATIENT
Start: 2018-01-01 | End: 2018-01-01 | Stop reason: HOSPADM

## 2018-01-01 RX ORDER — METOPROLOL SUCCINATE 50 MG/1
50 TABLET, EXTENDED RELEASE ORAL DAILY
Status: DISCONTINUED | OUTPATIENT
Start: 2018-01-01 | End: 2018-01-01 | Stop reason: HOSPADM

## 2018-01-01 RX ORDER — AMMONIUM LACTATE 12 G/100G
LOTION TOPICAL 2 TIMES DAILY PRN
DISCHARGE
Start: 2018-01-01

## 2018-01-01 RX ORDER — ACETAMINOPHEN 500 MG
1000 TABLET ORAL 3 TIMES DAILY
DISCHARGE
Start: 2018-01-01

## 2018-01-01 RX ORDER — CARBOXYMETHYLCELLULOSE SODIUM 5 MG/ML
1 SOLUTION/ DROPS OPHTHALMIC 4 TIMES DAILY
Status: DISCONTINUED | OUTPATIENT
Start: 2018-01-01 | End: 2018-01-01 | Stop reason: HOSPADM

## 2018-01-01 RX ORDER — SIMETHICONE 40MG/0.6ML
40 SUSPENSION, DROPS(FINAL DOSAGE FORM)(ML) ORAL EVERY 6 HOURS PRN
Status: DISCONTINUED | OUTPATIENT
Start: 2018-01-01 | End: 2018-01-01 | Stop reason: HOSPADM

## 2018-01-01 RX ORDER — IPRATROPIUM BROMIDE AND ALBUTEROL SULFATE 2.5; .5 MG/3ML; MG/3ML
3 SOLUTION RESPIRATORY (INHALATION) EVERY 4 HOURS PRN
Status: DISCONTINUED | OUTPATIENT
Start: 2018-01-01 | End: 2018-01-01 | Stop reason: HOSPADM

## 2018-01-01 RX ORDER — BISACODYL 10 MG
10 SUPPOSITORY, RECTAL RECTAL DAILY PRN
COMMUNITY

## 2018-01-01 RX ORDER — POLYETHYLENE GLYCOL 3350 17 G/17G
17 POWDER, FOR SOLUTION ORAL DAILY
Qty: 7 PACKET | DISCHARGE
Start: 2018-01-01

## 2018-01-01 RX ORDER — POLYETHYLENE GLYCOL 3350 17 G/17G
17 POWDER, FOR SOLUTION ORAL DAILY
Status: DISCONTINUED | OUTPATIENT
Start: 2018-01-01 | End: 2018-01-01 | Stop reason: HOSPADM

## 2018-01-01 RX ORDER — PHYTONADIONE 5 MG/1
5 TABLET ORAL ONCE
Status: COMPLETED | OUTPATIENT
Start: 2018-01-01 | End: 2018-01-01

## 2018-01-01 RX ORDER — MEROPENEM 1 G/1
1 INJECTION, POWDER, FOR SOLUTION INTRAVENOUS EVERY 8 HOURS
Status: DISCONTINUED | OUTPATIENT
Start: 2018-01-01 | End: 2018-01-01 | Stop reason: ALTCHOICE

## 2018-01-01 RX ORDER — HEPARIN SODIUM,PORCINE 10 UNIT/ML
5-10 VIAL (ML) INTRAVENOUS
Status: DISCONTINUED | OUTPATIENT
Start: 2018-01-01 | End: 2018-01-01 | Stop reason: HOSPADM

## 2018-01-01 RX ORDER — PANTOPRAZOLE SODIUM 40 MG/1
40 TABLET, DELAYED RELEASE ORAL
Status: DISCONTINUED | OUTPATIENT
Start: 2018-01-01 | End: 2018-01-01 | Stop reason: HOSPADM

## 2018-01-01 RX ORDER — FOLIC ACID 1 MG/1
1 TABLET ORAL DAILY
Status: DISCONTINUED | OUTPATIENT
Start: 2018-01-01 | End: 2018-01-01 | Stop reason: HOSPADM

## 2018-01-01 RX ORDER — PROCHLORPERAZINE 25 MG
12.5 SUPPOSITORY, RECTAL RECTAL EVERY 12 HOURS PRN
Status: DISCONTINUED | OUTPATIENT
Start: 2018-01-01 | End: 2018-01-01 | Stop reason: HOSPADM

## 2018-01-01 RX ORDER — POLYETHYLENE GLYCOL 3350 17 G/17G
17 POWDER, FOR SOLUTION ORAL DAILY PRN
Status: DISCONTINUED | OUTPATIENT
Start: 2018-01-01 | End: 2018-01-01

## 2018-01-01 RX ORDER — SODIUM CHLORIDE, SODIUM LACTATE, POTASSIUM CHLORIDE, CALCIUM CHLORIDE 600; 310; 30; 20 MG/100ML; MG/100ML; MG/100ML; MG/100ML
INJECTION, SOLUTION INTRAVENOUS CONTINUOUS
Status: DISCONTINUED | OUTPATIENT
Start: 2018-01-01 | End: 2018-01-01

## 2018-01-01 RX ORDER — ONDANSETRON 8 MG/1
8 TABLET, FILM COATED ORAL EVERY 8 HOURS PRN
Status: DISCONTINUED | OUTPATIENT
Start: 2018-01-01 | End: 2018-01-01 | Stop reason: HOSPADM

## 2018-01-01 RX ORDER — ONDANSETRON 4 MG/1
4 TABLET, ORALLY DISINTEGRATING ORAL EVERY 6 HOURS PRN
Status: DISCONTINUED | OUTPATIENT
Start: 2018-01-01 | End: 2018-01-01 | Stop reason: HOSPADM

## 2018-01-01 RX ORDER — IPRATROPIUM BROMIDE AND ALBUTEROL SULFATE 2.5; .5 MG/3ML; MG/3ML
1 SOLUTION RESPIRATORY (INHALATION) EVERY 4 HOURS PRN
Status: DISCONTINUED | OUTPATIENT
Start: 2018-01-01 | End: 2018-01-01 | Stop reason: CLARIF

## 2018-01-01 RX ORDER — BUDESONIDE AND FORMOTEROL FUMARATE DIHYDRATE 160; 4.5 UG/1; UG/1
2 AEROSOL RESPIRATORY (INHALATION) 2 TIMES DAILY
Status: DISCONTINUED | OUTPATIENT
Start: 2018-01-01 | End: 2018-01-01 | Stop reason: CLARIF

## 2018-01-01 RX ORDER — HYDROMORPHONE HYDROCHLORIDE 1 MG/ML
0.5 INJECTION, SOLUTION INTRAMUSCULAR; INTRAVENOUS; SUBCUTANEOUS ONCE
Status: COMPLETED | OUTPATIENT
Start: 2018-01-01 | End: 2018-01-01

## 2018-01-01 RX ORDER — NALOXONE HYDROCHLORIDE 0.4 MG/ML
.1-.4 INJECTION, SOLUTION INTRAMUSCULAR; INTRAVENOUS; SUBCUTANEOUS
Status: DISCONTINUED | OUTPATIENT
Start: 2018-01-01 | End: 2018-01-01 | Stop reason: HOSPADM

## 2018-01-01 RX ORDER — METHOCARBAMOL 500 MG/1
500 TABLET, FILM COATED ORAL EVERY 6 HOURS PRN
Status: DISCONTINUED | OUTPATIENT
Start: 2018-01-01 | End: 2018-01-01 | Stop reason: HOSPADM

## 2018-01-01 RX ORDER — FENTANYL 12.5 UG/1
1 PATCH TRANSDERMAL
Qty: 5 PATCH | Refills: 0 | Status: ON HOLD | OUTPATIENT
Start: 2018-01-01 | End: 2018-01-01

## 2018-01-01 RX ORDER — ACETAMINOPHEN 325 MG/1
650 TABLET ORAL EVERY 4 HOURS PRN
Status: DISCONTINUED | OUTPATIENT
Start: 2018-01-01 | End: 2018-01-01

## 2018-01-01 RX ORDER — SODIUM CHLORIDE 450 MG/100ML
INJECTION, SOLUTION INTRAVENOUS CONTINUOUS
Status: DISCONTINUED | OUTPATIENT
Start: 2018-01-01 | End: 2018-01-01 | Stop reason: HOSPADM

## 2018-01-01 RX ORDER — PROMETHAZINE HYDROCHLORIDE 25 MG/ML
25 INJECTION, SOLUTION INTRAMUSCULAR; INTRAVENOUS ONCE
Status: DISCONTINUED | OUTPATIENT
Start: 2018-01-01 | End: 2018-01-01 | Stop reason: HOSPADM

## 2018-01-01 RX ORDER — METOPROLOL SUCCINATE 50 MG/1
50 TABLET, EXTENDED RELEASE ORAL DAILY
Qty: 5 TABLET | Refills: 0 | DISCHARGE
Start: 2018-01-01

## 2018-01-01 RX ORDER — SULFAMETHOXAZOLE/TRIMETHOPRIM 800-160 MG
1 TABLET ORAL 2 TIMES DAILY
Status: DISCONTINUED | OUTPATIENT
Start: 2018-01-01 | End: 2018-01-01 | Stop reason: HOSPADM

## 2018-01-01 RX ORDER — LATANOPROST 50 UG/ML
1 SOLUTION/ DROPS OPHTHALMIC AT BEDTIME
Status: DISCONTINUED | OUTPATIENT
Start: 2018-01-01 | End: 2018-01-01

## 2018-01-01 RX ORDER — ACETAMINOPHEN 325 MG/1
650 TABLET ORAL EVERY 4 HOURS PRN
Status: DISCONTINUED | OUTPATIENT
Start: 2018-01-01 | End: 2018-01-01 | Stop reason: HOSPADM

## 2018-01-01 RX ORDER — CARBOXYMETHYLCELLULOSE SODIUM 5 MG/ML
1 SOLUTION/ DROPS OPHTHALMIC 4 TIMES DAILY
Qty: 1 BOTTLE | DISCHARGE
Start: 2018-01-01

## 2018-01-01 RX ORDER — POLYETHYLENE GLYCOL 3350 17 G/17G
17 POWDER, FOR SOLUTION ORAL DAILY PRN
Status: DISCONTINUED | OUTPATIENT
Start: 2018-01-01 | End: 2018-01-01 | Stop reason: HOSPADM

## 2018-01-01 RX ORDER — AMOXICILLIN 250 MG
1 CAPSULE ORAL 2 TIMES DAILY PRN
Status: DISCONTINUED | OUTPATIENT
Start: 2018-01-01 | End: 2018-01-01 | Stop reason: HOSPADM

## 2018-01-01 RX ORDER — FUROSEMIDE 20 MG
20 TABLET ORAL 2 TIMES DAILY
Status: DISCONTINUED | OUTPATIENT
Start: 2018-01-01 | End: 2018-01-01 | Stop reason: HOSPADM

## 2018-01-01 RX ORDER — AMOXICILLIN 250 MG
2 CAPSULE ORAL 2 TIMES DAILY
Qty: 100 TABLET | DISCHARGE
Start: 2018-01-01 | End: 2018-01-01

## 2018-01-01 RX ORDER — SULFAMETHOXAZOLE/TRIMETHOPRIM 800-160 MG
1 TABLET ORAL 2 TIMES DAILY
Refills: 0 | DISCHARGE
Start: 2018-01-01 | End: 2018-01-01

## 2018-01-01 RX ORDER — DULOXETIN HYDROCHLORIDE 20 MG/1
20 CAPSULE, DELAYED RELEASE ORAL DAILY
Qty: 60 CAPSULE | DISCHARGE
Start: 2018-01-01

## 2018-01-01 RX ORDER — LATANOPROST 50 UG/ML
1 SOLUTION/ DROPS OPHTHALMIC AT BEDTIME
Status: DISCONTINUED | OUTPATIENT
Start: 2018-01-01 | End: 2018-01-01 | Stop reason: HOSPADM

## 2018-01-01 RX ORDER — AMMONIUM LACTATE 12 G/100G
LOTION TOPICAL 2 TIMES DAILY PRN
Status: DISCONTINUED | OUTPATIENT
Start: 2018-01-01 | End: 2018-01-01 | Stop reason: HOSPADM

## 2018-01-01 RX ORDER — FERROUS SULFATE 325(65) MG
325 TABLET ORAL 2 TIMES DAILY
Status: DISCONTINUED | OUTPATIENT
Start: 2018-01-01 | End: 2018-01-01 | Stop reason: HOSPADM

## 2018-01-01 RX ORDER — PHYTONADIONE 1 MG/.5ML
2 INJECTION, EMULSION INTRAMUSCULAR; INTRAVENOUS; SUBCUTANEOUS ONCE
Status: DISCONTINUED | OUTPATIENT
Start: 2018-01-01 | End: 2018-01-01 | Stop reason: CLARIF

## 2018-01-01 RX ORDER — ISOSORBIDE MONONITRATE 30 MG/1
60 TABLET, EXTENDED RELEASE ORAL DAILY
Status: DISCONTINUED | OUTPATIENT
Start: 2018-01-01 | End: 2018-01-01 | Stop reason: HOSPADM

## 2018-01-01 RX ORDER — DULOXETIN HYDROCHLORIDE 20 MG/1
20 CAPSULE, DELAYED RELEASE ORAL DAILY
Status: DISCONTINUED | OUTPATIENT
Start: 2018-01-01 | End: 2018-01-01 | Stop reason: HOSPADM

## 2018-01-01 RX ORDER — ONDANSETRON 2 MG/ML
8 INJECTION INTRAMUSCULAR; INTRAVENOUS EVERY 8 HOURS PRN
Status: DISCONTINUED | OUTPATIENT
Start: 2018-01-01 | End: 2018-01-01 | Stop reason: HOSPADM

## 2018-01-01 RX ORDER — OXYCODONE HCL 5 MG/5 ML
10 SOLUTION, ORAL ORAL
COMMUNITY

## 2018-01-01 RX ORDER — AMOXICILLIN 250 MG
2 CAPSULE ORAL 2 TIMES DAILY PRN
Status: DISCONTINUED | OUTPATIENT
Start: 2018-01-01 | End: 2018-01-01 | Stop reason: HOSPADM

## 2018-01-01 RX ORDER — AMOXICILLIN 250 MG
1-2 CAPSULE ORAL
Status: DISCONTINUED | OUTPATIENT
Start: 2018-01-01 | End: 2018-01-01

## 2018-01-01 RX ORDER — TIMOLOL MALEATE 2.5 MG/ML
1 SOLUTION/ DROPS OPHTHALMIC 2 TIMES DAILY
Status: DISCONTINUED | OUTPATIENT
Start: 2018-01-01 | End: 2018-01-01

## 2018-01-01 RX ORDER — BISACODYL 10 MG
10 SUPPOSITORY, RECTAL RECTAL DAILY PRN
Status: DISCONTINUED | OUTPATIENT
Start: 2018-01-01 | End: 2018-01-01 | Stop reason: HOSPADM

## 2018-01-01 RX ORDER — CALCIUM CARBONATE 500(1250)
1250 TABLET ORAL 2 TIMES DAILY WITH MEALS
Status: DISCONTINUED | OUTPATIENT
Start: 2018-01-01 | End: 2018-01-01 | Stop reason: HOSPADM

## 2018-01-01 RX ORDER — LOPERAMIDE HCL 2 MG
2 CAPSULE ORAL PRN
COMMUNITY

## 2018-01-01 RX ORDER — ISOSORBIDE MONONITRATE 30 MG/1
60 TABLET, EXTENDED RELEASE ORAL DAILY
Qty: 30 TABLET | DISCHARGE
Start: 2018-01-01

## 2018-01-01 RX ORDER — ATORVASTATIN CALCIUM 10 MG/1
10 TABLET, FILM COATED ORAL AT BEDTIME
Qty: 30 TABLET | DISCHARGE
Start: 2018-01-01 | End: 2018-01-01

## 2018-01-01 RX ORDER — SUCRALFATE ORAL 1 G/10ML
1 SUSPENSION ORAL
Qty: 1200 ML | DISCHARGE
Start: 2018-01-01

## 2018-01-01 RX ORDER — ACETAMINOPHEN 500 MG
1000 TABLET ORAL 3 TIMES DAILY
Status: DISCONTINUED | OUTPATIENT
Start: 2018-01-01 | End: 2018-01-01 | Stop reason: HOSPADM

## 2018-01-01 RX ORDER — FENTANYL 25 UG/1
1 PATCH TRANSDERMAL
COMMUNITY

## 2018-01-01 RX ORDER — ONDANSETRON 8 MG/1
8 TABLET, ORALLY DISINTEGRATING ORAL EVERY 8 HOURS PRN
Status: DISCONTINUED | OUTPATIENT
Start: 2018-01-01 | End: 2018-01-01 | Stop reason: HOSPADM

## 2018-01-01 RX ORDER — TIOTROPIUM BROMIDE 18 UG/1
18 CAPSULE ORAL; RESPIRATORY (INHALATION) DAILY
Status: DISCONTINUED | OUTPATIENT
Start: 2018-01-01 | End: 2018-01-01 | Stop reason: CLARIF

## 2018-01-01 RX ORDER — OMEPRAZOLE 10 MG/1
10 CAPSULE, DELAYED RELEASE ORAL DAILY
Qty: 60 CAPSULE | DISCHARGE
Start: 2018-01-01 | End: 2018-01-01

## 2018-01-01 RX ORDER — FUROSEMIDE 20 MG
20 TABLET ORAL DAILY
Status: DISCONTINUED | OUTPATIENT
Start: 2018-01-01 | End: 2018-01-01 | Stop reason: HOSPADM

## 2018-01-01 RX ORDER — OXYCODONE HYDROCHLORIDE 5 MG/1
5 TABLET ORAL EVERY 4 HOURS PRN
Status: DISCONTINUED | OUTPATIENT
Start: 2018-01-01 | End: 2018-01-01 | Stop reason: HOSPADM

## 2018-01-01 RX ORDER — IOPAMIDOL 755 MG/ML
126 INJECTION, SOLUTION INTRAVASCULAR ONCE
Status: COMPLETED | OUTPATIENT
Start: 2018-01-01 | End: 2018-01-01

## 2018-01-01 RX ORDER — IPRATROPIUM BROMIDE AND ALBUTEROL SULFATE 2.5; .5 MG/3ML; MG/3ML
1 SOLUTION RESPIRATORY (INHALATION) EVERY 4 HOURS PRN
COMMUNITY

## 2018-01-01 RX ORDER — ALBUTEROL SULFATE 0.83 MG/ML
2.5 SOLUTION RESPIRATORY (INHALATION) EVERY 4 HOURS PRN
Status: DISCONTINUED | OUTPATIENT
Start: 2018-01-01 | End: 2018-01-01 | Stop reason: HOSPADM

## 2018-01-01 RX ORDER — BUDESONIDE AND FORMOTEROL FUMARATE DIHYDRATE 160; 4.5 UG/1; UG/1
2 AEROSOL RESPIRATORY (INHALATION) 2 TIMES DAILY
DISCHARGE
Start: 2018-01-01 | End: 2018-01-01

## 2018-01-01 RX ORDER — NITROGLYCERIN 0.4 MG/1
0.4 TABLET SUBLINGUAL EVERY 5 MIN PRN
Qty: 25 TABLET | DISCHARGE
Start: 2018-01-01

## 2018-01-01 RX ORDER — IPRATROPIUM BROMIDE AND ALBUTEROL SULFATE 2.5; .5 MG/3ML; MG/3ML
1 SOLUTION RESPIRATORY (INHALATION) EVERY 4 HOURS PRN
Qty: 360 ML | DISCHARGE
Start: 2018-01-01 | End: 2018-01-01

## 2018-01-01 RX ORDER — TAMSULOSIN HYDROCHLORIDE 0.4 MG/1
0.4 CAPSULE ORAL DAILY
Qty: 5 CAPSULE | Refills: 0 | DISCHARGE
Start: 2018-01-01

## 2018-01-01 RX ADMIN — UMECLIDINIUM 1 PUFF: 62.5 AEROSOL, POWDER ORAL at 08:10

## 2018-01-01 RX ADMIN — SUCRALFATE 1 G: 1 SUSPENSION ORAL at 09:50

## 2018-01-01 RX ADMIN — UMECLIDINIUM 1 PUFF: 62.5 AEROSOL, POWDER ORAL at 09:06

## 2018-01-01 RX ADMIN — CEFTRIAXONE SODIUM 1 G: 10 INJECTION, POWDER, FOR SOLUTION INTRAVENOUS at 16:49

## 2018-01-01 RX ADMIN — Medication 1500 MG: at 18:37

## 2018-01-01 RX ADMIN — OXYCODONE HYDROCHLORIDE 5 MG: 5 TABLET ORAL at 21:55

## 2018-01-01 RX ADMIN — FENTANYL 1 PATCH: 12 PATCH TRANSDERMAL at 21:07

## 2018-01-01 RX ADMIN — CARBOXYMETHYLCELLULOSE SODIUM 1 DROP: 5 SOLUTION/ DROPS OPHTHALMIC at 19:57

## 2018-01-01 RX ADMIN — METOPROLOL SUCCINATE 50 MG: 50 TABLET, EXTENDED RELEASE ORAL at 08:54

## 2018-01-01 RX ADMIN — FUROSEMIDE 20 MG: 20 TABLET ORAL at 08:39

## 2018-01-01 RX ADMIN — SUCRALFATE 1 G: 1 SUSPENSION ORAL at 12:15

## 2018-01-01 RX ADMIN — FOLIC ACID 1 MG: 1 TABLET ORAL at 08:54

## 2018-01-01 RX ADMIN — ISOSORBIDE MONONITRATE 60 MG: 60 TABLET, EXTENDED RELEASE ORAL at 08:15

## 2018-01-01 RX ADMIN — FENTANYL 1 PATCH: 12 PATCH TRANSDERMAL at 10:20

## 2018-01-01 RX ADMIN — FLUTICASONE FUROATE AND VILANTEROL TRIFENATATE 1 PUFF: 200; 25 POWDER RESPIRATORY (INHALATION) at 09:00

## 2018-01-01 RX ADMIN — SUCRALFATE 1 G: 1 SUSPENSION ORAL at 21:10

## 2018-01-01 RX ADMIN — PANTOPRAZOLE SODIUM 40 MG: 40 INJECTION, POWDER, FOR SOLUTION INTRAVENOUS at 08:15

## 2018-01-01 RX ADMIN — WATER 1 G: 1 INJECTION INTRAMUSCULAR; INTRAVENOUS; SUBCUTANEOUS at 10:46

## 2018-01-01 RX ADMIN — ACETAMINOPHEN 1000 MG: 500 TABLET, FILM COATED ORAL at 09:02

## 2018-01-01 RX ADMIN — Medication 1500 MG: at 19:50

## 2018-01-01 RX ADMIN — FLUTICASONE FUROATE AND VILANTEROL TRIFENATATE 1 PUFF: 200; 25 POWDER RESPIRATORY (INHALATION) at 08:01

## 2018-01-01 RX ADMIN — TAMSULOSIN HYDROCHLORIDE 0.4 MG: 0.4 CAPSULE ORAL at 09:50

## 2018-01-01 RX ADMIN — SUCRALFATE 1 G: 1 SUSPENSION ORAL at 09:04

## 2018-01-01 RX ADMIN — METOPROLOL SUCCINATE 50 MG: 50 TABLET, EXTENDED RELEASE ORAL at 09:03

## 2018-01-01 RX ADMIN — SUCRALFATE 1 G: 1 SUSPENSION ORAL at 16:29

## 2018-01-01 RX ADMIN — ACETAMINOPHEN 650 MG: 325 TABLET, FILM COATED ORAL at 05:18

## 2018-01-01 RX ADMIN — Medication 1250 MG: at 17:20

## 2018-01-01 RX ADMIN — LATANOPROST 1 DROP: 50 SOLUTION/ DROPS OPHTHALMIC at 02:19

## 2018-01-01 RX ADMIN — ACETAMINOPHEN 650 MG: 325 TABLET, FILM COATED ORAL at 13:09

## 2018-01-01 RX ADMIN — SODIUM CHLORIDE 80 MG: 9 INJECTION, SOLUTION INTRAVENOUS at 12:31

## 2018-01-01 RX ADMIN — SODIUM CHLORIDE, POTASSIUM CHLORIDE, SODIUM LACTATE AND CALCIUM CHLORIDE 500 ML: 600; 310; 30; 20 INJECTION, SOLUTION INTRAVENOUS at 10:59

## 2018-01-01 RX ADMIN — ISOSORBIDE MONONITRATE 60 MG: 30 TABLET, EXTENDED RELEASE ORAL at 07:57

## 2018-01-01 RX ADMIN — SODIUM CHLORIDE, POTASSIUM CHLORIDE, SODIUM LACTATE AND CALCIUM CHLORIDE 500 ML: 600; 310; 30; 20 INJECTION, SOLUTION INTRAVENOUS at 21:23

## 2018-01-01 RX ADMIN — SODIUM CHLORIDE, PRESERVATIVE FREE: 5 INJECTION INTRAVENOUS at 15:48

## 2018-01-01 RX ADMIN — FOLIC ACID 1 MG: 1 TABLET ORAL at 16:49

## 2018-01-01 RX ADMIN — CARBOXYMETHYLCELLULOSE SODIUM 1 DROP: 5 SOLUTION/ DROPS OPHTHALMIC at 08:39

## 2018-01-01 RX ADMIN — TIMOLOL MALEATE 1 DROP: 2.5 SOLUTION/ DROPS OPHTHALMIC at 09:54

## 2018-01-01 RX ADMIN — METOPROLOL SUCCINATE 50 MG: 50 TABLET, EXTENDED RELEASE ORAL at 09:56

## 2018-01-01 RX ADMIN — BIMATOPROST 1 DROP: 0.1 SOLUTION/ DROPS OPHTHALMIC at 21:51

## 2018-01-01 RX ADMIN — MEROPENEM 1 G: 1 INJECTION, POWDER, FOR SOLUTION INTRAVENOUS at 05:51

## 2018-01-01 RX ADMIN — UMECLIDINIUM 1 PUFF: 62.5 AEROSOL, POWDER ORAL at 08:37

## 2018-01-01 RX ADMIN — UMECLIDINIUM 1 PUFF: 62.5 AEROSOL, POWDER ORAL at 09:54

## 2018-01-01 RX ADMIN — SIMETHICONE 40 MG: 20 SUSPENSION/ DROPS ORAL at 17:22

## 2018-01-01 RX ADMIN — ONDANSETRON 4 MG: 2 INJECTION INTRAMUSCULAR; INTRAVENOUS at 18:44

## 2018-01-01 RX ADMIN — UMECLIDINIUM 1 PUFF: 62.5 AEROSOL, POWDER ORAL at 16:50

## 2018-01-01 RX ADMIN — BIMATOPROST 1 DROP: 0.1 SOLUTION/ DROPS OPHTHALMIC at 21:36

## 2018-01-01 RX ADMIN — MEROPENEM 1 G: 1 INJECTION, POWDER, FOR SOLUTION INTRAVENOUS at 22:41

## 2018-01-01 RX ADMIN — FOLIC ACID 1 MG: 1 TABLET ORAL at 09:31

## 2018-01-01 RX ADMIN — INSULIN ASPART 1 UNITS: 100 INJECTION, SOLUTION INTRAVENOUS; SUBCUTANEOUS at 14:51

## 2018-01-01 RX ADMIN — PANTOPRAZOLE SODIUM 40 MG: 40 TABLET, DELAYED RELEASE ORAL at 09:29

## 2018-01-01 RX ADMIN — PANTOPRAZOLE SODIUM 40 MG: 40 INJECTION, POWDER, FOR SOLUTION INTRAVENOUS at 09:57

## 2018-01-01 RX ADMIN — SODIUM CHLORIDE, PRESERVATIVE FREE 5 ML: 5 INJECTION INTRAVENOUS at 20:49

## 2018-01-01 RX ADMIN — SUCRALFATE 1 G: 1 SUSPENSION ORAL at 22:12

## 2018-01-01 RX ADMIN — PANTOPRAZOLE SODIUM 40 MG: 40 INJECTION, POWDER, FOR SOLUTION INTRAVENOUS at 01:03

## 2018-01-01 RX ADMIN — CARBOXYMETHYLCELLULOSE SODIUM 1 DROP: 5 SOLUTION/ DROPS OPHTHALMIC at 13:23

## 2018-01-01 RX ADMIN — PANTOPRAZOLE SODIUM 40 MG: 40 INJECTION, POWDER, FOR SOLUTION INTRAVENOUS at 09:04

## 2018-01-01 RX ADMIN — ACETAMINOPHEN 1000 MG: 500 TABLET, FILM COATED ORAL at 08:57

## 2018-01-01 RX ADMIN — Medication 0.5 MG: at 09:52

## 2018-01-01 RX ADMIN — LATANOPROST 1 DROP: 50 SOLUTION/ DROPS OPHTHALMIC at 21:23

## 2018-01-01 RX ADMIN — SODIUM CHLORIDE 8 MG/HR: 9 INJECTION, SOLUTION INTRAVENOUS at 09:57

## 2018-01-01 RX ADMIN — FUROSEMIDE 20 MG: 20 TABLET ORAL at 19:50

## 2018-01-01 RX ADMIN — Medication 1250 MG: at 09:56

## 2018-01-01 RX ADMIN — Medication 1250 MG: at 17:17

## 2018-01-01 RX ADMIN — CARBOXYMETHYLCELLULOSE SODIUM 1 DROP: 5 SOLUTION/ DROPS OPHTHALMIC at 16:29

## 2018-01-01 RX ADMIN — FLUTICASONE FUROATE AND VILANTEROL TRIFENATATE 1 PUFF: 200; 25 POWDER RESPIRATORY (INHALATION) at 08:10

## 2018-01-01 RX ADMIN — VANCOMYCIN HYDROCHLORIDE 1750 MG: 10 INJECTION, POWDER, LYOPHILIZED, FOR SOLUTION INTRAVENOUS at 13:50

## 2018-01-01 RX ADMIN — FOLIC ACID 1 MG: 1 TABLET ORAL at 08:38

## 2018-01-01 RX ADMIN — SUCRALFATE 1 G: 1 SUSPENSION ORAL at 17:20

## 2018-01-01 RX ADMIN — ACETAMINOPHEN 1000 MG: 500 TABLET, FILM COATED ORAL at 14:47

## 2018-01-01 RX ADMIN — CARBOXYMETHYLCELLULOSE SODIUM 1 DROP: 5 SOLUTION/ DROPS OPHTHALMIC at 20:50

## 2018-01-01 RX ADMIN — PANTOPRAZOLE SODIUM 40 MG: 40 TABLET, DELAYED RELEASE ORAL at 11:48

## 2018-01-01 RX ADMIN — FOLIC ACID 1 MG: 1 TABLET ORAL at 09:56

## 2018-01-01 RX ADMIN — PANTOPRAZOLE SODIUM 40 MG: 40 TABLET, DELAYED RELEASE ORAL at 09:01

## 2018-01-01 RX ADMIN — FERROUS SULFATE 325 MG: 325 TABLET, FILM COATED ORAL at 09:50

## 2018-01-01 RX ADMIN — PANTOPRAZOLE SODIUM 40 MG: 40 INJECTION, POWDER, FOR SOLUTION INTRAVENOUS at 13:21

## 2018-01-01 RX ADMIN — Medication 1500 MG: at 09:03

## 2018-01-01 RX ADMIN — CARBOXYMETHYLCELLULOSE SODIUM 1 DROP: 5 SOLUTION/ DROPS OPHTHALMIC at 13:08

## 2018-01-01 RX ADMIN — FUROSEMIDE 20 MG: 20 TABLET ORAL at 21:07

## 2018-01-01 RX ADMIN — DULOXETINE 20 MG: 20 CAPSULE, DELAYED RELEASE ORAL at 08:13

## 2018-01-01 RX ADMIN — FUROSEMIDE 20 MG: 20 TABLET ORAL at 09:03

## 2018-01-01 RX ADMIN — WATER 1 G: 1 INJECTION INTRAMUSCULAR; INTRAVENOUS; SUBCUTANEOUS at 11:49

## 2018-01-01 RX ADMIN — CARBOXYMETHYLCELLULOSE SODIUM 1 DROP: 5 SOLUTION/ DROPS OPHTHALMIC at 22:40

## 2018-01-01 RX ADMIN — TAMSULOSIN HYDROCHLORIDE 0.4 MG: 0.4 CAPSULE ORAL at 09:30

## 2018-01-01 RX ADMIN — DULOXETINE 20 MG: 20 CAPSULE, DELAYED RELEASE ORAL at 08:51

## 2018-01-01 RX ADMIN — DULOXETINE 20 MG: 20 CAPSULE, DELAYED RELEASE ORAL at 17:38

## 2018-01-01 RX ADMIN — LATANOPROST 1 DROP: 50 SOLUTION/ DROPS OPHTHALMIC at 21:36

## 2018-01-01 RX ADMIN — MEROPENEM 1 G: 1 INJECTION, POWDER, FOR SOLUTION INTRAVENOUS at 13:40

## 2018-01-01 RX ADMIN — FLUTICASONE FUROATE AND VILANTEROL TRIFENATATE 1 PUFF: 200; 25 POWDER RESPIRATORY (INHALATION) at 09:03

## 2018-01-01 RX ADMIN — Medication 1250 MG: at 08:11

## 2018-01-01 RX ADMIN — TAMSULOSIN HYDROCHLORIDE 0.4 MG: 0.4 CAPSULE ORAL at 08:57

## 2018-01-01 RX ADMIN — TIMOLOL MALEATE 1 DROP: 2.5 SOLUTION/ DROPS OPHTHALMIC at 09:51

## 2018-01-01 RX ADMIN — PANTOPRAZOLE SODIUM 40 MG: 40 TABLET, DELAYED RELEASE ORAL at 19:50

## 2018-01-01 RX ADMIN — TAMSULOSIN HYDROCHLORIDE 0.4 MG: 0.4 CAPSULE ORAL at 08:39

## 2018-01-01 RX ADMIN — ACETAMINOPHEN 650 MG: 325 TABLET, FILM COATED ORAL at 21:36

## 2018-01-01 RX ADMIN — Medication 1500 MG: at 19:10

## 2018-01-01 RX ADMIN — SODIUM CHLORIDE: 4.5 INJECTION, SOLUTION INTRAVENOUS at 00:03

## 2018-01-01 RX ADMIN — CARBOXYMETHYLCELLULOSE SODIUM 1 DROP: 5 SOLUTION/ DROPS OPHTHALMIC at 17:17

## 2018-01-01 RX ADMIN — Medication 1500 MG: at 17:38

## 2018-01-01 RX ADMIN — CARBOXYMETHYLCELLULOSE SODIUM 1 DROP: 5 SOLUTION/ DROPS OPHTHALMIC at 21:51

## 2018-01-01 RX ADMIN — FLUTICASONE FUROATE AND VILANTEROL TRIFENATATE 1 PUFF: 200; 25 POWDER RESPIRATORY (INHALATION) at 09:31

## 2018-01-01 RX ADMIN — ISOSORBIDE MONONITRATE 60 MG: 30 TABLET, EXTENDED RELEASE ORAL at 09:03

## 2018-01-01 RX ADMIN — ISOSORBIDE MONONITRATE 60 MG: 30 TABLET, EXTENDED RELEASE ORAL at 09:01

## 2018-01-01 RX ADMIN — Medication 1500 MG: at 09:01

## 2018-01-01 RX ADMIN — SODIUM CHLORIDE 8 MG/HR: 9 INJECTION, SOLUTION INTRAVENOUS at 00:14

## 2018-01-01 RX ADMIN — DULOXETINE 20 MG: 20 CAPSULE, DELAYED RELEASE ORAL at 08:57

## 2018-01-01 RX ADMIN — FENTANYL 1 PATCH: 12 PATCH TRANSDERMAL at 08:37

## 2018-01-01 RX ADMIN — ACETAMINOPHEN 650 MG: 325 TABLET, FILM COATED ORAL at 21:55

## 2018-01-01 RX ADMIN — PHYTONADIONE 2 MG: 10 INJECTION, EMULSION INTRAMUSCULAR; INTRAVENOUS; SUBCUTANEOUS at 13:11

## 2018-01-01 RX ADMIN — METOPROLOL SUCCINATE 50 MG: 50 TABLET, EXTENDED RELEASE ORAL at 09:31

## 2018-01-01 RX ADMIN — UMECLIDINIUM 1 PUFF: 62.5 AEROSOL, POWDER ORAL at 09:00

## 2018-01-01 RX ADMIN — TIMOLOL MALEATE 1 DROP: 2.5 SOLUTION/ DROPS OPHTHALMIC at 08:38

## 2018-01-01 RX ADMIN — Medication 0.5 MG: at 19:08

## 2018-01-01 RX ADMIN — SODIUM CHLORIDE, PRESERVATIVE FREE 5 ML: 5 INJECTION INTRAVENOUS at 14:59

## 2018-01-01 RX ADMIN — SODIUM CHLORIDE, POTASSIUM CHLORIDE, SODIUM LACTATE AND CALCIUM CHLORIDE 1000 ML: 600; 310; 30; 20 INJECTION, SOLUTION INTRAVENOUS at 10:30

## 2018-01-01 RX ADMIN — SUCRALFATE 1 G: 1 SUSPENSION ORAL at 16:48

## 2018-01-01 RX ADMIN — SODIUM CHLORIDE, PRESERVATIVE FREE 5 ML: 5 INJECTION INTRAVENOUS at 18:37

## 2018-01-01 RX ADMIN — SODIUM CHLORIDE 8 MG/HR: 9 INJECTION, SOLUTION INTRAVENOUS at 12:31

## 2018-01-01 RX ADMIN — DULOXETINE 20 MG: 20 CAPSULE, DELAYED RELEASE ORAL at 12:21

## 2018-01-01 RX ADMIN — Medication 1250 MG: at 17:39

## 2018-01-01 RX ADMIN — UMECLIDINIUM 1 PUFF: 62.5 AEROSOL, POWDER ORAL at 08:58

## 2018-01-01 RX ADMIN — CARBOXYMETHYLCELLULOSE SODIUM 1 DROP: 5 SOLUTION/ DROPS OPHTHALMIC at 08:56

## 2018-01-01 RX ADMIN — SENNOSIDES AND DOCUSATE SODIUM 2 TABLET: 8.6; 5 TABLET ORAL at 22:42

## 2018-01-01 RX ADMIN — IOPAMIDOL 126 ML: 755 INJECTION, SOLUTION INTRAVENOUS at 11:36

## 2018-01-01 RX ADMIN — CARBOXYMETHYLCELLULOSE SODIUM 1 DROP: 5 SOLUTION/ DROPS OPHTHALMIC at 17:20

## 2018-01-01 RX ADMIN — PHYTONADIONE 5 MG: 5 TABLET ORAL at 12:21

## 2018-01-01 RX ADMIN — FLUTICASONE FUROATE AND VILANTEROL TRIFENATATE 1 PUFF: 200; 25 POWDER RESPIRATORY (INHALATION) at 09:52

## 2018-01-01 RX ADMIN — INSULIN ASPART 1 UNITS: 100 INJECTION, SOLUTION INTRAVENOUS; SUBCUTANEOUS at 18:36

## 2018-01-01 RX ADMIN — IPRATROPIUM BROMIDE AND ALBUTEROL SULFATE 3 ML: .5; 3 SOLUTION RESPIRATORY (INHALATION) at 05:50

## 2018-01-01 RX ADMIN — CARBOXYMETHYLCELLULOSE SODIUM 1 DROP: 5 SOLUTION/ DROPS OPHTHALMIC at 09:51

## 2018-01-01 RX ADMIN — PIPERACILLIN SODIUM AND TAZOBACTAM SODIUM 4.5 G: 36; 4.5 INJECTION, POWDER, FOR SOLUTION INTRAVENOUS at 12:55

## 2018-01-01 RX ADMIN — Medication 1250 MG: at 18:29

## 2018-01-01 RX ADMIN — FUROSEMIDE 20 MG: 20 TABLET ORAL at 07:57

## 2018-01-01 RX ADMIN — WATER 1 G: 1 INJECTION INTRAMUSCULAR; INTRAVENOUS; SUBCUTANEOUS at 10:15

## 2018-01-01 RX ADMIN — SENNOSIDES AND DOCUSATE SODIUM 2 TABLET: 8.6; 5 TABLET ORAL at 21:10

## 2018-01-01 RX ADMIN — Medication 1500 MG: at 07:58

## 2018-01-01 RX ADMIN — ATORVASTATIN CALCIUM 10 MG: 10 TABLET, FILM COATED ORAL at 23:36

## 2018-01-01 RX ADMIN — PANTOPRAZOLE SODIUM 40 MG: 40 INJECTION, POWDER, FOR SOLUTION INTRAVENOUS at 09:51

## 2018-01-01 RX ADMIN — SUCRALFATE 1 G: 1 SUSPENSION ORAL at 19:43

## 2018-01-01 RX ADMIN — Medication 1250 MG: at 09:43

## 2018-01-01 RX ADMIN — TIMOLOL MALEATE 1 DROP: 2.5 SOLUTION/ DROPS OPHTHALMIC at 09:00

## 2018-01-01 RX ADMIN — LIDOCAINE HYDROCHLORIDE 1 ML: 20 INJECTION, SOLUTION INFILTRATION; PERINEURAL at 18:19

## 2018-01-01 RX ADMIN — TIMOLOL MALEATE 1 DROP: 2.5 SOLUTION/ DROPS OPHTHALMIC at 20:05

## 2018-01-01 RX ADMIN — ACETAMINOPHEN 1000 MG: 500 TABLET, FILM COATED ORAL at 13:17

## 2018-01-01 RX ADMIN — METOPROLOL SUCCINATE 50 MG: 50 TABLET, EXTENDED RELEASE ORAL at 08:14

## 2018-01-01 RX ADMIN — FERROUS SULFATE 325 MG: 325 TABLET, FILM COATED ORAL at 22:40

## 2018-01-01 RX ADMIN — OXYCODONE HYDROCHLORIDE 5 MG: 5 TABLET ORAL at 17:17

## 2018-01-01 RX ADMIN — FUROSEMIDE 20 MG: 20 TABLET ORAL at 19:10

## 2018-01-01 RX ADMIN — Medication 0.5 MG: at 02:45

## 2018-01-01 RX ADMIN — ATORVASTATIN CALCIUM 10 MG: 10 TABLET, FILM COATED ORAL at 21:50

## 2018-01-01 RX ADMIN — FOLIC ACID 1 MG: 1 TABLET ORAL at 08:14

## 2018-01-01 RX ADMIN — FOLIC ACID 1 MG: 1 TABLET ORAL at 09:50

## 2018-01-01 RX ADMIN — DULOXETINE 20 MG: 20 CAPSULE, DELAYED RELEASE ORAL at 09:03

## 2018-01-01 RX ADMIN — FERROUS SULFATE 325 MG: 325 TABLET, FILM COATED ORAL at 20:50

## 2018-01-01 RX ADMIN — WATER 1 G: 1 INJECTION INTRAMUSCULAR; INTRAVENOUS; SUBCUTANEOUS at 12:18

## 2018-01-01 RX ADMIN — CARBOXYMETHYLCELLULOSE SODIUM 1 DROP: 5 SOLUTION/ DROPS OPHTHALMIC at 17:39

## 2018-01-01 RX ADMIN — METOPROLOL SUCCINATE 50 MG: 50 TABLET, EXTENDED RELEASE ORAL at 08:57

## 2018-01-01 RX ADMIN — CARBOXYMETHYLCELLULOSE SODIUM 1 DROP: 5 SOLUTION/ DROPS OPHTHALMIC at 12:54

## 2018-01-01 RX ADMIN — SUCRALFATE 1 G: 1 SUSPENSION ORAL at 21:24

## 2018-01-01 RX ADMIN — DULOXETINE 20 MG: 20 CAPSULE, DELAYED RELEASE ORAL at 09:54

## 2018-01-01 RX ADMIN — PIPERACILLIN SODIUM AND TAZOBACTAM SODIUM 4.5 G: 36; 4.5 INJECTION, POWDER, FOR SOLUTION INTRAVENOUS at 23:22

## 2018-01-01 RX ADMIN — METOPROLOL SUCCINATE 50 MG: 50 TABLET, EXTENDED RELEASE ORAL at 07:57

## 2018-01-01 RX ADMIN — FUROSEMIDE 20 MG: 20 TABLET ORAL at 09:30

## 2018-01-01 RX ADMIN — Medication 1500 MG: at 08:57

## 2018-01-01 RX ADMIN — BIMATOPROST 1 DROP: 0.1 SOLUTION/ DROPS OPHTHALMIC at 22:40

## 2018-01-01 RX ADMIN — SUCRALFATE 1 G: 1 SUSPENSION ORAL at 08:56

## 2018-01-01 RX ADMIN — FERROUS SULFATE 325 MG: 325 TABLET, FILM COATED ORAL at 09:56

## 2018-01-01 RX ADMIN — TAMSULOSIN HYDROCHLORIDE 0.4 MG: 0.4 CAPSULE ORAL at 09:55

## 2018-01-01 RX ADMIN — IPRATROPIUM BROMIDE AND ALBUTEROL SULFATE 3 ML: .5; 3 SOLUTION RESPIRATORY (INHALATION) at 05:24

## 2018-01-01 RX ADMIN — TAMSULOSIN HYDROCHLORIDE 0.4 MG: 0.4 CAPSULE ORAL at 07:57

## 2018-01-01 RX ADMIN — CARBOXYMETHYLCELLULOSE SODIUM 1 DROP: 5 SOLUTION/ DROPS OPHTHALMIC at 09:03

## 2018-01-01 RX ADMIN — ONDANSETRON 4 MG: 2 INJECTION INTRAMUSCULAR; INTRAVENOUS at 08:23

## 2018-01-01 RX ADMIN — PIPERACILLIN SODIUM AND TAZOBACTAM SODIUM 4.5 G: 36; 4.5 INJECTION, POWDER, FOR SOLUTION INTRAVENOUS at 17:59

## 2018-01-01 RX ADMIN — IPRATROPIUM BROMIDE AND ALBUTEROL SULFATE 3 ML: .5; 3 SOLUTION RESPIRATORY (INHALATION) at 02:42

## 2018-01-01 RX ADMIN — UMECLIDINIUM 1 PUFF: 62.5 AEROSOL, POWDER ORAL at 09:52

## 2018-01-01 RX ADMIN — ISOSORBIDE MONONITRATE 60 MG: 30 TABLET, EXTENDED RELEASE ORAL at 08:57

## 2018-01-01 RX ADMIN — FERROUS SULFATE 325 MG: 325 TABLET, FILM COATED ORAL at 21:55

## 2018-01-01 RX ADMIN — METHOCARBAMOL 500 MG: 500 TABLET ORAL at 09:10

## 2018-01-01 RX ADMIN — SODIUM CHLORIDE, PRESERVATIVE FREE: 5 INJECTION INTRAVENOUS at 08:07

## 2018-01-01 RX ADMIN — TIMOLOL MALEATE 1 DROP: 2.5 SOLUTION/ DROPS OPHTHALMIC at 08:11

## 2018-01-01 RX ADMIN — UMECLIDINIUM 1 PUFF: 62.5 AEROSOL, POWDER ORAL at 09:32

## 2018-01-01 RX ADMIN — FLUTICASONE FUROATE AND VILANTEROL TRIFENATATE 1 PUFF: 200; 25 POWDER RESPIRATORY (INHALATION) at 09:54

## 2018-01-01 RX ADMIN — BIMATOPROST 1 DROP: 0.1 SOLUTION/ DROPS OPHTHALMIC at 21:24

## 2018-01-01 RX ADMIN — SUCRALFATE 1 G: 1 SUSPENSION ORAL at 13:23

## 2018-01-01 RX ADMIN — ACETAMINOPHEN 1000 MG: 500 TABLET, FILM COATED ORAL at 13:47

## 2018-01-01 RX ADMIN — POLYETHYLENE GLYCOL 3350 17 G: 17 POWDER, FOR SOLUTION ORAL at 09:02

## 2018-01-01 RX ADMIN — CARBOXYMETHYLCELLULOSE SODIUM 1 DROP: 5 SOLUTION/ DROPS OPHTHALMIC at 13:01

## 2018-01-01 RX ADMIN — OXYCODONE HYDROCHLORIDE 5 MG: 5 TABLET ORAL at 03:36

## 2018-01-01 RX ADMIN — Medication 1250 MG: at 08:39

## 2018-01-01 RX ADMIN — FUROSEMIDE 20 MG: 20 TABLET ORAL at 20:04

## 2018-01-01 RX ADMIN — CARBOXYMETHYLCELLULOSE SODIUM 1 DROP: 5 SOLUTION/ DROPS OPHTHALMIC at 09:56

## 2018-01-01 RX ADMIN — Medication 1250 MG: at 09:50

## 2018-01-01 RX ADMIN — CARBOXYMETHYLCELLULOSE SODIUM 1 DROP: 5 SOLUTION/ DROPS OPHTHALMIC at 08:00

## 2018-01-01 RX ADMIN — OXYCODONE HYDROCHLORIDE 5 MG: 5 TABLET ORAL at 09:10

## 2018-01-01 RX ADMIN — DULOXETINE 20 MG: 20 CAPSULE, DELAYED RELEASE ORAL at 09:55

## 2018-01-01 RX ADMIN — ACETAMINOPHEN 1000 MG: 500 TABLET, FILM COATED ORAL at 14:44

## 2018-01-01 RX ADMIN — METOPROLOL SUCCINATE 50 MG: 50 TABLET, EXTENDED RELEASE ORAL at 08:39

## 2018-01-01 RX ADMIN — ACETAMINOPHEN 1000 MG: 500 TABLET, FILM COATED ORAL at 09:01

## 2018-01-01 RX ADMIN — ACETAMINOPHEN 1000 MG: 500 TABLET, FILM COATED ORAL at 09:30

## 2018-01-01 RX ADMIN — INSULIN ASPART 1 UNITS: 100 INJECTION, SOLUTION INTRAVENOUS; SUBCUTANEOUS at 14:26

## 2018-01-01 RX ADMIN — SUCRALFATE 1 G: 1 SUSPENSION ORAL at 08:11

## 2018-01-01 RX ADMIN — FENTANYL 1 PATCH: 12 PATCH TRANSDERMAL at 20:03

## 2018-01-01 RX ADMIN — CARBOXYMETHYLCELLULOSE SODIUM 1 DROP: 5 SOLUTION/ DROPS OPHTHALMIC at 08:11

## 2018-01-01 RX ADMIN — PANTOPRAZOLE SODIUM 40 MG: 40 TABLET, DELAYED RELEASE ORAL at 08:57

## 2018-01-01 RX ADMIN — DULOXETINE 20 MG: 20 CAPSULE, DELAYED RELEASE ORAL at 09:51

## 2018-01-01 RX ADMIN — SODIUM CHLORIDE, POTASSIUM CHLORIDE, SODIUM LACTATE AND CALCIUM CHLORIDE 1000 ML: 600; 310; 30; 20 INJECTION, SOLUTION INTRAVENOUS at 08:26

## 2018-01-01 RX ADMIN — FUROSEMIDE 20 MG: 20 TABLET ORAL at 08:14

## 2018-01-01 RX ADMIN — FERROUS SULFATE 325 MG: 325 TABLET, FILM COATED ORAL at 08:39

## 2018-01-01 RX ADMIN — METOPROLOL SUCCINATE 50 MG: 50 TABLET, EXTENDED RELEASE ORAL at 09:01

## 2018-01-01 RX ADMIN — PANTOPRAZOLE SODIUM 40 MG: 40 INJECTION, POWDER, FOR SOLUTION INTRAVENOUS at 08:31

## 2018-01-01 RX ADMIN — FUROSEMIDE 20 MG: 20 TABLET ORAL at 09:50

## 2018-01-01 RX ADMIN — SUCRALFATE 1 G: 1 SUSPENSION ORAL at 22:40

## 2018-01-01 RX ADMIN — ACETAMINOPHEN 650 MG: 325 TABLET, FILM COATED ORAL at 15:50

## 2018-01-01 RX ADMIN — FLUTICASONE FUROATE AND VILANTEROL TRIFENATATE 1 PUFF: 200; 25 POWDER RESPIRATORY (INHALATION) at 08:37

## 2018-01-01 RX ADMIN — ACETAMINOPHEN 1000 MG: 500 TABLET, FILM COATED ORAL at 20:04

## 2018-01-01 RX ADMIN — SUCRALFATE 1 G: 1 SUSPENSION ORAL at 13:08

## 2018-01-01 RX ADMIN — PANTOPRAZOLE SODIUM 40 MG: 40 TABLET, DELAYED RELEASE ORAL at 16:15

## 2018-01-01 RX ADMIN — CARBOXYMETHYLCELLULOSE SODIUM 1 DROP: 5 SOLUTION/ DROPS OPHTHALMIC at 16:48

## 2018-01-01 RX ADMIN — SUCRALFATE 1 G: 1 SUSPENSION ORAL at 08:57

## 2018-01-01 RX ADMIN — Medication 1500 MG: at 09:30

## 2018-01-01 RX ADMIN — SUCRALFATE 1 G: 1 SUSPENSION ORAL at 12:53

## 2018-01-01 RX ADMIN — FUROSEMIDE 20 MG: 20 TABLET ORAL at 09:55

## 2018-01-01 RX ADMIN — ALBUTEROL SULFATE 2.5 MG: 2.5 SOLUTION RESPIRATORY (INHALATION) at 20:25

## 2018-01-01 RX ADMIN — FOLIC ACID 1 MG: 1 TABLET ORAL at 09:03

## 2018-01-01 RX ADMIN — SUCRALFATE 1 G: 1 SUSPENSION ORAL at 17:17

## 2018-01-01 RX ADMIN — ACETAMINOPHEN 1000 MG: 500 TABLET, FILM COATED ORAL at 07:57

## 2018-01-01 RX ADMIN — HYDROMORPHONE HYDROCHLORIDE 0.5 MG: 1 INJECTION, SOLUTION INTRAMUSCULAR; INTRAVENOUS; SUBCUTANEOUS at 12:28

## 2018-01-01 RX ADMIN — SODIUM CHLORIDE 250 ML: 9 INJECTION, SOLUTION INTRAVENOUS at 05:55

## 2018-01-01 RX ADMIN — FERROUS SULFATE 325 MG: 325 TABLET, FILM COATED ORAL at 08:13

## 2018-01-01 RX ADMIN — ACETAMINOPHEN 650 MG: 325 TABLET, FILM COATED ORAL at 10:56

## 2018-01-01 RX ADMIN — CARBOXYMETHYLCELLULOSE SODIUM 1 DROP: 5 SOLUTION/ DROPS OPHTHALMIC at 12:53

## 2018-01-01 RX ADMIN — ACETAMINOPHEN 650 MG: 325 TABLET, FILM COATED ORAL at 22:43

## 2018-01-01 RX ADMIN — ATORVASTATIN CALCIUM 10 MG: 10 TABLET, FILM COATED ORAL at 23:21

## 2018-01-01 RX ADMIN — PANTOPRAZOLE SODIUM 40 MG: 40 TABLET, DELAYED RELEASE ORAL at 09:03

## 2018-01-01 RX ADMIN — DULOXETINE 20 MG: 20 CAPSULE, DELAYED RELEASE ORAL at 07:58

## 2018-01-01 RX ADMIN — ACETAMINOPHEN 1000 MG: 500 TABLET, FILM COATED ORAL at 19:52

## 2018-01-01 RX ADMIN — SODIUM CHLORIDE, POTASSIUM CHLORIDE, SODIUM LACTATE AND CALCIUM CHLORIDE 500 ML: 600; 310; 30; 20 INJECTION, SOLUTION INTRAVENOUS at 13:44

## 2018-01-01 RX ADMIN — ISOSORBIDE MONONITRATE 60 MG: 60 TABLET, EXTENDED RELEASE ORAL at 09:55

## 2018-01-01 RX ADMIN — Medication 1250 MG: at 19:57

## 2018-01-01 RX ADMIN — FOLIC ACID 1 MG: 1 TABLET ORAL at 09:01

## 2018-01-01 RX ADMIN — ISOSORBIDE MONONITRATE 60 MG: 60 TABLET, EXTENDED RELEASE ORAL at 08:39

## 2018-01-01 RX ADMIN — FUROSEMIDE 20 MG: 20 TABLET ORAL at 08:57

## 2018-01-01 RX ADMIN — TAMSULOSIN HYDROCHLORIDE 0.4 MG: 0.4 CAPSULE ORAL at 08:53

## 2018-01-01 RX ADMIN — SUCRALFATE 1 G: 1 SUSPENSION ORAL at 13:01

## 2018-01-01 RX ADMIN — FOLIC ACID 1 MG: 1 TABLET ORAL at 08:57

## 2018-01-01 RX ADMIN — MEROPENEM 1 G: 1 INJECTION, POWDER, FOR SOLUTION INTRAVENOUS at 21:36

## 2018-01-01 RX ADMIN — FERROUS SULFATE 325 MG: 325 TABLET, FILM COATED ORAL at 08:53

## 2018-01-01 RX ADMIN — ALBUTEROL SULFATE 2.5 MG: 2.5 SOLUTION RESPIRATORY (INHALATION) at 04:53

## 2018-01-01 RX ADMIN — TAMSULOSIN HYDROCHLORIDE 0.4 MG: 0.4 CAPSULE ORAL at 09:03

## 2018-01-01 RX ADMIN — TAMSULOSIN HYDROCHLORIDE 0.4 MG: 0.4 CAPSULE ORAL at 09:01

## 2018-01-01 RX ADMIN — FERROUS SULFATE 325 MG: 325 TABLET, FILM COATED ORAL at 19:42

## 2018-01-01 RX ADMIN — TIMOLOL MALEATE 1 DROP: 2.5 SOLUTION/ DROPS OPHTHALMIC at 09:09

## 2018-01-01 RX ADMIN — INSULIN ASPART 1 UNITS: 100 INJECTION, SOLUTION INTRAVENOUS; SUBCUTANEOUS at 09:55

## 2018-01-01 RX ADMIN — FLUTICASONE FUROATE AND VILANTEROL TRIFENATATE 1 PUFF: 200; 25 POWDER RESPIRATORY (INHALATION) at 08:58

## 2018-01-01 RX ADMIN — SODIUM CHLORIDE, POTASSIUM CHLORIDE, SODIUM LACTATE AND CALCIUM CHLORIDE 500 ML: 600; 310; 30; 20 INJECTION, SOLUTION INTRAVENOUS at 16:29

## 2018-01-01 RX ADMIN — ATORVASTATIN CALCIUM 10 MG: 10 TABLET, FILM COATED ORAL at 22:42

## 2018-01-01 RX ADMIN — BIMATOPROST 1 DROP: 0.1 SOLUTION/ DROPS OPHTHALMIC at 22:12

## 2018-01-01 RX ADMIN — OXYCODONE HYDROCHLORIDE 5 MG: 5 TABLET ORAL at 05:18

## 2018-01-01 RX ADMIN — ISOSORBIDE MONONITRATE 60 MG: 60 TABLET, EXTENDED RELEASE ORAL at 08:51

## 2018-01-01 RX ADMIN — ACETAMINOPHEN 1000 MG: 500 TABLET, FILM COATED ORAL at 19:09

## 2018-01-01 RX ADMIN — CEFTRIAXONE SODIUM 1 G: 10 INJECTION, POWDER, FOR SOLUTION INTRAVENOUS at 17:35

## 2018-01-01 RX ADMIN — SODIUM CHLORIDE, POTASSIUM CHLORIDE, SODIUM LACTATE AND CALCIUM CHLORIDE: 600; 310; 30; 20 INJECTION, SOLUTION INTRAVENOUS at 05:38

## 2018-01-01 RX ADMIN — FUROSEMIDE 20 MG: 20 TABLET ORAL at 09:01

## 2018-01-01 RX ADMIN — MEROPENEM 1 G: 1 INJECTION, POWDER, FOR SOLUTION INTRAVENOUS at 05:30

## 2018-01-01 RX ADMIN — SODIUM CHLORIDE: 4.5 INJECTION, SOLUTION INTRAVENOUS at 12:15

## 2018-01-01 RX ADMIN — POLYETHYLENE GLYCOL 3350 17 G: 17 POWDER, FOR SOLUTION ORAL at 09:57

## 2018-01-01 RX ADMIN — DULOXETINE 20 MG: 20 CAPSULE, DELAYED RELEASE ORAL at 09:31

## 2018-01-01 RX ADMIN — CARBOXYMETHYLCELLULOSE SODIUM 1 DROP: 5 SOLUTION/ DROPS OPHTHALMIC at 19:43

## 2018-01-01 RX ADMIN — FERROUS SULFATE 325 MG: 325 TABLET, FILM COATED ORAL at 19:57

## 2018-01-01 RX ADMIN — FLUTICASONE FUROATE AND VILANTEROL TRIFENATATE 1 PUFF: 200; 25 POWDER RESPIRATORY (INHALATION) at 09:06

## 2018-01-01 RX ADMIN — SUCRALFATE 1 G: 1 SUSPENSION ORAL at 17:39

## 2018-01-01 RX ADMIN — OXYCODONE HYDROCHLORIDE 5 MG: 5 TABLET ORAL at 13:09

## 2018-01-01 RX ADMIN — SODIUM CHLORIDE, POTASSIUM CHLORIDE, SODIUM LACTATE AND CALCIUM CHLORIDE 500 ML: 600; 310; 30; 20 INJECTION, SOLUTION INTRAVENOUS at 22:41

## 2018-01-01 RX ADMIN — PIPERACILLIN SODIUM AND TAZOBACTAM SODIUM 4.5 G: 36; 4.5 INJECTION, POWDER, FOR SOLUTION INTRAVENOUS at 05:34

## 2018-01-01 RX ADMIN — TAMSULOSIN HYDROCHLORIDE 0.4 MG: 0.4 CAPSULE ORAL at 08:11

## 2018-01-01 RX ADMIN — UMECLIDINIUM 1 PUFF: 62.5 AEROSOL, POWDER ORAL at 09:03

## 2018-01-01 RX ADMIN — ISOSORBIDE MONONITRATE 60 MG: 30 TABLET, EXTENDED RELEASE ORAL at 09:30

## 2018-01-01 RX ADMIN — FUROSEMIDE 20 MG: 20 TABLET ORAL at 09:02

## 2018-01-01 RX ADMIN — PANTOPRAZOLE SODIUM 40 MG: 40 TABLET, DELAYED RELEASE ORAL at 17:19

## 2018-01-01 ASSESSMENT — PAIN DESCRIPTION - DESCRIPTORS
DESCRIPTORS: CRAMPING
DESCRIPTORS: ACHING
DESCRIPTORS: CRAMPING
DESCRIPTORS: ACHING
DESCRIPTORS: ACHING;CRAMPING
DESCRIPTORS: ACHING
DESCRIPTORS: CRAMPING
DESCRIPTORS: CRAMPING

## 2018-01-01 ASSESSMENT — ENCOUNTER SYMPTOMS
DIARRHEA: 1
ABDOMINAL PAIN: 0
BLOOD IN STOOL: 1
VOMITING: 1
BLOOD IN STOOL: 1
HEADACHES: 1
SHORTNESS OF BREATH: 0
VOMITING: 0
NAUSEA: 1
FEVER: 0
ABDOMINAL PAIN: 1

## 2018-01-01 ASSESSMENT — ACTIVITIES OF DAILY LIVING (ADL)
RETIRED_COMMUNICATION: 0-->UNDERSTANDS/COMMUNICATES WITHOUT DIFFICULTY
AMBULATION: 3-->ASSISTIVE EQUIPMENT AND PERSON
TOILETING: 3-->ASSISTIVE EQUIPMENT AND PERSON
FALL_HISTORY_WITHIN_LAST_SIX_MONTHS: YES
NUMBER_OF_TIMES_PATIENT_HAS_FALLEN_WITHIN_LAST_SIX_MONTHS: 1
ADLS_ACUITY_SCORE: 23
DRESS: 2-->ASSISTIVE PERSON
ADLS_ACUITY_SCORE: 23
SWALLOWING: 0-->SWALLOWS FOODS/LIQUIDS WITHOUT DIFFICULTY
RETIRED_EATING: 0-->INDEPENDENT
TRANSFERRING: 3-->ASSISTIVE EQUIPMENT AND PERSON
BATHING: 2-->ASSISTIVE PERSON
ADLS_ACUITY_SCORE: 25
COGNITION: 1 - ATTENTION OR MEMORY DEFICITS

## 2018-01-01 ASSESSMENT — PAIN SCALES - GENERAL: PAINLEVEL: NO PAIN (0)

## 2018-01-03 NOTE — PROGRESS NOTES
[facility]  PRIMARY CARE PROVIDER AND CLINIC:  Ekaterina Lozano Magi [address] / [place] [address]  Chief Complaint   Patient presents with     [facility]     Hospital F/U     HPI:    Tomas Grey is a 72 year old  (1945), who is being seen today for a federally mandated E/M visit and being re-admitted to [facility] from Hospital  [facility]. Hospital stay 12/29/17 through 12/31/17. Re-admitted to this facility for  rehab, medical management and nursing care.  HPI information obtained from: facility chart records, facility staff, patient report and [facility] chart review. Hospital course per review of discharge summary:    This is a 72-year-old male, with a past medical history significant for severe COPD on supplemental Oxygen, obstructive sleep apnea, prostate cancer metastatic to bone, transfusion dependent anemia, type 2 diabetes mellitus, chronic kidney disease stage III, chronic diastolic heart failure with an EF 55-60% on 4/28/15, coronary artery disease, hypertension, paroxsymal atrial fibrillation on anticoagulation and lymphedema, who was admitted to [facility] with chest pain, shortness of breath and increased sputum production. Of note, hospitalized at [facility] 12/13/17 through 12/14/17 for anemia requiring a blood transfusion and a COPD exacerbation. During this hospitalization, Prednisone and Nebulizers were initiated with improvement in breathing. Methocarbamol and Oxycodone were initiated for chronic neck pain. Discharged back to long term care in stable condition.    Current issues are:        Is slumped forward in his electric wheelchair with his head resting on his controls. Complains of pain all over. Would like a massage for his neck. Does not think new pain medications are making a difference.    CODE STATUS/ADVANCE DIRECTIVES DISCUSSION:   CPR/Full code   Patient's living condition:  lives in a residential care facility    ALLERGIES:Morphine  PAST MEDICAL HISTORY:  has a past medical history of Anemia; Anxiety; Aspiration pneumonia (H) (2014); C. difficile colitis; CAD (coronary artery disease); Chronic pain; CKD (chronic kidney disease); COPD (chronic obstructive pulmonary disease) (H); Depressive disorder; Diabetes mellitus (H); Hypertension; Insomnia; ALEXIS (obstructive sleep apnea); Osteoporosis; and Prostate cancer metastatic to multiple sites (H).  PAST SURGICAL HISTORY:  has a past surgical history that includes Abdomen surgery; Arthroscopy knee; and Eye surgery.  FAMILY HISTORY: family history includes CANCER in his mother; DIABETES in his mother.  SOCIAL HISTORY:  reports that he has quit smoking. He has never used smokeless tobacco. He reports that he does not drink alcohol or use illicit drugs.    Post Discharge Medication Reconciliation Status: discharge medications reconciled and changed, per note/orders (see AVS).  Current Outpatient Prescriptions   Medication Sig Dispense Refill     METHOCARBAMOL PO Take 500 mg by mouth every 6 hours as needed for muscle spasms       Timolol Hemihydrate (BETIMOL OP) Place 1 drop into both eyes daily        fentaNYL (DURAGESIC) 12 mcg/hr 72 hr patch Place 1 patch onto the skin every 72 hours 5 patch 0     oxyCODONE IR (ROXICODONE) 5 MG tablet Take 1 tablet (5 mg) by mouth every 4 hours as needed 18 tablet 0     ATORVASTATIN CALCIUM PO Take 10 mg by mouth daily       latanoprost (XALATAN) 0.005 % ophthalmic solution Place 1 drop into both eyes At Bedtime       omeprazole (PRILOSEC) 40 MG capsule Take 1 capsule (40 mg) by mouth 2 times daily       ipratropium - albuterol 0.5 mg/2.5 mg/3 mL (DUONEB) 0.5-2.5 (3) MG/3ML neb solution Take 1 vial by nebulization every 4 hours as needed for shortness of breath / dyspnea or wheezing       insulin aspart (NOVOLOG FLEXPEN) 100 UNIT/ML injection Inject Subcutaneous At Bedtime Inject as per sliding scale: if 0 -  199 = 0; 200 - 249 = 1; 250 - 299 = 2; 300 - 349 = 3; 350 - 399 = 4; 400+ = 5       insulin aspart (NOVOLOG FLEXPEN) 100 UNIT/ML injection Inject Subcutaneous 3 times daily (with meals) 140 - 189 = 1; 190 - 239 = 2; 240 - 289 = 3; 290 - 339 = 4; 340 - 399 = 5; 400 - 449 = 6; 450+ = 7       acetaminophen (TYLENOL) 500 MG tablet Take 2 tablets (1,000 mg) by mouth 3 times daily Not to exceed 3000 mg/24 hours       isosorbide mononitrate (IMDUR) 30 MG 24 hr tablet Take 2 tablets (60 mg) by mouth daily 30 tablet      nitroGLYcerin (NITROSTAT) 0.4 MG sublingual tablet Place 1 tablet (0.4 mg) under the tongue every 5 minutes as needed for chest pain if you are still having symptoms after 3 doses (15 minutes) call 911. 25 tablet      budesonide-formoterol (SYMBICORT) 160-4.5 MCG/ACT Inhaler Inhale 2 puffs into the lungs 2 times daily       tiotropium (SPIRIVA) 18 MCG capsule Inhale 1 capsule (18 mcg) into the lungs daily 30 capsule      DULoxetine (CYMBALTA) 20 MG EC capsule Take 1 capsule (20 mg) by mouth daily 60 capsule      ondansetron 4 MG FILM Take 4 mg by mouth every 8 hours as needed 12 each      metoprolol succinate (TOPROL XL) 50 MG 24 hr tablet Take 1 tablet (50 mg) by mouth daily 5 tablet 0     ammonium lactate (LAC-HYDRIN) 12 % lotion Apply topically 2 times daily as needed for dry skin To all extremities for dry skin       diclofenac (VOLTAREN) 1 % GEL topical gel Apply 4 grams to knees four times daily as needed using enclosed dosing card. 100 g 0     furosemide (LASIX) 20 MG tablet Take 1 tablet (20 mg) by mouth 2 times daily 30 tablet      ferrous sulfate (IRON) 325 (65 FE) MG tablet Take 1 tablet (325 mg) by mouth 2 times daily 100 tablet      folic acid (FOLVITE) 1 MG tablet Take 1 tablet (1 mg) by mouth daily 30 tablet      polyethylene glycol (MIRALAX/GLYCOLAX) Packet Take 17 g by mouth daily , hold for loose stools. 7 packet      senna-docusate (SENOKOT-S;PERICOLACE) 8.6-50 MG per tablet Take 2 tablets  by mouth 2 times daily , hold for loose stools. 100 tablet      calcium carbonate (OS-JORDAN 600 MG [place]. CA) 1500 (600 CA) MG tablet Take 1 tablet (1,500 mg) by mouth 2 times daily (with meals) 60 tablet      tamsulosin (FLOMAX) 0.4 MG capsule Take 1 capsule (0.4 mg) by mouth daily 5 capsule 0     simethicone (MYLICON) 40 MG/0.6ML suspension Take 0.6 mLs (40 mg) by mouth every 6 hours as needed for cramping       bimatoprost (LUMIGAN) 0.01 % SOLN Place 1 drop into both eyes At Bedtime 1 Bottle      Carboxymethylcellulose Sod PF (REFRESH PLUS) 0.5 % SOLN ophthalmic solution Place 1 drop into both eyes 4 times daily And 1 drop both eyes daily prn 1 Bottle      sucralfate (CARAFATE) 1 GM/10ML suspension Take 10 mLs (1 g) by mouth 4 times daily (before meals and nightly) 1200 mL      [DISCONTINUED] enzalutamide (XTANDI) 40 MG capsule Take 4 capsules (160 mg) by mouth daily 120 capsule 0     Case Management:  I have reviewed the care plan and MDS and do agree with the plan. Patient's desire to return to the community is not present.  Information reviewed:  Medications, vital signs, orders, and nursing notes.    ROS:  4 point ROS including Respiratory, CV, GI and , other than that noted in the HPI,  is negative    Exam:  /74  Pulse 104  Temp 98.1  F (36.7  C)  Resp 18  Wt 204 lb 8 oz (92.8 kg)  SpO2 94%  BMI 28.52 kg/m2  GENERAL APPEARANCE: In no distress  ENT:  Mouth and posterior oropharynx normal, moist mucous membranes  EYES:  EOM, conjunctivae, lids, pupils and irises normal  RESP:  No respiratory distress.  ABDOMEN: Active bowel sounds, soft, nontender, no hepatosplenomegaly or other masses  M/S:   Active movement of bilateral upper and lower extremities. Lymphedema wraps on bilateral lower extremities.  SKIN:  Inspection of skin and subcutaneous tissue baseline, Palpation of skin and subcutaneous tissue baseline  NEURO:   Cranial nerves 2-12 are normal tested and grossly at patient's baseline  PSYCH:  " affect and mood normal    Lab/Diagnostic data per review of hospital documentation:  Basic Metab Profile (12/31/2017 7:37 AM)  Only the most recent of 3 results within the time period is included.    Basic Metab Profile (12/31/2017 7:37 AM)   Component Value Ref Range   SODIUM 143 136 - 145 mmol/L   POTASSIUM 5.0 3.5 - 5.1 mmol/L   CHLORIDE 110 98 - 112 mmol/L   CARBON DIOXIDE 25 21 - 32 mmol/L   BUN (UREA NITRO) 34 (H) 7 - 24 mg/dL   CREATININE 1.26 0.70 - 1.30 mg/dL   EST GFR (MDRD) 56 (L) >60 mL/min   EST GFR IF AFRICAN AM >60 >60 mL/min   GLUCOSE 89 74 - 106 mg/dL   CALCIUM, SERUM 8.2 (L) 8.5 - 10.1 mg/dL   ANION GAP 8.0 0.0 - 15.0 mmol/     CBC / Diff (12/30/2017 7:57 AM)  Only the most recent of 2 results within the time period is included.    CBC / Diff (12/30/2017 7:57 AM)   Component Value Ref Range   WBC 9.4 4.3 - 10.8 K/uL   RBC 3.09 (L) 4.60 - 6.20 M/uL   HEMOGLOBIN 8.3 (L) 14.0 - 18.0 gm/dL   HEMATOCRIT 27.8 (L) 40.0 - 54.0 %   MCV 90 80 - 100 fl   MCH 27 27 - 33 pg   MCHC 30 (L) 33 - 36 gm/dL   RDW 18.2 (H) 11.5 - 14.5 %   PLATELET COUNT 231 150 - 400 K/uL   MPV 9.9 6.5 - 12.0   PMN % 88.9 %   IG% 2.0 (H)Comment: Immature granulocytes often indicate \"left shift\" when outside of normal limits. <=1.0 %   LYMPH % 7.3 %   MONO % 1.6 %   EOS % 0.0 %   BASO % 0.2 %   PMN ABSOLUTE 8.31 (H) 1.80 - 7.80 K/uL   IG ABSOLUTE 0.19 K/uL   LYMPH ABSOLUTE 0.68 (L) 1.00 - 4.00 K/uL   MONO ABSOLUTE 0.15 0.00 - 1.00 K/uL   EOS ABSOLUTE 0.00 0.00 - 0.45 K/uL   BASO ABSOLUTE 0.02 0.00 - 0.20 K/uL   NUCL RBC % 0.0 0.0 - 0.0 /100 WBC   NUCL RBC ABSOLUTE 0.00 K/uL     ASSESSMENT/PLAN:  Chronic Obstructive Pulmonary Disease on Chronic Oxygen Supplementation/Obstructive Sleep Apnea with CO2 Retention. Continue Prednisone x 3 days outpatient.Treated for a COPD exacerbation 12/13/17 and 12/29/17. Question if true COPD exacerbatins given resident's chronic complaint of shortness of breath. Chest x-ray negative. Seen by  " Luz Maria in Pulmonology on 10/4/17. Noted to have very severe COPD and chronic hypoxemic respiratory failure. Often refuses BiPAP at night per staff report. Sleep study outpatient recommended. Continue Budesonide-Formoterol, DuoNebs and Tiotropium as ordered.       Transfusion Dependent Anemia. Received blood transfusions 11/1/17, 11/25/17. 12/1/17 and 12/13/17. Oncology recommends blood transfusions for Hemoglobin < 8. Noted by Oncology to be folate and iron deficiency. Chronic disease and bone marrow suppression from metastatic prostate cancer likely contributing. Folic Acid and Ferrous Sulfate initiated by Oncology. Repeat Hemoglobin on 12/28/17 to ensure stability.       Prostate Cancer Metastatic to Bone. Seen by Palliative Care 11/16/17. Comfort care recommended by Dr. Oviedo on 12/4/17 due to compromised performance status, advanced cancer for which he used all reasonable treatments and not being a candidate for cytotoxic chemotherapy. Has had 50 lbs weight loss in 1 year.  Resident and family refused [facility] enrollment. Remains FULL CODE. Given resident's lack of capacity to make decisions,  at facility is working to pursue guardianship, but reportedly is having difficulty finding the numbers and addresses of all resident's family members. Fentanyl Patch and Acetaminophen ordered for pain control. Will discontinue Megesterol at next visit due to no further treatment.       Left-Sided Neck Pain. Likely related to above. Reports cream, ice and heat do not help for long. No neurological symptoms. Sits slumped forward wheelchair throughout the day with no back or neck support. Recommended resident sit in his recliner during the day and not just the wheelchair. Started on Oxycodone and Methocarbamol during most recent hospitalization. PTA Acetaminophen and Fentanyl Patch available for pain control.      Weight Loss. Secondary to above and overall decline. Weight 204.1 lbs on 12/27/17 ->  208.2 lbs on 11/27/17 -> 220 lbs on 8/28/17 -> 239 lbs on 5/25/17 -> 256.2 lbs on 11/25/16. Dietary involved. Refuses Hospice enrollment.       Paroxysmal Atrial Fibrillation on Chronic Anticoagulation. INR 3.1 on 1/4/18. Ordered Warfarin 1 mg and 0.5 mg alternating. Repeat INR on 1/8/18. Also on Metoprolol.       Coronary Artery Disease/Hypertension/Chronic Diastolic Heart Failure with EF 60-65% on 11/24/17. Monitor blood pressure and weights weekly. Continue Aspirin, Isosorbide Mononitrate, Metoprolol, Atorvastatin and Nitroglycerin as ordered.      Urinary Retention in the Setting of Metastatic Lymph Node Dissection with Recurrent UTIs. Follow-up with Dr. Epstein on 1/9/18 as scheduled due to recurrent UTIs. Treated for Providencia Rettgeri UTI 7/1/17 with Rocephin and Bactrim DS. Treated for Pseudomonas and Klebsiella UTI 7/29/17 with Ciprofloxacin and ESBL E. Coli 8/30/17 with Ertapenem IV. Previously evaluated by Dr. Graf on 3/22/17 for urinary retention. Elevated Creatinine may be due to bladder outlet obstruction. Resistant to surgical options. Instructed how to self-catheterize, but resident resistant. Told Urology his main goal is avoiding pain. Voids as able. Continue Tamsulosin as ordered.       Lymphedema of Both Lower Extremities with Venous Stasis Ulcers. Physical Therapy ordered for lymphedema wraps. Continue Furosemide as ordered.      Chronic Kidney Disease Stage III. Baseline Creatinine mid 1s. Last Creatinine 1.26 on 12/31/17. Monitor periodically.      Type 2 Diabetes Mellitus. Last A1C 6.0 on 11/18/17. Glargine decreased from 16 U BID to 20 U QD on 11/21/17. Continue to monitor Accuchecks prior to meals and at bedtime.       Gastroesophageal Reflux. Continue Omeprazole and Ranitidine as ordered.      Anxiety and Depression. Seen by in-house psychiatrist and started on Duloxetine 12/28/17.      Cognitive Impairment. Mild-to-moderate impairment based on neurocognitive testing ~ 1 year ago. BIMS  Score 11/15 on 11/16/17. Given resident lacks the capacity to make decisions,  working to appoint a guardian for resident given lack of capacity to make decisions as goals are inconsistent with overall health status. Of note has 11 children and none of the children attend outside appointments with resident so a guardian will help ensure there is 1 appropriate decision maker. Resident also has metastatic prostate cancer and severe COPD making a FULL CODE situation quite dismal.       Glaucoma. Followed by [facility]. Continue Bimatoprost and Timolol Hemihydrate as ordered. Also on Carboxymethylcellulose drops.      Electronically signed by:  SAL Acevedo CNP

## 2018-01-08 PROBLEM — K92.2 GI BLEED: Status: ACTIVE | Noted: 2018-01-01

## 2018-01-08 NOTE — ED NOTES
Bed: ED01  Expected date: 1/8/18  Expected time: 9:22 AM  Means of arrival: Ambulance  Comments:  [id number]  71 male  abd pain, HA  Yellow

## 2018-01-08 NOTE — ED NOTES
"Tomas was BIBA today for evaluation of a 4 day history of decreased appetite, abdominal pain, HA, and \"black\" stools.    "

## 2018-01-08 NOTE — IP AVS SNAPSHOT
[address] [address]    Phone:  [phone]    Fax:  [phone]                                       After Visit Summary   1/8/2018    Tomas Grey    MRN: [id number]           After Visit Summary Signature Page     I have received my discharge instructions, and my questions have been answered. I have discussed any challenges I see with this plan with the nurse or doctor.    ..........................................................................................................................................  Patient/Patient Representative Signature      ..........................................................................................................................................  Patient Representative Print Name and Relationship to Patient    ..................................................               ................................................  Date                                            Time    ..........................................................................................................................................  Reviewed by Signature/Title    ...................................................              ..............................................  Date                                                            Time

## 2018-01-08 NOTE — ED PROVIDER NOTES
History     Chief Complaint   Patient presents with     Abdominal Pain     Melena     Headache     HPI  Tomas Grey is a 72 year old male with history of metastatic prostate cancer, anemia, CAD, aspiration pneumonia, CKD, COPD, HTN, and diabetes among others presents to the ED today with abdominal pain, melena, and headache.  Patient states he has not eaten for 4-5 days but has been having black stools.  He states he has not had these symptoms before.  He also has complaints of dizziness and weakness at baseline. He states he also felt nauseated but denies vomiting.   He reports he is currently taking blood thinners, Coumadin.    PAST MEDICAL HISTORY  Past Medical History:   Diagnosis Date     Anemia      Anxiety      Aspiration pneumonia (H) 2014     C. difficile colitis      CAD (coronary artery disease)      Chronic pain      CKD (chronic kidney disease)      COPD (chronic obstructive pulmonary disease) (H)      Depressive disorder      Diabetes mellitus (H)      Hypertension      Insomnia      ALEXIS (obstructive sleep apnea)      Osteoporosis      Prostate cancer metastatic to multiple sites (H)      PAST SURGICAL HISTORY  Past Surgical History:   Procedure Laterality Date     ABDOMEN SURGERY       ARTHROSCOPY KNEE       EYE SURGERY       FAMILY HISTORY  Family History   Problem Relation Age of Onset     DIABETES Mother      CANCER Mother      stomach     SOCIAL HISTORY  Social History   Substance Use Topics     Smoking status: Former Smoker     Smokeless tobacco: Never Used     Alcohol use No     MEDICATIONS  Current Facility-Administered Medications   Medication     promethazine (PHENERGAN) IV injection 25 mg     pantoprazole (PROTONIX) 80 mg in NaCl 0.9 % 100 mL infusion     Medication Instruction     0.9% sodium chloride infusion     ondansetron (ZOFRAN) injection 8 mg    Or     ondansetron (ZOFRAN-ODT) ODT tab 8 mg    Or     ondansetron (ZOFRAN) tablet 8 mg     acetaminophen (TYLENOL) tablet 650 mg  "    acetaminophen (TYLENOL) tablet 1,000 mg     ammonium lactate (LAC-HYDRIN) 12 % lotion     atorvastatin (LIPITOR) tablet 10 mg     bimatoprost (LUMIGAN) 0.01 % ophthalmic drops 1 drop     calcium carbonate (OS-JORDAN 600 mg [place]. Ca) tablet 1,500 mg     diclofenac (VOLTAREN) 1 % topical gel 4 g     DULoxetine (CYMBALTA) EC capsule 20 mg     fentaNYL (DURAGESIC) 12 mcg/hr 72 hr patch 1 patch     furosemide (LASIX) tablet 20 mg     [START ON 1/9/2018] isosorbide mononitrate (IMDUR) 24 hr tablet 60 mg     [START ON 1/9/2018] metoprolol (TOPROL-XL) 24 hr tablet 50 mg     [START ON 1/9/2018] tamsulosin (FLOMAX) capsule 0.4 mg     cefTRIAXone (ROCEPHIN) 1 g in 10 mL SWFI Premix Syringe     glucose 40 % gel 15-30 g    Or     dextrose 50 % injection 25-50 mL    Or     glucagon injection 1 mg     insulin aspart (NovoLOG) inj (RAPID ACTING)     insulin aspart (NovoLOG) inj (RAPID ACTING)     [START ON 1/9/2018] fluticasone-vilanterol (BREO ELLIPTA) 200-25 MCG/INH oral inhaler 1 puff     albuterol neb solution 2.5 mg     [START ON 1/9/2018] umeclidinium (INCRUSE ELLIPTA) 62.5 MCG/INH oral inhaler 1 puff     ALLERGIES  Allergies   Allergen Reactions     Morphine Other (See Comments)     Patient reports makes him almost go into a coma       I have reviewed the Medications, Allergies, Past Medical and Surgical History, and Social History in the Epic system.    Review of Systems   Gastrointestinal: Positive for abdominal pain (lower), blood in stool (melena) and nausea. Negative for vomiting.   Neurological: Positive for headaches.   All other systems reviewed and are negative.      Physical Exam   BP: 134/81  Heart Rate: 110  Temp: 98.9  F (37.2  C)  Resp: 18  Height: 180.3 cm (5' 11\")  Weight: 93 kg (205 lb)  SpO2: 96 %      Physical Exam   Constitutional: He is oriented to person, place, and time. No distress.   HENT:   Head: Atraumatic.   Mouth/Throat: Oropharynx is clear and moist. No oropharyngeal exudate.   Eyes: Pupils are " equal, round, and reactive to light. No scleral icterus.   Neck: Neck supple. No JVD present.   Cardiovascular: Regular rhythm, normal heart sounds and intact distal pulses.  Tachycardia present.  Exam reveals no gallop.    No murmur heard.  Pulmonary/Chest: Effort normal and breath sounds normal. No respiratory distress. He has no wheezes. He has no rales. He exhibits no tenderness.   Abdominal: Soft. Bowel sounds are normal. He exhibits no distension and no mass. There is no tenderness. There is no rebound and no guarding.   Genitourinary: Rectal exam shows guaiac positive stool.   Musculoskeletal: He exhibits no edema or tenderness.   Neurological: He is alert and oriented to person, place, and time. No cranial nerve deficit. Coordination normal.   Skin: Skin is warm. No rash noted. He is not diaphoretic.       ED Course     ED Course     Procedures   9:53 AM  The patient was seen and examined by Dr. Peguero in Room 1.                EKG Interpretation:      Interpreted by Dr. Peguero  Time reviewed: 10:05 AM   Symptoms at time of EKG: abdominal pain, melena  Rhythm: normal sinus   Rate: Normal  Axis: Left Axis Deviation  Ectopy: none  Conduction: normal  ST Segments/ T Waves: No ST-T wave changes  Q Waves: none  Comparison to prior: Unchanged from 12/18/17    Clinical Impression: no acute changes        Critical Care time:  was 40 minutes for this patient excluding procedures.  Results for orders placed or performed during the hospital encounter of 01/08/18 (from the past 24 hour(s))   EKG 12-lead, tracing only     Status: None (Preliminary result)    Collection Time: 01/08/18 10:02 AM   Result Value Ref Range    Interpretation ECG Click View Image link to view waveform and result    XR Chest Port 1 View     Status: None    Collection Time: 01/08/18 10:26 AM    Narrative    Exam: XR CHEST PORT 1 VW, 1/8/2018 10:48 AM    Indication: Chest pain    Comparison: 12/18/2017, 12/13/2017, 1/16/2017,  12/26/2016    Findings:   A single AP view of the chest was obtained. The cardiomediastinal  silhouette is not enlarged. No pneumothorax or pleural effusion.  Prominent pulmonary vasculature. Mild streaky bibasilar opacities. Old  right rib fractures.      Impression    Impression:   Prominent pulmonary vasculature with mild streaky bibasilar opacities,  likely atelectasis.    I have personally reviewed the examination and initial interpretation  and I agree with the findings.    BENITO ROWLEY MD   CBC with platelets differential     Status: Abnormal    Collection Time: 01/08/18 10:28 AM   Result Value Ref Range    WBC 12.1 (H) 4.0 - 11.0 10e9/L    RBC Count 1.23 (L) 4.4 - 5.9 10e12/L    Hemoglobin 3.2 (LL) 13.3 - 17.7 g/dL    Hematocrit 11.0 (L) 40.0 - 53.0 %    MCV 89 78 - 100 fl    MCH 26.0 (L) 26.5 - 33.0 pg    MCHC 29.1 (L) 31.5 - 36.5 g/dL    RDW 19.4 (H) 10.0 - 15.0 %    Platelet Count 346 150 - 450 10e9/L    Diff Method Automated Method     % Neutrophils 65.3 %    % Lymphocytes 17.0 %    % Monocytes 11.9 %    % Eosinophils 4.8 %    % Basophils 0.2 %    % Immature Granulocytes 0.8 %    Nucleated RBCs 0 0 /100    Absolute Neutrophil 7.9 1.6 - 8.3 10e9/L    Absolute Lymphocytes 2.1 0.8 - 5.3 10e9/L    Absolute Monocytes 1.4 (H) 0.0 - 1.3 10e9/L    Absolute Eosinophils 0.6 0.0 - 0.7 10e9/L    Absolute Basophils 0.0 0.0 - 0.2 10e9/L    Abs Immature Granulocytes 0.1 0 - 0.4 10e9/L    Absolute Nucleated RBC 0.0    INR     Status: Abnormal    Collection Time: 01/08/18 10:28 AM   Result Value Ref Range    INR 1.90 (H) 0.86 - 1.14   ABO/Rh type and screen     Status: None    Collection Time: 01/08/18 10:28 AM   Result Value Ref Range    Units Ordered 2     ABO O     RH(D) Pos     Antibody Screen Neg     Test Valid Only At          [facility]    Specimen Expires 01/11/2018     Crossmatch Red Blood Cells    Comprehensive metabolic panel     Status: Abnormal    Collection  Time: 01/08/18 10:28 AM   Result Value Ref Range    Sodium 143 133 - 144 mmol/L    Potassium 4.3 3.4 - 5.3 mmol/L    Chloride 110 (H) 94 - 109 mmol/L    Carbon Dioxide 24 20 - 32 mmol/L    Anion Gap 9 3 - 14 mmol/L    Glucose 84 70 - 99 mg/dL    Urea Nitrogen 30 7 - 30 mg/dL    Creatinine 1.38 (H) 0.66 - 1.25 mg/dL    GFR Estimate 51 (L) >60 mL/min/1.7m2    GFR Estimate If Black 61 >60 mL/min/1.7m2    Calcium 7.9 (L) 8.5 - 10.1 mg/dL    Bilirubin Total 0.3 0.2 - 1.3 mg/dL    Albumin 2.3 (L) 3.4 - 5.0 g/dL    Protein Total 7.0 6.8 - 8.8 g/dL    Alkaline Phosphatase 150 40 - 150 U/L    ALT 11 0 - 70 U/L    AST 13 0 - 45 U/L   Lipase     Status: None    Collection Time: 01/08/18 10:28 AM   Result Value Ref Range    Lipase 104 73 - 393 U/L   Lactic acid whole blood     Status: None    Collection Time: 01/08/18 10:28 AM   Result Value Ref Range    Lactic Acid 0.7 0.7 - 2.0 mmol/L   Troponin I     Status: None    Collection Time: 01/08/18 10:28 AM   Result Value Ref Range    Troponin I ES <0.015 0.000 - 0.045 ug/L   Blood component     Status: None    Collection Time: 01/08/18 10:28 AM   Result Value Ref Range    Unit Number [id number]     Blood Component Type Red Blood Cells Leukocyte Reduced     Division Number 00     Status of Unit Released to care unit 01/08/2018 1513     Blood Product Code [id number]     Unit Status ISS    Blood component     Status: None    Collection Time: 01/08/18 10:28 AM   Result Value Ref Range    Unit Number [id number]     Blood Component Type Red Blood Cells Leukocyte Reduced     Division Number 00     Status of Unit Released to care unit 01/08/2018 1243     Blood Product Code I6664H84     Unit Status ISS    UA with Microscopic     Status: Abnormal    Collection Time: 01/08/18 11:06 AM   Result Value Ref Range    Color Urine Yellow     Appearance Urine Cloudy     Glucose Urine Negative NEG^Negative mg/dL    Bilirubin Urine Negative NEG^Negative    Ketones Urine 5 (A) NEG^Negative mg/dL     Specific Gravity Urine 1.017 1.003 - 1.035    Blood Urine Small (A) NEG^Negative    pH Urine 5.5 5.0 - 7.0 pH    Protein Albumin Urine 30 (A) NEG^Negative mg/dL    Urobilinogen mg/dL Normal 0.0 - 2.0 mg/dL    Nitrite Urine Negative NEG^Negative    Leukocyte Esterase Urine Large (A) NEG^Negative    Source Clean catch urine     WBC Urine >182 (H) 0 - 2 /HPF    RBC Urine 68 (H) 0 - 2 /HPF    WBC Clumps Present (A) NEG^Negative /HPF    Bacteria Urine Moderate (A) NEG^Negative /HPF    Transitional Epi 1 0 - 1 /HPF   Urine Culture Aerobic Bacterial     Status: None (Preliminary result)    Collection Time: 01/08/18 11:06 AM   Result Value Ref Range    Specimen Description Catheterized Urine     Special Requests Specimen received in preservative     Culture Micro PENDING    CT Abdomen Pelvis w Contrast     Status: Abnormal    Collection Time: 01/08/18 12:00 PM   Result Value Ref Range    Radiologist flags (Urgent)      Right renal mass with differential includes oncocytoma    Narrative    EXAMINATION: CT ABDOMEN PELVIS W CONTRAST, 1/8/2018 12:00 PM    TECHNIQUE:  Helical CT images from the lung bases through the  symphysis pubis were obtained with contrast    COMPARISON: CT abdomen pelvis on 9/1/2016    HISTORY: LLQ pain r/o diverticulitis;     DLP: 678 mGy*cm    Contrast dose: 126 cc of isovue 370    FINDINGS:  Abdomen and pelvis:   There is a enhancing structure in the lower pole of the right kidney  measuring 2.5 cm on series 5 image 160. On prior nonenhanced studies  this does not demonstrate enhancement and also do not appear cystic,  indicating an enhancing mass.    Cystic lesion in both kidneys including 5.6 x 6.3 cm cyst arising from  the inferior pole of the left kidney (series 2, image 29) and 2.9 x  3.4 cm cyst arising from the inferior pole of the right kidney (series  2, image 29). No hydronephrosis. No hydroureter. The no renal calculi  identified. The distal right ureter dilated up to 2.1 cm with no  definite  stenosis or obstructing stone, similar to CT on 9/9/2016.  Nodularity of one of the adrenal glands measuring 1.8 x 1.8 cm (series  2, image 19), in the prior noncontrasted study on 9/9/2016, it has  Hounsfield units of 25 and measuring approximately 17 x 16 mm.  Gallbladder, biliary ducts, spleen, and pancreas are unremarkable. The  right adrenal gland is unremarkable. There is a 10 x 7 mm Ill-defined  hypoattenuating lesion in the right liver lobe (series 2, image 18),  is too small to characterize by CT.  No free fluid in the abdomen or pelvis. No free air in the abdomen.  Small bowel and colon are normal caliber without appreciable wall  thickening. No evidence for diverticulitis. No lymphadenopathy in the  abdomen or pelvis.  Aneurysmal dilation of infrarenal abdominal aorta up to 3.2 cm at the  superior endplate of L4. Aneurysmal dilatation of bilateral common  iliac arteries, right up to 2.2 cm and left up to 2.2 cm. These are  similar to CT on 9/11/2016.    Bones and soft tissues: Innumerable patchy ill-defined sclerosis in  the vertebral bodies of visualized spine and bony pelvis and femurs  consistent with known metastatic disease. No acute osseous abnormality  identified. Fat-containing umbilical hernia.    Lower chest: No no pleural effusion. No pericardial effusion. The  heart size is within normal limits.      Impression    IMPRESSION:   1. No cause of acute pain is identified in the abdomen or pelvis.  2. 2.5 cm right lower pole solid renal mass. This is been present on  noncontrast CT since at least 7/16/2014 and size has not changed  substantially. Differential includes oncocytoma and renal cell  carcinoma.  3. Redemonstration of multiple patchy ill-defined sclerosis in  vertebral bodies and bony pelvis consistent with known metastatic  disease.  4. Aneurysmal dilation of the aorta and bilateral common iliac  arteries.  5. Nodularity of the left adrenal gland not significantly changed  since  7/16/2014.      [Urgent Result: Right renal mass with differential includes oncocytoma  and renal cell carcinoma]    Finding was identified on 1/8/2018 12:22 PM.     Dr. Peguero was contacted by Dr. Cruz at 1/8/2018 1:47 PM and  verbalized understanding of the urgent finding.     I have personally reviewed the examination and initial interpretation  and I agree with the findings.    NATALIA DAVIS MD   Glucose by meter     Status: None    Collection Time: 01/08/18  5:09 PM   Result Value Ref Range    Glucose 77 70 - 99 mg/dL         Labs Ordered and Resulted from Time of ED Arrival Up to the Time of Departure from the ED   CBC WITH PLATELETS DIFFERENTIAL - Abnormal; Notable for the following:        Result Value    WBC 12.1 (*)     RBC Count 1.23 (*)     Hemoglobin 3.2 (*)     Hematocrit 11.0 (*)     MCH 26.0 (*)     MCHC 29.1 (*)     RDW 19.4 (*)     Absolute Monocytes 1.4 (*)     All other components within normal limits   INR - Abnormal; Notable for the following:     INR 1.90 (*)     All other components within normal limits   COMPREHENSIVE METABOLIC PANEL - Abnormal; Notable for the following:     Chloride 110 (*)     Creatinine 1.38 (*)     GFR Estimate 51 (*)     Calcium 7.9 (*)     Albumin 2.3 (*)     All other components within normal limits   ROUTINE UA WITH MICROSCOPIC - Abnormal; Notable for the following:     Ketones Urine 5 (*)     Blood Urine Small (*)     Protein Albumin Urine 30 (*)     Leukocyte Esterase Urine Large (*)     WBC Urine >182 (*)     RBC Urine 68 (*)     WBC Clumps Present (*)     Bacteria Urine Moderate (*)     All other components within normal limits   LIPASE   LACTIC ACID WHOLE BLOOD   TROPONIN I   CARDIAC CONTINUOUS MONITORING   CARDIAC CONTINUOUS MONITORING   CARDIAC CONTINUOUS MONITORING   VITAL SIGNS   ABO/RH TYPE AND SCREEN   RED BLOOD CELL PREPARE ORDER UNIT   BLOOD COMPONENT       Consults  Oncology: Responded (01/08/18 8027)    Assessments & Plan (with Medical  Decision Making)  GI bleed melena for four days per pt's hx, BP ok but HR slightly higher than his baseline 's tp 100-110's, rectal hem pos, Hgb 3.2 but normal lactic acid, INR 1.9 for paroxysmal a fib -given vit K 2 mg per protocol, D/E Hem and GI, hold off for FFP or kcentra, PPI bolus and gtt started, PRBC 2 u PRBC ordered.  End stage prostate ca with mets, discussion of Hospice Care ?started but still Full code, D/W Hem Onc Fellow-admit to [facility] [place], needs to readdress code status.  COPD home O2 dependent stable.       I have reviewed the nursing notes.    I have reviewed the findings, diagnosis, plan and need for follow up with the patient.    Current Discharge Medication List          Final diagnoses:   Gastrointestinal hemorrhage with melena   Chronic anticoagulation   Chronic respiratory failure with hypoxia (H)   Type 2 diabetes mellitus with stage 3 chronic kidney disease, unspecified long term insulin use status (H)   Prostate cancer metastatic to bone (H)     IJose, am serving as a trained medical scribe to document services personally performed by Juliette Peguero MD, based on the provider's statements to me.      IJuliette MD, was physically present and have reviewed and verified the accuracy of this note documented by Jose Soni.     1/8/2018   [facility], [place], EMERGENCY DEPARTMENT     Juliette Peguero MD  01/08/18 8329

## 2018-01-08 NOTE — IP AVS SNAPSHOT
Tomas Grey #[id number] (CSN: [id number])  (72 year old M)  (Adm: 18)     [id number]               UNIT 5C BMT [facility]: [phone]            Patient Demographics     Patient Name Sex          Age SSN Address Phone    Tomas Grey Male 1945 (72 year old) [id number] [facility]  [address]  [facility] [address] [phone] (Home) *Preferred*  [phone] (Work)  [phone] (Mobile)      Emergency Contact(s)     Name Relation Home Work Mobile    Melissa Mccormack - HCA Relative [phone]    Jeevan Grey Son   [phone]    Les Bourgeois Son [phone]      Admission Information     Attending Provider Admitting Provider Admission Type Admission Date/Time    Elidia Mckeon MD Domingo Musibay, Evidio, MD Emergency 18  0938    Discharge Date Hospital Service Auth/Cert Status Service Area     Hem/Onc Incomplete [facility]    Unit Room/Bed Admission Status       UU U BMT 5418/5418-01 Admission (Confirmed)       Admission     Complaint    GI bleed      Hospital Account     Name Acct ID Class Status Primary Coverage    Tomas Grey [id number] Inpatient Open [facility]            Guarantor Account (for Hospital Account #[id number])     Name Relation to Pt Service Area Active? Acct Type    Tomas Grey  FCS Yes Personal/Family    Address Phone          [facility]  [address]  [place] [address] [phone](H)  [phone](O)              Coverage Information (for Hospital Account #[id number])     F/O Payor/Plan Precert #    UCARE/ARE-SENIORS [facility]     Subscriber Subscriber #    Tomas Grey [id number]    Address Phone    [address]  [place] [id number] [phone]                                                INTERAGENCY TRANSFER FORM - PHYSICIAN ORDERS   2018             "           UNIT 5C BMT [facility]: [phone]            Attending Provider: Elidia Mckeon MD     Allergies:  Morphine    Infection:  ESBL, MRSA   Service:  Hem/Onc    Ht:  1.803 m (5' 11\")   Wt:  89.8 kg (198 lb)   Admission Wt:  93 kg (205 lb)    BMI:  27.62 kg/m 2   BSA:  2.12 m 2            ED Clinical Impression     Diagnosis Description Comment Added By Time Added    Gastrointestinal hemorrhage with melena [K92.1] Gastrointestinal hemorrhage with melena [K92.1]  Juliette Peguero MD 1/8/2018 11:37 AM    Chronic anticoagulation [Z79.01] Chronic anticoagulation [Z79.01]  Juliette Peguero MD 1/8/2018 11:37 AM    Chronic respiratory failure with hypoxia (H) [J96.11] Chronic respiratory failure with hypoxia (H) [J96.11]  Juliette Peguero MD 1/8/2018 11:38 AM    Type 2 diabetes mellitus with stage 3 chronic kidney disease, unspecified long term insulin use status (H) [E11.22, N18.3] Type 2 diabetes mellitus with stage 3 chronic kidney disease, unspecified long term insulin use status (H) [E11.22, N18.3]  Juliette Peguero MD 1/8/2018 11:38 AM    Prostate cancer metastatic to bone (H) [C61, C79.51] Prostate cancer metastatic to bone (H) [C61, C79.51]  Juliette Peguero MD 1/8/2018 11:39 AM      Hospital Problems as of 1/13/2018              Priority Class Noted POA    GI bleed Medium  1/8/2018 Yes      Non-Hospital Problems as of 1/13/2018              Priority Class Noted    Insomnia Medium  Unknown    COPD (chronic obstructive pulmonary disease) (H) Medium  Unknown    Anemia Medium  Unknown    Osteoporosis Medium  Unknown    ASCVD (arteriosclerotic cardiovascular disease) Medium  Unknown    Prostate cancer metastatic to multiple sites (H) Medium  Unknown    Venous stasis dermatitis Medium  7/8/2014    Fecal incontinence Medium  7/25/2014    CKD (chronic kidney disease) stage 3, GFR 30-59 ml/min Medium  8/22/2014    Serum albumin decreased Medium  8/27/2014    Right heart failure Medium  8/27/2014    Type 2 " diabetes, HbA1C goal < 8% (H) Medium  9/5/2014    Impaired mobility Medium  9/5/2014    Knee pain Medium  9/5/2014    Irregular heart rhythm Medium  10/10/2014    Sleep apnea Medium  12/10/2014    Metastasis to bone (H) Medium  12/15/2014    SOB (shortness of breath) Medium  6/2/2015    FTT (failure to thrive) in adult Medium  6/23/2015    Dyspnea Medium  6/24/2015    Atypical chest pain Medium  7/19/2015    Prostate cancer metastatic to bone (H) Medium  7/23/2015    Chronic pain Medium  7/31/2015    ACP (advance care planning) Medium  8/4/2015    Respiratory failure (H) Medium  9/1/2015    CAD (coronary artery disease) Medium  9/1/2015    Physical deconditioning Medium  9/1/2015    Hyperlipidemia Medium  9/10/2015    Renal insufficiency Medium  10/2/2015    Epigastric pain Medium  10/2/2015    Lymphedema of both lower extremities Medium  11/2/2015    Primary pulmonary hypertension (H) Medium  11/2/2015    Redness of eye, left Medium  11/9/2015    Chest wall pain Medium  12/21/2015    Chronic obstructive pulmonary disease, unspecified COPD type (H) Medium  12/24/2015    FH: chronic lung disease requiring oxygen Medium  12/24/2015    COPD exacerbation (H) Medium  1/3/2016    History of heart attack Medium  1/26/2016    Nocturnal dyspnea Medium  1/26/2016    Dyspnea on exertion Medium  1/26/2016    Chronic obstructive pulmonary disease with severity to be determined (H) Medium  1/26/2016    Leg pain Medium  2/25/2016    Adjustment disorder with mixed anxiety and depressed mood Medium  4/28/2016    Noncompliance with medication regimen Medium  6/2/2016    Type 2 diabetes mellitus with stage 3 chronic kidney disease (H) Medium  6/5/2016    Chronic respiratory failure with hypoxia and hypercapnia (H) Medium  6/24/2016    Misuse of drugs (H) Medium  6/24/2016    Pneumonia of left lower lobe due to infectious organism (H) Medium  7/2/2016    Pneumonia Medium  7/2/2016    Hypercapnia Medium  7/18/2016    Health Care Home  Medium  8/17/2016    HTN (hypertension) Medium  10/12/2016    CHF (congestive heart failure) (H) Medium  10/12/2016    Chronic respiratory failure with hypoxia (H) Medium  12/27/2016    Morbid obesity (H) High  5/17/2017    Altered mental status Medium  6/1/2017    Bacterial sepsis (H) Medium  6/1/2017    Anemia in neoplastic disease Medium  12/13/2017      Code Status History     Date Active Date Inactive Code Status Order ID Comments User Context    12/13/2017  5:31 PM 12/14/2017  3:19 PM Full Code [id number]  Lise Mcdonald PA-C Inpatient    6/6/2017 10:33 AM 12/13/2017  5:31 PM Full Code [id number]  Viktoria Gunderson MD Outpatient    6/1/2017  5:14 AM 6/6/2017 10:33 AM Full Code [id number]  Shahid Chávez MD Inpatient    12/27/2016 12:10 AM 12/27/2016  8:37 PM Full Code [id number]  Inocencio Tenorio MD Inpatient    10/11/2016  9:57 AM 12/27/2016 12:10 AM Full Code [id number]  Meek Lindo MD Outpatient    10/4/2016 10:59 PM 10/11/2016  9:57 AM Full Code [id number]  Junior Headley MD Inpatient    7/17/2016  5:08 PM 7/20/2016  7:02 PM Full Code [id number]  Lucero Harris PA-C ED    7/8/2016 12:29 PM 7/17/2016  5:08 PM Full Code [id number]  Jim Barillas MD Outpatient    7/2/2016  7:04 AM 7/8/2016 12:29 PM Full Code [id number]  Jim Arellano MD ED    6/24/2016 10:02 AM 7/2/2016  7:04 AM Full Code [id number]  Lucero Harris PA-C Outpatient    6/23/2016  4:00 PM 6/24/2016 10:02 AM Full Code [id number]  Altagracia Perez PA-C ED    5/27/2016  6:23 PM 5/31/2016  8:04 PM Full Code [id number]  Brant Harvey MD Inpatient    5/12/2016  3:18 PM 5/27/2016  6:23 PM Full Code [id number]  Jeovanny Quinn DO Outpatient    5/3/2016  6:32 PM 5/12/2016  3:18 PM Full Code [id number]  Meek Lindo MD Inpatient    4/19/2016 11:05 PM 4/24/2016  8:24 PM Full Code [id number]  Lucero Harris PA-C ED    3/23/2016 10:42 AM 4/19/2016 11:05 PM Full Code [id number]  Diana  Rafia Callejas MD Outpatient    3/20/2016 10:01 PM 3/23/2016 10:42 AM Full Code [id number]  Ly Butcher MD Inpatient    3/6/2016 11:01 AM 3/20/2016 10:01 PM Full Code [id number]  John Graff MD Outpatient    2/27/2016 10:36 PM 3/6/2016 11:01 AM Full Code [id number]  Jim Arellano MD Inpatient    2/26/2016 10:30 AM 2/27/2016 10:36 PM Full Code [id number]  Xenia Conley, DO Outpatient    2/25/2016  4:48 PM 2/26/2016 10:30 AM Full Code [id number]  Xenia Conley, DO ED    1/26/2016 12:53 PM 2/25/2016  4:48 PM Full Code [id number]  Melody Perez APRN CNP Outpatient    12/30/2015  1:44 AM 1/3/2016  5:51 PM Full Code [id number]  Jim Barillas MD Inpatient    12/20/2015 10:28 AM 12/30/2015  1:44 AM Full Code [id number]  Caren Stevens PA-C Outpatient    12/20/2015  3:17 AM 12/20/2015 10:28 AM Full Code [id number]  Rafia Ortiz MD ED    11/18/2015  9:13 AM 12/20/2015  3:17 AM Full Code [id number]  Caren Stevens PA-C Outpatient    11/17/2015  4:49 PM 11/18/2015  9:13 AM Full Code [id number]  Caren Stevens PA-C ED    10/25/2015  9:12 PM 10/30/2015  2:14 PM Full Code [id number]  Preston Scott MD Inpatient    8/13/2015  8:04 AM 10/25/2015  9:12 PM Full Code [id number]  Gt Moody, DO Outpatient    8/9/2015  7:58 PM 8/13/2015  8:04 AM Full Code [id number]  Jim Arellano MD Inpatient    7/19/2015  8:04 PM 7/21/2015  7:59 PM Full Code [id number]  Juan Antonio Oneal MD ED    7/15/2015  2:39 PM 7/19/2015  8:04 PM Full Code [id number]  Juan Antonio Oneal MD Outpatient    7/8/2015 11:07 PM 7/15/2015  2:39 PM Full Code [id number]  Scarlett Burt PA-C Inpatient    6/28/2015  9:54 AM 7/8/2015 11:07 PM Full Code [id number]  Josh Garrett DO Outpatient    6/23/2015  1:56 AM 6/28/2015  9:54 AM Full Code [id number]  Gt Moody DO Inpatient    6/4/2015 11:47 AM 6/23/2015  1:56 AM Full Code [id number]  Dasia Marc MD Outpatient    6/2/2015   1:50 AM 6/4/2015 11:47 AM Full Code [id number]  Kalee Moreno MD Inpatient    5/31/2015  9:39 AM 6/2/2015  1:50 AM Full Code [id number]  Josh Garrett DO Outpatient    5/17/2015 11:10 PM 5/31/2015  9:39 AM Full Code [id number]  Geovany Hamilton MD Inpatient    4/27/2015 11:59 PM 5/3/2015  3:22 PM Full Code [id number]  Jim Arellano MD Inpatient    3/6/2015  1:00 PM 3/12/2015  3:03 PM Full Code [id number]  Argentina Partida PA-C Inpatient    2/22/2015 10:48 AM 3/6/2015  1:00 PM Full Code [id number]  Kevin Lozada MD Outpatient    2/18/2015  6:59 AM 2/22/2015 10:48 AM Full Code [id number]  Ronn Funez MD Inpatient    11/28/2014 12:18 PM 2/18/2015  6:59 AM Full Code [id number]  Jim Arellano MD Outpatient    11/23/2014  1:29 AM 11/28/2014 12:18 PM Full Code [id number]  John Graff MD Inpatient    10/15/2014 10:26 AM 11/23/2014  1:29 AM Full Code [id number]  John Graff MD Outpatient    10/10/2014  7:03 PM 10/15/2014 10:26 AM Full Code [id number]  Garcia Mcfarlane MD Inpatient    7/22/2014  9:54 AM 10/10/2014  7:03 PM Full Code [id number]  Jeovanny Quinn DO Outpatient    7/16/2014 10:23 PM 7/22/2014  9:54 AM Full Code [id number]  Argentina Partida PA-C Inpatient      Current Code Status     Date Active Code Status Order ID Comments User Context       1/8/2018  5:46 PM Full Code [id number]  Roula Mcneil PA-C Inpatient          Medication Review      UNREVIEWED medications. Ask your doctor about these medications        Dose / Directions Comments    acetaminophen 500 MG tablet   Commonly known as:  TYLENOL        Dose:  1000 mg   Take 1,000 mg by mouth 3 times daily Not to exceed 3000 mg/24 hours   Refills:  0        ammonium lactate 12 % lotion   Commonly known as:  LAC-HYDRIN   Indication:  Abnormal Dryness of Skin        Apply topically 2 times daily as needed for dry skin To all extremities for dry skin   Refills:  0        aspirin 81 MG EC tablet    Used for:  ASCVD (arteriosclerotic cardiovascular disease)        Dose:  81 mg   Take 1 tablet (81 mg) by mouth daily   Quantity:  5 tablet   Refills:  0        BETIMOL OP        Dose:  1 drop   Place 1 drop into both eyes daily   Refills:  0        bimatoprost 0.01 % Soln   Commonly known as:  LUMIGAN        Dose:  1 drop   Place 1 drop into both eyes At Bedtime   Refills:  0        budesonide-formoterol 160-4.5 MCG/ACT Inhaler   Commonly known as:  SYMBICORT        Dose:  2 puff   Inhale 2 puffs into the lungs 2 times daily   Refills:  0        calcium carbonate 1500 (600 CA) MG tablet   Commonly known as:  OS-JORDAN 600 mg [place]. Ca        Dose:  1500 mg   Take 1,500 mg by mouth 2 times daily (with meals)   Refills:  0        CARBOXYMETHYLCELLULOSE SOD PF OP        Dose:  1 drop   Place 1 drop into both eyes 4 times daily And 1 drop both eyes daily prn   Refills:  0        CYMBALTA PO        Dose:  20 mg   Take 20 mg by mouth daily   Refills:  0        diclofenac 1 % Gel topical gel   Commonly known as:  VOLTAREN   Used for:  Chronic pain of both knees        Apply 4 grams to knees four times daily as needed using enclosed dosing card.   Quantity:  100 g   Refills:  0        fentaNYL 12 mcg/hr 72 hr patch   Commonly known as:  DURAGESIC        Dose:  1 patch   Place 1 patch onto the skin every 72 hours   Refills:  0        ferrous sulfate 325 (65 FE) MG tablet   Commonly known as:  IRON        Dose:  325 mg   Take 325 mg by mouth 2 times daily   Refills:  0        furosemide 20 MG tablet   Commonly known as:  LASIX   Used for:  Chronic diastolic congestive heart failure (H), Lymphedema of both lower extremities        Dose:  20 mg   Take 1 tablet (20 mg) by mouth 2 times daily   Quantity:  30 tablet   Refills:  0        insulin glargine 100 UNIT/ML injection   Commonly known as:  LANTUS   Indication:  Type 2 Diabetes        Dose:  20 Units   Inject 20 Units Subcutaneous every morning   Refills:  0         ipratropium - albuterol 0.5 mg/2.5 mg/3 mL 0.5-2.5 (3) MG/3ML neb solution   Commonly known as:  DUONEB        Dose:  1 vial   Take 1 vial by nebulization every 4 hours as needed for shortness of breath / dyspnea or wheezing   Refills:  0        isosorbide mononitrate 30 MG 24 hr tablet   Commonly known as:  IMDUR        Dose:  60 mg   Take 2 tablets (60 mg) by mouth daily   Quantity:  30 tablet   Refills:  0        LIPITOR 10 MG tablet   Generic drug:  atorvastatin        Dose:  10 mg   Take 10 mg by mouth At Bedtime   Refills:  0        METHOCARBAMOL PO        Dose:  500 mg   Take 500 mg by mouth every 6 hours as needed for muscle spasms   Refills:  0        metoprolol succinate 50 MG 24 hr tablet   Commonly known as:  TOPROL XL   Used for:  ASCVD (arteriosclerotic cardiovascular disease)        Dose:  50 mg   Take 1 tablet (50 mg) by mouth daily   Quantity:  5 tablet   Refills:  0        nitroGLYcerin 0.4 MG sublingual tablet   Commonly known as:  NITROSTAT        Dose:  0.4 mg   Place 0.4 mg under the tongue every 5 minutes as needed for chest pain if you are still having symptoms after 3 doses (15 minutes) call 911.   Refills:  0        omeprazole 10 MG CR capsule   Commonly known as:  priLOSEC   Used for:  Gastric reflux        Dose:  10 mg   Take 1 capsule (10 mg) by mouth daily   Refills:  0        ONDANSETRON PO        Dose:  4 mg   Take 4 mg by mouth every 8 hours as needed for nausea   Refills:  0        OXYCODONE HCL PO        Dose:  5 mg   Take 5 mg by mouth every 4 hours as needed   Refills:  0        senna-docusate 8.6-50 MG per tablet   Commonly known as:  SENOKOT-S;PERICOLACE        Dose:  2 tablet   Take 2 tablets by mouth 2 times daily   Refills:  0        tamsulosin 0.4 MG capsule   Commonly known as:  FLOMAX   Used for:  Benign non-nodular prostatic hyperplasia without lower urinary tract symptoms        Dose:  0.4 mg   Take 1 capsule (0.4 mg) by mouth daily   Quantity:  5 capsule   Refills:  0  "       tiotropium 18 MCG capsule   Commonly known as:  SPIRIVA   Indication:  Chronic Obstructive Lung Disease        Dose:  18 mcg   Inhale 18 mcg into the lungs daily   Refills:  0        WARFARIN SODIUM PO        Take by mouth daily See facility orders for INR dosing   Refills:  0                                                    INTERAGENCY TRANSFER FORM - NURSING   1/8/2018                       UNIT 5C BMT [facility]ANK: [phone]            Attending Provider: Elidia Mckeon MD     Allergies:  Morphine    Infection:  ESBL, MRSA   Service:  Hem/Onc    Ht:  1.803 m (5' 11\")   Wt:  89.8 kg (198 lb)   Admission Wt:  93 kg (205 lb)    BMI:  27.62 kg/m 2   BSA:  2.12 m 2            Advance Directives        Does patient have a scanned Advance Directive/ACP document in EPIC?           Yes        Immunizations     Name Date      Influenza (High Dose) 3 valent vaccine 10/04/17     Influenza (High Dose) 3 valent vaccine 10/11/16     Influenza (High Dose) 3 valent vaccine 10/27/15     Influenza (IIV3) PF 11/04/15     Pneumo Conj 13-V (2010&after) 11/04/15     Pneumococcal 23 valent 02/08/12     Tdap (Adacel,Boostrix) 11/04/15       ASSESSMENT     Discharge Profile Flowsheet     DISCHARGE NEEDS ASSESSMENT     Other Resources  Home Care 10/05/16 1431    Concerns To Be Addressed  discharge planning concerns 05/29/16 1110   PAS Number  [id number] (10/11/16) 10/11/16 0959    Equipment Currently Used at Home  wheelchair;respiratory supplies 06/02/17 1236   Existing Resources/Services  Home Care;DME 04/28/15 1505    # of Referrals Placed by CTS  Communication hand-offs to next level of Care Providers 10/17/16 1501   SKIN      Does Patient Need a Referral for Clinic CC  Yes 12/30/15 1137   Inspection of bony prominences  Full 01/13/18 0206    Equipment Used at Home  walker, rolling 03/23/16 1247   Full except areas not inspected   Hip, left;Hip, right;Buttock, left;Buttock, right;Sacrum;Coccyx 01/12/18 0031    " "GASTROINTESTINAL (ADULT,PEDIATRIC,OB)     Inspection under devices  Full 01/13/18 0206    GI WDL  ex 01/13/18 0206   Skin WDL  ex 01/13/18 0206    All Quadrants Palpation  tender 01/10/18 0057   Skin Color/Characteristics  carlos 01/13/18 0206    Last Bowel Movement  01/12/18 01/13/18 0206   Skin Temperature  warm 01/13/18 0206    GI Signs/Symptoms  abdominal discomfort 01/13/18 0206   Skin Moisture  flaky;dry 01/13/18 0206    Passing flatus  yes 01/13/18 0206   Skin Elasticity  quick return to original state 01/12/18 1547    COMMUNICATION ASSESSMENT     Skin Integrity  cracked 01/13/18 0206    Patient's communication style  spoken language (English or Bilingual) 01/08/18 0938   SAFETY      FINAL RESOURCES     Safety WDL  WDL 01/13/18 0206    Resources List  Skilled Nursing Facility 06/01/17 0835   All Alarms  none present 01/13/18 0206                 Assessment WDL (Within Defined Limits) Definitions           Safety WDL     Effective: 09/28/15    Row Information: <b>WDL Definition:</b> Bed in low position, wheels locked; call light in reach; upper side rails up x 2; ID band on<br> <font color=\"gray\"><i>Item=AS safety wdl>>List=AS safety wdl>>Version=F14</i></font>      Skin WDL     Effective: 09/28/15    Row Information: <b>WDL Definition:</b> Warm; dry; intact; elastic; without discoloration; pressure points without redness<br> <font color=\"gray\"><i>Item=AS skin wdl>>List=AS skin wdl>>Version=F14</i></font>      Vitals     Vital Signs Flowsheet     VITAL SIGNS     Comfort  tolerable with discomfort 01/12/18 0313    Temp  97.5  F (36.4  C) 01/13/18 1220   Change in Pain  about the same 01/12/18 0313    Temp src  Oral 01/13/18 1220   Pain Control  partially effective 01/12/18 0313    Resp  18 01/13/18 1220   Functioning  can do most things, but pain gets in the way of some 01/12/18 0313    Pulse  89 01/12/18 1218   Sleep  normal sleep 01/12/18 0313    Heart Rate  98 01/13/18 1220   HEIGHT AND WEIGHT      " "Pulse/Heart Rate Source  Monitor 01/12/18 2017   Height  1.803 m (5' 11\") 01/08/18 1439    BP  (!)  146/96 01/13/18 1220   Height Method  Stated 01/08/18 0942    BP Location  Right arm 01/13/18 0346   Weight  89.8 kg (198 lb) 01/11/18 1350    OXYGEN THERAPY     BSA (Calculated - sq m)  2.11 01/08/18 1439    SpO2  100 % 01/13/18 1220   BMI (Calculated)  27.27 01/08/18 1439    O2 Device  Nasal cannula 01/13/18 0346   POSITIONING      Oxygen Delivery  2 LPM 01/13/18 0346   Body Position  sitting up in bed 01/12/18 1645    PAIN/COMFORT     Head of Bed (HOB)  HOB at 30 degrees 01/12/18 1551    Patient Currently in Pain  denies 01/13/18 0157   Chair  Upright in chair 01/13/18 0206    Preferred Pain Scale  CAPA (Clinically Aligned Pain Assessment) ([facility] Adults Only) 01/13/18 0157   DAILY CARE      Pain Location  Abdomen 01/12/18 1637   Activity Management  activity adjusted per tolerance 01/13/18 0206    Pain Orientation  Lower;Mid 01/12/18 1637   Activity Assistance Provided  assistance, 1 person 01/13/18 0206    Pain Descriptors  Cramping 01/12/18 1637   POINT OF CARE TESTING      Pain Intervention(s)  Declines 01/12/18 1637   Puncture Site  fingertip 01/11/18 2130    CLINICALLY ALIGNED PAIN ASSESSMENT (CAPA) ([facility] ADULTS ONLY)     Bedside Glucose (mg/dl )   85 mg/dl 01/11/18 2130            Patient Lines/Drains/Airways Status    Active LINES/DRAINS/AIRWAYS     Name: Placement date: Placement time: Site: Days: Last dressing change:    Pressure Injury Left Leg             Wound 02/27/16 Lower;Right Leg Ulceration RLE Venous stasis open wounds 02/27/16   2300   Leg   685     Wound 02/26/16 Lower;Left Leg Ulceration LLE open wounds 02/26/16   1114   Leg   687     Wound 05/09/16 Right Leg whie,weeping serous fluid  05/09/16      Leg   614     Wound 12/13/17 Lower;Right Leg Venous stasis 12/13/17   1800   Leg   30     Wound 12/13/17 Right Buttocks 12/13/17   1800   Buttocks   30 "             Patient Lines/Drains/Airways Status    Active PICC/CVC     Name: Placement date: Placement time: Site: Days: Additional Info Last dressing change:    PICC Double Lumen 01/10/18 Left Brachial vein medial 01/10/18   1813   Brachial vein medial   2 External Cath Length (cm): 3 cm            Size (Fr): 5 Fr            Orientation: Left            Extremity Circumference (cm): 28 cm            Catheter Brand: BioFlo             Dressing Intervention: Transparent;Securing device;New dressing            Description: Power PICC;Non - valved (open ended)            Total Catheter Length (cm) Trimmed: 48 cm            Site Prep: Chlorhexidine            Local Anesthetic: Injectable            Inserted by: Toya Nuñez RN [facility]            Insertion attempts without ultrasound: 2            Patient Tolerance: Tolerated well            Placement Verification: Xray;Blood Return            Difficulty with threading line: No            Tip location: SVC            Full barrier precautions done: Yes            Consent Signed: Yes            Time Out performed: Yes            Lot #: [id number]            Use for : Antibiotics               Intake/Output Detail Report     Date Intake       Output Net    Shift P.O. I.V. IV Piggyback Blood Components Total Urine Total       Day 01/12/18 0000 - 01/12/18 0659 -- 450 -- -- 450 200 200 250    Sharon 01/12/18 0700 - 01/12/18 1459 240 225 -- -- 465 625 625 -160    Noc 01/12/18 1500 - 01/12/18 2359 0 1200 -- -- 1200 350 350 850    Day 01/13/18 0000 - 01/13/18 0659 -- 525 -- -- 525 -- -- 525    Sharon 01/13/18 0700 - 01/13/18 1459 -- 375 -- -- 375 675 675 -300      Case Management/Discharge Planning     Case Management/Discharge Planning Flowsheet     REFERRAL INFORMATION     Equipment Used at Home  walker, rolling 03/23/16 1247    Arrived From  home or self-care 07/20/16 1529   FINAL RESOURCES      # of Referrals Placed by CTS  Communication hand-offs to next level of Care Providers  10/17/16 1501   Equipment Currently Used at Home  wheelchair;respiratory supplies 06/02/17 1236    Primary Care Clinic Name  [facility] 10/05/16 1428   Resources List  Skilled Nursing Facility 06/01/17 0835    Primary Care MD Name  Kemal Miller 10/05/16 1428   Other Resources  Home Care 10/05/16 1431    LIVING ENVIRONMENT     PAS Number  [id number] (10/11/16) 10/11/16 0959    Lives With  facility resident 01/08/18 1408   Existing Resources/Services  Home Care;DME 04/28/15 1505    Living Arrangements  -- (SNF with rehab services) 06/02/17 1236   MH/BH CAREGIVER      COPING/STRESS     Filed Complexity Screen Score  15 01/08/18 1458    Major Change/Loss/Stressor  hospitalization 01/08/18 1408   ABUSE RISK SCREEN      Patient Personal Strengths  able to adapt;expressive of needs 05/21/15 1418   QUESTION TO PATIENT:  Has a member of your family or a partner(now or in the past) intimidated, hurt, manipulated, or controlled you in any way?  no 01/08/18 0943    ASSESSMENT/CONCERNS TO BE ADDRESSED     QUESTION TO PATIENT: Do you feel safe going back to the place where you are living?  yes 01/08/18 0943    Concerns To Be Addressed  discharge planning concerns 05/29/16 1110   OBSERVATION: Is there reason to believe there has been maltreatment of a vulnerable adult (ie. Physical/Sexual/Emotional abuse, self neglect, lack of adequate food, shelter, medical care, or financial exploitation)?  no 01/08/18 0943    DISCHARGE PLANNING     (R) MENTAL HEALTH SUICIDE RISK      Does Patient Need a Referral for Clinic CC  Yes 12/30/15 1137   Are you depressed or being treated for depression?  No 01/08/18 1408                  UNIT 5C B[facility]: [phone]            Medication Administration Report for Tomas Grey as of 01/13/18 1238   Legend:    Given Hold Not Given Due Canceled Entry Other Actions    Time Time (Time) Time  Time-Action       Inactive    Active    Linked        Medications 01/07/18  01/08/18 01/09/18 01/10/18 01/11/18 01/12/18 01/13/18    acetaminophen (TYLENOL) tablet 1,000 mg  Dose: 1,000 mg Freq: 3 TIMES DAILY Route: PO  Start: 01/08/18 1630   Admin Instructions: Maximum acetaminophen dose from all sources = 75 mg/kg/day not to exceed 4 gram      (1705)-Not Given [C]       2004 (1,000 mg)-Given        0757 (1,000 mg)-Given       1444 (1,000 mg)-Given       1952 (1,000 mg)-Given        0857 (1,000 mg)-Given       1317 (1,000 mg)-Given       1909 (1,000 mg)-Given        0902 (1,000 mg)-Given       1347 (1,000 mg)-Given       (2109)-Not Given [C]        0901 (1,000 mg)-Given       1447 (1,000 mg)-Given       (2116)-Not Given        0930 (1,000 mg)-Given       [ ] 1400       [ ] 2000           albuterol neb solution 2.5 mg  Dose: 2.5 mg Freq: EVERY 4 HOURS PRN Route: NEBULIZATION  PRN Reason: shortness of breath / dyspnea  Start: 01/08/18 1649      0453 (2.5 mg)-Given         2025 (2.5 mg)-Given             ammonium lactate (LAC-HYDRIN) 12 % lotion  Freq: 2 TIMES DAILY PRN Route: Top  PRN Reason: dry skin  Indications of Use: XEROSIS CUTIS  Start: 01/08/18 1554   Admin Instructions: Apply to all extremities for dry skin.               atorvastatin (LIPITOR) tablet 10 mg  Dose: 10 mg Freq: AT BEDTIME Route: PO  Start: 01/08/18 2200     2321 (10 mg)-Given        2336 (10 mg)-Given        2242 (10 mg)-Given        2150 (10 mg)-Given        (2116)-Not Given        [ ] 2200           bimatoprost (LUMIGAN) 0.01 % ophthalmic drops 1 drop  Dose: 1 drop Freq: AT BEDTIME Route: Both Eyes  Start: 01/08/18 2200     (2321)-Not Given        (2220)-Not Given        (2242)-Not Given        (2109)-Not Given        (2117)-Not Given        [ ] 2200           calcium carbonate (OS-JORDAN 600 mg [place]. Ca) tablet 1,500 mg  Dose: 1,500 mg Freq: 2 TIMES DAILY WITH MEALS Route: PO  Start: 01/08/18 1800   Admin Instructions: OS-JORDAN 600 = 1500 mg calcium carbonate      1738 (1,500 mg)-Given        0758 (1,500 mg)-Given        1950 (1,500 mg)-Given        0857 (1,500 mg)-Given       1910 (1,500 mg)-Given        0903 (1,500 mg)-Given       (1719)-Not Given        0901 (1,500 mg)-Given       1837 (1,500 mg)-Given        0930 (1,500 mg)-Given       [ ] 1800           Carboxymethylcellulose Sod PF (REFRESH PLUS) 0.5 % ophthalmic solution 1 drop  Dose: 1 drop Freq: 4 TIMES DAILY Route: Both Eyes  Start: 01/08/18 1745     (2004)-Not Given       (2005)-Not Given        0800 (1 drop)-Given       (1444)-Not Given       (1600)-Not Given       (1952)-Not Given        (0858)-Not Given       (1200)-Not Given       (1737)-Not Given       (1911)-Not Given        0903 (1 drop)-Given       (1150)-Not Given       (1718)-Not Given       (2101)-Not Given        (1153)-Not Given       (1443)-Not Given       (1616)-Not Given       (2117)-Not Given        (0933)-Not Given       [ ] 1200       [ ] 1600       [ ] 2000           diclofenac (VOLTAREN) 1 % topical gel 4 g  Dose: 4 g Freq: 4 TIMES DAILY PRN Route: Top  PRN Reason: moderate pain  Start: 01/08/18 1556   Admin Instructions: Apply to knees  Send dosing card with product.               DULoxetine (CYMBALTA) EC capsule 20 mg  Dose: 20 mg Freq: DAILY Route: PO  Start: 01/08/18 1630     1738 (20 mg)-Given        0758 (20 mg)-Given        0857 (20 mg)-Given        0903 (20 mg)-Given        1221 (20 mg)-Given        0931 (20 mg)-Given           ertapenem (INVanz) 1 g in 10 mL SWFI for IVP  Dose: 1 g Freq: EVERY 24 HOURS Route: IV  Indications of Use: URINARY TRACT INFECTION  Indications Comment: ESBL  Start: 01/10/18 1000   Admin Instructions: Infuse over 30 minutes.  Give IVP over 5 minutes        1218 (1 g)-Given        1149 (1 g)-Given        1046 (1 g)-Given        1015 (1 g)-Given           fentaNYL (DURAGESIC) 12 mcg/hr 72 hr patch 1 patch  Dose: 12 mcg Freq: EVERY 72 HOURS Route: TD  Start: 01/08/18 1900   Admin Instructions: Used fentaNYL patches must be disposed of by sticking sides together and  flushing down a toilet.      2003 (1 patch)-Given          2107 (1 patch)-Given             fentaNYL (DURAGESIC) Patch in Place  Freq: EVERY 8 HOURS Route: TD  Start: 01/08/18 1900   Admin Instructions: Chart every shift, confirming that patch is still in place on patient (no barcode scan needed). See patch order for dose information.      1833 ( )-Negative [C]        0238 ( )-Patch in Place       1100 ( )-Patch in Place       1952 ( )-Patch in Place        0238 ( )-Patch in Place       1100 ( )-Patch in Place       1911 ( )-Patch in Place        0212 ( )-Patch in Place       1149 ( )-Patch in Place       2043 ( )-Patch Removed        0254 ( )-Patch in Place       1221 ( )-Given       1837 ( )-Patch in Place        0330 ( )-Patch in Place       [ ] 1100       [ ] 1900           fentaNYL (DURAGESIC) patch REMOVAL  Freq: EVERY 72 HOURS Route: TD  Start: 01/11/18 1215   Admin Instructions: Remove patch when new patch is applied or patch is discontinued.  Used fentaNYL patches must be disposed of by sticking sides together and flushing down a toilet.         1149 ( )-Patch Removed             fluticasone-vilanterol (BREO ELLIPTA) 200-25 MCG/INH oral inhaler 1 puff  Dose: 1 puff Freq: DAILY Route: IN  Start: 01/09/18 0800   Admin Instructions: *Do not use more frequently than once daily.*  Rinse mouth after use.  Therapeutic interchange for symbicort.       0801 (1 puff)-Given        0900 (1 puff)-Given        0906 (1 puff)-Given        0903 (1 puff)-Given        0931 (1 puff)-Given           folic acid (FOLVITE) tablet 1 mg  Dose: 1 mg Freq: DAILY Route: PO  Start: 01/09/18 1245      1649 (1 mg)-Given        0857 (1 mg)-Given        0903 (1 mg)-Given        0901 (1 mg)-Given        0931 (1 mg)-Given           furosemide (LASIX) tablet 20 mg  Dose: 20 mg Freq: 2 TIMES DAILY Route: PO  Start: 01/08/18 2000 2004 (20 mg)-Given        0757 (20 mg)-Given       1950 (20 mg)-Given        0857 (20 mg)-Given       1910 (20  mg)-Given        0903 (20 mg)-Given       2107 (20 mg)-Given        0901 (20 mg)-Given       (2116)-Not Given        0930 (20 mg)-Given       [ ] 2000           glucose 40 % gel 15-30 g  Dose: 15-30 g Freq: EVERY 15 MIN PRN Route: PO  PRN Reason: low blood sugar  Start: 01/08/18 1622   Admin Instructions: Give 15 g for BG 51 to 69 mg/dL IF patient is conscious and able to swallow. Give 30 g for BG less than or equal to 50 mg/dL IF patient is conscious and able to swallow. Do NOT give glucose gel via enteral tube.  IF patient has enteral tube: give apple juice 120 mL (4 oz or 15 g of CHO) via enteral tube for BG 51 to 69 mg/dL.  Give apple juice 240 mL (8 oz or 30 g of CHO) via enteral tube for BG less than or equal to 50 mg/dL.    ~Oral gel is preferable for conscious and able to swallow patient.   ~IF gel unavailable or patient refuses may provide apple juice 120 mL (4 oz or 15 g of CHO). Document juice on I and O flowsheet.              Or  dextrose 50 % injection 25-50 mL  Dose: 25-50 mL Freq: EVERY 15 MIN PRN Route: IV  PRN Reason: low blood sugar  Start: 01/08/18 1622   Admin Instructions: Use if have IV access, BG less than 70 mg/dL and meet dose criteria below:  Dose if conscious and alert (or disorientated) and NPO = 25 mL  Dose if unconscious / not alert = 50 mL  Vesicant. For ordered doses up to 25 mg, give IV Push undiluted. Give each 5g over 1 minute.              Or  glucagon injection 1 mg  Dose: 1 mg Freq: EVERY 15 MIN PRN Route: SC  PRN Reason: low blood sugar  PRN Comment: May repeat x 1 only  Start: 01/08/18 1622   Admin Instructions: May give SQ or IM. ONLY use glucagon IF patient has NO IV access AND is UNABLE to swallow AND blood glucose is LESS than or EQUAL to 50 mg/dL.  Give IV Push over 1 minute. Reconstitute with 1mL sterile water.               heparin lock flush 10 UNIT/ML injection 2-5 mL  Dose: 2-5 mL Freq: ONCE PRN Route: IK  PRN Reason: line flush  PRN Comment: for locking each  dormant lumen with line placement  Start: 01/10/18 1004   Admin Instructions: May repeat x 1               heparin lock flush 10 UNIT/ML injection 5-10 mL  Dose: 5-10 mL Freq: EVERY 1 HOUR PRN Route: IK  PRN Reason: other  PRN Comment: to lock each CVC - Open Ended (Tunneled and Non-Tunneled) dormant lumen.  Start: 01/10/18 1812   Admin Instructions: MAX: 5 mL per lumen.               heparin lock flush 10 UNIT/ML injection 5-10 mL  Dose: 5-10 mL Freq: EVERY 24 HOURS Route: IK  Start: 01/10/18 1815   Admin Instructions: To lock each CVC - Open Ended (Tunneled and Non-Tunneled) dormant lumen. Check PRN heparin flush order to see when last dose of PRN heparin was given before administering.  MAX: 5 mL per lumen..        2049 (5 mL)-Given        (1719)-Not Given        1459 (5 mL)-Given       1837 (5 mL)-Given        [ ] 1815           HYDROmorphone (PF) (DILAUDID) injection 0.5 mg  Dose: 0.5 mg Freq: EVERY 4 HOURS PRN Route: IV  PRN Reason: moderate to severe pain  Start: 01/08/18 1735   Admin Instructions: For ordered doses up to 4 mg give IV Push undiluted. Administer each 2mg over 2-5 minutes.        0245 (0.5 mg)-Given       1908 (0.5 mg)-Given          0952 (0.5 mg)-Given           insulin aspart (NovoLOG) inj (RAPID ACTING)  Dose: 1-5 Units Freq: AT BEDTIME Route: SC  Start: 01/08/18 2200   Admin Instructions: MEDIUM INSULIN RESISTANCE DOSING    Do Not give Bedtime Correction Insulin if BG less than  200.   For  - 249 give 1 units.   For  - 299 give 2 units.   For  - 349 give 3 units.   For  -399 give 4 units.   For BG greater than or equal to 400 give 5 units.  Notify provider if glucose greater than or equal to 350 mg/dL after administration of correction dose.  If given at mealtime, must be administered 5 min before meal or immediately after.      (2242)-Not Given [C]        (2220)-Not Given [C]        (2232)-Not Given [C]        (2149)-Not Given [C]        (2214)-Not Given        [ ]  2200           insulin aspart (NovoLOG) inj (RAPID ACTING)  Dose: 1-7 Units Freq: 3 TIMES DAILY BEFORE MEALS Route: SC  Start: 01/08/18 1700   Admin Instructions: Correction Scale - MEDIUM INSULIN RESISTANCE DOSING     Do Not give Correction Insulin if Pre-Meal BG less than 140.   For Pre-Meal  - 189 give 1 unit.   For Pre-Meal  - 239 give 2 units.   For Pre-Meal  - 289 give 3 units.   For Pre-Meal  - 339 give 4 units.   For Pre-Meal - 399 give 5 units.   For Pre-Meal -449 give 6 units  For Pre-Meal BG greater than or equal to 450 give 7 units.   To be given with prandial insulin, and based on pre-meal blood glucose.    Notify provider if glucose greater than or equal to 350 mg/dL after administration of correction dose.  If given at mealtime, must be administered 5 min before meal or immediately after.      (1743)-Not Given        (0808)-Not Given       (1444)-Not Given       (1858)-Not Given        (0902)-Not Given       (1300)-Not Given       (1927)-Not Given [C]        (0855)-Not Given       (1157)-Not Given [C]       (1712)-Not Given [C]        (0900)-Not Given       1451 (1 Units)-Given       1836 (1 Units)-Given        (0900)-Not Given       [ ] 1200       [ ] 1700           isosorbide mononitrate (IMDUR) 24 hr tablet 60 mg  Dose: 60 mg Freq: DAILY Route: PO  Start: 01/09/18 0800   Admin Instructions: DO NOT CRUSH. Can split tablet in half along score anai.       0757 (60 mg)-Given        0857 (60 mg)-Given        0903 (60 mg)-Given        0901 (60 mg)-Given        0930 (60 mg)-Given           lidocaine (LMX4) kit  Freq: ONCE PRN Route: Top  PRN Reason: moderate pain  PRN Comment: for local anesthetic during PICC insertion  Start: 01/10/18 1004   Admin Instructions: Apply to PICC Insertion Site. Apply 30 minutes prior to procedure. MAX Dose: 2.5 gm  (1/2 of 5 gm tube)                 lidocaine (LMX4) kit  Freq: EVERY 1 HOUR PRN Route: Top  PRN Reason: pain  PRN Comment:  "with VAD insertion or accessing implanted port.  Start: 01/09/18 0141   Admin Instructions: Do NOT give if patient has a history of allergy to any local anesthetic or any \"gareth\" product.   Apply 30 minutes prior to VAD insertion or port access.  MAX Dose:  2.5 g (  of 5 g tube)               lidocaine 1 % 1 mL  Dose: 1 mL Freq: EVERY 1 HOUR PRN Route: OTHER  PRN Comment: mild pain with VAD insertion or accessing implanted port  Start: 01/09/18 0141   Admin Instructions: Do NOT give if patient has a history of allergy to any local anesthetic or any \"gareth\" product. MAX dose 1 mL subcutaneous OR intradermal in divided doses.               Medication Instruction  Freq: CONTINUOUS PRN Route: XX  Start: 01/08/18 1403   Admin Instructions: No rectal suppositories if WBC less than 1000/microliter or platelets less than 50,000/microliter.               metoprolol (TOPROL-XL) 24 hr tablet 50 mg  Dose: 50 mg Freq: DAILY Route: PO  Start: 01/09/18 0800   Admin Instructions: DO NOT CRUSH. Tablet may be split in half along score line.       0757 (50 mg)-Given        0857 (50 mg)-Given        0903 (50 mg)-Given        0901 (50 mg)-Given        0931 (50 mg)-Given           naloxone (NARCAN) injection 0.1-0.4 mg  Dose: 0.1-0.4 mg Freq: EVERY 2 MIN PRN Route: IV  PRN Reason: opioid reversal  Start: 01/08/18 1741   Admin Instructions: For respiratory rate LESS than or EQUAL to 8.  Partial reversal dose:  0.1 mg titrated q 2 minutes for Analgesia Side Effects Monitoring Sedation Level of 3 (frequently drowsy, arousable, drifts to sleep during conversation).Full reversal dose:  0.4 mg bolus for Analgesia Side Effects Monitoring Sedation Level of 4 (somnolent, minimal or no response to stimulation).  For ordered doses up to 2mg give IVP. Give each 0.4mg over 15 seconds in emergency situations. For non-emergent situations further dilute in 9mL of NS to facilitate titration of response.               ondansetron (ZOFRAN) injection 8 " mg  Dose: 8 mg Freq: EVERY 8 HOURS PRN Route: IV  PRN Reasons: nausea,vomiting  Start: 01/08/18 1411   Admin Instructions: Offer second  Irritant. For ordered doses up to 4 mg, give IV Push undiluted over 2-5 minutes.              Or  ondansetron (ZOFRAN-ODT) ODT tab 8 mg  Dose: 8 mg Freq: EVERY 8 HOURS PRN Route: PO  PRN Reasons: nausea,vomiting  Start: 01/08/18 1411   Admin Instructions: Offer second.              Or  ondansetron (ZOFRAN) tablet 8 mg  Dose: 8 mg Freq: EVERY 8 HOURS PRN Route: PO  PRN Reasons: nausea,vomiting  Start: 01/08/18 1411   Admin Instructions: Offer second.               pantoprazole (PROTONIX) EC tablet 40 mg  Dose: 40 mg Freq: 2 TIMES DAILY BEFORE MEALS Route: PO  Start: 01/09/18 1030   Admin Instructions: DO NOT CRUSH.       1148 (40 mg)-Given       1950 (40 mg)-Given        0857 (40 mg)-Given       (1839)-Not Given        0903 (40 mg)-Given       1719 (40 mg)-Given        0901 (40 mg)-Given       1615 (40 mg)-Given        0929 (40 mg)-Given       [ ] 1630           polyethylene glycol (MIRALAX/GLYCOLAX) Packet 17 g  Dose: 17 g Freq: DAILY Route: PO  Start: 01/11/18 0800   Admin Instructions: 1 Packet = 17 grams. Mixed prescribed dose in 8 ounces of water. Follow with 8 oz. of water.         0902 (17 g)-Given        (1046)-Not Given        (0933)-Not Given           promethazine (PHENERGAN) IV injection 25 mg  Dose: 25 mg Freq: ONCE Route: IV  Start: 01/08/18 1002   Admin Instructions: Vesicant. Dilute 25 mg with 10 mL of normal saline in a syringe. Resulting concentration = 2.5 mg/ mL. Administer over 3-5 minutes. High risk of tissue necrosis with extravasation.      (1030)-Not Given [C]                senna-docusate (SENOKOT-S;PERICOLACE) 8.6-50 MG per tablet 1-2 tablet  Dose: 1-2 tablet Freq: AT BEDTIME Route: PO  Start: 01/10/18 2200       2242 (2 tablet)-Given        2110 (2 tablet)-Given        (2116)-Not Given        [ ] 2200           simethicone (MYLICON) suspension 40  mg  Dose: 40 mg Freq: EVERY 6 HOURS PRN Route: PO  PRN Reason: cramping  Start: 01/11/18 1347        1722 (40 mg)-Given             sodium chloride (PF) 0.9% PF flush 10-20 mL  Dose: 10-20 mL Freq: EVERY 1 HOUR PRN Route: IK  PRN Reasons: line flush,post meds or blood draw  Start: 01/10/18 1812   Admin Instructions: to flush CVC - Open Ended (Tunneled and Non-Tunneled).   10 mL post IV meds; 20 mL post blood draw.               sodium chloride (PF) 0.9% PF flush 3 mL  Dose: 3 mL Freq: EVERY 8 HOURS Route: IK  Start: 01/09/18 0145   Admin Instructions: And Q1H PRN, to lock peripheral IV dormant line.              0807 (3 mL)-Given       0945 (3 mL)-Given       (1952)-Not Given        (0129)-Not Given       (0945)-Not Given       (1737)-Not Given               (1149)-Not Given       1719 (3 mL)-Given                      (1716)-Not Given [C]        (0330)-Not Given       [ ] 0945       [ ] 1745           sodium chloride (PF) 0.9% PF flush 3 mL  Dose: 3 mL Freq: EVERY 1 HOUR PRN Route: IK  PRN Reason: line flush  PRN Comment: for peripheral IV flush post IV meds  Start: 01/09/18 0141              sodium chloride 0.45% infusion  Rate: 75 mL/hr Freq: CONTINUOUS Route: IV  Start: 01/10/18 1000       1215 ( )-New Bag          0003 ( )-New Bag           sucralfate (CARAFATE) suspension 1 g  Dose: 1 g Freq: 4 TIMES DAILY BEFORE MEALS & NIGHTLY Route: PO  Start: 01/10/18 0730   Admin Instructions: Shake well. Recommended to take before meals.        0857 (1 g)-Given       1215 (1 g)-Given       (1737)-Not Given       (2243)-Not Given        0904 (1 g)-Given       (1150)-Not Given       1720 (1 g)-Given       2110 (1 g)-Given        (0900)-Not Given       (1312)-Not Given       (1614)-Not Given       (2214)-Not Given        (0932)-Not Given       [ ] 1130       [ ] 1630       [ ] 2200           tamsulosin (FLOMAX) capsule 0.4 mg  Dose: 0.4 mg Freq: DAILY Route: PO  Start: 01/09/18 0800   Admin Instructions: Administer 30  minutes after the same meal each day.  Capsules should be swallowed whole; do not crush chew or open.       0757 (0.4 mg)-Given        0857 (0.4 mg)-Given        0903 (0.4 mg)-Given        0901 (0.4 mg)-Given        0930 (0.4 mg)-Given           timolol (TIMOPTIC) 0.25 % ophthalmic solution 1 drop  Dose: 1 drop Freq: DAILY Route: Both Eyes  Start: 01/08/18 2000 2005 (1 drop)-Given        (0800)-Not Given        (0800)-Not Given        0909 (1 drop)-Given        (0902)-Not Given        (0933)-Not Given           umeclidinium (INCRUSE ELLIPTA) 62.5 MCG/INH oral inhaler 1 puff  Dose: 1 puff Freq: DAILY Route: IN  Start: 01/09/18 0800   Admin Instructions: Therapeutic interchange for spiriva.       1650 (1 puff)-Given        0900 (1 puff)-Given        0906 (1 puff)-Given        0903 (1 puff)-Given        0932 (1 puff)-Given          Completed Medications  Medications 01/07/18 01/08/18 01/09/18 01/10/18 01/11/18 01/12/18 01/13/18         Dose: 0.5-5 mL Freq: ONCE PRN Route: OTHER  PRN Comment: mild pain For local anesthetic during PICC insertion.  Start: 01/10/18 1004   End: 01/10/18 1819   Admin Instructions: Give Sub-Q/Intradermal.  Give in divided doses as needed.        1819 (1 mL)-Given                Dose: 5 mg Freq: ONCE Route: PO  Start: 01/12/18 1115   End: 01/12/18 1221         1221 (5 mg)-Given              Dose: 5-50 mL Freq: ONCE PRN Route: IK  PRN Reason: line flush  PRN Comment: to flush each lumen with line placement  Start: 01/10/18 1004   End: 01/10/18 1820   Admin Instructions: May repeat x 1        1820 (20 mL)-Given [C]                       INTERAGENCY TRANSFER FORM - NOTES (H&P, Discharge Summary, Consults, Procedures, Therapies)   1/8/2018                       [facility]C [facility]: [phone]               History & Physicals      H&P by Ruola Mcneil PA-C at 1/8/2018  2:00 PM     Author:  Roula Mcneil PA-C Service:  Hem/Onc Author Type:  Physician Assistant - C    Filed:   1/8/2018  5:47 PM Date of Service:  1/8/2018  2:00 PM Creation Time:  1/8/2018  4:41 PM    Status:  Attested :  Roula Mcniel PA-C (Physician Assistant - [facility])    Cosigner:  Elidia Mckeon MD at 1/10/2018  2:20 PM        Attestation signed by Elidia Mckeon MD at 1/10/2018  2:20 PM        Attestation:  Physician Attestation   IElidia, saw and evaluated Tomas Grey as part of a shared visit.  I have reviewed and discussed with the advanced practice provider their history, physical and plan.    I personally reviewed the vital signs, medications, labs and imaging.    My key history or physical exam findings: Mr. Grey is a 72 year old with GI bleed and history of metastatic prostate cancer. Hemoglobin 3.2 in the ED. He was given 2 units of red cells and started on ceftriaxone for possible UTI. Normally on coumadin for A fib. He was given 2 mg vitamin K in the ER.    Key management decisions made by me: Await speciation and sensitivities on urine culture. Continue ceftriaxone for now. IV PPI. Hold coumadin.    Elidia Chase  Date of Service (when I saw the patient): 1/8/18                               [facility]    History and Physical  Hematology / Oncology     Date of Admission:  1/8/2018  Date of Service (when I saw the patient): 01/08/18    Assessment & Plan   Tomas Grey is a 73 y/o male with metastatic prostate cancer and PMHx significant for CAD, CKD, COPD, HTN, A fib managed on Coumadin, and diabetes who presents with 5 days of abdominal pain and anorexia, and 3-4 days of melena. Found to have a hgb of 3.2 on presentation to the ED concerning for a GI bleed.     #Abdominal pain.  #Suspected GI bleed.  Patient endorses 3-4 days of melena at his home. States he has been having LLQ/periumbilical abdominal pain for approximately 5 days PTA and has not been eating d/t abdominal pain. Denies nausea or  "vomiting, no hematemesis. No BRBPR. Explains he told the staff at his nursing home facility but \"they did not do anything\", so patient called 911.   -On evaluation of labs, patient's hgb 3.2. Patient slightly tachycardic, but BPs stable. Pt also notes a mild headache.   -2 units PRBCs given STAT in ED. Will give an additional 1 unit PRBCs then recheck hemoglobin. Transfuse for hgb <7.   -IVF at 75 ml/hr.  -Started on PPI gtt   -GI consulted, appreciate recs. Waiting for note with recs, however briefly discussed patient with fellow, will likely plan to take patient for procedural investigation (EGD) tonight or tomorrow since patient appears stable. Improve hgb, trend hgb q 8hrs, and monitor BMs.   -HELD Coumadin and ASA given bleed, given Vitamin K 2 mg in ED.  -CT AP with no e/o acute issue. Notes a renal mass which is stable since 2014.    #Anemia. Likely 2/2 disease and previous treatment. Appears to have h/o folate deficiency as well. Has been transfusion dependant.   -Transfuse for hgb <7 at this time, will consider hgb <8 if remains symptomatic.   -Held iron sulfate 325 mg BID at this time.    #Probable UTI. Denies dysuria, however endorses frequency and urgency. Denies hematuria. H/o recurrent UTIs in setting of prostate Ca and metastatic lymph node dissection. Resistent to surgical options in past. Has f/u with Dr. Epstein 1/9/18 per NP note. On tamsulosin for retention.   -Continue PTA tamsulosin  -UA with e/o infection: >182 WBC with WBC clumps, large LE and moderate bacteria. UC pending.  -Started on Ceftriaxone 1 g q24 hrs 1/8.     #Metastatic prostate cancer. Follows with Dr. Oviedo. Metastases to bone. S/p multiple therapies (Lupron, Casodex, Xgeva, Xtandi, Zytega+pred, Xofigo, and Megace). Recent note by Dr. Oviedo 12/5 stating they would stop all cancer-directed therapies and refer to hospice. Although there are some notes that patient has been resistant/refused hospice care.     #Coronary artery disease. " "  #Hypertension.  #Chronic diastolic CHF (EF 60-65%).  -Held PTA ASA in setting of acute bleed  -Continue Isosorbide mononitrate, metoprolol, and atorvastatin with hold parameters BP <120/80 and HR <80.    #Paroxysmal A fib, managed on Coumadin. INR 1.9 on admission. IN NSR on EKG.   -Continue PTA metoprolol  -HOLD Coumadin in setting of acute GI bleed.    #CKD. Baseline Cr appears to be in upper 1s. Cr on admission 1.38.   -Monitor with daily BMP  -IVF     #COPD, on chronic oxygen.  #ALEXIS.  Has been seen/treated multiple times for COPD exacerbations. Follows with Dr. Loera in Pulmonology.   -Continue PTA Spiriva and Symbicort    #Diabetes mellitus, type II. Managed on Lantus 20 U daily.  -HOLD PTA Lantus.  -Started on medium SSI to manage BG  -QID blood glucose checks per protocol.    #Lymphedema.  #Venous stasis skin changes/ulcerations.  -Lymphedema consulted, appreciate involvement in care.   -Continue PTA Lasix BID.    #Anxiety/Depression.  -Continue PTA Cymbalta    #Congnitive impairment. Per Nursing home visit notes and note by Dr. Rossi with palliative care, patient has mild-moderate impairment based on previous neurocognitive testing. BIMS score 11/15 on 11/16/17. Notes discuss appointing a guardian for patient to make medical decisions. Patient does have 11 children, however notes indicate they do not attend his hospital appointments. Patient explained that his medical decision maker is his \"son\" Les Bourgeois (stepson).     #Glaucoma. Follows with [facility].   -Continue Bimatoprost, Timolol Hemihydrate, and Carboxymethylcellulose drops.    FEN  -IVF @ 75 ml/hr  -replace lytes prn  -NPO for possible GI procedure    PPx  -VTE: defer d/t likely GI bleed  -PUD: PPI gtt    Dispo: Pending evaluation and resolution of acute issues, likely 2-3 day stay.     Roula Mcneil PA-C  Hematology/Oncology  Pager #[phone]    Code Status   Full Code[CR1.1], discussed with patient and he deferred decision " "to family members however unable to get ahold of on day of admission, will continue to discuss.[CR1.2]    Primary Care Physician   Ekaterina Lozano    Chief Complaint   Patient presents to ED with 5 day history of abdominal pain and anorexia as well as a 3-4 day history of melena.    History is obtained from the patient and electronic health record    History of Present Illness   Tomas Grey is a 71 y/o male with metastatic prostate cancer and PMHx significant for CAD, CKD, COPD, HTN, A fib managed on Coumadin, and diabetes who presents with 5 days of abdominal pain and anorexia, and 3-4 days of melena. Found to have a hgb of 3.2 on presentation to the ED concerning for a GI bleed.     Tomas explains he has had this lower abdominal pain for approximately 5 days, localizes pain to LLQ and periumbilical area. States he has not been eating due to this pain. 3-4 days prior to admission, he noted that his stools were black. Patient explains he told the nursing home staff of new onset melena and \"they did nothing about it.\" He called 911 on his own. Additionally, endorses a headache and some baseline dizziness. Denies nausea, vomiting, or hematemesis. No BRPBR. Denies dysuria but endorses frequency and urgency with urination. No hematuria. Patient requesting to not return to his current living situation (LTCF) because of their \"lack of care\". Tried discussing code status with him, and he deferred decision to his children (Les vega).     Past Medical History    I have reviewed this patient's medical history and updated it with pertinent information if needed.[CR1.1]   Past Medical History:   Diagnosis Date     Anemia      Anxiety      Aspiration pneumonia (H) 2014     C. difficile colitis      CAD (coronary artery disease)      Chronic pain      CKD (chronic kidney disease)      COPD (chronic obstructive pulmonary disease) (H)      Depressive disorder      Diabetes mellitus (H)      Hypertension "      Insomnia      ALEXIS (obstructive sleep apnea)      Osteoporosis      Prostate cancer metastatic to multiple sites (H)[CR1.3]        Past Surgical History   I have reviewed this patient's surgical history and updated it with pertinent information if needed.[CR1.1]  Past Surgical History:   Procedure Laterality Date     ABDOMEN SURGERY       ARTHROSCOPY KNEE       EYE SURGERY[CR1.3]         Prior to Admission Medications   Prior to Admission Medications   Prescriptions Last Dose Informant Patient Reported? Taking?   Carboxymethylcellulose Sodium (CARBOXYMETHYLCELLULOSE SOD PF OP) 1/7/2018 at Unknown time  Yes Yes   Sig: Place 1 drop into both eyes 4 times daily And 1 drop both eyes daily prn   DULoxetine HCl (CYMBALTA PO) 1/7/2018 at Unknown time  Yes Yes   Sig: Take 20 mg by mouth daily   METHOCARBAMOL PO 1/7/2018 at Unknown time  Yes Yes   Sig: Take 500 mg by mouth every 6 hours as needed for muscle spasms   ONDANSETRON PO 1/7/2018 at Unknown time  Yes Yes   Sig: Take 4 mg by mouth every 8 hours as needed for nausea   OXYCODONE HCL PO 1/7/2018 at Unknown time  Yes Yes   Sig: Take 5 mg by mouth every 4 hours as needed   Timolol Hemihydrate (BETIMOL OP) 1/7/2018 at Unknown time  Yes Yes   Sig: Place 1 drop into both eyes daily    WARFARIN SODIUM PO 1/7/2018 at Unknown time  Yes Yes   Sig: Take by mouth daily See facility orders for INR dosing   acetaminophen (TYLENOL) 500 MG tablet 1/7/2018 at Unknown time Nursing Home Yes Yes   Sig: Take 1,000 mg by mouth 3 times daily Not to exceed 3000 mg/24 hours    ammonium lactate (LAC-HYDRIN) 12 % lotion 1/7/2018 at Unknown time  Yes Yes   Sig: Apply topically 2 times daily as needed for dry skin To all extremities for dry skin   aspirin 81 MG EC tablet 1/7/2018 at Unknown time  No Yes   Sig: Take 1 tablet (81 mg) by mouth daily   atorvastatin (LIPITOR) 10 MG tablet 1/7/2018 at Unknown time  Yes Yes   Sig: Take 10 mg by mouth At Bedtime    bimatoprost (LUMIGAN) 0.01 % SOLN  1/7/2018 at Unknown time  Yes Yes   Sig: Place 1 drop into both eyes At Bedtime   budesonide-formoterol (SYMBICORT) 160-4.5 MCG/ACT Inhaler 1/7/2018 at Unknown time  Yes Yes   Sig: Inhale 2 puffs into the lungs 2 times daily   calcium carbonate (OS-JORDAN 600 MG [place]. CA) 1500 (600 CA) MG tablet 1/7/2018 at Unknown time  Yes Yes   Sig: Take 1,500 mg by mouth 2 times daily (with meals)   diclofenac (VOLTAREN) 1 % GEL topical gel 1/7/2018 at Unknown time  No Yes   Sig: Apply 4 grams to knees four times daily as needed using enclosed dosing card.   fentaNYL (DURAGESIC) 12 mcg/hr 72 hr patch 1/7/2018 at Unknown time  Yes Yes   Sig: Place 1 patch onto the skin every 72 hours   ferrous sulfate (IRON) 325 (65 FE) MG tablet 1/7/2018 at Unknown time  Yes Yes   Sig: Take 325 mg by mouth 2 times daily   furosemide (LASIX) 20 MG tablet 1/7/2018 at Unknown time  No Yes   Sig: Take 1 tablet (20 mg) by mouth 2 times daily   insulin glargine (LANTUS) 100 UNIT/ML injection 1/7/2018 at Unknown time  Yes Yes   Sig: Inject 20 Units Subcutaneous every morning    ipratropium - albuterol 0.5 mg/2.5 mg/3 mL (DUONEB) 0.5-2.5 (3) MG/3ML neb solution 1/7/2018 at Unknown time  Yes Yes   Sig: Take 1 vial by nebulization every 4 hours as needed for shortness of breath / dyspnea or wheezing   isosorbide mononitrate (IMDUR) 30 MG 24 hr tablet 1/7/2018 at Unknown time  No Yes   Sig: Take 2 tablets (60 mg) by mouth daily   metoprolol (TOPROL XL) 50 MG 24 hr tablet 1/7/2018 at Unknown time  No Yes   Sig: Take 1 tablet (50 mg) by mouth daily   nitroglycerin (NITROSTAT) 0.4 MG SL tablet 1/7/2018 at Unknown time  Yes Yes   Sig: Place 0.4 mg under the tongue every 5 minutes as needed for chest pain if you are still having symptoms after 3 doses (15 minutes) call 911.   omeprazole (PRILOSEC) 10 MG CR capsule 1/7/2018 at Unknown time  No Yes   Sig: Take 1 capsule (10 mg) by mouth daily   senna-docusate (SENOKOT-S;PERICOLACE) 8.6-50 MG per tablet 1/7/2018 at  Unknown time  Yes Yes   Sig: Take 2 tablets by mouth 2 times daily   tamsulosin (FLOMAX) 0.4 MG 24 hr capsule 1/7/2018 at Unknown time  No Yes   Sig: Take 1 capsule (0.4 mg) by mouth daily   tiotropium (SPIRIVA) 18 MCG inhalation capsule 1/7/2018 at Unknown time  Yes Yes   Sig: Inhale 18 mcg into the lungs daily      Facility-Administered Medications: None     Allergies   Allergies   Allergen Reactions     Morphine Other (See Comments)     Patient reports makes him almost go into a coma       Social History   I have reviewed this patient's social history and updated it with pertinent information if needed. Tomas Grey[CR1.1]  reports that he has quit smoking. He has never used smokeless tobacco. He reports that he does not drink alcohol or use illicit drugs.[CR1.3]    Family History   I have reviewed this patient's family history and updated it with pertinent information if needed.[CR1.1]   Family History   Problem Relation Age of Onset     DIABETES Mother      CANCER Mother      stomach[CR1.3]       Review of Systems   The 10 point Review of Systems is negative other than noted in the HPI or here.     Physical Exam   Temp: 98.4  F (36.9  C) Temp src: Axillary BP: 129/84   Heart Rate: 109 Resp: 18 SpO2: 99 % O2 Device: Nasal cannula Oxygen Delivery: 2 LPM  Vital Signs with Ranges  Temp:  [97.7  F (36.5  C)-98.9  F (37.2  C)] 98.4  F (36.9  C)  Heart Rate:  [102-120] 109  Resp:  [18] 18  BP: (105-156)/(62-84) 129/84  SpO2:  [96 %-100 %] 99 %  195 lbs 1.6 oz    Constitutional: Pleasant male seen sitting up in a chair, in NAD. Alert and interactive.   HEENT: NCAT, anicteric sclerae, conjunctiva clear, + proptosis. Moist mucous membranes without lesions or thrush. Poor dentition.  Respiratory: Non-labored breathing, good air exchange. Lungs are slightly coarse in bases, otherwise clear. No wheezing.   Cardiovascular: Tachycardic with regular rhythm, no murmur, rub or gallop.  GI: Normoactive BS. Abdomen is soft,  non-distended, with tenderness to palpation of epigastric, LUQ, LLQ, periumbilical, and RLQ. No rigidity or guarding.   Skin: Warm and dry. Significant distal LE changes 2/2 venous stasis (scaling and venous stasis ulcer).  Musculoskeletal: Extremities grossly normal. Edematous LE bilaterally to knees.  Neurologic: A &O x3, speech normal, answering questions appropriately. Moves all extremities spontaneously. Grossly non-focal.  Neuropsychiatric: Mentation and affect normal/appropriate.    Data   I personally reviewed the EKG tracing showing NSR, the chest x-ray image(s) showing pending and the abdominal CT image(s) showing no acute intra-abdominal pathology, stable renal mass..[CR1.1]  Results for orders placed or performed during the hospital encounter of 01/08/18 (from the past 24 hour(s))   EKG 12-lead, tracing only   Result Value Ref Range    Interpretation ECG Click View Image link to view waveform and result    XR Chest Port 1 View    Narrative    Exam: XR CHEST PORT 1 VW, 1/8/2018 10:48 AM    Indication: Chest pain    Comparison: 12/18/2017, 12/13/2017, 1/16/2017, 12/26/2016    Findings:   A single AP view of the chest was obtained. The cardiomediastinal  silhouette is not enlarged. No pneumothorax or pleural effusion.  Prominent pulmonary vasculature. Mild streaky bibasilar opacities. Old  right rib fractures.      Impression    Impression:   Prominent pulmonary vasculature with mild streaky bibasilar opacities,  likely atelectasis.    I have personally reviewed the examination and initial interpretation  and I agree with the findings.    BENITO ROWLEY MD   CBC with platelets differential   Result Value Ref Range    WBC 12.1 (H) 4.0 - 11.0 10e9/L    RBC Count 1.23 (L) 4.4 - 5.9 10e12/L    Hemoglobin 3.2 (LL) 13.3 - 17.7 g/dL    Hematocrit 11.0 (L) 40.0 - 53.0 %    MCV 89 78 - 100 fl    MCH 26.0 (L) 26.5 - 33.0 pg    MCHC 29.1 (L) 31.5 - 36.5 g/dL    RDW 19.4 (H) 10.0 - 15.0 %    Platelet Count 346 150 -  450 10e9/L    Diff Method Automated Method     % Neutrophils 65.3 %    % Lymphocytes 17.0 %    % Monocytes 11.9 %    % Eosinophils 4.8 %    % Basophils 0.2 %    % Immature Granulocytes 0.8 %    Nucleated RBCs 0 0 /100    Absolute Neutrophil 7.9 1.6 - 8.3 10e9/L    Absolute Lymphocytes 2.1 0.8 - 5.3 10e9/L    Absolute Monocytes 1.4 (H) 0.0 - 1.3 10e9/L    Absolute Eosinophils 0.6 0.0 - 0.7 10e9/L    Absolute Basophils 0.0 0.0 - 0.2 10e9/L    Abs Immature Granulocytes 0.1 0 - 0.4 10e9/L    Absolute Nucleated RBC 0.0    INR   Result Value Ref Range    INR 1.90 (H) 0.86 - 1.14   ABO/Rh type and screen   Result Value Ref Range    Units Ordered 2     ABO O     RH(D) Pos     Antibody Screen Neg     Test Valid Only At          [facility]    Specimen Expires 01/11/2018     Crossmatch Red Blood Cells    Comprehensive metabolic panel   Result Value Ref Range    Sodium 143 133 - 144 mmol/L    Potassium 4.3 3.4 - 5.3 mmol/L    Chloride 110 (H) 94 - 109 mmol/L    Carbon Dioxide 24 20 - 32 mmol/L    Anion Gap 9 3 - 14 mmol/L    Glucose 84 70 - 99 mg/dL    Urea Nitrogen 30 7 - 30 mg/dL    Creatinine 1.38 (H) 0.66 - 1.25 mg/dL    GFR Estimate 51 (L) >60 mL/min/1.7m2    GFR Estimate If Black 61 >60 mL/min/1.7m2    Calcium 7.9 (L) 8.5 - 10.1 mg/dL    Bilirubin Total 0.3 0.2 - 1.3 mg/dL    Albumin 2.3 (L) 3.4 - 5.0 g/dL    Protein Total 7.0 6.8 - 8.8 g/dL    Alkaline Phosphatase 150 40 - 150 U/L    ALT 11 0 - 70 U/L    AST 13 0 - 45 U/L   Lipase   Result Value Ref Range    Lipase 104 73 - 393 U/L   Lactic acid whole blood   Result Value Ref Range    Lactic Acid 0.7 0.7 - 2.0 mmol/L   Troponin I   Result Value Ref Range    Troponin I ES <0.015 0.000 - 0.045 ug/L   Blood component   Result Value Ref Range    Unit Number [id number]     Blood Component Type Red Blood Cells Leukocyte Reduced     Division Number 00     Status of Unit Released to care unit 01/08/2018 1513     Blood Product Code  [id number]     Unit Status ISS    Blood component   Result Value Ref Range    Unit Number [id number]     Blood Component Type Red Blood Cells Leukocyte Reduced     Division Number 00     Status of Unit Released to care unit 01/08/2018 1243     Blood Product Code [id number]     Unit Status ISS    UA with Microscopic   Result Value Ref Range    Color Urine Yellow     Appearance Urine Cloudy     Glucose Urine Negative NEG^Negative mg/dL    Bilirubin Urine Negative NEG^Negative    Ketones Urine 5 (A) NEG^Negative mg/dL    Specific Gravity Urine 1.017 1.003 - 1.035    Blood Urine Small (A) NEG^Negative    pH Urine 5.5 5.0 - 7.0 pH    Protein Albumin Urine 30 (A) NEG^Negative mg/dL    Urobilinogen mg/dL Normal 0.0 - 2.0 mg/dL    Nitrite Urine Negative NEG^Negative    Leukocyte Esterase Urine Large (A) NEG^Negative    Source Clean catch urine     WBC Urine >182 (H) 0 - 2 /HPF    RBC Urine 68 (H) 0 - 2 /HPF    WBC Clumps Present (A) NEG^Negative /HPF    Bacteria Urine Moderate (A) NEG^Negative /HPF    Transitional Epi 1 0 - 1 /HPF   Urine Culture Aerobic Bacterial   Result Value Ref Range    Specimen Description Catheterized Urine     Special Requests Specimen received in preservative     Culture Micro PENDING    CT Abdomen Pelvis w Contrast   Result Value Ref Range    Radiologist flags (Urgent)      Right renal mass with differential includes oncocytoma    Narrative    EXAMINATION: CT ABDOMEN PELVIS W CONTRAST, 1/8/2018 12:00 PM    TECHNIQUE:  Helical CT images from the lung bases through the  symphysis pubis were obtained with contrast    COMPARISON: CT abdomen pelvis on 9/1/2016    HISTORY: LLQ pain r/o diverticulitis;     DLP: 678 mGy*cm    Contrast dose: 126 cc of isovue 370    FINDINGS:  Abdomen and pelvis:   There is a enhancing structure in the lower pole of the right kidney  measuring 2.5 cm on series 5 image 160. On prior nonenhanced studies  this does not demonstrate enhancement and also do not appear  cystic,  indicating an enhancing mass.    Cystic lesion in both kidneys including 5.6 x 6.3 cm cyst arising from  the inferior pole of the left kidney (series 2, image 29) and 2.9 x  3.4 cm cyst arising from the inferior pole of the right kidney (series  2, image 29). No hydronephrosis. No hydroureter. The no renal calculi  identified. The distal right ureter dilated up to 2.1 cm with no  definite stenosis or obstructing stone, similar to CT on 9/9/2016.  Nodularity of one of the adrenal glands measuring 1.8 x 1.8 cm (series  2, image 19), in the prior noncontrasted study on 9/9/2016, it has  Hounsfield units of 25 and measuring approximately 17 x 16 mm.  Gallbladder, biliary ducts, spleen, and pancreas are unremarkable. The  right adrenal gland is unremarkable. There is a 10 x 7 mm Ill-defined  hypoattenuating lesion in the right liver lobe (series 2, image 18),  is too small to characterize by CT.  No free fluid in the abdomen or pelvis. No free air in the abdomen.  Small bowel and colon are normal caliber without appreciable wall  thickening. No evidence for diverticulitis. No lymphadenopathy in the  abdomen or pelvis.  Aneurysmal dilation of infrarenal abdominal aorta up to 3.2 cm at the  superior endplate of L4. Aneurysmal dilatation of bilateral common  iliac arteries, right up to 2.2 cm and left up to 2.2 cm. These are  similar to CT on 9/11/2016.    Bones and soft tissues: Innumerable patchy ill-defined sclerosis in  the vertebral bodies of visualized spine and bony pelvis and femurs  consistent with known metastatic disease. No acute osseous abnormality  identified. Fat-containing umbilical hernia.    Lower chest: No no pleural effusion. No pericardial effusion. The  heart size is within normal limits.      Impression    IMPRESSION:   1. No cause of acute pain is identified in the abdomen or pelvis.  2. 2.5 cm right lower pole solid renal mass. This is been present on  noncontrast CT since at least  7/16/2014 and size has not changed  substantially. Differential includes oncocytoma and renal cell  carcinoma.  3. Redemonstration of multiple patchy ill-defined sclerosis in  vertebral bodies and bony pelvis consistent with known metastatic  disease.  4. Aneurysmal dilation of the aorta and bilateral common iliac  arteries.  5. Nodularity of the left adrenal gland not significantly changed  since 7/16/2014.      [Urgent Result: Right renal mass with differential includes oncocytoma  and renal cell carcinoma]    Finding was identified on 1/8/2018 12:22 PM.     Dr. Peguero was contacted by Dr. Cruz at 1/8/2018 1:47 PM and  verbalized understanding of the urgent finding.     I have personally reviewed the examination and initial interpretation  and I agree with the findings.    NATALIA DAVIS MD   Glucose by meter   Result Value Ref Range    Glucose 77 70 - 99 mg/dL     *Note: Due to a large number of results and/or encounters for the requested time period, some results have not been displayed. A complete set of results can be found in Results Review.[CR1.4]            Revision History        User Key Date/Time User Provider Type Action    > CR1.2 1/8/2018  5:47 PM Roula Mcneil PA-C Physician Assistant - TIFFANI Sign     CR1.4 1/8/2018  5:34 PM Roula Mcneil PA-C Physician Assistant - TIFFANI Sign     CR1.3 1/8/2018  5:25 PM Roula Mcneil PA-C Physician Assistant - C      CR1.1 1/8/2018  4:41 PM Roula Mcneil PA-C Physician Assistant - C                   Discharge Summaries     No notes of this type exist for this encounter.         Consult Notes      Consults by Susan Oneil at 1/9/2018 10:19 AM     Author:  Susan Oneil Service:  Social Work Author Type:      Filed:  1/9/2018  4:20 PM Date of Service:  1/9/2018 10:19 AM Creation Time:  1/9/2018  4:19 PM    Status:  Addendum :  Susan Oneil ()     Consult Orders:    1. Social Work IP Consult [[id number]]  "ordered by Roula Mcneil PA-C at 01/09/18 1011                SW brief note:     Confirmed with [facility] that pt has bed hold within long-term care, able to return when medically stable and ready for DC.        [facility], [facility]   [address] [place] [address]   Fax: [phone]  Main: [phone]  Admissions: [phone]              JW Thorne, MSW  7D  (Hem/Onc)  Pager: [phone]  1/9/2018[JM1.1]     Revision History        User Key Date/Time User Provider Type Action    > [N/A] 1/9/2018  4:20 PM Susan Oneil Worker Addend     JM1.1 1/9/2018  4:19 PM Susan Oneil Sign            Consults by Gt Iniguez MD at 1/9/2018  3:29 PM     Author:  Gt Iniguez MD Service:  Palliative Author Type:  Physician    Filed:  1/9/2018  4:07 PM Date of Service:  1/9/2018  3:29 PM Creation Time:  1/9/2018  3:22 PM    Status:  Addendum :  Gt Iniguez MD (Physician)     Consult Orders:    1. Palliative Care Adult IP Consult: Patient to be seen: Routine within 24 hrs; Call back #: [phone]; GOC discussion and evaluation of therapy goals; Consultant may enter orders: Yes [[id number]] ordered by Roula Mcneil PA-C at 01/09/18 1236                [facility]  Palliative Care Consultation         Tomas Grey MRN# [id number]   Age: 72 year old YOB: 1945   Date of Admission: 1/8/2018    Reason for consult: Goals of care       Requesting physician/service: Roula GOMEZ/Heme Onc       Recommendations        - Mr Grey reports, consistently and with the ability to explain why, that he chooses his stepson Les Ashford (also referred to in the chart as his son-in-law) as his choice for HCA.  Pt has an ACD in chart from 2015 listing his cousin Melissa Mccormack as his HCA and his son Jeevan Grey as alternate HCA, but reports that \"Les is the one who " "comes to see me now, he helps me the most\".[JE1.1]   D/W JULIANA Uriel, who is trying to reach pt.  Consider new ACD if indicated as we further sort through his issues.      -I tried to bring up code status to assess how he engages about this, in spite of comments in his chart about limited decisional capacity based on formal psych testing, but Mr Grey stopped me immediately.  \"They know all about this, it's all in the chart\" and wouldn't engage further.    - He feels his pain is well controlled at this time.  We note for future references that he's been seen in our clinic previously (while still living with his son) and there were concerns about opioid diversion and other opioid misadventures.     Gt Iniguez MD  Palliative Medicine Consult Team  Pager: [phone]   TT: 72 minutes, with > 50% spent in C/C/E patient/family/care teams re: GOC, POC, Sx management.   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 Found to have a hgb of 3.2 on presentation to the ED concerning for a GI bleed. Multiple recent admissions including [facility] ~ 2 weeks ago.  Seen in Oncology clinic here.[JE1.2]       Support/Coping[JE1.1]   Chart notes that he has 11 children but there seems to be a pattern of them not being involved; there are comments in the chart about his care facility seeking a guardian for him 2/2 family non-involvement and limited decisional capacity.[JE1.2]     Decision-Making & Goals of Care     Discussion/counseling today about prognosis/goals of care/decisions:[JE1.1]   None[JE1.2]  Patient has a completed health care directive available in the chart (Y/N):[JE1.1] Old ACD 2015; see comments above[JE1.2]  Health " care agent (only if patient has an available, complete HCD):[JE1.1]  See comments above  Physician orders for life-sustaining treatment (POLST) form is not completed[JE1.2]  Code Status:[JE1.1] Full code[JE1.2]   Patient has decision-making capacity (Y/N):[JE1.1] He's able to contribute to some decisions, but does not appear to have decisional capacity for independent complex decision-making.[JE1.2]     Coordination of Care     Findings & plan of care discussed with:[JE1.1] Primary team, JULIANA Oneil[JE1.2]  Follow-up plan from palliative team:[JE1.1] Will f/u with you[JE1.2]            Chief Complaint[JE1.1]     GOC in setting of late-stage prostate CA with no treatment options, transfusion dependent ACD, GOC inconsistent with disease status.[JE1.2]         History of Present Illness[JE1.1]     73 y/o male with metastatic prostate cancer who presents with 5 days of abdominal pain and anorexia, and 3-4 days of melena. Found to have a hgb of 3.2 on presentation to the ED concerning for a GI bleed. Multiple recent admissions including [facility] ~ 2 weeks ago.  Seen in oncology clinic here. Recently DC'd from [facility] to [facility].[JE1.2]          Past Medical History:[JE1.1]   Past Medical History:   Diagnosis Date     Anemia      Anxiety      Aspiration pneumonia (H) 2014     C. difficile colitis      CAD (coronary artery disease)      Chronic pain      CKD (chronic kidney disease)      COPD (chronic obstructive pulmonary disease) (H)      Depressive disorder      Diabetes mellitus (H)      Hypertension      Insomnia      ALEXIS (obstructive sleep apnea)      Osteoporosis      Prostate cancer metastatic to multiple sites (H)[JE1.3]               Past Surgical History:[JE1.1]   Past Surgical History:   Procedure Laterality Date     ABDOMEN SURGERY       ARTHROSCOPY KNEE       EYE SURGERY[JE1.3]                 Social/Spiritual History:     Living situation:[JE1.1] Currently at [facility][JE1.2]  Family system:[JE1.1] By chart review has 11 children, but multiple chart references to limited family involvement[JE1.2]  Functional status (needs help with ADLs or IADLs):[JE1.1] Dependent[JE1.2]  Employment/education:[JE1.1] Retired from [employer][JE1.2]  Use of community resources:[JE1.1] ND[JE1.2]  Activities/interests:[JE1.1] ND[JE1.2]  History of substance use/abuse:[JE1.1] History of opioid diversion and other aberrant opioid-related behaviors per Dr Rossi's clinic note 11/17.[JE1.2]            Family History:[JE1.1]   Family History   Problem Relation Age of Onset     DIABETES Mother      CANCER Mother      stomach[JE1.4]     Family history reviewed and updated in EPIC[JE1.2]           Allergies:[JE1.1]   Allergies   Allergen Reactions     Morphine Other (See Comments)     Patient reports makes him almost go into a coma[JE1.5]              Medications:   I have reviewed this patient's medication profile and medications during this hospitalization[JE1.1]  Fentanyl 12 mcg patch  IV dilaudid 0.5 mg q 4 hours prn  APAP 1 gm TID  Duloxetine 20 mg daily[JE1.2]           Review of Systems:   The comprehensive review of systems is negative other than noted here and in the HPI.[JE1.1]  Pain is well controlled. + for weakness, abdominal pain,      Physical Exam:  VITALS:[JE1.2] Blood pressure 124/59, temperature 97.5  F (36.4  C), temperature source Axillary, resp. rate 16, height 1.803 m (5' 11\"), weight 88.5 kg (195 lb 1.6 oz), SpO2 98 %.[JE1.6]   GEN: Awake, alert, interactive. Chronically ill appearing  EYES: Sclera non-icteric  EARS: Eumorphic bilaterally  NOSE: No significant nasal drainage  MOUTH: Oral mucosa moist, no evidence of thrush  LUNGS: No increased WOB or accessory muscle use; anterior fields clear  CV: Regular radial pulse 88  ABD:Soft; + mild diffuse tenderness  EXTR: 3 LE edema and lymphedema;[JE1.2] R[JE1.7]LE dressed  SKIN: Warm, dry  NEUROPSYCH: Engaged, coherent thought " process[JE1.2]              Data Reviewed:[JE1.1]     ROUTINE ICU LABS (Last four results)  CMP[JE1.2]  Recent Labs  Lab 01/09/18 0421 01/08/18  1028    143   POTASSIUM 4.5 4.3   CHLORIDE 110* 110*   CO2 22 24   ANIONGAP 12 9   GLC 72 84   BUN 22 30   CR 1.31* 1.38*   GFRESTIMATED 54* 51*   GFRESTBLACK 65 61   JORDAN 8.0* 7.9*   MAG 1.6  --    PHOS 2.4*  --    PROTTOTAL  --  7.0   ALBUMIN  --  2.3*   BILITOTAL  --  0.3   ALKPHOS  --  150   AST  --  13   ALT  --  11[JE1.8]     CBC[JE1.2]  Recent Labs  Lab 01/09/18  1324 01/09/18 0421 01/08/18 2050 01/08/18  1028   WBC  --  9.1  --  12.1*   RBC  --  3.96*  --  1.23*   HGB 10.3* 11.0* 10.5* 3.2*   HCT  --  36.2*  --  11.0*   MCV  --  91  --  89   MCH  --  27.8  --  26.0*   MCHC  --  30.4*  --  29.1*   RDW  --  18.6*  --  19.4*   PLT  --  231  --  346[JE1.8]     INR[JE1.2]  Recent Labs  Lab 01/09/18 0421 01/08/18  1028   INR 1.43* 1.90*[JE1.8]     Arterial Blood Gas[JE1.2]No lab results found in last 7 days.[JE1.8]             Revision History        User Key Date/Time User Provider Type Action    > [N/A] 1/9/2018  4:07 PM Gt Iniguez MD Physician Addend     JE1.7 1/9/2018  4:07 PM Gt Iniguez MD Physician Sign     JE1.8 1/9/2018  4:05 PM Gt Iniguez MD Physician      JE1.6 1/9/2018  4:04 PM tG Iniguez MD Physician      JE1.5 1/9/2018  4:02 PM Gt Iniguez MD Physician      JE1.4 1/9/2018  4:01 PM Gt Iniguez MD Physician      JE1.3 1/9/2018  4:00 PM Gt Iniguez MD Physician      JE1.2 1/9/2018  3:48 PM Gt Iniguez MD Physician      JE1.1 1/9/2018  3:22 PM Gt Iniguez MD Physician             Consults by Nancy Grande MD at 1/8/2018  6:12 PM     Author:  Nancy Grande MD Service:  Gastroenterology Author Type:  Fellow    Filed:  1/8/2018  6:50 PM Date of Service:  1/8/2018  6:12 PM Creation Time:  1/8/2018  6:12 PM    Status:  Attested :  Nancy Grande MD (Fellow)     Cosigner:  Kike Schuler MD at 1/8/2018  9:03 PM         Consult Orders:    1. GI LUMINAL ADULT IP CONSULT: Patient to be seen: STAT within 1 hr; Call back #: [id number]; Melena x 4 days with hgb of 3.2 on admission, ? procedural evaluation for source of GI bleed; Consultant may enter orders: Yes [[id number]] ordered by Roula Mcneil PA-C at 01/08/18 1356           Attestation signed by Kike Schuler MD at 1/8/2018  9:03 PM        I performed a history and physical examination of the above patient and discussed the management with Dr. Grande on 1/8/2018. I reviewed the note and there are no changes to the past medical, family or social history.  A complete 10 point review of systems was obtained. Please see the HPI for pertinent positives and negatives. All other systems were reviewed and were found to be negative. I agree with the documented findings and plan of care as outlined with the following additions:    Patient overall with very poor prognosis.  He is almost certainly bleeding from his stomach, although the chance of finding an endoscopically treatable lesion is low given his subacute presentation.  More likely this represents oozing from an ucler, gastritis, or less likely metastatic disease to his GI tract. EGD can be performed, but it is high risk particularly for sedation related complications such as aspiration, and will require anesthesia to monitor his sedation. Additionally I think it unlikely to be a therapeutic endoscopy and more likely a diagnostic endoscopy.      Recommend a team meeting with family to help define this patients goals of care and determine how to focus his care.  Palliative care and or hospice may be appropriate.      Kike Schuler MD  GI Attending  Pager: [phone]                                     GASTROENTEROLOGY CONSULTATION      Date of Admission:  1/8/2018          ASSESSMENT AND RECOMMENDATIONS:   Assessment:  Tomas Grey is a 72 year old male  with a past medical history of prostate cancer with extensive metastasis to the bone not on current therapy given poor functional status and recurrent admissions for COPD/infections, COPD on 2L home oxygen, chronic pain on fentanyl patch, transfusion-dependent anemia (b/l ~7), and chronic renal insufficiency who is presenting with a three day history of black stools and anemia with hemoglobin to 3.2 concerning for gastrointestinal bleed.     Overall clinical picture consistent with GI bleeding; differential diagnosis includes peptic ulcer disease, gastritis, malignancy (including primary cancer vs prostate cancer mets), vs AVM etc. EGD unlikely to assist with overall clinical course and likely carries more risk than benefit in this patient with narcotic and oxygen dependence in the setting of untreated metastatic cancer. Anemia seems to be chronic in nature with hemoglobins between 5-8 at baseline. Would treat for possible PUD with PPI. Likely little role for endoscopic intervention if underlying pathology were malignant.     Thein Blatchford Score of 12.     Recommendations:   - Continue PPI gtt   - Trend hemoglobin, monitor bowel movements   - Transfuse for hemoglobin <7 from GI perspective; patient may have higher goals (8) based on cardiovascular disease   - Ensure two large-bore peripheral IVs   - Recommend planning family conference regarding overall goals of care (metastatic prostate cancer not on therapy) and discussion of risks vs benefits of EGD as this patient is high-risk for EGD and findings would not likely change overall plan in this patient   - GI will continue to follow; please contact our service with hemodynamically significant bleeding.    Gastroenterology outpatient follow up recommendations: Pending clinical course.    Thank you for involving us in this patient's care. Please do not hesitate to contact the GI service with any questions or concerns.     Pt care plan discussed with Dr. Schuler,  GI staff physician.    Nancy Grande MD  Gastroenterology Fellow  -------------------------------------------------------------------------------------------------------------------          Chief Complaint:   We were asked by Roula Mcneil of Heme/Onc to evaluate this patient with GIB    History is obtained from the patient and the medical record.          History of Present Illness:   Tomas Grey is a 72 year old male with a past medical history of prostate cancer with extensive metastasis to the bone not on current therapy given poor functional status and recurrent admissions for COPD/infections, COPD on 2L home oxygen, chronic pain on fentanyl patch, transfusion-dependent anemia (b/l ~7), and chronic renal insufficiency who is presenting with a three day history of black stools and anemia with hemoglobin to 3.2 concerning for gastrointestinal bleed.     Patient reports abdominal pain, 10/10 severity, located near his navel, achy in quality and non-radiating over the past several days. The pain has stopped with pain medication administration in the emergency department. He also notes black stools over the past three days, averaging ~3 bowel movements daily on average. He has a headache and some palpitations. He has had a decreased appetite for several days, he has only tolerated watermelon and tea. He denies nausea, vomiting, fevers, chills, change in urine, dizziness or lightheadedness. No syncope.    Inconsistent in reports of prior endoscopies - unclear if he has ever had EGD or colonoscopy - none noted on [facility] or [facility] records. Denies history of GI bleeds. Denies NSAID use.     Prior documentation by palliative care notes patient is incapable of medical decision making. Unfortunately, family is not at bedside on my examination.             Past Medical History:   Reviewed and edited as appropriate  Past Medical History:   Diagnosis Date     Anemia      Anxiety      Aspiration  pneumonia (H) 2014     C. difficile colitis      CAD (coronary artery disease)      Chronic pain      CKD (chronic kidney disease)      COPD (chronic obstructive pulmonary disease) (H)      Depressive disorder      Diabetes mellitus (H)      Hypertension      Insomnia      ALEXIS (obstructive sleep apnea)      Osteoporosis      Prostate cancer metastatic to multiple sites (H)             Past Surgical History:   Reviewed and edited as appropriate   Past Surgical History:   Procedure Laterality Date     ABDOMEN SURGERY       ARTHROSCOPY KNEE       EYE SURGERY              Previous Endoscopy:   No results found. However, due to the size of the patient record, not all encounters were searched. Please check Results Review for a complete set of results.         Social History:   Reviewed and edited as appropriate  Social History     Social History     Marital status:      Spouse name: N/A     Number of children: N/A     Years of education: N/A     Occupational History     Not on file.     Social History Main Topics     Smoking status: Former Smoker     Smokeless tobacco: Never Used     Alcohol use No     Drug use: No     Sexual activity: No     Other Topics Concern     Not on file     Social History Narrative            Family History:   Reviewed and edited as appropriate  Family History   Problem Relation Age of Onset     DIABETES Mother      CANCER Mother      stomach       No known history of colorectal cancer, liver disease, or inflammatory bowel disease.       Allergies:   Reviewed and edited as appropriate     Allergies   Allergen Reactions     Morphine Other (See Comments)     Patient reports makes him almost go into a coma            Medications:[AL1.1]     Prescriptions Prior to Admission   Medication Sig Dispense Refill Last Dose     DULoxetine HCl (CYMBALTA PO) Take 20 mg by mouth daily   1/7/2018 at Unknown time     METHOCARBAMOL PO Take 500 mg by mouth every 6 hours as needed for muscle spasms   1/7/2018  at Unknown time     OXYCODONE HCL PO Take 5 mg by mouth every 4 hours as needed   1/7/2018 at Unknown time     ferrous sulfate (IRON) 325 (65 FE) MG tablet Take 325 mg by mouth 2 times daily   1/7/2018 at Unknown time     ipratropium - albuterol 0.5 mg/2.5 mg/3 mL (DUONEB) 0.5-2.5 (3) MG/3ML neb solution Take 1 vial by nebulization every 4 hours as needed for shortness of breath / dyspnea or wheezing   1/7/2018 at Unknown time     Carboxymethylcellulose Sodium (CARBOXYMETHYLCELLULOSE SOD PF OP) Place 1 drop into both eyes 4 times daily And 1 drop both eyes daily prn   1/7/2018 at Unknown time     fentaNYL (DURAGESIC) 12 mcg/hr 72 hr patch Place 1 patch onto the skin every 72 hours   1/7/2018 at Unknown time     senna-docusate (SENOKOT-S;PERICOLACE) 8.6-50 MG per tablet Take 2 tablets by mouth 2 times daily   1/7/2018 at Unknown time     budesonide-formoterol (SYMBICORT) 160-4.5 MCG/ACT Inhaler Inhale 2 puffs into the lungs 2 times daily   1/7/2018 at Unknown time     Timolol Hemihydrate (BETIMOL OP) Place 1 drop into both eyes daily    1/7/2018 at Unknown time     bimatoprost (LUMIGAN) 0.01 % SOLN Place 1 drop into both eyes At Bedtime   1/7/2018 at Unknown time     WARFARIN SODIUM PO Take by mouth daily See facility orders for INR dosing   1/7/2018 at Unknown time     calcium carbonate (OS-JORDAN 600 MG [place]. CA) 1500 (600 CA) MG tablet Take 1,500 mg by mouth 2 times daily (with meals)   1/7/2018 at Unknown time     isosorbide mononitrate (IMDUR) 30 MG 24 hr tablet Take 2 tablets (60 mg) by mouth daily 30 tablet  1/7/2018 at Unknown time     ONDANSETRON PO Take 4 mg by mouth every 8 hours as needed for nausea   1/7/2018 at Unknown time     furosemide (LASIX) 20 MG tablet Take 1 tablet (20 mg) by mouth 2 times daily 30 tablet  1/7/2018 at Unknown time     diclofenac (VOLTAREN) 1 % GEL topical gel Apply 4 grams to knees four times daily as needed using enclosed dosing card. 100 g 0 1/7/2018 at Unknown time     insulin  "glargine (LANTUS) 100 UNIT/ML injection Inject 20 Units Subcutaneous every morning    1/7/2018 at Unknown time     omeprazole (PRILOSEC) 10 MG CR capsule Take 1 capsule (10 mg) by mouth daily   1/7/2018 at Unknown time     atorvastatin (LIPITOR) 10 MG tablet Take 10 mg by mouth At Bedtime    1/7/2018 at Unknown time     tiotropium (SPIRIVA) 18 MCG inhalation capsule Inhale 18 mcg into the lungs daily   1/7/2018 at Unknown time     acetaminophen (TYLENOL) 500 MG tablet Take 1,000 mg by mouth 3 times daily Not to exceed 3000 mg/24 hours    1/7/2018 at Unknown time     nitroglycerin (NITROSTAT) 0.4 MG SL tablet Place 0.4 mg under the tongue every 5 minutes as needed for chest pain if you are still having symptoms after 3 doses (15 minutes) call 911.   1/7/2018 at Unknown time     aspirin 81 MG EC tablet Take 1 tablet (81 mg) by mouth daily 5 tablet 0 1/7/2018 at Unknown time     metoprolol (TOPROL XL) 50 MG 24 hr tablet Take 1 tablet (50 mg) by mouth daily 5 tablet 0 1/7/2018 at Unknown time     tamsulosin (FLOMAX) 0.4 MG 24 hr capsule Take 1 capsule (0.4 mg) by mouth daily 5 capsule 0 1/7/2018 at Unknown time     ammonium lactate (LAC-HYDRIN) 12 % lotion Apply topically 2 times daily as needed for dry skin To all extremities for dry skin   1/7/2018 at Unknown time[AL1.2]             Review of Systems:   A complete review of systems was performed and is negative except as noted in the HPI           Physical Exam:   /64  Temp 98.2  F (36.8  C) (Axillary)  Resp 18  Ht 1.803 m (5' 11\")  Wt 88.5 kg (195 lb 1.6 oz)  SpO2 100%  BMI 27.21 kg/m2  Wt:   Wt Readings from Last 2 Encounters:   01/08/18 88.5 kg (195 lb 1.6 oz)   01/03/18 92.8 kg (204 lb 8 oz)      Constitutional: cooperative, pleasant, not dyspneic/diaphoretic, no acute distress  Eyes: Sclera anicteric  Ears/nose/mouth/throat: Normal oropharynx without ulcers or exudate, mucus membranes moist, hearing intact  Neck: supple, thyroid normal size  CV: No " edema  Respiratory: Unlabored breathing  Lymph: No axillary, submandibular, supraclavicular LAD  Abd: Nondistended, +bs, no hepatosplenomegaly, diffuse TTP, no peritoneal signs  Skin: warm, perfused, no jaundice  Neuro: No focal deficits  Psych: Normal affect  MSK: No gross deformities  : Brown mucousy stool on rectal exam         Data:   Labs and imaging below were independently reviewed and interpreted    BMP  Recent Labs  Lab 01/08/18  1028      POTASSIUM 4.3   CHLORIDE 110*   JORDAN 7.9*   CO2 24   BUN 30   CR 1.38*   GLC 84     CBC  Recent Labs  Lab 01/08/18  1028   WBC 12.1*   RBC 1.23*   HGB 3.2*   HCT 11.0*   MCV 89   MCH 26.0*   MCHC 29.1*   RDW 19.4*        INR  Recent Labs  Lab 01/08/18  1028   INR 1.90*     LFTs  Recent Labs  Lab 01/08/18  1028   ALKPHOS 150   AST 13   ALT 11   BILITOTAL 0.3   PROTTOTAL 7.0   ALBUMIN 2.3*      PANC  Recent Labs  Lab 01/08/18  1028   LIPASE 104       Imaging:  CT Abdomen/Pelvis 1/8/2018  1. No cause of acute pain is identified in the abdomen or pelvis.  2. 2.5 cm right lower pole solid renal mass. This is been present on  noncontrast CT since at least 7/16/2014 and size has not changed  substantially. Differential includes oncocytoma and renal cell  carcinoma.  3. Redemonstration of multiple patchy ill-defined sclerosis in  vertebral bodies and bony pelvis consistent with known metastatic  disease.  4. Aneurysmal dilation of the aorta and bilateral common iliac  arteries.  5. Nodularity of the left adrenal gland not significantly changed  since 7/16/2014.[AL1.1]     Revision History        User Key Date/Time User Provider Type Action    > AL1.2 1/8/2018  6:50 PM Nancy Grande MD Fellow Sign     AL1.1 1/8/2018  6:12 PM Nancy Grande MD Fellow                   Progress Notes - Physician (Notes for yesterday and today)     No notes of this type exist for this encounter.      Procedure Notes     No notes of this type exist for this encounter.  "        Progress Notes - Therapies (Notes from 01/10/18 through 01/13/18)      Progress Notes by Joana Amezcua PT at 1/11/2018  7:54 AM     Author:  Joana Amezcua PT Service:  (none) Author Type:  Physical Therapist    Filed:  1/11/2018  7:54 AM Date of Service:  1/11/2018  7:54 AM Creation Time:  1/11/2018  7:54 AM    Status:  Signed :  Joana Amezcua PT (Physical Therapist)            01/10/18 1145   Rehab Discipline   Discipline PT  (EDEMA)   Type of Visit   Type of visit Initial Edema Evaluation   General Information   Start of care 01/10/18   Referring physician Roula Mcneil PA-C   Order date 01/09/18   Medical diagnosis GI bleed   Edema onset (chronic (>6 months))   Affected body parts RLE;LLE   Edema etiology Chronic Venous Insufficiency;Infection  (inactivity, cancer)   Edema etiology comments Pt with limited activity with lack muscle pumping to aide LE decongestion. Pt also with advanced cardiac and Pulm history exacerbating LE swelling. CVI and wounds/infection creating exacerbations as well   Pertinent history of current problem (PT: include personal factors and/or comorbidities that impact the POC; OT: include additional occupational profile info) Pt is a 71 y/o male with metastatic prostate cancer and PMHx significant for CAD, CKD, COPD, HTN, A fib managed on Coumadin, and diabetes who presents with 5 days of abdominal pain and anorexia, and 3-4 days of melena. Found to have a hgb of 3.2 on presentation to the ED concerning for a GI bleed.    Surgical / medical history reviewed Yes   Prior level of functional mobility Pt reports he has been working with therapies at [facility], working on standing, strength and walking short distances.    Prior treatment Compression garments;Gradient compression bandaging  (Pt states he has a therapist who \"wraps his legs everyday\". )   Living environment (Pt states he his lives with his son. Was admitted from [facility]. )   Current assistive devices Walker;4 " wheeled walker  (scooter)   General observations Activity: up with assist. Poor historian as pt providing variable information on PLOF/prior living arrangement.    Fall Risk Screen   Fall screen completed by PT   Is patient a fall risk? Yes   Edema Exam / Assessment   Skin condition Non-pitting;Lipodermatosclerosis;Hemosiderin deposits   Skin condition comments Pt has firm non-pitting edema in LEs. Noted hemosideran changes on anterior shins, also has lipodermatosclerosis. Wound on R shin, assessed by [facility] notes, see notes for details.    Range of Motion   ROM comments Decreased ankle ROM B   Strength   Strength comments BLE strength not formally assessed, appears to have ~3/5 strength per observation of funcitonal mobility.    Posture   Posture Protracted shoulders;Forward head position  (pt leaning to the right while sitting up in chair. )   Bed Mobility   Bed mobility Sit>supine with mod A for LEs   Transfers   Transfers Sit>partial stand and partial standing pivot transfer chair>bed from recliner chair with CGA.    Gait / Locomotion   Gait / Locomotion not assessed   Planned Edema Interventions   Planned edema interventions (holding compression 2/2 anticipated imminent disch to TCU)   Clinical Impression   Criteria for skilled therapeutic intervention met Yes   Therapy diagnosis impaired functional mobility   Influenced by the following impairments / conditions Edema;Wounds / Ulcers   Functional limitations due to impairments / conditions decreased bed mobility, transfers, ambulation   Clinical Presentation Evolving/Changing   Clinical Presentation Rationale medical status, has known discharge back to TCU, medical status, impaired cognition   Clinical Decision Making (Complexity) Low complexity   Treatment frequency 5 times / week   Treatment duration 1 week   Patient / family and/or staff in agreement with plan of care Yes   Risks and benefits of therapy have been explained Yes   Goals   Edema Eval Goals (IP)  See plan of care for patient goals   Total Evaluation Time   Total evaluation time 5[RL1.1]        Revision History        User Key Date/Time User Provider Type Action    > RL1.1 1/11/2018  7:54 AM Joana Amezcua, PT Physical Therapist Sign                                                      INTERAGENCY TRANSFER FORM - LAB / IMAGING / EKG / EMG RESULTS   1/8/2018                       UNIT 5C BMT [facility] BANK: [phone]            Unresulted Labs (24h ago through future)    Start       Ordered    01/11/18 1200  Hemoglobin  EVERY 8 HOURS,   Timed     Comments:  Last Lab Result: Hemoglobin (g/dL)       Date                     Value                 01/10/2018               10.0 (L)         ----------    01/11/18 0352    01/11/18 0400  INR  AM DRAW,   Routine      01/11/18 0352    01/11/18 0400  CBC with platelets  AM DRAW,   Routine     Comments:  Last Lab Result: Hemoglobin (g/dL)       Date                     Value                 01/08/2018               3.2 (LL)         ----------    01/11/18 0352    01/11/18 0400  Basic metabolic panel  AM DRAW,   Routine      01/11/18 0352    Unscheduled  Blood culture  (Blood Culture - 2 Sites)  CONDITIONAL (SPECIFY),   Routine     Comments:  Site #1:  If temp is greater than 100.4    May repeat max of once per 24 hrs. (Stat Lab Collect with 1st site collected from Venipuncture and 2nd site from VAD by RN,  if no VAD then 2 peripheral sticks-2 venipuncture from different sites)    01/08/18 1412    Unscheduled  Blood culture  (Blood Culture - 2 Sites)  CONDITIONAL (SPECIFY),   Routine     Comments:  SITE #2: If temp is greater than 100.4    May repeat max of once per 24 hrs. (Stat Lab Collect with 1st site collected from Venipuncture and 2nd site from VAD by RN,  if no VAD then 2 peripheral sticks-2 venipuncture from different sites)    01/08/18 1412    Unscheduled  Red blood cell prepare order unit conditional  (Conditional Red Blood Cells Unit)  CONDITIONAL (SPECIFY)  BLOOD,   Routine     Comments:  For patients with significant heart failure:  Recommend transfusing slowly using the 4 hour allowed time period, if clinically appropriate.  Do NOT change or add to this text.  Use additional instructions field.   Question Answer Comment   Irradiation Indication Not irradiated    Red Blood Cells: NON-Irradiated    Number of Units 1    When to transfuse Hemoglobin is 8 g/dL or less (anemia)    Special Requirements None    Infusion Duration Infuse within 4 hours of release        01/12/18 1136         Lab Results - 3 Days      Glucose by meter [[id number]] (Abnormal)  Resulted: 01/13/18 1225, Result status: Final result    Ordering provider: Elidia Mckeon MD  01/13/18 1223 Resulting lab: POINT OF CARE TEST, GLUCOSE    Specimen Information    Type Source Collected On     01/13/18 1223          Components       Value Reference Range Flag Lab   Glucose 159 70 - 99 mg/dL H 170            Glucose by meter [[id number]] (Abnormal)  Resulted: 01/13/18 0831, Result status: Final result    Ordering provider: Elidia Mckeon MD  01/13/18 0828 Resulting lab: POINT OF CARE TEST, GLUCOSE    Specimen Information    Type Source Collected On     01/13/18 0828          Components       Value Reference Range Flag Lab   Glucose 113 70 - 99 mg/dL H 170            Basic metabolic panel [[id number]] (Abnormal)  Resulted: 01/13/18 0442, Result status: Final result    Ordering provider: Elidia Mckeon MD  01/12/18 2200 Resulting lab: [facility]    Specimen Information    Type Source Collected On   Blood  01/13/18 0331          Components       Value Reference Range Flag Lab   Sodium 145 133 - 144 mmol/L H 51   Potassium 4.0 3.4 - 5.3 mmol/L  51   Chloride 112 94 - 109 mmol/L H 51   Carbon Dioxide 26 20 - 32 mmol/L  51   Anion Gap 7 3 - 14 mmol/L  51   Glucose 129 70 - 99 mg/dL H 51   Urea Nitrogen 13 7 - 30 mg/dL  51   Creatinine 1.08 0.66 - 1.25 mg/dL   51   GFR Estimate 67 >60 mL/min/1.7m2  51   Comment:  Non  GFR Calc   GFR Estimate If Black 81 >60 mL/min/1.7m2  51   Comment:  African American GFR Calc   Calcium 8.3 8.5 - 10.1 mg/dL L 51            CBC with platelets [[id number]] (Abnormal)  Resulted: 01/13/18 0428, Result status: Final result    Ordering provider: Elidia Mckeon MD  01/12/18 2200 Resulting lab: [facility]    Specimen Information    Type Source Collected On   Blood  01/13/18 0331          Components       Value Reference Range Flag Lab   WBC 8.2 4.0 - 11.0 10e9/L  51   RBC Count 3.54 4.4 - 5.9 10e12/L L 51   Hemoglobin 9.5 13.3 - 17.7 g/dL L 51   Hematocrit 32.1 40.0 - 53.0 % L 51   MCV 91 78 - 100 fl  51   MCH 26.8 26.5 - 33.0 pg  51   MCHC 29.6 31.5 - 36.5 g/dL L 51   RDW 19.0 10.0 - 15.0 % H 51   Platelet Count 222 150 - 450 10e9/L  51            INR [[id number]] (Abnormal)  Resulted: 01/13/18 0414, Result status: Final result    Ordering provider: Elidia Mckeon MD  01/12/18 2200 Resulting lab: [facility]    Specimen Information    Type Source Collected On   Blood  01/13/18 0331          Components       Value Reference Range Flag Lab   INR 1.33 0.86 - 1.14 H 51            Hemoglobin [[id number]] (Abnormal)  Resulted: 01/12/18 2220, Result status: Final result    Ordering provider: Elidia Mckeon MD  01/12/18 1400 Resulting lab: [facility]    Specimen Information    Type Source Collected On   Blood  01/12/18 2159          Components       Value Reference Range Flag Lab   Hemoglobin 9.7 13.3 - 17.7 g/dL L 51            Glucose by meter [[id number]] (Abnormal)  Resulted: 01/12/18 2210, Result status: Final result    Ordering provider: Elidia Mckeon MD  01/12/18 2206 Resulting lab: POINT OF CARE TEST, GLUCOSE    Specimen Information    Type Source Collected On     01/12/18 8218           Components       Value Reference Range Flag Lab   Glucose 126 70 - 99 mg/dL H 170            Glucose by meter [[id number]] (Abnormal)  Resulted: 01/12/18 1800, Result status: Final result    Ordering provider: Elidia Mckeon MD  01/12/18 1756 Resulting lab: POINT OF CARE TEST, GLUCOSE    Specimen Information    Type Source Collected On     01/12/18 1756          Components       Value Reference Range Flag Lab   Glucose 144 70 - 99 mg/dL H 170            Glucose by meter [[id number]] (Abnormal)  Resulted: 01/12/18 1456, Result status: Final result    Ordering provider: Elidia Mckeon MD  01/12/18 1450 Resulting lab: POINT OF CARE TEST, GLUCOSE    Specimen Information    Type Source Collected On     01/12/18 1450          Components       Value Reference Range Flag Lab   Glucose 173 70 - 99 mg/dL H 170            Troponin I [[id number]]  Resulted: 01/12/18 1146, Result status: Final result    Ordering provider: Roula Mcneil PA-C  01/12/18 0947 Resulting lab: [facility]    Specimen Information    Type Source Collected On   Blood  01/12/18 1100          Components       Value Reference Range Flag Lab   Troponin I ES <0.015 0.000 - 0.045 ug/L  51   Comment:         The 99th percentile for upper reference range is 0.045 ug/L.  Troponin values   in the range of 0.045 - 0.120 ug/L may be associated with risks of adverse   clinical events.              Hemoglobin [[id number]] (Abnormal)  Resulted: 01/12/18 1124, Result status: Final result    Ordering provider: Elidia Mckeon MD  01/12/18 0600 Resulting lab: [facility]    Specimen Information    Type Source Collected On   Blood  01/12/18 1100          Components       Value Reference Range Flag Lab   Hemoglobin 9.2 13.3 - 17.7 g/dL L 51            Glucose by meter [[id number]]  Resulted: 01/12/18 0950, Result status: Final result    Ordering provider: Elidia Mckeon MD   01/12/18 0948 Resulting lab: POINT OF CARE TEST, GLUCOSE    Specimen Information    Type Source Collected On     01/12/18 0948          Components       Value Reference Range Flag Lab   Glucose 89 70 - 99 mg/dL  170            Basic metabolic panel [[id number]] (Abnormal)  Resulted: 01/12/18 0339, Result status: Final result    Ordering provider: Elidia Mckeon MD  01/11/18 2200 Resulting lab: [facility]    Specimen Information    Type Source Collected On   Blood  01/12/18 0313          Components       Value Reference Range Flag Lab   Sodium 145 133 - 144 mmol/L H 51   Potassium 3.8 3.4 - 5.3 mmol/L  51   Chloride 111 94 - 109 mmol/L H 51   Carbon Dioxide 23 20 - 32 mmol/L  51   Anion Gap 10 3 - 14 mmol/L  51   Glucose 81 70 - 99 mg/dL  51   Urea Nitrogen 12 7 - 30 mg/dL  51   Creatinine 1.23 0.66 - 1.25 mg/dL  51   GFR Estimate 58 >60 mL/min/1.7m2 L 51   Comment:  Non  GFR Calc   GFR Estimate If Black 70 >60 mL/min/1.7m2  51   Comment:  African American GFR Calc   Calcium 7.6 8.5 - 10.1 mg/dL L 51            INR [[id number]] (Abnormal)  Resulted: 01/12/18 0327, Result status: Final result    Ordering provider: Elidia Mckeon MD  01/11/18 2200 Resulting lab: [facility]    Specimen Information    Type Source Collected On   Blood  01/12/18 0313          Components       Value Reference Range Flag Lab   INR 1.75 0.86 - 1.14 H 51            CBC with platelets [[id number]] (Abnormal)  Resulted: 01/12/18 0321, Result status: Final result    Ordering provider: Elidia Mckeon MD  01/11/18 2200 Resulting lab: [facility]    Specimen Information    Type Source Collected On   Blood  01/12/18 0313          Components       Value Reference Range Flag Lab   WBC 9.8 4.0 - 11.0 10e9/L  51   RBC Count 3.45 4.4 - 5.9 10e12/L L 51   Hemoglobin 9.3 13.3 - 17.7 g/dL L 51   Hematocrit 31.4 40.0 -  53.0 % L 51   MCV 91 78 - 100 fl  51   MCH 27.0 26.5 - 33.0 pg  51   MCHC 29.6 31.5 - 36.5 g/dL L 51   RDW 19.2 10.0 - 15.0 % H 51   Platelet Count 205 150 - 450 10e9/L  51            Glucose by meter [[id number]]  Resulted: 01/11/18 2135, Result status: Final result    Ordering provider: Elidia Mckeon MD  01/11/18 2130 Resulting lab: POINT OF CARE TEST, GLUCOSE    Specimen Information    Type Source Collected On     01/11/18 2130          Components       Value Reference Range Flag Lab   Glucose 95 70 - 99 mg/dL  170            Hemoglobin [[id number]] (Abnormal)  Resulted: 01/11/18 2124, Result status: Final result    Ordering provider: Elidia Mckeon MD  01/11/18 1400 Resulting lab: [facility]    Specimen Information    Type Source Collected On   Blood  01/11/18 2112          Components       Value Reference Range Flag Lab   Hemoglobin 9.4 13.3 - 17.7 g/dL L 51            Glucose by meter [[id number]]  Resulted: 01/11/18 1710, Result status: Final result    Ordering provider: Elidia Mckeon MD  01/11/18 1702 Resulting lab: POINT OF CARE TEST, GLUCOSE    Specimen Information    Type Source Collected On     01/11/18 1702          Components       Value Reference Range Flag Lab   Glucose 77 70 - 99 mg/dL  170            Hemoglobin [[id number]] (Abnormal)  Resulted: 01/11/18 1215, Result status: Final result    Ordering provider: Elidia Mckeon MD  01/11/18 0600 Resulting lab: [facility]    Specimen Information    Type Source Collected On   Blood  01/11/18 1200          Components       Value Reference Range Flag Lab   Hemoglobin 9.5 13.3 - 17.7 g/dL L 51            Glucose by meter [[id number]]  Resulted: 01/11/18 1201, Result status: Final result    Ordering provider: Elidia Mckeon MD  01/11/18 1156 Resulting lab: POINT OF CARE TEST, GLUCOSE    Specimen Information    Type Source Collected On     01/11/18  1156          Components       Value Reference Range Flag Lab   Glucose 81 70 - 99 mg/dL  170            Glucose by meter [[id number]]  Resulted: 01/11/18 0820, Result status: Final result    Ordering provider: Elidia Mckeon MD  01/11/18 0816 Resulting lab: POINT OF CARE TEST, GLUCOSE    Specimen Information    Type Source Collected On     01/11/18 0816          Components       Value Reference Range Flag Lab   Glucose 82 70 - 99 mg/dL  170            INR [[id number]] (Abnormal)  Resulted: 01/11/18 0711, Result status: Final result    Ordering provider: Elidia Mckeon MD  01/11/18 0352 Resulting lab: [facility]    Specimen Information    Type Source Collected On   Blood  01/11/18 0445          Components       Value Reference Range Flag Lab   INR 1.66 0.86 - 1.14 H 51            CBC with platelets [[id number]] (Abnormal)  Resulted: 01/11/18 0708, Result status: Final result    Ordering provider: Elidia Mckeon MD  01/11/18 0352 Resulting lab: [facility]    Specimen Information    Type Source Collected On   Blood  01/11/18 0445          Components       Value Reference Range Flag Lab   WBC 9.0 4.0 - 11.0 10e9/L  51   RBC Count 3.70 4.4 - 5.9 10e12/L L 51   Hemoglobin 10.2 13.3 - 17.7 g/dL L 51   Hematocrit 34.3 40.0 - 53.0 % L 51   MCV 93 78 - 100 fl  51   MCH 27.6 26.5 - 33.0 pg  51   MCHC 29.7 31.5 - 36.5 g/dL L 51   RDW 19.0 10.0 - 15.0 % H 51   Platelet Count 220 150 - 450 10e9/L  51            Basic metabolic panel [[id number]] (Abnormal)  Resulted: 01/11/18 0458, Result status: Final result    Ordering provider: Roula Mcneil PA-C  01/11/18 0445 Resulting lab: [facility]    Specimen Information    Type Source Collected On     01/11/18 0445          Components       Value Reference Range Flag Lab   Sodium 144 133 - 144 mmol/L  51   Potassium 4.0 3.4 - 5.3 mmol/L  51   Chloride 110 94  - 109 mmol/L H 51   Carbon Dioxide 23 20 - 32 mmol/L  51   Anion Gap 10 3 - 14 mmol/L  51   Glucose 91 70 - 99 mg/dL  51   Urea Nitrogen 14 7 - 30 mg/dL  51   Creatinine 1.35 0.66 - 1.25 mg/dL H 51   GFR Estimate 52 >60 mL/min/1.7m2 L 51   Comment:  Non  GFR Calc   GFR Estimate If Black 63 >60 mL/min/1.7m2  51   Comment:  African American GFR Calc   Calcium 7.6 8.5 - 10.1 mg/dL L 51            INR [[id number]]  Resulted: 01/11/18 0447, Result status: In process    Ordering provider: Roula Mcneil PA-C  01/11/18 0445 Resulting lab: [facility]    Specimen Information    Type Source Collected On     01/11/18 0445            CBC with platelets [[id number]]  Resulted: 01/11/18 0447, Result status: In process    Ordering provider: Roula Mcneil PA-C  01/11/18 0445 Resulting lab: [facility]    Specimen Information    Type Source Collected On     01/11/18 0445            Glucose by meter [[id number]]  Resulted: 01/10/18 2235, Result status: Final result    Ordering provider: Elidia Mckeon MD  01/10/18 2229 Resulting lab: POINT OF CARE TEST, GLUCOSE    Specimen Information    Type Source Collected On     01/10/18 2229          Components       Value Reference Range Flag Lab   Glucose 98 70 - 99 mg/dL  170            Glucose by meter [[id number]]  Resulted: 01/10/18 1925, Result status: Final result    Ordering provider: Elidia Mckeon MD  01/10/18 1921 Resulting lab: POINT OF CARE TEST, GLUCOSE    Specimen Information    Type Source Collected On     01/10/18 1921          Components       Value Reference Range Flag Lab   Glucose 93 70 - 99 mg/dL  170            Glucose by meter [[id number]] (Abnormal)  Resulted: 01/10/18 1445, Result status: Final result    Ordering provider: Elidia Mckeon MD  01/10/18 1439 Resulting lab: POINT OF CARE TEST, GLUCOSE    Specimen Information    Type Source Collected On     01/10/18 1439          Components       Value Reference Range Flag Lab    Glucose 119 70 - 99 mg/dL H 170            Glucose by meter [[id number]] (Abnormal)  Resulted: 01/10/18 1340, Result status: Final result    Ordering provider: Elidia Mckeon MD  01/10/18 1335 Resulting lab: POINT OF CARE TEST, GLUCOSE    Specimen Information    Type Source Collected On     01/10/18 1335          Components       Value Reference Range Flag Lab   Glucose 109 70 - 99 mg/dL H 170   Comment:  Dr/RN Notified            Hemoglobin [[id number]] (Abnormal)  Resulted: 01/10/18 1305, Result status: Final result    Ordering provider: Roula Mcneil PA-C  01/10/18 0600 Resulting lab: [facility]    Specimen Information    Type Source Collected On   Blood  01/10/18 1257          Components       Value Reference Range Flag Lab   Hemoglobin 10.0 13.3 - 17.7 g/dL L 51            Glucose by meter [[id number]] (Abnormal)  Resulted: 01/10/18 0831, Result status: Final result    Ordering provider: Elidia Mckeon MD  01/10/18 0826 Resulting lab: POINT OF CARE TEST, GLUCOSE    Specimen Information    Type Source Collected On     01/10/18 0826          Components       Value Reference Range Flag Lab   Glucose 124 70 - 99 mg/dL H 170   Comment:  Dr/RN Notified            Urine Culture Aerobic Bacterial [[id number]] (Abnormal)  Resulted: 01/10/18 0721, Result status: Final result    Ordering provider: Roula Mcneil PA-C  01/08/18 1412 Resulting lab: [facility]    Specimen Information    Type Source Collected On   Catheterized Urine  01/08/18 1106          Components       Value Reference Range Flag Lab   Specimen Description Catheterized Urine      Special Requests Specimen received in preservative   75   Culture Micro --  A 225   Result:         >100,000 colonies/mL  Escherichia coli ESBL  Enterobacteriaceae that are susceptible to meropenem are usually susceptible to ertapenem.     Culture Micro ESBL (extended beta lactamase)  producing organisms require contact precautions.   225   Result:              Basic metabolic panel [[id number]] (Abnormal)  Resulted: 01/10/18 0423, Result status: Final result    Ordering provider: Roula Mcneil PA-C  01/09/18 2200 Resulting lab: [facility]    Specimen Information    Type Source Collected On   Blood  01/10/18 0341          Components       Value Reference Range Flag Lab   Sodium 145 133 - 144 mmol/L H 51   Potassium 4.2 3.4 - 5.3 mmol/L  51   Chloride 111 94 - 109 mmol/L H 51   Carbon Dioxide 23 20 - 32 mmol/L  51   Anion Gap 11 3 - 14 mmol/L  51   Glucose 108 70 - 99 mg/dL H 51   Urea Nitrogen 18 7 - 30 mg/dL  51   Creatinine 1.43 0.66 - 1.25 mg/dL H 51   GFR Estimate 49 >60 mL/min/1.7m2 L 51   Comment:  Non  GFR Calc   GFR Estimate If Black 59 >60 mL/min/1.7m2 L 51   Comment:  African American GFR Calc   Calcium 7.6 8.5 - 10.1 mg/dL L 51            INR [[id number]] (Abnormal)  Resulted: 01/10/18 0410, Result status: Final result    Ordering provider: Roual Mcneil PA-C  01/09/18 2200 Resulting lab: [facility]    Specimen Information    Type Source Collected On   Blood  01/10/18 0341          Components       Value Reference Range Flag Lab   INR 1.52 0.86 - 1.14 H 51            CBC with platelets [[id number]] (Abnormal)  Resulted: 01/10/18 0402, Result status: Final result    Ordering provider: Roula Mcneil PA-C  01/09/18 2200 Resulting lab: [facility]    Specimen Information    Type Source Collected On   Blood  01/10/18 0341          Components       Value Reference Range Flag Lab   WBC 8.6 4.0 - 11.0 10e9/L  51   RBC Count 3.78 4.4 - 5.9 10e12/L L 51   Hemoglobin 10.5 13.3 - 17.7 g/dL L 51   Hematocrit 34.6 40.0 - 53.0 % L 51   MCV 92 78 - 100 fl  51   MCH 27.8 26.5 - 33.0 pg  51   MCHC 30.3 31.5 - 36.5 g/dL L 51   RDW 19.0 10.0 - 15.0 % H 51   Platelet Count 220 150 -  450 10e9/L  51            Testing Performed By     Lab - Abbreviation Name Director Address Valid Date Range    45 - XIS324 [facility] Unknown Unknown 01/28/02 0000 - Present    51 - Unknown [facility] Unknown [address] [facility] [id number] 12/31/14 1010 - Present    75 - Unknown [facility] Unknown [address] [facility] [id number] 01/15/15 1019 - Present    170 - Unknown POINT OF CARE TEST, GLUCOSE Unknown Unknown 10/31/11 1114 - Present    225 - Unknown [facility] Unknown [address] [facility] [id number] 12/19/14 0954 - Present               Imaging Results - 3 Days      XR Chest Port 1 View [[id number]]  Resulted: 01/11/18 0059, Result status: Final result    Ordering provider: Roula Mcneil PA-C  01/10/18 1820 Resulted by: Trisha Rangel MD Schat, Robben, MD    Performed: 01/10/18 1821 - 01/10/18 1832 Resulting lab: RADIOLOGY RESULTS    Narrative:       Exam: XR CHEST PORT 1 VW, 1/10/2018 6:47 PM    Indication: RN placed PICC - verify tip placement;     Comparison: 1/8/2018    Findings:   Left upper extremity PICC tip projects at the low SVC. The cardiac  mediastinal silhouette and pulmonary vasculature are within normal  limits. No pleural effusion or pneumothorax. Stable prominent  pulmonary vasculature in the perihilar region. Stable mild streaky  bibasilar opacities favored to represent atelectasis.      Impression:       Impression: Left upper extremity PICC tip projects at the low SVC.    I have personally reviewed the examination and initial interpretation  and I agree with the findings.    TRISHA RANGEL MD      Testing Performed By     Lab - Abbreviation Name Director Address Valid Date Range    104 - Rad Rslts RADIOLOGY RESULTS Unknown Unknown 02/16/05 1553 - Present            Encounter-Level Documents:     There are no encounter-level documents.      Order-Level Documents:      There are no order-level documents.

## 2018-01-08 NOTE — H&P
"[facility], [place]    History and Physical  Hematology / Oncology     Date of Admission:  1/8/2018  Date of Service (when I saw the patient): 01/08/18    Assessment & Plan   Tomas Grey is a 73 y/o male with metastatic prostate cancer and PMHx significant for CAD, CKD, COPD, HTN, A fib managed on Coumadin, and diabetes who presents with 5 days of abdominal pain and anorexia, and 3-4 days of melena. Found to have a hgb of 3.2 on presentation to the ED concerning for a GI bleed.     #Abdominal pain.  #Suspected GI bleed.  Patient endorses 3-4 days of melena at his home. States he has been having LLQ/periumbilical abdominal pain for approximately 5 days PTA and has not been eating d/t abdominal pain. Denies nausea or vomiting, no hematemesis. No BRBPR. Explains he told the staff at his nursing home facility but \"they did not do anything\", so patient called 911.   -On evaluation of labs, patient's hgb 3.2. Patient slightly tachycardic, but BPs stable. Pt also notes a mild headache.   -2 units PRBCs given STAT in ED. Will give an additional 1 unit PRBCs then recheck hemoglobin. Transfuse for hgb <7.   -IVF at 75 ml/hr.  -Started on PPI gtt   -GI consulted, appreciate recs. Waiting for note with recs, however briefly discussed patient with fellow, will likely plan to take patient for procedural investigation (EGD) tonight or tomorrow since patient appears stable. Improve hgb, trend hgb q 8hrs, and monitor BMs.   -HELD Coumadin and ASA given bleed, given Vitamin K 2 mg in ED.  -CT AP with no e/o acute issue. Notes a renal mass which is stable since 2014.    #Anemia. Likely 2/2 disease and previous treatment. Appears to have h/o folate deficiency as well. Has been transfusion dependant.   -Transfuse for hgb <7 at this time, will consider hgb <8 if remains symptomatic.   -Held iron sulfate 325 mg BID at this time.    #Probable UTI. Denies dysuria, however endorses frequency and urgency. " Denies hematuria. H/o recurrent UTIs in setting of prostate Ca and metastatic lymph node dissection. Resistent to surgical options in past. Has f/u with Dr. Epstein 1/9/18 per NP note. On tamsulosin for retention.   -Continue PTA tamsulosin  -UA with e/o infection: >182 WBC with WBC clumps, large LE and moderate bacteria. UC pending.  -Started on Ceftriaxone 1 g q24 hrs 1/8.     #Metastatic prostate cancer. Follows with Dr. Oviedo. Metastases to bone. S/p multiple therapies (Lupron, Casodex, Xgeva, Xtandi, Zytega+pred, Xofigo, and Megace). Recent note by Dr. Oviedo 12/5 stating they would stop all cancer-directed therapies and refer to hospice. Although there are some notes that patient has been resistant/refused hospice care.     #Coronary artery disease.   #Hypertension.  #Chronic diastolic CHF (EF 60-65%).  -Held PTA ASA in setting of acute bleed  -Continue Isosorbide mononitrate, metoprolol, and atorvastatin with hold parameters BP <120/80 and HR <80.    #Paroxysmal A fib, managed on Coumadin. INR 1.9 on admission. IN NSR on EKG.   -Continue PTA metoprolol  -HOLD Coumadin in setting of acute GI bleed.    #CKD. Baseline Cr appears to be in upper 1s. Cr on admission 1.38.   -Monitor with daily BMP  -IVF     #COPD, on chronic oxygen.  #ALEXIS.  Has been seen/treated multiple times for COPD exacerbations. Follows with Dr. Loera in Pulmonology.   -Continue PTA Spiriva and Symbicort    #Diabetes mellitus, type II. Managed on Lantus 20 U daily.  -HOLD PTA Lantus.  -Started on medium SSI to manage BG  -QID blood glucose checks per protocol.    #Lymphedema.  #Venous stasis skin changes/ulcerations.  -Lymphedema consulted, appreciate involvement in care.   -Continue PTA Lasix BID.    #Anxiety/Depression.  -Continue PTA Cymbalta    #Congnitive impairment. Per Nursing home visit notes and note by Dr. Rossi with palliative care, patient has mild-moderate impairment based on previous neurocognitive testing. BIMS score 11/15  "on 11/16/17. Notes discuss appointing a guardian for patient to make medical decisions. Patient does have 11 children, however notes indicate they do not attend his hospital appointments. Patient explained that his medical decision maker is his \"son\" Les Bourgeois (silvia).     #Glaucoma. Follows with [facility].   -Continue Bimatoprost, Timolol Hemihydrate, and Carboxymethylcellulose drops.    FEN  -IVF @ 75 ml/hr  -replace lytes prn  -NPO for possible GI procedure    PPx  -VTE: defer d/t likely GI bleed  -PUD: PPI gtt    Dispo: Pending evaluation and resolution of acute issues, likely 2-3 day stay.     Roula Mcneil PA-C  Hematology/Oncology  Pager #[phone]    Code Status   Full Code, discussed with patient and he deferred decision to family members however unable to get ahold of on day of admission, will continue to discuss.    Primary Care Physician   Ekaterina Lozano    Chief Complaint   Patient presents to ED with 5 day history of abdominal pain and anorexia as well as a 3-4 day history of melena.    History is obtained from the patient and electronic health record    History of Present Illness   Tomas Grey is a 73 y/o male with metastatic prostate cancer and PMHx significant for CAD, CKD, COPD, HTN, A fib managed on Coumadin, and diabetes who presents with 5 days of abdominal pain and anorexia, and 3-4 days of melena. Found to have a hgb of 3.2 on presentation to the ED concerning for a GI bleed.     Tomas explains he has had this lower abdominal pain for approximately 5 days, localizes pain to LLQ and periumbilical area. States he has not been eating due to this pain. 3-4 days prior to admission, he noted that his stools were black. Patient explains he told the nursing home staff of new onset melena and \"they did nothing about it.\" He called 911 on his own. Additionally, endorses a headache and some baseline dizziness. Denies nausea, vomiting, or hematemesis. No BRPBR. Denies " "dysuria but endorses frequency and urgency with urination. No hematuria. Patient requesting to not return to his current living situation (LTCF) because of their \"lack of care\". Tried discussing code status with him, and he deferred decision to his children (Les vega).     Past Medical History    I have reviewed this patient's medical history and updated it with pertinent information if needed.   Past Medical History:   Diagnosis Date     Anemia      Anxiety      Aspiration pneumonia (H) 2014     C. difficile colitis      CAD (coronary artery disease)      Chronic pain      CKD (chronic kidney disease)      COPD (chronic obstructive pulmonary disease) (H)      Depressive disorder      Diabetes mellitus (H)      Hypertension      Insomnia      ALEXIS (obstructive sleep apnea)      Osteoporosis      Prostate cancer metastatic to multiple sites (H)        Past Surgical History   I have reviewed this patient's surgical history and updated it with pertinent information if needed.  Past Surgical History:   Procedure Laterality Date     ABDOMEN SURGERY       ARTHROSCOPY KNEE       EYE SURGERY         Prior to Admission Medications   Prior to Admission Medications   Prescriptions Last Dose Informant Patient Reported? Taking?   Carboxymethylcellulose Sodium (CARBOXYMETHYLCELLULOSE SOD PF OP) 1/7/2018 at Unknown time  Yes Yes   Sig: Place 1 drop into both eyes 4 times daily And 1 drop both eyes daily prn   DULoxetine HCl (CYMBALTA PO) 1/7/2018 at Unknown time  Yes Yes   Sig: Take 20 mg by mouth daily   METHOCARBAMOL PO 1/7/2018 at Unknown time  Yes Yes   Sig: Take 500 mg by mouth every 6 hours as needed for muscle spasms   ONDANSETRON PO 1/7/2018 at Unknown time  Yes Yes   Sig: Take 4 mg by mouth every 8 hours as needed for nausea   OXYCODONE HCL PO 1/7/2018 at Unknown time  Yes Yes   Sig: Take 5 mg by mouth every 4 hours as needed   Timolol Hemihydrate (BETIMOL OP) 1/7/2018 at Unknown time  Yes Yes   Sig: Place 1 " drop into both eyes daily    WARFARIN SODIUM PO 1/7/2018 at Unknown time  Yes Yes   Sig: Take by mouth daily See facility orders for INR dosing   acetaminophen (TYLENOL) 500 MG tablet 1/7/2018 at Unknown time Nursing Home Yes Yes   Sig: Take 1,000 mg by mouth 3 times daily Not to exceed 3000 mg/24 hours    ammonium lactate (LAC-HYDRIN) 12 % lotion 1/7/2018 at Unknown time  Yes Yes   Sig: Apply topically 2 times daily as needed for dry skin To all extremities for dry skin   aspirin 81 MG EC tablet 1/7/2018 at Unknown time  No Yes   Sig: Take 1 tablet (81 mg) by mouth daily   atorvastatin (LIPITOR) 10 MG tablet 1/7/2018 at Unknown time  Yes Yes   Sig: Take 10 mg by mouth At Bedtime    bimatoprost (LUMIGAN) 0.01 % SOLN 1/7/2018 at Unknown time  Yes Yes   Sig: Place 1 drop into both eyes At Bedtime   budesonide-formoterol (SYMBICORT) 160-4.5 MCG/ACT Inhaler 1/7/2018 at Unknown time  Yes Yes   Sig: Inhale 2 puffs into the lungs 2 times daily   calcium carbonate (OS-JORDAN 600 MG [place]. [place]) 1500 (600 CA) MG tablet 1/7/2018 at Unknown time  Yes Yes   Sig: Take 1,500 mg by mouth 2 times daily (with meals)   diclofenac (VOLTAREN) 1 % GEL topical gel 1/7/2018 at Unknown time  No Yes   Sig: Apply 4 grams to knees four times daily as needed using enclosed dosing card.   fentaNYL (DURAGESIC) 12 mcg/hr 72 hr patch 1/7/2018 at Unknown time  Yes Yes   Sig: Place 1 patch onto the skin every 72 hours   ferrous sulfate (IRON) 325 (65 FE) MG tablet 1/7/2018 at Unknown time  Yes Yes   Sig: Take 325 mg by mouth 2 times daily   furosemide (LASIX) 20 MG tablet 1/7/2018 at Unknown time  No Yes   Sig: Take 1 tablet (20 mg) by mouth 2 times daily   insulin glargine (LANTUS) 100 UNIT/ML injection 1/7/2018 at Unknown time  Yes Yes   Sig: Inject 20 Units Subcutaneous every morning    ipratropium - albuterol 0.5 mg/2.5 mg/3 mL (DUONEB) 0.5-2.5 (3) MG/3ML neb solution 1/7/2018 at Unknown time  Yes Yes   Sig: Take 1 vial by nebulization every 4 hours  as needed for shortness of breath / dyspnea or wheezing   isosorbide mononitrate (IMDUR) 30 MG 24 hr tablet 1/7/2018 at Unknown time  No Yes   Sig: Take 2 tablets (60 mg) by mouth daily   metoprolol (TOPROL XL) 50 MG 24 hr tablet 1/7/2018 at Unknown time  No Yes   Sig: Take 1 tablet (50 mg) by mouth daily   nitroglycerin (NITROSTAT) 0.4 MG SL tablet 1/7/2018 at Unknown time  Yes Yes   Sig: Place 0.4 mg under the tongue every 5 minutes as needed for chest pain if you are still having symptoms after 3 doses (15 minutes) call 911.   omeprazole (PRILOSEC) 10 MG CR capsule 1/7/2018 at Unknown time  No Yes   Sig: Take 1 capsule (10 mg) by mouth daily   senna-docusate (SENOKOT-S;PERICOLACE) 8.6-50 MG per tablet 1/7/2018 at Unknown time  Yes Yes   Sig: Take 2 tablets by mouth 2 times daily   tamsulosin (FLOMAX) 0.4 MG 24 hr capsule 1/7/2018 at Unknown time  No Yes   Sig: Take 1 capsule (0.4 mg) by mouth daily   tiotropium (SPIRIVA) 18 MCG inhalation capsule 1/7/2018 at Unknown time  Yes Yes   Sig: Inhale 18 mcg into the lungs daily      Facility-Administered Medications: None     Allergies   Allergies   Allergen Reactions     Morphine Other (See Comments)     Patient reports makes him almost go into a coma       Social History   I have reviewed this patient's social history and updated it with pertinent information if needed. Tomas WILL Grey  reports that he has quit smoking. He has never used smokeless tobacco. He reports that he does not drink alcohol or use illicit drugs.    Family History   I have reviewed this patient's family history and updated it with pertinent information if needed.   Family History   Problem Relation Age of Onset     DIABETES Mother      CANCER Mother      stomach       Review of Systems   The 10 point Review of Systems is negative other than noted in the HPI or here.     Physical Exam   Temp: 98.4  F (36.9  C) Temp src: Axillary BP: 129/84   Heart Rate: 109 Resp: 18 SpO2: 99 % O2 Device: Nasal  cannula Oxygen Delivery: 2 LPM  Vital Signs with Ranges  Temp:  [97.7  F (36.5  C)-98.9  F (37.2  C)] 98.4  F (36.9  C)  Heart Rate:  [102-120] 109  Resp:  [18] 18  BP: (105-156)/(62-84) 129/84  SpO2:  [96 %-100 %] 99 %  195 lbs 1.6 oz    Constitutional: Pleasant male seen sitting up in a chair, in NAD. Alert and interactive.   HEENT: NCAT, anicteric sclerae, conjunctiva clear, + proptosis. Moist mucous membranes without lesions or thrush. Poor dentition.  Respiratory: Non-labored breathing, good air exchange. Lungs are slightly coarse in bases, otherwise clear. No wheezing.   Cardiovascular: Tachycardic with regular rhythm, no murmur, rub or gallop.  GI: Normoactive BS. Abdomen is soft, non-distended, with tenderness to palpation of epigastric, LUQ, LLQ, periumbilical, and RLQ. No rigidity or guarding.   Skin: Warm and dry. Significant distal LE changes 2/2 venous stasis (scaling and venous stasis ulcer).  Musculoskeletal: Extremities grossly normal. Edematous LE bilaterally to knees.  Neurologic: A &O x3, speech normal, answering questions appropriately. Moves all extremities spontaneously. Grossly non-focal.  Neuropsychiatric: Mentation and affect normal/appropriate.    Data   I personally reviewed the EKG tracing showing NSR, the chest x-ray image(s) showing pending and the abdominal CT image(s) showing no acute intra-abdominal pathology, stable renal mass..  Results for orders placed or performed during the hospital encounter of 01/08/18 (from the past 24 hour(s))   EKG 12-lead, tracing only   Result Value Ref Range    Interpretation ECG Click View Image link to view waveform and result    XR Chest Port 1 View    Narrative    Exam: XR CHEST PORT 1 VW, 1/8/2018 10:48 AM    Indication: Chest pain    Comparison: 12/18/2017, 12/13/2017, 1/16/2017, 12/26/2016    Findings:   A single AP view of the chest was obtained. The cardiomediastinal  silhouette is not enlarged. No pneumothorax or pleural effusion.  Prominent  pulmonary vasculature. Mild streaky bibasilar opacities. Old  right rib fractures.      Impression    Impression:   Prominent pulmonary vasculature with mild streaky bibasilar opacities,  likely atelectasis.    I have personally reviewed the examination and initial interpretation  and I agree with the findings.    BENITO ROWLEY MD   CBC with platelets differential   Result Value Ref Range    WBC 12.1 (H) 4.0 - 11.0 10e9/L    RBC Count 1.23 (L) 4.4 - 5.9 10e12/L    Hemoglobin 3.2 (LL) 13.3 - 17.7 g/dL    Hematocrit 11.0 (L) 40.0 - 53.0 %    MCV 89 78 - 100 fl    MCH 26.0 (L) 26.5 - 33.0 pg    MCHC 29.1 (L) 31.5 - 36.5 g/dL    RDW 19.4 (H) 10.0 - 15.0 %    Platelet Count 346 150 - 450 10e9/L    Diff Method Automated Method     % Neutrophils 65.3 %    % Lymphocytes 17.0 %    % Monocytes 11.9 %    % Eosinophils 4.8 %    % Basophils 0.2 %    % Immature Granulocytes 0.8 %    Nucleated RBCs 0 0 /100    Absolute Neutrophil 7.9 1.6 - 8.3 10e9/L    Absolute Lymphocytes 2.1 0.8 - 5.3 10e9/L    Absolute Monocytes 1.4 (H) 0.0 - 1.3 10e9/L    Absolute Eosinophils 0.6 0.0 - 0.7 10e9/L    Absolute Basophils 0.0 0.0 - 0.2 10e9/L    Abs Immature Granulocytes 0.1 0 - 0.4 10e9/L    Absolute Nucleated RBC 0.0    INR   Result Value Ref Range    INR 1.90 (H) 0.86 - 1.14   ABO/Rh type and screen   Result Value Ref Range    Units Ordered 2     ABO O     RH(D) Pos     Antibody Screen Neg     Test Valid Only At          [facility]    Specimen Expires 01/11/2018     Crossmatch Red Blood Cells    Comprehensive metabolic panel   Result Value Ref Range    Sodium 143 133 - 144 mmol/L    Potassium 4.3 3.4 - 5.3 mmol/L    Chloride 110 (H) 94 - 109 mmol/L    Carbon Dioxide 24 20 - 32 mmol/L    Anion Gap 9 3 - 14 mmol/L    Glucose 84 70 - 99 mg/dL    Urea Nitrogen 30 7 - 30 mg/dL    Creatinine 1.38 (H) 0.66 - 1.25 mg/dL    GFR Estimate 51 (L) >60 mL/min/1.7m2    GFR Estimate If Black 61 >60 mL/min/1.7m2     Calcium 7.9 (L) 8.5 - 10.1 mg/dL    Bilirubin Total 0.3 0.2 - 1.3 mg/dL    Albumin 2.3 (L) 3.4 - 5.0 g/dL    Protein Total 7.0 6.8 - 8.8 g/dL    Alkaline Phosphatase 150 40 - 150 U/L    ALT 11 0 - 70 U/L    AST 13 0 - 45 U/L   Lipase   Result Value Ref Range    Lipase 104 73 - 393 U/L   Lactic acid whole blood   Result Value Ref Range    Lactic Acid 0.7 0.7 - 2.0 mmol/L   Troponin I   Result Value Ref Range    Troponin I ES <0.015 0.000 - 0.045 ug/L   Blood component   Result Value Ref Range    Unit Number [id number]     Blood Component Type Red Blood Cells Leukocyte Reduced     Division Number 00     Status of Unit Released to care unit 01/08/2018 1513     Blood Product Code [id number]     Unit Status ISS    Blood component   Result Value Ref Range    Unit Number [id number]     Blood Component Type Red Blood Cells Leukocyte Reduced     Division Number 00     Status of Unit Released to care unit 01/08/2018 1243     Blood Product Code [id number]     Unit Status ISS    UA with Microscopic   Result Value Ref Range    Color Urine Yellow     Appearance Urine Cloudy     Glucose Urine Negative NEG^Negative mg/dL    Bilirubin Urine Negative NEG^Negative    Ketones Urine 5 (A) NEG^Negative mg/dL    Specific Gravity Urine 1.017 1.003 - 1.035    Blood Urine Small (A) NEG^Negative    pH Urine 5.5 5.0 - 7.0 pH    Protein Albumin Urine 30 (A) NEG^Negative mg/dL    Urobilinogen mg/dL Normal 0.0 - 2.0 mg/dL    Nitrite Urine Negative NEG^Negative    Leukocyte Esterase Urine Large (A) NEG^Negative    Source Clean catch urine     WBC Urine >182 (H) 0 - 2 /HPF    RBC Urine 68 (H) 0 - 2 /HPF    WBC Clumps Present (A) NEG^Negative /HPF    Bacteria Urine Moderate (A) NEG^Negative /HPF    Transitional Epi 1 0 - 1 /HPF   Urine Culture Aerobic Bacterial   Result Value Ref Range    Specimen Description Catheterized Urine     Special Requests Specimen received in preservative     Culture Micro PENDING    CT Abdomen Pelvis w Contrast    Result Value Ref Range    Radiologist flags (Urgent)      Right renal mass with differential includes oncocytoma    Narrative    EXAMINATION: CT ABDOMEN PELVIS W CONTRAST, 1/8/2018 12:00 PM    TECHNIQUE:  Helical CT images from the lung bases through the  symphysis pubis were obtained with contrast    COMPARISON: CT abdomen pelvis on 9/1/2016    HISTORY: LLQ pain r/o diverticulitis;     DLP: 678 mGy*cm    Contrast dose: 126 cc of isovue 370    FINDINGS:  Abdomen and pelvis:   There is a enhancing structure in the lower pole of the right kidney  measuring 2.5 cm on series 5 image 160. On prior nonenhanced studies  this does not demonstrate enhancement and also do not appear cystic,  indicating an enhancing mass.    Cystic lesion in both kidneys including 5.6 x 6.3 cm cyst arising from  the inferior pole of the left kidney (series 2, image 29) and 2.9 x  3.4 cm cyst arising from the inferior pole of the right kidney (series  2, image 29). No hydronephrosis. No hydroureter. The no renal calculi  identified. The distal right ureter dilated up to 2.1 cm with no  definite stenosis or obstructing stone, similar to CT on 9/9/2016.  Nodularity of one of the adrenal glands measuring 1.8 x 1.8 cm (series  2, image 19), in the prior noncontrasted study on 9/9/2016, it has  Hounsfield units of 25 and measuring approximately 17 x 16 mm.  Gallbladder, biliary ducts, spleen, and pancreas are unremarkable. The  right adrenal gland is unremarkable. There is a 10 x 7 mm Ill-defined  hypoattenuating lesion in the right liver lobe (series 2, image 18),  is too small to characterize by CT.  No free fluid in the abdomen or pelvis. No free air in the abdomen.  Small bowel and colon are normal caliber without appreciable wall  thickening. No evidence for diverticulitis. No lymphadenopathy in the  abdomen or pelvis.  Aneurysmal dilation of infrarenal abdominal aorta up to 3.2 cm at the  superior endplate of L4. Aneurysmal dilatation of  bilateral common  iliac arteries, right up to 2.2 cm and left up to 2.2 cm. These are  similar to CT on 9/11/2016.    Bones and soft tissues: Innumerable patchy ill-defined sclerosis in  the vertebral bodies of visualized spine and bony pelvis and femurs  consistent with known metastatic disease. No acute osseous abnormality  identified. Fat-containing umbilical hernia.    Lower chest: No no pleural effusion. No pericardial effusion. The  heart size is within normal limits.      Impression    IMPRESSION:   1. No cause of acute pain is identified in the abdomen or pelvis.  2. 2.5 cm right lower pole solid renal mass. This is been present on  noncontrast CT since at least 7/16/2014 and size has not changed  substantially. Differential includes oncocytoma and renal cell  carcinoma.  3. Redemonstration of multiple patchy ill-defined sclerosis in  vertebral bodies and bony pelvis consistent with known metastatic  disease.  4. Aneurysmal dilation of the aorta and bilateral common iliac  arteries.  5. Nodularity of the left adrenal gland not significantly changed  since 7/16/2014.      [Urgent Result: Right renal mass with differential includes oncocytoma  and renal cell carcinoma]    Finding was identified on 1/8/2018 12:22 PM.     Dr. Peguero was contacted by Dr. Cruz at 1/8/2018 1:47 PM and  verbalized understanding of the urgent finding.     I have personally reviewed the examination and initial interpretation  and I agree with the findings.    NATALIA DAVIS MD   Glucose by meter   Result Value Ref Range    Glucose 77 70 - 99 mg/dL     *Note: Due to a large number of results and/or encounters for the requested time period, some results have not been displayed. A complete set of results can be found in Results Review.

## 2018-01-08 NOTE — PROGRESS NOTES
..Patient admitted to:5c  Admitted from:ED from nursing home  Arrived by:ambulance  Reason for admission:gi bleed  Patient accompanied by:self  Belongings:clothing, $141 and [employer] mobility card locked up with security, watch  Teaching:admission

## 2018-01-08 NOTE — IP AVS SNAPSHOT
MRN:[id number]                      After Visit Summary   1/8/2018    Tomas Grey    MRN: [id number]           Thank you!     Thank you for choosing [facility] for your care. Our goal is always to provide you with excellent care. Hearing back from our patients is one way we can continue to improve our services. Please take a few minutes to complete the written survey that you may receive in the mail after you visit with us. Thank you!        Patient Information     Date Of Birth          1945        Designated Caregiver       Most Recent Value    Caregiver    Will someone help with your care after discharge? no      About your hospital stay     You were admitted on:  January 8, 2018 You last received care in the:  [address] [facility]    You were discharged on:  January 13, 2018        Reason for your hospital stay       Admitted for a GI bleed and UTI.                  Who to Call     For medical emergencies, please call 911.  For non-urgent questions about your medical care, please call your primary care provider or clinic, [phone]          Attending Provider     Provider Specialty    Juliette Peguero MD Internal Medicine    Elidia Mckeon MD Hematology & Oncology       Primary Care Provider Office Phone # Fax #    Ekaterina Sow SAL [facility] [phone]      After Care Instructions     Activity - Up with nursing assistance           Additional Discharge Instructions       --Please draw labs on patient three times weekly and send results to Dr. Oviedo at [facility].  --Transfuse RBCs for Hgb <8.    Sliding scale insulin  Before meals Correction Scale - MEDIUM INSULIN RESISTANCE DOSING     Do Not give Correction Insulin if Pre-Meal BG less than 140.   For Pre-Meal  - 189 give 1 unit.   For Pre-Meal  - 239 give 2 units.   For Pre-Meal  - 289 give 3 units.   For Pre-Meal  - 339 give 4 units.   For Pre-Meal - 399 give  5 units.   For Pre-Meal -449 give 6 units  For Pre-Meal BG greater than or equal to 450 give 7 units.   To be given with prandial insulin, and based on pre-meal blood glucose.    Notify provider if glucose greater than or equal to 350 mg/dL after administration of correction dose.    Before bedtime correction scale  MEDIUM INSULIN RESISTANCE DOSING    Do Not give Bedtime Correction Insulin if BG less than  200.   For  - 249 give 1 units.   For  - 299 give 2 units.   For  - 349 give 3 units.   For  -399 give 4 units.   For BG greater than or equal to 400 give 5 units.  Notify provider if glucose greater than or equal to 350 mg/dL after administration of correction dose.            Advance Diet as Tolerated       Follow this diet upon discharge: Regular            Fall precautions           General info for SNF       Length of Stay Estimate: Long Term Care  Condition at Discharge: Stable  Level of care:board and care  Rehabilitation Potential: Fair  Admission H&P remains valid and up-to-date: Yes  Recent Chemotherapy: N/A  Use Nursing Home Standing Orders: Yes            Glucose monitor nursing POCT       Before meals and at bedtime            Mantoux instructions       Give two-step Mantoux (PPD) Per Facility Policy Yes            Wound care       Site:   Bilateral lower extremities  Instructions:  Bilateral LEs: wash daily and pat dry.  Sween cream to dry scaly skin.  Apply mepilex border to open area on right LE. (per WOC nurse)                  Follow-up Appointments     Follow Up and recommended labs and tests       Please have patient follow up in clinic with Dr. Oviedo in ~ 1 week from discharge. This appointment has been requested.                  Additional Services     Physical Therapy Adult Consult       Evaluate and treat as clinically indicated.    Reason:  Maintain strength and conditioning                  Future tests that were ordered for you     CBC with platelets and  "differential       Last Lab Result: Hemoglobin (g/dL)       Date                     Value                 2018               9.5 (L)          ----------            Comprehensive metabolic panel (BMP + Alb, Alk Phos, ALT, AST, Total. Bili, TP)           Contact Isolation                 Pending Results     Date and Time Order Name Status Description    2018 0947 EKG 12-lead, complete Preliminary     2018 0445 CBC with platelets In process     2018 0445 INR In process             Statement of Approval     Ordered          18 1451  I have reviewed and agree with all the recommendations and orders detailed in this document.  EFFECTIVE NOW     Approved and electronically signed by:  Roula Mcneil PA-C             Admission Information     Date & Time Provider Department Dept. Phone    2018 Elidia Mckeon MD [address] [facility] [phone]      Your Vitals Were     Blood Pressure Pulse Temperature Respirations Height Weight    146/96 89 97.5  F (36.4  C) (Oral) 18 1.803 m (5' 11\") 89.8 kg (198 lb)    Pulse Oximetry BMI (Body Mass Index)                100% 27.62 kg/m2          MyChart Information     [employer] lets you send messages to your doctor, view your test results, renew your prescriptions, schedule appointments and more. To sign up, go to [email or web address] . Click on \"Log in\" on the left side of the screen, which will take you to the Welcome page. Then click on \"Sign up Now\" on the right side of the page.     You will be asked to enter the access code listed below, as well as some personal information. Please follow the directions to create your username and password.     Your access code is: [email or web address]  Expires: 3/14/2018 10:17 AM     Your access code will  in 90 days. If you need help or a new code, please call your [facility] clinic or [phone].        Care EveryWhere ID     This is your Care EveryWhere ID. This could be used by other " organizations to access your [facility] medical records  [id number]        Equal Access to Services     PATRICIA BAUM : Elaina Mcclelland, stanley alvarez, aryan espitiamaanita pierson, jorje musa. So [facility] [phone].    ATENCIÓN: Si habla español, tiene a soria disposición servicios gratuitos de asistencia lingüística. Llame al [phone].    We comply with applicable federal civil rights laws and [place] laws. We do not discriminate on the basis of race, color, national origin, age, disability, sex, sexual orientation, or gender identity.               Review of your medicines      START taking        Dose / Directions    folic acid 1 MG tablet   Commonly known as:  FOLVITE   Used for:  Anemia, unspecified type        Dose:  1 mg   Start taking on:  1/14/2018   Take 1 tablet (1 mg) by mouth daily   Quantity:  30 tablet   Refills:  0       * insulin aspart 100 UNIT/ML injection   Commonly known as:  NovoLOG PEN   Used for:  Type 2 diabetes mellitus with stage 3 chronic kidney disease, unspecified long term insulin use status (H)        Dose:  1-7 Units   Inject 1-7 Units Subcutaneous 3 times daily (before meals) (see discharge instructions for sliding scale.   Refills:  0       * insulin aspart 100 UNIT/ML injection   Commonly known as:  NovoLOG PEN   Used for:  Type 2 diabetes mellitus with stage 3 chronic kidney disease, unspecified long term insulin use status (H)        Dose:  1-5 Units   Inject 1-5 Units Subcutaneous At Bedtime (see discharge instructions regarding sliding scale).   Refills:  0       polyethylene glycol Packet   Commonly known as:  MIRALAX/GLYCOLAX   Used for:  Constipation, unspecified constipation type        Dose:  17 g   Start taking on:  1/14/2018   Take 17 g by mouth daily , hold for loose stools.   Quantity:  7 packet   Refills:  0       simethicone 40 MG/0.6ML suspension   Commonly known as:  MYLICON   Used for:  Flatulence, eructation and gas  pain        Dose:  40 mg   Take 0.6 mLs (40 mg) by mouth every 6 hours as needed for cramping   Refills:  0       sucralfate 1 GM/10ML suspension   Commonly known as:  CARAFATE   Used for:  Gastrointestinal hemorrhage with melena        Dose:  1 g   Take 10 mLs (1 g) by mouth 4 times daily (before meals and nightly)   Quantity:  1200 mL   Refills:  0       sulfamethoxazole-trimethoprim 800-160 MG per tablet   Commonly known as:  BACTRIM DS/SEPTRA DS   Indication:  Urinary Tract Infection   Used for:  Urinary tract infection without hematuria, site unspecified        Dose:  1 tablet   Take 1 tablet by mouth 2 times daily for 3 doses   Refills:  0       * Notice:  This list has 2 medication(s) that are the same as other medications prescribed for you. Read the directions carefully, and ask your doctor or other care provider to review them with you.      CONTINUE these medicines which may have CHANGED, or have new prescriptions. If we are uncertain of the size of tablets/capsules you have at home, strength may be listed as something that might have changed.        Dose / Directions    Carboxymethylcellulose Sod PF 0.5 % Soln ophthalmic solution   Commonly known as:  REFRESH PLUS   This may have changed:  medication strength   Used for:  Dry eyes        Dose:  1 drop   Place 1 drop into both eyes 4 times daily And 1 drop both eyes daily prn   Quantity:  1 Bottle   Refills:  0       ondansetron 4 MG Film   This may have changed:  reasons to take this   Used for:  Prostate cancer metastatic to bone (H)        Dose:  4 mg   Take 4 mg by mouth every 8 hours as needed   Quantity:  12 each   Refills:  0       oxyCODONE IR 5 MG tablet   Commonly known as:  ROXICODONE   This may have changed:  medication strength   Used for:  Prostate cancer metastatic to bone (H)        Dose:  5 mg   Take 1 tablet (5 mg) by mouth every 4 hours as needed   Quantity:  18 tablet   Refills:  0       senna-docusate 8.6-50 MG per tablet   Commonly  known as:  SENOKOT-S;PERICOLACE   This may have changed:  additional instructions   Used for:  Constipation, unspecified constipation type        Dose:  2 tablet   Take 2 tablets by mouth 2 times daily , hold for loose stools.   Quantity:  100 tablet   Refills:  0         CONTINUE these medicines which have NOT CHANGED        Dose / Directions    acetaminophen 500 MG tablet   Commonly known as:  TYLENOL   Used for:  Prostate cancer metastatic to bone (H)        Dose:  1000 mg   Take 2 tablets (1,000 mg) by mouth 3 times daily Not to exceed 3000 mg/24 hours   Refills:  0       ammonium lactate 12 % lotion   Commonly known as:  LAC-HYDRIN   Indication:  Abnormal Dryness of Skin   Used for:  Venous stasis dermatitis of both lower extremities        Apply topically 2 times daily as needed for dry skin To all extremities for dry skin   Refills:  0       atorvastatin 10 MG tablet   Commonly known as:  LIPITOR   Used for:  Coronary artery disease involving native heart with angina pectoris, unspecified vessel or lesion type (H)        Dose:  10 mg   Take 1 tablet (10 mg) by mouth At Bedtime   Quantity:  30 tablet   Refills:  0       BETIMOL OP        Dose:  1 drop   Place 1 drop into both eyes daily   Refills:  0       bimatoprost 0.01 % Soln   Commonly known as:  LUMIGAN   Used for:  Eye abnormalities        Dose:  1 drop   Place 1 drop into both eyes At Bedtime   Quantity:  1 Bottle   Refills:  0       budesonide-formoterol 160-4.5 MCG/ACT Inhaler   Commonly known as:  SYMBICORT   Used for:  Chronic obstructive pulmonary disease, unspecified COPD type (H)        Dose:  2 puff   Inhale 2 puffs into the lungs 2 times daily   Refills:  0       calcium carbonate 1500 (600 CA) MG tablet   Commonly known as:  OS-JORDAN 600 mg [place]. Ca   Used for:  Prostate cancer metastatic to bone (H)        Dose:  1500 mg   Take 1 tablet (1,500 mg) by mouth 2 times daily (with meals)   Quantity:  60 tablet   Refills:  0       diclofenac 1 % Gel  topical gel   Commonly known as:  VOLTAREN   Used for:  Chronic pain of both knees        Apply 4 grams to knees four times daily as needed using enclosed dosing card.   Quantity:  100 g   Refills:  0       DULoxetine 20 MG EC capsule   Commonly known as:  CYMBALTA   Used for:  Depression, unspecified depression type        Dose:  20 mg   Take 1 capsule (20 mg) by mouth daily   Quantity:  60 capsule   Refills:  0       fentaNYL 12 mcg/hr 72 hr patch   Commonly known as:  DURAGESIC   Used for:  Prostate cancer metastatic to bone (H)        Dose:  1 patch   Place 1 patch onto the skin every 72 hours   Quantity:  5 patch   Refills:  0       ferrous sulfate 325 (65 FE) MG tablet   Commonly known as:  IRON   Used for:  Anemia, unspecified type        Dose:  325 mg   Take 1 tablet (325 mg) by mouth 2 times daily   Quantity:  100 tablet   Refills:  0       furosemide 20 MG tablet   Commonly known as:  LASIX   Used for:  Chronic diastolic congestive heart failure (H), Lymphedema of both lower extremities        Dose:  20 mg   Take 1 tablet (20 mg) by mouth 2 times daily   Quantity:  30 tablet   Refills:  0       ipratropium - albuterol 0.5 mg/2.5 mg/3 mL 0.5-2.5 (3) MG/3ML neb solution   Commonly known as:  DUONEB   Used for:  Chronic obstructive pulmonary disease, unspecified COPD type (H)        Dose:  1 vial   Take 1 vial (3 mLs) by nebulization every 4 hours as needed for shortness of breath / dyspnea or wheezing   Quantity:  360 mL   Refills:  0       isosorbide mononitrate 30 MG 24 hr tablet   Commonly known as:  IMDUR   Used for:  Essential hypertension        Dose:  60 mg   Take 2 tablets (60 mg) by mouth daily   Quantity:  30 tablet   Refills:  0       METHOCARBAMOL PO        Dose:  500 mg   Take 500 mg by mouth every 6 hours as needed for muscle spasms   Refills:  0       metoprolol succinate 50 MG 24 hr tablet   Commonly known as:  TOPROL XL   Used for:  ASCVD (arteriosclerotic cardiovascular disease)         Dose:  50 mg   Take 1 tablet (50 mg) by mouth daily   Quantity:  5 tablet   Refills:  0       nitroGLYcerin 0.4 MG sublingual tablet   Commonly known as:  NITROSTAT   Used for:  Coronary artery disease involving native heart with angina pectoris, unspecified vessel or lesion type (H)        Dose:  0.4 mg   Place 1 tablet (0.4 mg) under the tongue every 5 minutes as needed for chest pain if you are still having symptoms after 3 doses (15 minutes) call 911.   Quantity:  25 tablet   Refills:  0       omeprazole 10 MG CR capsule   Commonly known as:  priLOSEC   Used for:  Gastric reflux        Dose:  10 mg   Take 1 capsule (10 mg) by mouth daily   Quantity:  60 capsule   Refills:  0       tamsulosin 0.4 MG capsule   Commonly known as:  FLOMAX   Used for:  Benign non-nodular prostatic hyperplasia without lower urinary tract symptoms        Dose:  0.4 mg   Take 1 capsule (0.4 mg) by mouth daily   Quantity:  5 capsule   Refills:  0       tiotropium 18 MCG capsule   Commonly known as:  SPIRIVA   Indication:  Chronic Obstructive Lung Disease   Used for:  Chronic obstructive pulmonary disease, unspecified COPD type (H)        Dose:  18 mcg   Inhale 1 capsule (18 mcg) into the lungs daily   Quantity:  30 capsule   Refills:  0         STOP taking     aspirin 81 MG EC tablet           insulin glargine 100 UNIT/ML injection   Commonly known as:  LANTUS           WARFARIN SODIUM PO                Where to get your medicines      Some of these will need a paper prescription and others can be bought over the counter. Ask your nurse if you have questions.     Bring a paper prescription for each of these medications     fentaNYL 12 mcg/hr 72 hr patch    oxyCODONE IR 5 MG tablet       You don't need a prescription for these medications     acetaminophen 500 MG tablet    ammonium lactate 12 % lotion    atorvastatin 10 MG tablet    bimatoprost 0.01 % Soln    budesonide-formoterol 160-4.5 MCG/ACT Inhaler    calcium carbonate 1500 (600 CA)  MG tablet    Carboxymethylcellulose Sod PF 0.5 % Soln ophthalmic solution    diclofenac 1 % Gel topical gel    DULoxetine 20 MG EC capsule    ferrous sulfate 325 (65 FE) MG tablet    folic acid 1 MG tablet    furosemide 20 MG tablet    insulin aspart 100 UNIT/ML injection    insulin aspart 100 UNIT/ML injection    ipratropium - albuterol 0.5 mg/2.5 mg/3 mL 0.5-2.5 (3) MG/3ML neb solution    isosorbide mononitrate 30 MG 24 hr tablet    metoprolol succinate 50 MG 24 hr tablet    nitroGLYcerin 0.4 MG sublingual tablet    omeprazole 10 MG CR capsule    ondansetron 4 MG Film    polyethylene glycol Packet    senna-docusate 8.6-50 MG per tablet    simethicone 40 MG/0.6ML suspension    sucralfate 1 GM/10ML suspension    sulfamethoxazole-trimethoprim 800-160 MG per tablet    tamsulosin 0.4 MG capsule    tiotropium 18 MCG capsule               ANTIBIOTIC INSTRUCTION     You've Been Prescribed an Antibiotic - Now What?  Your healthcare team thinks that you or your loved one might have an infection. Some infections can be treated with antibiotics, which are powerful, life-saving drugs. Like all medications, antibiotics have side effects and should only be used when necessary. There are some important things you should know about your antibiotic treatment.      Your healthcare team may run tests before you start taking an antibiotic.    Your team may take samples (e.g., from your blood, urine or other areas) to run tests to look for bacteria. These test can be important to determine if you need an antibiotic at all and, if you do, which antibiotic will work best.      Within a few days, your healthcare team might change or even stop your antibiotic.    Your team may start you on an antibiotic while they are working to find out what is making you sick.    Your team might change your antibiotic because test results show that a different antibiotic would be better to treat your infection.    In some cases, once your team has more  information, they learn that you do not need an antibiotic at all. They may find out that you don't have an infection, or that the antibiotic you're taking won't work against your infection. For example, an infection caused by a virus can't be treated with antibiotics. Staying on an antibiotic when you don't need it is more likely to be harmful than helpful.      You may experience side effects from your antibiotic.    Like all medications, antibiotics have side effects. Some of these can be serious.    Let you healthcare team know if you have any known allergies when you are admitted to the hospital.    One significant side effect of nearly all antibiotics is the risk of severe and sometimes deadly diarrhea caused by Clostridium difficile (C. Difficile). This occurs when a person takes antibiotics because some good germs are destroyed. Antibiotic use allows C. diificile to take over, putting patients at high risk for this serious infection.    As a patient or caregiver, it is important to understand your or your loved one's antibiotic treatment. It is especially important for caregivers to speak up when patients can't speak for themselves. Here are some important questions to ask your healthcare team.    What infection is this antibiotic treating and how do you know I have that infection?    What side effects might occur from this antibiotic?    How long will I need to take this antibiotic?    Is it safe to take this antibiotic with other medications or supplements (e.g., vitamins) that I am taking?     Are there any special directions I need to know about taking this antibiotic? For example, should I take it with food?    How will I be monitored to know whether my infection is responding to the antibiotic?    What tests may help to make sure the right antibiotic is prescribed for me?      Information provided by:  www.cdc.gov/getsmart  U.S. Department of Health and Human Services  Centers for disease Control and  Prevention  National Center for Emerging and Zoonotic Infectious Diseases  Division of Healthcare Quality Promotion         Protect others around you: Learn how to safely use, store and throw away your medicines at www.disposemymeds.org.             Medication List: This is a list of all your medications and when to take them. Check marks below indicate your daily home schedule. Keep this list as a reference.      Medications           Morning Afternoon Evening Bedtime As Needed    acetaminophen 500 MG tablet   Commonly known as:  TYLENOL   Take 2 tablets (1,000 mg) by mouth 3 times daily Not to exceed 3000 mg/24 hours   Last time this was given:  1,000 mg on 1/13/2018  9:30 AM                                ammonium lactate 12 % lotion   Commonly known as:  LAC-HYDRIN   Apply topically 2 times daily as needed for dry skin To all extremities for dry skin                                atorvastatin 10 MG tablet   Commonly known as:  LIPITOR   Take 1 tablet (10 mg) by mouth At Bedtime   Last time this was given:  10 mg on 1/11/2018  9:50 PM                                BETIMOL OP   Place 1 drop into both eyes daily                                bimatoprost 0.01 % Soln   Commonly known as:  LUMIGAN   Place 1 drop into both eyes At Bedtime                                budesonide-formoterol 160-4.5 MCG/ACT Inhaler   Commonly known as:  SYMBICORT   Inhale 2 puffs into the lungs 2 times daily                                calcium carbonate 1500 (600 CA) MG tablet   Commonly known as:  OS-JORDAN 600 mg [place]. Ca   Take 1 tablet (1,500 mg) by mouth 2 times daily (with meals)   Last time this was given:  1,500 mg on 1/13/2018  9:30 AM                                Carboxymethylcellulose Sod PF 0.5 % Soln ophthalmic solution   Commonly known as:  REFRESH PLUS   Place 1 drop into both eyes 4 times daily And 1 drop both eyes daily prn   Last time this was given:  1 drop on 1/11/2018  9:03 AM                                 diclofenac 1 % Gel topical gel   Commonly known as:  VOLTAREN   Apply 4 grams to knees four times daily as needed using enclosed dosing card.                                DULoxetine 20 MG EC capsule   Commonly known as:  CYMBALTA   Take 1 capsule (20 mg) by mouth daily   Last time this was given:  20 mg on 1/13/2018  9:31 AM                                fentaNYL 12 mcg/hr 72 hr patch   Commonly known as:  DURAGESIC   Place 1 patch onto the skin every 72 hours   Last time this was given:  1 patch on 1/11/2018  9:07 PM                                ferrous sulfate 325 (65 FE) MG tablet   Commonly known as:  IRON   Take 1 tablet (325 mg) by mouth 2 times daily                                folic acid 1 MG tablet   Commonly known as:  FOLVITE   Take 1 tablet (1 mg) by mouth daily   Start taking on:  1/14/2018   Last time this was given:  1 mg on 1/13/2018  9:31 AM                                furosemide 20 MG tablet   Commonly known as:  LASIX   Take 1 tablet (20 mg) by mouth 2 times daily   Last time this was given:  20 mg on 1/13/2018  9:30 AM                                * insulin aspart 100 UNIT/ML injection   Commonly known as:  NovoLOG PEN   Inject 1-7 Units Subcutaneous 3 times daily (before meals) (see discharge instructions for sliding scale.   Last time this was given:  1 Units on 1/13/2018  2:26 PM                                * insulin aspart 100 UNIT/ML injection   Commonly known as:  NovoLOG PEN   Inject 1-5 Units Subcutaneous At Bedtime (see discharge instructions regarding sliding scale).   Last time this was given:  1 Units on 1/13/2018  2:26 PM                                ipratropium - albuterol 0.5 mg/2.5 mg/3 mL 0.5-2.5 (3) MG/3ML neb solution   Commonly known as:  DUONEB   Take 1 vial (3 mLs) by nebulization every 4 hours as needed for shortness of breath / dyspnea or wheezing                                isosorbide mononitrate 30 MG 24 hr tablet   Commonly known as:  IMDUR   Take 2  tablets (60 mg) by mouth daily   Last time this was given:  60 mg on 1/13/2018  9:30 AM                                METHOCARBAMOL PO   Take 500 mg by mouth every 6 hours as needed for muscle spasms                                metoprolol succinate 50 MG 24 hr tablet   Commonly known as:  TOPROL XL   Take 1 tablet (50 mg) by mouth daily   Last time this was given:  50 mg on 1/13/2018  9:31 AM                                nitroGLYcerin 0.4 MG sublingual tablet   Commonly known as:  NITROSTAT   Place 1 tablet (0.4 mg) under the tongue every 5 minutes as needed for chest pain if you are still having symptoms after 3 doses (15 minutes) call 911.                                omeprazole 10 MG CR capsule   Commonly known as:  priLOSEC   Take 1 capsule (10 mg) by mouth daily                                ondansetron 4 MG Film   Take 4 mg by mouth every 8 hours as needed                                oxyCODONE IR 5 MG tablet   Commonly known as:  ROXICODONE   Take 1 tablet (5 mg) by mouth every 4 hours as needed                                polyethylene glycol Packet   Commonly known as:  MIRALAX/GLYCOLAX   Take 17 g by mouth daily , hold for loose stools.   Start taking on:  1/14/2018   Last time this was given:  17 g on 1/11/2018  9:02 AM                                senna-docusate 8.6-50 MG per tablet   Commonly known as:  SENOKOT-S;PERICOLACE   Take 2 tablets by mouth 2 times daily , hold for loose stools.   Last time this was given:  2 tablets on 1/11/2018  9:10 PM                                simethicone 40 MG/0.6ML suspension   Commonly known as:  MYLICON   Take 0.6 mLs (40 mg) by mouth every 6 hours as needed for cramping   Last time this was given:  40 mg on 1/11/2018  5:22 PM                                sucralfate 1 GM/10ML suspension   Commonly known as:  CARAFATE   Take 10 mLs (1 g) by mouth 4 times daily (before meals and nightly)   Last time this was given:  1 g on 1/11/2018  9:10 PM                                 sulfamethoxazole-trimethoprim 800-160 MG per tablet   Commonly known as:  BACTRIM DS/SEPTRA DS   Take 1 tablet by mouth 2 times daily for 3 doses                                tamsulosin 0.4 MG capsule   Commonly known as:  FLOMAX   Take 1 capsule (0.4 mg) by mouth daily   Last time this was given:  0.4 mg on 1/13/2018  9:30 AM                                tiotropium 18 MCG capsule   Commonly known as:  SPIRIVA   Inhale 1 capsule (18 mcg) into the lungs daily                                * Notice:  This list has 2 medication(s) that are the same as other medications prescribed for you. Read the directions carefully, and ask your doctor or other care provider to review them with you.

## 2018-01-09 NOTE — PROGRESS NOTES
SW brief note:    Confirmed with [facility] that pt has bed hold within long-term care, able to return when medically stable and ready for DC.       [facility], [facility]   [address] [place] [address]   Fax: [phone]  Main: [phone]  Admissions: [phone]          JW Thorne, DAGO  7D  (Hem/Onc)  Pager: [phone]  1/9/2018

## 2018-01-09 NOTE — PROGRESS NOTES
[facility] Nurse Inpatient Wound Assessment    Initial Assessment  Reason for consultation: Evaluate and treat Right lower extremity wound        ASSESSMENT:   Right LE wound due to Venous Ulcer complicated by lymphedema  Status: initial assessment    Right buttock: intact depigmented skin. Over fleshy portion of buttock and not a previous pressure injury.        TREATMENT PLAN:     Bilateral LEs: wash daily and pat dry.  Sween cream to dry scaly skin.  Apply mepilex border to open area on right LE.    Orders Written  WO Nurse follow-up plan:   Nursing to notify the Provider(s) and re-consult the WO Nurse if wound(s) deteriorates or new skin concern.    Patient History  According to provider note(s): Tomas Grey is a 71 y/o male with metastatic prostate cancer and PMHx significant for CAD, CKD, COPD, HTN, A fib managed on Coumadin, and diabetes who presents with 5 days of abdominal pain and anorexia, and 3-4 days of melena. Found to have a hgb of 3.2 on presentation to the ED concerning for a GI bleed.     Objective Data   Containment of urine/stool: Continent of bowel and Continent of bladder    Active Diet Order    Active Diet Order      Clear Liquid Diet    Output:  I/O last 3 completed shifts:  In: 2090 [P.O.:240; I.V.:1550]  Out: 1690 [Urine:1690]    Risk Assessment:   Herrera Herrera Score  Avg: 15.9  Min: 15  Max: 16                                 Labs:     Recent Labs  Lab 01/09/18  1324 01/09/18  0421   HGB 10.3* 11.0*   INR  --  1.43*   WBC  --  9.1       Recent Labs  Lab 01/08/18  1106   CULT >100,000 colonies/mLEscherichia coli*  Susceptibility testing in progress           PHYSICAL EXAM:   Skin assessment:     Wound Location:  Right LE  Date of last photo: n/a  Wound History: present on admit.    Measurements (length x width x depth, in cm) 1.5 x 1 x 0.1 cm  Pale smooth red base.    Palpation of the wound bed: normal   Periwound skin: hemosiderin staining and lipoderamatosclerosis, old keloid  scars  Color: dark  Temperature: normal   Drainage: scant  Description of drainage: serous  Odor: none  Pain: denies      Right buttock: intact depigmented healed wound with old dried drainage.  Not a pressure injury.       INTERVENTIONS:   Current support surface: Standard  Atmos Air   Current off-loading measures: Pillows under calves  Visual inspection of wounds(s) completed.  Wound Care: was done per plan of care.  Supplies: mepilex, sween cream  Education provided today: wound care    Discussed plan of care with Patient and Nurse  Face to face time: 20 minutes

## 2018-01-09 NOTE — CONSULTS
SW brief note:     Confirmed with [facility] that pt has bed hold within long-term care, able to return when medically stable and ready for DC.        [facility], [facility]   [address] [place] [address]   Fax: [phone]  Main: [phone]  Admissions: [phone]              JW Thorne, DAGO  7D  (Hem/Onc)  Pager: [phone]  1/9/2018

## 2018-01-09 NOTE — PROGRESS NOTES
SPIRITUAL HEALTH SERVICES  SPIRITUAL ASSESSMENT Progress Note  [facility] 5C     REFERRAL SOURCE: Hospital  Request    Introduced myself to pt and the support / services of the [facility] team. Patient says he has bone cancer but the doctors have it currently in remission. He asked for prayers for continued healing. Tomas said he has a large family (I believe 9 kids) and they are all praying for him.     PLAN: No anticipated follow-up at this time, but remain available for spiritual support and care as requested.     Jaime Arceo  Chaplain Resident  Pager [phone]

## 2018-01-09 NOTE — PLAN OF CARE
Problem: Patient Care Overview  Goal: Plan of Care/Patient Progress Review  Outcome: No Change   01/08/18 2219   OTHER   Plan Of Care Reviewed With patient   Plan of Care Review   Progress no change       Problem: Gastrointestinal Bleeding (Adult)  Goal: Signs and Symptoms of Listed Potential Problems Will be Absent, Minimized or Managed (Gastrointestinal Bleeding)  Signs and symptoms of listed potential problems will be absent, minimized or managed by discharge/transition of care (reference Gastrointestinal Bleeding (Adult) CPG).   Outcome: Improving   01/08/18 2219   Gastrointestinal Bleeding   Problems Assessed (GI Bleeding) all   Problems Present (GI Bleeding) situational response;hypoxia/hypoxemia;hemorrhage     VSS throughout shift. Pt given scheduled Tylenol for abd pain and scheduled fentanyl patch to upper left back/neck area for chronic neck pain. 2 bags of blood given during day and evening shift. NS running at 75 ml/hr in L PIV. Protonix drip running at 8 mg/hr (10 ml/hr). Pt prefers to be up in chair. Bedside commode in room for BMs. Pt uses urinal for voiding.

## 2018-01-09 NOTE — PLAN OF CARE
Problem: Patient Care Overview  Goal: Plan of Care/Patient Progress Review  AVSS on 2 L O2 nasal cannula. No stools overnight. Patient voiding in urinal. Pt refused to go to bed, spent night in chair. Pt instructed to shift weight while in chair. Denies pain. Pt called once complaining of shortness of breath, saturations high 90s on 2L, diminished lung sounds, pt requested prn nebulizer treatment and received. Will continue to monitor.     Problem: Gastrointestinal Bleeding (Adult)  Goal: Signs and Symptoms of Listed Potential Problems Will be Absent, Minimized or Managed (Gastrointestinal Bleeding)  Signs and symptoms of listed potential problems will be absent, minimized or managed by discharge/transition of care (reference Gastrointestinal Bleeding (Adult) CPG).    01/09/18 0554   Gastrointestinal Bleeding   Problems Assessed (GI Bleeding) all   Problems Present (GI Bleeding) situational response;hypoxia/hypoxemia

## 2018-01-09 NOTE — PROGRESS NOTES
"[facility], [place]    Hematology / Oncology Progress Note    Date of Admission: 1/8/2018  Hospital Day #: 1   Date of Service (when I saw the patient): 01/09/2018     Assessment & Plan   Tomas Grey is a 73 y/o male with metastatic prostate cancer and PMHx significant for CAD, CKD, COPD, HTN, A fib managed on Coumadin, and diabetes who presents with 5 days of abdominal pain and anorexia, and 3-4 days of melena. Found to have a hgb of 3.2 on presentation to the ED concerning for a GI bleed.      #Abdominal pain.  #Suspected GI bleed.  Patient endorses 3-4 days of melena at his home. States he has been having LLQ/periumbilical abdominal pain for approximately 5 days PTA and has not been eating d/t abdominal pain. Denies nausea or vomiting, no hematemesis. No BRBPR. Explains he told the staff at his nursing home facility but \"they did not do anything\", so patient called 911.   -On evaluation of labs, patient's hgb 3.2. Patient slightly tachycardic, but BPs stable. Pt also notes a mild headache.   -2 units PRBCs given STAT in ED. Will give an additional 1 unit PRBCs then recheck hemoglobin. Hgb of 11.0 after 3 units.  -IVF at 75 ml/hr.  -Started on PPI gtt-->Transitioned to PO dosing bid.  -GI consulted, appreciate recs. Per recs, risk of doing EGD in this patient quite high and likely outweighs benefit. At this time, think mgmt with bid PPI and GOC discussion most appropriate.  -HELD Coumadin and ASA given bleed, given Vitamin K 2 mg in ED.  -CT AP with no e/o acute issue. Notes a renal mass which is stable since 2014. Known bony metastases visualized.     #Anemia. Likely 2/2 disease and previous treatment. Appears to have h/o folate deficiency as well. Has been transfusion dependant.   -Transfuse for hgb <8  -Held iron sulfate 325 mg BID at this time. Continued daily folate.     #UTI. Denies dysuria, however endorses frequency and urgency. Denies hematuria. H/o recurrent UTIs in " setting of prostate Ca and metastatic lymph node dissection. Resistent to surgical options in past. Has f/u with Dr. Epstein 1/9/18 per NP note. On tamsulosin for retention.   -Continue PTA tamsulosin  -UA with e/o infection: >182 WBC with WBC clumps, large LE and moderate bacteria. UC growing >100,000 colonies E coli, sensitivities pending.  -Started on Ceftriaxone 1 g q24 hrs 1/8, will tailor abx with sensitivity results.     #Metastatic prostate cancer. Follows with Dr. Oviedo. Metastases to bone. S/p multiple therapies (Lupron, Casodex, Xgeva, Xtandi, Zytega+pred, Xofigo, and Megace). Recent note by Dr. Oviedo 12/5 stating they would stop all cancer-directed therapies and refer to hospice. Although there are some notes that patient has been resistant/refused hospice care.   -Will discuss patient's cancer care plan with Dr. Oviedo when able.  -Palliative care consult, appreciate recs.     #Coronary artery disease.   #Hypertension.  #Chronic diastolic CHF (EF 60-65%).  -Held PTA ASA in setting of acute bleed  -Continue Isosorbide mononitrate, metoprolol, and atorvastatin with hold parameters BP <120/80 and HR <80.     #Paroxysmal A fib, managed on Coumadin. INR 1.9 on admission. IN NSR on EKG.   -Continue PTA metoprolol  -HOLD Coumadin in setting of acute GI bleed.     #CKD. Baseline Cr appears to be in upper 1s. Cr on admission 1.38. Stable.  -Monitor with daily BMP  -IVF      #COPD, on chronic oxygen.  #ALEXIS.  Has been seen/treated multiple times for COPD exacerbations. Follows with Dr. Loera in Pulmonology.   -Continue PTA Spiriva and Symbicort     #Diabetes mellitus, type II. Managed on Lantus 20 U daily.  -HOLD PTA Lantus.  -Started on medium SSI to manage BG  -QID blood glucose checks per protocol.  -BG in mid 70s this am with c/o nasuea, headache, and dizziness. Gave apple juice. Continue to monitor.     #Lymphedema.  #Venous stasis skin changes/ulcerations.  -Lymphedema consulted, appreciate involvement in  "care.   -Continue PTA Lasix BID.     #Anxiety/Depression.  -Continue PTA Cymbalta     #Congnitive impairment. Per Nursing home visit notes and note by Dr. Rossi with palliative care, patient has mild-moderate impairment based on previous neurocognitive testing. BIMS score 11/15 on 11/16/17. Notes discuss appointing a guardian for patient to make medical decisions. Patient does have 11 children, however notes indicate they do not attend his hospital appointments. Patient explained that his medical decision maker is his \"son\" Les Bourgeois (silvia). Per advanced directive, his primary contact is cousin, Melissa Mccormcak with second contact as his son, Jeevan Grey (see advanced directive for phone number).     #Glaucoma. Follows with [facility].   -Continue Bimatoprost, Timolol Hemihydrate, and Carboxymethylcellulose drops.     FEN  -IVF @ 75 ml/hr  -replace lytes prn  -CLD with advancement as tolerated     PPx  -VTE: defer d/t likely GI bleed  -PUD: PPI bid     Dispo: Pending evaluation and resolution of acute issues, likely 2-3 day stay (possible DC 1/10).      Roula Mcneil PA-C  Hematology/Oncology  Pager #[phone]     Interval History   Tomas is doing okay today. Notes that he is still feeling tired. Endorsed some nausea and emesis with morning medications. Additionally, continues to endorse LLQ abdominal pain. Did have a BM this morning which was \"green\" in color per nursing notes/patient. Urinating without difficulty. Discussed his blood glucose with him, and he does become symptomatic at home with BG in 70s.     Physical Exam   Temp: 97.2  F (36.2  C) Temp src: Axillary BP: 147/74   Heart Rate: 121 Resp: 16 SpO2: 99 % O2 Device: Nasal cannula Oxygen Delivery: 2 LPM  Vitals:    01/08/18 0940 01/08/18 1347   Weight: 93 kg (205 lb) 88.5 kg (195 lb 1.6 oz)     Vital Signs with Ranges  Temp:  [97.2  F (36.2  C)-98.7  F (37.1  C)] 97.2  F (36.2  C)  Heart Rate:  [] 121  Resp:  [16-18] 16  BP: " (107-159)/(62-86) 147/74  SpO2:  [96 %-100 %] 99 %  I/O last 3 completed shifts:  In: 2715 [P.O.:240; I.V.:1175; IV Piggyback:1000]  Out: 1370 [Urine:1370]    Constitutional: Pleasant male seen curled up in recliner chair, sleeping upon entry, wakes easily for examiner, in NAD. Alert and interactive.   HEENT: NCAT, anicteric sclerae, conjunctiva clear, + proptosis. Moist mucous membranes without lesions or thrush. Poor dentition.  Respiratory: Non-labored breathing, good air exchange. Lungs are diminished but aeration throughout, no crackles or wheezes.  Cardiovascular: Regular rate and rhythm, no murmur, rub or gallop.  GI: Normoactive BS. Abdomen is soft, non-distended, with tenderness to palpation of epigastric, LUQ, LLQ, periumbilical, and RLQ, stable. No rigidity or guarding.   Skin: Warm and dry. Significant distal LE skin changes 2/2 venous stasis (scaling and venous stasis ulcer).  Musculoskeletal: Extremities grossly normal. Edematous LE bilaterally to knees.  Neurologic: A &O x3, speech normal, answering questions appropriately. Moves all extremities spontaneously. Grossly non-focal.  Neuropsychiatric: Mentation and affect normal/appropriate.     Medications   Current Facility-Administered Medications   Medication     lidocaine 1 % 1 mL     lidocaine (LMX4) kit     sodium chloride (PF) 0.9% PF flush 3 mL     sodium chloride (PF) 0.9% PF flush 3 mL     pantoprazole (PROTONIX) EC tablet 40 mg     promethazine (PHENERGAN) IV injection 25 mg     Medication Instruction     0.9% sodium chloride infusion     ondansetron (ZOFRAN) injection 8 mg    Or     ondansetron (ZOFRAN-ODT) ODT tab 8 mg    Or     ondansetron (ZOFRAN) tablet 8 mg     acetaminophen (TYLENOL) tablet 1,000 mg     ammonium lactate (LAC-HYDRIN) 12 % lotion     atorvastatin (LIPITOR) tablet 10 mg     bimatoprost (LUMIGAN) 0.01 % ophthalmic drops 1 drop     calcium carbonate (OS-JORDAN 600 mg [place]. Ca) tablet 1,500 mg     diclofenac (VOLTAREN) 1 %  topical gel 4 g     DULoxetine (CYMBALTA) EC capsule 20 mg     fentaNYL (DURAGESIC) 12 mcg/hr 72 hr patch 1 patch     furosemide (LASIX) tablet 20 mg     isosorbide mononitrate (IMDUR) 24 hr tablet 60 mg     metoprolol (TOPROL-XL) 24 hr tablet 50 mg     tamsulosin (FLOMAX) capsule 0.4 mg     cefTRIAXone (ROCEPHIN) 1 g in 10 mL SWFI Premix Syringe     glucose 40 % gel 15-30 g    Or     dextrose 50 % injection 25-50 mL    Or     glucagon injection 1 mg     insulin aspart (NovoLOG) inj (RAPID ACTING)     insulin aspart (NovoLOG) inj (RAPID ACTING)     fluticasone-vilanterol (BREO ELLIPTA) 200-25 MCG/INH oral inhaler 1 puff     albuterol neb solution 2.5 mg     umeclidinium (INCRUSE ELLIPTA) 62.5 MCG/INH oral inhaler 1 puff     HYDROmorphone (PF) (DILAUDID) injection 0.5 mg     Carboxymethylcellulose Sod PF (REFRESH PLUS) 0.5 % ophthalmic solution 1 drop     naloxone (NARCAN) injection 0.1-0.4 mg     [START ON 1/11/2018] fentaNYL (DURAGESIC) patch REMOVAL     fentaNYL (DURAGESIC) Patch in Place     timolol (TIMOPTIC) 0.25 % ophthalmic solution 1 drop       Data   CBC  Recent Labs  Lab 01/09/18 0421 01/08/18 2050 01/08/18  1028   WBC 9.1  --  12.1*   RBC 3.96*  --  1.23*   HGB 11.0* 10.5* 3.2*   HCT 36.2*  --  11.0*   MCV 91  --  89   MCH 27.8  --  26.0*   MCHC 30.4*  --  29.1*   RDW 18.6*  --  19.4*     --  346     CMP  Recent Labs  Lab 01/09/18  0421 01/08/18  1028    143   POTASSIUM 4.5 4.3   CHLORIDE 110* 110*   CO2 22 24   ANIONGAP 12 9   GLC 72 84   BUN 22 30   CR 1.31* 1.38*   GFRESTIMATED 54* 51*   GFRESTBLACK 65 61   JORDAN 8.0* 7.9*   MAG 1.6  --    PHOS 2.4*  --    PROTTOTAL  --  7.0   ALBUMIN  --  2.3*   BILITOTAL  --  0.3   ALKPHOS  --  150   AST  --  13   ALT  --  11     INR  Recent Labs  Lab 01/09/18  0421 01/08/18  1028   INR 1.43* 1.90*       Results for orders placed or performed during the hospital encounter of 01/08/18   XR Chest Port 1 View    Narrative    Exam: XR CHEST PORT 1 VW,  1/8/2018 10:48 AM    Indication: Chest pain    Comparison: 12/18/2017, 12/13/2017, 1/16/2017, 12/26/2016    Findings:   A single AP view of the chest was obtained. The cardiomediastinal  silhouette is not enlarged. No pneumothorax or pleural effusion.  Prominent pulmonary vasculature. Mild streaky bibasilar opacities. Old  right rib fractures.      Impression    Impression:   Prominent pulmonary vasculature with mild streaky bibasilar opacities,  likely atelectasis.    I have personally reviewed the examination and initial interpretation  and I agree with the findings.    BENITO ROWLEY MD   CT Abdomen Pelvis w Contrast     Value    Radiologist flags (Urgent)     Right renal mass with differential includes oncocytoma    Narrative    EXAMINATION: CT ABDOMEN PELVIS W CONTRAST, 1/8/2018 12:00 PM    TECHNIQUE:  Helical CT images from the lung bases through the  symphysis pubis were obtained with contrast    COMPARISON: CT abdomen pelvis on 9/1/2016    HISTORY: LLQ pain r/o diverticulitis;     DLP: 678 mGy*cm    Contrast dose: 126 cc of isovue 370    FINDINGS:  Abdomen and pelvis:   There is a enhancing structure in the lower pole of the right kidney  measuring 2.5 cm on series 5 image 160. On prior nonenhanced studies  this does not demonstrate enhancement and also do not appear cystic,  indicating an enhancing mass.    Cystic lesion in both kidneys including 5.6 x 6.3 cm cyst arising from  the inferior pole of the left kidney (series 2, image 29) and 2.9 x  3.4 cm cyst arising from the inferior pole of the right kidney (series  2, image 29). No hydronephrosis. No hydroureter. The no renal calculi  identified. The distal right ureter dilated up to 2.1 cm with no  definite stenosis or obstructing stone, similar to CT on 9/9/2016.  Nodularity of one of the adrenal glands measuring 1.8 x 1.8 cm (series  2, image 19), in the prior noncontrasted study on 9/9/2016, it has  Hounsfield units of 25 and measuring approximately 17  x 16 mm.  Gallbladder, biliary ducts, spleen, and pancreas are unremarkable. The  right adrenal gland is unremarkable. There is a 10 x 7 mm Ill-defined  hypoattenuating lesion in the right liver lobe (series 2, image 18),  is too small to characterize by CT.  No free fluid in the abdomen or pelvis. No free air in the abdomen.  Small bowel and colon are normal caliber without appreciable wall  thickening. No evidence for diverticulitis. No lymphadenopathy in the  abdomen or pelvis.  Aneurysmal dilation of infrarenal abdominal aorta up to 3.2 cm at the  superior endplate of L4. Aneurysmal dilatation of bilateral common  iliac arteries, right up to 2.2 cm and left up to 2.2 cm. These are  similar to CT on 9/11/2016.    Bones and soft tissues: Innumerable patchy ill-defined sclerosis in  the vertebral bodies of visualized spine and bony pelvis and femurs  consistent with known metastatic disease. No acute osseous abnormality  identified. Fat-containing umbilical hernia.    Lower chest: No no pleural effusion. No pericardial effusion. The  heart size is within normal limits.      Impression    IMPRESSION:   1. No cause of acute pain is identified in the abdomen or pelvis.  2. 2.5 cm right lower pole solid renal mass. This is been present on  noncontrast CT since at least 7/16/2014 and size has not changed  substantially. Differential includes oncocytoma and renal cell  carcinoma.  3. Redemonstration of multiple patchy ill-defined sclerosis in  vertebral bodies and bony pelvis consistent with known metastatic  disease.  4. Aneurysmal dilation of the aorta and bilateral common iliac  arteries.  5. Nodularity of the left adrenal gland not significantly changed  since 7/16/2014.      [Urgent Result: Right renal mass with differential includes oncocytoma  and renal cell carcinoma]    Finding was identified on 1/8/2018 12:22 PM.     Dr. Peguero was contacted by Dr. Cruz at 1/8/2018 1:47 PM and  verbalized understanding of  the urgent finding.     I have personally reviewed the examination and initial interpretation  and I agree with the findings.    NATALIA DAVIS MD     *Note: Due to a large number of results and/or encounters for the requested time period, some results have not been displayed. A complete set of results can be found in Results Review.

## 2018-01-09 NOTE — PROGRESS NOTES
CLINICAL NUTRITION SERVICES - ASSESSMENT NOTE     Nutrition Prescription    RECOMMENDATIONS FOR MDs/PROVIDERS TO ORDER:  1.Diet adv v nutrition support within 2-3 days.  2. When and if diet is able to be advanced to regular diet recommend a high kcal and high protein diet d/t statue of pt and recent severe weight loss.     Malnutrition Status:    Severe malnutrition in the context of chronic disease or illness    Recommendations already ordered by Registered Dietitian (RD):  None at this time     Future/Additional Recommendations:  - When diet advances order MNT supplements for snacks pt like Ensure Clears, Ensure plus (chocolate and vanilla) and would like to try the Orange or Berry flavored Magic cup.   - If and when diet advance past clear liquids recommend ordering calories counts to better assess PO intake and need for nutrition support.      REASON FOR ASSESSMENT  Tomas Grey is a/an 72 year old male assessed by the dietitian for Admission Nutrition Risk Screen for reduced oral intake over the last month and Provider Order - [place] less than 3 Consult.     NUTRITION HISTORY  During nutrition assessment pt was very sleepy. Minimal nutritional history was obtain d/t pt drifting in and out of sleep. Pt stated that he has no appetite because noting taste good. At home he was eating a lot of watermelon. Pt endorses weight loss stating that he use to weigh over 200 lbs and now he weights 195 lbs.     Pt is currently NPO, discussed nutritional supplements when diet is advanced. Pt states that he likes most of the nutritional supplements and has tried them in the past.     Per chart review, saw that pt was previously receiving Glucerna (MNT supplement for blood sugar control) for snacks.     CURRENT NUTRITION ORDERS  Diet: NPO  Intake/Tolerance: Pt NPO status, no intake is currently permitted.     LABS  Labs reviewed  Phos: 2.4 (L)  MEDICATIONS  Medications reviewed  Calcium carbonate 1,500 mg 2x daily,  "Lasix    ANTHROPOMETRICS  Height: 180.3 cm (5' 11\")  Most Recent Weight: 88.5 kg (195 lb 1.6 oz)    IBW: 78.2 kg  BMI: Overweight BMI 25-29.9  Weight History:   Wt Readings from Last 20 Encounters:   01/08/18 88.5 kg (195 lb 1.6 oz)   01/03/18 92.8 kg (204 lb 8 oz)   12/27/17 92.5 kg (204 lb)   12/18/17 92.1 kg (203 lb)   12/12/17 93.1 kg (205 lb 4.8 oz)   12/04/17 91.6 kg (202 lb)   11/30/17 94.5 kg (208 lb 4.8 oz)   11/28/17 94.4 kg (208 lb 3.2 oz)   11/17/17 94.5 kg (208 lb 6.4 oz)   11/14/17 94.8 kg (209 lb)   10/24/17 95.4 kg (210 lb 6.4 oz)   10/17/17 96.6 kg (213 lb)   10/11/17 104.3 kg (230 lb)   10/09/17 107.5 kg (237 lb)   10/04/17 107.5 kg (237 lb)   09/26/17 100.2 kg (221 lb)   Noted: 17% weight loss over the past 3 months.     Dosing Weight: 89 kg (actual) - based on lowest weight since admission (88.5 kg on 1/8/18)    ASSESSED NUTRITION NEEDS  Estimated Energy Needs: 7339-4180 kcals/day (25 - 30 kcals/kg)  Justification: Repletion (recent weight loss)  Estimated Protein Needs: 107-134 grams protein/day (1.2 - 1.5 grams of pro/kg)  Justification: Repletion (recent weight loss)  Estimated Fluid Needs: 1231-1003 mL/day (25 - 30 mL/kg) or Per provider pending fluid status  Justification: Maintenance     PHYSICAL FINDINGS  See malnutrition section below.    MALNUTRITION  % Intake: </= 50% for >/= 5 days (severe)  % Weight Loss: > 7.5% in 3 months (severe)  Subcutaneous Fat Loss: None observed - difficult to assess pt was covered and  not moving for assessment    Muscle Loss: Temporal: mild and Facial & jaw region: Moderate -  Attempted to assess other regions for muscle loss but pt was covered and not allowing RD to move the pt much because he was tired.   Fluid Accumulation/Edema: Moderate  Malnutrition Diagnosis: Severe malnutrition in the context of chronic disease or illness.     NUTRITION DIAGNOSIS  Inadequate protein-energy intake related to decreased appetite secondary taste changes as evidenced by " pt report and weight loss of 17% over the past 3 months.       INTERVENTIONS  Implementation  1. Discussed tips to increase appetite  2. Discussed MNT supplements - Pts preference are under future recommendations.      Goals  Diet adv v nutrition support within 2-3 days.     Monitoring/Evaluation  Progress toward goals will be monitored and evaluated per protocol.    Emily Donahue RD, LD  5C pgr: [phone]

## 2018-01-09 NOTE — CONSULTS
GASTROENTEROLOGY CONSULTATION      Date of Admission:  1/8/2018          ASSESSMENT AND RECOMMENDATIONS:   Assessment:  Tomas Grey is a 72 year old male with a past medical history of prostate cancer with extensive metastasis to the bone not on current therapy given poor functional status and recurrent admissions for COPD/infections, COPD on 2L home oxygen, chronic pain on fentanyl patch, transfusion-dependent anemia (b/l ~7), and chronic renal insufficiency who is presenting with a three day history of black stools and anemia with hemoglobin to 3.2 concerning for gastrointestinal bleed.     Overall clinical picture consistent with GI bleeding; differential diagnosis includes peptic ulcer disease, gastritis, malignancy (including primary cancer vs prostate cancer mets), vs AVM etc. EGD unlikely to assist with overall clinical course and likely carries more risk than benefit in this patient with narcotic and oxygen dependence in the setting of untreated metastatic cancer. Anemia seems to be chronic in nature with hemoglobins between 5-8 at baseline. Would treat for possible PUD with PPI. Likely little role for endoscopic intervention if underlying pathology were malignant.     Pinson Blatchford Score of 12.     Recommendations:   - Continue PPI gtt   - Trend hemoglobin, monitor bowel movements   - Transfuse for hemoglobin <7 from GI perspective; patient may have higher goals (8) based on cardiovascular disease   - Ensure two large-bore peripheral IVs   - Recommend planning family conference regarding overall goals of care (metastatic prostate cancer not on therapy) and discussion of risks vs benefits of EGD as this patient is high-risk for EGD and findings would not likely change overall plan in this patient   - GI will continue to follow; please contact our service with hemodynamically significant bleeding.    Gastroenterology outpatient follow up recommendations: Pending clinical course.    Thank you  for involving us in this patient's care. Please do not hesitate to contact the GI service with any questions or concerns.     Pt care plan discussed with Dr. Schuler, GI staff physician.    Nancy Grande MD  Gastroenterology Fellow  -------------------------------------------------------------------------------------------------------------------          Chief Complaint:   We were asked by Roula Mcneil of Heme/Onc to evaluate this patient with GIB    History is obtained from the patient and the medical record.          History of Present Illness:   Tomas Grey is a 72 year old male with a past medical history of prostate cancer with extensive metastasis to the bone not on current therapy given poor functional status and recurrent admissions for COPD/infections, COPD on 2L home oxygen, chronic pain on fentanyl patch, transfusion-dependent anemia (b/l ~7), and chronic renal insufficiency who is presenting with a three day history of black stools and anemia with hemoglobin to 3.2 concerning for gastrointestinal bleed.     Patient reports abdominal pain, 10/10 severity, located near his navel, achy in quality and non-radiating over the past several days. The pain has stopped with pain medication administration in the emergency department. He also notes black stools over the past three days, averaging ~3 bowel movements daily on average. He has a headache and some palpitations. He has had a decreased appetite for several days, he has only tolerated watermelon and tea. He denies nausea, vomiting, fevers, chills, change in urine, dizziness or lightheadedness. No syncope.    Inconsistent in reports of prior endoscopies - unclear if he has ever had EGD or colonoscopy - none noted on [facility] or [facility] records. Denies history of GI bleeds. Denies NSAID use.     Prior documentation by palliative care notes patient is incapable of medical decision making. Unfortunately, family is not at bedside on my  examination.             Past Medical History:   Reviewed and edited as appropriate  Past Medical History:   Diagnosis Date     Anemia      Anxiety      Aspiration pneumonia (H) 2014     C. difficile colitis      CAD (coronary artery disease)      Chronic pain      CKD (chronic kidney disease)      COPD (chronic obstructive pulmonary disease) (H)      Depressive disorder      Diabetes mellitus (H)      Hypertension      Insomnia      ALEXIS (obstructive sleep apnea)      Osteoporosis      Prostate cancer metastatic to multiple sites (H)             Past Surgical History:   Reviewed and edited as appropriate   Past Surgical History:   Procedure Laterality Date     ABDOMEN SURGERY       ARTHROSCOPY KNEE       EYE SURGERY              Previous Endoscopy:   No results found. However, due to the size of the patient record, not all encounters were searched. Please check Results Review for a complete set of results.         Social History:   Reviewed and edited as appropriate  Social History     Social History     Marital status:      Spouse name: N/A     Number of children: N/A     Years of education: N/A     Occupational History     Not on file.     Social History Main Topics     Smoking status: Former Smoker     Smokeless tobacco: Never Used     Alcohol use No     Drug use: No     Sexual activity: No     Other Topics Concern     Not on file     Social History Narrative            Family History:   Reviewed and edited as appropriate  Family History   Problem Relation Age of Onset     DIABETES Mother      CANCER Mother      stomach       No known history of colorectal cancer, liver disease, or inflammatory bowel disease.       Allergies:   Reviewed and edited as appropriate     Allergies   Allergen Reactions     Morphine Other (See Comments)     Patient reports makes him almost go into a coma            Medications:     Prescriptions Prior to Admission   Medication Sig Dispense Refill Last Dose     DULoxetine HCl  (CYMBALTA PO) Take 20 mg by mouth daily   1/7/2018 at Unknown time     METHOCARBAMOL PO Take 500 mg by mouth every 6 hours as needed for muscle spasms   1/7/2018 at Unknown time     OXYCODONE HCL PO Take 5 mg by mouth every 4 hours as needed   1/7/2018 at Unknown time     ferrous sulfate (IRON) 325 (65 FE) MG tablet Take 325 mg by mouth 2 times daily   1/7/2018 at Unknown time     ipratropium - albuterol 0.5 mg/2.5 mg/3 mL (DUONEB) 0.5-2.5 (3) MG/3ML neb solution Take 1 vial by nebulization every 4 hours as needed for shortness of breath / dyspnea or wheezing   1/7/2018 at Unknown time     Carboxymethylcellulose Sodium (CARBOXYMETHYLCELLULOSE SOD PF OP) Place 1 drop into both eyes 4 times daily And 1 drop both eyes daily prn   1/7/2018 at Unknown time     fentaNYL (DURAGESIC) 12 mcg/hr 72 hr patch Place 1 patch onto the skin every 72 hours   1/7/2018 at Unknown time     senna-docusate (SENOKOT-S;PERICOLACE) 8.6-50 MG per tablet Take 2 tablets by mouth 2 times daily   1/7/2018 at Unknown time     budesonide-formoterol (SYMBICORT) 160-4.5 MCG/ACT Inhaler Inhale 2 puffs into the lungs 2 times daily   1/7/2018 at Unknown time     Timolol Hemihydrate (BETIMOL OP) Place 1 drop into both eyes daily    1/7/2018 at Unknown time     bimatoprost (LUMIGAN) 0.01 % SOLN Place 1 drop into both eyes At Bedtime   1/7/2018 at Unknown time     WARFARIN SODIUM PO Take by mouth daily See facility orders for INR dosing   1/7/2018 at Unknown time     calcium carbonate (OS-JORDAN 600 MG [place]. CA) 1500 (600 CA) MG tablet Take 1,500 mg by mouth 2 times daily (with meals)   1/7/2018 at Unknown time     isosorbide mononitrate (IMDUR) 30 MG 24 hr tablet Take 2 tablets (60 mg) by mouth daily 30 tablet  1/7/2018 at Unknown time     ONDANSETRON PO Take 4 mg by mouth every 8 hours as needed for nausea   1/7/2018 at Unknown time     furosemide (LASIX) 20 MG tablet Take 1 tablet (20 mg) by mouth 2 times daily 30 tablet  1/7/2018 at Unknown time      "diclofenac (VOLTAREN) 1 % GEL topical gel Apply 4 grams to knees four times daily as needed using enclosed dosing card. 100 g 0 1/7/2018 at Unknown time     insulin glargine (LANTUS) 100 UNIT/ML injection Inject 20 Units Subcutaneous every morning    1/7/2018 at Unknown time     omeprazole (PRILOSEC) 10 MG CR capsule Take 1 capsule (10 mg) by mouth daily   1/7/2018 at Unknown time     atorvastatin (LIPITOR) 10 MG tablet Take 10 mg by mouth At Bedtime    1/7/2018 at Unknown time     tiotropium (SPIRIVA) 18 MCG inhalation capsule Inhale 18 mcg into the lungs daily   1/7/2018 at Unknown time     acetaminophen (TYLENOL) 500 MG tablet Take 1,000 mg by mouth 3 times daily Not to exceed 3000 mg/24 hours    1/7/2018 at Unknown time     nitroglycerin (NITROSTAT) 0.4 MG SL tablet Place 0.4 mg under the tongue every 5 minutes as needed for chest pain if you are still having symptoms after 3 doses (15 minutes) call 911.   1/7/2018 at Unknown time     aspirin 81 MG EC tablet Take 1 tablet (81 mg) by mouth daily 5 tablet 0 1/7/2018 at Unknown time     metoprolol (TOPROL XL) 50 MG 24 hr tablet Take 1 tablet (50 mg) by mouth daily 5 tablet 0 1/7/2018 at Unknown time     tamsulosin (FLOMAX) 0.4 MG 24 hr capsule Take 1 capsule (0.4 mg) by mouth daily 5 capsule 0 1/7/2018 at Unknown time     ammonium lactate (LAC-HYDRIN) 12 % lotion Apply topically 2 times daily as needed for dry skin To all extremities for dry skin   1/7/2018 at Unknown time             Review of Systems:   A complete review of systems was performed and is negative except as noted in the HPI           Physical Exam:   /64  Temp 98.2  F (36.8  C) (Axillary)  Resp 18  Ht 1.803 m (5' 11\")  Wt 88.5 kg (195 lb 1.6 oz)  SpO2 100%  BMI 27.21 kg/m2  Wt:   Wt Readings from Last 2 Encounters:   01/08/18 88.5 kg (195 lb 1.6 oz)   01/03/18 92.8 kg (204 lb 8 oz)      Constitutional: cooperative, pleasant, not dyspneic/diaphoretic, no acute distress  Eyes: Sclera " anicteric  Ears/nose/mouth/throat: Normal oropharynx without ulcers or exudate, mucus membranes moist, hearing intact  Neck: supple, thyroid normal size  CV: No edema  Respiratory: Unlabored breathing  Lymph: No axillary, submandibular, supraclavicular LAD  Abd: Nondistended, +bs, no hepatosplenomegaly, diffuse TTP, no peritoneal signs  Skin: warm, perfused, no jaundice  Neuro: No focal deficits  Psych: Normal affect  MSK: No gross deformities  : Brown mucousy stool on rectal exam         Data:   Labs and imaging below were independently reviewed and interpreted    BMP  Recent Labs  Lab 01/08/18  1028      POTASSIUM 4.3   CHLORIDE 110*   JORDAN 7.9*   CO2 24   BUN 30   CR 1.38*   GLC 84     CBC  Recent Labs  Lab 01/08/18  1028   WBC 12.1*   RBC 1.23*   HGB 3.2*   HCT 11.0*   MCV 89   MCH 26.0*   MCHC 29.1*   RDW 19.4*        INR  Recent Labs  Lab 01/08/18  1028   INR 1.90*     LFTs  Recent Labs  Lab 01/08/18  1028   ALKPHOS 150   AST 13   ALT 11   BILITOTAL 0.3   PROTTOTAL 7.0   ALBUMIN 2.3*      PANC  Recent Labs  Lab 01/08/18  1028   LIPASE 104       Imaging:  CT Abdomen/Pelvis 1/8/2018  1. No cause of acute pain is identified in the abdomen or pelvis.  2. 2.5 cm right lower pole solid renal mass. This is been present on  noncontrast CT since at least 7/16/2014 and size has not changed  substantially. Differential includes oncocytoma and renal cell  carcinoma.  3. Redemonstration of multiple patchy ill-defined sclerosis in  vertebral bodies and bony pelvis consistent with known metastatic  disease.  4. Aneurysmal dilation of the aorta and bilateral common iliac  arteries.  5. Nodularity of the left adrenal gland not significantly changed  since 7/16/2014.

## 2018-01-10 NOTE — PLAN OF CARE
Problem: Patient Care Overview  Goal: Plan of Care/Patient Progress Review  Outcome: No Change  Pt c/o abdominal cramping today.  Heat pack with some relief.  Miralax started.  No BM since yesterday am.  Pt continues to express wishes of not going back to the nursing home he came from.  RN spoke with JULIANA.  At this time pt must return to facility and they will work on other placement from there.  MD and JULIANA trying to get a hold of family to discuss concern and care.  Glucoses in the 120's.  Plan to place PICC line this afternoon for IV antibiotics.  Plan for possible d/c tomorrow.  Pt eating small amounts.  Will continue with current care plan and notify MD if any changes.

## 2018-01-10 NOTE — PLAN OF CARE
Problem: Patient Care Overview  Goal: Plan of Care/Patient Progress Review  Outcome: No Change   01/09/18 2246   OTHER   Plan Of Care Reviewed With patient   Plan of Care Review   Progress no change      5955-9649 AVSS. Pt was upright in chair, says he prefers it that way. Voiding appropriately. No bedtime insulin coverage, tolerating clear liquid diet. Did have slight amount of nausea with pills but did not want any PRNs and was instructed to inform RN if it got any worse. No other changes    Problem: Gastrointestinal Bleeding (Adult)  Goal: Signs and Symptoms of Listed Potential Problems Will be Absent, Minimized or Managed (Gastrointestinal Bleeding)  Signs and symptoms of listed potential problems will be absent, minimized or managed by discharge/transition of care (reference Gastrointestinal Bleeding (Adult) CPG).   Outcome: No Change   01/09/18 2246   Gastrointestinal Bleeding   Problems Assessed (GI Bleeding) all   Problems Present (GI Bleeding) fluid imbalance;situational response       Problem: Diabetes, Type 2 (Adult)  Goal: Signs and Symptoms of Listed Potential Problems Will be Absent, Minimized or Managed (Diabetes, Type 2)  Signs and symptoms of listed potential problems will be absent, minimized or managed by discharge/transition of care (reference Diabetes, Type 2 (Adult) CPG).   Outcome: No Change   01/09/18 2246   Diabetes, Type 2   Problems Assessed (Type 2 Diabetes) all   Problems Present (Type 2 Diabetes) situational response

## 2018-01-10 NOTE — PROGRESS NOTES
GASTROENTEROLOGY PROGRESS NOTE  Date of Service: 01/09/2018    ASSESSMENT:  Tomas Grey is a 72 year old male with h/o extensive metastatic prostate cancer not on therapy given poor functional therapy and recurrent admissions for COPD/infections, on 2L home O2 for COPD, chronic pain on fentanyl patch, transfusion dependent anemia (monthly, b/l ~7), and chronic renal insufficiency who presented with complaints of three days of black stools and hemoglobin of 3.2. He was transfused three units with brisk response to >10 suggesting error of initial laboratory value of 3.2. He has had no evidence of GI blood loss. If need for EGD were to arise, would recommend overall goals of care conversation with family. Patient has been deemed unable to make his own medical decisions.     RECOMMENDATIONS:   - Discontinue PPI gtt; would restart high-dose PPI BID PO indefinitely   - No plans for EGD; discussed with primary service who will plan to discharge patient in coming days   - Advance diet to regular diet as tolerated from GI perspective   - Recommend ongoing outpatient goals of care conversations in setting of untreated metastatic cancer and transfusion dependent anemia    Inpatient GI consults service will sign off. No further recommendations at this time. If primary team has addition questions, please page consult fellow listed in [facility].    Current GI Consult Staff: Dr Kike Schuler     Follow up recommendations:   No outpatient GI clinic follow-up indicated. Follow-up with primary care provider at timing determined by discharge physician.    If outpatient GI follow-up is felt indicated by primary care, they may coordinate this by placing new GI referral and contacting: General GI clinic - ([phone])    The patient was discussed and plan agreed upon with GI staff.    Nancy Grande MD  GI Consult Service  _______________________________________________________________  S:   No acute events overnight.   No bowel  "movements; no evidence of GI blood loss  Hemoglobin >10 on recheck.   No nausea, vomiting  No fevers.   Abdominal pain improved.     O:  Blood pressure 124/59, temperature 97.5  F (36.4  C), temperature source Axillary, resp. rate 16, height 1.803 m (5' 11\"), weight 88.5 kg (195 lb 1.6 oz), SpO2 98 %.    Gen: Alert, NAD  HEENT: NC/AT  CV: RRR  Lungs: No increased WOB  Abd: Diffuse TTP, no guarding  Skin: No rash  MS: Significant lower extremity edema  Neuro: Alert      LABS:  BMP  Recent Labs  Lab 01/09/18  0421 01/08/18  1028    143   POTASSIUM 4.5 4.3   CHLORIDE 110* 110*   JORDAN 8.0* 7.9*   CO2 22 24   BUN 22 30   CR 1.31* 1.38*   GLC 72 84     CBC  Recent Labs  Lab 01/09/18  1324 01/09/18  0421  01/08/18  1028   WBC  --  9.1  --  12.1*   RBC  --  3.96*  --  1.23*   HGB 10.3* 11.0*  < > 3.2*   HCT  --  36.2*  --  11.0*   MCV  --  91  --  89   MCH  --  27.8  --  26.0*   MCHC  --  30.4*  --  29.1*   RDW  --  18.6*  --  19.4*   PLT  --  231  --  346   < > = values in this interval not displayed.  INR  Recent Labs  Lab 01/09/18  0421 01/08/18  1028   INR 1.43* 1.90*     LFTs  Recent Labs  Lab 01/08/18  1028   ALKPHOS 150   AST 13   ALT 11   BILITOTAL 0.3   PROTTOTAL 7.0   ALBUMIN 2.3*      PANC  Recent Labs  Lab 01/08/18  1028   LIPASE 104       "

## 2018-01-10 NOTE — PLAN OF CARE
Problem: Patient Care Overview  Goal: Plan of Care/Patient Progress Review  Outcome: Improving  Pt has not had any bowel movements since early am.  Pt c/o feeling dizzy and light headed with a headache this am.  Sugars were 74-78 this am with increase into the 80's this afternoon.   MD notified due to NPO status.  Apple juice given and diet advanced to clear liquids.  Pt tolerating sips of juice and 1/2 of broth and jello for dinner.  Pt alert and oriented all shift.  Pt abigail Temple called [phone].  RN messaged son's phone number to MD.  RN called and left nurses desk phone number on Maverick's voice message in regards to MD wanting to connect with family.  Pt expressing several times today that he does not want to go back to his current nursing home.  Pt stated he had to call 911 to get himself to hospital.  Pt stated that nursing home would not take him to the hospital.  Protonix gtt d/c'd.   Palliative care team here to see pt.  Wound care RN here to assess pt needs.  Left lower leg wound is healing.  Mepiplex applied to wound.  Will continue with care plan and notify MD if any concerns.

## 2018-01-10 NOTE — PROGRESS NOTES
"[facility] [place]    Hematology / Oncology Progress Note    Date of Admission: 1/8/2018  Hospital Day #: 2   Date of Service (when I saw the patient): 01/10/2018     Assessment & Plan   Tomas Grey is a 73 y/o male with metastatic prostate cancer and PMHx significant for CAD, CKD, COPD, HTN, A fib managed on Coumadin, and diabetes who presents with 5 days of abdominal pain and anorexia, and 3-4 days of melena. Found to have a hgb of 3.2 on presentation to the ED concerning for a GI bleed.      #Abdominal pain.  #Suspected GI bleed.  Patient endorses 3-4 days of melena at his home. States he has been having LLQ/periumbilical abdominal pain for approximately 5 days PTA and has not been eating d/t abdominal pain. Denies nausea or vomiting, no hematemesis. No BRBPR. Explains he told the staff at his nursing home facility but \"they did not do anything\", so patient called 911.   -On evaluation of labs, patient's hgb 3.2. Patient slightly tachycardic, but BPs stable. Pt also notes a mild headache.   -2 units PRBCs given STAT in ED. Will give an additional 1 unit PRBCs then recheck hemoglobin. Hgb of 11.0 after 3 units. Holding hgb stable, 10.5 today.  -IVF at 75 ml/hr.  -Started on PPI gtt-->Transitioned to PO dosing bid.  -GI consulted, appreciate recs. Per recs, risk of doing EGD in this patient quite high and likely outweighs benefit. At this time, think mgmt with bid PPI and GOC discussion most appropriate, signed off.  -HELD Coumadin and ASA given bleed, given Vitamin K 2 mg in ED.  -CT AP with no e/o acute issue. Notes a renal mass which is stable since 2014. Known bony metastases visualized.     #Anemia. Likely 2/2 disease and previous treatment. Appears to have h/o folate deficiency as well. Has been transfusion dependant.   -Transfuse for hgb <8  -Held iron sulfate 325 mg BID at this time. Continued daily folate.     #UTI. Denies dysuria, however endorses frequency and " urgency. Denies hematuria. H/o recurrent UTIs in setting of prostate Ca and metastatic lymph node dissection. Resistent to surgical options in past. Has f/u with Dr. Epstein 1/9/18 per NP note. On tamsulosin for retention.   -Continue PTA tamsulosin  -UA with e/o infection: >182 WBC with WBC clumps, large LE and moderate bacteria. UC growing >100,000 colonies E coli ESBL.  -Started on Ceftriaxone 1 g q24 hrs 1/8 now discontinued. Started Ertapenem 1g q 24hrs. PICC placed for IV abx home.     #Metastatic prostate cancer. Follows with Dr. Oviedo. Metastases to bone. S/p multiple therapies (Lupron, Casodex, Xgeva, Xtandi, Zytega+pred, Xofigo, and Megace). Recent note by Dr. Oviedo 12/5 stating they would stop all cancer-directed therapies and refer to hospice. Although there are some notes that patient has been resistant/refused hospice care.   -Will set up clinic f/u apt with Dr. Oviedo next week.  -Palliative care consult, appreciate recs.     #Coronary artery disease.   #Hypertension.  #Chronic diastolic CHF (EF 60-65%).  -Held PTA ASA in setting of acute bleed  -Continue Isosorbide mononitrate, metoprolol, and atorvastatin with hold parameters BP <120/80 and HR <80.     #Paroxysmal A fib, managed on Coumadin. INR 1.9 on admission. IN NSR on EKG.   -Continue PTA metoprolol  -HOLD Coumadin in setting of acute GI bleed.     #CKD. Baseline Cr appears to be in upper 1s. Cr on admission 1.38. Stable fluctuations in baseline.  -Monitor with daily BMP  -IVF      #COPD, on chronic oxygen.  #ALEXIS.  Has been seen/treated multiple times for COPD exacerbations. Follows with Dr. Loera in Pulmonology.   -Continue PTA Spiriva and Symbicort     #Diabetes mellitus, type II. Managed on Lantus 20 U daily.  -HOLD PTA Lantus.  -Started on medium SSI to manage BG  -QID blood glucose checks per protocol.  -BG in mid 70s in AM 1/9 with c/o nasuea, headache, and dizziness. Gave apple juice. Continue to monitor.     #Lymphedema.  #Venous  "stasis skin changes/ulcerations.  -Lymphedema consulted, appreciate involvement in care.   -Continue PTA Lasix BID.     #Anxiety/Depression.  -Continue PTA Cymbalta     #Congnitive impairment. Per Nursing home visit notes and note by Dr. Rossi with palliative care, patient has mild-moderate impairment based on previous neurocognitive testing. BIMS score 11/15 on 11/16/17. Notes discuss appointing a guardian for patient to make medical decisions. Patient does have 11 children, however notes indicate they do not attend his hospital appointments. Patient explained that his medical decision maker is his \"son\" Les Bourgeois (stepson). Per advanced directive, his primary contact is cousin, Melissa Mccormack with second contact as his son, Jeevan Grey (see advanced directive for phone number). Plan on calling sonLes this afternoon.     #Glaucoma. Follows with [facility].   -Continue Bimatoprost, Timolol Hemihydrate, and Carboxymethylcellulose drops.     FEN  -1/2 NS @ 75 ml/hr  -replace lytes prn  -FLD with advancement as tolerated     PPx  -VTE: defer d/t likely GI bleed  -PUD: PPI bid     Dispo: Pending evaluation and resolution of acute issues, likely 2-3 day stay (possible DC 1/11).      Roula Mcneil PA-C  Hematology/Oncology  Pager #[phone]     Interval History   Afebrile. No overnight events. Still endorsing some periumbilical pain, but states he is feeling a little better. No emesis since yesterday morning, tolerating small amounts of PO intake. No BM since yesterday. Notes fatigue and headache. Continues to express that he does not want to go back to his nursing home. Will try to discuss this with family today.    Physical Exam   Temp: 98  F (36.7  C) Temp src: Oral BP: 129/66   Heart Rate: 101 Resp: 16 SpO2: 99 % O2 Device: Nasal cannula Oxygen Delivery: 2 LPM  Vitals:    01/08/18 0940 01/08/18 1347   Weight: 93 kg (205 lb) 88.5 kg (195 lb 1.6 oz)     Vital Signs with Ranges  Temp:  [97.5  F (36.4 "  C)-98.6  F (37  C)] 98  F (36.7  C)  Heart Rate:  [] 101  Resp:  [14-16] 16  BP: (110-134)/(54-75) 129/66  SpO2:  [98 %-100 %] 99 %  I/O last 3 completed shifts:  In: 2115 [P.O.:540; I.V.:1575]  Out: 1165 [Urine:1165]    Constitutional: Pleasant male seen sitting up in a recliner chair, sleeping upon entry, wakes easily for examiner, in NAD. Alert and interactive.   HEENT: NCAT, anicteric sclerae, conjunctiva clear, + proptosis. Moist mucous membranes. Poor dentition.  Respiratory: Non-labored breathing, good air exchange. Lungs are diminished but aeration throughout, no crackles or wheezes.  Cardiovascular: Regular rate and rhythm, no murmur, rub or gallop.  GI: Normoactive BS. Abdomen is soft, non-distended, Tenderness has improved, although still notes tenderness to palpation around umbilicus. Sm hernia present, does not appear incarcerated/strangulated--soft. No rigidity or guarding.   Skin: Warm and dry. Significant distal LE skin changes 2/2 venous stasis (scaling and venous stasis ulcer), stable.   Musculoskeletal: Extremities grossly normal. Edematous LE bilaterally to knees, 1+ pitting around ankles/mid calf.  Neurologic: A &O x3, speech normal, answering questions appropriately. Moves all extremities spontaneously. Grossly non-focal.  Neuropsychiatric: Mentation and affect normal/appropriate.     Medications   Current Facility-Administered Medications   Medication     sucralfate (CARAFATE) suspension 1 g     ertapenem (INVanz) 1 g in 10 mL SWFI for IVP     sodium chloride 0.45% infusion     senna-docusate (SENOKOT-S;PERICOLACE) 8.6-50 MG per tablet 1-2 tablet     [START ON 1/11/2018] polyethylene glycol (MIRALAX/GLYCOLAX) Packet 17 g     lidocaine 1 % 0.5-5 mL     lidocaine (LMX4) kit     sodium chloride (PF) 0.9% PF flush 5-50 mL     heparin lock flush 10 UNIT/ML injection 2-5 mL     lidocaine 1 % 1 mL     lidocaine (LMX4) kit     sodium chloride (PF) 0.9% PF flush 3 mL     sodium chloride (PF)  0.9% PF flush 3 mL     pantoprazole (PROTONIX) EC tablet 40 mg     folic acid (FOLVITE) tablet 1 mg     promethazine (PHENERGAN) IV injection 25 mg     Medication Instruction     ondansetron (ZOFRAN) injection 8 mg    Or     ondansetron (ZOFRAN-ODT) ODT tab 8 mg    Or     ondansetron (ZOFRAN) tablet 8 mg     acetaminophen (TYLENOL) tablet 1,000 mg     ammonium lactate (LAC-HYDRIN) 12 % lotion     atorvastatin (LIPITOR) tablet 10 mg     bimatoprost (LUMIGAN) 0.01 % ophthalmic drops 1 drop     calcium carbonate (OS-JORDAN 600 mg [place]) tablet 1,500 mg     diclofenac (VOLTAREN) 1 % topical gel 4 g     DULoxetine (CYMBALTA) EC capsule 20 mg     fentaNYL (DURAGESIC) 12 mcg/hr 72 hr patch 1 patch     furosemide (LASIX) tablet 20 mg     isosorbide mononitrate (IMDUR) 24 hr tablet 60 mg     metoprolol (TOPROL-XL) 24 hr tablet 50 mg     tamsulosin (FLOMAX) capsule 0.4 mg     glucose 40 % gel 15-30 g    Or     dextrose 50 % injection 25-50 mL    Or     glucagon injection 1 mg     insulin aspart (NovoLOG) inj (RAPID ACTING)     insulin aspart (NovoLOG) inj (RAPID ACTING)     fluticasone-vilanterol (BREO ELLIPTA) 200-25 MCG/INH oral inhaler 1 puff     albuterol neb solution 2.5 mg     umeclidinium (INCRUSE ELLIPTA) 62.5 MCG/INH oral inhaler 1 puff     HYDROmorphone (PF) (DILAUDID) injection 0.5 mg     Carboxymethylcellulose Sod PF (REFRESH PLUS) 0.5 % ophthalmic solution 1 drop     naloxone (NARCAN) injection 0.1-0.4 mg     [START ON 1/11/2018] fentaNYL (DURAGESIC) patch REMOVAL     fentaNYL (DURAGESIC) Patch in Place     timolol (TIMOPTIC) 0.25 % ophthalmic solution 1 drop       Data   CBC  Recent Labs  Lab 01/10/18  0341 01/09/18  2031 01/09/18  1324 01/09/18  0421  01/08/18  1028   WBC 8.6  --   --  9.1  --  12.1*   RBC 3.78*  --   --  3.96*  --  1.23*   HGB 10.5* 10.3* 10.3* 11.0*  < > 3.2*   HCT 34.6*  --   --  36.2*  --  11.0*   MCV 92  --   --  91  --  89   MCH 27.8  --   --  27.8  --  26.0*   MCHC 30.3*  --   --  30.4*   --  29.1*   RDW 19.0*  --   --  18.6*  --  19.4*     --   --  231  --  346   < > = values in this interval not displayed.  CMP    Recent Labs  Lab 01/10/18  0341 01/09/18  0421 01/08/18  1028   * 144 143   POTASSIUM 4.2 4.5 4.3   CHLORIDE 111* 110* 110*   CO2 23 22 24   ANIONGAP 11 12 9   * 72 84   BUN 18 22 30   CR 1.43* 1.31* 1.38*   GFRESTIMATED 49* 54* 51*   GFRESTBLACK 59* 65 61   JORDAN 7.6* 8.0* 7.9*   MAG  --  1.6  --    PHOS  --  2.4*  --    PROTTOTAL  --   --  7.0   ALBUMIN  --   --  2.3*   BILITOTAL  --   --  0.3   ALKPHOS  --   --  150   AST  --   --  13   ALT  --   --  11     INR    Recent Labs  Lab 01/10/18  0341 01/09/18  0421 01/08/18  1028   INR 1.52* 1.43* 1.90*       Results for orders placed or performed during the hospital encounter of 01/08/18   XR Chest Port 1 View    Narrative    Exam: XR CHEST PORT 1 VW, 1/8/2018 10:48 AM    Indication: Chest pain    Comparison: 12/18/2017, 12/13/2017, 1/16/2017, 12/26/2016    Findings:   A single AP view of the chest was obtained. The cardiomediastinal  silhouette is not enlarged. No pneumothorax or pleural effusion.  Prominent pulmonary vasculature. Mild streaky bibasilar opacities. Old  right rib fractures.      Impression    Impression:   Prominent pulmonary vasculature with mild streaky bibasilar opacities,  likely atelectasis.    I have personally reviewed the examination and initial interpretation  and I agree with the findings.    BENITO ROWLEY MD   CT Abdomen Pelvis w Contrast     Value    Radiologist flags (Urgent)     Right renal mass with differential includes oncocytoma    Narrative    EXAMINATION: CT ABDOMEN PELVIS W CONTRAST, 1/8/2018 12:00 PM    TECHNIQUE:  Helical CT images from the lung bases through the  symphysis pubis were obtained with contrast    COMPARISON: CT abdomen pelvis on 9/1/2016    HISTORY: LLQ pain r/o diverticulitis;     DLP: 678 mGy*cm    Contrast dose: 126 cc of isovue 370    FINDINGS:  Abdomen and  pelvis:   There is a enhancing structure in the lower pole of the right kidney  measuring 2.5 cm on series 5 image 160. On prior nonenhanced studies  this does not demonstrate enhancement and also do not appear cystic,  indicating an enhancing mass.    Cystic lesion in both kidneys including 5.6 x 6.3 cm cyst arising from  the inferior pole of the left kidney (series 2, image 29) and 2.9 x  3.4 cm cyst arising from the inferior pole of the right kidney (series  2, image 29). No hydronephrosis. No hydroureter. The no renal calculi  identified. The distal right ureter dilated up to 2.1 cm with no  definite stenosis or obstructing stone, similar to CT on 9/9/2016.  Nodularity of one of the adrenal glands measuring 1.8 x 1.8 cm (series  2, image 19), in the prior noncontrasted study on 9/9/2016, it has  Hounsfield units of 25 and measuring approximately 17 x 16 mm.  Gallbladder, biliary ducts, spleen, and pancreas are unremarkable. The  right adrenal gland is unremarkable. There is a 10 x 7 mm Ill-defined  hypoattenuating lesion in the right liver lobe (series 2, image 18),  is too small to characterize by CT.  No free fluid in the abdomen or pelvis. No free air in the abdomen.  Small bowel and colon are normal caliber without appreciable wall  thickening. No evidence for diverticulitis. No lymphadenopathy in the  abdomen or pelvis.  Aneurysmal dilation of infrarenal abdominal aorta up to 3.2 cm at the  superior endplate of L4. Aneurysmal dilatation of bilateral common  iliac arteries, right up to 2.2 cm and left up to 2.2 cm. These are  similar to CT on 9/11/2016.    Bones and soft tissues: Innumerable patchy ill-defined sclerosis in  the vertebral bodies of visualized spine and bony pelvis and femurs  consistent with known metastatic disease. No acute osseous abnormality  identified. Fat-containing umbilical hernia.    Lower chest: No no pleural effusion. No pericardial effusion. The  heart size is within normal  limits.      Impression    IMPRESSION:   1. No cause of acute pain is identified in the abdomen or pelvis.  2. 2.5 cm right lower pole solid renal mass. This is been present on  noncontrast CT since at least 7/16/2014 and size has not changed  substantially. Differential includes oncocytoma and renal cell  carcinoma.  3. Redemonstration of multiple patchy ill-defined sclerosis in  vertebral bodies and bony pelvis consistent with known metastatic  disease.  4. Aneurysmal dilation of the aorta and bilateral common iliac  arteries.  5. Nodularity of the left adrenal gland not significantly changed  since 7/16/2014.      [Urgent Result: Right renal mass with differential includes oncocytoma  and renal cell carcinoma]    Finding was identified on 1/8/2018 12:22 PM.     Dr. Peguero was contacted by Dr. Cruz at 1/8/2018 1:47 PM and  verbalized understanding of the urgent finding.     I have personally reviewed the examination and initial interpretation  and I agree with the findings.    NATALIA DAVIS MD     *Note: Due to a large number of results and/or encounters for the requested time period, some results have not been displayed. A complete set of results can be found in Results Review.

## 2018-01-10 NOTE — PLAN OF CARE
Problem: Patient Care Overview  Goal: Plan of Care/Patient Progress Review  PT/EDEMA 5C: Initial PT evaluation completed. Pt does have chronic LE edema with new R venous stasis ulcer. Pt states therapist at TCU wraps/re-wraps LEs everyday. Pt completes partial standing pivot transfer chair>bed with CGA. Sit>supine with min A. Per chart review, pt has poor prognosis and uncertain medical plan. With anticipated short hospital stay, holding initiation of compression until return to TCU. If pt stays inpatient, may be appropriate to initiate LE gradient compression bandages.    Discharge Planner PT   Patient plan for discharge: return to TCU  Current status: see above  Barriers to return to prior living situation: cognitive deficits, LE weakness, deconditioning, chronic LE edema  Recommendations for discharge: TCU  Rationale for recommendations: improve independence with functional mobility       Entered by: Joana Amezcua 01/10/2018 5:21 PM

## 2018-01-10 NOTE — PLAN OF CARE
Problem: Patient Care Overview  Goal: Plan of Care/Patient Progress Review  Outcome: Improving   01/10/18 0517   OTHER   Plan Of Care Reviewed With patient   Plan of Care Review   Progress improving     [id number]: AVSS. A&Ox4. Up A1, pivot. Voiding adequately using bedside urinal. No BM this shift. NS through PIV at 75cc/hr. Dilaudid given x1 for pain control. Patient also has fentanyl patch to upper R) shoulder. On 2L O2 via nc (COPD).  overnight. Tolerating clear liquid diet. Denies nausea. Refusing eye gtts on evenings. Sleeping in chair all night, will shift weight when instructed. Protonix gtt d/c'd and oral started. Ceftriaxone once daily for UTI, sensitivities pending. Patient states he doesn't want to go back to previous NH.  involved. No calls from son or other family members this shift. Will continue to monitor and follow POC.     Problem: Diabetes, Type 2 (Adult)  Goal: Signs and Symptoms of Listed Potential Problems Will be Absent, Minimized or Managed (Diabetes, Type 2)  Signs and symptoms of listed potential problems will be absent, minimized or managed by discharge/transition of care (reference Diabetes, Type 2 (Adult) CPG).   Outcome: No Change   01/10/18 0517   Diabetes, Type 2   Problems Assessed (Type 2 Diabetes) all   Problems Present (Type 2 Diabetes) none

## 2018-01-10 NOTE — PLAN OF CARE
Problem: Patient Care Overview  Goal: Plan of Care/Patient Progress Review  OT 5C: Hold. Per PT patient near baseline, likely to return to TCU. PT to continue to follow for safe d/c planning, OT to initiate as appropriate.

## 2018-01-11 NOTE — PLAN OF CARE
Problem: Patient Care Overview  Goal: Plan of Care/Patient Progress Review  OT 5C: cancel and HOLD, pt may only be appropriate for one therapy discipline to be following during IP stay 2/2 anticipate short LOS. Will check in to determine if OT should initiate prior to d/c.

## 2018-01-11 NOTE — PLAN OF CARE
Problem: Patient Care Overview  Goal: Plan of Care/Patient Progress Review   01/10/18 2233   OTHER   Plan Of Care Reviewed With patient   Plan of Care Review   Progress no change     AVSS. Abdominal pain - given dilaudid x1. No c/o N/V/D. Up in chair most of night. New PICC line placed for IV ABX at discharge. Oriented X2-3. Hgb stable. Refused meal tonight. Very little PO intake. Full liquid diet - to be advanced as tolerated. Blood sugars in the 90s throughout shift - no corrective insulin needed. Will continue to monitor and with POC.     Problem: Gastrointestinal Bleeding (Adult)  Goal: Signs and Symptoms of Listed Potential Problems Will be Absent, Minimized or Managed (Gastrointestinal Bleeding)  Signs and symptoms of listed potential problems will be absent, minimized or managed by discharge/transition of care (reference Gastrointestinal Bleeding (Adult) CPG).    01/10/18 2233   Gastrointestinal Bleeding   Problems Assessed (GI Bleeding) all   Problems Present (GI Bleeding) fluid imbalance       Problem: Diabetes, Type 2 (Adult)  Goal: Signs and Symptoms of Listed Potential Problems Will be Absent, Minimized or Managed (Diabetes, Type 2)  Signs and symptoms of listed potential problems will be absent, minimized or managed by discharge/transition of care (reference Diabetes, Type 2 (Adult) CPG).    01/10/18 2233   Diabetes, Type 2   Problems Assessed (Type 2 Diabetes) all   Problems Present (Type 2 Diabetes) situational response

## 2018-01-11 NOTE — PLAN OF CARE
"Problem: Patient Care Overview  Goal: Plan of Care/Patient Progress Review  /58  Temp 97.4  F (36.3  C) (Axillary)  Resp 18  Ht 1.803 m (5' 11\")  Wt 89.8 kg (198 lb)  SpO2 98%  BMI 27.62 kg/m2  Neuro: A&Ox4.   Cardiac: VSS.   Respiratory: 2L Nc- which is  baseline at home.  GI/: Voiding spontaneously. Had a BM.   Diet/appetite: Tolerating  Diet but poor appetite. Denies nausea , reports some cramping.  Activity: Up with * assist    Pain: . Denies   Skin: Intact, no new deficits noted. Chronic venous ulcers. Edema in LE  Lines:   PICC and PIV  Drains: N/A    Hgb was 9.5. Per BM no signs of bleeding. Will continue to monitor and follow plan of care.           "

## 2018-01-11 NOTE — PROGRESS NOTES
"   01/10/18 1145   Rehab Discipline   Discipline PT  (EDEMA)   Type of Visit   Type of visit Initial Edema Evaluation   General Information   Start of care 01/10/18   Referring physician Roula Mcneil PA-C   Order date 01/09/18   Medical diagnosis GI bleed   Edema onset (chronic (>6 months))   Affected body parts RLE;LLE   Edema etiology Chronic Venous Insufficiency;Infection  (inactivity, cancer)   Edema etiology comments Pt with limited activity with lack muscle pumping to aide LE decongestion. Pt also with advanced cardiac and Pulm history exacerbating LE swelling. CVI and wounds/infection creating exacerbations as well   Pertinent history of current problem (PT: include personal factors and/or comorbidities that impact the POC; OT: include additional occupational profile info) Pt is a 73 y/o male with metastatic prostate cancer and PMHx significant for CAD, CKD, COPD, HTN, A fib managed on Coumadin, and diabetes who presents with 5 days of abdominal pain and anorexia, and 3-4 days of melena. Found to have a hgb of 3.2 on presentation to the ED concerning for a GI bleed.    Surgical / medical history reviewed Yes   Prior level of functional mobility Pt reports he has been working with therapies at [facility], working on standing, strength and walking short distances.    Prior treatment Compression garments;Gradient compression bandaging  (Pt states he has a therapist who \"wraps his legs everyday\". )   Living environment (Pt states he his lives with his son. Was admitted from [facility]. )   Current assistive devices Walker;4 wheeled walker  (scooter)   General observations Activity: up with assist. Poor historian as pt providing variable information on PLOF/prior living arrangement.    Fall Risk Screen   Fall screen completed by PT   Is patient a fall risk? Yes   Edema Exam / Assessment   Skin condition Non-pitting;Lipodermatosclerosis;Hemosiderin deposits   Skin condition comments Pt has firm non-pitting edema in LEs. " Noted hemosideran changes on anterior shins, also has lipodermatosclerosis. Wound on R shin, assessed by [facility] notes, see notes for details.    Range of Motion   ROM comments Decreased ankle ROM B   Strength   Strength comments BLE strength not formally assessed, appears to have ~3/5 strength per observation of funcitonal mobility.    Posture   Posture Protracted shoulders;Forward head position  (pt leaning to the right while sitting up in chair. )   Bed Mobility   Bed mobility Sit>supine with mod A for LEs   Transfers   Transfers Sit>partial stand and partial standing pivot transfer chair>bed from recliner chair with CGA.    Gait / Locomotion   Gait / Locomotion not assessed   Planned Edema Interventions   Planned edema interventions (holding compression 2/2 anticipated imminent disch to TCU)   Clinical Impression   Criteria for skilled therapeutic intervention met Yes   Therapy diagnosis impaired functional mobility   Influenced by the following impairments / conditions Edema;Wounds / Ulcers   Functional limitations due to impairments / conditions decreased bed mobility, transfers, ambulation   Clinical Presentation Evolving/Changing   Clinical Presentation Rationale medical status, has known discharge back to TCU, medical status, impaired cognition   Clinical Decision Making (Complexity) Low complexity   Treatment frequency 5 times / week   Treatment duration 1 week   Patient / family and/or staff in agreement with plan of care Yes   Risks and benefits of therapy have been explained Yes   Goals   Edema Eval Goals (IP) See plan of care for patient goals   Total Evaluation Time   Total evaluation time 5

## 2018-01-11 NOTE — PLAN OF CARE
Problem: Patient Care Overview  Goal: Plan of Care/Patient Progress Review  Outcome: No Change   01/11/18 0704   OTHER   Plan Of Care Reviewed With patient   Plan of Care Review   Progress no change     Pt had a quiet night with stable vital signs.  He slept in the chair all night and refused repositioning or going to bed.  Pt did agree to stand once for 30 seconds to be assessed and release pressure from coccyx.

## 2018-01-11 NOTE — PROGRESS NOTES
"[facility], [place]    Hematology / Oncology Progress Note    Date of Admission: 1/8/2018  Hospital Day #: 3   Date of Service (when I saw the patient): 01/11/2018     Assessment & Plan   Tomas Grey is a 71 y/o male with metastatic prostate cancer and PMHx significant for CAD, CKD, COPD, HTN, A fib managed on Coumadin, and diabetes who presents with 5 days of abdominal pain and anorexia, and 3-4 days of melena. Found to have a hgb of 3.2 on presentation to the ED concerning for a GI bleed.      #Abdominal pain, improving.  #Suspected GI bleed.  Patient endorses 3-4 days of melena at his home. States he has been having LLQ/periumbilical abdominal pain for approximately 5 days PTA and has not been eating d/t abdominal pain. Denies nausea or vomiting, no hematemesis. No BRBPR. Explains he told the staff at his nursing home facility but \"they did not do anything\", so patient called 911.   -On evaluation of labs, patient's hgb 3.2. Patient slightly tachycardic, but BPs stable. Pt also notes a mild headache.   -2 units PRBCs given STAT in ED. Will give an additional 1 unit PRBCs then recheck hemoglobin. Hgb of 11.0 after 3 units. Holding hgb stable, 10.2 today.  -IVF at 75 ml/hr.  -Started on PPI gtt-->Transitioned to PO dosing bid.  -GI consulted, appreciate recs. Per recs, risk of doing EGD in this patient quite high and likely outweighs benefit. At this time, think mgmt with bid PPI and GOC discussion most appropriate, signed off.  -HELD Coumadin and ASA given bleed, given Vitamin K 2 mg in ED.  -CT AP with no e/o acute issue. Notes a renal mass which is stable since 2014. Known bony metastases visualized.     #Anemia. Likely 2/2 disease and previous treatment. Appears to have h/o folate deficiency as well. Has been transfusion dependant.   -Transfuse for hgb <8  -Held iron sulfate 325 mg BID at this time. Continued daily folate.     #UTI. Denies dysuria, however endorses frequency " and urgency. Denies hematuria. H/o recurrent UTIs in setting of prostate Ca and metastatic lymph node dissection. Resistent to surgical options in past. Has f/u with Dr. Epstein 1/9/18 per NP note. On tamsulosin for retention.   -Continue PTA tamsulosin  -UA with e/o infection: >182 WBC with WBC clumps, large LE and moderate bacteria. UC growing >100,000 colonies E coli ESBL.  -Started on Ceftriaxone 1 g q24 hrs 1/8 now discontinued. Started Ertapenem 1g q 24hrs. PICC placed for IV abx home. Will discuss with ID treatment course prior to discharge.     #Metastatic prostate cancer. Follows with Dr. Oviedo. Metastases to bone. S/p multiple therapies (Lupron, Casodex, Xgeva, Xtandi, Zytega+pred, Xofigo, and Megace). Recent note by Dr. Oviedo 12/5 stating they would stop all cancer-directed therapies and refer to hospice. Although there are some notes that patient has been resistant/refused hospice care.   -Will set up clinic f/u apt with Dr. Oviedo next week.  -Palliative care consult, appreciate recs.     #Coronary artery disease.   #Hypertension.  #Chronic diastolic CHF (EF 60-65%).  -Held PTA ASA in setting of acute bleed  -Continue Isosorbide mononitrate, metoprolol, and atorvastatin with hold parameters BP <120/80 and HR <80.     #Paroxysmal A fib, managed on Coumadin. INR 1.9 on admission. IN NSR on EKG.   -Continue PTA metoprolol  -HOLD Coumadin in setting of acute GI bleed.     #CKD. Baseline Cr appears to be in upper 1s. Cr on admission 1.38. Stable fluctuations in baseline.  -Monitor with daily BMP  -IVF      #COPD, on chronic oxygen.  #ALEXIS.  Has been seen/treated multiple times for COPD exacerbations. Follows with Dr. Loera in Pulmonology.   -Continue PTA Spiriva and Symbicort     #Diabetes mellitus, type II. Managed on Lantus 20 U daily PTA.  -HOLD PTA Lantus.  -Started on medium SSI to manage BG  -QID blood glucose checks per protocol.  -BG in mid 70s in AM 1/9 with c/o nasuea, headache, and dizziness.  "Gave apple juice. Continue to monitor.     #Lymphedema.  #Venous stasis skin changes/ulcerations.  -Lymphedema consulted, appreciate involvement in care.   -Continue PTA Lasix BID.     #Anxiety/Depression.  -Continue PTA Cymbalta     #Congnitive impairment. Per Nursing home visit notes and note by Dr. Rossi with palliative care, patient has mild-moderate impairment based on previous neurocognitive testing. BIMS score 11/15 on 11/16/17. Notes discuss appointing a guardian for patient to make medical decisions. Patient does have 11 children, however notes indicate they do not attend his hospital appointments. Patient explained that his medical decision maker is his \"son\" Les Bourgeois (stepson). Per advanced directive, his primary contact is cousin, Melissa Mccormack with second contact as his son, Jeevan Grey (see advanced directive for phone number). Discussed patient's hospitaliziation and concerns for returning to NH with son, Les yesterday.     #Glaucoma. Follows with [facility].   -Continue Bimatoprost, Timolol Hemihydrate, and Carboxymethylcellulose drops.     FEN  -1/2 NS @ 75 ml/hr  -replace lytes prn  -FLD with advancement as tolerated     PPx  -VTE: defer d/t likely GI bleed  -PUD: PPI bid     Dispo: Pending further improvement in abdominal pain, bowels, and PO intake, possible discharge to LTCF tomorrow.     Roula Mcneil PA-C  Hematology/Oncology  Pager #[phone]     Interval History   Remains afebrile. States his abdomen is feeling better, still mild amounts of abdominal pain. C/o no appetite, has not taken good PO for a while. No BM in past few days, will work on this today. Complaints of being cold often, despite being wrapped in blankets.    Physical Exam   Temp: 97.4  F (36.3  C) Temp src: Axillary BP: 117/58   Heart Rate: 91 Resp: 18 SpO2: 98 % O2 Device: Nasal cannula Oxygen Delivery: 2 LPM  Vitals:    01/08/18 0940 01/08/18 1347   Weight: 93 kg (205 lb) 88.5 kg (195 lb 1.6 oz)     Vital " Signs with Ranges  Temp:  [97.3  F (36.3  C)-99.2  F (37.3  C)] 97.4  F (36.3  C)  Heart Rate:  [] 91  Resp:  [16-18] 18  BP: (114-134)/(57-92) 117/58  SpO2:  [98 %-99 %] 98 %  I/O last 3 completed shifts:  In: 1990 [P.O.:200; I.V.:1790]  Out: 975 [Urine:975]    Constitutional: Pleasant male seen hunched over in chair, sleeping upon entry, wakes easily for examiner, in NAD. Alert and interactive.   HEENT: NCAT, anicteric sclerae, conjunctiva clear, + proptosis. Moist mucous membranes. Poor dentition.  Respiratory: Non-labored breathing, good air exchange. Lungs are diminished but aeration throughout, no crackles or wheezes.  Cardiovascular: Tachycardic with regular rhythm, no murmur, rub or gallop.  GI: Normoactive BS. Abdomen is soft, non-distended, minimally tender to palpation around umbilicus, otherwise nontender. Sm hernia present, does not appear incarcerated/strangulated--soft. No rigidity or guarding.   Skin: Warm and dry. Significant distal LE skin changes 2/2 venous stasis (scaling and venous stasis ulcer), stable.   Musculoskeletal: Extremities grossly normal. Edematous LE bilaterally to knees, 1+ pitting around ankles/mid calf.  Neurologic: A &O x3, speech normal, answering questions appropriately. Moves all extremities spontaneously. Grossly non-focal.  Neuropsychiatric: Mentation and affect normal/appropriate.     Medications   Current Facility-Administered Medications   Medication     sucralfate (CARAFATE) suspension 1 g     ertapenem (INVanz) 1 g in 10 mL SWFI for IVP     sodium chloride 0.45% infusion     senna-docusate (SENOKOT-S;PERICOLACE) 8.6-50 MG per tablet 1-2 tablet     polyethylene glycol (MIRALAX/GLYCOLAX) Packet 17 g     lidocaine (LMX4) kit     heparin lock flush 10 UNIT/ML injection 2-5 mL     sodium chloride (PF) 0.9% PF flush 10-20 mL     heparin lock flush 10 UNIT/ML injection 5-10 mL     heparin lock flush 10 UNIT/ML injection 5-10 mL     lidocaine 1 % 1 mL     lidocaine  (LMX4) kit     sodium chloride (PF) 0.9% PF flush 3 mL     sodium chloride (PF) 0.9% PF flush 3 mL     pantoprazole (PROTONIX) EC tablet 40 mg     folic acid (FOLVITE) tablet 1 mg     promethazine (PHENERGAN) IV injection 25 mg     Medication Instruction     ondansetron (ZOFRAN) injection 8 mg    Or     ondansetron (ZOFRAN-ODT) ODT tab 8 mg    Or     ondansetron (ZOFRAN) tablet 8 mg     acetaminophen (TYLENOL) tablet 1,000 mg     ammonium lactate (LAC-HYDRIN) 12 % lotion     atorvastatin (LIPITOR) tablet 10 mg     bimatoprost (LUMIGAN) 0.01 % ophthalmic drops 1 drop     calcium carbonate (OS-JORDAN 600 mg [place] IRA. Ca) tablet 1,500 mg     diclofenac (VOLTAREN) 1 % topical gel 4 g     DULoxetine (CYMBALTA) EC capsule 20 mg     fentaNYL (DURAGESIC) 12 mcg/hr 72 hr patch 1 patch     furosemide (LASIX) tablet 20 mg     isosorbide mononitrate (IMDUR) 24 hr tablet 60 mg     metoprolol (TOPROL-XL) 24 hr tablet 50 mg     tamsulosin (FLOMAX) capsule 0.4 mg     glucose 40 % gel 15-30 g    Or     dextrose 50 % injection 25-50 mL    Or     glucagon injection 1 mg     insulin aspart (NovoLOG) inj (RAPID ACTING)     insulin aspart (NovoLOG) inj (RAPID ACTING)     fluticasone-vilanterol (BREO ELLIPTA) 200-25 MCG/INH oral inhaler 1 puff     albuterol neb solution 2.5 mg     umeclidinium (INCRUSE ELLIPTA) 62.5 MCG/INH oral inhaler 1 puff     HYDROmorphone (PF) (DILAUDID) injection 0.5 mg     Carboxymethylcellulose Sod PF (REFRESH PLUS) 0.5 % ophthalmic solution 1 drop     naloxone (NARCAN) injection 0.1-0.4 mg     fentaNYL (DURAGESIC) patch REMOVAL     fentaNYL (DURAGESIC) Patch in Place     timolol (TIMOPTIC) 0.25 % ophthalmic solution 1 drop       Data   CBC  Recent Labs  Lab 01/11/18  0445 01/10/18  1257 01/10/18  0341 01/09/18  2031  01/09/18  0421  01/08/18  1028   WBC 9.0  --  8.6  --   --  9.1  --  12.1*   RBC 3.70*  --  3.78*  --   --  3.96*  --  1.23*   HGB 10.2* 10.0* 10.5* 10.3*  < > 11.0*  < > 3.2*   HCT 34.3*  --  34.6*  --    --  36.2*  --  11.0*   MCV 93  --  92  --   --  91  --  89   MCH 27.6  --  27.8  --   --  27.8  --  26.0*   MCHC 29.7*  --  30.3*  --   --  30.4*  --  29.1*   RDW 19.0*  --  19.0*  --   --  18.6*  --  19.4*     --  220  --   --  231  --  346   < > = values in this interval not displayed.  CMP    Recent Labs  Lab 01/11/18  0445 01/10/18  0341 01/09/18  0421 01/08/18  1028    145* 144 143   POTASSIUM 4.0 4.2 4.5 4.3   CHLORIDE 110* 111* 110* 110*   CO2 23 23 22 24   ANIONGAP 10 11 12 9   GLC 91 108* 72 84   BUN 14 18 22 30   CR 1.35* 1.43* 1.31* 1.38*   GFRESTIMATED 52* 49* 54* 51*   GFRESTBLACK 63 59* 65 61   JORDAN 7.6* 7.6* 8.0* 7.9*   MAG  --   --  1.6  --    PHOS  --   --  2.4*  --    PROTTOTAL  --   --   --  7.0   ALBUMIN  --   --   --  2.3*   BILITOTAL  --   --   --  0.3   ALKPHOS  --   --   --  150   AST  --   --   --  13   ALT  --   --   --  11     INR    Recent Labs  Lab 01/11/18  0445 01/10/18  0341 01/09/18  0421 01/08/18  1028   INR 1.66* 1.52* 1.43* 1.90*       Results for orders placed or performed during the hospital encounter of 01/08/18   XR Chest Port 1 View    Narrative    Exam: XR CHEST PORT 1 VW, 1/8/2018 10:48 AM    Indication: Chest pain    Comparison: 12/18/2017, 12/13/2017, 1/16/2017, 12/26/2016    Findings:   A single AP view of the chest was obtained. The cardiomediastinal  silhouette is not enlarged. No pneumothorax or pleural effusion.  Prominent pulmonary vasculature. Mild streaky bibasilar opacities. Old  right rib fractures.      Impression    Impression:   Prominent pulmonary vasculature with mild streaky bibasilar opacities,  likely atelectasis.    I have personally reviewed the examination and initial interpretation  and I agree with the findings.    BENITO ROWLEY MD   CT Abdomen Pelvis w Contrast     Value    Radiologist flags (Urgent)     Right renal mass with differential includes oncocytoma    Narrative    EXAMINATION: CT ABDOMEN PELVIS W CONTRAST, 1/8/2018 12:00  PM    TECHNIQUE:  Helical CT images from the lung bases through the  symphysis pubis were obtained with contrast    COMPARISON: CT abdomen pelvis on 9/1/2016    HISTORY: LLQ pain r/o diverticulitis;     DLP: 678 mGy*cm    Contrast dose: 126 cc of isovue 370    FINDINGS:  Abdomen and pelvis:   There is a enhancing structure in the lower pole of the right kidney  measuring 2.5 cm on series 5 image 160. On prior nonenhanced studies  this does not demonstrate enhancement and also do not appear cystic,  indicating an enhancing mass.    Cystic lesion in both kidneys including 5.6 x 6.3 cm cyst arising from  the inferior pole of the left kidney (series 2, image 29) and 2.9 x  3.4 cm cyst arising from the inferior pole of the right kidney (series  2, image 29). No hydronephrosis. No hydroureter. The no renal calculi  identified. The distal right ureter dilated up to 2.1 cm with no  definite stenosis or obstructing stone, similar to CT on 9/9/2016.  Nodularity of one of the adrenal glands measuring 1.8 x 1.8 cm (series  2, image 19), in the prior noncontrasted study on 9/9/2016, it has  Hounsfield units of 25 and measuring approximately 17 x 16 mm.  Gallbladder, biliary ducts, spleen, and pancreas are unremarkable. The  right adrenal gland is unremarkable. There is a 10 x 7 mm Ill-defined  hypoattenuating lesion in the right liver lobe (series 2, image 18),  is too small to characterize by CT.  No free fluid in the abdomen or pelvis. No free air in the abdomen.  Small bowel and colon are normal caliber without appreciable wall  thickening. No evidence for diverticulitis. No lymphadenopathy in the  abdomen or pelvis.  Aneurysmal dilation of infrarenal abdominal aorta up to 3.2 cm at the  superior endplate of L4. Aneurysmal dilatation of bilateral common  iliac arteries, right up to 2.2 cm and left up to 2.2 cm. These are  similar to CT on 9/11/2016.    Bones and soft tissues: Innumerable patchy ill-defined sclerosis in  the  vertebral bodies of visualized spine and bony pelvis and femurs  consistent with known metastatic disease. No acute osseous abnormality  identified. Fat-containing umbilical hernia.    Lower chest: No no pleural effusion. No pericardial effusion. The  heart size is within normal limits.      Impression    IMPRESSION:   1. No cause of acute pain is identified in the abdomen or pelvis.  2. 2.5 cm right lower pole solid renal mass. This is been present on  noncontrast CT since at least 7/16/2014 and size has not changed  substantially. Differential includes oncocytoma and renal cell  carcinoma.  3. Redemonstration of multiple patchy ill-defined sclerosis in  vertebral bodies and bony pelvis consistent with known metastatic  disease.  4. Aneurysmal dilation of the aorta and bilateral common iliac  arteries.  5. Nodularity of the left adrenal gland not significantly changed  since 7/16/2014.      [Urgent Result: Right renal mass with differential includes oncocytoma  and renal cell carcinoma]    Finding was identified on 1/8/2018 12:22 PM.     Dr. Peguero was contacted by Dr. Cruz at 1/8/2018 1:47 PM and  verbalized understanding of the urgent finding.     I have personally reviewed the examination and initial interpretation  and I agree with the findings.    NATALIA DAVIS MD   XR Chest Port 1 View    Narrative    Exam: XR CHEST PORT 1 VW, 1/10/2018 6:47 PM    Indication: RN placed PICC - verify tip placement;     Comparison: 1/8/2018    Findings:   Left upper extremity PICC tip projects at the low SVC. The cardiac  mediastinal silhouette and pulmonary vasculature are within normal  limits. No pleural effusion or pneumothorax. Stable prominent  pulmonary vasculature in the perihilar region. Stable mild streaky  bibasilar opacities favored to represent atelectasis.      Impression    Impression: Left upper extremity PICC tip projects at the low SVC.    I have personally reviewed the examination and initial  interpretation  and I agree with the findings.    TRISHA RANGEL MD     *Note: Due to a large number of results and/or encounters for the requested time period, some results have not been displayed. A complete set of results can be found in Results Review.

## 2018-01-11 NOTE — PROGRESS NOTES
[facility] For [facility] in urine, could treat with bactrim DS or nitrofurantoin for 3-5 days and repeat UA. Does not need ertapenem.

## 2018-01-12 NOTE — PLAN OF CARE
Problem: Patient Care Overview  Goal: Plan of Care/Patient Progress Review   01/11/18 2210   OTHER   Plan Of Care Reviewed With patient   Plan of Care Review   Progress improving     1500 - 2330: AVSS. Some c/o SOB, but saturation was in the high 90s, encouraged deep breathing - resolved. Given simethicone for abdominal cramping, which helped but patient did not like liquid, will try to change to pill form. Advanced diet to regular - patient ate 90% of meal, tolerated well. Adequate output. Encouraged PO intake. Up in chair throughout shift. C/o bottom soreness, ordered pad for chair, which relieved the pain in his bottom. Last hgb at 2030 - 9.4. Blood sugars below 100 - no corrective insulin needed. Oriented x4. 1/2NS running at 75ml/hr into R PIV. Is refusing some medications. No other complaints. Will continue to monitor and with POC.     Problem: Gastrointestinal Bleeding (Adult)  Goal: Signs and Symptoms of Listed Potential Problems Will be Absent, Minimized or Managed (Gastrointestinal Bleeding)  Signs and symptoms of listed potential problems will be absent, minimized or managed by discharge/transition of care (reference Gastrointestinal Bleeding (Adult) CPG).    01/11/18 2210   Gastrointestinal Bleeding   Problems Assessed (GI Bleeding) all   Problems Present (GI Bleeding) fluid imbalance       Problem: Diabetes, Type 2 (Adult)  Goal: Signs and Symptoms of Listed Potential Problems Will be Absent, Minimized or Managed (Diabetes, Type 2)  Signs and symptoms of listed potential problems will be absent, minimized or managed by discharge/transition of care (reference Diabetes, Type 2 (Adult) CPG).    01/11/18 2210   Diabetes, Type 2   Problems Assessed (Type 2 Diabetes) all   Problems Present (Type 2 Diabetes) situational response

## 2018-01-12 NOTE — PLAN OF CARE
Problem: Gastrointestinal Bleeding (Adult)  Goal: Signs and Symptoms of Listed Potential Problems Will be Absent, Minimized or Managed (Gastrointestinal Bleeding)  Signs and symptoms of listed potential problems will be absent, minimized or managed by discharge/transition of care (reference Gastrointestinal Bleeding (Adult) CPG).   Outcome: Improving  Patient with stable Hgb today and no evidence of blood in stool, stool loose brown. Held miralax due to looseness of stool.

## 2018-01-12 NOTE — PLAN OF CARE
Problem: Patient Care Overview  Goal: Plan of Care/Patient Progress Review  PT 5C    Discharge Planner PT   Patient plan for discharge: Back to NH  Current status: Pivot transfers with CGA, applied comperm size G for low level compression. Please make sure it does not bunch or crease. Please remove it pt experiences pain, numbness/tingling or increased SOB.  Barriers to return to prior living situation: Medical status  Recommendations for discharge: Back to NH  Rationale for recommendations: Current level of function       Entered by: Omer Puga 01/12/2018 1:21 PM

## 2018-01-12 NOTE — PLAN OF CARE
Problem: Patient Care Overview  Goal: Plan of Care/Patient Progress Review  OT 5C: cancel and HOLD, per discussion with physical therapy pt may only be appropriate for one therapy discipline to be following during IP stay, will check in to determine if OT needs arise.

## 2018-01-12 NOTE — PROGRESS NOTES
"[facility], [place]    Hematology / Oncology Progress Note    Date of Admission: 1/8/2018  Hospital Day #: 4   Date of Service (when I saw the patient): 01/12/2018     Assessment & Plan   Tomas Grey is a 73 y/o male with metastatic prostate cancer and PMHx significant for CAD, CKD, COPD, HTN, A fib managed on Coumadin, and diabetes who presents with 5 days of abdominal pain and anorexia, and 3-4 days of melena. Found to have a hgb of 3.2 on presentation to the ED concerning for a GI bleed.      #Abdominal pain, improving.  #Suspected GI bleed.  Patient endorses 3-4 days of melena at his home. States he has been having LLQ/periumbilical abdominal pain for approximately 5 days PTA and has not been eating d/t abdominal pain. Denies nausea or vomiting, no hematemesis. No BRBPR. Explains he told the staff at his nursing home facility but \"they did not do anything\", so patient called 911.   -On evaluation of labs, patient's hgb 3.2. Patient slightly tachycardic, but BPs stable. Pt also notes a mild headache.   -2 units PRBCs given STAT in ED. Will give an additional 1 unit PRBCs then recheck hemoglobin. Hgb of 11.0 after 3 units. Hgb trended down a bit today-->9.3. BM yesterday without blood per nursing notes.  -IVF at 75 ml/hr.  -Started on PPI gtt-->Transitioned to PO dosing bid.  -GI consulted, appreciate recs. Per recs, risk of doing EGD in this patient quite high and likely outweighs benefit. At this time, think mgmt with bid PPI and GOC discussion most appropriate, signed off.  -HELD Coumadin and ASA given bleed, given Vitamin K 2 mg in ED. Additional 5 mg Vitamin K PO given today for INR of 1.75.  -CT AP with no e/o acute issue. Notes a renal mass which is stable since 2014. Known bony metastases visualized.     #Anemia. Likely 2/2 disease and previous treatment. Appears to have h/o folate deficiency as well. Has been transfusion dependant.   -Transfuse for hgb <8  -Held iron " sulfate 325 mg BID at this time. Continued daily folate.     #UTI. Denies dysuria, however endorses frequency and urgency. Denies hematuria. H/o recurrent UTIs in setting of prostate Ca and metastatic lymph node dissection. Resistent to surgical options in past. Has f/u with Dr. Epstein 1/9/18 per NP note. On tamsulosin for retention.   -Continue PTA tamsulosin  -UA with e/o infection: >182 WBC with WBC clumps, large LE and moderate bacteria. UC growing >100,000 colonies E coli ESBL.  -Started on Ceftriaxone 1 g q24 hrs 1/8 now discontinued. Started Ertapenem 1g q 24hrs. PICC placed for IV abx home. Per ID, can consider PO Bactrim or Nitrofurantoin  For 3-5 days for discharge.     #Metastatic prostate cancer. Follows with Dr. Oviedo. Metastases to bone. S/p multiple therapies (Lupron, Casodex, Xgeva, Xtandi, Zytega+pred, Xofigo, and Megace). Recent note by Dr. Oviedo 12/5 stating they would stop all cancer-directed therapies and refer to hospice. Although there are some notes that patient has been resistant/refused hospice care.   -Will set up clinic f/u apt with Dr. Oviedo next week.  -Palliative care consult, appreciate recs.     #Coronary artery disease.   #Hypertension.  #Chronic diastolic CHF (EF 60-65%).  -Held PTA ASA in setting of acute bleed  -Continue Isosorbide mononitrate, metoprolol, and atorvastatin with hold parameters BP <120/80 and HR <80.  -Complaining of chest pain this morning, EKG with sinus tachycardia, troponin pending.     #Paroxysmal A fib, managed on Coumadin. INR 1.9 on admission. IN NSR on EKG.   -Continue PTA metoprolol  -HOLD Coumadin in setting of acute GI bleed.     #CKD. Baseline Cr appears to be in upper 1s. Cr on admission 1.38. Stable fluctuations in baseline.  -Monitor with daily BMP  -IVF      #COPD, on chronic oxygen.  #ALEXIS.  Has been seen/treated multiple times for COPD exacerbations. Follows with Dr. Loera in Pulmonology.   -Continue PTA Spiriva and Symbicort     #Diabetes  "mellitus, type II. Managed on Lantus 20 U daily PTA.  -HOLD PTA Lantus.  -Started on medium SSI to manage BG  -QID blood glucose checks per protocol.  -BG in mid 70s in AM 1/9 with c/o nasuea, headache, and dizziness. Gave apple juice. Continue to monitor.     #Lymphedema.  #Venous stasis skin changes/ulcerations.  -Lymphedema consulted, appreciate involvement in care.   -Continue PTA Lasix BID.     #Anxiety/Depression.  -Continue PTA Cymbalta     #Congnitive impairment. Per Nursing home visit notes and note by Dr. Rossi with palliative care, patient has mild-moderate impairment based on previous neurocognitive testing. BIMS score 11/15 on 11/16/17. Notes discuss appointing a guardian for patient to make medical decisions. Patient does have 11 children, however notes indicate they do not attend his hospital appointments. Patient explained that his medical decision maker is his \"son\" Les Bourgeois ([facility]). Per advanced directive, his primary contact is cousin, Melissa Mccormack with second contact as his son, Jeevan Grey (see advanced directive for phone number). Discussed patient's hospitaliziation and concerns for returning to NH with son, Les 1/10.     #Glaucoma. Follows with [facility].   -Continue Bimatoprost, Timolol Hemihydrate, and Carboxymethylcellulose drops.     FEN  -1/2 NS @ 75 ml/hr  -replace lytes prn  -FLD with advancement as tolerated     PPx  -VTE: defer d/t likely GI bleed  -PUD: PPI bid     Dispo: Pending further improvement in abdominal pain, bowels, and PO intake, possible discharge to LTCF tomorrow (1/13).     Roula Mcneil PA-C  Hematology/Oncology  Pager #[phone]     Interval History   Afebrile. No overnight events. Took in some good PO intake yesterday and bowels moved (no blood per nursing notes). Patient states he is feeling poorly this morning. Endorsing chest pain and abdominal pain. Breathing is stable. Offers no additional complaints. States he does not feel well enough to " "return to the LTCF today, but \"maybe tomorrow\".    Physical Exam   Temp: 98  F (36.7  C) Temp src: Oral BP: 133/59   Heart Rate: 113 Resp: 20 SpO2: 98 % O2 Device: Nasal cannula Oxygen Delivery: 4 LPM  Vitals:    01/08/18 0940 01/08/18 1347 01/11/18 1300   Weight: 93 kg (205 lb) 88.5 kg (195 lb 1.6 oz) 89.8 kg (198 lb)     Vital Signs with Ranges  Temp:  [97.4  F (36.3  C)-99.3  F (37.4  C)] 98  F (36.7  C)  Heart Rate:  [] 113  Resp:  [16-20] 20  BP: (115-157)/(45-75) 133/59  SpO2:  [97 %-98 %] 98 %  I/O last 3 completed shifts:  In: 1350 [P.O.:150; I.V.:1200]  Out: 975 [Urine:975]    Constitutional: Pleasant male curled up in bed under the covers, sleeping upon entry, wakes easily for examiner, in NAD. Alert and interactive.   HEENT: NCAT, anicteric sclerae, conjunctiva clear, + proptosis. Moist mucous membranes. Poor dentition.  Respiratory: Non-labored breathing, good air exchange. Lungs are diminished but aeration throughout, no crackles or wheezes.  Cardiovascular: Tachycardic with regular rhythm with a few extra beats, no murmur, rub or gallop.  GI: Normoactive BS. Abdomen is soft, non-distended, minimally tender to palpation around umbilicus, otherwise nontender. Sm hernia present, soft. No rigidity or guarding.   Skin: Warm and dry. Significant distal LE skin changes 2/2 venous stasis (scaling and venous stasis ulcer), stable. Tender to palpation of LE today.  Musculoskeletal: Extremities grossly normal. Edematous LE bilaterally to knees, chronic.  Neurologic: A &O x3, speech normal, answering questions appropriately. Moves all extremities spontaneously. Grossly non-focal.  Neuropsychiatric: Mentation and affect normal/appropriate.     Medications   Current Facility-Administered Medications   Medication     phytonadione (MEPHYTON) tablet 5 mg     simethicone (MYLICON) suspension 40 mg     sucralfate (CARAFATE) suspension 1 g     ertapenem (INVanz) 1 g in 10 mL SWFI for IVP     sodium chloride 0.45% " infusion     senna-docusate (SENOKOT-S;PERICOLACE) 8.6-50 MG per tablet 1-2 tablet     polyethylene glycol (MIRALAX/GLYCOLAX) Packet 17 g     lidocaine (LMX4) kit     heparin lock flush 10 UNIT/ML injection 2-5 mL     sodium chloride (PF) 0.9% PF flush 10-20 mL     heparin lock flush 10 UNIT/ML injection 5-10 mL     heparin lock flush 10 UNIT/ML injection 5-10 mL     lidocaine 1 % 1 mL     lidocaine (LMX4) kit     sodium chloride (PF) 0.9% PF flush 3 mL     sodium chloride (PF) 0.9% PF flush 3 mL     pantoprazole (PROTONIX) EC tablet 40 mg     folic acid (FOLVITE) tablet 1 mg     promethazine (PHENERGAN) IV injection 25 mg     Medication Instruction     ondansetron (ZOFRAN) injection 8 mg    Or     ondansetron (ZOFRAN-ODT) ODT tab 8 mg    Or     ondansetron (ZOFRAN) tablet 8 mg     acetaminophen (TYLENOL) tablet 1,000 mg     ammonium lactate (LAC-HYDRIN) 12 % lotion     atorvastatin (LIPITOR) tablet 10 mg     bimatoprost (LUMIGAN) 0.01 % ophthalmic drops 1 drop     calcium carbonate (OS-JORDAN 600 mg [place]. Ca) tablet 1,500 mg     diclofenac (VOLTAREN) 1 % topical gel 4 g     DULoxetine (CYMBALTA) EC capsule 20 mg     fentaNYL (DURAGESIC) 12 mcg/hr 72 hr patch 1 patch     furosemide (LASIX) tablet 20 mg     isosorbide mononitrate (IMDUR) 24 hr tablet 60 mg     metoprolol (TOPROL-XL) 24 hr tablet 50 mg     tamsulosin (FLOMAX) capsule 0.4 mg     glucose 40 % gel 15-30 g    Or     dextrose 50 % injection 25-50 mL    Or     glucagon injection 1 mg     insulin aspart (NovoLOG) inj (RAPID ACTING)     insulin aspart (NovoLOG) inj (RAPID ACTING)     fluticasone-vilanterol (BREO ELLIPTA) 200-25 MCG/INH oral inhaler 1 puff     albuterol neb solution 2.5 mg     umeclidinium (INCRUSE ELLIPTA) 62.5 MCG/INH oral inhaler 1 puff     HYDROmorphone (PF) (DILAUDID) injection 0.5 mg     Carboxymethylcellulose Sod PF (REFRESH PLUS) 0.5 % ophthalmic solution 1 drop     naloxone (NARCAN) injection 0.1-0.4 mg     fentaNYL (DURAGESIC) patch  REMOVAL     fentaNYL (DURAGESIC) Patch in Place     timolol (TIMOPTIC) 0.25 % ophthalmic solution 1 drop       Data   CBC  Recent Labs  Lab 01/12/18  1100 01/12/18  0313 01/11/18  2112 01/11/18  1200 01/11/18  0445  01/10/18  0341  01/09/18  0421   WBC  --  9.8  --   --  9.0  --  8.6  --  9.1   RBC  --  3.45*  --   --  3.70*  --  3.78*  --  3.96*   HGB 9.2* 9.3* 9.4* 9.5* 10.2*  < > 10.5*  < > 11.0*   HCT  --  31.4*  --   --  34.3*  --  34.6*  --  36.2*   MCV  --  91  --   --  93  --  92  --  91   MCH  --  27.0  --   --  27.6  --  27.8  --  27.8   MCHC  --  29.6*  --   --  29.7*  --  30.3*  --  30.4*   RDW  --  19.2*  --   --  19.0*  --  19.0*  --  18.6*   PLT  --  205  --   --  220  --  220  --  231   < > = values in this interval not displayed.  CMP    Recent Labs  Lab 01/12/18  0313 01/11/18  0445 01/10/18  0341 01/09/18  0421 01/08/18  1028   * 144 145* 144 143   POTASSIUM 3.8 4.0 4.2 4.5 4.3   CHLORIDE 111* 110* 111* 110* 110*   CO2 23 23 23 22 24   ANIONGAP 10 10 11 12 9   GLC 81 91 108* 72 84   BUN 12 14 18 22 30   CR 1.23 1.35* 1.43* 1.31* 1.38*   GFRESTIMATED 58* 52* 49* 54* 51*   GFRESTBLACK 70 63 59* 65 61   JORDAN 7.6* 7.6* 7.6* 8.0* 7.9*   MAG  --   --   --  1.6  --    PHOS  --   --   --  2.4*  --    PROTTOTAL  --   --   --   --  7.0   ALBUMIN  --   --   --   --  2.3*   BILITOTAL  --   --   --   --  0.3   ALKPHOS  --   --   --   --  150   AST  --   --   --   --  13   ALT  --   --   --   --  11     INR    Recent Labs  Lab 01/12/18  0313 01/11/18  0445 01/10/18  0341 01/09/18  0421   INR 1.75* 1.66* 1.52* 1.43*       Results for orders placed or performed during the hospital encounter of 01/08/18   XR Chest Port 1 View    Narrative    Exam: XR CHEST PORT 1 VW, 1/8/2018 10:48 AM    Indication: Chest pain    Comparison: 12/18/2017, 12/13/2017, 1/16/2017, 12/26/2016    Findings:   A single AP view of the chest was obtained. The cardiomediastinal  silhouette is not enlarged. No pneumothorax or pleural  effusion.  Prominent pulmonary vasculature. Mild streaky bibasilar opacities. Old  right rib fractures.      Impression    Impression:   Prominent pulmonary vasculature with mild streaky bibasilar opacities,  likely atelectasis.    I have personally reviewed the examination and initial interpretation  and I agree with the findings.    BENITO ROWLEY MD   CT Abdomen Pelvis w Contrast     Value    Radiologist flags (Urgent)     Right renal mass with differential includes oncocytoma    Narrative    EXAMINATION: CT ABDOMEN PELVIS W CONTRAST, 1/8/2018 12:00 PM    TECHNIQUE:  Helical CT images from the lung bases through the  symphysis pubis were obtained with contrast    COMPARISON: CT abdomen pelvis on 9/1/2016    HISTORY: LLQ pain r/o diverticulitis;     DLP: 678 mGy*cm    Contrast dose: 126 cc of isovue 370    FINDINGS:  Abdomen and pelvis:   There is a enhancing structure in the lower pole of the right kidney  measuring 2.5 cm on series 5 image 160. On prior nonenhanced studies  this does not demonstrate enhancement and also do not appear cystic,  indicating an enhancing mass.    Cystic lesion in both kidneys including 5.6 x 6.3 cm cyst arising from  the inferior pole of the left kidney (series 2, image 29) and 2.9 x  3.4 cm cyst arising from the inferior pole of the right kidney (series  2, image 29). No hydronephrosis. No hydroureter. The no renal calculi  identified. The distal right ureter dilated up to 2.1 cm with no  definite stenosis or obstructing stone, similar to CT on 9/9/2016.  Nodularity of one of the adrenal glands measuring 1.8 x 1.8 cm (series  2, image 19), in the prior noncontrasted study on 9/9/2016, it has  Hounsfield units of 25 and measuring approximately 17 x 16 mm.  Gallbladder, biliary ducts, spleen, and pancreas are unremarkable. The  right adrenal gland is unremarkable. There is a 10 x 7 mm Ill-defined  hypoattenuating lesion in the right liver lobe (series 2, image 18),  is too small to  characterize by CT.  No free fluid in the abdomen or pelvis. No free air in the abdomen.  Small bowel and colon are normal caliber without appreciable wall  thickening. No evidence for diverticulitis. No lymphadenopathy in the  abdomen or pelvis.  Aneurysmal dilation of infrarenal abdominal aorta up to 3.2 cm at the  superior endplate of L4. Aneurysmal dilatation of bilateral common  iliac arteries, right up to 2.2 cm and left up to 2.2 cm. These are  similar to CT on 9/11/2016.    Bones and soft tissues: Innumerable patchy ill-defined sclerosis in  the vertebral bodies of visualized spine and bony pelvis and femurs  consistent with known metastatic disease. No acute osseous abnormality  identified. Fat-containing umbilical hernia.    Lower chest: No no pleural effusion. No pericardial effusion. The  heart size is within normal limits.      Impression    IMPRESSION:   1. No cause of acute pain is identified in the abdomen or pelvis.  2. 2.5 cm right lower pole solid renal mass. This is been present on  noncontrast CT since at least 7/16/2014 and size has not changed  substantially. Differential includes oncocytoma and renal cell  carcinoma.  3. Redemonstration of multiple patchy ill-defined sclerosis in  vertebral bodies and bony pelvis consistent with known metastatic  disease.  4. Aneurysmal dilation of the aorta and bilateral common iliac  arteries.  5. Nodularity of the left adrenal gland not significantly changed  since 7/16/2014.      [Urgent Result: Right renal mass with differential includes oncocytoma  and renal cell carcinoma]    Finding was identified on 1/8/2018 12:22 PM.     Dr. Peguero was contacted by Dr. Cruz at 1/8/2018 1:47 PM and  verbalized understanding of the urgent finding.     I have personally reviewed the examination and initial interpretation  and I agree with the findings.    NATALIA DAVIS MD   XR Chest Port 1 View    Narrative    Exam: XR CHEST PORT 1 VW, 1/10/2018 6:47  PM    Indication: RN placed PICC - verify tip placement;     Comparison: 1/8/2018    Findings:   Left upper extremity PICC tip projects at the low SVC. The cardiac  mediastinal silhouette and pulmonary vasculature are within normal  limits. No pleural effusion or pneumothorax. Stable prominent  pulmonary vasculature in the perihilar region. Stable mild streaky  bibasilar opacities favored to represent atelectasis.      Impression    Impression: Left upper extremity PICC tip projects at the low SVC.    I have personally reviewed the examination and initial interpretation  and I agree with the findings.    TRISHA RANGEL MD     *Note: Due to a large number of results and/or encounters for the requested time period, some results have not been displayed. A complete set of results can be found in Results Review.

## 2018-01-12 NOTE — PROGRESS NOTES
Social Work Services Progress Note    Hospital Day: 5  Date of Initial Social Work Evaluation:  Not yet completed  Collaborated with:  [facility] (Stephanie)    Data:  Pt is 71 y/o male admitted to [facility] on 1/8/18 r/t 5 days of abdominal pain and anorexia and 3-4 days of melena. Pt admitted d/t concern for GI bleed.  SW involved for return to SNF when medically stable.     Intervention:  SW notified today that Pt is not medically stable for d/c today. SW [facility]; SW asked if they could accept Pt back over the weekend if he is ready and she said that they can accommodate that. Weekend SW will need to coordinate with NH weekend staff by calling [phone] if Pt is ready over the weekend.    Assessment:  See Bedside RN, PT/OT and medical team notes.    Plan:    Anticipated Disposition:  Facility:  [facility]    Barriers to d/c plan:  Medical stability    Follow Up:  SW to continue to follow and assist with d/c plan.    DAGO Valle, LEONSW   Surgical Oncology Unit   [phone]  Pager: [phone]

## 2018-01-12 NOTE — PLAN OF CARE
Problem: Patient Care Overview  Goal: Plan of Care/Patient Progress Review  AVSS on 2L nasal cannual O2. Pt moved from chair to bed at about 0300, repositions independently in bed. No stools this shift. Voiding spontaneously. Will continue to monitor.    Problem: Gastrointestinal Bleeding (Adult)  Goal: Signs and Symptoms of Listed Potential Problems Will be Absent, Minimized or Managed (Gastrointestinal Bleeding)  Signs and symptoms of listed potential problems will be absent, minimized or managed by discharge/transition of care (reference Gastrointestinal Bleeding (Adult) CPG).    01/12/18 0527   Gastrointestinal Bleeding   Problems Assessed (GI Bleeding) all   Problems Present (GI Bleeding) fluid imbalance

## 2018-01-13 NOTE — PROGRESS NOTES
Focus: Discharge     D: Patient to be transferred back to [facility], called and gave report to Marycarmen CAMERON. Informed Marycarmen that patient had been taken off his coumadin due to the GI bleed. Also that he hasn't been given lantus insulin here, just SS novolog before his meals. Pulled PICC line before discharge, minimal bleeding noted. Gauze placed over insertion site and coban wrapped around his arm. Also informed Marycarmen that patient had been refusing his eye gtts and carafate. That he didn't take his AM medications till the afternoon.

## 2018-01-13 NOTE — PLAN OF CARE
Problem: Patient Care Overview  Goal: Plan of Care/Patient Progress Review  Outcome: No Change  [id number] AVSS throughou shift. Pt weaned from 4L to 2L O2 & sats %. Pt did not complain of pain but did state his stomach was hurting; pt could not adequately describe pain & declined intervention. Pt ate a late lunch & had insulin given per MAR. Spent entire shift in chair, sleeping on and off. Hgb 9.7 & 9.5. Pt's grey lumen heparin locked & purple infusing at 75 ml/hr-quite sluggish for blood return. Pt denied many of his medications, stating he didn't need them; writer emphasized importance of medications & pt still declined. Pt reports always feeling cold & also noted some SOB that resolved with position change & deep breathing. No other concerns, continue to monitor.    Problem: Gastrointestinal Bleeding (Adult)  Goal: Signs and Symptoms of Listed Potential Problems Will be Absent, Minimized or Managed (Gastrointestinal Bleeding)  Signs and symptoms of listed potential problems will be absent, minimized or managed by discharge/transition of care (reference Gastrointestinal Bleeding (Adult) CPG).   Outcome: No Change   01/13/18 0610   Gastrointestinal Bleeding   Problems Assessed (GI Bleeding) all   Problems Present (GI Bleeding) none       Problem: Diabetes, Type 2 (Adult)  Goal: Signs and Symptoms of Listed Potential Problems Will be Absent, Minimized or Managed (Diabetes, Type 2)  Signs and symptoms of listed potential problems will be absent, minimized or managed by discharge/transition of care (reference Diabetes, Type 2 (Adult) CPG).   Outcome: No Change   01/13/18 0610   Diabetes, Type 2   Problems Assessed (Type 2 Diabetes) all   Problems Present (Type 2 Diabetes) situational response

## 2018-01-13 NOTE — INTERIM SUMMARY
"Palliative Care Interim Summary  I spoke with Onc JASON Mcneil about Mr Grey.  She had spoken to pts son (silvia?) Les Ashford earlier this week.   This is the family member Mr Grey wants to be his HCA, although an old ACD from 2015 lists one of his cousins.  As per my consult, Mr Grey demonstrated decisional capacity for wanting this change \"Les's the one who visits me all the time, no one else does\".  Yet pt wasn't willing to put this into a new ACD form.  \"I'd do it when Les's here\".   Onc hasn't so far been able to get Les in to the hospital to help with all this, although they've asked him to.   Les didn't answer when I tried him today.  Mr Grey gave me permission to send this info about desiring a new HCA to the doc caring for him at [place], Dr Annika Ortiz, and I've done so.  Facility SW may be able to assist with this when he returns to [place].    As per my consult note, Tomas wouldn't engage at all in a conversation about code status.  Regarding ongoing transfusion dependence \"it doesn't bother me at all to have to get transfusion every few weeks\" if it keeps me going.  At this point doesn't find re-hospitalization to be a burden....  Continue ongoing GOC communication as his illness and life situation evolve.     Gt Iniguez MD  Palliative Medicine Consult Team  Pager: [phone]   "

## 2018-01-13 NOTE — PROGRESS NOTES
Pt transferred to [facility] via [facility] transportation. Pt left in wheelchair with clothing, money and [employer] mobility card. Left on 2L O2 via NC. Report given by previous nurse. Denied 1600 medication before leaving, took lunch with him.

## 2018-01-13 NOTE — PROGRESS NOTES
Social Work Services Discharge Note      Patient Name:  Tomas Gery     Anticipated Discharge Date:  1.13.18    Discharge Disposition:   TCU:  [facility] - where he was prior to admission     [facility] [place] [phone] for 1630 today - first available. Encouraged patient to eat dinner here prior to leaving so he does not miss his evening meal.     Following MD:  Ekaterina NEUMANN at [facility]     Pre-Admission Screening (PAS) online form has been completed.  The Level of Care (LOC) is:  Determined  Confirmation Code is:  n/a  Patient/caregiver informed of referral to [facility] for Pre-Admission Screening for skilled nursing facility (SNF) placement and to expect a phone call post discharge from SNF.     Additional Services/Equipment Arranged:  Portable tank of O2 ordered from [facility] [phone] (facility contracted provider) for ride in WC to facility today.      Patient / Family response to discharge plan:  Patient indicated that he would like to eventually move to another facility. He expressed some concern that they did not hear him when he said that he needed to go to the hospital. We talked about addressing with NH staff and they would assist him in making a change. Tomas is in agreement with a discharge to [facility] today. He has updated his children by phone.      Persons notified of above discharge plan:  Patient, PA, facility, and 5C Charge    Staff Discharge Instructions:  Please fax discharge orders and signed hard scripts for any controlled substances.  Please print a packet and send with patient.     RN - please call RN to RN report to facility at [phone]    Please fax discharge orders and summary to [phone]    CTS Handoff completed:  YES to Tammy LOZA LICSW  1/13/2018    ON CALL PAGER   0800 - 1600   [phone]    ON CALL COVERAGE AFTER 1600  [phone]

## 2018-01-15 NOTE — DISCHARGE SUMMARY
[facility], [place]    Discharge Summary  Hematology / Oncology    Date of Admission:  1/8/2018  Date of Discharge:  1/13/2018  4:30 PM  Discharging Provider: Roula Mcneil  Date of Service (when I saw the patient): 1/13/18    Discharge Diagnoses   Gastrointestinal bleed, resolved.  Anemia  Urinary tract infection  Metastatic prostate cancer  CAD  Hypertension  Chronic diastolic CHF  Paroxysmal atrial fibrillation  COPD  ALEXIS  Diabetes mellitus, type II  Chronic lymphedema  Cognitive impairment  Galucoma    History of Present Illness   Tomas Grey is a 71 y/o male with metastatic prostate cancer and PMHx significant for CAD, CKD, COPD, HTN, A fib managed on Coumadin, and DM2 who presents from his LTCF with 5 days of abdominal pain and anorexia, and 3-4 days of melena. Found to have a hgb of 3.2 on presentation to the ED concerning for a GI bleed. Coumadin held. Given 3 units of PRBCs with recheck hgb ~11. Vitals remained stable, patient remained alert and oriented. GI consulted and felt that risk outweighed the benefit of doing procedural imaging (i.e EGD/colonoscopy). Started PPI gtt then transitioned to high dose PPI bid to remain on indefinitely. Hemoglobin remained relatively stable throughout admission, and patient had normal BMs w/o melena. Transitioned to regular diet slowly. Abdominal pain improved daily. Discussed patient's situation with son, Les Bourgeois per patient request. Patient discharged back to LTCF in stable condition. Requesting follow up with Dr. Oviedo sometime next week.     Hospital Course   Tomas Grey was admitted on 1/8/2018.  The following problems were addressed during his hospitalization:    #Abdominal pain, Improved.  #Suspected GI bleed, Resolved.  Patient endorses 3-4 days of melena at his home. States he has been having LLQ/periumbilical abdominal pain for approximately 5 days PTA and has not been eating d/t abdominal pain. Denies nausea or  "vomiting, no hematemesis. No BRBPR. Explains he told the staff at his nursing home facility but \"they did not do anything\", so patient called 911.   -On evaluation of labs, patient's hgb 3.2. Patient slightly tachycardic on admission, but BPs stable. Pt also notes a mild headache. 2 units PRBCs given STAT in ED with an additional unit when admitted to the floor, recheck hemoglobin was 11.0. Hgb trended down slowly to 9.5 at discharge, no melena or hematochezia. Started on PPI gtt-->Transitioned to PO dosing bid to continue indefinitely per GI.  -GI consulted, appreciate recs. Per recs, risk of doing EGD in this patient quite high and likely outweighs benefit. Patient and family in agreement.  -HELD Coumadin and ASA given bleed, given Vitamin K 2 mg in ED and gave 5 mg Vitamin K PO 1/12 for INR of 1.75.  -CT AP with no e/o acute issue. Notes a renal mass which is stable since 2014. Known bony metastases visualized.      #Anemia. Likely 2/2 disease and previous treatment. Appears to have h/o folate deficiency as well. Has been transfusion dependant. Transfuse for hgb <8.  -Continued daily folate and iron at discharge.  -Requested 3x weekly lab draws forwarded to Dr. Oviedo until f/u apt to monitor hgb.      #UTI. Denies dysuria, however endorses frequency and urgency. Denies hematuria. H/o recurrent UTIs in setting of prostate Ca and metastatic lymph node dissection. Resistent to surgical options in past. Has f/u with Dr. Epstein 1/9/18 per NP note. On tamsulosin for retention.   -Continued PTA tamsulosin  -UA with e/o infection: >182 WBC with WBC clumps, large LE and moderate bacteria. UC growing >100,000 colonies E coli ESBL.  -Started on Ceftriaxone 1 g q24 hrs-->transitioned to Ertapenem 1g q 24hrs with ESBL result. Per ID and sens, discharged on Bactrim DS bid x 3 doses to complete a 5 day total course. Repeat UA to follow up treatment.      #Metastatic prostate cancer. Follows with Dr. Oviedo. Metastases to bone. " S/p multiple therapies (Lupron, Casodex, Xgeva, Xtandi, Zytega+pred, Xofigo, and Megace). Recent note by Dr. Oviedo 12/5 stating they would stop all cancer-directed therapies and refer to hospice. Although there are some notes that patient has been resistant/refused hospice care.   -Requested f/u apt with Dr. Oviedo next week.  -Palliative care followed while inpatient.     #Coronary artery disease.   #Hypertension.  #Chronic diastolic CHF (EF 60-65%).  -Held PTA ASA in setting of acute bleed, discontinued at discharge.  -Continue Isosorbide mononitrate, metoprolol, and atorvastatin with hold parameters BP <120/80 and HR <80.  -Complaining of chest pain in AM 1/12, EKG with sinus tachycardia, troponin negative.      #Paroxysmal A fib, managed on Coumadin. INR 1.9 on admission. IN NSR on EKG.   -Continue PTA metoprolol  -HOLD Coumadin in setting of acute GI bleed, did not resume.      #CKD. Baseline Cr appears to be in upper 1s. Cr on admission 1.38. Stable fluctuations in baseline.  -Monitor with daily BMP  -IVF       #COPD, on chronic oxygen.  #ALEXIS.  Has been seen/treated multiple times for COPD exacerbations. Follows with Dr. Loera in Pulmonology.   -Continue PTA Spiriva and Symbicort.      #Diabetes mellitus, type II. Managed on Lantus 20U daily PTA.  -HELD PTA Lantus. Started on medium SSI to manage BG. With decreased PO intake, blood glucose running a bit lower. Held PTA Lantus at discharge as well and discharged on SSI. Primary physician to reevaluate.   -QID blood glucose checks per protocol.  -BG in mid 70s in AM 1/9 with c/o nasuea, headache, and dizziness. Gave apple juice.       #Lymphedema.  #Venous stasis skin changes/ulcerations.  -Lymphedema consulted.   -Continue PTA Lasix BID.      #Anxiety/Depression.  -Continue PTA Cymbalta.      #Congnitive impairment. Per Nursing home visit notes and note by Dr. Rossi with palliative care, patient has mild-moderate impairment based on previous  "neurocognitive testing. BIMS score 11/15 on 11/16/17. Notes discuss appointing a guardian for patient to make medical decisions. Patient does have 11 children, however notes indicate they do not attend his hospital appointments. Patient explained that his medical decision maker is his \"son\" Les Bourgeois (stepson). Per advanced directive, his primary contact is cousin, Melissa Mccormack with second contact as his son, Jeevan Grey (see advanced directive for phone number). Discussed patient's hospitaliziation and concerns for returning to NH with son, Les 1/10.      #Glaucoma. Follows with [facility].   -Continue Bimatoprost, Timolol Hemihydrate, and Carboxymethylcellulose drops.    Patient and plan discussed with Dr. Grant.    Roula Mcneil PA-C  Hematology/Oncology  Pager #[phone]    Significant Results and Procedures   None    Pending Results   These results will be followed up by Dr. Oviedo.  Unresulted Labs Ordered in the Past 30 Days of this Admission     Date and Time Order Name Status Description    1/11/2018 0445 CBC with platelets In process     1/11/2018 0445 INR In process         Code Status   Full Code    Primary Care Physician   Ekaterina Lozano    Physical Exam                     Vitals:    01/08/18 0940 01/08/18 1347 01/11/18 1300   Weight: 93 kg (205 lb) 88.5 kg (195 lb 1.6 oz) 89.8 kg (198 lb)     Vital Signs with Ranges     I/O last 3 completed shifts:  In: 3351 [P.O.:240; I.V.:3111]  Out: 1950 [Urine:1950]    Constitutional: Pleasant male seen sitting up in bed, in NAD. Alert and interactive.   HEENT: NCAT, anicteric sclerae, conjunctiva clear. Moist mucous membranes.  Respiratory: Non-labored breathing, good air exchange on 2L. Lungs are clear to auscultation bilaterally, without wheezing, crackles or rhonchi. No cough noted.   Cardiovascular: Regular rate and rhythm with no murmur, rub or gallop.  GI: Normoactive BS. Abdomen is soft, non-distended, and non-tender to " palpation. No rigidity or guarding. Small umbilical hernia-soft.  Skin: Warm and dry. Significant distal LE skin changes 2/2 venous stasis (scaling and venous stasis ulcer), chronic.   Musculoskeletal: Extremities grossly normal. Edematous LE bilaterally.  Neurologic: A &O x3, speech normal, answering questions appropriately. Moves all extremities spontaneously. Grossly non-focal.  Neuropsychiatric: Mentation and affect normal/appropriate.    Discharge Disposition   Discharged to long-term care facility  Condition at discharge: Stable    Consultations This Hospital Stay   GI LUMINAL ADULT IP CONSULT  NUTRITION SERVICES ADULT IP CONSULT  SPIRITUAL HEALTH SERVICES IP CONSULT  OCCUPATIONAL THERAPY ADULT IP CONSULT  PHYSICAL THERAPY ADULT IP CONSULT  WOUND OSTOMY CONTINENCE NURSE  IP CONSULT  VASCULAR ACCESS CARE ADULT IP CONSULT  SOCIAL WORK IP CONSULT  LYMPHEDEMA THERAPY IP CONSULT  PALLIATIVE CARE ADULT IP CONSULT  VASCULAR ACCESS ADULT IP CONSULT  PHYSICAL THERAPY ADULT IP CONSULT    Discharge Orders     CBC with platelets and differential   Last Lab Result: Hemoglobin (g/dL)      Date                     Value                01/13/2018               9.5 (L)          ----------     Comprehensive metabolic panel (BMP + Alb, Alk Phos, ALT, AST, Total. Bili, TP)     General info for SNF   Length of Stay Estimate: Long Term Care  Condition at Discharge: Stable  Level of care:board and care  Rehabilitation Potential: Fair  Admission H&P remains valid and up-to-date: Yes  Recent Chemotherapy: N/A  Use Nursing Home Standing Orders: Yes     Mantoux instructions   Give two-step Mantoux (PPD) Per Facility Policy Yes     Reason for your hospital stay   Admitted for a GI bleed and UTI.     Glucose monitor nursing POCT   Before meals and at bedtime     Additional Discharge Instructions   --Please draw labs on patient three times weekly and send results to Dr. Oviedo at [facility].  --Transfuse RBCs for Hgb <8.    Sliding scale  insulin  Before meals Correction Scale - MEDIUM INSULIN RESISTANCE DOSING     Do Not give Correction Insulin if Pre-Meal BG less than 140.   For Pre-Meal  - 189 give 1 unit.   For Pre-Meal  - 239 give 2 units.   For Pre-Meal  - 289 give 3 units.   For Pre-Meal  - 339 give 4 units.   For Pre-Meal - 399 give 5 units.   For Pre-Meal -449 give 6 units  For Pre-Meal BG greater than or equal to 450 give 7 units.   To be given with prandial insulin, and based on pre-meal blood glucose.    Notify provider if glucose greater than or equal to 350 mg/dL after administration of correction dose.    Before bedtime correction scale  MEDIUM INSULIN RESISTANCE DOSING    Do Not give Bedtime Correction Insulin if BG less than  200.   For  - 249 give 1 units.   For  - 299 give 2 units.   For  - 349 give 3 units.   For  -399 give 4 units.   For BG greater than or equal to 400 give 5 units.  Notify provider if glucose greater than or equal to 350 mg/dL after administration of correction dose.     Follow Up and recommended labs and tests   Please have patient follow up in clinic with Dr. Oviedo in ~ 1 week from discharge. This appointment has been requested.     Wound care   Site:   Bilateral lower extremities  Instructions:  Bilateral LEs: wash daily and pat dry.  Sween cream to dry scaly skin.  Apply mepilex border to open area on right LE. (per [facility] nurse)     Activity - Up with nursing assistance     Full Code     Physical Therapy Adult Consult   Evaluate and treat as clinically indicated.    Reason:  Maintain strength and conditioning     Contact Isolation     Fall precautions     Advance Diet as Tolerated   Follow this diet upon discharge: Regular       Discharge Medications   Discharge Medication List as of 1/13/2018  2:53 PM      START taking these medications    Details   !! insulin aspart (NOVOLOG PEN) 100 UNIT/ML injection Inject 1-7 Units Subcutaneous 3 times daily  (before meals) (see discharge instructions for sliding scale., Transitional      !! insulin aspart (NOVOLOG PEN) 100 UNIT/ML injection Inject 1-5 Units Subcutaneous At Bedtime (see discharge instructions regarding sliding scale)., Transitional      folic acid (FOLVITE) 1 MG tablet Take 1 tablet (1 mg) by mouth daily, Disp-30 tablet, Transitional      polyethylene glycol (MIRALAX/GLYCOLAX) Packet Take 17 g by mouth daily , hold for loose stools., Disp-7 packet, Transitional      sulfamethoxazole-trimethoprim (BACTRIM DS/SEPTRA DS) 800-160 MG per tablet Take 1 tablet by mouth 2 times daily for 3 doses, R-0, Transitional      simethicone (MYLICON) 40 MG/0.6ML suspension Take 0.6 mLs (40 mg) by mouth every 6 hours as needed for cramping, Transitional      sucralfate (CARAFATE) 1 GM/10ML suspension Take 10 mLs (1 g) by mouth 4 times daily (before meals and nightly), Disp-1200 mL, Transitional       !! - Potential duplicate medications found. Please discuss with provider.      CONTINUE these medications which have CHANGED    Details   fentaNYL (DURAGESIC) 12 mcg/hr 72 hr patch Place 1 patch onto the skin every 72 hours, Disp-5 patch, R-0, Local Print      oxyCODONE IR (ROXICODONE) 5 MG tablet Take 1 tablet (5 mg) by mouth every 4 hours as needed, Disp-18 tablet, R-0, Local Print      acetaminophen (TYLENOL) 500 MG tablet Take 2 tablets (1,000 mg) by mouth 3 times daily Not to exceed 3000 mg/24 hours, Transitional      isosorbide mononitrate (IMDUR) 30 MG 24 hr tablet Take 2 tablets (60 mg) by mouth daily, Disp-30 tablet, Transitional      nitroGLYcerin (NITROSTAT) 0.4 MG sublingual tablet Place 1 tablet (0.4 mg) under the tongue every 5 minutes as needed for chest pain if you are still having symptoms after 3 doses (15 minutes) call 911., Disp-25 tablet, Transitional      budesonide-formoterol (SYMBICORT) 160-4.5 MCG/ACT Inhaler Inhale 2 puffs into the lungs 2 times daily, Transitional      ipratropium - albuterol 0.5  mg/2.5 mg/3 mL (DUONEB) 0.5-2.5 (3) MG/3ML neb solution Take 1 vial (3 mLs) by nebulization every 4 hours as needed for shortness of breath / dyspnea or wheezing, Disp-360 mL, Transitional      tiotropium (SPIRIVA) 18 MCG capsule Inhale 1 capsule (18 mcg) into the lungs daily, Disp-30 capsule, Transitional      DULoxetine (CYMBALTA) 20 MG EC capsule Take 1 capsule (20 mg) by mouth daily, Disp-60 capsule, Transitional      ondansetron 4 MG FILM Take 4 mg by mouth every 8 hours as needed, Disp-12 each, Transitional      atorvastatin (LIPITOR) 10 MG tablet Take 1 tablet (10 mg) by mouth At Bedtime, Disp-30 tablet, Transitional      metoprolol succinate (TOPROL XL) 50 MG 24 hr tablet Take 1 tablet (50 mg) by mouth daily, Disp-5 tablet, R-0, Transitional      ammonium lactate (LAC-HYDRIN) 12 % lotion Apply topically 2 times daily as needed for dry skin To all extremities for dry skinTransitional      diclofenac (VOLTAREN) 1 % GEL topical gel Apply 4 grams to knees four times daily as needed using enclosed dosing card.Disp-100 g, R-0Transitional      furosemide (LASIX) 20 MG tablet Take 1 tablet (20 mg) by mouth 2 times daily, Disp-30 tablet, Transitional      ferrous sulfate (IRON) 325 (65 FE) MG tablet Take 1 tablet (325 mg) by mouth 2 times daily, Disp-100 tablet, Transitional      senna-docusate (SENOKOT-S;PERICOLACE) 8.6-50 MG per tablet Take 2 tablets by mouth 2 times daily , hold for loose stools., Disp-100 tablet, Transitional      calcium carbonate (OS-JORDAN 600 MG [place]) 1500 (600 [place]) MG tablet Take 1 tablet (1,500 mg) by mouth 2 times daily (with meals), Disp-60 tablet, Transitional      tamsulosin (FLOMAX) 0.4 MG capsule Take 1 capsule (0.4 mg) by mouth daily, Disp-5 capsule, R-0, Transitional      bimatoprost (LUMIGAN) 0.01 % SOLN Place 1 drop into both eyes At Bedtime, Disp-1 Bottle, Transitional      Carboxymethylcellulose Sod PF (REFRESH PLUS) 0.5 % SOLN ophthalmic solution Place 1 drop into both eyes 4  times daily And 1 drop both eyes daily prn, Disp-1 Bottle, Transitional      omeprazole (PRILOSEC) 10 MG CR capsule Take 1 capsule (10 mg) by mouth daily, Disp-60 capsule, Transitional         CONTINUE these medications which have NOT CHANGED    Details   METHOCARBAMOL PO Take 500 mg by mouth every 6 hours as needed for muscle spasms, Historical      Timolol Hemihydrate (BETIMOL OP) Place 1 drop into both eyes daily , Historical         STOP taking these medications       WARFARIN SODIUM PO Comments:   Reason for Stopping:         insulin glargine (LANTUS) 100 UNIT/ML injection Comments:   Reason for Stopping:         aspirin 81 MG EC tablet Comments:   Reason for Stopping:             Allergies   Allergies   Allergen Reactions     Morphine Other (See Comments)     Patient reports makes him almost go into a coma     Data   Most Recent 3 CBC's:  Recent Labs   Lab Test  01/13/18   0331  01/12/18   2159  01/12/18   1100  01/12/18   0313   01/11/18   0445   WBC  8.2   --    --   9.8   --   9.0   HGB  9.5*  9.7*  9.2*  9.3*   < >  10.2*   MCV  91   --    --   91   --   93   PLT  222   --    --   205   --   220    < > = values in this interval not displayed.      Most Recent 3 BMP's:  Recent Labs   Lab Test  01/13/18   0331  01/12/18   0313  01/11/18   0445   NA  145*  145*  144   POTASSIUM  4.0  3.8  4.0   CHLORIDE  112*  111*  110*   CO2  26  23  23   BUN  13  12  14   CR  1.08  1.23  1.35*   ANIONGAP  7  10  10   JORDAN  8.3*  7.6*  7.6*   GLC  129*  81  91     Most Recent 2 LFT's:  Recent Labs   Lab Test  01/08/18   1028  12/04/17   1040   AST  13  29   ALT  11  17   ALKPHOS  150  157*   BILITOTAL  0.3  0.6     Most Recent INR's and Anticoagulation Dosing History:  Anticoagulation Dose History     Recent Dosing and Labs Latest Ref Rng & Units 12/28/2017 1/8/2018 1/9/2018 1/10/2018 1/11/2018 1/12/2018 1/13/2018    INR 0.86 - 1.14 1.7(H) 1.90(H) 1.43(H) 1.52(H) 1.66(H) 1.75(H) 1.33(H)        Most Recent 3  Troponin's:  Recent Labs   Lab Test  01/12/18   1100  01/08/18   1028  12/18/17   0041   06/21/17   0924   10/25/15   1744   06/21/15   1918   TROPI  <0.015  <0.015  <0.015   < >   --    < >   --    < >   --    TROPONIN   --    --    --    --   0.00   --   0.00   --   0.00    < > = values in this interval not displayed.     Most Recent Cholesterol Panel:  Recent Labs   Lab Test 06/20/17   CHOL  134   LDL  50   HDL  51   TRIG  166     Most Recent 6 Bacteria Isolates From Any Culture (See EPIC Reports for Culture Details):  Recent Labs   Lab Test  01/08/18   1106  06/03/17   1433  06/03/17   1332  06/03/17   1329  06/01/17   0550  06/01/17   0357   CULT  >100,000 colonies/mL  Escherichia coli ESBL  Enterobacteriaceae that are susceptible to meropenem are usually susceptible to ertapenem.  *  ESBL (extended beta lactamase) producing organisms require contact precautions.  Light growth Pseudomonas aeruginosa  Plus Light growth Normal skin rex  *  No growth  No growth  <10,000 colonies/mL mixed urogenital rex Susceptibility testing not routinely   done    No growth     Most Recent TSH, T4 and A1c Labs:  Recent Labs   Lab Test 11/16/17 10/26/17  11/30/16   TSH   --   0.57   --   0.84   T4   --    --    --   1.19   A1C  6.0   --    < >   --     < > = values in this interval not displayed.     Results for orders placed or performed during the hospital encounter of 01/08/18   XR Chest Port 1 View    Narrative    Exam: XR CHEST PORT 1 VW, 1/8/2018 10:48 AM    Indication: Chest pain    Comparison: 12/18/2017, 12/13/2017, 1/16/2017, 12/26/2016    Findings:   A single AP view of the chest was obtained. The cardiomediastinal  silhouette is not enlarged. No pneumothorax or pleural effusion.  Prominent pulmonary vasculature. Mild streaky bibasilar opacities. Old  right rib fractures.      Impression    Impression:   Prominent pulmonary vasculature with mild streaky bibasilar opacities,  likely atelectasis.    I have personally  reviewed the examination and initial interpretation  and I agree with the findings.    BENITO ROWLEY MD   CT Abdomen Pelvis w Contrast     Value    Radiologist flags (Urgent)     Right renal mass with differential includes oncocytoma    Narrative    EXAMINATION: CT ABDOMEN PELVIS W CONTRAST, 1/8/2018 12:00 PM    TECHNIQUE:  Helical CT images from the lung bases through the  symphysis pubis were obtained with contrast    COMPARISON: CT abdomen pelvis on 9/1/2016    HISTORY: LLQ pain r/o diverticulitis;     DLP: 678 mGy*cm    Contrast dose: 126 cc of isovue 370    FINDINGS:  Abdomen and pelvis:   There is a enhancing structure in the lower pole of the right kidney  measuring 2.5 cm on series 5 image 160. On prior nonenhanced studies  this does not demonstrate enhancement and also do not appear cystic,  indicating an enhancing mass.    Cystic lesion in both kidneys including 5.6 x 6.3 cm cyst arising from  the inferior pole of the left kidney (series 2, image 29) and 2.9 x  3.4 cm cyst arising from the inferior pole of the right kidney (series  2, image 29). No hydronephrosis. No hydroureter. The no renal calculi  identified. The distal right ureter dilated up to 2.1 cm with no  definite stenosis or obstructing stone, similar to CT on 9/9/2016.  Nodularity of one of the adrenal glands measuring 1.8 x 1.8 cm (series  2, image 19), in the prior noncontrasted study on 9/9/2016, it has  Hounsfield units of 25 and measuring approximately 17 x 16 mm.  Gallbladder, biliary ducts, spleen, and pancreas are unremarkable. The  right adrenal gland is unremarkable. There is a 10 x 7 mm Ill-defined  hypoattenuating lesion in the right liver lobe (series 2, image 18),  is too small to characterize by CT.  No free fluid in the abdomen or pelvis. No free air in the abdomen.  Small bowel and colon are normal caliber without appreciable wall  thickening. No evidence for diverticulitis. No lymphadenopathy in the  abdomen or  pelvis.  Aneurysmal dilation of infrarenal abdominal aorta up to 3.2 cm at the  superior endplate of L4. Aneurysmal dilatation of bilateral common  iliac arteries, right up to 2.2 cm and left up to 2.2 cm. These are  similar to CT on 9/11/2016.    Bones and soft tissues: Innumerable patchy ill-defined sclerosis in  the vertebral bodies of visualized spine and bony pelvis and femurs  consistent with known metastatic disease. No acute osseous abnormality  identified. Fat-containing umbilical hernia.    Lower chest: No no pleural effusion. No pericardial effusion. The  heart size is within normal limits.      Impression    IMPRESSION:   1. No cause of acute pain is identified in the abdomen or pelvis.  2. 2.5 cm right lower pole solid renal mass. This is been present on  noncontrast CT since at least 7/16/2014 and size has not changed  substantially. Differential includes oncocytoma and renal cell  carcinoma.  3. Redemonstration of multiple patchy ill-defined sclerosis in  vertebral bodies and bony pelvis consistent with known metastatic  disease.  4. Aneurysmal dilation of the aorta and bilateral common iliac  arteries.  5. Nodularity of the left adrenal gland not significantly changed  since 7/16/2014.      [Urgent Result: Right renal mass with differential includes oncocytoma  and renal cell carcinoma]    Finding was identified on 1/8/2018 12:22 PM.     Dr. Peguero was contacted by Dr. Cruz at 1/8/2018 1:47 PM and  verbalized understanding of the urgent finding.     I have personally reviewed the examination and initial interpretation  and I agree with the findings.    NATALIA DAVIS MD   XR Chest Port 1 View    Narrative    Exam: XR CHEST PORT 1 VW, 1/10/2018 6:47 PM    Indication: RN placed PICC - verify tip placement;     Comparison: 1/8/2018    Findings:   Left upper extremity PICC tip projects at the low SVC. The cardiac  mediastinal silhouette and pulmonary vasculature are within normal  limits. No  pleural effusion or pneumothorax. Stable prominent  pulmonary vasculature in the perihilar region. Stable mild streaky  bibasilar opacities favored to represent atelectasis.      Impression    Impression: Left upper extremity PICC tip projects at the low SVC.    I have personally reviewed the examination and initial interpretation  and I agree with the findings.    TRISHA RANGEL MD     *Note: Due to a large number of results and/or encounters for the requested time period, some results have not been displayed. A complete set of results can be found in Results Review.

## 2018-01-17 NOTE — PROGRESS NOTES
[facility]  PRIMARY CARE PROVIDER AND CLINIC:  Ekaterina Lozano Magi [address] / [place] [address]  Chief Complaint   Patient presents with     Hospital F/U     HPI:    Tomas Grey is a 72 year old  (1945),admitted to the [facility] from Hospital  [facility]. Hospital stay 1/8/18 through 1/13/18. Admitted to this facility for  rehab, medical management and nursing care.  HPI information obtained from: facility chart records, facility staff, patient report and [facility] chart review. Hospital course per review of discharge summary:    This is a 72-year-old male, with a past medical history significant for severe COPD on supplemental Oxygen, obstructive sleep apnea, prostate cancer metastatic to bone, transfusion dependent anemia, type 2 diabetes mellitus, chronic kidney disease stage III, chronic diastolic heart failure with an EF 55-60% on 4/28/15, coronary artery disease, hypertension, paroxsymal atrial fibrillation on anticoagulation and lymphedema, who was admitted the [facility] with abdominal pain and a headache. Of note, was recently hospitalized at [facility] for a COPD exacerbation 12/29/17 through 12/31/17. During this hospitalization, labs revealed Hemoglobin 3.2. A total of 3 Units of PRBCs were transfused. Gastroenterology consulted. Given the risks outweighed the benefits, an EGD was not performed. An abdominal and pelvic CT revealed a renal mass which has been stable since 2014. A urine culture revealed > 100,000 ESBL E. Coli and antibiotics were initiated. PTA insulin was discontinued due to poor oral intake. Palliative Care was consulted and son, Les, was requested by patient to be his medical decision maker, but would not fill out the paperwork for this change as his son was not present. Discharged back to long term care when medically stable.     Current issues are:         Sitting slumped over in his scooter with his head resting on the controls. Reports he's tired. Was not able to eat breakfast this morning as he didn't have an appetite. States he would like either his cousin, Melissa, or his son, Les, to be his medical decision maker. Later states he would like his son to be his medical decision maker as he lives closer.     CODE STATUS/ADVANCE DIRECTIVES DISCUSSION:   CPR/Full code   Patient's living condition: lives in a skilled nursing facility    ALLERGIES:Morphine  PAST MEDICAL HISTORY:  has a past medical history of Anemia; Anxiety; Aspiration pneumonia (H) (2014); C. difficile colitis; CAD (coronary artery disease); Chronic pain; CKD (chronic kidney disease); COPD (chronic obstructive pulmonary disease) (H); Depressive disorder; Diabetes mellitus (H); Hypertension; Insomnia; ALEXIS (obstructive sleep apnea); Osteoporosis; and Prostate cancer metastatic to multiple sites (H).  PAST SURGICAL HISTORY:  has a past surgical history that includes Abdomen surgery; Arthroscopy knee; and Eye surgery.  FAMILY HISTORY: family history includes CANCER in his mother; DIABETES in his mother.  SOCIAL HISTORY:  reports that he has quit smoking. He has never used smokeless tobacco. He reports that he does not drink alcohol or use illicit drugs.    Post Discharge Medication Reconciliation Status: discharge medications reconciled, continue medications without change.  Current Outpatient Prescriptions   Medication Sig Dispense Refill     omeprazole (PRILOSEC) 40 MG capsule Take 1 capsule (40 mg) by mouth 2 times daily       ipratropium - albuterol 0.5 mg/2.5 mg/3 mL (DUONEB) 0.5-2.5 (3) MG/3ML neb solution Take 1 vial by nebulization every 4 hours as needed for shortness of breath / dyspnea or wheezing       insulin aspart (NOVOLOG FLEXPEN) 100 UNIT/ML injection Inject Subcutaneous At Bedtime Inject as per sliding scale: if 0 - 199 = 0; 200 - 249 = 1; 250 - 299 = 2; 300 - 349 = 3; 350 - 399 =  4; 400+ = 5       insulin aspart (NOVOLOG FLEXPEN) 100 UNIT/ML injection Inject Subcutaneous 3 times daily (with meals) 140 - 189 = 1; 190 - 239 = 2; 240 - 289 = 3; 290 - 339 = 4; 340 - 399 = 5; 400 - 449 = 6; 450+ = 7       fentaNYL (DURAGESIC) 12 mcg/hr 72 hr patch Place 1 patch onto the skin every 72 hours 5 patch 0     oxyCODONE IR (ROXICODONE) 5 MG tablet Take 1 tablet (5 mg) by mouth every 4 hours as needed 18 tablet 0     acetaminophen (TYLENOL) 500 MG tablet Take 2 tablets (1,000 mg) by mouth 3 times daily Not to exceed 3000 mg/24 hours       isosorbide mononitrate (IMDUR) 30 MG 24 hr tablet Take 2 tablets (60 mg) by mouth daily 30 tablet      nitroGLYcerin (NITROSTAT) 0.4 MG sublingual tablet Place 1 tablet (0.4 mg) under the tongue every 5 minutes as needed for chest pain if you are still having symptoms after 3 doses (15 minutes) call 911. 25 tablet      budesonide-formoterol (SYMBICORT) 160-4.5 MCG/ACT Inhaler Inhale 2 puffs into the lungs 2 times daily       tiotropium (SPIRIVA) 18 MCG capsule Inhale 1 capsule (18 mcg) into the lungs daily 30 capsule      DULoxetine (CYMBALTA) 20 MG EC capsule Take 1 capsule (20 mg) by mouth daily 60 capsule      ondansetron 4 MG FILM Take 4 mg by mouth every 8 hours as needed 12 each      metoprolol succinate (TOPROL XL) 50 MG 24 hr tablet Take 1 tablet (50 mg) by mouth daily 5 tablet 0     ammonium lactate (LAC-HYDRIN) 12 % lotion Apply topically 2 times daily as needed for dry skin To all extremities for dry skin       diclofenac (VOLTAREN) 1 % GEL topical gel Apply 4 grams to knees four times daily as needed using enclosed dosing card. 100 g 0     furosemide (LASIX) 20 MG tablet Take 1 tablet (20 mg) by mouth 2 times daily 30 tablet      ferrous sulfate (IRON) 325 (65 FE) MG tablet Take 1 tablet (325 mg) by mouth 2 times daily 100 tablet      folic acid (FOLVITE) 1 MG tablet Take 1 tablet (1 mg) by mouth daily 30 tablet      polyethylene glycol (MIRALAX/GLYCOLAX)  Packet Take 17 g by mouth daily , hold for loose stools. 7 packet      senna-docusate (SENOKOT-S;PERICOLACE) 8.6-50 MG per tablet Take 2 tablets by mouth 2 times daily , hold for loose stools. 100 tablet      calcium carbonate (OS-JORDAN 600 MG [place]. CA) 1500 (600 CA) MG tablet Take 1 tablet (1,500 mg) by mouth 2 times daily (with meals) 60 tablet      tamsulosin (FLOMAX) 0.4 MG capsule Take 1 capsule (0.4 mg) by mouth daily 5 capsule 0     simethicone (MYLICON) 40 MG/0.6ML suspension Take 0.6 mLs (40 mg) by mouth every 6 hours as needed for cramping       bimatoprost (LUMIGAN) 0.01 % SOLN Place 1 drop into both eyes At Bedtime 1 Bottle      Carboxymethylcellulose Sod PF (REFRESH PLUS) 0.5 % SOLN ophthalmic solution Place 1 drop into both eyes 4 times daily And 1 drop both eyes daily prn 1 Bottle      sucralfate (CARAFATE) 1 GM/10ML suspension Take 10 mLs (1 g) by mouth 4 times daily (before meals and nightly) 1200 mL      METHOCARBAMOL PO Take 500 mg by mouth every 6 hours as needed for muscle spasms       Timolol Hemihydrate (BETIMOL OP) Place 1 drop into both eyes daily        ATORVASTATIN CALCIUM PO Take 10 mg by mouth daily       latanoprost (XALATAN) 0.005 % ophthalmic solution Place 1 drop into both eyes At Bedtime       [DISCONTINUED] enzalutamide (XTANDI) 40 MG capsule Take 4 capsules (160 mg) by mouth daily 120 capsule 0     ROS:  4 point ROS including Respiratory, CV, GI and , other than that noted in the HPI,  is negative    Exam:  /72  Pulse 86  Temp 97.8  F (36.6  C)  Resp 18  Wt 204 lb (92.5 kg)  SpO2 96%  BMI 28.45 kg/m2  GENERAL APPEARANCE: In no distress.   ENT:  Mouth and posterior oropharynx normal, moist mucous membranes  EYES:  EOM, conjunctivae, lids, pupils and irises normal  RESP:  Respiratory effort and palpation of chest normal, no respiratory distress, diminished lung sounds throughout  CV:  Palpation and auscultation of heart done, regular rate and rhythm, no murmur, rub, or  gallop  ABDOMEN: Active bowel sounds, soft, nontender, no hepatosplenomegaly or other masses  M/S:   Active movement of bilateral upper and lower extremities. Lymphedema wraps on BLE.  SKIN:  Inspection of skin and subcutaneous tissue baseline, Palpation of skin and subcutaneous tissue baseline  NEURO:   Cranial nerves 2-12 are normal tested and grossly at patient's baseline  PSYCH:  affect and mood normal    Lab/Diagnostic data:  Last Complete Blood Count:  Lab Results   Component Value Date    WBC 8.2 01/13/2018     Lab Results   Component Value Date    RBC 3.54 01/13/2018     Lab Results   Component Value Date    HGB 9.5 01/13/2018     Lab Results   Component Value Date    HCT 32.1 01/13/2018     Lab Results   Component Value Date    MCV 91 01/13/2018     Lab Results   Component Value Date    MCH 26.8 01/13/2018     Lab Results   Component Value Date    MCHC 29.6 01/13/2018     Lab Results   Component Value Date    RDW 19.0 01/13/2018     Lab Results   Component Value Date     01/13/2018     Last Basic Metabolic Panel:  Lab Results   Component Value Date     01/13/2018      Lab Results   Component Value Date    POTASSIUM 4.0 01/13/2018     Lab Results   Component Value Date    CHLORIDE 112 01/13/2018     Lab Results   Component Value Date    JORDAN 8.3 01/13/2018     Lab Results   Component Value Date    CO2 26 01/13/2018     Lab Results   Component Value Date    BUN 13 01/13/2018     Lab Results   Component Value Date    CR 1.08 01/13/2018     Lab Results   Component Value Date     01/13/2018     ASSESSMENT/PLAN:  Gastrointestinal Bleed with Transfusion Dependent Anemia. Differential Diagnosis for GI bleed includes: Peptic ulcer disease, gastritis, malignancy vs AVM. EGD unlikely to assist with overall clinical course and risk > benefit so this was not performed. Recommend PPI BID indefinitely. Also taking Sucralfate. Aspirin and Warfarin discontinued. Hemoglobin to be drawn 3 times weekly with  results communicated to Dr. Oviedo. Received blood transfusions 11/1/17, 11/25/17. 12/1/17, 12/13/17 and 3 Units during most recent hospitalization. Oncology recommends blood transfusions for Hemoglobin < 8. Noted by Oncology to be folate and iron deficiency. Chronic disease and bone marrow suppression from metastatic prostate cancer likely contributing. Continue Folic Acid and Ferrous Sulfate as ordered.        Prostate Cancer Metastatic to Bone. Seen by Palliative Care during hospitalization and 11/16/17. Hospital discharge orders recommend follow-up with Dr. Oviedo in 1 week. Comfort care recommended by Dr. Oviedo on 12/4/17 due to compromised performance status, advanced cancer for which he used all reasonable treatments and not being a candidate for cytotoxic chemotherapy. Has had 50 lbs weight loss in 1 year.  Resident and family refused [facility] enrollment. Remains FULL CODE. Given resident's lack of capacity to make decisions,  at facility is working to pursue guardianship, but reportedly is having difficulty finding the numbers and addresses of all resident's family members. Fentanyl Patch, Oxycodone, Methocarbamol, Diclofenac gel and Acetaminophen ordered for pain control.     ESBL E. Coli UTI with History of Urinary Retention in the Setting of Metastatic Lymph Node Dissection with Recurrent UTIs. Completed course of antibiotics with Bactrim outpatient. Will need to reschedule Urology appointment as patient was hospitalized during previously scheduled visit. Treated for Providencia Rettgeri UTI 7/1/17 with Rocephin and Bactrim DS. Treated for Pseudomonas and Klebsiella UTI 7/29/17 with Ciprofloxacin and ESBL E. Coli 8/30/17 with Ertapenem IV. Previously evaluated by Dr. Graf on 3/22/17 for urinary retention. Elevated Creatinine may be due to bladder outlet obstruction. Resistant to surgical options. Instructed how to self-catheterize, but resident resistant. Told Urology his main goal is  avoiding pain. Voids as able. Continue Tamsulosin as ordered.     Chronic Obstructive Pulmonary Disease on Chronic Oxygen Supplementation/Obstructive Sleep Apnea with CO2 Retention. Treated for 2 COPD exacerbations in the past month. Question if true COPD exacerbatins given resident's chronic complaint of shortness of breath.Seen by Dr. Loera in Pulmonology on 10/4/17. Noted to have very severe COPD and chronic hypoxemic respiratory failure. Often refuses BiPAP at night per staff report. Sleep study outpatient recommended. Continue Budesonide-Formoterol, DuoNebs and Tiotropium as ordered.       Weight Loss. Secondary to above and overall decline. Weight 204.1 lbs on 12/27/17 -> 208.2 lbs on 11/27/17 -> 220 lbs on 8/28/17 -> 239 lbs on 5/25/17 -> 256.2 lbs on 11/25/16. Dietary involved. Refuses Hospice enrollment.     Paroxysmal Atrial Fibrillation.Warfarin and Aspirin discontinued due to GI bleed. Continue Metoprolol as ordered.       Coronary Artery Disease/Hypertension/Chronic Diastolic Heart Failure with EF 60-65% on 11/24/17. Monitor blood pressure and weights weekly. Continue Isosorbide Mononitrate, Metoprolol, Atorvastatin and Nitroglycerin as ordered.      Lymphedema of Both Lower Extremities with Venous Stasis Ulcers. Physical Therapy ordered for lymphedema wraps. Continue Furosemide as ordered.      Chronic Kidney Disease Stage III. Baseline Creatinine mid 1s. Last Creatinine 1.08 on 1/13/18. Monitor periodically.      Type 2 Diabetes Mellitus. Last A1C 6.0 on 11/18/17. Glargine discontinued during hospitalization due to hypoglycemia. SSI ordered. Continue to monitor Accuchecks prior to meals and at bedtime. Goal is to discontinue SSI once baseline blood sugars have been established.      Gastroesophageal Reflux. Continue Omeprazole as noted above and Ranitidine as ordered.      Anxiety and Depression. Seen by in-house psychiatrist and started on Duloxetine 12/28/17.      Cognitive Impairment.  Mild-to-moderate impairment based on neurocognitive testing ~ 1 year ago. BIMS Score 11/15 on 11/16/17. Given resident lacks the capacity to make decisions,  working to appoint a guardian for resident given lack of capacity to make decisions as goals are inconsistent with overall health status. Of note has 11 children and none of the children attend outside appointments with resident so a guardian will help ensure there is 1 appropriate decision maker. Resident also has metastatic prostate cancer and severe COPD making a FULL CODE situation quite dismal. Spoke to son, Les, via telephone at [phone] about goals of care. Explained resident now wanted him to be his medical decision maker. Reviewed multiple recent hospitalizations and overall no improvement in resident's status. Questioned if this process should continue. Les states he will talk to his dad about it tonight. Would like his dad to live to his next birthday as the family has a big party planned.      Glaucoma. Followed by [facility]. Continue Bimatoprost and Timolol Hemihydrate as ordered. Also on Carboxymethylcellulose drops.    Constipation. Continue Senna-S and Miralax as ordered.    Recurrent ED Visits and Hospitalizations. Resident calls 911 from facility without trying medications or staff interventions. Enjoys going to the hospital for IV pain medications. Have discussed on multiple occasions with resident and family appropriateness of ED and attempting staff interventions first. Most recent hospitalizations include:    [facility] - 1/8/18 - 1/13/18 - GI Bleed  [facility] - 12/29/17 - 12/31/17 - COPD Exacerbation  [facility] ED - 12/18/17 - Pain  [facility] - 12/13/17 - 12/14/17 - COPD Exacerbation  [facility] - 11/23/17 - 11/26/17 - COPD Exacerbation    Prior to 11/23/17, had 9 ED visits and 3 hospitalizations in 6 months at [facility] and [facility] primarily for shortness of breath, chest pain and abdominal pain    Electronically signed  by:  SAL Acevedo CNP

## 2018-01-19 NOTE — PROGRESS NOTES
[place] [facility]  INITIAL VISIT NOTE  January 19, 2018    PRIMARY CARE PROVIDER AND CLINIC:  BlakeEkaterina Magi [address] / [place] [address]    Chief Complaint   Patient presents with     Hospital F/U       HPI:    Tomas Grey is a 72 year old  (1945) male who was seen at [facility] on January 19, 2018 for an initial visit. Medical history is notable for metastatic prostate cancer refractory to treatment, COPD, chronic hypoxic respiratory failure, CAD, diastolic CHF, and atrial fibrillation. He has had multiple recent ER visits and hospitalization as well as multiple PRBC transfusions (11/1/17, 11/25/17, 12/1/17 and 12/13/17). He was hospitalized at [facility] from 1/8/18 to 1/13/18 where he presented with abdominal pain and a headache and was found to have a Hgb of 3.2. GI workup was deferred given risk of EGD and recommended BID PPI. His ASA and warfarin were discontinued. He was transfused 3 units PRBCs.  He was admitted to this facility for medical management and long-term care    Today, Mr. Grey is seen zipping around in his motorized scooter. He was difficult to locate in the building, as usual. Tells me his neck hurts and his legs are swollen (both chronic issues for him).     CODE STATUS:   CPR/Full code     ALLERGIES:     Allergies   Allergen Reactions     Morphine Other (See Comments)     Patient reports makes him almost go into a coma       PAST MEDICAL HISTORY:   Past Medical History:   Diagnosis Date     Anemia      Anxiety      Aspiration pneumonia (H) 2014     C. difficile colitis      CAD (coronary artery disease)      Chronic pain      CKD (chronic kidney disease)      COPD (chronic obstructive pulmonary disease) (H)      Depressive disorder      Diabetes mellitus (H)      Hypertension      Insomnia      ALEXIS (obstructive sleep apnea)      Osteoporosis      Prostate cancer metastatic to multiple sites (H)        PAST SURGICAL HISTORY:   Past  Surgical History:   Procedure Laterality Date     ABDOMEN SURGERY       ARTHROSCOPY KNEE       EYE SURGERY         FAMILY HISTORY:   Family History   Problem Relation Age of Onset     DIABETES Mother      CANCER Mother      stomach       SOCIAL HISTORY:   L[facility] in [facility]    MEDICATIONS:  Current Outpatient Prescriptions   Medication Sig Dispense Refill     ATORVASTATIN CALCIUM PO Take 10 mg by mouth daily       latanoprost (XALATAN) 0.005 % ophthalmic solution Place 1 drop into both eyes At Bedtime       insulin aspart (NOVOLOG FLEXPEN) 100 UNIT/ML injection Inject Subcutaneous At Bedtime Inject as per sliding scale: if 0 - 199 = 0; 200 - 249 = 1; 250 - 299 = 2; 300 - 349 = 3; 350 - 399 = 4; 400+ = 5       insulin aspart (NOVOLOG FLEXPEN) 100 UNIT/ML injection Inject Subcutaneous 3 times daily (with meals) 140 - 189 = 1; 190 - 239 = 2; 240 - 289 = 3; 290 - 339 = 4; 340 - 399 = 5; 400 - 449 = 6; 450+ = 7       fentaNYL (DURAGESIC) 12 mcg/hr 72 hr patch Place 1 patch onto the skin every 72 hours 5 patch 0     oxyCODONE IR (ROXICODONE) 5 MG tablet Take 1 tablet (5 mg) by mouth every 4 hours as needed 18 tablet 0     acetaminophen (TYLENOL) 500 MG tablet Take 2 tablets (1,000 mg) by mouth 3 times daily Not to exceed 3000 mg/24 hours       isosorbide mononitrate (IMDUR) 30 MG 24 hr tablet Take 2 tablets (60 mg) by mouth daily 30 tablet      nitroGLYcerin (NITROSTAT) 0.4 MG sublingual tablet Place 1 tablet (0.4 mg) under the tongue every 5 minutes as needed for chest pain if you are still having symptoms after 3 doses (15 minutes) call 911. 25 tablet      budesonide-formoterol (SYMBICORT) 160-4.5 MCG/ACT Inhaler Inhale 2 puffs into the lungs 2 times daily       tiotropium (SPIRIVA) 18 MCG capsule Inhale 1 capsule (18 mcg) into the lungs daily 30 capsule      DULoxetine (CYMBALTA) 20 MG EC capsule Take 1 capsule (20 mg) by mouth daily 60 capsule      ondansetron 4 MG FILM Take 4 mg by mouth every 8 hours as needed 12 each       metoprolol succinate (TOPROL XL) 50 MG 24 hr tablet Take 1 tablet (50 mg) by mouth daily 5 tablet 0     ammonium lactate (LAC-HYDRIN) 12 % lotion Apply topically 2 times daily as needed for dry skin To all extremities for dry skin       diclofenac (VOLTAREN) 1 % GEL topical gel Apply 4 grams to knees four times daily as needed using enclosed dosing card. 100 g 0     furosemide (LASIX) 20 MG tablet Take 1 tablet (20 mg) by mouth 2 times daily 30 tablet      ferrous sulfate (IRON) 325 (65 FE) MG tablet Take 1 tablet (325 mg) by mouth 2 times daily 100 tablet      folic acid (FOLVITE) 1 MG tablet Take 1 tablet (1 mg) by mouth daily 30 tablet      polyethylene glycol (MIRALAX/GLYCOLAX) Packet Take 17 g by mouth daily , hold for loose stools. 7 packet      senna-docusate (SENOKOT-S;PERICOLACE) 8.6-50 MG per tablet Take 2 tablets by mouth 2 times daily , hold for loose stools. 100 tablet      calcium carbonate (OS-JORDAN 600 MG [place]. CA) 1500 (600 CA) MG tablet Take 1 tablet (1,500 mg) by mouth 2 times daily (with meals) 60 tablet      tamsulosin (FLOMAX) 0.4 MG capsule Take 1 capsule (0.4 mg) by mouth daily 5 capsule 0     simethicone (MYLICON) 40 MG/0.6ML suspension Take 0.6 mLs (40 mg) by mouth every 6 hours as needed for cramping       bimatoprost (LUMIGAN) 0.01 % SOLN Place 1 drop into both eyes At Bedtime 1 Bottle      Carboxymethylcellulose Sod PF (REFRESH PLUS) 0.5 % SOLN ophthalmic solution Place 1 drop into both eyes 4 times daily And 1 drop both eyes daily prn 1 Bottle      sucralfate (CARAFATE) 1 GM/10ML suspension Take 10 mLs (1 g) by mouth 4 times daily (before meals and nightly) 1200 mL      METHOCARBAMOL PO Take 500 mg by mouth every 6 hours as needed for muscle spasms       Timolol Hemihydrate (BETIMOL OP) Place 1 drop into both eyes daily        omeprazole (PRILOSEC) 40 MG capsule Take 1 capsule (40 mg) by mouth 2 times daily       ipratropium - albuterol 0.5 mg/2.5 mg/3 mL (DUONEB) 0.5-2.5 (3) MG/3ML  neb solution Take 1 vial by nebulization every 4 hours as needed for shortness of breath / dyspnea or wheezing       [DISCONTINUED] enzalutamide (XTANDI) 40 MG capsule Take 4 capsules (160 mg) by mouth daily 120 capsule 0       Post Discharge Medication Reconciliation Status: medication reconcilation previously completed during another office visit.    ROS:  4 point ROS neg other than the symptoms noted above in the HPI    PHYSICAL EXAM:  /72  Pulse 110  Temp 97.5  F (36.4  C)  Resp 20   Gen: sitting in power scooter, alert, cooperative and in no acute distress  HEENT: normocephalic; oropharynx clear; thyroid not enlarged  Card: RRR, S1, S2, no murmurs  Resp: lungs clear to auscultation bilaterally  GI: abdomen soft, not-tender  MSK: decreased muscle tone, 2+ bilateral LE edema with wraps in place   Neuro: CX II-XII grossly in tact; ROM in all four extremities grossly in tact  Psych: alert and oriented to self and general situation; normal affect    LABORATORY/IMAGING DATA:  Reviewed as per Epic    ASSESSMENT/PLAN:    Acute Blood Loss Anemia  Anemia of Chronic Disease  Now requiring frequent blood transfusions (11/1/17, 11/25/17, 12/1/17 and 12/13/17 plus 3 units during this hospitalization). On admission, Hgb was 3.2. GI workup was deferred.   -- continues on ferrous sulfate 325 mg BID   -- continues on omeprazole 40 mg BID and sucralfate 1g QID AC  -- follow up with oncology as scheduled  -- continue to favor a comfort focused approach (see below)     Metastatic Prostate Cancer   Chronic Pain  This has progressed despite treatment, currently on Xgeva. Is also followed by palliative care and was seen by Dr. Iniguez during his hospitalization. I did not discuss goals of care today as it was difficult to locate Mr. Grey in the facility. I continue to favor a comfort focused approach without further hospitalizations given overall clinical decline and prognosis.   -- continues on fentanyl 72 mcg/72 hr path,  methocarbamol 500 mg q6h PRN, oxycodone 5 mg q4 PRN  -- continues on tamsulosin 0.4 mg daily   -- follow up with oncology as scheduled     CAD / Diastolic CHF / HTN / HLD  SBPs 120s-130s. ASA d/c during hospitalization given anemia.   -- continues on atorvastatin 10 mg daily, furosemide 20 mg BID, Imdur 60 mg daily, metoprolol XL 50 mg daily  -- follow BPs and adjust medications as needed     Paroxysmal Atrial Fibrillation  HR labile in 80s-110s.  Warfarin d/c during hospitalization given anemia.   -- rate controlled with metoprolol XL 50 mg daily  -- may need to increase metoprolol if HR trend favors higher rate     Chronic Bilateral LE Edema  -- continues on furosemide 20 mg BID  -- compression, elevation    COPD   Chronic Hypoxic Respiratory Failure   No signs of exacerbation today. He has limited endurance from his pulmonary disease.   -- continues on budesonide-formoterol 160-5.4 mcg BID and tiotropium 18 mcg daily       Depression / Anxiety  -- continues on duloxetine 20 mg daily  -- supportive cares    Drug Induced Constipation  -- continues on Miralax 17g daily and Senna-S 2 tabs BID  -- adjust bowel regimen as needed    Electronically signed by:  Annika Ortiz MD

## 2018-01-23 NOTE — PROGRESS NOTES
Patient is scheduled to see MD on 2/12/18.  Message left by scheduling for patient and writer contacted [facility] in [place] where patient resides. Left message for nursing staff~ appt on 2/12/18.  Gage Yang, RN, BSN, OCN  [facility]  Patient Care Coordinator

## 2018-01-24 NOTE — IP AVS SNAPSHOT
` ` Patient Information     Patient Name Sex     Tomas Grey ([id number]) Male 1945       Room Bed    52 5216-      Patient Demographics     Address Phone    [facility]  [address]  [facility] [address] [phone] (Home) *Preferred*  [phone] (Work)  [phone] (Mobile)      Patient Ethnicity & Race     Ethnic Group Patient Race    American       Emergency Contact(s)     Name Relation Home Work Mobile    Melissa Mccormack - HCA Relative [phone]    Jeevan Grey Son   [phone]    Les Bourgeois Son [phone]      Documents on File        Status Date Received Description       Documents for the Patient    Consent Form  07     Waiver - Payment  07     Insurance Card  () 07 [place] - MEDICAL ASSISTANCE    [facility]   [facility]    Consent for Services - Hospital/Clinic Received () 14     Consent for EHR Access Received 14     External Medication Information Consent Patient Refused 14     Patient ID Received () 14 [place] CARD/ NOT A DL LIFETIME    [facility] Specified Other       Insurance Card Received 16 Medicare Part A and B    Privacy Notice - [place] Received 14     Consent to Communicate   Consent to Communicate, 14    HIM SEBAS Authorization - File Only  14 [facility]    Business/Insurance/Care Coordination/Health Form - Patient   [place] Dept of Human Services, PCA Services 7-15-14    HIM SEBAS Authorization - File Only  14 [facility] 14    HIM SEBAS Authorization - File Only  14 [facility] 14    Consent to Communicate   Consent to Communicate, 14    HIM SEBAS Authorization - File Only  14 [facility] 14    HIM SEBAS Authorization  14 [facility]- 14    Advance Directives and Living Will Received 14 POLST 14    Business/Insurance/Care Coordination/Health  Form - Patient   Dental Clearance received from [facility]. 14    Business/Insurance/Care Coordination/Health Form - Patient   [facility], Denial of Services 14    HIM SEBAS Authorization - File Only  14 [facility]  SEBAS  2014    Patient ID Received () 02/18/15 LIFETIME    Consent for Services - Hospital/Clinic Received () 07/06/15     Advance Directives and Living Will Received 07/22/15 HEALTH CARE DIRECTIVE 2015    Advance Directives and Living Will Not Received  VALIDATION OF AD 2015    Consent for Services - Geriatrics Received 07/23/15     HIM SEBAS Authorization  09/25/15 NGS/CBO    Advance Directives and Living Will Received 09/30/15 POLST 2013    Consent for Services - Hospital/Clinic  16 CONSENT FOR SERVICE, 16    Consent for Services/Privacy Notice - Hospital/Clinic Received () 16     Business/Insurance/Care Coordination/Health Form - Patient  16 [facility] COMMODE CMN    Patient ID Received 16 Lifetime    HIM SEBAS Authorization  16 [facility] physicians    HIM SEBAS Authorization  16     Business/Insurance/Care Coordination/Health Form - Patient  16 [facility] SPECIAL TRANSPORTATION SERVICES CERTIFICATE OF NEED 16    Advance Directives and Living Will Received 16 POLST 2016    HIM SEBAS Authorization  16 ARTIE 5/3/16-16    Consent for Services - [facility]       Business/Insurance/Care Coordination/Health Form - Patient   [facility]- Xgeva approval 17-18    Consent for Services/Privacy Notice - Hospital/Clinic Received 17     Insurance Card Received 17 U CARE    Business/Insurance/Care Coordination/Health Form - Patient  10/17/17 ARE DENIAL LETTER MEGESTROL ACETATE 10/11/17    Care Everywhere Prospective Auth Received 10/24/17     Business/Insurance/Care Coordination/Health Form - Patient  10/27/17 ARE NOTICE OF DENIAL MEGESTROL 10/09/17     Business/Insurance/Care Coordination/Health Form - Patient  10/27/17 CRISTIANO MEGESTROL 09/11/17-10/11/18    HIM SEBAS Authorization  11/03/17     Business/Insurance/Care Coordination/Health Form - Patient  11/28/17 JUANITA-4 ANSWER AND RECORD FORM 11/18/17    Privacy Notice - [place]  (Deleted) 05/24/07     Patient ID Received (Deleted) 10/19/14 [place] CARD/ NOT A DL EXP. LIFETIME    Advance Directives and Living Will  (Deleted)         Documents for the Encounter    CMS IM for Patient Signature Received 01/24/18       Admission Information     Attending Provider Admitting Provider Admission Type Admission Date/Time    Emanuel Estrada MD Hilliard, Brian Stephen, MD Emergency 01/24/18 2035    Discharge Date Hospital Service Auth/Cert Status Service Area     General Medicine Incomplete [facility]    Unit Room/Bed Admission Status       UU U5A 5216/5216-01 Admission (Confirmed)       Admission     Complaint    GI bleed      Hospital Account     Name Acct ID Class Status Primary Coverage    Tomas Grey [id number] Inpatient Open [facility]            Guarantor Account (for Hospital Account #[id number])     Name Relation to Pt Service Area Active? Acct Type    Tomas Grey  FCS Yes Personal/Family    Address Phone          [address]  [place] [address] [phone](H)  [phone](O)              Coverage Information (for Hospital Account #[id number])     F/O Payor/Plan Precert #    UCARE/UCARE-SENIORS [facility]     Subscriber Subscriber #    Tomas Grey [id number]    Address Phone    [address]  [place] [address] [phone]

## 2018-01-24 NOTE — IP AVS SNAPSHOT
MRN:[id number]                      After Visit Summary   1/24/2018    Tomas Grey    MRN: [id number]           Thank you!     Thank you for choosing [facility] for your care. Our goal is always to provide you with excellent care. Hearing back from our patients is one way we can continue to improve our services. Please take a few minutes to complete the written survey that you may receive in the mail after you visit with us. Thank you!        Patient Information     Date Of Birth          1945        About your hospital stay     You were admitted on:  January 25, 2018 You last received care in the:  [address] [facility]    You were discharged on:  January 30, 2018        Reason for your hospital stay       You initially came in for GI bleed. We did not find evidence of this. Your blood counts stayed constant. You later had evidence of infection which we believe is in your lungs. Your urine has not grown anything yet.                  Who to Call     For medical emergencies, please call 911.  For non-urgent questions about your medical care, please call your primary care provider or clinic, [phone]          Attending Provider     Provider Specialty    Landon Griffiths MD Emergency Medicine    [facility] Internal Medicine    Emanuel Estrada MD Internal Medicine       Primary Care Provider Office Phone # Fax #    Ekaterina SAL [facility] [phone]      After Care Instructions     Activity - Up with nursing assistance           Advance Diet as Tolerated       Follow this diet upon discharge: dysphagia diet 1 and thin liquids            Fall precautions           General info for SNF       Length of Stay Estimate: Long Term Care  Condition at Discharge: Stable  Level of care:skilled   Rehabilitation Potential: Fair  Admission H&P remains valid and up-to-date: Yes  Recent Chemotherapy: N/A  Use Nursing Home Standing Orders: Yes             Mantoux instructions       Give two-step Mantoux (PPD) Per Facility Policy Yes, if needed; pt already resides at facility so may not be needed.            Oxygen - Nasal cannula       Lpm by nasal cannula to keep O2 sats 90% or greater.            Wound care (specify)       Site:  bilateral lower legs  Instructions:  Lymphedema care                  Follow-up Appointments     Follow Up and recommended labs and tests       Follow up with Nursing home physician in 3-5 days.  The following labs/tests are recommended: CBC, BMP to assess creatinine.                  Your next 10 appointments already scheduled     Feb 12, 2018 11:30 AM CST   Return Visit with Manpreet Oviedo MD   [facility] ([facility])    [facility]  [address]  [address]  [facility] [address] [phone]              Additional Services     Medication Therapy Management Referral       Reason for referral:  on more than 5 medications and managing chronic disease and on more than 10 medications and hospitalized or in the ED in the past 6 months     This service is designed to help you get the most from your medications.  A specially trained pharmacist will work closely with you and your doctors  to solve any problems related to your medications and to help you get the   best results from taking them.      The Medication Therapy Management staff will call you to schedule an appointment.            Nutrition Services Adult IP Consult       Reason:  Pt had chewing difficulty and changed to dysphagia diet. Needs nutrition consult to ensure proper caloric intake.            Occupational Therapy Adult Consult       Evaluate and treat as clinically indicated.    Reason: Pt needing Cristiana for rolling in bed.  OT spoke with Tanya, nurse at pt's residence, and she reports that pt was receiving assist for majority of ADL prior to admission.  He was completing oral/facial hygiene with min assist and  "eating meals with set-up.  Pt was variable in assist he needed to transfer, ranging from SBA to Ax2.            Physical Therapy Adult Consult       Evaluate and treat as clinically indicated.    Reason:  PT - per plan established by the Physical Therapist, according to functional mobility the discharge recommendation is return to LTC. Pt needing min to mod A for all log roll tech. Pt needing mod  A for side lying to sitting at EOB. Pt able to sit up to 5 min. Pt demo core stability activities with close SBA.            Speech Language Path Adult Consult       Evaluate and treat as clinically indicated.    Reason: Evaluate and treat as clinically indicated.    Reason: He continues to have difficulty chewing and then spitting out solid texture foods. Unclear if this is a change in status or is the patient's baseline function.  Barriers to return to prior living situation: none, anticipate LTC can provide modified diet.    Rationale for recommendations: could see SLP at LTC if swallowing function is below baseline                  Pending Results     Date and Time Order Name Status Description    1/29/2018 1047 EKG 12-lead, tracing only Preliminary     1/27/2018 2146 Blood culture Preliminary     1/27/2018 2146 Blood culture Preliminary             Statement of Approval     Ordered          01/30/18 1253  I have reviewed and agree with all the recommendations and orders detailed in this document.  EFFECTIVE NOW     Approved and electronically signed by:  Emanuel Estrada MD             Admission Information     Date & Time Provider Department Dept. Phone    1/24/2018 Emanuel Estrada MD [address] [facility] [phone]      Your Vitals Were     Blood Pressure Pulse Temperature Respirations Height Weight    109/53 90 98  F (36.7  C) (Axillary) 18 1.803 m (5' 11\") 87.9 kg (193 lb 11.2 oz)    Pulse Oximetry BMI (Body Mass Index)                93% 27.02 kg/m2          MyChart Information     MyChart " "lets you send messages to your doctor, view your test results, renew your prescriptions, schedule appointments and more. To sign up, go to [email or web address] . Click on \"Log in\" on the left side of the screen, which will take you to the Welcome page. Then click on \"Sign up Now\" on the right side of the page.     You will be asked to enter the access code listed below, as well as some personal information. Please follow the directions to create your username and password.     Your access code is: [email or web address]  Expires: 3/14/2018 10:17 AM     Your access code will  in 90 days. If you need help or a new code, please call your [place] clinic or [phone].        Care EveryWhere ID     This is your Care EveryWhere ID. This could be used by other organizations to access your [place] medical records  [id number]        Equal Access to Services     PATRICIA BAUM : Elaina Mcclelland, stanley alvarez, aryan pierson, jorje prather . [facility] [phone].    ATENCIÓN: Si habla español, tiene a soria disposición servicios gratuitos de asistencia lingüística. Llame al [phone].    We comply with applicable federal civil rights laws and [place] laws. We do not discriminate on the basis of race, color, national origin, age, disability, sex, sexual orientation, or gender identity.               Review of your medicines      START taking        Dose / Directions    amoxicillin-clavulanate 875-125 MG per tablet   Commonly known as:  AUGMENTIN   Used for:  Pneumonia of lower lobe due to infectious organism, unspecified laterality (H)        Dose:  1 tablet   Take 1 tablet by mouth 2 times daily for 4 days   Quantity:  8 tablet   Refills:  0         CONTINUE these medicines which have NOT CHANGED        Dose / Directions    acetaminophen 500 MG tablet   Commonly known as:  TYLENOL   Used for:  Prostate cancer metastatic to bone (H)        Dose:  1000 mg   Take 2 " tablets (1,000 mg) by mouth 3 times daily Not to exceed 3000 mg/24 hours   Refills:  0       ammonium lactate 12 % lotion   Commonly known as:  LAC-HYDRIN   Indication:  Abnormal Dryness of Skin   Used for:  Venous stasis dermatitis of both lower extremities        Apply topically 2 times daily as needed for dry skin To all extremities for dry skin   Refills:  0       ATORVASTATIN CALCIUM PO        Dose:  10 mg   Take 10 mg by mouth daily   Refills:  0       BETIMOL OP        Dose:  1 drop   Place 1 drop into both eyes daily   Refills:  0       bimatoprost 0.01 % Soln   Commonly known as:  LUMIGAN   Used for:  Eye abnormalities        Dose:  1 drop   Place 1 drop into both eyes At Bedtime   Quantity:  1 Bottle   Refills:  0       budesonide-formoterol 160-4.5 MCG/ACT Inhaler   Commonly known as:  SYMBICORT   Used for:  Chronic obstructive pulmonary disease, unspecified COPD type (H)        Dose:  2 puff   Inhale 2 puffs into the lungs 2 times daily   Refills:  0       calcium carbonate 1500 (600 CA) MG tablet   Commonly known as:  OS-JORDAN 600 mg [place]. Ca   Used for:  Prostate cancer metastatic to bone (H)        Dose:  1500 mg   Take 1 tablet (1,500 mg) by mouth 2 times daily (with meals)   Quantity:  60 tablet   Refills:  0       Carboxymethylcellulose Sod PF 0.5 % Soln ophthalmic solution   Commonly known as:  REFRESH PLUS   Used for:  Dry eyes        Dose:  1 drop   Place 1 drop into both eyes 4 times daily And 1 drop both eyes daily prn   Quantity:  1 Bottle   Refills:  0       diclofenac 1 % Gel topical gel   Commonly known as:  VOLTAREN   Used for:  Chronic pain of both knees        Apply 4 grams to knees four times daily as needed using enclosed dosing card.   Quantity:  100 g   Refills:  0       DULoxetine 20 MG EC capsule   Commonly known as:  CYMBALTA   Used for:  Depression, unspecified depression type        Dose:  20 mg   Take 1 capsule (20 mg) by mouth daily   Quantity:  60 capsule   Refills:  0        fentaNYL 12 mcg/hr 72 hr patch   Commonly known as:  DURAGESIC   Used for:  Prostate cancer metastatic to bone (H)        Dose:  1 patch   Place 1 patch onto the skin every 72 hours   Quantity:  5 patch   Refills:  0       ferrous sulfate 325 (65 FE) MG tablet   Commonly known as:  IRON   Used for:  Anemia, unspecified type        Dose:  325 mg   Take 1 tablet (325 mg) by mouth 2 times daily   Quantity:  100 tablet   Refills:  0       folic acid 1 MG tablet   Commonly known as:  FOLVITE   Used for:  Anemia, unspecified type        Dose:  1 mg   Take 1 tablet (1 mg) by mouth daily   Quantity:  30 tablet   Refills:  0       furosemide 20 MG tablet   Commonly known as:  LASIX   Used for:  Chronic diastolic congestive heart failure (H), Lymphedema of both lower extremities        Dose:  20 mg   Take 1 tablet (20 mg) by mouth 2 times daily   Quantity:  30 tablet   Refills:  0       ipratropium - albuterol 0.5 mg/2.5 mg/3 mL 0.5-2.5 (3) MG/3ML neb solution   Commonly known as:  DUONEB        Dose:  1 vial   Take 1 vial by nebulization every 4 hours as needed for shortness of breath / dyspnea or wheezing   Refills:  0       isosorbide mononitrate 30 MG 24 hr tablet   Commonly known as:  IMDUR   Used for:  Essential hypertension        Dose:  60 mg   Take 2 tablets (60 mg) by mouth daily   Quantity:  30 tablet   Refills:  0       latanoprost 0.005 % ophthalmic solution   Commonly known as:  XALATAN        Dose:  1 drop   Place 1 drop into both eyes At Bedtime   Refills:  0       METHOCARBAMOL PO        Dose:  500 mg   Take 500 mg by mouth every 6 hours as needed for muscle spasms   Refills:  0       metoprolol succinate 50 MG 24 hr tablet   Commonly known as:  TOPROL XL   Used for:  ASCVD (arteriosclerotic cardiovascular disease)        Dose:  50 mg   Take 1 tablet (50 mg) by mouth daily   Quantity:  5 tablet   Refills:  0       nitroGLYcerin 0.4 MG sublingual tablet   Commonly known as:  NITROSTAT   Used for:  Coronary  artery disease involving native heart with angina pectoris, unspecified vessel or lesion type (H)        Dose:  0.4 mg   Place 1 tablet (0.4 mg) under the tongue every 5 minutes as needed for chest pain if you are still having symptoms after 3 doses (15 minutes) call 911.   Quantity:  25 tablet   Refills:  0       * NovoLOG FLEXPEN 100 UNIT/ML injection   Generic drug:  insulin aspart        Inject Subcutaneous At Bedtime Inject as per sliding scale: if 0 - 199 = 0; 200 - 249 = 1; 250 - 299 = 2; 300 - 349 = 3; 350 - 399 = 4; 400+ = 5   Refills:  0       * NovoLOG FLEXPEN 100 UNIT/ML injection   Generic drug:  insulin aspart        Inject Subcutaneous 3 times daily (with meals) 140 - 189 = 1; 190 - 239 = 2; 240 - 289 = 3; 290 - 339 = 4; 340 - 399 = 5; 400 - 449 = 6; 450+ = 7   Refills:  0       omeprazole 40 MG capsule   Commonly known as:  priLOSEC        Dose:  40 mg   Take 1 capsule (40 mg) by mouth 2 times daily   Refills:  0       ondansetron 4 MG Film   Used for:  Prostate cancer metastatic to bone (H)        Dose:  4 mg   Take 4 mg by mouth every 8 hours as needed   Quantity:  12 each   Refills:  0       oxyCODONE IR 5 MG tablet   Commonly known as:  ROXICODONE   Used for:  Prostate cancer metastatic to bone (H)        Dose:  5 mg   Take 1 tablet (5 mg) by mouth every 4 hours as needed   Quantity:  18 tablet   Refills:  0       polyethylene glycol Packet   Commonly known as:  MIRALAX/GLYCOLAX   Used for:  Constipation, unspecified constipation type        Dose:  17 g   Take 17 g by mouth daily , hold for loose stools.   Quantity:  7 packet   Refills:  0       senna-docusate 8.6-50 MG per tablet   Commonly known as:  SENOKOT-S;PERICOLACE   Used for:  Constipation, unspecified constipation type        Dose:  2 tablet   Take 2 tablets by mouth 2 times daily , hold for loose stools.   Quantity:  100 tablet   Refills:  0       simethicone 40 MG/0.6ML suspension   Commonly known as:  MYLICON   Used for:  Flatulence,  eructation and gas pain        Dose:  40 mg   Take 0.6 mLs (40 mg) by mouth every 6 hours as needed for cramping   Refills:  0       sucralfate 1 GM/10ML suspension   Commonly known as:  CARAFATE   Used for:  Gastrointestinal hemorrhage with melena        Dose:  1 g   Take 10 mLs (1 g) by mouth 4 times daily (before meals and nightly)   Quantity:  1200 mL   Refills:  0       tamsulosin 0.4 MG capsule   Commonly known as:  FLOMAX   Used for:  Benign non-nodular prostatic hyperplasia without lower urinary tract symptoms        Dose:  0.4 mg   Take 1 capsule (0.4 mg) by mouth daily   Quantity:  5 capsule   Refills:  0       tiotropium 18 MCG capsule   Commonly known as:  SPIRIVA   Indication:  Chronic Obstructive Lung Disease   Used for:  Chronic obstructive pulmonary disease, unspecified COPD type (H)        Dose:  18 mcg   Inhale 1 capsule (18 mcg) into the lungs daily   Quantity:  30 capsule   Refills:  0       * Notice:  This list has 2 medication(s) that are the same as other medications prescribed for you. Read the directions carefully, and ask your doctor or other care provider to review them with you.         Where to get your medicines      Some of these will need a paper prescription and others can be bought over the counter. Ask your nurse if you have questions.     Bring a paper prescription for each of these medications     fentaNYL 12 mcg/hr 72 hr patch    oxyCODONE IR 5 MG tablet       You don't need a prescription for these medications     amoxicillin-clavulanate 875-125 MG per tablet                Protect others around you: Learn how to safely use, store and throw away your medicines at www.disposemymeds.org.        ANTIBIOTIC INSTRUCTION     You've Been Prescribed an Antibiotic - Now What?  Your healthcare team thinks that you or your loved one might have an infection. Some infections can be treated with antibiotics, which are powerful, life-saving drugs. Like all medications, antibiotics have side  effects and should only be used when necessary. There are some important things you should know about your antibiotic treatment.      Your healthcare team may run tests before you start taking an antibiotic.    Your team may take samples (e.g., from your blood, urine or other areas) to run tests to look for bacteria. These test can be important to determine if you need an antibiotic at all and, if you do, which antibiotic will work best.      Within a few days, your healthcare team might change or even stop your antibiotic.    Your team may start you on an antibiotic while they are working to find out what is making you sick.    Your team might change your antibiotic because test results show that a different antibiotic would be better to treat your infection.    In some cases, once your team has more information, they learn that you do not need an antibiotic at all. They may find out that you don't have an infection, or that the antibiotic you're taking won't work against your infection. For example, an infection caused by a virus can't be treated with antibiotics. Staying on an antibiotic when you don't need it is more likely to be harmful than helpful.      You may experience side effects from your antibiotic.    Like all medications, antibiotics have side effects. Some of these can be serious.    Let you healthcare team know if you have any known allergies when you are admitted to the hospital.    One significant side effect of nearly all antibiotics is the risk of severe and sometimes deadly diarrhea caused by Clostridium difficile (C. Difficile). This occurs when a person takes antibiotics because some good germs are destroyed. Antibiotic use allows C. diificile to take over, putting patients at high risk for this serious infection.    As a patient or caregiver, it is important to understand your or your loved one's antibiotic treatment. It is especially important for caregivers to speak up when patients can't  speak for themselves. Here are some important questions to ask your healthcare team.    What infection is this antibiotic treating and how do you know I have that infection?    What side effects might occur from this antibiotic?    How long will I need to take this antibiotic?    Is it safe to take this antibiotic with other medications or supplements (e.g., vitamins) that I am taking?     Are there any special directions I need to know about taking this antibiotic? For example, should I take it with food?    How will I be monitored to know whether my infection is responding to the antibiotic?    What tests may help to make sure the right antibiotic is prescribed for me?      Information provided by:  www.cdc.gov/getsmart  U.S. Department of Health and Human Services  Centers for disease Control and Prevention  National Center for Emerging and Zoonotic Infectious Diseases  Division of Healthcare Quality Promotion        Information about OPIOIDS     PRESCRIPTION OPIOIDS: WHAT YOU NEED TO KNOW    Prescription opioids can be used to help relieve moderate to severe pain and are often prescribed following a surgery or injury, or for certain health conditions. These medications can be an important part of treatment but also come with serious risks. It is important to work with your health care provider to make sure you are getting the safest, most effective care.    WHAT ARE THE RISKS AND SIDE EFFECTS OF OPIOID USE?  Prescription opioids carry serious risks of addiction and overdose, especially with prolonged use. An opioid overdose, often marked by slowed breathing can cause sudden death. The use of prescription opioids can have a number of side effects as well, even when taken as directed:      Tolerance - meaning you might need to take more of a medication for the same pain relief    Physical dependence - meaning you have symptoms of withdrawal when a medication is stopped    Increased sensitivity to  pain    Constipation    Nausea, vomiting, and dry mouth    Sleepiness and dizziness    Confusion    Depression    Low levels of testosterone that can result in lower sex drive, energy, and strength    Itching and sweating    RISKS ARE GREATER WITH:    History of drug misuse, substance use disorder, or overdose    Mental health conditions (such as depression or anxiety)    Sleep apnea    Older age (65 years or older)    Pregnancy    Avoid alcohol while taking prescription opioids.   Also, unless specifically advised by your health care provider, medications to avoid include:    Benzodiazepines (such as Xanax or Valium)    Muscle relaxants (such as Soma or Flexeril)    Hypnotics (such as Ambien or Lunesta)    Other prescription opioids    KNOW YOUR OPTIONS:  Talk to your health care provider about ways to manage your pain that do not involve prescription opioids. Some of these options may actually work better and have fewer risks and side effects:    Pain relievers such as acetaminophen, ibuprofen, and naproxen    Some medications that are also used for depression or seizures    Physical therapy and exercise    Cognitive behavioral therapy, a psychological, goal-directed approach, in which patients learn how to modify physical, behavioral, and emotional triggers of pain and stress    IF YOU ARE PRESCRIBED OPIOIDS FOR PAIN:    Never take opioids in greater amounts or more often than prescribed    Follow up with your primary health care provider and work together to create a plan on how to manage your pain.    Talk about ways to help manage your pain that do not involve prescription opioids    Talk about all concerns and side effects    Help prevent misuse and abuse    Never sell or share prescription opioids    Never use another person's prescription opioids    Store prescription opioids in a secure place and out of reach of others (this may include visitors, children, friends, and family)    Visit  www.cdc.gov/drugoverdose to learn about risks of opioid abuse and overdose    If you believe you may be struggling with addiction, tell your health care provider and ask for guidance or call [facility]'s National Helpline at 0-592-062-HELP    LEARN MORE / www.cdc.gov/drugoverdose/prescribing/guideline.html    Safely dispose of unused prescription opioids: Find your local drug take-back programs and more information about the importance of safe disposal at www.doseofreality.mn.gov             Medication List: This is a list of all your medications and when to take them. Check marks below indicate your daily home schedule. Keep this list as a reference.      Medications           Morning Afternoon Evening Bedtime As Needed    acetaminophen 500 MG tablet   Commonly known as:  TYLENOL   Take 2 tablets (1,000 mg) by mouth 3 times daily Not to exceed 3000 mg/24 hours   Last time this was given:  650 mg on 1/29/2018 10:56 AM                                ammonium lactate 12 % lotion   Commonly known as:  LAC-HYDRIN   Apply topically 2 times daily as needed for dry skin To all extremities for dry skin                                amoxicillin-clavulanate 875-125 MG per tablet   Commonly known as:  AUGMENTIN   Take 1 tablet by mouth 2 times daily for 4 days                                ATORVASTATIN CALCIUM PO   Take 10 mg by mouth daily                                BETIMOL OP   Place 1 drop into both eyes daily                                bimatoprost 0.01 % Soln   Commonly known as:  LUMIGAN   Place 1 drop into both eyes At Bedtime   Last time this was given:  1 drop on 1/29/2018  9:24 PM                                budesonide-formoterol 160-4.5 MCG/ACT Inhaler   Commonly known as:  SYMBICORT   Inhale 2 puffs into the lungs 2 times daily                                calcium carbonate 1500 (600 CA) MG tablet   Commonly known as:  OS-JORDAN 600 mg [place]. Ca   Take 1 tablet (1,500 mg) by mouth 2 times daily (with meals)                                 Carboxymethylcellulose Sod PF 0.5 % Soln ophthalmic solution   Commonly known as:  REFRESH PLUS   Place 1 drop into both eyes 4 times daily And 1 drop both eyes daily prn   Last time this was given:  1 drop on 1/30/2018 12:54 PM                                diclofenac 1 % Gel topical gel   Commonly known as:  VOLTAREN   Apply 4 grams to knees four times daily as needed using enclosed dosing card.                                DULoxetine 20 MG EC capsule   Commonly known as:  CYMBALTA   Take 1 capsule (20 mg) by mouth daily   Last time this was given:  20 mg on 1/30/2018  8:13 AM                                fentaNYL 12 mcg/hr 72 hr patch   Commonly known as:  DURAGESIC   Place 1 patch onto the skin every 72 hours   Last time this was given:  1 patch on 1/30/2018  8:37 AM                                ferrous sulfate 325 (65 FE) MG tablet   Commonly known as:  IRON   Take 1 tablet (325 mg) by mouth 2 times daily   Last time this was given:  325 mg on 1/30/2018  8:13 AM                                folic acid 1 MG tablet   Commonly known as:  FOLVITE   Take 1 tablet (1 mg) by mouth daily   Last time this was given:  1 mg on 1/30/2018  8:14 AM                                furosemide 20 MG tablet   Commonly known as:  LASIX   Take 1 tablet (20 mg) by mouth 2 times daily   Last time this was given:  20 mg on 1/30/2018  8:14 AM                                ipratropium - albuterol 0.5 mg/2.5 mg/3 mL 0.5-2.5 (3) MG/3ML neb solution   Commonly known as:  DUONEB   Take 1 vial by nebulization every 4 hours as needed for shortness of breath / dyspnea or wheezing   Last time this was given:  3 mLs on 1/30/2018  2:42 AM                                isosorbide mononitrate 30 MG 24 hr tablet   Commonly known as:  IMDUR   Take 2 tablets (60 mg) by mouth daily   Last time this was given:  60 mg on 1/30/2018  8:15 AM                                latanoprost 0.005 % ophthalmic solution    Commonly known as:  XALATAN   Place 1 drop into both eyes At Bedtime   Last time this was given:  1 drop on 1/29/2018  9:23 PM                                METHOCARBAMOL PO   Take 500 mg by mouth every 6 hours as needed for muscle spasms   Last time this was given:  500 mg on 1/25/2018  9:10 AM                                metoprolol succinate 50 MG 24 hr tablet   Commonly known as:  TOPROL XL   Take 1 tablet (50 mg) by mouth daily   Last time this was given:  50 mg on 1/30/2018  8:14 AM                                nitroGLYcerin 0.4 MG sublingual tablet   Commonly known as:  NITROSTAT   Place 1 tablet (0.4 mg) under the tongue every 5 minutes as needed for chest pain if you are still having symptoms after 3 doses (15 minutes) call 911.                                * NovoLOG FLEXPEN 100 UNIT/ML injection   Inject Subcutaneous At Bedtime Inject as per sliding scale: if 0 - 199 = 0; 200 - 249 = 1; 250 - 299 = 2; 300 - 349 = 3; 350 - 399 = 4; 400+ = 5   Last time this was given:  1 Units on 1/27/2018  9:55 AM   Generic drug:  insulin aspart                                * NovoLOG FLEXPEN 100 UNIT/ML injection   Inject Subcutaneous 3 times daily (with meals) 140 - 189 = 1; 190 - 239 = 2; 240 - 289 = 3; 290 - 339 = 4; 340 - 399 = 5; 400 - 449 = 6; 450+ = 7   Last time this was given:  1 Units on 1/27/2018  9:55 AM   Generic drug:  insulin aspart                                omeprazole 40 MG capsule   Commonly known as:  priLOSEC   Take 1 capsule (40 mg) by mouth 2 times daily                                ondansetron 4 MG Film   Take 4 mg by mouth every 8 hours as needed                                oxyCODONE IR 5 MG tablet   Commonly known as:  ROXICODONE   Take 1 tablet (5 mg) by mouth every 4 hours as needed   Last time this was given:  5 mg on 1/30/2018  3:36 AM                                polyethylene glycol Packet   Commonly known as:  MIRALAX/GLYCOLAX   Take 17 g by mouth daily , hold for loose  stools.                                senna-docusate 8.6-50 MG per tablet   Commonly known as:  SENOKOT-S;PERICOLACE   Take 2 tablets by mouth 2 times daily , hold for loose stools.                                simethicone 40 MG/0.6ML suspension   Commonly known as:  MYLICON   Take 0.6 mLs (40 mg) by mouth every 6 hours as needed for cramping                                sucralfate 1 GM/10ML suspension   Commonly known as:  CARAFATE   Take 10 mLs (1 g) by mouth 4 times daily (before meals and nightly)   Last time this was given:  1 g on 1/30/2018  8:11 AM                                tamsulosin 0.4 MG capsule   Commonly known as:  FLOMAX   Take 1 capsule (0.4 mg) by mouth daily   Last time this was given:  0.4 mg on 1/30/2018  8:11 AM                                tiotropium 18 MCG capsule   Commonly known as:  SPIRIVA   Inhale 1 capsule (18 mcg) into the lungs daily                                * Notice:  This list has 2 medication(s) that are the same as other medications prescribed for you. Read the directions carefully, and ask your doctor or other care provider to review them with you.

## 2018-01-24 NOTE — LETTER
Transition Communication Hand-off for Care Transitions to Next Level of Care Provider    Name: Tomas Grey  MRN #: [id number]  Primary Care Provider: Ekaterina Lozano     Primary Clinic: [address] [address]     Reason for Hospitalization:  Gastrointestinal hemorrhage associated with gastritis, unspecified gastritis type [K29.71]  Admit Date/Time: 1/24/2018  8:35 PM  Discharge Date: 1/30/18  Payor Source: Payor: [employer] / Plan: [employer] [facility] / Product Type: HMO /     Readmission Assessment Measure (SERVANDO) Risk Score/category: High    Reason for Communication Hand-off Referral: Fragility    Discharge Plan:  [facility]:  [address] [address]   [phone]     Concern for non-adherence with plan of care:   Y/N No  Discharge Needs Assessment:  Needs       Most Recent Value    Equipment Currently Used at Home wheelchair, power          Already enrolled in Tele-monitoring program and name of program:  Unknown  Follow-up specialty is recommended: Yes    Follow-up plan:  Future Appointments  Date Time Provider Department Center   1/31/2018 4:00 AM Jenn Casanova [facility]   2/12/2018 11:30 AM [facility]       Any outstanding tests or procedures:        Referrals     Future Labs/Procedures    Medication Therapy Management Referral     Comments:    Reason for referral:  on more than 5 medications and managing chronic disease and on more than 10 medications and hospitalized or in the ED in the past 6 months     This service is designed to help you get the most from your medications.  A specially trained pharmacist will work closely with you and your doctors  to solve any problems related to your medications and to help you get the   best results from taking them.      The Medication Therapy Management staff will call you to schedule an appointment.          DAGO Nuñez, LGSW  [address]   Pager  [phone]  Phone [phone]      AVS/Discharge Summary is the source of truth; this is a helpful guide for improved communication of patient story

## 2018-01-24 NOTE — IP AVS SNAPSHOT
Tomas Grey #[id number] (CSN: [id number])  (72 year old M)  (Adm: 18)     [id number]               [address] [facility] BANK: [phone]            Patient Demographics     Patient Name Sex          Age SSN Address Phone    Que Tomas WILL Male 1945 (72 year old) [id number] [facility]  [address]  [facility] [address] [phone] (Home) *Preferred*  [phone] (Work)  [phone] (Mobile)      Emergency Contact(s)     Name Relation Home Work Mobile    Melissa Mccormack - HCA Relative [phone]    Jeevan Grey Son   [phone]    Les Bourgeois Son [phone]      Admission Information     Attending Provider Admitting Provider Admission Type Admission Date/Time    Emanuel Estrada MD Hilliard, Brian Stephen, MD Emergency 185    Discharge Date Hospital Service Auth/Cert Status Service Area     General Medicine [facility]    Unit Room/Bed Admission Status       UU U5A 5216/5216-01 Admission (Confirmed)       Admission     Complaint    GI bleed      Hospital Account     Name Acct ID Class Status Primary Coverage    Tomas Grey [id number] Inpatient Open [facility]            Guarantor Account (for Hospital Account #[id number])     Name Relation to Pt Service Area Active? Acct Type    Tomas Grey  FCS Yes Personal/Family    Address Phone          [facility]  [address]  [place] [address] [phone](H)  [phone](O)              Coverage Information (for Hospital Account #[id number])     F/O Payor/Plan Precert #    UCARE/UCARE-SENIORS [facility]/ PARTNERS     Subscriber Subscriber #    Tomas Grey [id number]    Address Phone    [address]  [place] [id number] [phone]                                                INTERAGENCY TRANSFER FORM - PHYSICIAN ORDERS   2018             "           [address] [facility]: [phone]            Attending Provider: Emanuel Estrada MD     Allergies:  Morphine    Infection:  ESBL, MRSA   Service:  GENERAL MEDI    Ht:  1.803 m (5' 11\")   Wt:  87.9 kg (193 lb 11.2 oz)   Admission Wt:  86.6 kg (191 lb)    BMI:  27.02 kg/m 2   BSA:  2.1 m 2            ED Clinical Impression     Diagnosis Description Comment Added By Time Added    Gastrointestinal hemorrhage associated with gastritis, unspecified gastritis type [K29.71] Gastrointestinal hemorrhage associated with gastritis, unspecified gastritis type [K29.71]  Landon Griffiths MD 1/25/2018 12:03 AM      Hospital Problems as of 1/30/2018              Priority Class Noted POA    GI bleed Medium  1/8/2018 Yes      Non-Hospital Problems as of 1/30/2018              Priority Class Noted    Insomnia Medium  Unknown    COPD (chronic obstructive pulmonary disease) (H) Medium  Unknown    Anemia Medium  Unknown    Osteoporosis Medium  Unknown    ASCVD (arteriosclerotic cardiovascular disease) Medium  Unknown    Prostate cancer metastatic to multiple sites (H) Medium  Unknown    Venous stasis dermatitis Medium  7/8/2014    Fecal incontinence Medium  7/25/2014    CKD (chronic kidney disease) stage 3, GFR 30-59 ml/min Medium  8/22/2014    Serum albumin decreased Medium  8/27/2014    Right heart failure Medium  8/27/2014    Type 2 diabetes, HbA1C goal < 8% (H) Medium  9/5/2014    Impaired mobility Medium  9/5/2014    Knee pain Medium  9/5/2014    Irregular heart rhythm Medium  10/10/2014    Sleep apnea Medium  12/10/2014    Metastasis to bone (H) Medium  12/15/2014    SOB (shortness of breath) Medium  6/2/2015    FTT (failure to thrive) in adult Medium  6/23/2015    Dyspnea Medium  6/24/2015    Atypical chest pain Medium  7/19/2015    Prostate cancer metastatic to bone (H) Medium  7/23/2015    Chronic pain Medium  7/31/2015    ACP (advance care planning) Medium  8/4/2015    Respiratory failure (H) Medium  " 9/1/2015    CAD (coronary artery disease) Medium  9/1/2015    Physical deconditioning Medium  9/1/2015    Hyperlipidemia Medium  9/10/2015    Renal insufficiency Medium  10/2/2015    Epigastric pain Medium  10/2/2015    Lymphedema of both lower extremities Medium  11/2/2015    Primary pulmonary hypertension (H) Medium  11/2/2015    Redness of eye, left Medium  11/9/2015    Chest wall pain Medium  12/21/2015    Chronic obstructive pulmonary disease, unspecified COPD type (H) Medium  12/24/2015    FH: chronic lung disease requiring oxygen Medium  12/24/2015    COPD exacerbation (H) Medium  1/3/2016    History of heart attack Medium  1/26/2016    Nocturnal dyspnea Medium  1/26/2016    Dyspnea on exertion Medium  1/26/2016    Chronic obstructive pulmonary disease with severity to be determined (H) Medium  1/26/2016    Leg pain Medium  2/25/2016    Adjustment disorder with mixed anxiety and depressed mood Medium  4/28/2016    Noncompliance with medication regimen Medium  6/2/2016    Type 2 diabetes mellitus with stage 3 chronic kidney disease (H) Medium  6/5/2016    Chronic respiratory failure with hypoxia and hypercapnia (H) Medium  6/24/2016    Misuse of drugs (H) Medium  6/24/2016    Pneumonia of left lower lobe due to infectious organism (H) Medium  7/2/2016    Pneumonia Medium  7/2/2016    Hypercapnia Medium  7/18/2016    Health Care Home Medium  8/17/2016    HTN (hypertension) Medium  10/12/2016    CHF (congestive heart failure) (H) Medium  10/12/2016    Chronic respiratory failure with hypoxia (H) Medium  12/27/2016    Morbid obesity (H) High  5/17/2017    Altered mental status Medium  6/1/2017    Bacterial sepsis (H) Medium  6/1/2017    Anemia in neoplastic disease Medium  12/13/2017      Code Status History     Date Active Date Inactive Code Status Order ID Comments User Context    1/30/2018 12:49 PM  Full Code [id number]  Jovani Couch MD Outpatient    1/25/2018  2:37 AM 1/30/2018 12:49 PM Full Code  [id number]  Rubio Ariza MD ED    1/13/2018  2:51 PM 1/25/2018  2:37 AM Full Code [id number]  Roula Mcneil PA-C Outpatient    1/8/2018  5:46 PM 1/13/2018  2:51 PM Full Code [id number]  Roula Mcneil PA-C Inpatient    12/13/2017  5:31 PM 12/14/2017  3:19 PM Full Code [id number]  Lise Mcdonald PA-C Inpatient    6/6/2017 10:33 AM 12/13/2017  5:31 PM Full Code [id number]  Viktoria Gunderson MD Outpatient    6/1/2017  5:14 AM 6/6/2017 10:33 AM Full Code [id number]  Shahid Chávez MD Inpatient    12/27/2016 12:10 AM 12/27/2016  8:37 PM Full Code [id number]  Inocencio Tenorio MD Inpatient    10/11/2016  9:57 AM 12/27/2016 12:10 AM Full Code [id number]  Meek Lindo MD Outpatient    10/4/2016 10:59 PM 10/11/2016  9:57 AM Full Code [id number]  Junior Headley MD Inpatient    7/17/2016  5:08 PM 7/20/2016  7:02 PM Full Code [id number]  Lucero Harris PA-C ED    7/8/2016 12:29 PM 7/17/2016  5:08 PM Full Code [id number]  Jim Barillas MD Outpatient    7/2/2016  7:04 AM 7/8/2016 12:29 PM Full Code [id number]  Jim Arellano MD ED    6/24/2016 10:02 AM 7/2/2016  7:04 AM Full Code [id number]  Lucero Harris PA-C Outpatient    6/23/2016  4:00 PM 6/24/2016 10:02 AM Full Code [id number]  Altagracia Perez PA-C ED    5/27/2016  6:23 PM 5/31/2016  8:04 PM Full Code [id number]  Brant Harvey MD Inpatient    5/12/2016  3:18 PM 5/27/2016  6:23 PM Full Code [id number]  Jeovanny Quinn DO Outpatient    5/3/2016  6:32 PM 5/12/2016  3:18 PM Full Code [id number]  Meek Lindo MD Inpatient    4/19/2016 11:05 PM 4/24/2016  8:24 PM Full Code [id number]  Lucero Harris PA-C ED    3/23/2016 10:42 AM 4/19/2016 11:05 PM Full Code [id number]  Rafia Ortiz MD Outpatient    3/20/2016 10:01 PM 3/23/2016 10:42 AM Full Code [id number]  Ly Butcher MD Inpatient    3/6/2016 11:01 AM 3/20/2016 10:01 PM Full Code [id number]  John Graff MD Outpatient     2/27/2016 10:36 PM 3/6/2016 11:01 AM Full Code [id number]  Jim Arellano MD Inpatient    2/26/2016 10:30 AM 2/27/2016 10:36 PM Full Code [id number]  Xenia Conley, DO Outpatient    2/25/2016  4:48 PM 2/26/2016 10:30 AM Full Code [id number]  Xenia Conley, DO ED    1/26/2016 12:53 PM 2/25/2016  4:48 PM Full Code [id number]  Melody Perez, APRN CNP Outpatient    12/30/2015  1:44 AM 1/3/2016  5:51 PM Full Code [id number]  Jim Barillas MD Inpatient    12/20/2015 10:28 AM 12/30/2015  1:44 AM Full Code [id number]  StevensCaren PA-C Outpatient    12/20/2015  3:17 AM 12/20/2015 10:28 AM Full Code [id number]  Rafia Ortiz MD ED    11/18/2015  9:13 AM 12/20/2015  3:17 AM Full Code [id number]  StevensCaren PA-C Outpatient    11/17/2015  4:49 PM 11/18/2015  9:13 AM Full Code [id number]  StevensCaren crystal PA-C ED    10/25/2015  9:12 PM 10/30/2015  2:14 PM Full Code [id number]  Preston Scott MD Inpatient    8/13/2015  8:04 AM 10/25/2015  9:12 PM Full Code [id number]  Gt Moody,  Outpatient    8/9/2015  7:58 PM 8/13/2015  8:04 AM Full Code [id number]  Jim Arellano MD Inpatient    7/19/2015  8:04 PM 7/21/2015  7:59 PM Full Code [id number]  Juan Antonio Oneal MD ED    7/15/2015  2:39 PM 7/19/2015  8:04 PM Full Code [id number]  Juan Antonio Oneal MD Outpatient    7/8/2015 11:07 PM 7/15/2015  2:39 PM Full Code [id number]  Scarlett Burt PA-C Inpatient    6/28/2015  9:54 AM 7/8/2015 11:07 PM Full Code [id number]  Josh Garrett,  Outpatient    6/23/2015  1:56 AM 6/28/2015  9:54 AM Full Code [id number]  Gt Moody,  Inpatient    6/4/2015 11:47 AM 6/23/2015  1:56 AM Full Code [id number]  Dasia Marc MD Outpatient    6/2/2015  1:50 AM 6/4/2015 11:47 AM Full Code [id number]  Kalee Moreno MD Inpatient    5/31/2015  9:39 AM 6/2/2015  1:50 AM Full Code [id number]  Josh Garrett DO Outpatient    5/17/2015 11:10 PM 5/31/2015  9:39 AM Full Code  [id number]  Geovany Hamilton MD Inpatient    4/27/2015 11:59 PM 5/3/2015  3:22 PM Full Code [id number]  Jim Arellano MD Inpatient    3/6/2015  1:00 PM 3/12/2015  3:03 PM Full Code [id number]  Argentina Partida PA-C Inpatient    2/22/2015 10:48 AM 3/6/2015  1:00 PM Full Code [id number]  Kevin Lozada MD Outpatient    2/18/2015  6:59 AM 2/22/2015 10:48 AM Full Code [id number]  Ronn Funez MD Inpatient    11/28/2014 12:18 PM 2/18/2015  6:59 AM Full Code [id number]  Jim Arellano MD Outpatient    11/23/2014  1:29 AM 11/28/2014 12:18 PM Full Code [id number]  John Graff MD Inpatient    10/15/2014 10:26 AM 11/23/2014  1:29 AM Full Code [id number]  John Graff MD Outpatient    10/10/2014  7:03 PM 10/15/2014 10:26 AM Full Code [id number]  Garcia Mcfarlane MD Inpatient    7/22/2014  9:54 AM 10/10/2014  7:03 PM Full Code [id number]  Jeovanny Quinn DO Outpatient    7/16/2014 10:23 PM 7/22/2014  9:54 AM Full Code [id number]  Argentina Partida PA-C Inpatient      Current Code Status     Date Active Code Status Order ID Comments User Context       Prior      Summary of Visit     Reason for your hospital stay       You initially came in for GI bleed. We did not find evidence of this. Your blood counts stayed constant. You later had evidence of infection which we believe is in your lungs. Your urine has not grown anything yet.                Medication Review      START taking        Dose / Directions Comments    amoxicillin-clavulanate 875-125 MG per tablet   Commonly known as:  AUGMENTIN   Used for:  Pneumonia of lower lobe due to infectious organism, unspecified laterality (H)        Dose:  1 tablet   Take 1 tablet by mouth 2 times daily for 4 days   Quantity:  8 tablet   Refills:  0          CONTINUE these medications which have NOT CHANGED        Dose / Directions Comments    acetaminophen 500 MG tablet   Commonly known as:  TYLENOL   Used for:  Prostate cancer  metastatic to bone (H)        Dose:  1000 mg   Take 2 tablets (1,000 mg) by mouth 3 times daily Not to exceed 3000 mg/24 hours   Refills:  0        ammonium lactate 12 % lotion   Commonly known as:  LAC-HYDRIN   Indication:  Abnormal Dryness of Skin   Used for:  Venous stasis dermatitis of both lower extremities        Apply topically 2 times daily as needed for dry skin To all extremities for dry skin   Refills:  0        ATORVASTATIN CALCIUM PO        Dose:  10 mg   Take 10 mg by mouth daily   Refills:  0        BETIMOL OP        Dose:  1 drop   Place 1 drop into both eyes daily   Refills:  0        bimatoprost 0.01 % Soln   Commonly known as:  LUMIGAN   Used for:  Eye abnormalities        Dose:  1 drop   Place 1 drop into both eyes At Bedtime   Quantity:  1 Bottle   Refills:  0        budesonide-formoterol 160-4.5 MCG/ACT Inhaler   Commonly known as:  SYMBICORT   Used for:  Chronic obstructive pulmonary disease, unspecified COPD type (H)        Dose:  2 puff   Inhale 2 puffs into the lungs 2 times daily   Refills:  0        calcium carbonate 1500 (600 CA) MG tablet   Commonly known as:  OS-JORDAN 600 mg [place]q. Ca   Used for:  Prostate cancer metastatic to bone (H)        Dose:  1500 mg   Take 1 tablet (1,500 mg) by mouth 2 times daily (with meals)   Quantity:  60 tablet   Refills:  0        Carboxymethylcellulose Sod PF 0.5 % Soln ophthalmic solution   Commonly known as:  REFRESH PLUS   Used for:  Dry eyes        Dose:  1 drop   Place 1 drop into both eyes 4 times daily And 1 drop both eyes daily prn   Quantity:  1 Bottle   Refills:  0        diclofenac 1 % Gel topical gel   Commonly known as:  VOLTAREN   Used for:  Chronic pain of both knees        Apply 4 grams to knees four times daily as needed using enclosed dosing card.   Quantity:  100 g   Refills:  0        DULoxetine 20 MG EC capsule   Commonly known as:  CYMBALTA   Used for:  Depression, unspecified depression type        Dose:  20 mg   Take 1 capsule (20  mg) by mouth daily   Quantity:  60 capsule   Refills:  0        fentaNYL 12 mcg/hr 72 hr patch   Commonly known as:  DURAGESIC   Used for:  Prostate cancer metastatic to bone (H)        Dose:  1 patch   Place 1 patch onto the skin every 72 hours   Quantity:  5 patch   Refills:  0        ferrous sulfate 325 (65 FE) MG tablet   Commonly known as:  IRON   Used for:  Anemia, unspecified type        Dose:  325 mg   Take 1 tablet (325 mg) by mouth 2 times daily   Quantity:  100 tablet   Refills:  0        folic acid 1 MG tablet   Commonly known as:  FOLVITE   Used for:  Anemia, unspecified type        Dose:  1 mg   Take 1 tablet (1 mg) by mouth daily   Quantity:  30 tablet   Refills:  0        furosemide 20 MG tablet   Commonly known as:  LASIX   Used for:  Chronic diastolic congestive heart failure (H), Lymphedema of both lower extremities        Dose:  20 mg   Take 1 tablet (20 mg) by mouth 2 times daily   Quantity:  30 tablet   Refills:  0        ipratropium - albuterol 0.5 mg/2.5 mg/3 mL 0.5-2.5 (3) MG/3ML neb solution   Commonly known as:  DUONEB        Dose:  1 vial   Take 1 vial by nebulization every 4 hours as needed for shortness of breath / dyspnea or wheezing   Refills:  0        isosorbide mononitrate 30 MG 24 hr tablet   Commonly known as:  IMDUR   Used for:  Essential hypertension        Dose:  60 mg   Take 2 tablets (60 mg) by mouth daily   Quantity:  30 tablet   Refills:  0        latanoprost 0.005 % ophthalmic solution   Commonly known as:  XALATAN        Dose:  1 drop   Place 1 drop into both eyes At Bedtime   Refills:  0        METHOCARBAMOL PO        Dose:  500 mg   Take 500 mg by mouth every 6 hours as needed for muscle spasms   Refills:  0        metoprolol succinate 50 MG 24 hr tablet   Commonly known as:  TOPROL XL   Used for:  ASCVD (arteriosclerotic cardiovascular disease)        Dose:  50 mg   Take 1 tablet (50 mg) by mouth daily   Quantity:  5 tablet   Refills:  0        nitroGLYcerin 0.4 MG  sublingual tablet   Commonly known as:  NITROSTAT   Used for:  Coronary artery disease involving native heart with angina pectoris, unspecified vessel or lesion type (H)        Dose:  0.4 mg   Place 1 tablet (0.4 mg) under the tongue every 5 minutes as needed for chest pain if you are still having symptoms after 3 doses (15 minutes) call 911.   Quantity:  25 tablet   Refills:  0        * NovoLOG FLEXPEN 100 UNIT/ML injection   Generic drug:  insulin aspart        Inject Subcutaneous At Bedtime Inject as per sliding scale: if 0 - 199 = 0; 200 - 249 = 1; 250 - 299 = 2; 300 - 349 = 3; 350 - 399 = 4; 400+ = 5   Refills:  0        * NovoLOG FLEXPEN 100 UNIT/ML injection   Generic drug:  insulin aspart        Inject Subcutaneous 3 times daily (with meals) 140 - 189 = 1; 190 - 239 = 2; 240 - 289 = 3; 290 - 339 = 4; 340 - 399 = 5; 400 - 449 = 6; 450+ = 7   Refills:  0        omeprazole 40 MG capsule   Commonly known as:  priLOSEC        Dose:  40 mg   Take 1 capsule (40 mg) by mouth 2 times daily   Refills:  0        ondansetron 4 MG Film   Used for:  Prostate cancer metastatic to bone (H)        Dose:  4 mg   Take 4 mg by mouth every 8 hours as needed   Quantity:  12 each   Refills:  0        oxyCODONE IR 5 MG tablet   Commonly known as:  ROXICODONE   Used for:  Prostate cancer metastatic to bone (H)        Dose:  5 mg   Take 1 tablet (5 mg) by mouth every 4 hours as needed   Quantity:  18 tablet   Refills:  0        polyethylene glycol Packet   Commonly known as:  MIRALAX/GLYCOLAX   Used for:  Constipation, unspecified constipation type        Dose:  17 g   Take 17 g by mouth daily , hold for loose stools.   Quantity:  7 packet   Refills:  0        senna-docusate 8.6-50 MG per tablet   Commonly known as:  SENOKOT-S;PERICOLACE   Used for:  Constipation, unspecified constipation type        Dose:  2 tablet   Take 2 tablets by mouth 2 times daily , hold for loose stools.   Quantity:  100 tablet   Refills:  0         simethicone 40 MG/0.6ML suspension   Commonly known as:  MYLICON   Used for:  Flatulence, eructation and gas pain        Dose:  40 mg   Take 0.6 mLs (40 mg) by mouth every 6 hours as needed for cramping   Refills:  0        sucralfate 1 GM/10ML suspension   Commonly known as:  CARAFATE   Used for:  Gastrointestinal hemorrhage with melena        Dose:  1 g   Take 10 mLs (1 g) by mouth 4 times daily (before meals and nightly)   Quantity:  1200 mL   Refills:  0        tamsulosin 0.4 MG capsule   Commonly known as:  FLOMAX   Used for:  Benign non-nodular prostatic hyperplasia without lower urinary tract symptoms        Dose:  0.4 mg   Take 1 capsule (0.4 mg) by mouth daily   Quantity:  5 capsule   Refills:  0        tiotropium 18 MCG capsule   Commonly known as:  SPIRIVA   Indication:  Chronic Obstructive Lung Disease   Used for:  Chronic obstructive pulmonary disease, unspecified COPD type (H)        Dose:  18 mcg   Inhale 1 capsule (18 mcg) into the lungs daily   Quantity:  30 capsule   Refills:  0        * Notice:  This list has 2 medication(s) that are the same as other medications prescribed for you. Read the directions carefully, and ask your doctor or other care provider to review them with you.            After Care     Activity - Up with nursing assistance           Advance Diet as Tolerated       Follow this diet upon discharge: dysphagia diet 1 and thin liquids       Fall precautions           General info for SNF       Length of Stay Estimate: Long Term Care  Condition at Discharge: Stable  Level of care:skilled   Rehabilitation Potential: Fair  Admission H&P remains valid and up-to-date: Yes  Recent Chemotherapy: N/A  Use Nursing Home Standing Orders: Yes       Mantoux instructions       Give two-step Mantoux (PPD) Per Facility Policy Yes, if needed; pt already resides at facility so may not be needed.       Wound care (specify)       Site:  bilateral lower legs  Instructions:  Lymphedema care              Procedures     Oxygen - Nasal cannula       Lpm by nasal cannula to keep O2 sats 90% or greater.             Referrals     Medication Therapy Management Referral       Reason for referral:  on more than 5 medications and managing chronic disease and on more than 10 medications and hospitalized or in the ED in the past 6 months     This service is designed to help you get the most from your medications.  A specially trained pharmacist will work closely with you and your doctors  to solve any problems related to your medications and to help you get the   best results from taking them.      The Medication Therapy Management staff will call you to schedule an appointment.       Nutrition Services Adult IP Consult       Reason:  Pt had chewing difficulty and changed to dysphagia diet. Needs nutrition consult to ensure proper caloric intake.       Occupational Therapy Adult Consult       Evaluate and treat as clinically indicated.    Reason: Pt needing Cristiana for rolling in bed.  OT spoke with Tanya, nurse at pt's residence, and she reports that pt was receiving assist for majority of ADL prior to admission.  He was completing oral/facial hygiene with min assist and eating meals with set-up.  Pt was variable in assist he needed to transfer, ranging from SBA to Ax2.       Physical Therapy Adult Consult       Evaluate and treat as clinically indicated.    Reason:  PT - per plan established by the Physical Therapist, according to functional mobility the discharge recommendation is return to LTC. Pt needing min to mod A for all log roll tech. Pt needing mod  A for side lying to sitting at EOB. Pt able to sit up to 5 min. Pt demo core stability activities with close SBA.       Speech Language Path Adult Consult       Evaluate and treat as clinically indicated.    Reason: Evaluate and treat as clinically indicated.    Reason: He continues to have difficulty chewing and then spitting out solid texture foods. Unclear if this is a  "change in status or is the patient's baseline function.  Barriers to return to prior living situation: none, anticipate LTC can provide modified diet.    Rationale for recommendations: could see SLP at LTC if swallowing function is below baseline             Follow-Up Appointment Instructions     Follow Up and recommended labs and tests       Follow up with Nursing home physician in 3-5 days.  The following labs/tests are recommended: CBC, BMP to assess creatinine.             Your next 10 appointments already scheduled     Feb 12, 2018 11:30 AM CST   Return Visit with Manpreet Oviedo MD   [facility] ([facility])    [facility]  [address] [place] [address]  [facility] [address]   [phone]              Statement of Approval     Ordered          01/30/18 1073  I have reviewed and agree with all the recommendations and orders detailed in this document.  EFFECTIVE NOW     Approved and electronically signed by:  Emanuel Estrada MD                                                 INTERAGENCY TRANSFER FORM - NURSING   1/24/2018                       [address] [facility]: [phone]            Attending Provider: Emanuel Estrada MD     Allergies:  Morphine    Infection:  ESBL, MRSA   Service:  GENERAL MEDI    Ht:  1.803 m (5' 11\")   Wt:  87.9 kg (193 lb 11.2 oz)   Admission Wt:  86.6 kg (191 lb)    BMI:  27.02 kg/m 2   BSA:  2.1 m 2            Advance Directives        Scanned docmt in ACP Activity?           Yes, scanned ACP docmt        Immunizations     Name Date      Influenza (High Dose) 3 valent vaccine 10/04/17     Influenza (High Dose) 3 valent vaccine 10/11/16     Influenza (High Dose) 3 valent vaccine 10/27/15     Influenza (IIV3) PF 11/04/15     Pneumo Conj 13-V (2010&after) 11/04/15     Pneumococcal 23 valent 02/08/12     Tdap (Adacel,Boostrix) 11/04/15       ASSESSMENT     Discharge Profile Flowsheet     EXPECTED DISCHARGE     " Other Resources  Home Care 10/05/16 1431    Expected Discharge Date  01/30/18 (Home) 01/29/18 1042   PAS Number  [id number] (10/11/16) 10/11/16 0959    DISCHARGE NEEDS ASSESSMENT     Existing Resources/Services  Home Care;DME 04/28/15 1505    Concerns To Be Addressed  discharge planning concerns 05/29/16 1110   SKIN      Equipment Currently Used at Home  wheelchair, power 01/28/18 1713   Inspection of bony prominences  Full except (identify areas not inspected) 01/30/18 0959    # of Referrals Placed by CTS  Communication hand-offs to next level of Care Providers 10/17/16 1501   Full except areas not inspected   Sacrum;Buttock, right;Buttock, left;Hip, right;Hip, left 01/30/18 0959    Does Patient Need a Referral for Clinic CC  Yes 12/30/15 1137   Inspection under devices  Full except (identify device(s) not inspected) 01/30/18 0959    Equipment Used at Home  walker, rolling 03/23/16 1247   Not Inspected under devices.  Compression wrap 01/30/18 0959    GASTROINTESTINAL (ADULT,PEDIATRIC,OB)     Skin WDL  ex 01/30/18 0959    GI WDL  ex 01/30/18 0959   Skin Color/Characteristics  dusky 01/30/18 0959    Abdominal Appearance  umbilical bulge 01/30/18 0959   Skin Temperature  cool 01/30/18 0959    Last Bowel Movement  01/29/18 01/30/18 0959   Skin Moisture  dry 01/30/18 0959    GI Signs/Symptoms  abdominal pain 01/28/18 0043   Skin Elasticity  slow return to original state 01/25/18 1906    Passing flatus  yes 01/30/18 0959   Skin Integrity  drain/device(s);scar(s);scab(s);wound(s) 01/30/18 0959    COMMUNICATION ASSESSMENT     SAFETY      Patient's communication style  spoken language (English or Bilingual) 01/24/18 2036   Safety WDL  WDL;ex 01/30/18 0959    FINAL RESOURCES     All Alarms  alarm(s) activated and audible 01/30/18 0959    Resources List  Skilled Nursing Facility 06/01/17 0835                      Assessment WDL (Within Defined Limits) Definitions           Safety WDL     Effective: 09/28/15    Row  "Information: <b>WDL Definition:</b> Bed in low position, wheels locked; call light in reach; upper side rails up x 2; ID band on<br> <font color=\"gray\"><i>Item=AS safety wdl>>List=AS safety wdl>>Version=F14</i></font>      Skin WDL     Effective: 09/28/15    Row Information: <b>WDL Definition:</b> Warm; dry; intact; elastic; without discoloration; pressure points without redness<br> <font color=\"gray\"><i>Item=AS skin wdl>>List=AS skin wdl>>Version=F14</i></font>      Vitals     Vital Signs Flowsheet     VITAL SIGNS     Sleep  normal sleep 01/30/18 0955    Temp  98  F (36.7  C) 01/30/18 0539   ANALGESIA SIDE EFFECTS MONITORING      Temp src  Axillary 01/30/18 0539   Side Effects Monitoring: Respiratory Quality  R 01/30/18 0452    Resp  18 01/30/18 0539   Side Effects Monitoring: Respiratory Depth  N 01/30/18 0452    Pulse  90 01/28/18 1453   Side Effects Monitoring: Sedation Level  S 01/30/18 0452    Heart Rate  110 01/30/18 0539   HEIGHT AND WEIGHT      Pulse/Heart Rate Source  Monitor 01/29/18 2226   Height  1.803 m (5' 11\") 01/24/18 2041    BP  109/53 01/30/18 0539   Height Method  Stated 01/24/18 2041    BP Location  Left arm 01/29/18 1410   Weight  87.9 kg (193 lb 11.2 oz) 01/25/18 0349    OXYGEN THERAPY     Weight Method  Bed scale 01/25/18 0349    SpO2  93 % 01/30/18 0539   BSA (Calculated - sq m)  2.08 01/24/18 2041    O2 Device  Nasal cannula 01/30/18 0539   BMI (Calculated)  26.69 01/24/18 2041    Oxygen Delivery  2 LPM 01/30/18 0539   POSITIONING      PAIN/COMFORT     Body Position  foot of bed elevated 01/29/18 2025    Patient Currently in Pain  yes 01/30/18 0955   Head of Bed (HOB)  HOB at 20 degrees 01/29/18 2025    Preferred Pain Scale  CAPA (Clinically Aligned Pain Assessment) ([facility], [facility] and [facility] Adults Only) 01/30/18 0955   Chair  Upright in chair 01/25/18 1437    Pain Location  Knee 01/30/18 0955   DAILY CARE      Pain Orientation  Right;Left 01/30/18 0955   Activity Management  activity " adjusted per tolerance 01/30/18 0959    Pain Descriptors  Aching 01/30/18 0955   Activity Assistance Provided  assistance, 2 people;lift team assistance 01/30/18 0959    Pain Management Interventions  analgesia administered 01/29/18 0523   Assistive Device Utilized  mechanical lift 01/30/18 0959    Pain Intervention(s)  Repositioned 01/30/18 0955   ECG      Response to Interventions  Decrease in pain 01/30/18 0955   ECG Rhythm  Sinus tachycardia 01/25/18 0450    CLINICALLY ALIGNED PAIN ASSESSMENT (CAPA) ([facility], [facility] AND [facility] ADULTS ONLY)     KATIA COMA SCALE      Comfort  comfortably manageable 01/30/18 0955   Best Eye Response  4-->(E4) spontaneous 01/29/18 2004    Change in Pain  about the same 01/30/18 0955   Best Motor Response  6-->(M6) obeys commands 01/29/18 2004    Pain Control  partially effective 01/30/18 0955   Best Verbal Response  4-->(V4) confused 01/29/18 2004    Functioning  can do most things, but pain gets in the way of some 01/30/18 0955   [place] Coma Scale Score  14 01/29/18 2004            Patient Lines/Drains/Airways Status    Active LINES/DRAINS/AIRWAYS     Name: Placement date: Placement time: Site: Days: Last dressing change:    Pressure Injury Left Leg             Wound 02/27/16 Lower;Right Leg Ulceration RLE Venous stasis open wounds 02/27/16   2300   Leg   702     Wound 02/26/16 Lower;Left Leg Ulceration LLE open wounds 02/26/16   1114   Leg   704     Wound 05/09/16 Right Leg whie,weeping serous fluid  05/09/16      Leg   631     Wound 12/13/17 Lower;Right Leg Venous stasis 12/13/17   1800   Leg   47     Wound 12/13/17 Right Buttocks 12/13/17   1800   Buttocks   47             Patient Lines/Drains/Airways Status    Active PICC/CVC     None            Intake/Output Detail Report     Date Intake     Output Net    Shift P.O. I.V. IV Piggyback Total Urine Total       Day 01/29/18 0000 - 01/29/18 0659 -- -- -- -- 350 350 -350    Sharon 01/29/18 0700 - 01/29/18 1459 -- -- -- -- -- -- 0     Noc 01/29/18 1500 - 01/29/18 2359 200 -- -- 200 300 300 -100    Day 01/30/18 0000 - 01/30/18 0659 -- -- 500 500 400 400 100    Sharon 01/30/18 0700 - 01/30/18 1459 -- -- -- -- 125 125 -125      Last Void/BM       Most Recent Value    Urine Occurrence 1 at 01/30/2018 0000    Stool Occurrence 1 at 01/30/2018 0900      Case Management/Discharge Planning     Case Management/Discharge Planning Flowsheet     REFERRAL INFORMATION     Equipment Used at Home  walker, rolling 03/23/16 1247    Arrived From  home or self-care 07/20/16 1529   FINAL RESOURCES      # of Referrals Placed by CTS  Communication hand-offs to next level of Care Providers 10/17/16 1501   Equipment Currently Used at Home  wheelchair, power 01/28/18 1713    Primary Care Clinic Name  [facility] 10/05/16 1428   Resources List  Skilled Nursing Facility 06/01/17 0835    Primary Care MD Name  Kemal Miller 10/05/16 1428   Other Resources  Home Care 10/05/16 1431    LIVING ENVIRONMENT     PAS Number  [id number] (10/11/16) 10/11/16 0959    Lives With  facility resident 01/28/18 1713   Existing Resources/Services  Home Care;DME 04/28/15 1505    Living Arrangements  extended care facility 01/28/18 1713   MH/ CAREGIVER      COPING/STRESS     Filed Complexity Screen Score  16 01/25/18 0823    Major Change/Loss/Stressor  hospitalization 01/25/18 0552   ABUSE RISK SCREEN      Patient Personal Strengths  able to adapt;expressive of needs 05/21/15 1418   QUESTION TO PATIENT:  Has a member of your family or a partner(now or in the past) intimidated, hurt, manipulated, or controlled you in any way?  no 01/24/18 2038    EXPECTED DISCHARGE     QUESTION TO PATIENT: Do you feel safe going back to the place where you are living?  yes 01/24/18 2038    Expected Discharge Date  01/30/18 (Home) 01/29/18 1042   OBSERVATION: Is there reason to believe there has been maltreatment of a vulnerable adult (ie. Physical/Sexual/Emotional abuse, self neglect, lack of adequate  food, shelter, medical care, or financial exploitation)?  no 01/24/18 2038    ASSESSMENT/CONCERNS TO BE ADDRESSED     OTHER      Concerns To Be Addressed  discharge planning concerns 05/29/16 1110   Are you depressed or being treated for depression?  No 01/25/18 0423    DISCHARGE PLANNING     HOMICIDE RISK      Does Patient Need a Referral for Clinic CC  Yes 12/30/15 1137   Feels Like Hurting Others  no 01/24/18 2038                  [address] [facility] BANK: [phone]            Medication Administration Report for Tomas Grey WILL as of 01/30/18 1410   Legend:    Given Hold Not Given Due Canceled Entry Other Actions    Time Time (Time) Time  Time-Action       Inactive    Active    Linked        Medications 01/24/18 01/25/18 01/26/18 01/27/18 01/28/18 01/29/18 01/30/18    acetaminophen (TYLENOL) tablet 650 mg  Dose: 650 mg  Freq: EVERY 4 HOURS PRN Route: PO  PRN Reason: mild pain  Start: 01/25/18 0236   Admin Instructions: Alternate ibuprofen (if ordered) with acetaminophen.  Maximum acetaminophen dose from all sources = 75 mg/kg/day not to exceed 4 grams/day.      1309 (650 mg)-Given        2243 (650 mg)-Given        2155 (650 mg)-Given        2136 (650 mg)-Given        0518 (650 mg)-Given       1056 (650 mg)-Given            bimatoprost (LUMIGAN) 0.01 % ophthalmic drops 1 drop  Dose: 1 drop  Freq: AT BEDTIME Route: Both Eyes  Start: 01/25/18 0330            2212 (1 drop)-Given        2240 (1 drop)-Given        2151 (1 drop)-Given        2136 (1 drop)-Given        2124 (1 drop)-Given        [ ] 2200           bisacodyl (DULCOLAX) Suppository 10 mg  Dose: 10 mg  Freq: DAILY PRN Route: RE  PRN Reason: constipation  Start: 01/26/18 1831   Admin Instructions: Hold for loose stools.  This is the third step of a three step constipation treatment.               calcium carbonate (OS-JORDAN 500 mg [place]. Ca) tablet 1,250 mg  Dose: 1,250 mg  Freq: 2 TIMES DAILY WITH MEALS Route: PO  Start: 01/25/18 0800   Admin  Instructions: OS-JORDAN 500 = 1250 mg calcium carbonate      (0823)-Not Given       1829 (1,250 mg)-Given        0839 (1,250 mg)-Given       1739 (1,250 mg)-Given        0956 (1,250 mg)-Given       1717 (1,250 mg)-Given        0950 (1,250 mg)-Given       1720 (1,250 mg)-Given        0943 (1,250 mg)-Given       1957 (1,250 mg)-Given        0811 (1,250 mg)-Given       [ ] 1800           Carboxymethylcellulose Sod PF (REFRESH PLUS) 0.5 % ophthalmic solution 1 drop  Dose: 1 drop  Freq: 4 TIMES DAILY Route: Both Eyes  Start: 01/25/18 0800                   1648 (1 drop)-Given       2050 (1 drop)-Given        0839 (1 drop)-Given       1308 (1 drop)-Given       1739 (1 drop)-Given       2240 (1 drop)-Given        0956 (1 drop)-Given       1301 (1 drop)-Given       1717 (1 drop)-Given       2151 (1 drop)-Given        0951 (1 drop)-Given       1253 (1 drop)-Given       1720 (1 drop)-Given       1943 (1 drop)-Given        0856 (1 drop)-Given       1323 (1 drop)-Given       1629 (1 drop)-Given       1957 (1 drop)-Given        0811 (1 drop)-Given       1254 (1 drop)-Given       [ ] 1600       [ ] 2000           DULoxetine (CYMBALTA) EC capsule 20 mg  Dose: 20 mg  Freq: DAILY Route: PO  Start: 01/25/18 0800     (1424)-Not Given        0954 (20 mg)-Given        0955 (20 mg)-Given        0951 (20 mg)-Given        0851 (20 mg)-Given        0813 (20 mg)-Given           fentaNYL (DURAGESIC) 12 mcg/hr 72 hr patch 1 patch  Dose: 12 mcg  Freq: EVERY 72 HOURS Route: TD  Start: 01/27/18 0800   Admin Instructions: Used fentaNYL patches must be disposed of by sticking sides together and flushing down a toilet.        1020 (1 patch)-Given          0837 (1 patch)-Given           fentaNYL (DURAGESIC) Patch in Place  Freq: EVERY 8 HOURS Route: TD  Start: 01/25/18 0800   Admin Instructions: Chart every shift, confirming that patch is still in place on patient (no barcode scan needed). See patch order for dose information.      0849 ( )-Patch in  Place       1650 ( )-Patch in Place       2341 ( )-Patch in Place        0848 ( )-Patch in Place       1729 ( )-Patch in Place        0047 ( )-Patch in Place       1021 ( )-Patch in Place       1709 ( )-Patch in Place       2320 ( )-Patch in Place        0952 ( )-Patch in Place       1524 ( )-Patch in Place       2334 ( )-Patch in Place        0926 ( )-Patch in Place       1532 ( )-Patch in Place       2320 ( )-Patch in Place        0935 ( )-Patch in Place       [ ] 1600           fentaNYL (DURAGESIC) patch REMOVAL  Freq: EVERY 72 HOURS Route: TD  Start: 01/27/18 0800   Admin Instructions: Nurse may need to adjust patch removal time schedule to match application time.  Used fentaNYL patches must be disposed of by sticking sides together and flushing down a toilet.        1021 ( )-Patch Removed          0816 ( )-Patch Removed           ferrous sulfate (IRON) tablet 325 mg  Dose: 325 mg  Freq: 2 TIMES DAILY Route: PO  Start: 01/25/18 0800   Admin Instructions: Absorbed best on an empty stomach. If stomach upset occurs, can take with meals.      (1424)-Not Given       2050 (325 mg)-Given        0839 (325 mg)-Given       2240 (325 mg)-Given        0956 (325 mg)-Given       2155 (325 mg)-Given        0950 (325 mg)-Given       1942 (325 mg)-Given        0853 (325 mg)-Given       1957 (325 mg)-Given        0813 (325 mg)-Given       [ ] 2000           fluticasone-vilanterol (BREO ELLIPTA) 200-25 MCG/INH oral inhaler 1 puff  Dose: 1 puff  Freq: DAILY Route: IN  Start: 01/25/18 0800   Admin Instructions: Automatic Substitution for Symbicort 160/4.5 2 puff BID per [place] [employer]<br>Rinse mouth after use.      (1424)-Not Given        0837 (1 puff)-Given        0954 (1 puff)-Given        0952 (1 puff)-Given        0858 (1 puff)-Given        0810 (1 puff)-Given           folic acid (FOLVITE) tablet 1 mg  Dose: 1 mg  Freq: DAILY Route: PO  Start: 01/25/18 0800     (1425)-Not Given        0838 (1 mg)-Given        0956 (1  mg)-Given        0950 (1 mg)-Given        0854 (1 mg)-Given        0814 (1 mg)-Given           furosemide (LASIX) tablet 20 mg  Dose: 20 mg  Freq: DAILY Route: PO  Start: 01/25/18 0800     (1425)-Not Given        0839 (20 mg)-Given        0955 (20 mg)-Given        0950 (20 mg)-Given        0902 (20 mg)-Given        0814 (20 mg)-Given           glucose 40 % gel 15-30 g  Dose: 15-30 g  Freq: EVERY 15 MIN PRN Route: PO  PRN Reason: low blood sugar  Start: 01/25/18 0318   Admin Instructions: Give 15 g for BG 51 to 69 mg/dL IF patient is conscious and able to swallow. Give 30 g for BG less than or equal to 50 mg/dL IF patient is conscious and able to swallow. Do NOT give glucose gel via enteral tube.  IF patient has enteral tube: give apple juice 120 mL (4 oz or 15 g of CHO) via enteral tube for BG 51 to 69 mg/dL.  Give apple juice 240 mL (8 oz or 30 g of CHO) via enteral tube for BG less than or equal to 50 mg/dL.    ~Oral gel is preferable for conscious and able to swallow patient.   ~IF gel unavailable or patient refuses may provide apple juice 120 mL (4 oz or 15 g of CHO). Document juice on I and O flowsheet.              Or  dextrose 50 % injection 25-50 mL  Dose: 25-50 mL  Freq: EVERY 15 MIN PRN Route: IV  PRN Reason: low blood sugar  Start: 01/25/18 0318   Admin Instructions: Use if have IV access, BG less than 70 mg/dL and meet dose criteria below:  Dose if conscious and alert (or disorientated) and NPO = 25 mL  Dose if unconscious / not alert = 50 mL  Vesicant. For ordered doses up to 25 mg, give IV Push undiluted. Give each 5g over 1 minute.              Or  glucagon injection 1 mg  Dose: 1 mg  Freq: EVERY 15 MIN PRN Route: SC  PRN Reason: low blood sugar  PRN Comment: May repeat x 1 only  Start: 01/25/18 0318   Admin Instructions: May give SQ or IM. ONLY use glucagon IF patient has NO IV access AND is UNABLE to swallow AND blood glucose is LESS than or EQUAL to 50 mg/dL.  Give IV Push over 1 minute.  Reconstitute with 1mL sterile water.               insulin aspart (NovoLOG) inj (RAPID ACTING)  Dose: 1-6 Units  Freq: 4 TIMES DAILY BEFORE MEALS & NIGHTLY Route: SC  Start: 01/26/18 0730   Admin Instructions: Correction Scale - MEDIUM INSULIN RESISTANCE DOSING     Do Not give Correction Insulin if BG less than 140.  For  - 189 give 1 unit.  For  - 239 give 2 units.  For  - 289 give 3 units.  For  - 339 give 4 units.  For  - 399 give 5 units.  For BG greater than or equal to 400 give 6 units.  Check blood glucose Q4H and administer based on blood glucose.  Notify provider if glucose greater than or equal to 350 mg/dL after administration of correction dose.  If given at mealtime, must be administered 5 min before meal or immediately after.       (0803)-Not Given [C]       (1116)-Not Given [C]       (1729)-Not Given [C]       (2240)-Not Given        0955 (1 Units)-Given       (1233)-Not Given [C]       (1729)-Not Given       (2157)-Not Given        (1025)-Not Given [C]       (1336)-Not Given [C]       (1738)-Not Given [C]       (2136)-Not Given [C]        (0816)-Not Given [C]       (1316)-Not Given [C]       (1723)-Not Given [C]       (2235)-Not Given        (0816)-Not Given [C]       (1249)-Not Given [C]       [ ] 1630       [ ] 2200           ipratropium - albuterol 0.5 mg/2.5 mg/3 mL (DUONEB) neb solution 3 mL  Dose: 3 mL  Freq: EVERY 4 HOURS PRN Route: NEBULIZATION  PRN Reason: wheezing  Start: 01/26/18 1822       0524 (3 mL)-Given         0550 (3 mL)-Given        0242 (3 mL)-Given           isosorbide mononitrate (IMDUR) 24 hr tablet 60 mg  Dose: 60 mg  Freq: DAILY Route: PO  Start: 01/25/18 0800   Admin Instructions: Hold if SBP <120 or HR <60    DO NOT CRUSH. Can split tablet in half along score anai.      (1425)-Not Given        0839 (60 mg)-Given        0955 (60 mg)-Given        (0954)-Not Given [C]        0851 (60 mg)-Given        0815 (60 mg)-Given           latanoprost  (XALATAN) 0.005 % ophthalmic solution 1 drop  Dose: 1 drop  Freq: AT BEDTIME Route: Both Eyes  Start: 01/25/18 0330   Admin Instructions: Refrigerated product.             2219-Hold [C]        (2241)-Not Given         0219 (1 drop)-Given       2136 (1 drop)-Given        2123 (1 drop)-Given        [ ] 2200           melatonin tablet 1 mg  Dose: 1 mg  Freq: AT BEDTIME PRN Route: PO  PRN Reason: sleep  Start: 01/25/18 0236   Admin Instructions: Do not give unless at least 6 hours of uninterrupted sleep is expected.               methocarbamol (ROBAXIN) tablet 500 mg  Dose: 500 mg  Freq: EVERY 6 HOURS PRN Route: PO  PRN Reason: muscle spasms  Start: 01/25/18 0236     0910 (500 mg)-Given                metoprolol succinate (TOPROL-XL) 24 hr tablet 50 mg  Dose: 50 mg  Freq: DAILY Route: PO  Start: 01/25/18 0800   Admin Instructions: Hold if SBP <120 or HR <60    DO NOT CRUSH. Tablet may be split in half along score line.      (1431)-Not Given        0839 (50 mg)-Given        0956 (50 mg)-Given        (0955)-Not Given [C]        0854 (50 mg)-Given        0814 (50 mg)-Given           naloxone (NARCAN) injection 0.1-0.4 mg  Dose: 0.1-0.4 mg  Freq: EVERY 2 MIN PRN Route: IV  PRN Reason: opioid reversal  Start: 01/25/18 0236   Admin Instructions: For respiratory rate LESS than or EQUAL to 8.  Partial reversal dose:  0.1 mg titrated q 2 minutes for Analgesia Side Effects Monitoring Sedation Level of 3 (frequently drowsy, arousable, drifts to sleep during conversation).Full reversal dose:  0.4 mg bolus for Analgesia Side Effects Monitoring Sedation Level of 4 (somnolent, minimal or no response to stimulation).  For ordered doses up to 2mg give IVP. Give each 0.4mg over 15 seconds in emergency situations. For non-emergent situations further dilute in 9mL of NS to facilitate titration of response.               ondansetron (ZOFRAN-ODT) ODT tab 4 mg  Dose: 4 mg  Freq: EVERY 6 HOURS PRN Route: PO  PRN Reasons:  nausea,vomiting  Start: 01/25/18 0236   Admin Instructions: This is Step 1 of nausea and vomiting management.  If nausea not resolved in 15 minutes, go to Step 2 prochlorperazine (COMPAZINE). Do not push through foil backing. Peel back foil and gently remove. Place on tongue immediately. Administration with liquid unnecessary  With dry hands, peel back foil backing and gently remove tablet; do not push oral disintegrating tablet through foil backing; administer immediately on tongue and oral disintegrating tablet dissolves in seconds; then swallow with saliva; liquid not required.                            Or  ondansetron (ZOFRAN) injection 4 mg  Dose: 4 mg  Freq: EVERY 6 HOURS PRN Route: IV  PRN Reasons: nausea,vomiting  Start: 01/25/18 0236   Admin Instructions: This is Step 1 of nausea and vomiting management.  If nausea not resolved in 15 minutes, go to Step 2 prochlorperazine (COMPAZINE).  Irritant. For ordered doses up to 4 mg, give IV Push undiluted over 2-5 minutes.      1844 (4 mg)-Given        0823 (4 mg)-Given               oxyCODONE IR (ROXICODONE) tablet 5 mg  Dose: 5 mg  Freq: EVERY 4 HOURS PRN Route: PO  PRN Reason: moderate to severe pain  Start: 01/25/18 0236     0910 (5 mg)-Given       1309 (5 mg)-Given         1717 (5 mg)-Given       2155 (5 mg)-Given         0518 (5 mg)-Given        0336 (5 mg)-Given           pantoprazole (PROTONIX) 40 mg IV push injection  Dose: 40 mg  Freq: DAILY Route: IV  Start: 01/25/18 0800   Admin Instructions: IV permitted if strictly NPO or NG/FT unavailable.  FIRST DOSE STAT  Irritant. For ordered doses up to 40 mg, give IV Push over 2 minutes when reconstituted with 10mL NS.      1321 (40 mg)-Given        0831 (40 mg)-Given        0957 (40 mg)-Given        0951 (40 mg)-Given        0904 (40 mg)-Given        0815 (40 mg)-Given           piperacillin-tazobactam (ZOSYN) 4.5 g in 20 mL NS Premix Syringe  Dose: 4.5 g  Freq: EVERY 6 HOURS Route: IV  Indications of Use:  ASPIRATION PNEUMONIA,HEALTHCARE-ASSOCIATED PNEUMONIA  Start: 01/29/18 1500   Admin Instructions: Give IVP over 3 minutes          1759 (4.5 g)-Given       2322 (4.5 g)-Given        0534 (4.5 g)-Given       1255 (4.5 g)-Given       [ ] 1800           polyethylene glycol (MIRALAX/GLYCOLAX) Packet 17 g  Dose: 17 g  Freq: DAILY PRN Route: PO  PRN Reason: constipation  Start: 01/26/18 1831   Admin Instructions: Give in 8oz of  water, juice, or soda. Hold for loose stools.  This is the second step of a three step constipation treatment.  1 Packet = 17 grams. Mixed prescribed dose in 8 ounces of water. Follow with 8 oz. of water.               prochlorperazine (COMPAZINE) injection 5 mg  Dose: 5 mg  Freq: EVERY 6 HOURS PRN Route: IV  PRN Reasons: nausea,vomiting  Start: 01/25/18 0236   Admin Instructions: This is Step 2 of nausea and vomiting management. Give if nausea not resolved 15 minutes after giving ondansetron (ZOFRAN). If nausea not resolved in 15 minutes, go to Step 3 metoclopramide (REGLAN), if ordered.  For ordered doses up to 10 mg, give IV Push undiluted. Each 5mg over 1 minute.              Or  prochlorperazine (COMPAZINE) tablet 5 mg  Dose: 5 mg  Freq: EVERY 6 HOURS PRN Route: PO  PRN Reason: vomiting  Start: 01/25/18 0236   Admin Instructions: This is Step 2 of nausea and vomiting management. Give if nausea not resolved 15 minutes after giving ondansetron (ZOFRAN). If nausea not resolved in 15 minutes, go to Step 3 metoclopramide (REGLAN), if ordered.              Or  prochlorperazine (COMPAZINE) Suppository 12.5 mg  Dose: 12.5 mg  Freq: EVERY 12 HOURS PRN Route: RE  PRN Reasons: nausea,vomiting  Start: 01/25/18 0236   Admin Instructions: This is Step 2 of nausea and vomiting management. Give if nausea not resolved 15 minutes after giving ondansetron (ZOFRAN). If nausea not resolved in 15 minutes, go to Step 3 metoclopramide (REGLAN), if ordered.               senna-docusate (SENOKOT-S;PERICOLACE) 8.6-50 MG  per tablet 1 tablet  Dose: 1 tablet  Freq: 2 TIMES DAILY PRN Route: PO  PRN Reason: constipation  Start: 01/26/18 1831   Admin Instructions: If no bowel movement in 24 hours, increase to 2 tablets PO.  Hold for loose stools.              Or  senna-docusate (SENOKOT-S;PERICOLACE) 8.6-50 MG per tablet 2 tablet  Dose: 2 tablet  Freq: 2 TIMES DAILY PRN Route: PO  PRN Reason: constipation  Start: 01/26/18 1831   Admin Instructions: Hold for loose stools.               sucralfate (CARAFATE) suspension 1 g  Dose: 1 g  Freq: 4 TIMES DAILY BEFORE MEALS & NIGHTLY Route: PO  Start: 01/25/18 0730   Admin Instructions: Shake well. Recommended to take before meals.      (1425)-Not Given              1648 (1 g)-Given       2212 (1 g)-Given        (0838)-Not Given       1308 (1 g)-Given       1739 (1 g)-Given       2240 (1 g)-Given        0950 (1 g)-Given       1301 (1 g)-Given       1717 (1 g)-Given       (2157)-Not Given        0950 (1 g)-Given       1253 (1 g)-Given       1720 (1 g)-Given       1943 (1 g)-Given               0856 (1 g)-Given       1323 (1 g)-Given       1629 (1 g)-Given       2124 (1 g)-Given        0811 (1 g)-Given       (1254)-Not Given [C]       [ ] 1630       [ ] 2200           tamsulosin (FLOMAX) capsule 0.4 mg  Dose: 0.4 mg  Freq: DAILY Route: PO  Start: 01/25/18 0800   Admin Instructions: Administer 30 minutes after the same meal each day.  Capsules should be swallowed whole; do not crush chew or open.      (1425)-Not Given        0839 (0.4 mg)-Given        0955 (0.4 mg)-Given        0950 (0.4 mg)-Given        0853 (0.4 mg)-Given        0811 (0.4 mg)-Given           timolol (TIMOPTIC) 0.25 % ophthalmic solution 1 drop  Dose: 1 drop  Freq: DAILY Route: Both Eyes  Start: 01/25/18 0800     (1426)-Not Given        0838 (1 drop)-Given        0954 (1 drop)-Given        0951 (1 drop)-Given        0900 (1 drop)-Given        0811 (1 drop)-Given           umeclidinium (INCRUSE ELLIPTA) 62.5 MCG/INH oral inhaler 1  puff  Dose: 1 puff  Freq: DAILY Route: IN  Indications of Use: CHRONIC OBSTRUCTIVE PULMONARY DISEASE  Start: 01/25/18 0800   Admin Instructions: Automatic Substitution for Spiriva inhaler per [employer]<br>      (1427)-Not Given        0837 (1 puff)-Given        0954 (1 puff)-Given        0952 (1 puff)-Given        0858 (1 puff)-Given        0810 (1 puff)-Given          Completed Medications  Medications 01/24/18 01/25/18 01/26/18 01/27/18 01/28/18 01/29/18 01/30/18         Dose: 250 mL  Freq: ONCE Route: IV  Start: 01/28/18 0600   End: 01/28/18 0555        0555 (250 mL)-New Bag               Dose: 500 mL  Freq: ONCE Route: IV  Last Dose: 500 mL (01/29/18 2123)  Start: 01/29/18 2030   End: 01/29/18 2323         2123 (500 mL)-New Bag              Dose: 500 mL  Freq: ONCE Route: IV  Last Dose: 500 mL (01/29/18 1629)  Start: 01/29/18 1500   End: 01/29/18 1829         1629 (500 mL)-New Bag              Dose: 500 mL  Freq: ONCE Route: IV  Last Dose: 500 mL (01/29/18 1059)  Start: 01/29/18 1100   End: 01/29/18 1259         1059 (500 mL)-New Bag              Dose: 500 mL  Freq: ONCE Route: IV  Last Dose: 500 mL (01/28/18 1344)  Start: 01/28/18 1300   End: 01/28/18 1544        1344 (500 mL)-New Bag               Dose: 500 mL  Freq: ONCE Route: IV  Start: 01/27/18 2200   End: 01/27/18 2241       2241 (500 mL)-New Bag             Discontinued Medications  Medications 01/24/18 01/25/18 01/26/18 01/27/18 01/28/18 01/29/18 01/30/18         Dose: 1 g  Freq: EVERY 8 HOURS Route: IV  Indications of Use: URINARY TRACT INFECTION  Start: 01/27/18 2230   End: 01/29/18 1455   Admin Instructions: Nursing to reconstitute vial with 10 mL Sterile Water for Injection and give IVP immediately over 3-5 minutes        2241 (1 g)-Given        0551 (1 g)-Given       1340 (1 g)-Given       2136 (1 g)-Given        0530 (1 g)-Given              1455-Med Discontinued          Dose: 1,250 mg  Freq: EVERY 12 HOURS Route: IV  Indications of  Use: SEPSIS  Start: 01/28/18 1300   End: 01/28/18 1304   Admin Instructions: Vesicant.    For peripheral catheter: If dose between 2-2.5 g, infuse over 2 hours.    For central catheter: If dose is below 2 g, dose may be infused over 1 hour.                1304-Med Discontinued           Dose: 1,750 mg  Freq: EVERY 24 HOURS Route: IV  Indications of Use: SEPSIS  Start: 01/28/18 1315   End: 01/29/18 1455   Admin Instructions: Vesicant.    For peripheral catheter: If dose between 2-2.5 g, infuse over 2 hours.    For central catheter: If dose is below 2 g, dose may be infused over 1 hour.         1350 (1,750 mg)-New Bag               1455-Med Discontinued                 INTERAGENCY TRANSFER FORM - NOTES (H&P, Discharge Summary, Consults, Procedures, Therapies)   1/24/2018                       UNIT [facility] [facility] BANK: [phone]               History & Physicals      H&P by Rubio Ariza MD at 1/25/2018  2:40 AM     Author:  Rubio Ariza MD Service:  General Medicine Author Type:  Resident    Filed:  1/25/2018 11:23 PM Date of Service:  1/25/2018  2:40 AM Creation Time:  1/25/2018  2:40 AM    Status:  Attested :  Rubio Ariza MD (Resident)    Cosigner:  Ramy Sotelo MD at 1/26/2018  1:54 PM        Attestation signed by Ramy Sotelo MD at 1/26/2018  1:54 PM        Attestation:  Physician Attestation   IRamy, personally examined and evaluated this patient.  I discussed the patient with the resident and care team, and agree with the assessment and plan of care as documented in the resident s note of 1/25/2018.      I personally reviewed vital signs, medications, labs and imaging.    Key findings: 72 year old male with metastatic prostate cancer who presents with questionable GI bleed despite stable hemoglobins.  Likely, need GOC discussion.  Ramy Sotelo  Date of Service (when I saw the patient): 1/25/18                                 [facility] (formerly [facility]) INTERNAL MEDICINE  HISTORY & PHYSICAL   Tomas Grey ([id number]) admitted on 1/24/2018  Primary care provider: Ekaterina Lozano    CHIEF COMPLAINT:  Tarry stools    HISTORY OF PRESENT ILLNESS:    Tomas Grey is a 72-year-old man with a history of metastatic prostate cancer, COPD, hypertension, atrial fibrillation, CAD, type 2 diabetes, HFpEF, and recent admission for GI bleed now presenting with recurrent symptoms of a GI bleed.    The patient was admitted to this institution from 1/8-1/13 for melena with initial hemoglobin of 3.2.  He was admitted to the oncology service and ultimately received 3 units PRBCs.  GI was consulted, but he was deemed to be high-risk candidate and an EGD was not offered.  On discharge, his Coumadin and aspirin were stopped.  He was also treated for ESBL E. coli UTI during this prior admission.    The patient is not an excellent historian.  I called the patient's nursing home to obtain collateral information. The patient reports to me that on Monday, 2 days ago, he noticed multiple episodes of black, tarry-like stool in the toilet bowl.  There has been some lower abdominal pain as well.  The patient was not clear if the black, tarry stool continued or not when I asked him specifically about this.  There has been no BRBPR, maroon stools, hematemesis, or hematuria.  I confirmed with nursing home that he has not been receiving warfarin, aspirin, or NSAIDs.  On fortunately, the night shift could not corroborate how much melena he's been having or if anyone other than Mr. Grey noticed it. On review of systems, he denied chest pain, shortness of breath, dysuria, or headache.    Of note, his most recent admission was complicated by issues surrounding goals of care.  His most recent oncology appointment in 12/2017 makes it clear that he is no longer candidate for any cytotoxic chemotherapy for his metastatic prostate cancer.  He does not wish to pursue a palliative approach and has remained  "full code during his prior admissions.  When I asked about this issue on admission, he told me that he was \"not on my death bed yet\" and \"I'll call ya when I need ya.\"  In the recent admission, his medical decision-maker has been a man he refers to as his son, Les, and this is corroborated in the chart.  He does have a POLST dated 11/30/2016 showing that he wishes to be full code, which aligns with his prior code statuses. He was seen by [facility] last visit and did not want to discuss code status.    PAST MEDICAL AND SURGICAL HISTORY:[DF1.1]    Patient Active Problem List   Diagnosis     Insomnia     COPD (chronic obstructive pulmonary disease) (H)     Anemia     Osteoporosis     ASCVD (arteriosclerotic cardiovascular disease)     Prostate cancer metastatic to multiple sites (H)     Venous stasis dermatitis     Fecal incontinence     CKD (chronic kidney disease) stage 3, GFR 30-59 ml/min     Serum albumin decreased     Right heart failure     Type 2 diabetes, HbA1C goal < 8% (H)     Impaired mobility     Knee pain     Irregular heart rhythm     Sleep apnea     Metastasis to bone (H)     SOB (shortness of breath)     FTT (failure to thrive) in adult     Dyspnea     Atypical chest pain     Prostate cancer metastatic to bone (H)     Chronic pain     ACP (advance care planning)     Respiratory failure (H)     CAD (coronary artery disease)     Physical deconditioning     Hyperlipidemia     Renal insufficiency     Epigastric pain     Lymphedema of both lower extremities     Primary pulmonary hypertension (H)     Redness of eye, left     Chest wall pain     Chronic obstructive pulmonary disease, unspecified COPD type (H)     FH: chronic lung disease requiring oxygen     COPD exacerbation (H)     History of heart attack     Nocturnal dyspnea     Dyspnea on exertion     Chronic obstructive pulmonary disease with severity to be determined (H)     Leg pain     Adjustment disorder with mixed anxiety and depressed mood     " Noncompliance with medication regimen     Type 2 diabetes mellitus with stage 3 chronic kidney disease (H)     Chronic respiratory failure with hypoxia and hypercapnia (H)     Misuse of drugs (H)     Pneumonia of left lower lobe due to infectious organism (H)     Pneumonia     Hypercapnia     Health Care Home     HTN (hypertension)     CHF (congestive heart failure) (H)     Chronic respiratory failure with hypoxia (H)     Morbid obesity (H)     Altered mental status     Bacterial sepsis (H)     Anemia in neoplastic disease     GI bleed       Past Surgical History:   Procedure Laterality Date     ABDOMEN SURGERY       ARTHROSCOPY KNEE       EYE SURGERY[DF1.2]         MEDICATIONS:[DF1.1]      No current facility-administered medications on file prior to encounter.   Current Outpatient Prescriptions on File Prior to Encounter:  ferrous sulfate (IRON) 325 (65 FE) MG tablet Take 1 tablet (325 mg) by mouth 2 times daily   ATORVASTATIN CALCIUM PO Take 10 mg by mouth daily   latanoprost (XALATAN) 0.005 % ophthalmic solution Place 1 drop into both eyes At Bedtime   omeprazole (PRILOSEC) 40 MG capsule Take 1 capsule (40 mg) by mouth 2 times daily   ipratropium - albuterol 0.5 mg/2.5 mg/3 mL (DUONEB) 0.5-2.5 (3) MG/3ML neb solution Take 1 vial by nebulization every 4 hours as needed for shortness of breath / dyspnea or wheezing   insulin aspart (NOVOLOG FLEXPEN) 100 UNIT/ML injection Inject Subcutaneous At Bedtime Inject as per sliding scale: if 0 - 199 = 0; 200 - 249 = 1; 250 - 299 = 2; 300 - 349 = 3; 350 - 399 = 4; 400+ = 5   insulin aspart (NOVOLOG FLEXPEN) 100 UNIT/ML injection Inject Subcutaneous 3 times daily (with meals) 140 - 189 = 1; 190 - 239 = 2; 240 - 289 = 3; 290 - 339 = 4; 340 - 399 = 5; 400 - 449 = 6; 450+ = 7   fentaNYL (DURAGESIC) 12 mcg/hr 72 hr patch Place 1 patch onto the skin every 72 hours   oxyCODONE IR (ROXICODONE) 5 MG tablet Take 1 tablet (5 mg) by mouth every 4 hours as needed   acetaminophen  (TYLENOL) 500 MG tablet Take 2 tablets (1,000 mg) by mouth 3 times daily Not to exceed 3000 mg/24 hours   isosorbide mononitrate (IMDUR) 30 MG 24 hr tablet Take 2 tablets (60 mg) by mouth daily   nitroGLYcerin (NITROSTAT) 0.4 MG sublingual tablet Place 1 tablet (0.4 mg) under the tongue every 5 minutes as needed for chest pain if you are still having symptoms after 3 doses (15 minutes) call 911.   budesonide-formoterol (SYMBICORT) 160-4.5 MCG/ACT Inhaler Inhale 2 puffs into the lungs 2 times daily   tiotropium (SPIRIVA) 18 MCG capsule Inhale 1 capsule (18 mcg) into the lungs daily   DULoxetine (CYMBALTA) 20 MG EC capsule Take 1 capsule (20 mg) by mouth daily   ondansetron 4 MG FILM Take 4 mg by mouth every 8 hours as needed   metoprolol succinate (TOPROL XL) 50 MG 24 hr tablet Take 1 tablet (50 mg) by mouth daily   ammonium lactate (LAC-HYDRIN) 12 % lotion Apply topically 2 times daily as needed for dry skin To all extremities for dry skin   diclofenac (VOLTAREN) 1 % GEL topical gel Apply 4 grams to knees four times daily as needed using enclosed dosing card.   furosemide (LASIX) 20 MG tablet Take 1 tablet (20 mg) by mouth 2 times daily   folic acid (FOLVITE) 1 MG tablet Take 1 tablet (1 mg) by mouth daily   polyethylene glycol (MIRALAX/GLYCOLAX) Packet Take 17 g by mouth daily , hold for loose stools.   senna-docusate (SENOKOT-S;PERICOLACE) 8.6-50 MG per tablet Take 2 tablets by mouth 2 times daily , hold for loose stools.   calcium carbonate (OS-JORDAN 600 MG [place]. CA) 1500 (600 CA) MG tablet Take 1 tablet (1,500 mg) by mouth 2 times daily (with meals)   tamsulosin (FLOMAX) 0.4 MG capsule Take 1 capsule (0.4 mg) by mouth daily   simethicone (MYLICON) 40 MG/0.6ML suspension Take 0.6 mLs (40 mg) by mouth every 6 hours as needed for cramping   bimatoprost (LUMIGAN) 0.01 % SOLN Place 1 drop into both eyes At Bedtime   Carboxymethylcellulose Sod PF (REFRESH PLUS) 0.5 % SOLN ophthalmic solution Place 1 drop into both eyes  "4 times daily And 1 drop both eyes daily prn   sucralfate (CARAFATE) 1 GM/10ML suspension Take 10 mLs (1 g) by mouth 4 times daily (before meals and nightly)   METHOCARBAMOL PO Take 500 mg by mouth every 6 hours as needed for muscle spasms   Timolol Hemihydrate (BETIMOL OP) Place 1 drop into both eyes daily    [DISCONTINUED] enzalutamide (XTANDI) 40 MG capsule Take 4 capsules (160 mg) by mouth daily[DF1.2]       ALLERGIES:[DF1.1]    Allergies   Allergen Reactions     Morphine Other (See Comments)     Patient reports makes him almost go into a coma[DF1.2]       FAMILY HISTORY:[DF1.1]    Family History   Problem Relation Age of Onset     DIABETES Mother      CANCER Mother      stomach[DF1.2]       SOCIAL HISTORY:[DF1.1]    Social History     Social History     Marital status:      Spouse name: N/A     Number of children: N/A     Years of education: N/A     Occupational History     Not on file.     Social History Main Topics     Smoking status: Former Smoker     Smokeless tobacco: Never Used     Alcohol use No     Drug use: No     Sexual activity: No     Other Topics Concern     Not on file     Social History Narrative[DF1.2]       PHYSICAL EXAM[DF1.1]  /64  Pulse 95  Temp 98.6  F (37  C) (Oral)  Resp 16  Ht 1.803 m (5' 11\")  Wt 86.6 kg (191 lb)  SpO2 95%  BMI 26.64 kg/m2[DF1.2]  General: Sitting in a chair next to bed, no clear distress  HEENT: Dry mucous membrane, no icterus  Cardiac: Distant heart sounds, irregular rhythm  Resp: Normal respiratory effort, clear bilaterally  Abd: Soft, umbilical hernia is easily reduced, no ridigity or rebound, endorses tenderness on palpation of lower belly  Skin: No jaundice  Extremities: Bilateral LE wraps  Neuro: Alert and oriented, no gross deficits, able to answer questions clearly  Psych: Euthymic mood and normal affect.    STUDIES AND IMAGING:[DF1.1]  Results for orders placed or performed during the hospital encounter of 01/24/18 (from the past 24 " hour(s))   CBC with platelets differential   Result Value Ref Range    WBC 9.0 4.0 - 11.0 10e9/L    RBC Count 3.52 (L) 4.4 - 5.9 10e12/L    Hemoglobin 9.6 (L) 13.3 - 17.7 g/dL    Hematocrit 33.2 (L) 40.0 - 53.0 %    MCV 94 78 - 100 fl    MCH 27.3 26.5 - 33.0 pg    MCHC 28.9 (L) 31.5 - 36.5 g/dL    RDW 18.6 (H) 10.0 - 15.0 %    Platelet Count 267 150 - 450 10e9/L    Diff Method Automated Method     % Neutrophils 65.4 %    % Lymphocytes 18.3 %    % Monocytes 9.4 %    % Eosinophils 5.7 %    % Basophils 0.2 %    % Immature Granulocytes 1.0 %    Nucleated RBCs 0 0 /100    Absolute Neutrophil 5.9 1.6 - 8.3 10e9/L    Absolute Lymphocytes 1.6 0.8 - 5.3 10e9/L    Absolute Monocytes 0.8 0.0 - 1.3 10e9/L    Absolute Eosinophils 0.5 0.0 - 0.7 10e9/L    Absolute Basophils 0.0 0.0 - 0.2 10e9/L    Abs Immature Granulocytes 0.1 0 - 0.4 10e9/L    Absolute Nucleated RBC 0.0    Comprehensive metabolic panel   Result Value Ref Range    Sodium 143 133 - 144 mmol/L    Potassium 4.1 3.4 - 5.3 mmol/L    Chloride 107 94 - 109 mmol/L    Carbon Dioxide 28 20 - 32 mmol/L    Anion Gap 8 3 - 14 mmol/L    Glucose 98 70 - 99 mg/dL    Urea Nitrogen 22 7 - 30 mg/dL    Creatinine 1.17 0.66 - 1.25 mg/dL    GFR Estimate 61 >60 mL/min/1.7m2    GFR Estimate If Black 74 >60 mL/min/1.7m2    Calcium 8.6 8.5 - 10.1 mg/dL    Bilirubin Total 0.4 0.2 - 1.3 mg/dL    Albumin 2.5 (L) 3.4 - 5.0 g/dL    Protein Total 7.4 6.8 - 8.8 g/dL    Alkaline Phosphatase 222 (H) 40 - 150 U/L    ALT 10 0 - 70 U/L    AST 24 0 - 45 U/L   Lipase   Result Value Ref Range    Lipase 84 73 - 393 U/L   Troponin I   Result Value Ref Range    Troponin I ES <0.015 0.000 - 0.045 ug/L   ABO/Rh type and screen   Result Value Ref Range    ABO O     RH(D) Pos     Antibody Screen Neg     Test Valid Only At          [facility]    Specimen Expires 01/27/2018    INR   Result Value Ref Range    INR 1.14 0.86 - 1.14   Partial thromboplastin time   Result  Value Ref Range    PTT 38 (H) 22 - 37 sec   XR Chest 2 Views    Narrative    Exam: Chest x-ray, 2 views, 1/24/2018.    COMPARISON: 1/10/2018.    HISTORY: Shortness of breath.    FINDINGS: PA and lateral views of the chest were obtained. Interval  removal of the previously described left upper extremity PICC line.  Cardiomediastinal silhouette within normal limits. Distinct pulmonary  vasculature. Stable bilateral lower lobes streaky opacities. No  pleural effusions. No pneumothorax. The right costophrenic angle is  collimated out of the image. Tortuous thoracic aorta with  calcifications. Degenerative changes of the spine and shoulders.      Impression    IMPRESSION: No acute airspace opacities. Stable bilateral lower lobes  streaky opacities, likely atelectasis.    KANNAN ELLINGTON MD   Abdomen US, limited (RUQ only)    Impression    IMPRESSION:   1.  No evidence of cholecystitis. Normal sonographic appearance of the  gallbladder and normal caliber common bile duct.  2.  Several simple cysts in the right kidney. Solid mass arising from  the lower pole of the right kidney described on comparison CT is not  well visualized on this study.   Hemoglobin   Result Value Ref Range    Hemoglobin 8.5 (L) 13.3 - 17.7 g/dL     *Note: Due to a large number of results and/or encounters for the requested time period, some results have not been displayed. A complete set of results can be found in Results Review.[DF1.3]     ASSESSMENT & PLAN:  Nehemiah a 72-year-old man with a history of metastatic prostate cancer, COPD, hypertension, atrial fibrillation, CAD, type 2 diabetes, HFpEF, and recent admission for GI bleed now presenting with recurrent symptoms of a GI bleed.    # Suspected GI bleed: Seems to be a very similar presentation to his last admission. Hemoglobin at discharge 9.5, on arrival 9.6 to ED, and then 8.4 on recheck without fluids. On GI consult from 1/8, thinking was that he was likely bleeding from stomach, but low  probability of endoscopically treatable lesion was low and an EGD was thought to be high risk. Warfarin, aspirin, and NSAIDs were correctly held on discharge. No additional melena since arrival to our institution, and no maroon stools to suggest more brisk bleed. Previous transfusion threshold was 8 on heme-onc service.  - type and screen done  - NPO  - LR at 75 cc/hr  - GI consult to luminal (order placed, day team can cancel)  - Pantoprazole BID  - Repeat hemoglobin in a.m.  - Continue holding warfarin, aspirin, and ibuprofen  - Continued daily folate and iron at discharge  - Continue PTA sucralfate 1g QID    # Metastatic prostate cancer. Complex issue; see HPI for brief summary. Has metastases to bone. He has been on numerous therapies (Lupron, Casodex, Xgeva, Xtandi, Zytega+pred, Xofigo, and Megace). Recent note by Dr. Oviedo 12/5 stating they would stop all cancer-directed therapies and refer to hospice, though patient has been resistant to this suggestion.  - Clarify goals of care  - Consider pall care consult, though this did not seem to go so well last time    Chronic issues    # Chronic pain:  - Continue fentanyl patch q72 hrs (last placed 1/24 0930 per nursing home RN)  - Continue PTA oxycodone 5mg q4h PRN  - Hold NSAIDS  - Tylenol available    # ESBL UTI: Would have completed 5-day course. No dysuria on admission.   # BPH  - Defer sending another U/A, high-risk of asymptomatic bacteriuria  - Continued PTA tamsulosin    # Coronary artery disease.   # Hypertension.  # Chronic diastolic CHF: Last echo 5/2016 with EF ~55% and grade 1 diastolic failure.  - Continue holding warfarin, aspirin, and ibuprofen in setting of GI bleed  - Continue isosorbide mononitrate 60mg daily and metoprolol 50mg daily with hold parameters BP <120/80 and HR <60  - Hold home statin      # Atrial fibrillation:  - Continue PTA metoprolol as above  - No warfarin or aspirin      # CKD: Creatinine at 1.17, which seems to be around  "baseline. Reported eGFR of 61 probable overestimate given limited muscle mass.  - Monitor with daily BMP      # COPD, on chronic oxygen.  # ALEXIS: Notes indicated that he has been treated multiple times for COPD exacerbations. Follows with Dr. Loera in Pulmonology.   -Continue PTA Spiriva and Symbicort.      # Diabetes mellitus, type II: Has not been receiving any insulin at home per nurse at his residence.  - Holding any basal insulin  - Hypoglycemia protocol  - Medium ISS      # Lymphedema.  # Venous stasis skin changes/ulcerations.  - Lymphedema consulted  - Continue PTA Lasix 20mg BID      #Anxiety/Depression  - Continue PTA Cymbalta      # Congnitive impairment: Per recent d/c summary: \"Nursing home visit notes and note by Dr. Rossi with palliative care, patient has mild-moderate impairment based on previous neurocognitive testing. BIMS score 11/15 on 11/16/17. Notes discuss appointing a guardian for patient to make medical decisions. Patient does have 11 children, however notes indicate they do not attend his hospital appointments. Patient explained that his medical decision maker is his \"son\" Les Bourgeois ([facility]). Per advanced directive, his primary contact is cousin, Melissa Mccormack with second contact as his son, Jeevan Grey (see advanced directive for phone number). Discussed patient's hospitaliziation and concerns for returning to NH with son, Les 1/10.\"  - Clarify during day  - Consider re-consult to [facility] care      # Glaucoma: Follows with [facility].   - Continue bimatoprost, timolol, latanoprost, and carboxymethylcellulose drops  - Pharmacist consult for help with many meds and eye drops    FEN: NPO, LR at 75cc/hr  Prophylaxis:  DVT: mechanial  Disposition: Anticipate multiple days to monitor and manage GI bleed  Code Status: FULL CODE    To be staffed in morning.[DF1.1]    Rubio Ariza MD  PGY-3     Revision History        User Key Date/Time User Provider Type Action    > [N/A] " 1/25/2018 11:23 PM Rubio Ariza MD Resident Sign     [N/A] 1/25/2018  3:33 AM Rubio Ariza MD Resident Sign     DF1.3 1/25/2018  3:32 AM Rubio Ariza MD Resident Sign     DF1.2 1/25/2018  2:41 AM Rubio Ariza MD Resident      DF1.1 1/25/2018  2:40 AM Rubio Ariza MD Resident                   Discharge Summaries     No notes of this type exist for this encounter.      Consult Notes     No notes of this type exist for this encounter.         Progress Notes - Physician (Notes for yesterday and today)      Progress Notes by Jovani Couch MD at 1/28/2018  6:49 AM     Author:  Jovani Couch MD Service:  General Medicine Author Type:  Resident    Filed:  1/28/2018  4:17 PM Date of Service:  1/28/2018  6:49 AM Creation Time:  1/28/2018  6:49 AM    Status:  Attested :  Jovani Couch MD (Resident)    Cosigner:  Ramy Sotelo MD at 1/29/2018  2:21 PM        Attestation signed by Ramy Sotelo MD at 1/29/2018  2:21 PM        Attestation:  Physician Attestation   I, Ramy Sotelo, saw this patient with the resident and agree with the resident s findings and plan of care as documented in the resident s note.      I personally reviewed vital signs, medications, labs and imaging.    Key findings: 72 year old male with history of metastatic prostate cancer with concerns for GI bleed subsequently developed fever with concerns for PNA vs. UTI.    Ramy Sotelo  Date of Service (when I saw the patient): 1/28/18                               [facility] [place]    Internal Medicine Progress Note - Gracie Service[ES1.1]    Main Plans for Today[ES1.2]   Meropenem and vancomycin   Monitor daily Hb[ES1.3]  PT for leg weakness[ES1.1]   Speech for regurgitation/swallow eval  Blood, sputum, urine culture[ES1.3]     Assessment & Plan[ES1.2]   This a 72-year-old man with a history of metastatic prostate cancer, COPD, hypertension,  atrial fibrillation, CAD, type 2 diabetes, HFpEF, and recent admission for GI bleed now presenting with recurrent symptoms of a GI bleed.   [ES1.1]  #Sepsis  On 1/28, pt spike a fever in context of tachycardia. Mild leukocytosis. Started on broad spectrum Abx. Known to have ESBL in urine previously. Procal 9.03. UA w/ mild pyuria and trace leuk esterase. CXR w/ slight increase in perihilar and bibasilar opacities.  - meropenem and vancomycin  - follow-up blood, sputum, and urine cultures     #NATTY on[ES1.3] CKD   Creatinine at 1.1[ES1.1]s,[ES1.3] which seems to be around baseline. Reported eGFR of 61 probable overestimate given limited muscle mass.[ES1.1] Spiked to 1.54. Pt has some lability on Cr, up to 2.09 on check in 8/2017. Likely prerenal component vs infection in context of chronic disease.   - IVF bolus: LR 500mL x2 since last night w/  mL bolus x1[ES1.3]  - daily BMP[ES1.1]; recheck Cr tomorrow[ES1.3]     #Suspected GI bleed  #Hx of melena  Similar presentation to his last admission. Hemoglobin at discharge 9.5, on arrival 9.6 to ED, then 8.5, then 9.0 on recheck. On GI consult from 1/8, thinking was that he was likely bleeding from stomach, but low probability of endoscopically treatable lesion was low and an EGD was thought to be high risk. Warfarin, aspirin, and NSAIDs were correctly held on discharge. No additional melena since arrival to our institution, and no maroon stools to suggest more brisk bleed. Previous transfusion threshold was 8 on heme-onc service. Case run by GI, who is not recommending endoscopic intervention at this time given stability.[ES1.1] Hb has been stable.[ES1.3]   - type and screen done  - pantoprazole BID  - daily Hb, increase frequency if pt becomes vitally unstable   - continue holding warfarin, aspirin, and ibuprofen  - continued daily folate and iron at discharge  - continue PTA sucralfate 1g QID     #Leg weakness  Likely chronic. Pt ability to move legs is stable. May  "be related to abdominal/hip pain when he moves legs, though he does not endorse pain at rest.  Sensation and movement intact bilaterally  - PT/OT consult[ES1.1]ed and following[ES1.3]  -[ES1.1] PT recommending skilled PT    #Solid food issues  Pt seen w/ partially chewed food in cup on 1/28. Pt concerned about vomiting. Speech saw pt. Assessment reveals pt not chewing food adequately.  - speech following  - dysphagia 1 - pureed diet w/ thin liquids[ES1.3]     #Metastatic prostate cancer  Complex issue; see HPI for brief summary. Has metastases to bone. He has been on numerous therapies (Lupron, Casodex, Xgeva, Xtandi, Zytega+pred, Xofigo, and Megace). Recent note by Dr. Oviedo 12/5 stating they would stop all cancer-directed therapies and refer to hospice, though patient has been resistant to this suggestion.  - clarify goals of care, see below  - holding palliative care consult for now given lack of success at last meeting    #Goals of care  After discussion with patient, I am not sure he quite grasps nature of cancer diagnosis/prognosis. Pt would like to know exactly where cancer is. Wants Dieudonne to be surrogate medical decision maker, if necessary. He is not listed in healthcare directive.   - SW following  - will provide pt w/ information he needs and continue this conversation  - will need to follow-up w/ nursing home   - possible palliative care consult if pt agreeable     #Congnitive impairment  Per recent d/c summary: \"Nursing home visit notes and note by Dr. Rossi with palliative care, patient has mild-moderate impairment based on previous neurocognitive testing. BIMS score 11/15 on 11/16/17. Notes discuss appointing a guardian for patient to make medical decisions. Patient does have 11 children, however notes indicate they do not attend his hospital appointments. Patient explained that his medical decision maker is his \"son\" Les Bourgeois (silvia). Per advanced directive, his primary contact is cousin, Melissa " "[place] with second contact as his son, Jeevan Grey (see advanced directive for phone number). Discussed patient's hospitaliziation and concerns for returning to NH with son, Les 1/10.\"  - goals of care, as above     #Chronic pain:  Pt notes chronic abdominal pain as well as \"left body pain.\" Some pain in L neck.  - continue fentanyl patch q72 hrs ([ES1.1]PTA patch[ES1.3] placed 1/24 0930 per nursing home RN)  - continue PTA oxycodone 5mg q4h PRN  - hold NSAIDS  - acetaminophen PRN     #Constipation  - started on bowel regimen    #Urinary ESBL  Would have completed 5-day course. No dysuria on admission. May be colonized  - hold Abx  - monitor for urinary symptoms   - if symptomatic, will send UA    #BPH  - continued PTA tamsulosin     #Coronary artery disease   #Hypertension  #Chronic diastolic CHF  Last echo 5/2016 with EF ~55% and grade 1 diastolic failure.  - continue holding warfarin, aspirin, and ibuprofen in setting of GI bleed  - continue isosorbide mononitrate 60mg daily and metoprolol 50mg daily with hold parameters BP <120/80 and HR <60  - hold home statin      #Atrial fibrillation  - continue PTA metoprolol as above  - no warfarin or aspirin      #COPD  #ALEXIS  On chronic oxygen at home. Notes indicated that he has been treated multiple times for COPD exacerbations. Follows with Dr. Loera in Pulmonology.   -continue PTA Spiriva and Symbicort  -Duonebs QID PRN      #Diabetes mellitus, type II  Has not been receiving any insulin at home per nurse at his residence.  - Holding any basal insulin  - Hypoglycemia protocol  - Medium ISS      #Lymphedema  #Venous stasis skin changes/ulcerations  - Lymphedema consulted  - Continue PTA Lasix 20mg BID      #Anxiety/Depression  - Continue PTA Cymbalta        #Glaucoma  Follows with [facility].   - continue bimatoprost, timolol, latanoprost, and carboxymethylcellulose drops    Diet:[ES1.1] Dysphagia 1 diet w/ thin liquids[ES1.3]  Fluids: Bolus IVF " as necessary   DVT Prophylaxis: Pneumatic Compression Devices  Code Status: Full Code[ES1.1]    Disposition Plan[ES1.2]   Expected discharge: 1-2, recommended to prior living arrangement once hemoglobin stable[ES1.1], infection worked up and treated,[ES1.3] and goals of care reviewed.      Entered:[ES1.1] Jovani Couch 01/28/2018[ES1.2],[ES1.1] 6:50 AM[ES1.2]   Information in the above section will display in the discharge planner report.      The patient's care was discussed with the Attending Physician, Dr. Andrew Sotelo.[ES1.1]    Jovani Couch[ES1.2], MD  Internal Medicine PGY-1  [facility], p: [phone]    Please see sticky note for cross cover information[ES1.1]    Interval History[ES1.2]   Overnight, pt spiked a fever to 101 w/ tachycardia and hypotension to 80s/40s. Also had concerns over R flank pain. Pain improved sitting in chair this am. Concerned about getting up but feels better in chair. Pt also says he has been throwing up food. Liquids go down well.[ES1.3]     4 point ROS reviewed and negative, except for the above.[ES1.1]     Physical Exam[ES1.2]   Vital Signs:[ES1.1] Temp: 98  F (36.7  C) Temp src: Axillary BP: 99/53   Heart Rate: 115 Resp: 16 SpO2: 99 % O2 Device: Nasal cannula Oxygen Delivery: 2 LPM[ES1.2]  Weight:[ES1.1] 193 lbs 11.2 oz[ES1.2]    General:[ES1.1] partially masticated food cup by pt food tray; sitting in chair[ES1.3]; no acute distress   HEENT: no conjunctival icterus; some missing teeth; normocephalic and atraumatic   Cardiac: normal S1/S2 w/ tachycardic rate; no murmurs noted  Resp: Normal work of breathing, breath sounds symmetric to bases; no wheezes, crackles, or rhonchi  Abd: Soft, non-distended; umbilical hernia is easily reduced; diffuse abdominal tenderness without ridigity  Skin: warm and dry  Extremities: bilateral LE lymphedema  Neuro: Alert and appropriately conversational; responds to commands appropriately, no gross deficits  Psych: Euthymic mood and  normal affec[ES1.1]t[ES1.3]    Data[ES1.2]   Labs, micro, and imaging data reviewed in EPIC.[ES1.1]      Revision History        User Key Date/Time User Provider Type Action    > ES1.3 1/28/2018  4:17 PM Jovani Couch MD Resident Sign     ES1.2 1/28/2018  6:50 AM Jovani Couch MD Resident      ES1.1 1/28/2018  6:49 AM Jovani Couch MD Resident                   Procedure Notes     No notes of this type exist for this encounter.         Progress Notes - Therapies (Notes from 01/27/18 through 01/30/18)      Progress Notes by Karon Willis SLP at 1/28/2018  4:00 PM     Author:  Karon Willis SLP Service:  (none) Author Type:  Speech Therapist    Filed:  1/29/2018  7:56 AM Date of Service:  1/28/2018  4:00 PM Creation Time:  1/29/2018  7:55 AM    Status:  Signed :  Karon Willis SLP (Speech Therapist)            01/28/18 1400   General Information   Onset Date 01/24/18   Start of Care Date 01/28/18   Referring Physician Jovani Couch MD   Patient Profile Review/OT: Additional Occupational Profile Info See Profile for full history and prior level of function   Patient/Family Goals Statement pt not able to state a goal   Swallowing Evaluation Bedside swallow evaluation   Behaviorial Observations Uncooperative;Confused;Alert   Mode of current nutrition Oral diet   Type of oral diet Regular;Thin liquid   Respiratory Status O2 Supply   Type of O2 supply Nasal cannula   Comments 72-year-old man with a history of metastatic prostate cancer, COPD, hypertension, atrial fibrillation, CAD, type 2 diabetes, HFpEF, and recent admission for GI bleed now admitted for GI bleed, now spiking fever to 101 with tachycardia and hypotension concerning for sepsis. SLP consulted due to concern for food regurgitation.   Clinical Swallow Evaluation   Oral Musculature unable to assess due to poor participation/comprehension;other (see comments)  (appears generally  intact)   Structural Abnormalities none present   Dentition other (see comments)  (own teeth, missing many teeth or in poor condition)   Secretion Management other (see comments)  (WFL)   Mucosal Quality good   Mandibular Strength and Mobility intact   Clinical Swallow Eval: Thin Liquid Texture Trial   Mode of Presentation, Thin Liquids straw;fed by clinician   Volume of Liquid or Food Presented 12 oz thin liquids   Oral Phase of Swallow WFL   Pharyngeal Phase of Swallow intact   Diagnostic Statement WNL with thin liquid consistency   Clinical Swallow Eval: Puree Solid Texture Trial   Mode of Presentation, Puree spoon;fed by clinician   Volume of Puree Presented 100% ice cream cup   Oral Phase, Puree WFL   Pharyngeal Phase, Puree intact   Diagnostic Statement WFL with puree texture   Clinical Swallow Eval: Solid Food Texture Trial   Mode of Presentation, Solid self-fed   Volume of Solid Food Presented x1 bite of regular turkey, x1 bite of turkey shredded with gravy   Oral Phase, Solid Poor AP movement;other (see comments)  (excessive mastication, spit out bolus)   Pharyngeal Phase, Solid (did not swallow - spit out all )   Diagnostic Statement Patient not able to chew and breakdown bolus, spit out 100%.   VFSS Evaluation   VFSS Additional Documentation No   FEES Evaluation   Additional Documentation No   Esophageal Phase of Swallow   Patient reports or presents with symptoms of esophageal dysphagia No   Esophageal comments has dx of GI bleed   General Therapy Interventions   Planned Therapy Interventions Dysphagia Treatment   Dysphagia treatment Instruction of safe swallow strategies;Modified diet education   Swallow Eval: Clinical Impressions   Skilled Criteria for Therapy Intervention Skilled criteria met.  Treatment indicated.   Functional Assessment Scale (FAS) 3   Treatment Diagnosis moderate oral dysphagia   Diet texture recommendations Dysphagia diet level 1;Thin liquids   Recommended Feeding/Eating  Techniques alternate between small bites and sips of food/liquid;maintain upright posture during/after eating for 30 mins;small sips/bites   Demonstrates Need for Referral to Another Service occupational therapy;physical therapy;dietitian   Therapy Frequency 5 times/wk   Predicted Duration of Therapy Intervention (days/wks) 2 weeks   Anticipated Discharge Disposition inpatient rehabilitation facility   Risks and Benefits of Treatment have been explained. Yes   Patient, family and/or staff in agreement with Plan of Care Yes   Clinical Impression Comments Clinical dysphagia examination completed per MD order. The patient presents with moderate oral dysphagia marked by excessive mastication, poor bolus manipulation and spitting out of all solid textures. Adequate bolus control with no spitting out, signs of difficulty, or signs of aspiration present with puree textures or thin liquids. Recommend the patient change to dysphagia diet 1 and thin liquids. SLP will f/u for diet tolerance and potential for advancement. Unclear if the patient was able to manipulate more advanced textures well at baseline.   Total Evaluation Time   Total Evaluation Time (Minutes) 20[KA1.1]        Revision History        User Key Date/Time User Provider Type Action    > KA1.1 1/29/2018  7:56 AM Karon Willis, SLP Speech Therapist Sign            Progress Notes by Doreen Dumas OT at 1/28/2018  5:20 PM     Author:  Doreen Dumas OT Service:  (none) Author Type:  Occupational Therapist    Filed:  1/28/2018  5:20 PM Date of Service:  1/28/2018  5:20 PM Creation Time:  1/28/2018  5:20 PM    Status:  Signed :  Doreen Dumas OT (Occupational Therapist)          01/28/18 1710   Quick Adds   Type of Visit Initial Occupational Therapy Evaluation   Living Environment   Lives With facility resident   Living Arrangements extended care facility   Home Accessibility no concerns   Number of Stairs to Enter Home 0   Number of  Stairs Within Home 0   Living Environment Comment Pt resides in SNF.   Self-Care   Dominant Hand right   Usual Activity Tolerance poor   Current Activity Tolerance poor   Regular Exercise yes   Activity/Exercise Type (PT/OT)   Exercise Amount/Frequency 5-7 times/wk   Equipment Currently Used at Home wheelchair, power   Functional Level Prior   Ambulation 4-->completely dependent   Transferring 3-->assistive equipment and person   Toileting 3-->assistive equipment and person   Bathing 3-->assistive equipment and person   Dressing 2-->assistive person   Eating 0-->independent   Communication 0-->understands/communicates without difficulty   Swallowing 0-->swallows foods/liquids without difficulty   Cognition 1 - attention or memory deficits   Fall history within last six months yes   Number of times patient has fallen within last six months 1   Which of the above functional risks had a recent onset or change? fall history   Prior Functional Level Comment Pt is a poor historian.  He reports he was getting assist for all ADL and mobility, however elsewhere in chart it states pt was able to transfer himself.   General Information   Onset of Illness/Injury or Date of Surgery - Date 01/24/18   Referring Physician Jovani Couch MD   Patient/Family Goals Statement to return to LTC   Additional Occupational Profile Info/Pertinent History of Current Problem 72-year-old man with a history of metastatic prostate cancer, COPD, hypertension, atrial fibrillation, CAD, type 2 diabetes, HFpEF, and recent admission for GI bleed now admitted for GI bleed, now spiking fever to 101 with tachycardia and hypotension concerning for sepsis. Has grown ESBL org's in urine prior. Exam remarkable for right sided flank pain which is new per patient and RN. While patient doesn't appear septic, concern for ESBL UTI +/- bacteremia vs nephro/ureterolithiasis   Precautions/Limitations fall precautions   Weight-Bearing Status - LUE full  weight-bearing   Weight-Bearing Status - RUE full weight-bearing   Weight-Bearing Status - LLE full weight-bearing   Cognitive Status Examination   Orientation person;place   Level of Consciousness lethargic/somnolent   Able to Follow Commands mild impairment   Cognitive Comment Pt demonstrates impairments in attention, memory, problem-solving, etc.  He is a poor historian.     Visual Perception   Visual Perception Comments Not tested, pt denies changes   Sensory Examination   Sensory Comments Not tested   Integumentary/Edema   Integumentary/Edema Comments BLE edema   Range of Motion (ROM)   ROM Comment Pt does not follow cues for testing, but appears WFL   Strength   Strength Comments generalized weakness   Mobility   Bed Mobility Bed mobility skill: Supine to sit   Bed Mobility Comments Ax2, max A to move L LE   Transfer Skill: Bed to Chair/Chair to Bed   Level of Wilkinson: Bed to Chair dependent (less than 25% patients effort)   Activities of Daily Living Analysis   Impairments Contributing to Impaired Activities of Daily Living balance impaired;cognition impaired;strength decreased   General Therapy Interventions   Planned Therapy Interventions ADL retraining;strengthening;cognition   Clinical Impression   Criteria for Skilled Therapeutic Interventions Met yes, treatment indicated   OT Diagnosis impaired functional cognition, decreased ability to perform ADL   Influenced by the following impairments decreased strength, impaired cognition   Assessment of Occupational Performance 1-3 Performance Deficits   Identified Performance Deficits transfers, hygiene, dressing   Clinical Decision Making (Complexity) Low complexity   Therapy Frequency 3 times/wk   Predicted Duration of Therapy Intervention (days/wks) 5   Anticipated Discharge Disposition Long Term Care Facility   Risks and Benefits of Treatment have been explained. Yes   Patient, Family & other staff in agreement with plan of care Yes   Clinical  "Impression Comments Pt presents with below baseline cognition and ADL performance d/t decreased strength and cognition.  Pt would benefit from skilled occupational therapy services to address strengthening and cognition and improve ADL performance   [facility] TM \"6 Clicks\"   2016, Trustees of [facility], under license to [employer].  All rights reserved.   6 Clicks Short Forms Daily Activity Inpatient Short Form   [facility] AM-PAC  \"6 Clicks\" Daily Activity Inpatient Short Form   1. Putting on and taking off regular lower body clothing? 1 - Total   2. Bathing (including washing, rinsing, drying)? 1 - Total   3. Toileting, which includes using toilet, bedpan or urinal? 1 - Total   4. Putting on and taking off regular upper body clothing? 1 - Total   5. Taking care of personal grooming such as brushing teeth? 2 - A Lot   6. Eating meals? 3 - A Little   Daily Activity Raw Score (Score out of 24.Lower scores equate to lower levels of function) 9   Total Evaluation Time   Total Evaluation Time (Minutes) 10[KH1.1]        Revision History        User Key Date/Time User Provider Type Action    > KH1.1 1/28/2018  5:20 PM Doreen Dumas OT Occupational Therapist Sign            Progress Notes by Jenn Grant PT at 1/28/2018 12:23 PM     Author:  Jenn Grant PT Service:  (none) Author Type:  Physical Therapist    Filed:  1/28/2018 12:23 PM Date of Service:  1/28/2018 12:23 PM Creation Time:  1/28/2018 12:23 PM    Status:  Signed :  Jenn Grant PT (Physical Therapist)          01/28/18 1104   Quick Adds   Type of Visit Initial PT Evaluation   Living Environment   Lives With facility resident   Home Accessibility no concerns   Number of Stairs to Enter Home 0   Number of Stairs Within Home 0   Living Environment Comment (States he has a power w/c for mobility)   Self-Care   Dominant Hand right   Usual Activity Tolerance poor   Current Activity Tolerance poor   Regular " "Exercise yes   Activity/Exercise Type (Working with PT and OT 1x day; Used [employer])   Exercise Amount/Frequency 5-7 times/wk   Equipment Currently Used at Home wheelchair, power   Activity/Exercise/Self-Care Comment (Was working with PT for a year on B LE strength)   Functional Level Prior   Ambulation 4-->completely dependent   Transferring 3-->assistive equipment and person   Toileting 3-->assistive equipment and person   Bathing 2-->assistive person   Dressing 2-->assistive person   Eating 0-->independent   Communication 0-->understands/communicates without difficulty   Swallowing 0-->swallows foods/liquids without difficulty   Cognition 1 - attention or memory deficits   Fall history within last six months yes   Number of times patient has fallen within last six months 1   Which of the above functional risks had a recent onset or change? fall history   Prior Functional Level Comment States he uses power w/c to get around   General Information   Onset of Illness/Injury or Date of Surgery - Date 01/24/18   Referring Physician Jovani Couch MD   Patient/Family Goals Statement Return to LTC   Pertinent History of Current Problem (include personal factors and/or comorbidities that impact the POC) 72-year-old man with a history of metastatic prostate cancer, COPD, hypertension, atrial fibrillation, CAD, type 2 diabetes, HFpEF, and recent admission for GI bleed now admitted for GI bleed, now spiking fever to 101 with tachycardia and hypotension concerning for sepsis. Has grown ESBL org's in urine prior. Exam remarkable for right sided flank pain which is new per patient and RN. While patient doesn't appear septic, concern for ESBL UTI +/- bacteremia vs nephro/ureterolithiasis   Precautions/Limitations no known precautions/limitations   Heart Disease Risk Factors Diabetes;Lack of physical activity;Overweight;Medical history   General Observations \"Activity: Up with Assist\"   Cognitive Status Examination "   Orientation orientation to person, place and time   Level of Consciousness lethargic/somnolent   Follows Commands and Answers Questions 75% of the time   Personal Safety and Judgment intact   Memory impaired   Pain Assessment   Patient Currently in Pain No   Integumentary/Edema   Integumentary/Edema other (describe)   Integumentary/Edema Comments (B LEs wrapped)   Posture    Posture Protracted shoulders;Forward head position   Range of Motion (ROM)   ROM Comment Impaired- limited by wraps and patient pain. Hamstring tightness noted on R LE   Strength   Strength Comments 3-/5 strength grossly B LEs limited by pain/weakess   Bed Mobility   Bed Mobility Comments Min A supine to side-lying each side with moderate verbal cueing for complete task.   Transfer Skills   Transfer Comments Total assist lift transfer bed to/from w/c.   Gait   Gait Comments Nonambulatory at baseline.   Balance   Balance Comments Unable to assess during initial eval second to fatigue- will assess sitting balance during treatment.   Sensory Examination   Sensory Perception Comments Increased sensitivity to light touch during B LE ROM and strength assessment.   General Therapy Interventions   Planned Therapy Interventions balance training;bed mobility training;manual therapy;gait training;strengthening;stretching;transfer training;wheelchair management/propulsion training;progressive activity/exercise   Clinical Impression   Criteria for Skilled Therapeutic Intervention yes, treatment indicated   PT Diagnosis Generalized weakness   Influenced by the following impairments Cognitive deficits, pain in B LEs   Functional limitations due to impairments poor activity tolerance, increased assist for all functional mobility   Clinical Presentation Evolving/Changing   Clinical Presentation Rationale Multiple personal factors and PMH    Clinical Decision Making (Complexity) Moderate complexity   Therapy Frequency` 5 times/week   Predicted Duration of  "Therapy Intervention (days/wks) 10   Anticipated Discharge Disposition Long Term Care Facility   Risk & Benefits of therapy have been explained Yes   Patient, Family & other staff in agreement with plan of care Yes   [facility] AM-PAC  \"6 Clicks\" V.2 Basic Mobility Inpatient Short Form   1. Turning from your back to your side while in a flat bed without using bedrails? 3 - A Little   2. Moving from lying on your back to sitting on the side of a flat bed without using bedrails? 2 - A Lot   3. Moving to and from a bed to a chair (including a wheelchair)? 1 - Total   4. Standing up from a chair using your arms (e.g., wheelchair, or bedside chair)? 1 - Total   5. To walk in hospital room? 1 - Total   6. Climbing 3-5 steps with a railing? 1 - Total   Basic Mobility Raw Score (Score out of 24.Lower scores equate to lower levels of function) 9   Total Evaluation Time   Total Evaluation Time (Minutes) 8[DK1.1]        Revision History        User Key Date/Time User Provider Type Action    > DK1.1 1/28/2018 12:23 PM Jenn Grant, PT Physical Therapist Sign                                                      INTERAGENCY TRANSFER FORM - LAB / IMAGING / EKG / EMG RESULTS   1/24/2018                       UNIT [address] [facility] BANK: [phone]            Unresulted Labs (24h ago through future)    Start       Ordered    01/29/18 0600  Creatinine  AM DRAW,   Routine     Comments:  Last Lab Result: Creatinine (mg/dL)       Date                     Value                 01/28/2018               1.54 (H)         ----------    01/28/18 1450         Lab Results - 3 Days      Glucose by meter [[id number]] (Abnormal)  Resulted: 01/30/18 1245, Result status: Final result    Ordering provider: Emanuel Estrada MD  01/30/18 1226 Resulting lab: POINT OF CARE TEST, GLUCOSE    Specimen Information    Type Source Collected On     01/30/18 1226          Components       Value Reference Range Flag Lab   Glucose 123 70 - 99 mg/dL H " 170            Basic metabolic panel [[id number]] (Abnormal)  Resulted: 01/30/18 0950, Result status: Final result    Ordering provider: Jovani Couch MD  01/30/18 0000 Resulting lab: [facility]    Specimen Information    Type Source Collected On   Blood  01/30/18 0833          Components       Value Reference Range Flag Lab   Sodium 143 133 - 144 mmol/L  51   Potassium 3.7 3.4 - 5.3 mmol/L  51   Chloride 108 94 - 109 mmol/L  51   Carbon Dioxide 26 20 - 32 mmol/L  51   Anion Gap 9 3 - 14 mmol/L  51   Glucose 115 70 - 99 mg/dL H 51   Urea Nitrogen 23 7 - 30 mg/dL  51   Creatinine 1.30 0.66 - 1.25 mg/dL H 51   GFR Estimate 54 >60 mL/min/1.7m2 L 51   Comment:  Non  GFR Calc   GFR Estimate If Black 66 >60 mL/min/1.7m2  51   Comment:  African American GFR Calc   Calcium 7.6 8.5 - 10.1 mg/dL L 51            CBC with platelets [[id number]] (Abnormal)  Resulted: 01/30/18 0948, Result status: Final result    Ordering provider: Jovani Couch MD  01/30/18 0000 Resulting lab: [facility]    Specimen Information    Type Source Collected On   Blood  01/30/18 0833          Components       Value Reference Range Flag Lab   WBC 10.0 4.0 - 11.0 10e9/L  51   RBC Count 2.68 4.4 - 5.9 10e12/L L 51   Hemoglobin 7.4 13.3 - 17.7 g/dL L 51   Hematocrit 25.1 40.0 - 53.0 % L 51   MCV 94 78 - 100 fl  51   MCH 27.6 26.5 - 33.0 pg  51   MCHC 29.5 31.5 - 36.5 g/dL L 51   RDW 18.7 10.0 - 15.0 % H 51   Platelet Count 261 150 - 450 10e9/L  51            Glucose by meter [[id number]] (Abnormal)  Resulted: 01/30/18 0826, Result status: Final result    Ordering provider: Emaneul Estrada MD  01/30/18 0807 Resulting lab: POINT OF CARE TEST, GLUCOSE    Specimen Information    Type Source Collected On     01/30/18 0807          Components       Value Reference Range Flag Lab   Glucose 117 70 - 99 mg/dL H 170            Blood culture [[id number]]   Resulted: 01/30/18 0732, Result status: Preliminary result    Ordering provider: Jose Campos MD  01/27/18 2146 Resulting lab: [facility]    Specimen Information    Type Source Collected On   Blood  01/27/18 2225   Comment:  Left Arm          Components       Value Reference Range Flag Lab   Specimen Description Blood Left Arm      Culture Micro No growth after 3 days   225            Blood culture [[id number]]  Resulted: 01/30/18 0732, Result status: Preliminary result    Ordering provider: Jose Campos MD  01/27/18 2146 Resulting lab: [facility]    Specimen Information    Type Source Collected On   Blood  01/27/18 2205   Comment:  Right Arm          Components       Value Reference Range Flag Lab   Specimen Description Blood Right Arm      Culture Micro No growth after 3 days   225            Glucose by meter [[id number]] (Abnormal)  Resulted: 01/30/18 0300, Result status: Final result    Ordering provider: Emanuel Estrada MD  01/30/18 0207 Resulting lab: POINT OF CARE TEST, GLUCOSE    Specimen Information    Type Source Collected On     01/30/18 0207          Components       Value Reference Range Flag Lab   Glucose 104 70 - 99 mg/dL H 170            Glucose by meter [[id number]]  Resulted: 01/29/18 2225, Result status: Final result    Ordering provider: Emanuel Estrada MD  01/29/18 2221 Resulting lab: POINT OF CARE TEST, GLUCOSE    Specimen Information    Type Source Collected On     01/29/18 2221          Components       Value Reference Range Flag Lab   Glucose 99 70 - 99 mg/dL  170            Glucose by meter [[id number]] (Abnormal)  Resulted: 01/29/18 1720, Result status: Final result    Ordering provider: Emanuel Estrada MD  01/29/18 1710 Resulting lab: POINT OF CARE TEST, GLUCOSE    Specimen Information    Type Source Collected On     01/29/18 1710          Components       Value Reference Range Flag Lab    Glucose 127 70 - 99 mg/dL H 170            Methicillin Resist/Sens S. aureus PCR [[id number]]  Resulted: 01/29/18 1247, Result status: Final result    Ordering provider: Ramy Sotelo MD  01/29/18 0951 Resulting lab: [facility]    Specimen Information    Type Source Collected On   Nares  01/29/18 0948          Components       Value Reference Range Flag Lab   Specimen Description Nares   51   Methicillin Resist/Sens S. aureus PCR Negative NEG^Negative  75   Comment:         MRSA Negative: SA Negative  MRSA and Staphylococcus aureus target DNA not   detected, presumed negative for MRSA and SA colonization or the number of   bacteria present may be below the limit of detection for the assay. FDA   approved assay performed using [employer] GeneXpert(R) real-time PCR.              Glucose by meter [[id number]] (Abnormal)  Resulted: 01/29/18 1236, Result status: Final result    Ordering provider: Emanuel Estrada MD  01/29/18 1212 Resulting lab: POINT OF CARE TEST, GLUCOSE    Specimen Information    Type Source Collected On     01/29/18 1212          Components       Value Reference Range Flag Lab   Glucose 107 70 - 99 mg/dL H 170            Methicillin Resist/Sens S. aureus PCR [[id number]] (Abnormal)  Resulted: 01/29/18 1116, Result status: Final result    Ordering provider: Jovani Couch MD  01/29/18 0622 Resulting lab: [facility]    Specimen Information    Type Source Collected On   Nares  01/29/18 0914          Components       Value Reference Range Flag Lab   Specimen Description Nares   51   Methicillin Resist/Sens S. aureus PCR Canceled, Test credited NEG^Negative A 75   Comment:         Specimen not labeled  Notification of test cancellation was given to  Omid aBugh RN from  per Julio César Brown from [facility].1/29/18 at 0940.TV.              Basic metabolic panel [[id number]] (Abnormal)  Resulted: 01/29/18 0826, Result  status: Final result    Ordering provider: Scarlett Moore MD  01/29/18 0000 Resulting lab: [facility]    Specimen Information    Type Source Collected On   Blood  01/29/18 0738          Components       Value Reference Range Flag Lab   Sodium 143 133 - 144 mmol/L  51   Potassium 3.8 3.4 - 5.3 mmol/L  51   Chloride 107 94 - 109 mmol/L  51   Carbon Dioxide 26 20 - 32 mmol/L  51   Anion Gap 10 3 - 14 mmol/L  51   Glucose 111 70 - 99 mg/dL H 51   Urea Nitrogen 26 7 - 30 mg/dL  51   Creatinine 1.42 0.66 - 1.25 mg/dL H 51   GFR Estimate 49 >60 mL/min/1.7m2 L 51   Comment:  Non  GFR Calc   GFR Estimate If Black 59 >60 mL/min/1.7m2 L 51   Comment:  African American GFR Calc   Calcium 7.6 8.5 - 10.1 mg/dL L 51            Glucose by meter [[id number]] (Abnormal)  Resulted: 01/29/18 0821, Result status: Final result    Ordering provider: Emanuel Estrada MD  01/29/18 0807 Resulting lab: POINT OF CARE TEST, GLUCOSE    Specimen Information    Type Source Collected On     01/29/18 0807          Components       Value Reference Range Flag Lab   Glucose 111 70 - 99 mg/dL H 170            CBC with platelets [[id number]] (Abnormal)  Resulted: 01/29/18 0812, Result status: Final result    Ordering provider: Scarlett Moore MD  01/29/18 0000 Resulting lab: [facility]    Specimen Information    Type Source Collected On   Blood  01/29/18 0738          Components       Value Reference Range Flag Lab   WBC 10.0 4.0 - 11.0 10e9/L  51   RBC Count 2.79 4.4 - 5.9 10e12/L L 51   Hemoglobin 7.6 13.3 - 17.7 g/dL L 51   Hematocrit 25.5 40.0 - 53.0 % L 51   MCV 91 78 - 100 fl  51   MCH 27.2 26.5 - 33.0 pg  51   MCHC 29.8 31.5 - 36.5 g/dL L 51   RDW 18.6 10.0 - 15.0 % H 51   Platelet Count 243 150 - 450 10e9/L  51            Urine Culture Aerobic Bacterial [[id number]]  Resulted: 01/29/18 0422, Result status: Final result    Ordering provider:  Jose Campos MD  01/28/18 0003 Resulting lab: [facility]    Specimen Information    Type Source Collected On   Midstream Urine Urine clean catch 01/27/18 2300          Components       Value Reference Range Flag Lab   Specimen Description Midstream Urine      Special Requests Specimen received in preservative   75   Culture Micro No growth   225            Glucose by meter [[id number]]  Resulted: 01/29/18 0216, Result status: Final result    Ordering provider: Emanuel Estrada MD  01/29/18 0207 Resulting lab: POINT OF CARE TEST, GLUCOSE    Specimen Information    Type Source Collected On     01/29/18 0207          Components       Value Reference Range Flag Lab   Glucose 99 70 - 99 mg/dL  170            Glucose by meter [[id number]] (Abnormal)  Resulted: 01/29/18 0210, Result status: Final result    Ordering provider: Emanuel Estrada MD  01/28/18 2121 Resulting lab: POINT OF CARE TEST, GLUCOSE    Specimen Information    Type Source Collected On     01/28/18 2121          Components       Value Reference Range Flag Lab   Glucose 107 70 - 99 mg/dL H 170            Glucose by meter [[id number]] (Abnormal)  Resulted: 01/28/18 1816, Result status: Final result    Ordering provider: Emanuel Estrada MD  01/28/18 1719 Resulting lab: POINT OF CARE TEST, GLUCOSE    Specimen Information    Type Source Collected On     01/28/18 1719          Components       Value Reference Range Flag Lab   Glucose 124 70 - 99 mg/dL H 170            Glucose by meter [[id number]] (Abnormal)  Resulted: 01/28/18 1505, Result status: Final result    Ordering provider: Emanuel Estrada MD  01/28/18 1332 Resulting lab: POINT OF CARE TEST, GLUCOSE    Specimen Information    Type Source Collected On     01/28/18 1332          Components       Value Reference Range Flag Lab   Glucose 108 70 - 99 mg/dL H 170            Creatinine [[id number]] (Abnormal)  Resulted: 01/28/18 1420, Result  status: Final result    Ordering provider: Jovani Couch MD  01/28/18 0719 Resulting lab: [facility]    Specimen Information    Type Source Collected On     01/28/18 0719          Components       Value Reference Range Flag Lab   Creatinine 1.54 0.66 - 1.25 mg/dL H 51   GFR Estimate 45 >60 mL/min/1.7m2 L 51   Comment:  Non  GFR Calc   GFR Estimate If Black 54 >60 mL/min/1.7m2 L 51   Comment:   GFR Calc            UA with Microscopic [[id number]] (Abnormal)  Resulted: 01/28/18 1337, Result status: Final result    Ordering provider: Jose Campos MD  01/28/18 0003 Resulting lab: [facility]    Specimen Information    Type Source Collected On   Catheterized Urine Urine catheter 01/28/18 1250          Components       Value Reference Range Flag Lab   Color Urine Yellow   51   Appearance Urine Clear   51   Glucose Urine Negative NEG^Negative mg/dL  51   Bilirubin Urine Negative NEG^Negative  51   Ketones Urine 10 NEG^Negative mg/dL A 51   Specific Gravity Urine 1.019 1.003 - 1.035  51   Blood Urine Negative NEG^Negative  51   pH Urine 5.5 5.0 - 7.0 pH  51   Protein Albumin Urine 30 NEG^Negative mg/dL A 51   Urobilinogen mg/dL 2.0 0.0 - 2.0 mg/dL  51   Nitrite Urine Negative NEG^Negative  51   Leukocyte Esterase Urine Trace NEG^Negative A 51   Source Catheterized Urine   51   WBC Urine 9 0 - 2 /HPF H 51   RBC Urine 2 0 - 2 /HPF  51   Squamous Epithelial /HPF Urine <1 0 - 1 /HPF  51   Mucous Urine Present NEG^Negative /LPF A 51   Hyaline Casts 2 0 - 2 /LPF  51   Granular Casts 1 NEG^Negative /LPF A 51            Glucose by meter [[id number]]  Resulted: 01/28/18 1035, Result status: Final result    Ordering provider: Emanuel Estrada MD  01/28/18 1020 Resulting lab: POINT OF CARE TEST, GLUCOSE    Specimen Information    Type Source Collected On     01/28/18 1020          Components       Value Reference  Range Flag Lab   Glucose 99 70 - 99 mg/dL  170            Procalcitonin [[id number]]  Resulted: 01/28/18 0834, Result status: Final result    Ordering provider: Jovani Couch MD  01/28/18 0648 Resulting lab: [facility]    Specimen Information    Type Source Collected On   Blood  01/28/18 0719          Components       Value Reference Range Flag Lab   Procalcitonin 9.03 ng/ml  51   Comment:         2.00-9.99 ng/ml  High risk for progression to severe sepsis.  Recommendation: Strongly recommend initiating or continuing antibiotics.   Evaluate culture results and clinical features to target antibacterial   therapy. Obtain blood cultures and other relevant cultures if not done. Repeat   PCT in 2 days to guide antibiotic de-escalation. Consider de-escalating   antibiotics when PCT concentration is <80% of peak or abs PCT <0.5.              CBC with platelets [[id number]] (Abnormal)  Resulted: 01/28/18 0804, Result status: Final result    Ordering provider: Jovani Couch MD  01/28/18 0000 Resulting lab: [facility]    Specimen Information    Type Source Collected On   Blood  01/28/18 0719          Components       Value Reference Range Flag Lab   WBC 10.9 4.0 - 11.0 10e9/L  51   RBC Count 2.93 4.4 - 5.9 10e12/L L 51   Hemoglobin 8.1 13.3 - 17.7 g/dL L 51   Hematocrit 26.9 40.0 - 53.0 % L 51   MCV 92 78 - 100 fl  51   MCH 27.6 26.5 - 33.0 pg  51   MCHC 30.1 31.5 - 36.5 g/dL L 51   RDW 18.5 10.0 - 15.0 % H 51   Platelet Count 242 150 - 450 10e9/L  51            Glucose by meter [[id number]] (Abnormal)  Resulted: 01/28/18 0711, Result status: Final result    Ordering provider: Emanuel Estrada MD  01/27/18 2129 Resulting lab: POINT OF CARE TEST, GLUCOSE    Specimen Information    Type Source Collected On     01/27/18 2129          Components       Value Reference Range Flag Lab   Glucose 122 70 - 99 mg/dL H 170            Glucose by  meter [[id number]] (Abnormal)  Resulted: 01/28/18 0231, Result status: Final result    Ordering provider: Emanuel Estrada MD  01/28/18 0155 Resulting lab: POINT OF CARE TEST, GLUCOSE    Specimen Information    Type Source Collected On     01/28/18 0155          Components       Value Reference Range Flag Lab   Glucose 112 70 - 99 mg/dL H 170            Influenza A and B and RSV PCR [[id number]]  Resulted: 01/28/18 0120, Result status: Final result    Ordering provider: Jose Campos MD  01/27/18 2149 Resulting lab: [facility]    Specimen Information    Type Source Collected On   Nasopharyngeal Nasopharyngeal 01/27/18 2300          Components       Value Reference Range Flag Lab   Specimen Description Nasopharyngeal   51   Influenza A PCR Negative NEG^Negative  75   Comment:         Flu A target RNA not detected, presumed negative for Influenza A or the viral   load is below the limit of detection.     Influenza B PCR Negative NEG^Negative  75   Comment:         Flu B target RNA not detected, presumed negative for Influenza B or the viral   load is below the limit of detection.     Resp Syncytial Virus Negative NEG^Negative  75   Comment:         RSV target RNA not detected, presumed negative for Respiratory Syncitial Virus   or the viral load is below the limit of detection.  FDA approved assay performed using [employer] GeneXpert(R) real-time PCR.              Lactic acid whole blood [[id number]]  Resulted: 01/27/18 2313, Result status: Final result    Ordering provider: Raym Sotelo MD  01/27/18 2149 Resulting lab: [facility]    Specimen Information    Type Source Collected On   Blood  01/27/18 2308          Components       Value Reference Range Flag Lab   Lactic Acid 1.0 0.7 - 2.0 mmol/L  51            CBC with platelets [[id number]] (Abnormal)  Resulted: 01/27/18 2235, Result status: Final result    Ordering provider: Jose Campos  MD Rudy  01/27/18 2147 Resulting lab: [facility]    Specimen Information    Type Source Collected On   Blood  01/27/18 2205          Components       Value Reference Range Flag Lab   WBC 11.7 4.0 - 11.0 10e9/L H 51   RBC Count 2.96 4.4 - 5.9 10e12/L L 51   Hemoglobin 8.1 13.3 - 17.7 g/dL L 51   Hematocrit 27.4 40.0 - 53.0 % L 51   MCV 93 78 - 100 fl  51   MCH 27.4 26.5 - 33.0 pg  51   MCHC 29.6 31.5 - 36.5 g/dL L 51   RDW 18.3 10.0 - 15.0 % H 51   Platelet Count 238 150 - 450 10e9/L  51            Glucose by meter [[id number]] (Abnormal)  Resulted: 01/27/18 1806, Result status: Final result    Ordering provider: Emanuel Estrada MD  01/27/18 1728 Resulting lab: POINT OF CARE TEST, GLUCOSE    Specimen Information    Type Source Collected On     01/27/18 1728          Components       Value Reference Range Flag Lab   Glucose 111 70 - 99 mg/dL H 170            Glucose by meter [[id number]]  Resulted: 01/27/18 1216, Result status: Final result    Ordering provider: Emanuel Estrada MD  01/27/18 1208 Resulting lab: POINT OF CARE TEST, GLUCOSE    Specimen Information    Type Source Collected On     01/27/18 1208          Components       Value Reference Range Flag Lab   Glucose 71 70 - 99 mg/dL  170            Glucose by meter [[id number]] (Abnormal)  Resulted: 01/27/18 0821, Result status: Final result    Ordering provider: Emanuel Estrada MD  01/27/18 0809 Resulting lab: POINT OF CARE TEST, GLUCOSE    Specimen Information    Type Source Collected On     01/27/18 0809          Components       Value Reference Range Flag Lab   Glucose 170 70 - 99 mg/dL H 170            Glucose by meter [[id number]] (Abnormal)  Resulted: 01/27/18 0320, Result status: Final result    Ordering provider: Emanuel Estrada MD  01/27/18 0316 Resulting lab: POINT OF CARE TEST, GLUCOSE    Specimen Information    Type Source Collected On     01/27/18 0316          Components        Value Reference Range Flag Lab   Glucose 110 70 - 99 mg/dL H 170            Glucose by meter [[id number]]  Resulted: 01/27/18 0241, Result status: Final result    Ordering provider: Emanuel Estrada MD  01/27/18 0225 Resulting lab: POINT OF CARE TEST, GLUCOSE    Specimen Information    Type Source Collected On     01/27/18 0225          Components       Value Reference Range Flag Lab   Glucose 89 70 - 99 mg/dL  170            Testing Performed By     Lab - Abbreviation Name Director Address Valid Date Range    51 - Unknown [facility] Unknown [address] [facility] [id number] 12/31/14 1010 - Present    75 - Unknown [facility] Unknown [address] [facility] [address] 01/15/15 1019 - Present    170 - Unknown POINT OF CARE TEST, GLUCOSE Unknown Unknown 10/31/11 1114 - Present    225 - Unknown [facility] Unknown [address] [facility] [address] 12/19/14 0954 - Present               Imaging Results - 3 Days      CT Abdomen Pelvis w/o Contrast [[id number]]  Resulted: 01/28/18 0826, Result status: Final result    Ordering provider: Jose Campos MD  01/27/18 2215 Resulted by: Tisha Gaspar MD Schat, Robben, MD    Performed: 01/28/18 0228 - 01/28/18 0233 Resulting lab: RADIOLOGY RESULTS    Narrative:       EXAMINATION: CT ABDOMEN PELVIS W/O CONTRAST, 1/28/2018 2:33 AM    TECHNIQUE:  Helical CT images from the lung bases through the pubic  symphysis were obtained without IV contrast.     COMPARISON: 1/8/2018 CT abdomen/pelvis    HISTORY: 73yo M with flank pain. CT for stone protocol;     FINDINGS:    Abdomen and pelvis: 4 mm calyceal stone in the midpole of the right  kidney and punctate calyceal stone in the lower pole of the right  kidney. Unchanged mild dilation of the distal ureters, more prominent  on the left with punctate nonobstructing calculus at the dependent  aspect of the left ureter  (series 2 image 139) without associated  proximal ureteral dilation. No obstructing ureteral calculi  identified. No hydronephrosis or hydroureter The bladder is within  normal limits. Numerous bilateral renal cystic lesions are not  significant changed since 1/8/2018 CT. Unchanged 2.3 cm solid mass  arising from the lower pole of the right kidney is better  characterized on 1/8/2018 contrast-enhanced CT .    The liver, gallbladder, spleen, and pancreas are unremarkable.  Unchanged left adrenal nodule measuring 1.8 cm in its greatest  dimension. The right adrenal gland is within normal limits. Dystrophic  calcifications in the nonenlarged prostate. No free fluid or free air.  No bowel obstruction or inflammation. Normal appendix. Diffuse  atherosclerotic calcification of the abdominal aorta with unchanged  3.4 cm infrarenal abdominal aortic aneurysm. Aneurysmal common iliac  arteries, measuring 2.0 cm on the right and 2.1 cm on the left, also  unchanged since prior study. 11 mm right common iliac chain lymph node  (series 2 image 99), unchanged since prior study.    Lung bases: New bibasilar nodular and consolidative opacities  representing atelectasis, infection or aspiration. Bones and soft  tissues: Diffusely sclerotic bones, not significant changed. No  pathologic fractures identified. Unchanged fat-containing  periumbilical hernia.      Impression:       IMPRESSION:   1. Nonobstructing right calyceal stones measuring up to 5 mm. Chronic  dilation of the distal left ureter with a punctate nonobstructing  stone layering dependently. No obstructing urinary tract stones. No  acute intra-abdominal pathology.  2. Unchanged right lower pole solid renal mass.  3. Unchanged indeterminate 11 mm right common iliac chain lymph node.   4. Unchanged 3.4 cm infrarenal abdominal aortic aneurysm and  aneurysmal common iliac arteries.  5. Diffuse sclerotic osseous metastases are not significant changed.  6. New bibasilar  opacities, representing atelectasis, aspiration or  infection.    I have personally reviewed the examination and initial interpretation  and I agree with the findings.    GARRETT BLANC MD      XR Chest Port 1 View [[id number]]  Resulted: 01/28/18 0714, Result status: Final result    Ordering provider: Jose Campos MD  01/27/18 4580 Resulted by: Garrett Blanc MD Schat, Robben, MD    Performed: 01/28/18 0156 - 01/28/18 0156 Resulting lab: RADIOLOGY RESULTS    Narrative:       Exam: XR CHEST PORT 1 VW, 1/28/2018 1:58 AM    Indication: 73 yo male sepsis concern for PNA;     Comparison: 1/24/2018     Findings:   The cardiomediastinal silhouette and pulmonary vasculature are within  normal limits. No pleural effusion or pneumothorax. Increased streaky  left midlung and basilar opacities. Subtle mixed interstitial and  airspace opacities in the right midlung and bilateral bases have also  slightly increased.       Impression:       Impression: Slightly increased perihilar and bibasilar opacities,  representing atelectasis and/or infection.    I have personally reviewed the examination and initial interpretation  and I agree with the findings.    GARRETT BLANC MD      Testing Performed By     Lab - Abbreviation Name Director Address Valid Date Range    104 - Rad [facility] RADIOLOGY RESULTS Unknown Unknown 02/16/05 1553 - Present            Encounter-Level Documents:     There are no encounter-level documents.      Order-Level Documents:     There are no order-level documents.

## 2018-01-24 NOTE — IP AVS SNAPSHOT
[address] [address]    Phone:  [phone]                                       After Visit Summary   1/24/2018    Tomas Grey    MRN: [id number]           After Visit Summary Signature Page     I have received my discharge instructions, and my questions have been answered. I have discussed any challenges I see with this plan with the nurse or doctor.    ..........................................................................................................................................  Patient/Patient Representative Signature      ..........................................................................................................................................  Patient Representative Print Name and Relationship to Patient    ..................................................               ................................................  Date                                            Time    ..........................................................................................................................................  Reviewed by Signature/Title    ...................................................              ..............................................  Date                                                            Time

## 2018-01-24 NOTE — LETTER
Health Information Management Services               Recipient:  [facility]        Sender:  DAGO Nuñez, LGSW  [address]   Pager [phone]  Phone [phone]          Date: January 30, 2018  Patient Name:  Tomas Grey  Routing Message:    Discharge orders        The documents accompanying this notice contain confidential information belonging to the sender.  This information is intended only for the use of the individual or entity named above.  The authorized recipient of this information is prohibited from disclosing this information to any other party and is required to destroy the information after its stated need has been fulfilled, unless otherwise required by state law.      If you are not the intended recipient, you are hereby notified that any disclosure, copying, distribution or action taken in reliance on the contents of these documents is strictly prohibited. If you have received this document in error, please notify [place] immediately at [phone].  You may return the document via fax ([phone]) or return mail  (Health Information Management, , [address] [place] [address]).

## 2018-01-25 NOTE — PROGRESS NOTES
Gastroenterology Fellow Brief Note:    Assessment:  Tomas Grey is a 72 year old male with a past medical history of prostate cancer with extensive metastasis to the bone not on current therapy given poor functional status and recurrent admissions for COPD/infections, COPD on 2L home oxygen, chronic pain on fentanyl patch, transfusion-dependent anemia (b/l ~7), and chronic renal insufficiency and recent hospitalization with anemia and a Hb of 3.2 that improved to 10 with 3U PRBC (GI was consulted but deferred any endoscopic evaluation due to patient's poor prognosis) who is now presenting with a a 1 day history of melena with a Hb that is stable on admission [Hb 9.5 on discharge 1/13/18, 9.0 on admission 1/24/18]. IBRAHIMA was negative as per ED note for melenic stool.     Patient overall with very poor prognosis. He may have a source of bleeding but with a negative IBRAHIMA and a stable Hb, it is hard to justify an endoscopic evaluation at this time given his subacute presentation and significant risks.  EGD can be performed, but it is high risk particularly for sedation related complications such as aspiration, and will require anesthesia to monitor his sedation. Additionally I think it unlikely to be a therapeutic endoscopy and more likely a diagnostic endoscopy. Will defer any endoscopic evaluation at this time. If he were to show signs of clinically significant GI bleeding (Hb drop > 2 gm in 24 hours or signs of hemodynamic compromise), risks and benefits of endoscopic evaluation can be discussed with the patient.     Please feel free to call the Gastroenterology team on call with questions or concerns.    Hannah Saxena  Gastroenterology Fellow

## 2018-01-25 NOTE — ED PROVIDER NOTES
History     Chief Complaint   Patient presents with     Abdominal Pain     Melena     HPI  Tomas Grey is a 72 year old male with a history of metastatic prostate cancer, CAD, CKD, COPD, hypertension, atrial fibrillation on Coumadin, type 2 diabetes, and chronic pain with an emergency care plan in place who presents the emergency department today via EMS from [facility] for evaluation of abdominal pain and concern for melena.  The patient reports that on Monday, 2 days ago, he developed diffuse, cramping abdominal pain that has persisted.  He says that since then he has developed generalized myalgias.  The patient was in this timeframe as well, he has had black, tar-like stool, with report of such bowel movements this morning, last at noon today.  He states that due to the intensity of his abdominal discomfort today he decided to come to the ER for further evaluation.  In addition to the above, the patient does report a single, small episode of vomiting.  The patient denies any fevers.  No chest pain.  He does report chronic breathing difficulty secondary COPD which is unchanged from baseline.  Of note the patient does admit that suffers from chronic pain related to bone metastasis.  The patient notably was hospitalized here 1/8-1/14 under Hematology/Oncology for abdominal pain, anorexia, and similarly concern for melena of 4 days duration.  He was found to have hemoglobin of 3.2 at that time, was transfused 3 units of PRBCs with improvement in hemoglobin to 11.  Consulting GI specialist felt risk outweighed benefit of doing procedural imaging such as EGD/colonoscopy.  The patient was started on PPI at that time as well.  He was discharged with resolution of melena and improvement of abdominal pain.  CT of the abdomen at that time was negative for acute process.      No current facility-administered medications for this encounter.      Current Outpatient Prescriptions   Medication     ferrous  sulfate (IRON) 325 (65 FE) MG tablet     ATORVASTATIN CALCIUM PO     latanoprost (XALATAN) 0.005 % ophthalmic solution     omeprazole (PRILOSEC) 40 MG capsule     ipratropium - albuterol 0.5 mg/2.5 mg/3 mL (DUONEB) 0.5-2.5 (3) MG/3ML neb solution     insulin aspart (NOVOLOG FLEXPEN) 100 UNIT/ML injection     insulin aspart (NOVOLOG FLEXPEN) 100 UNIT/ML injection     fentaNYL (DURAGESIC) 12 mcg/hr 72 hr patch     oxyCODONE IR (ROXICODONE) 5 MG tablet     acetaminophen (TYLENOL) 500 MG tablet     isosorbide mononitrate (IMDUR) 30 MG 24 hr tablet     nitroGLYcerin (NITROSTAT) 0.4 MG sublingual tablet     budesonide-formoterol (SYMBICORT) 160-4.5 MCG/ACT Inhaler     tiotropium (SPIRIVA) 18 MCG capsule     DULoxetine (CYMBALTA) 20 MG EC capsule     ondansetron 4 MG FILM     metoprolol succinate (TOPROL XL) 50 MG 24 hr tablet     ammonium lactate (LAC-HYDRIN) 12 % lotion     diclofenac (VOLTAREN) 1 % GEL topical gel     furosemide (LASIX) 20 MG tablet     folic acid (FOLVITE) 1 MG tablet     polyethylene glycol (MIRALAX/GLYCOLAX) Packet     senna-docusate (SENOKOT-S;PERICOLACE) 8.6-50 MG per tablet     calcium carbonate (OS-JORDAN 600 MG [place]. CA) 1500 (600 CA) MG tablet     tamsulosin (FLOMAX) 0.4 MG capsule     simethicone (MYLICON) 40 MG/0.6ML suspension     bimatoprost (LUMIGAN) 0.01 % SOLN     Carboxymethylcellulose Sod PF (REFRESH PLUS) 0.5 % SOLN ophthalmic solution     sucralfate (CARAFATE) 1 GM/10ML suspension     METHOCARBAMOL PO     Timolol Hemihydrate (BETIMOL OP)     [DISCONTINUED] enzalutamide (XTANDI) 40 MG capsule     Past Medical History:   Diagnosis Date     Anemia      Anxiety      Aspiration pneumonia (H) 2014     C. difficile colitis      CAD (coronary artery disease)      Chronic pain      CKD (chronic kidney disease)      COPD (chronic obstructive pulmonary disease) (H)      Depressive disorder      Diabetes mellitus (H)      Hypertension      Insomnia      ALEXIS (obstructive sleep apnea)       "Osteoporosis      Prostate cancer metastatic to multiple sites (H)        Past Surgical History:   Procedure Laterality Date     ABDOMEN SURGERY       ARTHROSCOPY KNEE       EYE SURGERY         Family History   Problem Relation Age of Onset     DIABETES Mother      CANCER Mother      stomach       Social History   Substance Use Topics     Smoking status: Former Smoker     Smokeless tobacco: Never Used     Alcohol use No     Allergies   Allergen Reactions     Morphine Other (See Comments)     Patient reports makes him almost go into a coma       I have reviewed the Medications, Allergies, Past Medical and Surgical History, and Social History in the Epic system.    Review of Systems   Constitutional: Negative for fever.   Respiratory: Negative for shortness of breath.    Cardiovascular: Negative for chest pain.   Gastrointestinal: Positive for blood in stool (Concern for melena), diarrhea (Black, tarry) and vomiting (x1 episode). Negative for abdominal pain.   All other systems reviewed and are negative.      Physical Exam   BP: 116/62  Pulse: 95  Temp: 98.6  F (37  C)  Resp: 16  Height: 180.3 cm (5' 11\")  Weight: 86.6 kg (191 lb)  SpO2: 98 %      Physical Exam   Constitutional: He is oriented to person, place, and time. No distress.   HENT:   Head: Atraumatic.   Mouth/Throat: Oropharynx is clear and moist. No oropharyngeal exudate.   Eyes: Pupils are equal, round, and reactive to light. No scleral icterus.   Cardiovascular: Normal rate, regular rhythm, normal heart sounds and intact distal pulses.    Pulmonary/Chest: Breath sounds normal. No respiratory distress.   Abdominal: Soft. Bowel sounds are normal. There is no tenderness.   Musculoskeletal: He exhibits no edema or tenderness.   Neurological: He is alert and oriented to person, place, and time.   Skin: Skin is warm. No rash noted. He is not diaphoretic.   Nursing note and vitals reviewed.      ED Course     ED Course     Procedures             Critical Care " time:  none             Labs Ordered and Resulted from Time of ED Arrival Up to the Time of Departure from the ED   CBC WITH PLATELETS DIFFERENTIAL - Abnormal; Notable for the following:        Result Value    RBC Count 3.52 (*)     Hemoglobin 9.6 (*)     Hematocrit 33.2 (*)     MCHC 28.9 (*)     RDW 18.6 (*)     All other components within normal limits   COMPREHENSIVE METABOLIC PANEL - Abnormal; Notable for the following:     Albumin 2.5 (*)     Alkaline Phosphatase 222 (*)     All other components within normal limits   PARTIAL THROMBOPLASTIN TIME - Abnormal; Notable for the following:     PTT 38 (*)     All other components within normal limits   LIPASE   TROPONIN I   INR   PERIPHERAL IV CATHETER   OCCULT BLOOD STOOL POCT   ABO/RH TYPE AND SCREEN            Assessments & Plan (with Medical Decision Making)     72 year old male with a history of metastatic prostate cancer, CAD, CKD, COPD, hypertension, atrial fibrillation on Coumadin, type 2 diabetes, and chronic pain with an emergency care plan in place who presents the emergency department today via EMS from [facility] for evaluation of abdominal pain and concern for melena. IV established and labs sent remarkable HgB 9.6, with repeat 8.5 two hours later. CXR obtained and negative. Patient given protonix 40 mg IV after discussing with GI. Call placed to Medicine and arrangements made for admission.     I have reviewed the nursing notes.    I have reviewed the findings, diagnosis, plan and need for follow up with the patient.    New Prescriptions    No medications on file       Final diagnoses:   Gastrointestinal hemorrhage associated with gastritis, unspecified gastritis type     I, Ranjit Welsh, am serving as a trained medical scribe to document services personally performed by Landon Griffiths MD, based on the provider's statements to me.      I, Landon Griffiths MD, was physically present and have reviewed and verified the accuracy of this note  documented by Ranjit Welsh.    1/24/2018   [facility], [place], EMERGENCY DEPARTMENT     Aye, Landon [place] MD  01/28/18 [id number]

## 2018-01-25 NOTE — ED NOTES
Bed: ED17  Expected date:   Expected time:   Means of arrival:   Comments:  N 733  71 F  Abdominal pain

## 2018-01-25 NOTE — PLAN OF CARE
"Problem: Patient Care Overview  Goal: Plan of Care/Patient Progress Review  Outcome: No Change  /58 (BP Location: Left arm)  Pulse 95  Temp 97.7  F (36.5  C) (Oral)  Resp 16  Ht 1.803 m (5' 11\")  Wt 87.9 kg (193 lb 11.2 oz)  SpO2 97%  BMI 27.02 kg/m2    Neuro: A/Ox4. Repeated requests.   Cardiac: SR -sinus tach with HR 90 - 110. VSS. Afebrile.   Respiratory: O2 sats stable on 2L NC.   GI/: Loose BM x1.   Diet/appetite: NPO with ice chips.   Activity:  Ax 1 to WC.   Pain: At acceptable level on current regimen.   Skin: Intact, bilat LE ulcers - MD notified for WOC consult to see ulcers.     MIV at 75mL/hr.    R: Continue with POC. Notify primary team with changes.      "

## 2018-01-25 NOTE — PLAN OF CARE
"/56 (BP Location: Left arm)  Pulse 95  Temp 98.5  F (36.9  C) (Oral)  Resp 16  Ht 1.803 m (5' 11\")  Wt 87.9 kg (193 lb 11.2 oz)  SpO2 98%  BMI 27.02 kg/m2    Transfer  Transferred to: 5A   Via: wheelchair- nursing transported   Reason for transfer:Pt no longer appropriate for 6B- improved patient condition, Hgb increasing, stools today not bloody.   Belongings: Packed and sent with pt  Chart: Delivered with pt to next unit  Medications: Meds received from old unit with pt  Report given to: 5A nurse   Pt status: pt resting comfortably in wheelchair. A&Ox3-4, refused most AM meds d/t nausea. Did not want to treat with meds, nausea resolved later in the afternoon. No complaints at this time.     "

## 2018-01-25 NOTE — PROGRESS NOTES
Transfer  Transferred from: [facility]  Via: WC  Reason for transfer: Pt appropriate for 6B    Belongings: Received with pt - 37$ cash in shirt pocket in side table - pt refused to send to security   Chart: Received with pt    2 RN Skin Assessment Completed By: Felicia Mcleod and Ashlie Baum  Report received from: NISHA Bernard  Pt status: A/Ox4. VSS. Afebrile. O2 sats stable on 2L NC.

## 2018-01-25 NOTE — H&P
[facility] (formerly [facility])[3] INTERNAL MEDICINE HISTORY & PHYSICAL   Tomas Grey ([id number]) admitted on 1/24/2018  Primary care provider: Ekaterina Lozano    CHIEF COMPLAINT:  Tarry stools    HISTORY OF PRESENT ILLNESS:    Tomas Grey is a 72-year-old man with a history of metastatic prostate cancer, COPD, hypertension, atrial fibrillation, CAD, type 2 diabetes, HFpEF, and recent admission for GI bleed now presenting with recurrent symptoms of a GI bleed.    The patient was admitted to this institution from 1/8-1/13 for melena with initial hemoglobin of 3.2.  He was admitted to the oncology service and ultimately received 3 units PRBCs.  GI was consulted, but he was deemed to be high-risk candidate and an EGD was not offered.  On discharge, his Coumadin and aspirin were stopped.  He was also treated for ESBL E. coli UTI during this prior admission.    The patient is not an excellent historian.  I called the patient's nursing home to obtain collateral information. The patient reports to me that on Monday, 2 days ago, he noticed multiple episodes of black, tarry-like stool in the toilet bowl.  There has been some lower abdominal pain as well.  The patient was not clear if the black, tarry stool continued or not when I asked him specifically about this.  There has been no BRBPR, maroon stools, hematemesis, or hematuria.  I confirmed with nursing home that he has not been receiving warfarin, aspirin, or NSAIDs.  On fortunately, the night shift could not corroborate how much melena he's been having or if anyone other than Mr. Grey noticed it. On review of systems, he denied chest pain, shortness of breath, dysuria, or headache.    Of note, his most recent admission was complicated by issues surrounding goals of care.  His most recent oncology appointment in 12/2017 makes it clear that he is no longer candidate for any cytotoxic chemotherapy for his metastatic prostate cancer.  He does not wish to pursue a palliative  "approach and has remained full code during his prior admissions.  When I asked about this issue on admission, he told me that he was \"not on my death bed yet\" and \"I'll call ya when I need ya.\"  In the recent admission, his medical decision-maker has been a man he refers to as his son, Les, and this is corroborated in the chart.  He does have a POLST dated 11/30/2016 showing that he wishes to be full code, which aligns with his prior code statuses. He was seen by [facility] last visit and did not want to discuss code status.    PAST MEDICAL AND SURGICAL HISTORY:    Patient Active Problem List   Diagnosis     Insomnia     COPD (chronic obstructive pulmonary disease) (H)     Anemia     Osteoporosis     ASCVD (arteriosclerotic cardiovascular disease)     Prostate cancer metastatic to multiple sites (H)     Venous stasis dermatitis     Fecal incontinence     CKD (chronic kidney disease) stage 3, GFR 30-59 ml/min     Serum albumin decreased     Right heart failure     Type 2 diabetes, HbA1C goal < 8% (H)     Impaired mobility     Knee pain     Irregular heart rhythm     Sleep apnea     Metastasis to bone (H)     SOB (shortness of breath)     FTT (failure to thrive) in adult     Dyspnea     Atypical chest pain     Prostate cancer metastatic to bone (H)     Chronic pain     ACP (advance care planning)     Respiratory failure (H)     CAD (coronary artery disease)     Physical deconditioning     Hyperlipidemia     Renal insufficiency     Epigastric pain     Lymphedema of both lower extremities     Primary pulmonary hypertension (H)     Redness of eye, left     Chest wall pain     Chronic obstructive pulmonary disease, unspecified COPD type (H)     FH: chronic lung disease requiring oxygen     COPD exacerbation (H)     History of heart attack     Nocturnal dyspnea     Dyspnea on exertion     Chronic obstructive pulmonary disease with severity to be determined (H)     Leg pain     Adjustment disorder with mixed anxiety and " depressed mood     Noncompliance with medication regimen     Type 2 diabetes mellitus with stage 3 chronic kidney disease (H)     Chronic respiratory failure with hypoxia and hypercapnia (H)     Misuse of drugs (H)     Pneumonia of left lower lobe due to infectious organism (H)     Pneumonia     Hypercapnia     Health Care Home     HTN (hypertension)     CHF (congestive heart failure) (H)     Chronic respiratory failure with hypoxia (H)     Morbid obesity (H)     Altered mental status     Bacterial sepsis (H)     Anemia in neoplastic disease     GI bleed       Past Surgical History:   Procedure Laterality Date     ABDOMEN SURGERY       ARTHROSCOPY KNEE       EYE SURGERY         MEDICATIONS:      No current facility-administered medications on file prior to encounter.   Current Outpatient Prescriptions on File Prior to Encounter:  ferrous sulfate (IRON) 325 (65 FE) MG tablet Take 1 tablet (325 mg) by mouth 2 times daily   ATORVASTATIN CALCIUM PO Take 10 mg by mouth daily   latanoprost (XALATAN) 0.005 % ophthalmic solution Place 1 drop into both eyes At Bedtime   omeprazole (PRILOSEC) 40 MG capsule Take 1 capsule (40 mg) by mouth 2 times daily   ipratropium - albuterol 0.5 mg/2.5 mg/3 mL (DUONEB) 0.5-2.5 (3) MG/3ML neb solution Take 1 vial by nebulization every 4 hours as needed for shortness of breath / dyspnea or wheezing   insulin aspart (NOVOLOG FLEXPEN) 100 UNIT/ML injection Inject Subcutaneous At Bedtime Inject as per sliding scale: if 0 - 199 = 0; 200 - 249 = 1; 250 - 299 = 2; 300 - 349 = 3; 350 - 399 = 4; 400+ = 5   insulin aspart (NOVOLOG FLEXPEN) 100 UNIT/ML injection Inject Subcutaneous 3 times daily (with meals) 140 - 189 = 1; 190 - 239 = 2; 240 - 289 = 3; 290 - 339 = 4; 340 - 399 = 5; 400 - 449 = 6; 450+ = 7   fentaNYL (DURAGESIC) 12 mcg/hr 72 hr patch Place 1 patch onto the skin every 72 hours   oxyCODONE IR (ROXICODONE) 5 MG tablet Take 1 tablet (5 mg) by mouth every 4 hours as needed   acetaminophen  (TYLENOL) 500 MG tablet Take 2 tablets (1,000 mg) by mouth 3 times daily Not to exceed 3000 mg/24 hours   isosorbide mononitrate (IMDUR) 30 MG 24 hr tablet Take 2 tablets (60 mg) by mouth daily   nitroGLYcerin (NITROSTAT) 0.4 MG sublingual tablet Place 1 tablet (0.4 mg) under the tongue every 5 minutes as needed for chest pain if you are still having symptoms after 3 doses (15 minutes) call 911.   budesonide-formoterol (SYMBICORT) 160-4.5 MCG/ACT Inhaler Inhale 2 puffs into the lungs 2 times daily   tiotropium (SPIRIVA) 18 MCG capsule Inhale 1 capsule (18 mcg) into the lungs daily   DULoxetine (CYMBALTA) 20 MG EC capsule Take 1 capsule (20 mg) by mouth daily   ondansetron 4 MG FILM Take 4 mg by mouth every 8 hours as needed   metoprolol succinate (TOPROL XL) 50 MG 24 hr tablet Take 1 tablet (50 mg) by mouth daily   ammonium lactate (LAC-HYDRIN) 12 % lotion Apply topically 2 times daily as needed for dry skin To all extremities for dry skin   diclofenac (VOLTAREN) 1 % GEL topical gel Apply 4 grams to knees four times daily as needed using enclosed dosing card.   furosemide (LASIX) 20 MG tablet Take 1 tablet (20 mg) by mouth 2 times daily   folic acid (FOLVITE) 1 MG tablet Take 1 tablet (1 mg) by mouth daily   polyethylene glycol (MIRALAX/GLYCOLAX) Packet Take 17 g by mouth daily , hold for loose stools.   senna-docusate (SENOKOT-S;PERICOLACE) 8.6-50 MG per tablet Take 2 tablets by mouth 2 times daily , hold for loose stools.   calcium carbonate (OS-JORDAN 600 MG [place]. CA) 1500 (600 CA) MG tablet Take 1 tablet (1,500 mg) by mouth 2 times daily (with meals)   tamsulosin (FLOMAX) 0.4 MG capsule Take 1 capsule (0.4 mg) by mouth daily   simethicone (MYLICON) 40 MG/0.6ML suspension Take 0.6 mLs (40 mg) by mouth every 6 hours as needed for cramping   bimatoprost (LUMIGAN) 0.01 % SOLN Place 1 drop into both eyes At Bedtime   Carboxymethylcellulose Sod PF (REFRESH PLUS) 0.5 % SOLN ophthalmic solution Place 1 drop into both eyes  "4 times daily And 1 drop both eyes daily prn   sucralfate (CARAFATE) 1 GM/10ML suspension Take 10 mLs (1 g) by mouth 4 times daily (before meals and nightly)   METHOCARBAMOL PO Take 500 mg by mouth every 6 hours as needed for muscle spasms   Timolol Hemihydrate (BETIMOL OP) Place 1 drop into both eyes daily    [DISCONTINUED] enzalutamide (XTANDI) 40 MG capsule Take 4 capsules (160 mg) by mouth daily       ALLERGIES:    Allergies   Allergen Reactions     Morphine Other (See Comments)     Patient reports makes him almost go into a coma       FAMILY HISTORY:    Family History   Problem Relation Age of Onset     DIABETES Mother      CANCER Mother      stomach       SOCIAL HISTORY:    Social History     Social History     Marital status:      Spouse name: N/A     Number of children: N/A     Years of education: N/A     Occupational History     Not on file.     Social History Main Topics     Smoking status: Former Smoker     Smokeless tobacco: Never Used     Alcohol use No     Drug use: No     Sexual activity: No     Other Topics Concern     Not on file     Social History Narrative       PHYSICAL EXAM  /64  Pulse 95  Temp 98.6  F (37  C) (Oral)  Resp 16  Ht 1.803 m (5' 11\")  Wt 86.6 kg (191 lb)  SpO2 95%  BMI 26.64 kg/m2  General: Sitting in a chair next to bed, no clear distress  HEENT: Dry mucous membrane, no icterus  Cardiac: Distant heart sounds, irregular rhythm  Resp: Normal respiratory effort, clear bilaterally  Abd: Soft, umbilical hernia is easily reduced, no ridigity or rebound, endorses tenderness on palpation of lower belly  Skin: No jaundice  Extremities: Bilateral LE wraps  Neuro: Alert and oriented, no gross deficits, able to answer questions clearly  Psych: Euthymic mood and normal affect.    STUDIES AND IMAGING:  Results for orders placed or performed during the hospital encounter of 01/24/18 (from the past 24 hour(s))   CBC with platelets differential   Result Value Ref Range    WBC " 9.0 4.0 - 11.0 10e9/L    RBC Count 3.52 (L) 4.4 - 5.9 10e12/L    Hemoglobin 9.6 (L) 13.3 - 17.7 g/dL    Hematocrit 33.2 (L) 40.0 - 53.0 %    MCV 94 78 - 100 fl    MCH 27.3 26.5 - 33.0 pg    MCHC 28.9 (L) 31.5 - 36.5 g/dL    RDW 18.6 (H) 10.0 - 15.0 %    Platelet Count 267 150 - 450 10e9/L    Diff Method Automated Method     % Neutrophils 65.4 %    % Lymphocytes 18.3 %    % Monocytes 9.4 %    % Eosinophils 5.7 %    % Basophils 0.2 %    % Immature Granulocytes 1.0 %    Nucleated RBCs 0 0 /100    Absolute Neutrophil 5.9 1.6 - 8.3 10e9/L    Absolute Lymphocytes 1.6 0.8 - 5.3 10e9/L    Absolute Monocytes 0.8 0.0 - 1.3 10e9/L    Absolute Eosinophils 0.5 0.0 - 0.7 10e9/L    Absolute Basophils 0.0 0.0 - 0.2 10e9/L    Abs Immature Granulocytes 0.1 0 - 0.4 10e9/L    Absolute Nucleated RBC 0.0    Comprehensive metabolic panel   Result Value Ref Range    Sodium 143 133 - 144 mmol/L    Potassium 4.1 3.4 - 5.3 mmol/L    Chloride 107 94 - 109 mmol/L    Carbon Dioxide 28 20 - 32 mmol/L    Anion Gap 8 3 - 14 mmol/L    Glucose 98 70 - 99 mg/dL    Urea Nitrogen 22 7 - 30 mg/dL    Creatinine 1.17 0.66 - 1.25 mg/dL    GFR Estimate 61 >60 mL/min/1.7m2    GFR Estimate If Black 74 >60 mL/min/1.7m2    Calcium 8.6 8.5 - 10.1 mg/dL    Bilirubin Total 0.4 0.2 - 1.3 mg/dL    Albumin 2.5 (L) 3.4 - 5.0 g/dL    Protein Total 7.4 6.8 - 8.8 g/dL    Alkaline Phosphatase 222 (H) 40 - 150 U/L    ALT 10 0 - 70 U/L    AST 24 0 - 45 U/L   Lipase   Result Value Ref Range    Lipase 84 73 - 393 U/L   Troponin I   Result Value Ref Range    Troponin I ES <0.015 0.000 - 0.045 ug/L   ABO/Rh type and screen   Result Value Ref Range    ABO O     RH(D) Pos     Antibody Screen Neg     Test Valid Only At          [facility]    Specimen Expires 01/27/2018    INR   Result Value Ref Range    INR 1.14 0.86 - 1.14   Partial thromboplastin time   Result Value Ref Range    PTT 38 (H) 22 - 37 sec   XR Chest 2 Views    Narrative     Exam: Chest x-ray, 2 views, 1/24/2018.    COMPARISON: 1/10/2018.    HISTORY: Shortness of breath.    FINDINGS: PA and lateral views of the chest were obtained. Interval  removal of the previously described left upper extremity PICC line.  Cardiomediastinal silhouette within normal limits. Distinct pulmonary  vasculature. Stable bilateral lower lobes streaky opacities. No  pleural effusions. No pneumothorax. The right costophrenic angle is  collimated out of the image. Tortuous thoracic aorta with  calcifications. Degenerative changes of the spine and shoulders.      Impression    IMPRESSION: No acute airspace opacities. Stable bilateral lower lobes  streaky opacities, likely atelectasis.    KANNAN ELLINGTON MD   Abdomen US, limited (RUQ only)    Impression    IMPRESSION:   1.  No evidence of cholecystitis. Normal sonographic appearance of the  gallbladder and normal caliber common bile duct.  2.  Several simple cysts in the right kidney. Solid mass arising from  the lower pole of the right kidney described on comparison CT is not  well visualized on this study.   Hemoglobin   Result Value Ref Range    Hemoglobin 8.5 (L) 13.3 - 17.7 g/dL     *Note: Due to a large number of results and/or encounters for the requested time period, some results have not been displayed. A complete set of results can be found in Results Review.     ASSESSMENT & PLAN:  Nehemiah a 72-year-old man with a history of metastatic prostate cancer, COPD, hypertension, atrial fibrillation, CAD, type 2 diabetes, HFpEF, and recent admission for GI bleed now presenting with recurrent symptoms of a GI bleed.    # Suspected GI bleed: Seems to be a very similar presentation to his last admission. Hemoglobin at discharge 9.5, on arrival 9.6 to ED, and then 8.4 on recheck without fluids. On GI consult from 1/8, thinking was that he was likely bleeding from stomach, but low probability of endoscopically treatable lesion was low and an EGD was thought to  be high risk. Warfarin, aspirin, and NSAIDs were correctly held on discharge. No additional melena since arrival to our institution, and no maroon stools to suggest more brisk bleed. Previous transfusion threshold was 8 on heme-onc service.  - type and screen done  - NPO  - LR at 75 cc/hr  - GI consult to luminal (order placed, day team can cancel)  - Pantoprazole BID  - Repeat hemoglobin in a.m.  - Continue holding warfarin, aspirin, and ibuprofen  - Continued daily folate and iron at discharge  - Continue PTA sucralfate 1g QID    # Metastatic prostate cancer. Complex issue; see HPI for brief summary. Has metastases to bone. He has been on numerous therapies (Lupron, Casodex, Xgeva, Xtandi, Zytega+pred, Xofigo, and Megace). Recent note by Dr. Oviedo 12/5 stating they would stop all cancer-directed therapies and refer to hospice, though patient has been resistant to this suggestion.  - Clarify goals of care  - Consider pall care consult, though this did not seem to go so well last time    Chronic issues    # Chronic pain:  - Continue fentanyl patch q72 hrs (last placed 1/24 0930 per nursing home RN)  - Continue PTA oxycodone 5mg q4h PRN  - Hold NSAIDS  - Tylenol available    # ESBL UTI: Would have completed 5-day course. No dysuria on admission.   # BPH  - Defer sending another U/A, high-risk of asymptomatic bacteriuria  - Continued PTA tamsulosin    # Coronary artery disease.   # Hypertension.  # Chronic diastolic CHF: Last echo 5/2016 with EF ~55% and grade 1 diastolic failure.  - Continue holding warfarin, aspirin, and ibuprofen in setting of GI bleed  - Continue isosorbide mononitrate 60mg daily and metoprolol 50mg daily with hold parameters BP <120/80 and HR <60  - Hold home statin      # Atrial fibrillation:  - Continue PTA metoprolol as above  - No warfarin or aspirin      # CKD: Creatinine at 1.17, which seems to be around baseline. Reported eGFR of 61 probable overestimate given limited muscle mass.  -  "Monitor with daily BMP      # COPD, on chronic oxygen.  # ALEXIS: Notes indicated that he has been treated multiple times for COPD exacerbations. Follows with Dr. Loera in Pulmonology.   -Continue PTA Spiriva and Symbicort.      # Diabetes mellitus, type II: Has not been receiving any insulin at home per nurse at his residence.  - Holding any basal insulin  - Hypoglycemia protocol  - Medium ISS      # Lymphedema.  # Venous stasis skin changes/ulcerations.  - Lymphedema consulted  - Continue PTA Lasix 20mg BID      #Anxiety/Depression  - Continue PTA Cymbalta      # Congnitive impairment: Per recent d/c summary: \"Nursing home visit notes and note by Dr. Rossi with palliative care, patient has mild-moderate impairment based on previous neurocognitive testing. BIMS score 11/15 on 11/16/17. Notes discuss appointing a guardian for patient to make medical decisions. Patient does have 11 children, however notes indicate they do not attend his hospital appointments. Patient explained that his medical decision maker is his \"son\" Les Bourgeois ([facility]). Per advanced directive, his primary contact is cousin, Melissa Mccormack with second contact as his son, Jeevan Grey (see advanced directive for phone number). Discussed patient's hospitaliziation and concerns for returning to NH with son, Les 1/10.\"  - Clarify during day  - Consider re-consult to \[facility]\"" care      # Glaucoma: Follows with [facility].   - Continue bimatoprost, timolol, latanoprost, and carboxymethylcellulose drops  - Pharmacist consult for help with many meds and eye drops    FEN: NPO, LR at 75cc/hr  Prophylaxis:  DVT: mechanial  Disposition: Anticipate multiple days to monitor and manage GI bleed  Code Status: FULL CODE    To be staffed in morning.    Rubio Ariza MD  PGY-3  "

## 2018-01-25 NOTE — ED NOTES
Pt BIBA with complaints of abdominal pain and dark stools that has worsened since Monday. Pt states he has been unable to eat, but has taken his medication as prescribed including an iron supplement.

## 2018-01-25 NOTE — ED NOTES
[facility], [place]   ED Nurse to Floor Handoff     Tomas Grey is a 72 year old male who speaks English and lives with others,  in a nursing home  They arrived in the ED by ambulance from home    ED Chief Complaint: Abdominal Pain and Melena    ED Dx;   Final diagnoses:   Gastrointestinal hemorrhage associated with gastritis, unspecified gastritis type         Needed?: No    Allergies:   Allergies   Allergen Reactions     Morphine Other (See Comments)     Patient reports makes him almost go into a coma   .  Past Medical Hx:   Past Medical History:   Diagnosis Date     Anemia      Anxiety      Aspiration pneumonia (H) 2014     C. difficile colitis      CAD (coronary artery disease)      Chronic pain      CKD (chronic kidney disease)      COPD (chronic obstructive pulmonary disease) (H)      Depressive disorder      Diabetes mellitus (H)      Hypertension      Insomnia      ALEXIS (obstructive sleep apnea)      Osteoporosis      Prostate cancer metastatic to multiple sites (H)       Baseline Mental status: WDL  Current Mental Status changes: at basesline    Infection: No  Sepsis suspected: No  Isolation type: Contact     Activity level - Baseline/Home:  Stand with Assist  Activity Level - Current:   Stand with Assist    Bariatric equipment needed?: No    In the ED these meds were given:   Medications   pantoprazole (PROTONIX) 40 mg IV push injection (40 mg Intravenous Given 1/25/18 0103)       Drips running?  No    Home pump or pre-existing LDA's present? No    Labs results:   Labs Ordered and Resulted from Time of ED Arrival Up to the Time of Departure from the ED   CBC WITH PLATELETS DIFFERENTIAL - Abnormal; Notable for the following:        Result Value    RBC Count 3.52 (*)     Hemoglobin 9.6 (*)     Hematocrit 33.2 (*)     MCHC 28.9 (*)     RDW 18.6 (*)     All other components within normal limits   COMPREHENSIVE METABOLIC PANEL - Abnormal; Notable for the following:   "   Albumin 2.5 (*)     Alkaline Phosphatase 222 (*)     All other components within normal limits   PARTIAL THROMBOPLASTIN TIME - Abnormal; Notable for the following:     PTT 38 (*)     All other components within normal limits   HEMOGLOBIN - Abnormal; Notable for the following:     Hemoglobin 8.5 (*)     All other components within normal limits   LIPASE   TROPONIN I   INR   PERIPHERAL IV CATHETER   IV ACCESS   OCCULT BLOOD STOOL POCT   ABO/RH TYPE AND SCREEN       Imaging Studies:   Recent Results (from the past 24 hour(s))   XR Chest 2 Views    Narrative    Exam: Chest x-ray, 2 views, 1/24/2018.    COMPARISON: 1/10/2018.    HISTORY: Shortness of breath.    FINDINGS: PA and lateral views of the chest were obtained. Interval  removal of the previously described left upper extremity PICC line.  Cardiomediastinal silhouette within normal limits. Distinct pulmonary  vasculature. Stable bilateral lower lobes streaky opacities. No  pleural effusions. No pneumothorax. The right costophrenic angle is  collimated out of the image. Tortuous thoracic aorta with  calcifications. Degenerative changes of the spine and shoulders.      Impression    IMPRESSION: No acute airspace opacities. Stable bilateral lower lobes  streaky opacities, likely atelectasis.    KANNAN ELLINGTON MD   Abdomen US, limited (RUQ only)    Impression    IMPRESSION:   1.  No evidence of cholecystitis. Normal sonographic appearance of the  gallbladder and normal caliber common bile duct.  2.  Several simple cysts in the right kidney. Solid mass arising from  the lower pole of the right kidney described on comparison CT is not  well visualized on this study.       Recent vital signs:   /70  Pulse 95  Temp 98.6  F (37  C) (Oral)  Resp 16  Ht 1.803 m (5' 11\")  Wt 86.6 kg (191 lb)  SpO2 96%  BMI 26.64 kg/m2    Cardiac Rhythm: Not assessed.  Pt needs tele? No  Skin/wound Issues: Pt has BLE lymphedema wraps with reported ulcerations underneath. " Have not visualized.     Code Status: Full Code    Pain control: poor- Chronic pain.     Nausea control: pt had none    Abnormal labs/tests/findings requiring intervention: Hgb likely to be rechecked by inpatient team.     Family present during ED course? No   Family Comments/Social Situation comments: Pt lives in nursing home. They were updated on disposition.     Tasks needing completion: None    Joana Landry RN  [facility]--   [phone] [place] [facility]  [phone] [facility]

## 2018-01-25 NOTE — PROGRESS NOTES
[facility], [place]    Internal Medicine Progress Note - Maroon Service    Main Plans for Today   Trend vitals  Hb stable   No GI procedure at this time  Initiated goals of care conversation     Assessment & Plan   This a 72-year-old man with a history of metastatic prostate cancer, COPD, hypertension, atrial fibrillation, CAD, type 2 diabetes, HFpEF, and recent admission for GI bleed now presenting with recurrent symptoms of a GI bleed.     #Suspected GI bleed  Similar presentation to his last admission. Hemoglobin at discharge 9.5, on arrival 9.6 to ED, then 8.5, then 9.0 on recheck. On GI consult from 1/8, thinking was that he was likely bleeding from stomach, but low probability of endoscopically treatable lesion was low and an EGD was thought to be high risk. Warfarin, aspirin, and NSAIDs were correctly held on discharge. No additional melena since arrival to our institution, and no maroon stools to suggest more brisk bleed. Previous transfusion threshold was 8 on heme-onc service. Case run by GI, who is not recommending endoscopic intervention at this time given stability.   - type and screen done  - pantoprazole BID  - daily Hb, increase frequency if pt becomes vitally unstable   - continue holding warfarin, aspirin, and ibuprofen  - continued daily folate and iron at discharge  - continue PTA sucralfate 1g QID     #Metastatic prostate cancer  Complex issue; see HPI for brief summary. Has metastases to bone. He has been on numerous therapies (Lupron, Casodex, Xgeva, Xtandi, Zytega+pred, Xofigo, and Megace). Recent note by Dr. Oviedo 12/5 stating they would stop all cancer-directed therapies and refer to hospice, though patient has been resistant to this suggestion.  - clarify goals of care, see below  - holding palliative care consult for now given lack of success at last meeting    #Goals of care  After discussion with patient, I am not sure he quite grasps nature of cancer  "diagnosis/prognosis. Pt would like to know exactly where cancer is. Wants Dieudonne to be surrogate medical decision maker, if necessary. He is not listed in healthcare directive.   - SW following  - will provide pt w/ information he needs and continue this conversation  - possible palliative care consult if pt agreeable     #Congnitive impairment  Per recent d/c summary: \"Nursing home visit notes and note by Dr. Rossi with palliative care, patient has mild-moderate impairment based on previous neurocognitive testing. BIMS score 11/15 on 11/16/17. Notes discuss appointing a guardian for patient to make medical decisions. Patient does have 11 children, however notes indicate they do not attend his hospital appointments. Patient explained that his medical decision maker is his \"son\" Les Bourgeois (stepson). Per advanced directive, his primary contact is cousin, Melissa Mccormack with second contact as his son, Jeevan Grey (see advanced directive for phone number). Discussed patient's hospitaliziation and concerns for returning to NH with son, Les 1/10.\"  - goals of care, as above     #Chronic pain:  Pt notes chronic abdominal pain as well as \"left body pain.\" Some pain in L neck.  - continue fentanyl patch q72 hrs (last placed 1/24 0930 per nursing home RN)  - continue PTA oxycodone 5mg q4h PRN  - hold NSAIDS  - acetaminophen PRN     #Urinary ESBL  Would have completed 5-day course. No dysuria on admission. May be colonized  - hold Abx  - monitor for urinary symptoms   - if symptomatic, will send UA    #BPH  - continued PTA tamsulosin     #Coronary artery disease   #Hypertension  #Chronic diastolic CHF  Last echo 5/2016 with EF ~55% and grade 1 diastolic failure.  - continue holding warfarin, aspirin, and ibuprofen in setting of GI bleed  - continue isosorbide mononitrate 60mg daily and metoprolol 50mg daily with hold parameters BP <120/80 and HR <60  - hold home statin      #Atrial fibrillation  - continue PTA metoprolol " "as above  - no warfarin or aspirin      #CKD   Creatinine at 1.17, which seems to be around baseline. Reported eGFR of 61 probable overestimate given limited muscle mass.  - daily BMP      #COPD  #ALEXIS  On chronic oxygen at home. Notes indicated that he has been treated multiple times for COPD exacerbations. Follows with Dr. Loera in Pulmonology.   -continue PTA Spiriva and Symbicort.      #Diabetes mellitus, type II  Has not been receiving any insulin at home per nurse at his residence.  - Holding any basal insulin  - Hypoglycemia protocol  - Medium ISS      #Lymphedema  #Venous stasis skin changes/ulcerations  - Lymphedema consulted  - Continue PTA Lasix 20mg BID      #Anxiety/Depression  - Continue PTA Cymbalta        #Glaucoma  Follows with [facility].   - continue bimatoprost, timolol, latanoprost, and carboxymethylcellulose drops    Diet: Regular diet  Fluids: Bolus IVF as necessary   DVT Prophylaxis: Pneumatic Compression Devices  Code Status: Full Code    Disposition Plan   Expected discharge: 2 - 3 days, recommended to prior living arrangement once hemoglobin stable and goals of care reviewed.      Entered: Jovani Couch 01/25/2018, 6:54 AM   Information in the above section will display in the discharge planner report.      The patient's care was discussed with the Attending Physician, Dr. Andrew Sotelo.    Jovani Couch MD  Internal Medicine PGY-1  [facility], p: [phone]    Please see sticky note for cross cover information    Interval History   Spoke with pt and reviewed his hx of cancer diagnosis and treatment. Aware he has \"bone cancer.\" Says he never has bone pain, except for general pain on the left side of his body along with a stiff neck. He's not sure he believes he has bone metastasis given the absence of pain. Recalls being told he had prostate cancer, and there is no more treatment for him. However, iterates that he never had prostate pain.     Has baseline " dizziness and weakness which is unchanged. Melena on Monday, he recalls. Also had dark black solid stool earlier today. Says he is not taking iron pills, but this may be incorrect. Reiterates that Dieudonne would make medical decisions on his behalf. However, he is not documented in his healthcare directive.     Physical Exam   Vital Signs: Temp: 97.7  F (36.5  C) Temp src: Oral BP: 128/58 Pulse: 95 Heart Rate: 101 Resp: 16 SpO2: 97 % O2 Device: Nasal cannula Oxygen Delivery: 2 LPM  Weight: 193 lbs 11.2 oz    General: Lying in bed, later seen sitting in chair; no acute distress   HEENT: no conjunctival icterus; some missing teeth; normocephalic and atraumatic   Cardiac: normal S1/S2 w/ somehwat distant heart sounds; no murmurs noted  Resp: Normal work of breathing, breath sounds symmetric to bases; no wheezes, crackles, or rhonchi  Abd: Soft, non-distended; umbilical hernia is easily reduced; diffuse abdominal tenderness without ridigity  Skin: warm and dry; no obvious rashes over face, neck, arms, or abdomen; brawny edema of lower legs  Extremities: bilateral LE lymphedema; no tenderness to palpations   Neuro: Alert and appropriately conversational; responds to commands appropriately, no gross deficits; needs help lifting legs to bed  Psych: Euthymic mood and normal affect; tangential in conversation    Data   Labs, micro, and imaging data reviewed in EPIC.

## 2018-01-26 NOTE — PLAN OF CARE
Problem: Patient Care Overview  Goal: Plan of Care/Patient Progress Review  Outcome: No Change  [id number] Pt alert, disoriented to time and situation, VSS on 2L NC, and up with A1-2+walker.  Pt has no complaints of pain overnight; slept well. Fentanyl patch in place on R upper back.  No complaints of nausea.  PIVs saline locked.  BLE noted to have +4 edema; no wraps in place.  Lymphedema consult placed.  BG stable overnight at 108.  Checks AC/HS.  Bed alarm on overnight.  Will continue to monitor and follow POC.

## 2018-01-26 NOTE — PLAN OF CARE
Problem: Gastrointestinal Bleeding (Adult)  Goal: Signs and Symptoms of Listed Potential Problems Will be Absent, Minimized or Managed (Gastrointestinal Bleeding)  Signs and symptoms of listed potential problems will be absent, minimized or managed by discharge/transition of care (reference Gastrointestinal Bleeding (Adult) CPG).   Alert and oriented. Forgetful. Reported pain in knees, given heating pad. Reported some relief. Both PIVs SL. VSS. BLE swollen, dusky, dry skin. Attempted to eat dinner, but became nauseated and vomited. No blood or coffee grounds in emesis. Given zofran x1. Will continue POC.

## 2018-01-26 NOTE — PROGRESS NOTES
01/26/18 0730   Rehab Discipline   Discipline OT   Type of Visit   Type of visit Initial Edema Evaluation   General Information   Start of care 01/26/18   Referring physician Jose Campos MD   Orders Evaluate and treat as indicated   Order date 01/25/18   Medical diagnosis 72-year-old man with a history of metastatic prostate cancer, COPD, hypertension, atrial fibrillation, CAD, type 2 diabetes, HFpEF, and recent admission for GI bleed now presenting with recurrent symptoms of a GI bleed.   Edema onset (acute on chronic for last 3 years, per patient. )   Affected body parts RLE;LLE   Edema etiology comments decreased mobility, cancer, cardiac disease   Surgical / medical history reviewed Yes   Prior treatment Gradient compression bandaging   Living environment [place] [facility]   Subjective Report   Patient report of symptoms patient reports discomfort throughout LEs.    Pain   Pain comments greatest pain in distal legs.    Edema Exam / Assessment   Skin condition Pitting;Dryness;Intact   Skin condition comments Venous stasis discoloration with healed ulcers distally. Firm/fibrotic 1+/2+ pitting. Moderate skin dryness throughout.    Pitting 1+;2+   Pitting location MTPs to knee creases    Range of Motion   ROM No deficits were identified   Strength   Strength comments generalized weakness thorughout. Assistance required for mobility.    Activities of Daily Living   Activities of Daily Living Assistance required for LE dressing and all bathing    Transfers   Transfers Ax2   Sensory   Sensory perception Light touch   Light touch Intact   Sensory perception comments reports new onset numbness in toes.    Planned Edema Interventions   Planned edema interventions Gradient compression bandaging;Fit for compression garment;Exercises;Precautions to prevent infection / exacerbation;Education   Planned edema intervention comments GCB applied from MTPs to knee creases bilaterally.    Clinical Impression   Criteria for  skilled therapeutic intervention met Yes   Therapy diagnosis Recommend use of compression wraps to manage chronic LE edema and protect skin integrity.    Influenced by the following impairments / conditions Edema   Assessment of Occupational Performance 1-3 Performance Deficits   Identified Performance Deficits Decreased functional mobility    Clinical Decision Making (Complexity) Low complexity   Treatment frequency 5 times / week   Treatment duration 1-2 weeks    Patient / family and/or staff in agreement with plan of care Yes   Risks and benefits of therapy have been explained Yes   Total Evaluation Time   Total evaluation time 5

## 2018-01-26 NOTE — PLAN OF CARE
Problem: Patient Care Overview  Goal: Plan of Care/Patient Progress Review  Edema 5A: Initial edema evaluation completed. Patient presents with acute on chronic LE edema - skin intact with healed ulcers, skin discoloration, firm 1+/2+ pitting distally, and moderate skin discoloration. Application of GCB from MTPs to knee creases for management of LE edema and protection of skin integrity. Remove wraps if causing pain/numbness or become soiled.

## 2018-01-26 NOTE — PROGRESS NOTES
[facility], [place]    Internal Medicine Progress Note - Maroon Service    Main Plans for Today   Trend vitals  Hb stable, daily checks  Continued goals of care conversation     Assessment & Plan   This a 72-year-old man with a history of metastatic prostate cancer, COPD, hypertension, atrial fibrillation, CAD, type 2 diabetes, HFpEF, and recent admission for GI bleed now presenting with recurrent symptoms of a GI bleed.     #Suspected GI bleed  Similar presentation to his last admission. Hemoglobin at discharge 9.5, on arrival 9.6 to ED, then 8.5, then 9.0 on recheck. On GI consult from 1/8, thinking was that he was likely bleeding from stomach, but low probability of endoscopically treatable lesion was low and an EGD was thought to be high risk. Warfarin, aspirin, and NSAIDs were correctly held on discharge. No additional melena since arrival to our institution, and no maroon stools to suggest more brisk bleed. Previous transfusion threshold was 8 on heme-onc service. Case run by GI, who is not recommending endoscopic intervention at this time given stability.   - type and screen done  - pantoprazole BID  - daily Hb, increase frequency if pt becomes vitally unstable   - continue holding warfarin, aspirin, and ibuprofen  - continued daily folate and iron at discharge  - continue PTA sucralfate 1g QID     #Leg weakness  Likely chronic. Pt ability to move legs is stable. May be related to abdominal/hip pain when he moves legs, though he does not endorse pain at rest.  Sensation and movement intact bilaterally  - PT/OT consult   - will touch base w/ nursing home given pts variable historical accounts of illnesses     #Metastatic prostate cancer  Complex issue; see HPI for brief summary. Has metastases to bone. He has been on numerous therapies (Lupron, Casodex, Xgeva, Xtandi, Zytega+pred, Xofigo, and Megace). Recent note by Dr. Oviedo 12/5 stating they would stop all cancer-directed  "therapies and refer to hospice, though patient has been resistant to this suggestion.  - clarify goals of care, see below  - holding palliative care consult for now given lack of success at last meeting    #Goals of care  After discussion with patient, I am not sure he quite grasps nature of cancer diagnosis/prognosis. Pt would like to know exactly where cancer is. Wants Dieudonne to be surrogate medical decision maker, if necessary. He is not listed in healthcare directive.   - SW following  - will provide pt w/ information he needs and continue this conversation  - will need to follow-up w/ nursing home   - possible palliative care consult if pt agreeable     #Congnitive impairment  Per recent d/c summary: \"Nursing home visit notes and note by Dr. Rossi with palliative care, patient has mild-moderate impairment based on previous neurocognitive testing. BIMS score 11/15 on 11/16/17. Notes discuss appointing a guardian for patient to make medical decisions. Patient does have 11 children, however notes indicate they do not attend his hospital appointments. Patient explained that his medical decision maker is his \"son\" Les Bourgeois (silvia). Per advanced directive, his primary contact is cousin, Melissa Easton with second contact as his son, Jeevan Grey (see advanced directive for phone number). Discussed patient's hospitaliziation and concerns for returning to NH with son, Les 1/10.\"  - goals of care, as above     #Chronic pain:  Pt notes chronic abdominal pain as well as \"left body pain.\" Some pain in L neck.  - continue fentanyl patch q72 hrs (last placed 1/24 0930 per nursing home RN)  - continue PTA oxycodone 5mg q4h PRN  - hold NSAIDS  - acetaminophen PRN     #Constipation  - started on bowel regimen    #Urinary ESBL  Would have completed 5-day course. No dysuria on admission. May be colonized  - hold Abx  - monitor for urinary symptoms   - if symptomatic, will send UA    #BPH  - continued PTA " tamsulosin     #Coronary artery disease   #Hypertension  #Chronic diastolic CHF  Last echo 5/2016 with EF ~55% and grade 1 diastolic failure.  - continue holding warfarin, aspirin, and ibuprofen in setting of GI bleed  - continue isosorbide mononitrate 60mg daily and metoprolol 50mg daily with hold parameters BP <120/80 and HR <60  - hold home statin      #Atrial fibrillation  - continue PTA metoprolol as above  - no warfarin or aspirin      #CKD   Creatinine at 1.17, which seems to be around baseline. Reported eGFR of 61 probable overestimate given limited muscle mass.  - daily BMP      #COPD  #ALEXIS  On chronic oxygen at home. Notes indicated that he has been treated multiple times for COPD exacerbations. Follows with Dr. Loera in Pulmonology.   -continue PTA Spiriva and Symbicort  -Duonebs QID PRN      #Diabetes mellitus, type II  Has not been receiving any insulin at home per nurse at his residence.  - Holding any basal insulin  - Hypoglycemia protocol  - Medium ISS      #Lymphedema  #Venous stasis skin changes/ulcerations  - Lymphedema consulted  - Continue PTA Lasix 20mg BID      #Anxiety/Depression  - Continue PTA Cymbalta        #Glaucoma  Follows with [facility].   - continue bimatoprost, timolol, latanoprost, and carboxymethylcellulose drops    Diet: Regular diet  Fluids: Bolus IVF as necessary   DVT Prophylaxis: Pneumatic Compression Devices  Code Status: Full Code    Disposition Plan   Expected discharge: 1-2, recommended to prior living arrangement once hemoglobin stable and goals of care reviewed.      Entered: Jovani Couch 01/26/2018, 6:43 AM   Information in the above section will display in the discharge planner report.      The patient's care was discussed with the Attending Physician, Dr. Andrew Sotelo.    Jovani Couch MD  Internal Medicine PGY-1  [facility] 3, p: [phone]    Please see sticky note for cross cover information    Interval History   Pt recalls last  night's discussion of bone cancer. Major concern is getting up and moving today. Endorses leg weakness. Says he is having more trouble today, but he seems to be at about baseline from when I saw him on admission. Needed help getting his legs onto bed at that time. Other than that, has some stable abdominal pain. Feeling wheezy this morning and requesting breathing treatment.     4 point ROS reviewed and negative, except for the above.     Physical Exam   Vital Signs: Temp: 99.4  F (37.4  C) Temp src: Oral BP: 136/52   Heart Rate: 112 Resp: 18 SpO2: 99 % O2 Device: Nasal cannula with humidification Oxygen Delivery: 2 LPM  Weight: 193 lbs 11.2 oz    General: Lying in bed; no acute distress   HEENT: no conjunctival icterus; some missing teeth; normocephalic and atraumatic   Cardiac: normal S1/S2 w/ tachycardic rate; no murmurs noted  Resp: Normal work of breathing, breath sounds symmetric to bases; no wheezes, crackles, or rhonchi  Abd: Soft, non-distended; umbilical hernia is easily reduced; diffuse abdominal tenderness without ridigity  Skin: warm and dry; no obvious rashes over face, neck, arms, or abdomen; brawny edema of lower legs  Extremities: bilateral LE lymphedema, wrapped this morning  Neuro: Alert and appropriately conversational; responds to commands appropriately, no gross deficits; needs help lifting legs to bed  Psych: Euthymic mood and normal affect; tangential in conversation    Data   Labs, micro, and imaging data reviewed in EPIC.

## 2018-01-26 NOTE — PLAN OF CARE
"Problem: Patient Care Overview  Goal: Plan of Care/Patient Progress Review  Outcome: No Change    /66 (BP Location: Right arm)  Pulse 95  Temp 97.6  F (36.4  C) (Oral)  Resp 18  Ht 1.803 m (5' 11\")  Wt 87.9 kg (193 lb 11.2 oz)  SpO2 98%  BMI 27.02 kg/m2    9047-4242:  Pt alert, but disoriented to time, forgetful.  A x1-2 w/walker.  VSS on 4L NC, except tachy.  R and L PIV SL.  Severe BLE edema.  Lymph consult in.  Due to void.  No BM this shift.  Heat applied for generalized c/o pain.  Tolerating regular diet.  UV=913, no SSI given this shift.  Hgb=9.0.  Continue to monitor and follow POC      "

## 2018-01-27 NOTE — PROGRESS NOTES
[facility], [place]    Internal Medicine Progress Note - Maroon Service    Main Plans for Today   Trend vitals  Hb stable, daily checks  PT for leg weakness     Assessment & Plan   This a 72-year-old man with a history of metastatic prostate cancer, COPD, hypertension, atrial fibrillation, CAD, type 2 diabetes, HFpEF, and recent admission for GI bleed now presenting with recurrent symptoms of a GI bleed.     #Suspected GI bleed  #Hx of melena  Similar presentation to his last admission. Hemoglobin at discharge 9.5, on arrival 9.6 to ED, then 8.5, then 9.0 on recheck. On GI consult from 1/8, thinking was that he was likely bleeding from stomach, but low probability of endoscopically treatable lesion was low and an EGD was thought to be high risk. Warfarin, aspirin, and NSAIDs were correctly held on discharge. No additional melena since arrival to our institution, and no maroon stools to suggest more brisk bleed. Previous transfusion threshold was 8 on heme-onc service. Case run by GI, who is not recommending endoscopic intervention at this time given stability.   - type and screen done  - pantoprazole BID  - daily Hb, increase frequency if pt becomes vitally unstable   - continue holding warfarin, aspirin, and ibuprofen  - continued daily folate and iron at discharge  - continue PTA sucralfate 1g QID     #Leg weakness  Likely chronic. Pt ability to move legs is stable. May be related to abdominal/hip pain when he moves legs, though he does not endorse pain at rest.  Sensation and movement intact bilaterally  - PT/OT consult   - will touch base w/ nursing home given pts variable historical accounts of illnesses     #Metastatic prostate cancer  Complex issue; see HPI for brief summary. Has metastases to bone. He has been on numerous therapies (Lupron, Casodex, Xgeva, Xtandi, Zytega+pred, Xofigo, and Megace). Recent note by Dr. Oviedo 12/5 stating they would stop all cancer-directed therapies  "and refer to hospice, though patient has been resistant to this suggestion.  - clarify goals of care, see below  - holding palliative care consult for now given lack of success at last meeting    #Goals of care  After discussion with patient, I am not sure he quite grasps nature of cancer diagnosis/prognosis. Pt would like to know exactly where cancer is. Wants Dieudonne to be surrogate medical decision maker, if necessary. He is not listed in healthcare directive.   - SW following  - will provide pt w/ information he needs and continue this conversation  - will need to follow-up w/ nursing home   - possible palliative care consult if pt agreeable     #Congnitive impairment  Per recent d/c summary: \"Nursing home visit notes and note by Dr. Rossi with palliative care, patient has mild-moderate impairment based on previous neurocognitive testing. BIMS score 11/15 on 11/16/17. Notes discuss appointing a guardian for patient to make medical decisions. Patient does have 11 children, however notes indicate they do not attend his hospital appointments. Patient explained that his medical decision maker is his \"son\" Les Bourgeois (silvia). Per advanced directive, his primary contact is cousin, Melissa Mccormack with second contact as his son, Jeevan Grey (see advanced directive for phone number). Discussed patient's hospitaliziation and concerns for returning to NH with son, Les 1/10.\"  - goals of care, as above     #Chronic pain:  Pt notes chronic abdominal pain as well as \"left body pain.\" Some pain in L neck.  - continue fentanyl patch q72 hrs (last placed 1/24 0930 per nursing home RN)  - continue PTA oxycodone 5mg q4h PRN  - hold NSAIDS  - acetaminophen PRN     #Constipation  - started on bowel regimen    #Urinary ESBL  Would have completed 5-day course. No dysuria on admission. May be colonized  - hold Abx  - monitor for urinary symptoms   - if symptomatic, will send UA    #BPH  - continued PTA tamsulosin     #Coronary " artery disease   #Hypertension  #Chronic diastolic CHF  Last echo 5/2016 with EF ~55% and grade 1 diastolic failure.  - continue holding warfarin, aspirin, and ibuprofen in setting of GI bleed  - continue isosorbide mononitrate 60mg daily and metoprolol 50mg daily with hold parameters BP <120/80 and HR <60  - hold home statin      #Atrial fibrillation  - continue PTA metoprolol as above  - no warfarin or aspirin      #CKD   Creatinine at 1.17, which seems to be around baseline. Reported eGFR of 61 probable overestimate given limited muscle mass.  - daily BMP      #COPD  #ALEXIS  On chronic oxygen at home. Notes indicated that he has been treated multiple times for COPD exacerbations. Follows with Dr. Loera in Pulmonology.   -continue PTA Spiriva and Symbicort  -Duonebs QID PRN      #Diabetes mellitus, type II  Has not been receiving any insulin at home per nurse at his residence.  - Holding any basal insulin  - Hypoglycemia protocol  - Medium ISS      #Lymphedema  #Venous stasis skin changes/ulcerations  - Lymphedema consulted  - Continue PTA Lasix 20mg BID      #Anxiety/Depression  - Continue PTA Cymbalta        #Glaucoma  Follows with [facility].   - continue bimatoprost, timolol, latanoprost, and carboxymethylcellulose drops    Diet: Regular diet  Fluids: Bolus IVF as necessary   DVT Prophylaxis: Pneumatic Compression Devices  Code Status: Full Code    Disposition Plan   Expected discharge: 1-2, recommended to prior living arrangement once hemoglobin stable and goals of care reviewed.      Entered: Jovani Couch 01/27/2018, 6:27 AM   Information in the above section will display in the discharge planner report.      The patient's care was discussed with the Attending Physician, Dr. Andrew Sotelo.    Jovani Couch MD  Internal Medicine PGY-1  [facility] 3, p: [phone]    Please see sticky note for cross cover information    Interval History   Pt having issues sitting up in bed. Has some  gluteal pain. Breathing well; breathing treatments helped him.     4 point ROS reviewed and negative, except for the above.     Physical Exam   Vital Signs: Temp: 97.8  F (36.6  C) Temp src: Axillary BP: 123/58   Heart Rate: 114 Resp: 18 SpO2: 94 % O2 Device: Nasal cannula Oxygen Delivery: 2 LPM  Weight: 193 lbs 11.2 oz    General: Lying in bed; no acute distress   HEENT: no conjunctival icterus; some missing teeth; normocephalic and atraumatic   Cardiac: normal S1/S2 w/ tachycardic rate; no murmurs noted  Resp: Normal work of breathing, breath sounds symmetric to bases; no wheezes, crackles, or rhonchi  Abd: Soft, non-distended; umbilical hernia is easily reduced; diffuse abdominal tenderness without ridigity  Skin: warm and dry; no obvious rashes over face, neck, arms, or abdomen; brawny edema of lower legs  Extremities: bilateral LE lymphedema, wrapped this morning  Neuro: Alert and appropriately conversational; responds to commands appropriately, no gross deficits; needs help lifting legs to bed  Psych: Euthymic mood and normal affect; tangential in conversation    Data   Labs, micro, and imaging data reviewed in EPIC.

## 2018-01-27 NOTE — PLAN OF CARE
Problem: Patient Care Overview  Goal: Plan of Care/Patient Progress Review  Edema 5A: Pt with lymphedema in B LEs, skin taut with firm non-pitting. Skin scaly, dry, and discolored. Pt with new small skin tear on lower R LE covered by primapore. B LEs sensitive, especially R LE. GCB reapplied with light compression 2/2 decreased tolerance, plan to wear x 24-48 hours, but please remove if compression causes numbness, tingling, pain, or becomes soiled. Pt a poor historian, and stating he was admitted to the hospital from his friend's house. Per RN, was a LTC patient. Will follow up.

## 2018-01-27 NOTE — PLAN OF CARE
Problem: Patient Care Overview  Goal: Plan of Care/Patient Progress Review  Outcome: No Change  AOx3. Forgetful. Got pt OOB in the am but pt was not able to bear weight on his BLE, had to use lift to get him back to bed. Used bedpan x1 but had no BM. Tried using urinal few times during day shift but had no urine output. Bladder scan done with 325ml and straight cath of 400ml. LR bolus given. Weaned pt down to 2L NC, pts baseline O2 level. Had expiratory wheezing. Fentanyl patch in place. Poor appetite, not eating, just a bite with meals. Drinking some water otherwise not really taking in any po. BLE with lymph wrap. Hgb remain stable. No S/S of bleeding.

## 2018-01-27 NOTE — PLAN OF CARE
Problem: Patient Care Overview  Goal: Plan of Care/Patient Progress Review  Pt disoriented to time and situation. VSS on 2L NC (baseline) besides tmax 100.5. Most recent temp 97.8. Reports soreness in BLE, lymph wraps in place. Utilized lift x1 to transfer from WC to bed. Incontinent urine x1, bladder scanned 135ml @0430. No BM. Hgb stable 8.4, no signs bleeding. BG 0200 89, apple juice w/ sugar given, BG up to 110. Able to make needs know. Will continue to monitor and follow POC.

## 2018-01-28 NOTE — PHARMACY-VANCOMYCIN DOSING SERVICE
Pharmacy Vancomycin Initial Note  Date of Service 2018  Patient's  1945  72 year old, male    Indication: Sepsis    Current estimated CrCl = Estimated Creatinine Clearance: 54.7 mL/min (based on Cr of 1.54).    Creatinine for last 3 days  2018:  9:51 AM Creatinine 1.13 mg/dL  2018:  7:19 AM Creatinine 1.54 mg/dL    Recent Vancomycin Level(s) for last 3 days  No results found for requested labs within last 72 hours.      Vancomycin IV Administrations (past 72 hours)                   vancomycin (VANCOCIN) 1,750 mg in NaCl 0.9 % 500 mL intermittent infusion (mg) 1,750 mg New Bag 18 1350                Nephrotoxins and other renal medications (Future)    Start     Dose/Rate Route Frequency Ordered Stop    18 1315  vancomycin (VANCOCIN) 1,750 mg in NaCl 0.9 % 500 mL intermittent infusion      1,750 mg  over 2 Hours Intravenous EVERY 24 HOURS 18 1304      18 0800  furosemide (LASIX) tablet 20 mg      20 mg Oral DAILY 18 0320            Contrast Orders - past 72 hours     None                Plan:  1.  Start vancomycin  1750 mg IV q24h.   2.  Goal Trough Level: 15-20 mg/L   3.  Pharmacy will check trough levels as appropriate in 1-3 Days.    4. Serum creatinine levels will be ordered daily for the first week of therapy and at least twice weekly for subsequent weeks.    5. [place] method utilized to dose vancomycin therapy: Method 2    Meli Dimas, TorD

## 2018-01-28 NOTE — PROGRESS NOTES
[facility], [place]    Internal Medicine Progress Note - Maroon Service    Main Plans for Today   Meropenem and vancomycin   Monitor daily Hb  PT for leg weakness   Speech for regurgitation/swallow eval  Blood, sputum, urine culture     Assessment & Plan   This a 72-year-old man with a history of metastatic prostate cancer, COPD, hypertension, atrial fibrillation, CAD, type 2 diabetes, HFpEF, and recent admission for GI bleed now presenting with recurrent symptoms of a GI bleed.     #Sepsis  On 1/28, pt spike a fever in context of tachycardia. Mild leukocytosis. Started on broad spectrum Abx. Known to have ESBL in urine previously. Procal 9.03. UA w/ mild pyuria and trace leuk esterase. CXR w/ slight increase in perihilar and bibasilar opacities.  - meropenem and vancomycin  - follow-up blood, sputum, and urine cultures     #NATTY on CKD   Creatinine at 1.1s, which seems to be around baseline. Reported eGFR of 61 probable overestimate given limited muscle mass. Spiked to 1.54. Pt has some lability on Cr, up to 2.09 on check in 8/2017. Likely prerenal component vs infection in context of chronic disease.   - IVF bolus: LR 500mL x2 since last night w/  mL bolus x1  - daily BMP; recheck Cr tomorrow     #Suspected GI bleed  #Hx of melena  Similar presentation to his last admission. Hemoglobin at discharge 9.5, on arrival 9.6 to ED, then 8.5, then 9.0 on recheck. On GI consult from 1/8, thinking was that he was likely bleeding from stomach, but low probability of endoscopically treatable lesion was low and an EGD was thought to be high risk. Warfarin, aspirin, and NSAIDs were correctly held on discharge. No additional melena since arrival to our institution, and no maroon stools to suggest more brisk bleed. Previous transfusion threshold was 8 on heme-onc service. Case run by GI, who is not recommending endoscopic intervention at this time given stability. Hb has been stable.   - type and  "screen done  - pantoprazole BID  - daily Hb, increase frequency if pt becomes vitally unstable   - continue holding warfarin, aspirin, and ibuprofen  - continued daily folate and iron at discharge  - continue PTA sucralfate 1g QID     #Leg weakness  Likely chronic. Pt ability to move legs is stable. May be related to abdominal/hip pain when he moves legs, though he does not endorse pain at rest.  Sensation and movement intact bilaterally  - PT/OT consulted and following  - PT recommending skilled PT    #Solid food issues  Pt seen w/ partially chewed food in cup on 1/28. Pt concerned about vomiting. Speech saw pt. Assessment reveals pt not chewing food adequately.  - speech following  - dysphagia 1 - pureed diet w/ thin liquids     #Metastatic prostate cancer  Complex issue; see HPI for brief summary. Has metastases to bone. He has been on numerous therapies (Lupron, Casodex, Xgeva, Xtandi, Zytega+pred, Xofigo, and Megace). Recent note by Dr. Oviedo 12/5 stating they would stop all cancer-directed therapies and refer to hospice, though patient has been resistant to this suggestion.  - clarify goals of care, see below  - holding palliative care consult for now given lack of success at last meeting    #Goals of care  After discussion with patient, I am not sure he quite grasps nature of cancer diagnosis/prognosis. Pt would like to know exactly where cancer is. Wants Dieudonne to be surrogate medical decision maker, if necessary. He is not listed in healthcare directive.   - SW following  - will provide pt w/ information he needs and continue this conversation  - will need to follow-up w/ nursing home   - possible palliative care consult if pt agreeable     #Congnitive impairment  Per recent d/c summary: \"Nursing home visit notes and note by Dr. Rossi with palliative care, patient has mild-moderate impairment based on previous neurocognitive testing. BIMS score 11/15 on 11/16/17. Notes discuss appointing a guardian for " "patient to make medical decisions. Patient does have 11 children, however notes indicate they do not attend his hospital appointments. Patient explained that his medical decision maker is his \"son\" Les Bourgeois (stepson). Per advanced directive, his primary contact is cousin, Melissa Mccormack with second contact as his son, Jeevan Grey (see advanced directive for phone number). Discussed patient's hospitaliziation and concerns for returning to NH with son, Les 1/10.\"  - goals of care, as above     #Chronic pain:  Pt notes chronic abdominal pain as well as \"left body pain.\" Some pain in L neck.  - continue fentanyl patch q72 hrs (PTA patch placed 1/24 0930 per nursing home RN)  - continue PTA oxycodone 5mg q4h PRN  - hold NSAIDS  - acetaminophen PRN     #Constipation  - started on bowel regimen    #Urinary ESBL  Would have completed 5-day course. No dysuria on admission. May be colonized  - hold Abx  - monitor for urinary symptoms   - if symptomatic, will send UA    #BPH  - continued PTA tamsulosin     #Coronary artery disease   #Hypertension  #Chronic diastolic CHF  Last echo 5/2016 with EF ~55% and grade 1 diastolic failure.  - continue holding warfarin, aspirin, and ibuprofen in setting of GI bleed  - continue isosorbide mononitrate 60mg daily and metoprolol 50mg daily with hold parameters BP <120/80 and HR <60  - hold home statin      #Atrial fibrillation  - continue PTA metoprolol as above  - no warfarin or aspirin      #COPD  #ALEXIS  On chronic oxygen at home. Notes indicated that he has been treated multiple times for COPD exacerbations. Follows with Dr. Loera in Pulmonology.   -continue PTA Spiriva and Symbicort  -Duonebs QID PRN      #Diabetes mellitus, type II  Has not been receiving any insulin at home per nurse at his residence.  - Holding any basal insulin  - Hypoglycemia protocol  - Medium ISS      #Lymphedema  #Venous stasis skin changes/ulcerations  - Lymphedema consulted  - Continue PTA Lasix 20mg " BID      #Anxiety/Depression  - Continue PTA Cymbalta        #Glaucoma  Follows with [facility].   - continue bimatoprost, timolol, latanoprost, and carboxymethylcellulose drops    Diet: Dysphagia 1 diet w/ thin liquids  Fluids: Bolus IVF as necessary   DVT Prophylaxis: Pneumatic Compression Devices  Code Status: Full Code    Disposition Plan   Expected discharge: 1-2, recommended to prior living arrangement once hemoglobin stable, infection worked up and treated, and goals of care reviewed.      Entered: Jovani Couch 01/28/2018, 6:50 AM   Information in the above section will display in the discharge planner report.      The patient's care was discussed with the Attending Physician, Dr. Andrew Sotelo.    Jovani Couch MD  Internal Medicine PGY-1  [facility], p: [phone]    Please see sticky note for cross cover information    Interval History   Overnight, pt spiked a fever to 101 w/ tachycardia and hypotension to 80s/40s. Also had concerns over R flank pain. Pain improved sitting in chair this am. Concerned about getting up but feels better in chair. Pt also says he has been throwing up food. Liquids go down well.     4 point ROS reviewed and negative, except for the above.     Physical Exam   Vital Signs: Temp: 98  F (36.7  C) Temp src: Axillary BP: 99/53   Heart Rate: 115 Resp: 16 SpO2: 99 % O2 Device: Nasal cannula Oxygen Delivery: 2 LPM  Weight: 193 lbs 11.2 oz    General: partially masticated food cup by pt food tray; sitting in chair; no acute distress   HEENT: no conjunctival icterus; some missing teeth; normocephalic and atraumatic   Cardiac: normal S1/S2 w/ tachycardic rate; no murmurs noted  Resp: Normal work of breathing, breath sounds symmetric to bases; no wheezes, crackles, or rhonchi  Abd: Soft, non-distended; umbilical hernia is easily reduced; diffuse abdominal tenderness without ridigity  Skin: warm and dry  Extremities: bilateral LE lymphedema  Neuro: Alert and  appropriately conversational; responds to commands appropriately, no gross deficits  Psych: Euthymic mood and normal affect    Data   Labs, micro, and imaging data reviewed in EPIC.

## 2018-01-28 NOTE — PROGRESS NOTES
01/28/18 1710   Quick Adds   Type of Visit Initial Occupational Therapy Evaluation   Living Environment   Lives With facility resident   Living Arrangements extended care facility   Home Accessibility no concerns   Number of Stairs to Enter Home 0   Number of Stairs Within Home 0   Living Environment Comment Pt resides in SNF.   Self-Care   Dominant Hand right   Usual Activity Tolerance poor   Current Activity Tolerance poor   Regular Exercise yes   Activity/Exercise Type (PT/OT)   Exercise Amount/Frequency 5-7 times/wk   Equipment Currently Used at Home wheelchair, power   Functional Level Prior   Ambulation 4-->completely dependent   Transferring 3-->assistive equipment and person   Toileting 3-->assistive equipment and person   Bathing 3-->assistive equipment and person   Dressing 2-->assistive person   Eating 0-->independent   Communication 0-->understands/communicates without difficulty   Swallowing 0-->swallows foods/liquids without difficulty   Cognition 1 - attention or memory deficits   Fall history within last six months yes   Number of times patient has fallen within last six months 1   Which of the above functional risks had a recent onset or change? fall history   Prior Functional Level Comment Pt is a poor historian.  He reports he was getting assist for all ADL and mobility, however elsewhere in chart it states pt was able to transfer himself.   General Information   Onset of Illness/Injury or Date of Surgery - Date 01/24/18   Referring Physician Jovani Couch MD   Patient/Family Goals Statement to return to LTC   Additional Occupational Profile Info/Pertinent History of Current Problem 72-year-old man with a history of metastatic prostate cancer, COPD, hypertension, atrial fibrillation, CAD, type 2 diabetes, HFpEF, and recent admission for GI bleed now admitted for GI bleed, now spiking fever to 101 with tachycardia and hypotension concerning for sepsis. Has grown ESBL org's in urine  prior. Exam remarkable for right sided flank pain which is new per patient and RN. While patient doesn't appear septic, concern for ESBL UTI +/- bacteremia vs nephro/ureterolithiasis   Precautions/Limitations fall precautions   Weight-Bearing Status - LUE full weight-bearing   Weight-Bearing Status - RUE full weight-bearing   Weight-Bearing Status - LLE full weight-bearing   Cognitive Status Examination   Orientation person;place   Level of Consciousness lethargic/somnolent   Able to Follow Commands mild impairment   Cognitive Comment Pt demonstrates impairments in attention, memory, problem-solving, etc.  He is a poor historian.     Visual Perception   Visual Perception Comments Not tested, pt denies changes   Sensory Examination   Sensory Comments Not tested   Integumentary/Edema   Integumentary/Edema Comments BLE edema   Range of Motion (ROM)   ROM Comment Pt does not follow cues for testing, but appears WFL   Strength   Strength Comments generalized weakness   Mobility   Bed Mobility Bed mobility skill: Supine to sit   Bed Mobility Comments Ax2, max A to move L LE   Transfer Skill: Bed to Chair/Chair to Bed   Level of [place]: Bed to Chair dependent (less than 25% patients effort)   Activities of Daily Living Analysis   Impairments Contributing to Impaired Activities of Daily Living balance impaired;cognition impaired;strength decreased   General Therapy Interventions   Planned Therapy Interventions ADL retraining;strengthening;cognition   Clinical Impression   Criteria for Skilled Therapeutic Interventions Met yes, treatment indicated   OT Diagnosis impaired functional cognition, decreased ability to perform ADL   Influenced by the following impairments decreased strength, impaired cognition   Assessment of Occupational Performance 1-3 Performance Deficits   Identified Performance Deficits transfers, hygiene, dressing   Clinical Decision Making (Complexity) Low complexity   Therapy Frequency 3 times/wk  "  Predicted Duration of Therapy Intervention (days/wks) 5   Anticipated Discharge Disposition Long Term Care Facility   Risks and Benefits of Treatment have been explained. Yes   Patient, Family & other staff in agreement with plan of care Yes   Clinical Impression Comments Pt presents with below baseline cognition and ADL performance d/t decreased strength and cognition.  Pt would benefit from skilled occupational therapy services to address strengthening and cognition and improve ADL performance   [facility] TM \"6 Clicks\"   2016, Trustees of [facility], under license to [employer].  All rights reserved.   6 Clicks Short Forms Daily Activity Inpatient Short Form   [facility]  \"6 Clicks\" Daily Activity Inpatient Short Form   1. Putting on and taking off regular lower body clothing? 1 - Total   2. Bathing (including washing, rinsing, drying)? 1 - Total   3. Toileting, which includes using toilet, bedpan or urinal? 1 - Total   4. Putting on and taking off regular upper body clothing? 1 - Total   5. Taking care of personal grooming such as brushing teeth? 2 - A Lot   6. Eating meals? 3 - A Little   Daily Activity Raw Score (Score out of 24.Lower scores equate to lower levels of function) 9   Total Evaluation Time   Total Evaluation Time (Minutes) 10     "

## 2018-01-28 NOTE — PLAN OF CARE
"Problem: Patient Care Overview  Goal: Plan of Care/Patient Progress Review  Disoriented to time and situation. Soft BP. Tachy. Tmax 101. Triggered sepsis. LA 1.0. Tylenol x1. 500cc LR & 250cc NS bolus given. Blood cultures drawn. CXR completed, atelectasis, infection not excluded. Influzenza A/B & RSV neg. C/o R flank pain. Oxy x1. CT abd/pelvis, \"Nonobstructing right calyceal stones measuring up to 5 mm\". Straight cathed for urine culture, sent. Low urine output throughout shift, bladder scanned only 65cc this AM. IV merrem started for presumed UTI. Lymph wraps BLE, legs tender. Lift up to chair. 0200 BG check stable 112. R&L PIV SL. Pt able to make needs known and uses call light appropriately. Will cont to monitor and follow POC.        "

## 2018-01-28 NOTE — PLAN OF CARE
Problem: Patient Care Overview  Goal: Plan of Care/Patient Progress Review  Discharge Planner PT   Patient plan for discharge: Return to LTC facility  Current status: Patient had limited activity tolerance second to fatigue and was asleep after short session. B LE strength and ROM limited by patient pain. Min A for all rolling and total assist transfer bed to/from chair using ceiling lift. Patient receiving PT services at baseline and verbalized motivation to work with PT in hospital.  Barriers to return to prior living situation: None identified from functional mobility standpoint  Recommendations for discharge: LTC facility  Rationale for recommendations: Skilled PT indicated to progress LE strength and independence with bed mobility and transfers to/from EOB.       Entered by: Jenn Grant 01/28/2018 12:32 PM

## 2018-01-28 NOTE — PROGRESS NOTES
01/28/18 1104   Quick Adds   Type of Visit Initial PT Evaluation   Living Environment   Lives With facility resident   Home Accessibility no concerns   Number of Stairs to Enter Home 0   Number of Stairs Within Home 0   Living Environment Comment (States he has a power w/c for mobility)   Self-Care   Dominant Hand right   Usual Activity Tolerance poor   Current Activity Tolerance poor   Regular Exercise yes   Activity/Exercise Type (Working with PT and OT 1x day; Used [employer])   Exercise Amount/Frequency 5-7 times/wk   Equipment Currently Used at Home wheelchair, power   Activity/Exercise/Self-Care Comment (Was working with PT for a year on B LE strength)   Functional Level Prior   Ambulation 4-->completely dependent   Transferring 3-->assistive equipment and person   Toileting 3-->assistive equipment and person   Bathing 2-->assistive person   Dressing 2-->assistive person   Eating 0-->independent   Communication 0-->understands/communicates without difficulty   Swallowing 0-->swallows foods/liquids without difficulty   Cognition 1 - attention or memory deficits   Fall history within last six months yes   Number of times patient has fallen within last six months 1   Which of the above functional risks had a recent onset or change? fall history   Prior Functional Level Comment States he uses power w/c to get around   General Information   Onset of Illness/Injury or Date of Surgery - Date 01/24/18   Referring Physician Jovani Couch MD   Patient/Family Goals Statement Return to LTC   Pertinent History of Current Problem (include personal factors and/or comorbidities that impact the POC) 72-year-old man with a history of metastatic prostate cancer, COPD, hypertension, atrial fibrillation, CAD, type 2 diabetes, HFpEF, and recent admission for GI bleed now admitted for GI bleed, now spiking fever to 101 with tachycardia and hypotension concerning for sepsis. Has grown ESBL org's in urine prior. Exam  "remarkable for right sided flank pain which is new per patient and RN. While patient doesn't appear septic, concern for ESBL UTI +/- bacteremia vs nephro/ureterolithiasis   Precautions/Limitations no known precautions/limitations   Heart Disease Risk Factors Diabetes;Lack of physical activity;Overweight;Medical history   General Observations \"Activity: Up with Assist\"   Cognitive Status Examination   Orientation orientation to person, place and time   Level of Consciousness lethargic/somnolent   Follows Commands and Answers Questions 75% of the time   Personal Safety and Judgment intact   Memory impaired   Pain Assessment   Patient Currently in Pain No   Integumentary/Edema   Integumentary/Edema other (describe)   Integumentary/Edema Comments (B LEs wrapped)   Posture    Posture Protracted shoulders;Forward head position   Range of Motion (ROM)   ROM Comment Impaired- limited by wraps and patient pain. Hamstring tightness noted on R LE   Strength   Strength Comments 3-/5 strength grossly B LEs limited by pain/weakess   Bed Mobility   Bed Mobility Comments Min A supine to side-lying each side with moderate verbal cueing for complete task.   Transfer Skills   Transfer Comments Total assist lift transfer bed to/from w/c.   Gait   Gait Comments Nonambulatory at baseline.   Balance   Balance Comments Unable to assess during initial eval second to fatigue- will assess sitting balance during treatment.   Sensory Examination   Sensory Perception Comments Increased sensitivity to light touch during B LE ROM and strength assessment.   General Therapy Interventions   Planned Therapy Interventions balance training;bed mobility training;manual therapy;gait training;strengthening;stretching;transfer training;wheelchair management/propulsion training;progressive activity/exercise   Clinical Impression   Criteria for Skilled Therapeutic Intervention yes, treatment indicated   PT Diagnosis Generalized weakness   Influenced by the " "following impairments Cognitive deficits, pain in B LEs   Functional limitations due to impairments poor activity tolerance, increased assist for all functional mobility   Clinical Presentation Evolving/Changing   Clinical Presentation Rationale Multiple personal factors and PMH    Clinical Decision Making (Complexity) Moderate complexity   Therapy Frequency` 5 times/week   Predicted Duration of Therapy Intervention (days/wks) 10   Anticipated Discharge Disposition Long Term Care Facility   Risk & Benefits of therapy have been explained Yes   Patient, Family & other staff in agreement with plan of care Yes   [facility] AM-PAC  \"6 Clicks\" V.2 Basic Mobility Inpatient Short Form   1. Turning from your back to your side while in a flat bed without using bedrails? 3 - A Little   2. Moving from lying on your back to sitting on the side of a flat bed without using bedrails? 2 - A Lot   3. Moving to and from a bed to a chair (including a wheelchair)? 1 - Total   4. Standing up from a chair using your arms (e.g., wheelchair, or bedside chair)? 1 - Total   5. To walk in hospital room? 1 - Total   6. Climbing 3-5 steps with a railing? 1 - Total   Basic Mobility Raw Score (Score out of 24.Lower scores equate to lower levels of function) 9   Total Evaluation Time   Total Evaluation Time (Minutes) 8     "

## 2018-01-28 NOTE — PLAN OF CARE
Problem: Patient Care Overview  Goal: Plan of Care/Patient Progress Review  Outcome: No Change  AOx3. Forgetful. Confused at times. Wants to discharge back to nursing home. Reports pain in BLE, oxycodone given x1. Lymph wraps on. Left lymph wrap taken off early in the am due to pt complaining of itching/pain at the ankle. Very poor appetite, not eating, drinking little bit with encouragement. Pt attempted to use urinal few times without any success, bladder scanned this afternoon for 322, straight cath for 500 cc. Pt reports that he was taught how to straight cath and used to do it himself. Had small BMs, green mucous, no s/s of bleeding. Has very weak BLE, unable to bear weight or move legs without assistance, using lift. 2 assist to turn in bed.

## 2018-01-28 NOTE — PLAN OF CARE
Problem: Patient Care Overview  Goal: Plan of Care/Patient Progress Review  Discharge Planner OT   Patient plan for discharge: did not state  Current status: Pt dependent for majority of ADLs and seems below baseline for functional mobility.  Pt is an unreliable historian, difficult to assess PLOF.  Unable to ascertain if cognitive impairments are baseline, but they seem to be contributing to increased need for assist.  Barriers to return to prior living situation: level of assist needed, medical status  Recommendations for discharge: TCU  Rationale for recommendations: to improve strength and endurance to support baseline mobility and ADLs       Entered by: Doreen Dumas 01/28/2018 5:08 PM

## 2018-01-28 NOTE — PLAN OF CARE
Problem: Patient Care Overview  Goal: Plan of Care/Patient Progress Review  Discharge Planner SLP   Patient plan for discharge: LTC  Current status: Clinical dysphagia examination completed per MD order. The patient presents with moderate oral dysphagia marked by excessive mastication, poor bolus manipulation and spitting out of all solid textures. Adequate bolus control with no spitting out, signs of difficulty, or signs of aspiration present with puree textures or thin liquids. Recommend the patient change to dysphagia diet 1 and thin liquids. SLP will f/u for diet tolerance and potential for advancement. Unclear if the patient was able to manipulate more advanced textures well at baseline.  Barriers to return to prior living situation: requires a modified diet  Recommendations for discharge: modified diet at LTC facility  Rationale for recommendations: requires modified diet for safety & ease of intake       Entered by: Karon Willis 01/28/2018 2:48 PM

## 2018-01-28 NOTE — PROGRESS NOTES
"[facility] - Internal Medicine Cross cover Note    S:   Was notified by RN of patient spiking fever to 101, tachy and hypotension (80s/40s) with right-sided flank pain. No increased respiratory needs. Patient reports needing to lay on left side as right flank hurts.     O:  /51 (BP Location: Right arm)  Pulse 95  Temp 101  F (38.3  C) (Oral)  Resp 18  Ht 1.803 m (5' 11\")  Wt 87.9 kg (193 lb 11.2 oz)  SpO2 95%  BMI 27.02 kg/m2  General: lying in bed in moderate distress due to pain  Pulm: CTAB, on NC 2lpm (stable)  Cardiac: RRR, no murmurs, rubs gallops  Abd: BS normoactive, soft, nondistended, nontender, right flank exquisitely tender to light palpation. + boogie sign     A/P:  72-year-old man with a history of metastatic prostate cancer, COPD, hypertension, atrial fibrillation, CAD, type 2 diabetes, HFpEF, and recent admission for GI bleed now admitted for GI bleed, now spiking fever to 101 with tachycardia and hypotension concerning for sepsis. Has grown ESBL org's in urine prior. Exam remarkable for right sided flank pain which is new per patient and RN. While patient doesn't appear septic, concern for ESBL UTI +/- bacteremia vs nephro/ureterolithiasis    CT A/P  Blood cultures  UA/UCx  Chest Xray  CBC  Lactic acid    Gave 500cc LR bolus  Started patient on Meropenem      Jose Campos MD  Internal Medicine, PGY1  Pager: [phone]    "

## 2018-01-29 NOTE — PROGRESS NOTES
[facility], [place]   Antimicrobial Management Team (AMT) Note              To: Med [place] 3  Unit: 5A  Allergies   Allergen Reactions     Morphine Other (See Comments)     Patient reports makes him almost go into a coma       Brief Summary: Tomas Grey is a 72 year old male with PMH of metastatic prostate cancer, COPD, hypertension, atrial fibrillation, CAD, T2DM, and HFpEF with a recent admission for GI bleed who was admitted on 1/24/2018 with recurrent symptoms of a GI bleed.    HPI: The patient was admitted to this institution from 1/8 to 1/13 for melena with initial hemoglobin of 3.2.  He was admitted to the oncology service and ultimately received 3 units PRBCs.  GI was consulted, but he was deemed to be high-risk candidate and an EGD was not offered. He was also treated for ESBL E. coli UTI during this prior admission with ertapenem while inpatient and subsequently, per ID and sensitivity, completed a total of 5 days treatment course while outpatient on TMP/SMX DS BID (x3 doses). Upon admission on 1/25, patient did not have dysuria, was afebrile, mildly hypertensive (/82) and WBC (9) was within reference range.    On 1/27 patient developed tachycardia and was hypotensive (86/47) with right-sided flank pain. He was febrile (101) and labs from 1/27 showed mild leukocytosis (WBC 11.7). On 1/28, he had an elevated procalcitonin (9.03), UA showed trace LE, mild pyuria (WBC urine 9), and negative nitrite. He was empirically started on meropenem and vancomycin on 1/28.    On 1/27, patient tested negative for influenza A/B and RSV, urine culture shows NGTD, and BCx (2of2) show NGTD. Abdominal CT from 1/28 showed non-obstructing right calyceal stones and new bibasilar opacities, representing atelectasis, aspiration or infection. MRSA nares negative on 1/29 and sputum culture also ordered on 1/29.    LDA:  Peripheral IV right; anterior lower forearm (1/28 - )    Assessment:    Sepsis 2/2 UTI vs Pneumonia? Patient s WBC (10) have been down-trending and back within reference range, he has remained afebrile within the last 24-hours, and is currently normotensive despite having some hypotension yesterday. BCx with NGTD indicative of no bacteremia at present time and urine culture with NGTD suggests low likelihood of UTI. However, with patient reported left, flank pain, fever, and abdominal CT empiric treatment was initiated. An important consideration is that the procalcitonin could be falsely elevated due to concurrent increase in SCr as well as with suspected GI bleed and metastatic prostate cancer and thus its predictability for a bacterial infection in this patient might be confounding (Kinjal NIXON. et al. World J Gastroenterol. 2014; and Arin MCKOY et al. Nature. 2016). Therefore, the s/sx of left, flank pain, fever and transient increase in serum creatinine could be attributed to the potential passing of a kidney stone versus a bacterial infection. With recent ESBL E. coli UTI, urine culture showing NGTD, and MRSA nares negative, risk for MRSA infection, including invasive MRSA pneumonia, is low and vancomycin is not warranted. If UTI is not suspected but if pneumonia (HCAP or HAP) is a concern, this would not merit ESBL bacteria coverage unless indicated by culture results. Therefore, meropenem is not warranted at this time. Pneumonia is unlikely unless s/sx consistent with its diagnosis are noted, but if suspected, Zosyn would represent a more adequate empiric treatment with coverage of likely pathogens.    Recommendations:  1. AMT agrees with sputum culture order.  2. AMT recommends discontinue vancomycin.  3. AMT recommends discontinue meropenem.  4.  If concerned for pneumonia and patient clinically declines, consider starting Zosyn 4.5 gm IV q6h and de-escalate if possible per sputum culture sensitivity. Total treatment duration of 7 days.    Discussed w/ ID Staff - Dr. Chew  MD Juan and Dr. Katelynn Almendarez, PharmD, MPH, BCPS (AQ-ID)  Regis Mejia, Pharmacy Intern    Current Anti-infective Orders:  Anti-infectives (Future)    Start     Dose/Rate Route Frequency Ordered Stop    01/28/18 1315  vancomycin (VANCOCIN) 1,750 mg in NaCl 0.9 % 500 mL intermittent infusion      1,750 mg  over 2 Hours Intravenous EVERY 24 HOURS 01/28/18 1304      01/27/18 2230  meropenem (MERREM) 1 g in 20 mL SWFI for IVP Vial      1 g  over 3-5 Minutes Intravenous EVERY 8 HOURS 01/27/18 2218            Vitals and other clinical features  Vitals       01/27 0700  -  01/28 0659 01/28 0700  -  01/29 0659 01/29 0700  -  01/29 1405   Most Recent    Temp ( F) 98 -  101    97.3 -  100.1       97.8 (36.6)    Pulse     90       90    Heart Rate 115 -  120    103 -  122       120    Resp 16 -  18    16 -  20       20    BP (!)86/47 -  132/52    (!)93/37 -  118/55       116/55    SpO2 (%) 95 -  99    91 -  97       97        Temperature curve:      Labs  Estimated Creatinine Clearance: 59.3 mL/min (based on Cr of 1.42).  Recent Labs   Lab Test  01/29/18   0738  01/28/18   0719  01/26/18   0951  01/25/18   0610  01/24/18   2104  01/13/18   0331   CR  1.42*  1.54*  1.13  1.17  1.17  1.08       Recent Labs   Lab Test  01/29/18   0738  01/28/18   0719  01/27/18   2205  01/26/18   0951  01/25/18   0610  01/24/18   2341  01/24/18   2104   01/08/18   1028   12/18/17   0041  12/14/17   0719   12/13/17   1325   12/04/17   1040   WBC  10.0  10.9  11.7*  9.2  9.3   --   9.0   < >  12.1*   --   15.3*  10.8   --   11.4*   --   9.9   ANEU   --    --    --    --    --    --   5.9   --   7.9   --   12.0*  9.4*   --   8.6*   --   7.3   ALYM   --    --    --    --    --    --   1.6   --   2.1   --   1.1  0.7*   --   1.6   --   1.3   TIA   --    --    --    --    --    --   0.8   --   1.4*   --   1.7*  0.7   --   1.0   --   1.0   AEOS   --    --    --    --    --    --   0.5   --   0.6   --   0.0  0.0   --   0.2   --   0.2   HGB  7.6*   8.1*  8.1*  8.4*  9.0*  8.5*  9.6*   < >  3.2*   < >  10.6*  8.7*   < >  6.4*   < >  8.0*   HCT  25.5*  26.9*  27.4*  29.4*  31.0*   --   33.2*   < >  11.0*   --   35.6*  29.9*   --   21.5*   --   26.7*   MCV  91  92  93  95  94   --   94   < >  89   --   90  89   --   88   --   89   PLT  243  242  238  226  263   --   267   < >  346   --   336  246   --   244   --   310    < > = values in this interval not displayed.       Recent Labs   Lab Test  01/25/18   0610  01/24/18   2104 01/08/18   1028  12/04/17   1040 11/10/17  10/11/17   2106   BILITOTAL  0.4  0.4  0.3  0.6  0.2  0.5   ALKPHOS  227*  222*  150  157*  96  159*   ALBUMIN  2.4*  2.5*  2.3*  2.2*  2.2*  2.4*   AST  39  24  13  29  13  Unsatisfactory specimen - hemolyzed   ALT  10  10  11  17  9*  11       Recent Labs   Lab Test  01/28/18   0719  01/27/18   2308  01/08/18   2050   12/14/17   0719  12/13/17   1325   10/11/17   2106   06/01/17   0043   12/26/16   1851   05/16/16   0800   05/08/16   0642   LACT   --   1.0  1.2   < >   --    --    < >   --    < >  1.2   < >   --    < >   --    < >   --    CRP   --    --    --    --   240.0*  240.0*   --   96.0*   --   68.0*   --   17.0*   --   19.4*   --    --    SED   --    --    --    --    --    --    --   137*   --    --    --    --    --   83*   --    --    PCAL  9.03   --    --    --    --    --    --    --    --    --    --    --    --    --    --   0.91    < > = values in this interval not displayed.     Recent Labs   Lab Test  06/04/17   0725   VANCOMYCIN  27.0*       Culture results with specimen source  Culture Micro   Date Value Ref Range Status   01/27/2018 No growth  Final   01/27/2018 No growth after 2 days  Preliminary   01/27/2018 No growth after 2 days  Preliminary   01/08/2018 (A)  Final    >100,000 colonies/mL  Escherichia coli ESBL  Enterobacteriaceae that are susceptible to meropenem are usually susceptible to ertapenem.     01/08/2018   Final    ESBL (extended beta lactamase) producing  organisms require contact precautions.   06/03/2017 (A)  Final    Light growth Pseudomonas aeruginosa  Plus Light growth Normal skin rex      Specimen Description   Date Value Ref Range Status   01/29/2018 Nares  Final   01/29/2018 Nares  Final   01/27/2018 Nasopharyngeal  Final   01/27/2018 Midstream Urine  Final   01/27/2018 Blood Left Arm  Final   01/27/2018 Blood Right Arm  Final        Urine Studies     Recent Labs   Lab Test  01/28/18   1250  01/08/18   1106  06/01/17   0550  10/04/16   2043  09/20/16   0225   URINEPH  5.5  5.5  5.5  5.0  6.5   NITRITE  Negative  Negative  Negative  Negative  Negative   LEUKEST  Trace*  Large*  Moderate*  Negative  Large*   WBCU  9*  >182*  2  11*  >182*       CSF Testing  No lab results found.    Respiratory Virus Testing    No results found for: RVSPEC, IFLUA, FLUAH1, FLUAH3, ZM7826, IFLUB, RSVA, RSVB, PIV1, PIV2, PIV3, HMPV, HRVS, ADVBE, ADVC  Last check of C difficile  C Diff Toxin B PCR   Date Value Ref Range Status   01/02/2016  NEG Final    Negative  Negative: Clostridium difficile target DNA sequences NOT detected, presumed   negative for Clostridium difficile toxin B or the number of bacteria present   may be below the limit of detection for the test.   FDA approved assay performed using 5 Minutes GeneXpert real-time PCR.   A negative result does not exclude actual disease due to Clostridium difficile   and may be due to improper collection, handling and storage of the specimen or   the number of organisms in the specimen is below the detection limit of the   assay.         Imaging:  Xr Chest 2 Views    Result Date: 1/24/2018  Exam: Chest x-ray, 2 views, 1/24/2018. COMPARISON: 1/10/2018. HISTORY: Shortness of breath. FINDINGS: PA and lateral views of the chest were obtained. Interval removal of the previously described left upper extremity PICC line. Cardiomediastinal silhouette within normal limits. Distinct pulmonary vasculature. Stable bilateral lower lobes streaky  opacities. No pleural effusions. No pneumothorax. The right costophrenic angle is collimated out of the image. Tortuous thoracic aorta with calcifications. Degenerative changes of the spine and shoulders.     IMPRESSION: No acute airspace opacities. Stable bilateral lower lobes streaky opacities, likely atelectasis. KANNAN ELLINGTON MD    Abdomen Us, Limited (ruq Only)    Result Date: 1/25/2018  EXAMINATION: US ABDOMEN LIMITED, 1/24/2018 10:54 PM COMPARISON: 1/8/2018 CT abdomen/pelvis HISTORY: RUQ pain, r/o is[place]; FINDINGS: Fluid: No evidence of ascites or pleural effusions. Liver: The liver demonstrates normal echotexture, measuring 16.6 cm in craniocaudal dimension. There is no evidence of a focal mass. There is no intrahepatic biliary dilatation. The main portal vein is patent with antegrade flow. Gallbladder: The gallbladder is well distended and of normal morphology. There is no wall thickening, pericholecystic fluid, sonographic Macias's sign nor evidence for cholelithiasis. Bile Ducts: Both the intra- and extrahepatic biliary system are of normal caliber.  The common bile duct measures 6 mm in diameter. Pancreas: Not well visualized due to overlying bowel gas. Kidney: The right kidney measures 10.5 cm long. No hydronephrosis or hydroureter, no shadowing renal calculi, and no solid mass. Several thin-walled anechoic cysts arise from the right kidney. The capsule and parenchyma have normal echotexture.     IMPRESSION: 1.  No evidence of cholecystitis. Normal sonographic appearance of the gallbladder and normal caliber common bile duct. 2.  Several simple cysts in the right kidney. Solid mass arising from the lower pole of the right kidney described on comparison CT is not well visualized on this study. I have personally reviewed the examination and initial interpretation and I agree with the findings. BERNICE LYNN MD    Xr Chest Port 1 View    Result Date: 1/28/2018  Exam: XR CHEST PORT 1 VW, 1/28/2018  1:58 AM Indication: 73 yo male sepsis concern for PNA; Comparison: 1/24/2018 Findings: The cardiomediastinal silhouette and pulmonary vasculature are within normal limits. No pleural effusion or pneumothorax. Increased streaky left midlung and basilar opacities. Subtle mixed interstitial and airspace opacities in the right midlung and bilateral bases have also slightly increased.     Impression: Slightly increased perihilar and bibasilar opacities, representing atelectasis and/or infection. I have personally reviewed the examination and initial interpretation and I agree with the findings. GARRETT BLANC MD    Ct Abdomen Pelvis W/o Contrast    Result Date: 1/28/2018  EXAMINATION: CT ABDOMEN PELVIS W/O CONTRAST, 1/28/2018 2:33 AM TECHNIQUE:  Helical CT images from the lung bases through the pubic symphysis were obtained without IV contrast. COMPARISON: 1/8/2018 CT abdomen/pelvis HISTORY: 73yo M with flank pain. CT for stone protocol; FINDINGS: Abdomen and pelvis: 4 mm calyceal stone in the midpole of the right kidney and punctate calyceal stone in the lower pole of the right kidney. Unchanged mild dilation of the distal ureters, more prominent on the left with punctate nonobstructing calculus at the dependent aspect of the left ureter (series 2 image 139) without associated proximal ureteral dilation. No obstructing ureteral calculi identified. No hydronephrosis or hydroureter The bladder is within normal limits. Numerous bilateral renal cystic lesions are not significant changed since 1/8/2018 CT. Unchanged 2.3 cm solid mass arising from the lower pole of the right kidney is better characterized on 1/8/2018 contrast-enhanced CT . The liver, gallbladder, spleen, and pancreas are unremarkable. Unchanged left adrenal nodule measuring 1.8 cm in its greatest dimension. The right adrenal gland is within normal limits. Dystrophic calcifications in the nonenlarged prostate. No free fluid or free air. No bowel obstruction  or inflammation. Normal appendix. Diffuse atherosclerotic calcification of the abdominal aorta with unchanged 3.4 cm infrarenal abdominal aortic aneurysm. Aneurysmal common iliac arteries, measuring 2.0 cm on the right and 2.1 cm on the left, also unchanged since prior study. 11 mm right common iliac chain lymph node (series 2 image 99), unchanged since prior study. Lung bases: New bibasilar nodular and consolidative opacities representing atelectasis, infection or aspiration. Bones and soft tissues: Diffusely sclerotic bones, not significant changed. No pathologic fractures identified. Unchanged fat-containing periumbilical hernia.     IMPRESSION: 1. Nonobstructing right calyceal stones measuring up to 5 mm. Chronic dilation of the distal left ureter with a punctate nonobstructing stone layering dependently. No obstructing urinary tract stones. No acute intra-abdominal pathology. 2. Unchanged right lower pole solid renal mass. 3. Unchanged indeterminate 11 mm right common iliac chain lymph node. 4. Unchanged 3.4 cm infrarenal abdominal aortic aneurysm and aneurysmal common iliac arteries. 5. Diffuse sclerotic osseous metastases are not significant changed. 6. New bibasilar opacities, representing atelectasis, aspiration or infection. I have personally reviewed the examination and initial interpretation and I agree with the findings. GARRETT BLANC MD

## 2018-01-29 NOTE — PLAN OF CARE
Problem: Patient Care Overview  Goal: Plan of Care/Patient Progress Review  Pt intermittently disoriented to situation&time. VSS currently stable on 2L NC (baseline) besides tachy & Tmax 101.1, Tylenol x2. Duonebx1 for wheezing. C/o tenderness and pain in LE, lymph wraps removed. Oxy x1. IV merrem & vanco for sepsis. RPIV SL. Pt unable to void on own despite several attempts w/ urinal, bladder scanned 406cc @0500, straight cathed 350cc. Small BM x1 on bed pan. Up w/lift to chair. DD1 diet. Takes PO meds easily w/ thin liq. Calls frequently to reposition. Will cont to monitor and follow POC.

## 2018-01-29 NOTE — PLAN OF CARE
Problem: Patient Care Overview  Goal: Plan of Care/Patient Progress Review  PT - per plan established by the Physical Therapist, according to functional mobility the  discharge recommendation is return to LTC. Pt needing min to mod A for all log roll tech. Pt needing mod  A for side lying to sitting at EOB. Pt able to sit up to 5 min. Pt demo core stability activities with close SBA. Pt mech lift to W/c at end of session.   Discharge Planner PT   Patient plan for discharge: return to LTC   Current status: see above.   Barriers to return to prior living situation: weakness, SOB  Recommendations for discharge: return to LTC  Rationale for recommendations: pt close to base line.  At time of D/c pt met 0/3 goals, pt to cont PT at LTC          Entered by: Fred Arreola 01/29/2018 11:57 AM       Physical Therapy Discharge Summary    Reason for therapy discharge:    Discharged to long term care facility.    Progress towards therapy goal(s). [facility] for goal details.  Goals not met.  Barriers to achieving goals:   discharge from facility.    Therapy recommendation(s):    Continued therapy is recommended.  Rationale/Recommendations:  to progress functional strength and mobility.

## 2018-01-29 NOTE — PLAN OF CARE
Problem: Patient Care Overview  Goal: Plan of Care/Patient Progress Review  OT 5A:   Discharge Planner OT   Patient plan for discharge: return to LTC  Current status: Pt alert at start of session and eager to participate in therapy, however after therapist gathered materials, pt was more lethargic and more difficult to engage.  Pt needing Cristiana for rolling in bed.  OT spoke with Tanya, nurse at pt's residence, and she reports that pt was receiving assist for majority of ADL prior to admission.  He was completing oral/facial hygiene with min assist and eating meals with set-up.  Pt was variable in assist he needed to transfer, ranging from SBA to Ax2.  Pt was receiving PT at his LTC.    Barriers to return to prior living situation: none  Recommendations for discharge: return to LTC  Rationale for recommendations: Pt able to get assist for ADL and continue working with PT to progress LE strengthening       Entered by: Doreen Dumas 01/29/2018 4:29 PM

## 2018-01-29 NOTE — PLAN OF CARE
Problem: Patient Care Overview  Goal: Plan of Care/Patient Progress Review  SLP session cancelled: pt working with other providers upon two attempts this AM. Will reschedule this afternoon or tomorrow as appropriate.

## 2018-01-29 NOTE — PLAN OF CARE
Problem: Patient Care Overview  Goal: Plan of Care/Patient Progress Review  Edema 5A: Recommend continued use of compression wraps to B LEs for management of chronic edema and protection of skin integrity. Dressing changed on anterior R shin, significant skin dryness throughout with discoloration and firm 1+ pitting. Reapplication of GCB from MTPs to knee creases. Remove wraps if causing pain/numbness or become soiled.

## 2018-01-29 NOTE — PROGRESS NOTES
01/28/18 1400   General Information   Onset Date 01/24/18   Start of Care Date 01/28/18   Referring Physician Jovani Couch MD   Patient Profile Review/OT: Additional Occupational Profile Info See Profile for full history and prior level of function   Patient/Family Goals Statement pt not able to state a goal   Swallowing Evaluation Bedside swallow evaluation   Behaviorial Observations Uncooperative;Confused;Alert   Mode of current nutrition Oral diet   Type of oral diet Regular;Thin liquid   Respiratory Status O2 Supply   Type of O2 supply Nasal cannula   Comments 72-year-old man with a history of metastatic prostate cancer, COPD, hypertension, atrial fibrillation, CAD, type 2 diabetes, HFpEF, and recent admission for GI bleed now admitted for GI bleed, now spiking fever to 101 with tachycardia and hypotension concerning for sepsis. SLP consulted due to concern for food regurgitation.   Clinical Swallow Evaluation   Oral Musculature unable to assess due to poor participation/comprehension;other (see comments)  (appears generally intact)   Structural Abnormalities none present   Dentition other (see comments)  (own teeth, missing many teeth or in poor condition)   Secretion Management other (see comments)  (WFL)   Mucosal Quality good   Mandibular Strength and Mobility intact   Clinical Swallow Eval: Thin Liquid Texture Trial   Mode of Presentation, Thin Liquids straw;fed by clinician   Volume of Liquid or Food Presented 12 oz thin liquids   Oral Phase of Swallow WFL   Pharyngeal Phase of Swallow intact   Diagnostic Statement WNL with thin liquid consistency   Clinical Swallow Eval: Puree Solid Texture Trial   Mode of Presentation, Puree spoon;fed by clinician   Volume of Puree Presented 100% ice cream cup   Oral Phase, Puree WFL   Pharyngeal Phase, Puree intact   Diagnostic Statement WFL with puree texture   Clinical Swallow Eval: Solid Food Texture Trial   Mode of Presentation, Solid self-fed    Volume of Solid Food Presented x1 bite of regular turkey, x1 bite of turkey shredded with gravy   Oral Phase, Solid Poor AP movement;other (see comments)  (excessive mastication, spit out bolus)   Pharyngeal Phase, Solid (did not swallow - spit out all )   Diagnostic Statement Patient not able to chew and breakdown bolus, spit out 100%.   VFSS Evaluation   VFSS Additional Documentation No   FEES Evaluation   Additional Documentation No   Esophageal Phase of Swallow   Patient reports or presents with symptoms of esophageal dysphagia No   Esophageal comments has dx of GI bleed   General Therapy Interventions   Planned Therapy Interventions Dysphagia Treatment   Dysphagia treatment Instruction of safe swallow strategies;Modified diet education   Swallow Eval: Clinical Impressions   Skilled Criteria for Therapy Intervention Skilled criteria met.  Treatment indicated.   Functional Assessment Scale (FAS) 3   Treatment Diagnosis moderate oral dysphagia   Diet texture recommendations Dysphagia diet level 1;Thin liquids   Recommended Feeding/Eating Techniques alternate between small bites and sips of food/liquid;maintain upright posture during/after eating for 30 mins;small sips/bites   Demonstrates Need for Referral to Another Service occupational therapy;physical therapy;dietitian   Therapy Frequency 5 times/wk   Predicted Duration of Therapy Intervention (days/wks) 2 weeks   Anticipated Discharge Disposition inpatient rehabilitation facility   Risks and Benefits of Treatment have been explained. Yes   Patient, family and/or staff in agreement with Plan of Care Yes   Clinical Impression Comments Clinical dysphagia examination completed per MD order. The patient presents with moderate oral dysphagia marked by excessive mastication, poor bolus manipulation and spitting out of all solid textures. Adequate bolus control with no spitting out, signs of difficulty, or signs of aspiration present with puree textures or thin  liquids. Recommend the patient change to dysphagia diet 1 and thin liquids. SLP will f/u for diet tolerance and potential for advancement. Unclear if the patient was able to manipulate more advanced textures well at baseline.   Total Evaluation Time   Total Evaluation Time (Minutes) 20

## 2018-01-29 NOTE — PLAN OF CARE
Problem: Patient Care Overview  Goal: Plan of Care/Patient Progress Review  Outcome: No Change  AOx3. Antibiotics given for infection. Soft BPs. LR bolus infused. Poor appetite. Speech consult done. Pt's diet changed to DD1. Non productive cough, unable to send sputum sample. Remains on 2LC (baseline). UA sent after pt was straight cath'ed. Had large non bloody BM. Using lift to get pt OOB. Lymph wraps in place. Reports discomfort in rt abd and BLE, better after repositioning.

## 2018-01-29 NOTE — PROGRESS NOTES
[facility] [place]    Internal Medicine Progress Note - Maroon Service    Main Plans for Today   Abx narrowed to pip-tazo  Monitor daily Hb  PT for leg weakness   Follow cultures     Assessment & Plan   This a 72-year-old man with a history of metastatic prostate cancer, COPD, hypertension, atrial fibrillation, CAD, type 2 diabetes, HFpEF, and recent admission for GI bleed now presenting with recurrent symptoms of a GI bleed.     #Sepsis  On 1/28, pt spike a fever in context of tachycardia. Mild leukocytosis. Started on broad spectrum Abx. Known to have ESBL in urine previously. Procal 9.03. UA w/ mild pyuria and trace leuk esterase. CXR w/ slight increase in perihilar and bibasilar opacities.  - meropenem and vancomycin  - follow-up blood, sputum, and urine cultures     #NATTY on CKD   Creatinine at 1.1s, which seems to be around baseline. Reported eGFR of 61 probable overestimate given limited muscle mass. Spiked to 1.54. Pt has some lability on Cr, up to 2.09 on check in 8/2017. Likely prerenal component vs infection in context of chronic disease. Improving with antibiotics and IVF  - IVF bolus: LR 500mL x2  - daily BMP; recheck Cr tomorrow     #Suspected GI bleed  #Hx of melena  Similar presentation to his last admission. Hemoglobin at discharge 9.5, on arrival 9.6 to ED, then 8.5, then 9.0 on recheck. On GI consult from 1/8, thinking was that he was likely bleeding from stomach, but low probability of endoscopically treatable lesion was low and an EGD was thought to be high risk. Warfarin, aspirin, and NSAIDs were correctly held on discharge. No additional melena since arrival to our institution, and no maroon stools to suggest more brisk bleed. Previous transfusion threshold was 8 on heme-onc service. Case run by GI, who is not recommending endoscopic intervention at this time given stability. Hb has been stable.   - type and screen done  - pantoprazole BID  - daily Hb, increase  "frequency if pt becomes vitally unstable   - continue holding warfarin, aspirin, and ibuprofen  - continued daily folate and iron at discharge  - continue PTA sucralfate 1g QID     #Leg weakness  Likely chronic. Pt ability to move legs is stable. May be related to abdominal/hip pain when he moves legs, though he does not endorse pain at rest.  Sensation and movement intact bilaterally  - PT/OT consulted and following  - PT recommending skilled PT    #Solid food issues  Pt seen w/ partially chewed food in cup on 1/28. Pt concerned about vomiting. Speech saw pt. Assessment reveals pt not chewing food adequately.  - speech following  - dysphagia 1 - pureed diet w/ thin liquids     #Metastatic prostate cancer  Complex issue; see HPI for brief summary. Has metastases to bone. He has been on numerous therapies (Lupron, Casodex, Xgeva, Xtandi, Zytega+pred, Xofigo, and Megace). Recent note by Dr. Oviedo 12/5 stating they would stop all cancer-directed therapies and refer to hospice, though patient has been resistant to this suggestion.  - clarify goals of care, see below  - holding palliative care consult for now given lack of success at last meeting    #Goals of care  After discussion with patient, I am not sure he quite grasps nature of cancer diagnosis/prognosis. Pt would like to know exactly where cancer is. Wants Dieudonne to be surrogate medical decision maker, if necessary. He is not listed in healthcare directive.   - SW following  - will provide pt w/ information he needs and continue this conversation  - will need to follow-up w/ nursing home   - possible palliative care consult if pt agreeable     #Congnitive impairment  Per recent d/c summary: \"Nursing home visit notes and note by Dr. Rossi with palliative care, patient has mild-moderate impairment based on previous neurocognitive testing. BIMS score 11/15 on 11/16/17. Notes discuss appointing a guardian for patient to make medical decisions. Patient does have 11 " "children, however notes indicate they do not attend his hospital appointments. Patient explained that his medical decision maker is his \"son\" Les Bourgeois (stepson). Per advanced directive, his primary contact is cousin, Melissa Mccormack with second contact as his son, Jeevan Grey (see advanced directive for phone number). Discussed patient's hospitaliziation and concerns for returning to NH with son, Les 1/10.\"  - goals of care, as above     #Chronic pain:  Pt notes chronic abdominal pain as well as \"left body pain.\" Some pain in L neck.  - continue fentanyl patch q72 hrs (PTA patch placed 1/24 0930 per nursing home RN)  - continue PTA oxycodone 5mg q4h PRN  - hold NSAIDS  - acetaminophen PRN     #Constipation  - started on bowel regimen    #Urinary ESBL  Would have completed 5-day course. No dysuria on admission. May be colonized  - hold Abx  - monitor for urinary symptoms   - if symptomatic, will send UA    #BPH  - continued PTA tamsulosin     #Coronary artery disease   #Hypertension  #Chronic diastolic CHF  Last echo 5/2016 with EF ~55% and grade 1 diastolic failure.  - continue holding warfarin, aspirin, and ibuprofen in setting of GI bleed  - continue isosorbide mononitrate 60mg daily and metoprolol 50mg daily with hold parameters BP <120/80 and HR <60  - hold home statin      #Atrial fibrillation  - continue PTA metoprolol as above  - no warfarin or aspirin      #COPD  #ALEXIS  On chronic oxygen at home. Notes indicated that he has been treated multiple times for COPD exacerbations. Follows with Dr. Loera in Pulmonology.   -continue PTA Spiriva and Symbicort  -Duonebs QID PRN      #Diabetes mellitus, type II  Has not been receiving any insulin at home per nurse at his residence.  - Holding any basal insulin  - Hypoglycemia protocol  - Medium ISS      #Lymphedema  #Venous stasis skin changes/ulcerations  - Lymphedema consulted  - Continue PTA Lasix 20mg BID      #Anxiety/Depression  - Continue PTA " Cymbalta        #Glaucoma  Follows with [facility].   - continue bimatoprost, timolol, latanoprost, and carboxymethylcellulose drops    Diet: Dysphagia 1 diet w/ thin liquids  Fluids: Bolus IVF as necessary   DVT Prophylaxis: Pneumatic Compression Devices  Code Status: Full Code    Disposition Plan   Expected discharge: 1-2, recommended to prior living arrangement once hemoglobin stable, infection worked up and treated, and goals of care reviewed.      Entered: Jovani Couch 01/29/2018, 6:20 AM   Information in the above section will display in the discharge planner report.      The patient's care was discussed with the Attending Physician, Dr. Andrew Sotelo.    Jovani Couch MD  Internal Medicine PGY-1  [facility], p: [phone]    Please see sticky note for cross cover information    Interval History   Overnight temperature to 100.1. Pt's main concerns continue to be his legs and standing up. Gets therapy at his nursing home. Also has pain in his ankles today. No breathing issues.      4 point ROS reviewed and negative, except for the above.     Physical Exam   Vital Signs: Temp: 97.8  F (36.6  C) Temp src: Axillary BP: 116/55 Pulse: 90 Heart Rate: 120 Resp: 20 SpO2: 97 % O2 Device: Nasal cannula Oxygen Delivery: 2 LPM  Weight: 193 lbs 11.2 oz    General: pt lying in bed, no acute distress   HEENT: no conjunctival icterus; some missing teeth; normocephalic and atraumatic   Cardiac: normal S1/S2 w/ regular rate; no murmurs noted  Resp: Normal work of breathing, breath sounds symmetric to bases; no wheezes, crackles, or rhonchi  Abd: Soft, non-distended; umbilical hernia is easily reduced; diffuse abdominal tenderness without ridigity  Skin: warm and dry  Extremities: bilateral LE lymphedema  Neuro: Alert and appropriately conversational; responds to commands appropriately, no gross deficits  Psych: Euthymic mood and normal affect    Data   Labs, micro, and imaging data reviewed in EPIC.

## 2018-01-30 NOTE — PLAN OF CARE
Problem: Patient Care Overview  Goal: Plan of Care/Patient Progress Review  Outcome: Adequate for Discharge Date Met: 01/30/18  Pt A/Ox2, disoriented to time and situation, easily reoriented. VSS on baseline 2L NC. Pt up with lift, PT/OT worked with pt today. Bed bath done. Speech following, DD1 diet. Little appetite. Had loose BM today and voided in BSU. BG stable at 117 and 123 today. No SSI used. Pt DCing back to LTC.

## 2018-01-30 NOTE — PLAN OF CARE
Problem: Patient Care Overview  Goal: Plan of Care/Patient Progress Review  Discharge Planner OT   Patient plan for discharge: back to LTC  Current status: Pt near his baseline for ADLs, continuing to rely on lift for transfers.  Continues to have impaired cognition, may be near baseline as well.  Barriers to return to prior living situation: none  Recommendations for discharge: return to LTC  Rationale for recommendations: LTC able to meet pt's needs and he receives PT there       Entered by: Doreen Dumas 01/30/2018 5:00 PM     Occupational Therapy Discharge Summary    Reason for therapy discharge:    Discharged to long term care facility.    Progress towards therapy goal(s). See goals on Care Plan in [facility] electronic health record for goal details.  Goals met    Therapy recommendation(s):    PT for LE strengthening/transfers

## 2018-01-30 NOTE — PROGRESS NOTES
Social Work Services Discharge Note      Patient Name:  Tomas Grey     Anticipated Discharge Date:  1/30/18    Discharge Disposition:   LT:  [facility]   [address] [place] [address]   [phone]  Fax [phone]    Following MD:  SAL Maddox CNP     Pre-Admission Screening (PAS) online form has been completed.  The Level of Care (LOC) is:  Determined  Confirmation Code is:  NA, return to facility     Additional Services/Equipment Arranged:  [facility] [phone][facility] stretcher arranged for 2pm. PCS form completed and placed in pt's chart d/t need for O2 and inability to self-regulate d/t disorientation.      Patient / Family response to discharge plan:  Pt disoriented. Spoke with pt's healthcare agent, Melissa (ph [phone]) who is in agreement with discharge plan.      Persons notified of above discharge plan:  NISHA Andrews, primary team [facility], Stephanie- admissions at [facility] (ph [phone]), pt's cousin Melissa (ph [phone])    Staff Discharge Instructions:  SW to fax discharge orders and signed hard scripts for any controlled substances.  Please print a packet and send with patient.     CTS Handoff completed:  YES    DAGO Nuñez, LG  [address]   Pager [phone]  Phone [phone]

## 2018-01-30 NOTE — PROGRESS NOTES
[facility], [place]    Internal Medicine Discharge Summary- Gracie Service    Date of Admission:  1/24/2018  Date of Discharge:  1/30/2018  2:27 PM  Discharging Attending Provider: Dr. Emanuel Estrada  Discharge Team: Gracie 3    Discharge Diagnoses   Suspected pneumonia, community acquired vs aspiration   Oropharyngeal dysphagia      Follow-ups Needed After Discharge   1. Follow-up with nursing home physician in 3-5 days with CBC and BMP  2. Speech therapy, PT, and OT at nursing home    Hospital Course   Tomas Grey is a 72-year-old man with a history of metastatic prostate cancer, COPD, hypertension, atrial fibrillation, CAD, type 2 diabetes, HFpEF, and recent admission for GI bleed was admitted on 1/24/2018 for suspected GI bleed. His Hb remained stable and had no observed melena. He subsequently had features of sepsis with tachycardia and mild leukocytosis. He was treated with antibiotics for possible pneumonia.  The following problems were addressed during his hospitalization:     #Sepsis  #Suspected pneumonia, either aspiration or community-acquired   On 1/28, pt spike a fever in context of tachycardia. Mild leukocytosis. Started on broad spectrum Abx, meropenem and vancomycin. Known to have ESBL in urine previously. Procal 9.03. UA w/ mild pyuria and trace leuk esterase. CXR w/ slight increase in perihilar and bibasilar opacities. Pt had been having trouble chewing and swallowing solid food.  Blood, urine, and sputum cultures did not grow anything as of discharge. He was narrowed to levofloxacin and continued for a full 7 day antibiotic course.   - TCU physician follow-up w/ CBC in 3-5 days  - Speech/swallow consult in TCU for aspiration, as below.    #NATTY on CKD   Creatinine at 1.1s, which seems to be around baseline. Reported eGFR of 61 probable overestimate given limited muscle mass. Spiked to 1.54. Pt has some lability on Cr, up to 2.09 on check in 8/2017. Likely  prerenal component vs infection in context of chronic disease. Trended downward with small fluid boluses.   - TCU physician follow-up in 3-5 days w/ BMP    #Suspected GI bleed  #Hx of melena  Similar presentation to his last admission. Hemoglobin at discharge 9.5, on arrival 9.6 to ED, then 8.5, then 9.0 on recheck. On GI consult from 1/8, thinking was that he was likely bleeding from stomach, but low probability of endoscopically treatable lesion was low and an EGD was thought to be high risk. Warfarin, aspirin, and NSAIDs were correctly held on discharge. No additional melena since arrival to our institution, and no maroon stools to suggest more brisk bleed. Previous transfusion threshold was 8 on heme-onc service. Case discussed with GI, who is not recommending endoscopic intervention at this time given stability. Hb has been stable the entirety of admission.  - follow-up with TCU physician in 3-5 days   - continue holding warfarin, aspirin, and ibuprofen  - continue daily folate and iron at discharge  - continue PPI twice daily  - continue PTA sucralfate 1g QID     #Leg weakness  Likely chronic. Pt ability to move legs is stable. May be related to abdominal/hip pain when he moves legs, though he does not endorse pain at rest. He offers a mixed history when talking about this issue. Sensation and movement intact bilaterally. PT and OT saw him in the hospital.   - PT/OT consult for TCU    #Oropharyngeal dysphagia  #Solid food issues  Pt seen w/ partially chewed food in cup on 1/28. He was concerned about vomiting. Speech saw and evaluated. Assessment reveals pt not chewing food adequately, so he was placed on dysphagia I diet - pureed with thin liquids.  - Speech consult in TCU  - Continue dysphagia 1 - pureed diet w/ thin liquids until repeat speech evaluation     #Metastatic prostate cancer  Complex issue; see HPI for brief summary. Has metastases to bone. He has been on numerous therapies (Lupron, Casodex,  "Xgeva, Xtandi, Zytega+pred, Xofigo, and Megace). Recent note by Dr. Oviedo 12/5 stating they would stop all cancer-directed therapies and refer to hospice, though patient has been resistant to this suggestion. He has a mixed understanding of his disease and prognosis. He says he has \"bone cancer\" but perseverates on the fact that he doesn't feel it in his bones.   - Continue to clarify goals of care, as below    #Goals of care  After discussion with patient, I am not sure he quite grasps nature of cancer diagnosis/prognosis. Pt would like to know exactly where cancer is. Wants Les to be surrogate medical decision maker, if necessary. He is not listed in healthcare directive. Per notes from geriatric provider, he does not have capacity to make his own medical decisions. Les was going to discuss medical decisions with him, per note from mid-January.     #Congnitive impairment  Per recent d/c summary: \"Nursing home visit notes and note by Dr. Rossi with palliative care, patient has mild-moderate impairment based on previous neurocognitive testing. BIMS score 11/15 on 11/16/17. Notes discuss appointing a guardian for patient to make medical decisions. Patient does have 11 children, however notes indicate they do not attend his hospital appointments. Patient explained that his medical decision maker is his \"son\" Les Bourgeois (silvia). Per advanced directive, his primary contact is cousin, Melissa Mccormack with second contact as his son, Jeevan Grey (see advanced directive for phone number). Discussed patient's hospitaliziation and concerns for returning to NH with son, Les 1/10.\"    #Chronic pain:  Pt notes chronic abdominal pain as well as \"left body pain.\" Some pain in L neck. Was continued on home pain medications and given acetaminophen.   - continued fentanyl patch q72 hrs (PTA patch placed 1/24 0930 per nursing home RN)  - continued PTA oxycodone 5mg q4h PRN     #Coronary artery " disease   #Hypertension  #Chronic diastolic CHF  Last echo 5/2016 with EF ~55% and grade 1 diastolic failure. Held warfarin, aspirin, and ibuprofen in setting of GI bleed  Continued isosorbide mononitrate 60mg daily and metoprolol 50mg daily with hold parameters BP <120/80 and HR <60.      #Atrial fibrillation  Continue PTA metoprolol, as above.      #COPD  #ALEXIS  On chronic oxygen at home. Notes indicated that he has been treated multiple times for COPD exacerbations. Follows with Dr. Loera in Pulmonology. Continued PTA Spiriva and Symbicort. Provided Duonebs QID PRN.      #Lymphedema  #Venous stasis skin changes/ulcerations  Lymphedema team consulted; legs were wrapped regularly. Continued PTA Lasix 20mg BID.      Consultations This Hospital Stay   GI LUMINAL ADULT IP CONSULT  PHARMACY IP CONSULT  LYMPHEDEMA THERAPY IP CONSULT  PHYSICAL THERAPY ADULT IP CONSULT  OCCUPATIONAL THERAPY ADULT IP CONSULT  SPEECH LANGUAGE PATH ADULT IP CONSULT  PHARMACY TO DOSE VANCO  VASCULAR ACCESS CARE ADULT IP CONSULT  VASCULAR ACCESS CARE ADULT IP CONSULT  NUTRITION SERVICES ADULT IP CONSULT  PHYSICAL THERAPY ADULT IP CONSULT  OCCUPATIONAL THERAPY ADULT IP CONSULT  SPEECH LANGUAGE PATH ADULT IP CONSULT     Code Status   Full Code       The patient was discussed with Dr. Emanuel Estrada.     Jovani PAGAN [facility]  Pager: [phone]  ______________________________________________________________________    Physical Exam   Vital Signs:                    Weight: 193 lbs 11.2 oz    General: pt lying in bed, no acute distress   HEENT: no conjunctival icterus; some missing teeth; normocephalic and atraumatic   Cardiac: normal S1/S2 w/ regular rate; no murmurs noted  Resp: Normal work of breathing, breath sounds symmetric to bases; no wheezes, crackles, or rhonchi  Abd: Soft, non-distended; umbilical hernia is easily reduced; diffuse abdominal tenderness without ridigity  Skin: warm and dry  Extremities:  bilateral LE lymphedema; legs wrapped  Neuro: Alert and appropriately conversational; responds to commands appropriately, no gross deficits  Psych: Euthymic mood and normal affect    Significant Results and Procedures   Results for orders placed or performed during the hospital encounter of 01/24/18   XR Chest 2 Views    Narrative    Exam: Chest x-ray, 2 views, 1/24/2018.    COMPARISON: 1/10/2018.    HISTORY: Shortness of breath.    FINDINGS: PA and lateral views of the chest were obtained. Interval  removal of the previously described left upper extremity PICC line.  Cardiomediastinal silhouette within normal limits. Distinct pulmonary  vasculature. Stable bilateral lower lobes streaky opacities. No  pleural effusions. No pneumothorax. The right costophrenic angle is  collimated out of the image. Tortuous thoracic aorta with  calcifications. Degenerative changes of the spine and shoulders.      Impression    IMPRESSION: No acute airspace opacities. Stable bilateral lower lobes  streaky opacities, likely atelectasis.    KANNAN ELLINGTON MD   Abdomen US, limited (RUQ only)    Narrative    EXAMINATION: US ABDOMEN LIMITED, 1/24/2018 10:54 PM     COMPARISON: 1/8/2018 CT abdomen/pelvis    HISTORY: RUQ pain, r/o is[place];     FINDINGS:   Fluid: No evidence of ascites or pleural effusions.    Liver: The liver demonstrates normal echotexture, measuring 16.6 cm in  craniocaudal dimension. There is no evidence of a focal mass. There is  no intrahepatic biliary dilatation. The main portal vein is patent  with antegrade flow.    Gallbladder: The gallbladder is well distended and of normal  morphology. There is no wall thickening, pericholecystic fluid,  sonographic Macias's sign nor evidence for cholelithiasis.    Bile Ducts: Both the intra- and extrahepatic biliary system are of  normal caliber.  The common bile duct measures 6 mm in diameter.    Pancreas: Not well visualized due to overlying bowel gas.    Kidney: The right  kidney measures 10.5 cm long. No hydronephrosis or  hydroureter, no shadowing renal calculi, and no solid mass. Several  thin-walled anechoic cysts arise from the right kidney. The capsule  and parenchyma have normal echotexture.       Impression    IMPRESSION:   1.  No evidence of cholecystitis. Normal sonographic appearance of the  gallbladder and normal caliber common bile duct.  2.  Several simple cysts in the right kidney. Solid mass arising from  the lower pole of the right kidney described on comparison CT is not  well visualized on this study.    I have personally reviewed the examination and initial interpretation  and I agree with the findings.    BERNICE LYNN MD   XR Chest Port 1 View    Narrative    Exam: XR CHEST PORT 1 VW, 1/28/2018 1:58 AM    Indication: 73 yo male sepsis concern for PNA;     Comparison: 1/24/2018     Findings:   The cardiomediastinal silhouette and pulmonary vasculature are within  normal limits. No pleural effusion or pneumothorax. Increased streaky  left midlung and basilar opacities. Subtle mixed interstitial and  airspace opacities in the right midlung and bilateral bases have also  slightly increased.       Impression    Impression: Slightly increased perihilar and bibasilar opacities,  representing atelectasis and/or infection.    I have personally reviewed the examination and initial interpretation  and I agree with the findings.    GARRETT BLANC MD   CT Abdomen Pelvis w/o Contrast    Narrative    EXAMINATION: CT ABDOMEN PELVIS W/O CONTRAST, 1/28/2018 2:33 AM    TECHNIQUE:  Helical CT images from the lung bases through the pubic  symphysis were obtained without IV contrast.     COMPARISON: 1/8/2018 CT abdomen/pelvis    HISTORY: 73yo M with flank pain. CT for stone protocol;     FINDINGS:    Abdomen and pelvis: 4 mm calyceal stone in the midpole of the right  kidney and punctate calyceal stone in the lower pole of the right  kidney. Unchanged mild dilation of the distal  ureters, more prominent  on the left with punctate nonobstructing calculus at the dependent  aspect of the left ureter (series 2 image 139) without associated  proximal ureteral dilation. No obstructing ureteral calculi  identified. No hydronephrosis or hydroureter The bladder is within  normal limits. Numerous bilateral renal cystic lesions are not  significant changed since 1/8/2018 CT. Unchanged 2.3 cm solid mass  arising from the lower pole of the right kidney is better  characterized on 1/8/2018 contrast-enhanced CT .    The liver, gallbladder, spleen, and pancreas are unremarkable.  Unchanged left adrenal nodule measuring 1.8 cm in its greatest  dimension. The right adrenal gland is within normal limits. Dystrophic  calcifications in the nonenlarged prostate. No free fluid or free air.  No bowel obstruction or inflammation. Normal appendix. Diffuse  atherosclerotic calcification of the abdominal aorta with unchanged  3.4 cm infrarenal abdominal aortic aneurysm. Aneurysmal common iliac  arteries, measuring 2.0 cm on the right and 2.1 cm on the left, also  unchanged since prior study. 11 mm right common iliac chain lymph node  (series 2 image 99), unchanged since prior study.    Lung bases: New bibasilar nodular and consolidative opacities  representing atelectasis, infection or aspiration. Bones and soft  tissues: Diffusely sclerotic bones, not significant changed. No  pathologic fractures identified. Unchanged fat-containing  periumbilical hernia.      Impression    IMPRESSION:   1. Nonobstructing right calyceal stones measuring up to 5 mm. Chronic  dilation of the distal left ureter with a punctate nonobstructing  stone layering dependently. No obstructing urinary tract stones. No  acute intra-abdominal pathology.  2. Unchanged right lower pole solid renal mass.  3. Unchanged indeterminate 11 mm right common iliac chain lymph node.   4. Unchanged 3.4 cm infrarenal abdominal aortic aneurysm and  aneurysmal  common iliac arteries.  5. Diffuse sclerotic osseous metastases are not significant changed.  6. New bibasilar opacities, representing atelectasis, aspiration or  infection.    I have personally reviewed the examination and initial interpretation  and I agree with the findings.    GARRETT BLANC MD     *Note: Due to a large number of results and/or encounters for the requested time period, some results have not been displayed. A complete set of results can be found in Results Review.       Pending Results   These results will be followed up by PCP  Unresulted Labs Ordered in the Past 30 Days of this Admission     Date and Time Order Name Status Description    1/27/2018 2146 Blood culture Preliminary     1/27/2018 2146 Blood culture Preliminary     1/11/2018 0445 CBC with platelets In process     1/11/2018 0445 INR In process              Primary Care Physician   Ekaterina Lozano    Discharge Disposition   Discharged to long-term care facility  Condition at discharge: Fair    Discharge Orders     Medication Therapy Management Referral     General info for SNF   Length of Stay Estimate: Long Term Care  Condition at Discharge: Stable  Level of care:skilled   Rehabilitation Potential: Fair  Admission H&P remains valid and up-to-date: Yes  Recent Chemotherapy: N/A  Use Nursing Home Standing Orders: Yes     Mantoux instructions   Give two-step Mantoux (PPD) Per Facility Policy Yes, if needed; pt already resides at facility so may not be needed.     Reason for your hospital stay   You initially came in for GI bleed. We did not find evidence of this. Your blood counts stayed constant. You later had evidence of infection which we believe is in your lungs. Your urine has not grown anything yet.     Activity - Up with nursing assistance     Wound care (specify)   Site:  bilateral lower legs  Instructions:  Lymphedema care     Follow Up and recommended labs and tests   Follow up with Nursing home physician in 3-5 days.   The following labs/tests are recommended: CBC, BMP to assess creatinine.     Full Code     Nutrition Services Adult IP Consult   Reason:  Pt had chewing difficulty and changed to dysphagia diet. Needs nutrition consult to ensure proper caloric intake.     Physical Therapy Adult Consult   Evaluate and treat as clinically indicated.    Reason:  PT - per plan established by the Physical Therapist, according to functional mobility the discharge recommendation is return to LTC. Pt needing min to mod A for all log roll tech. Pt needing mod  A for side lying to sitting at EOB. Pt able to sit up to 5 min. Pt demo core stability activities with close SBA.     Occupational Therapy Adult Consult   Evaluate and treat as clinically indicated.    Reason: Pt needing Cristiana for rolling in bed.  OT spoke with Tanya nurse at pt's residence, and she reports that pt was receiving assist for majority of ADL prior to admission.  He was completing oral/facial hygiene with min assist and eating meals with set-up.  Pt was variable in assist he needed to transfer, ranging from SBA to Ax2.     Speech Language Path Adult Consult   Evaluate and treat as clinically indicated.    Reason: Evaluate and treat as clinically indicated.    Reason: He continues to have difficulty chewing and then spitting out solid texture foods. Unclear if this is a change in status or is the patient's baseline function.  Barriers to return to prior living situation: none, anticipate LTC can provide modified diet.    Rationale for recommendations: could see SLP at LTC if swallowing function is below baseline     Oxygen - Nasal cannula   Lpm by nasal cannula to keep O2 sats 90% or greater.     Fall precautions     Advance Diet as Tolerated   Follow this diet upon discharge: dysphagia diet 1 and thin liquids       Discharge Medications   Discharge Medication List as of 1/30/2018  2:10 PM      START taking these medications    Details   amoxicillin-clavulanate (AUGMENTIN)  875-125 MG per tablet Take 1 tablet by mouth 2 times daily for 4 days, Disp-8 tablet, R-0, Transitional         CONTINUE these medications which have CHANGED    Details   fentaNYL (DURAGESIC) 12 mcg/hr 72 hr patch Place 1 patch onto the skin every 72 hours, Disp-5 patch, R-0, Local Print      oxyCODONE IR (ROXICODONE) 5 MG tablet Take 1 tablet (5 mg) by mouth every 4 hours as needed, Disp-18 tablet, R-0, Local Print         CONTINUE these medications which have NOT CHANGED    Details   ATORVASTATIN CALCIUM PO Take 10 mg by mouth daily, Historical      latanoprost (XALATAN) 0.005 % ophthalmic solution Place 1 drop into both eyes At Bedtime, Historical      omeprazole (PRILOSEC) 40 MG capsule Take 1 capsule (40 mg) by mouth 2 times daily, No Print Out      ipratropium - albuterol 0.5 mg/2.5 mg/3 mL (DUONEB) 0.5-2.5 (3) MG/3ML neb solution Take 1 vial by nebulization every 4 hours as needed for shortness of breath / dyspnea or wheezing, Historical      !! insulin aspart (NOVOLOG FLEXPEN) 100 UNIT/ML injection Inject Subcutaneous At Bedtime Inject as per sliding scale: if 0 - 199 = 0; 200 - 249 = 1; 250 - 299 = 2; 300 - 349 = 3; 350 - 399 = 4; 400+ = 5, Historical      !! insulin aspart (NOVOLOG FLEXPEN) 100 UNIT/ML injection Inject Subcutaneous 3 times daily (with meals) 140 - 189 = 1; 190 - 239 = 2; 240 - 289 = 3; 290 - 339 = 4; 340 - 399 = 5; 400 - 449 = 6; 450+ = 7, Historical      acetaminophen (TYLENOL) 500 MG tablet Take 2 tablets (1,000 mg) by mouth 3 times daily Not to exceed 3000 mg/24 hours, Transitional      isosorbide mononitrate (IMDUR) 30 MG 24 hr tablet Take 2 tablets (60 mg) by mouth daily, Disp-30 tablet, Transitional      nitroGLYcerin (NITROSTAT) 0.4 MG sublingual tablet Place 1 tablet (0.4 mg) under the tongue every 5 minutes as needed for chest pain if you are still having symptoms after 3 doses (15 minutes) call 911., Disp-25 tablet, Transitional      budesonide-formoterol (SYMBICORT) 160-4.5  MCG/ACT Inhaler Inhale 2 puffs into the lungs 2 times daily, Transitional      tiotropium (SPIRIVA) 18 MCG capsule Inhale 1 capsule (18 mcg) into the lungs daily, Disp-30 capsule, Transitional      DULoxetine (CYMBALTA) 20 MG EC capsule Take 1 capsule (20 mg) by mouth daily, Disp-60 capsule, Transitional      ondansetron 4 MG FILM Take 4 mg by mouth every 8 hours as needed, Disp-12 each, Transitional      metoprolol succinate (TOPROL XL) 50 MG 24 hr tablet Take 1 tablet (50 mg) by mouth daily, Disp-5 tablet, R-0, Transitional      ammonium lactate (LAC-HYDRIN) 12 % lotion Apply topically 2 times daily as needed for dry skin To all extremities for dry skinTransitional      diclofenac (VOLTAREN) 1 % GEL topical gel Apply 4 grams to knees four times daily as needed using enclosed dosing card.Disp-100 g, R-0Transitional      furosemide (LASIX) 20 MG tablet Take 1 tablet (20 mg) by mouth 2 times daily, Disp-30 tablet, Transitional      ferrous sulfate (IRON) 325 (65 FE) MG tablet Take 1 tablet (325 mg) by mouth 2 times daily, Disp-100 tablet, Transitional      folic acid (FOLVITE) 1 MG tablet Take 1 tablet (1 mg) by mouth daily, Disp-30 tablet, Transitional      polyethylene glycol (MIRALAX/GLYCOLAX) Packet Take 17 g by mouth daily , hold for loose stools., Disp-7 packet, Transitional      senna-docusate (SENOKOT-S;PERICOLACE) 8.6-50 MG per tablet Take 2 tablets by mouth 2 times daily , hold for loose stools., Disp-100 tablet, Transitional      calcium carbonate (OS-JORDAN 600 MG [place]. [place]) 1500 (600 [place]) MG tablet Take 1 tablet (1,500 mg) by mouth 2 times daily (with meals), Disp-60 tablet, Transitional      tamsulosin (FLOMAX) 0.4 MG capsule Take 1 capsule (0.4 mg) by mouth daily, Disp-5 capsule, R-0, Transitional      simethicone (MYLICON) 40 MG/0.6ML suspension Take 0.6 mLs (40 mg) by mouth every 6 hours as needed for cramping, Transitional      bimatoprost (LUMIGAN) 0.01 % SOLN Place 1 drop into both eyes At Bedtime,  Disp-1 Bottle, Transitional      Carboxymethylcellulose Sod PF (REFRESH PLUS) 0.5 % SOLN ophthalmic solution Place 1 drop into both eyes 4 times daily And 1 drop both eyes daily prn, Disp-1 Bottle, Transitional      sucralfate (CARAFATE) 1 GM/10ML suspension Take 10 mLs (1 g) by mouth 4 times daily (before meals and nightly), Disp-1200 mL, Transitional      METHOCARBAMOL PO Take 500 mg by mouth every 6 hours as needed for muscle spasms, Historical      Timolol Hemihydrate (BETIMOL OP) Place 1 drop into both eyes daily , Historical       !! - Potential duplicate medications found. Please discuss with provider.        Allergies   Allergies   Allergen Reactions     Morphine Other (See Comments)     Patient reports makes him almost go into a coma

## 2018-01-30 NOTE — PLAN OF CARE
Problem: Patient Care Overview  Goal: Plan of Care/Patient Progress Review  Discharge Planner SLP   Patient plan for discharge: to return to facility  Current status: The patient remains appropriate for dysphagia diet 1 and thin liquids. He continues to have difficulty chewing and then spitting out solid texture foods. Unclear if this is a change in status or is the patient's baseline function.  Barriers to return to prior living situation: none, anticipate LTC can provide modified diet  Recommendations for discharge: LTC with modified diet   Rationale for recommendations: could see SLP at LTC if swallowing function is below baseline       Entered by: Karon Willis 01/30/2018 12:12 PM       Speech Language Therapy Discharge Summary    Reason for therapy discharge:    Discharged to long term care facility.    Progress towards therapy goal(s). See goals on Care Plan [facility] electronic health record for goal details.  Goals partially met.  Barriers to achieving goals:   discharge from facility and unlcear if the patient's swallowing function is below baseline.    Therapy recommendation(s):    Patient may benefit from SLP tx at LTC if chewing/swallowing is below baseline. prior swallowing ability is not known.

## 2018-01-30 NOTE — PLAN OF CARE
Problem: Patient Care Overview  Goal: Plan of Care/Patient Progress Review  Outcome: No Change  Pt A/Ox2, disoriented to time and situation, easily reoriented. VSS on baseline 2L NC. Pt up with lift, PT/OT worked with pt today. Speech following, DD1 diet. Little appetite. R PIV infiltrated, US used to place new L PIV. Abx changed to IV zosyn, infused. Vanc and merrem DC'd. MRSA swab done, neg. Had loose BM today and was incontinent of urine x1. BG stable at 117 and 127 today. No SSI used. Bladder scanning q4 when not producing urine.

## 2018-01-30 NOTE — PLAN OF CARE
"Problem: Patient Care Overview  Goal: Plan of Care/Patient Progress Review  Outcome: No Change  Blood pressure 109/53, pulse 90, temperature 98  F (36.7  C), temperature source Axillary, resp. rate 18, height 1.803 m (5' 11\"), weight 87.9 kg (193 lb 11.2 oz), SpO2 93 %.      Pt is A&Ox2, disoriented to time and situation. VSS on baseline 2L NC. Pt up with lift, PT/OT ordered. Speech following, DD1 diet. Poor appetite. R PIV is saline locked. Intermittent IV antibiotics. LR bolus of 500 ml. Turned and repositioned with A2. Pt is urinating in urinal and intermittently incontinent of urine. Brief on. No BM this shift. Pt will discharge back to LTC when stable. Will continue to assess per POC.  "

## 2018-01-31 NOTE — DISCHARGE SUMMARY
[facility], [place]    Internal Medicine Discharge Summary- Gracie Service    Date of Admission:  1/24/2018  Date of Discharge:  1/30/2018  2:27 PM  Discharging Attending Provider: Dr. Emanuel Estrada  Discharge Team: Gracie 3    Discharge Diagnoses   Suspected pneumonia, community acquired vs aspiration   Oropharyngeal dysphagia      Follow-ups Needed After Discharge   1. Follow-up with nursing home physician in 3-5 days with CBC and BMP  2. Speech therapy, PT, and OT at nursing home    Hospital Course   Tomas Grey is a 72-year-old man with a history of metastatic prostate cancer, COPD, hypertension, atrial fibrillation, CAD, type 2 diabetes, HFpEF, and recent admission for GI bleed was admitted on 1/24/2018 for suspected GI bleed. His Hb remained stable and had no observed melena. He subsequently had features of sepsis with tachycardia and mild leukocytosis. He was treated with antibiotics for possible pneumonia.  The following problems were addressed during his hospitalization:     #Sepsis  #Suspected pneumonia, either aspiration or community-acquired   On 1/28, pt spike a fever in context of tachycardia. Mild leukocytosis. Started on broad spectrum Abx, meropenem and vancomycin. Known to have ESBL in urine previously. Procal 9.03. UA w/ mild pyuria and trace leuk esterase. CXR w/ slight increase in perihilar and bibasilar opacities. Pt had been having trouble chewing and swallowing solid food.  Blood, urine, and sputum cultures did not grow anything as of discharge. He was narrowed to levofloxacin and continued for a full 7 day antibiotic course.   - TCU physician follow-up w/ CBC in 3-5 days  - Speech/swallow consult in TCU for aspiration, as below.    #NATTY on CKD   Creatinine at 1.1s, which seems to be around baseline. Reported eGFR of 61 probable overestimate given limited muscle mass. Spiked to 1.54. Pt has some lability on Cr, up to 2.09 on check in 8/2017. Likely  prerenal component vs infection in context of chronic disease. Trended downward with small fluid boluses.   - TCU physician follow-up in 3-5 days w/ BMP    #Suspected GI bleed  #Hx of melena  Similar presentation to his last admission. Hemoglobin at discharge 9.5, on arrival 9.6 to ED, then 8.5, then 9.0 on recheck. On GI consult from 1/8, thinking was that he was likely bleeding from stomach, but low probability of endoscopically treatable lesion was low and an EGD was thought to be high risk. Warfarin, aspirin, and NSAIDs were correctly held on discharge. No additional melena since arrival to our institution, and no maroon stools to suggest more brisk bleed. Previous transfusion threshold was 8 on heme-onc service. Case discussed with GI, who is not recommending endoscopic intervention at this time given stability. Hb has been stable the entirety of admission.  - follow-up with TCU physician in 3-5 days   - continue holding warfarin, aspirin, and ibuprofen  - continue daily folate and iron at discharge  - continue PPI twice daily  - continue PTA sucralfate 1g QID     #Leg weakness  Likely chronic. Pt ability to move legs is stable. May be related to abdominal/hip pain when he moves legs, though he does not endorse pain at rest. He offers a mixed history when talking about this issue. Sensation and movement intact bilaterally. PT and OT saw him in the hospital.   - PT/OT consult for TCU    #Oropharyngeal dysphagia  #Solid food issues  Pt seen w/ partially chewed food in cup on 1/28. He was concerned about vomiting. Speech saw and evaluated. Assessment reveals pt not chewing food adequately, so he was placed on dysphagia I diet - pureed with thin liquids.  - Speech consult in TCU  - Continue dysphagia 1 - pureed diet w/ thin liquids until repeat speech evaluation     #Metastatic prostate cancer  Complex issue; see HPI for brief summary. Has metastases to bone. He has been on numerous therapies (Lupron, Casodex,  "Xgeva, Xtandi, Zytega+pred, Xofigo, and Megace). Recent note by Dr. Oviedo 12/5 stating they would stop all cancer-directed therapies and refer to hospice, though patient has been resistant to this suggestion. He has a mixed understanding of his disease and prognosis. He says he has \"bone cancer\" but perseverates on the fact that he doesn't feel it in his bones.   - Continue to clarify goals of care, as below    #Goals of care  After discussion with patient, I am not sure he quite grasps nature of cancer diagnosis/prognosis. Pt would like to know exactly where cancer is. Wants Les to be surrogate medical decision maker, if necessary. He is not listed in healthcare directive. Per notes from geriatric provider, he does not have capacity to make his own medical decisions. Les was going to discuss medical decisions with him, per note from mid-January.     #Congnitive impairment  Per recent d/c summary: \"Nursing home visit notes and note by Dr. Rossi with palliative care, patient has mild-moderate impairment based on previous neurocognitive testing. BIMS score 11/15 on 11/16/17. Notes discuss appointing a guardian for patient to make medical decisions. Patient does have 11 children, however notes indicate they do not attend his hospital appointments. Patient explained that his medical decision maker is his \"son\" Les Bourgeois (silvia). Per advanced directive, his primary contact is cousin, Melissa Mccormack with second contact as his son, Jeevan Grey (see advanced directive for phone number). Discussed patient's hospitaliziation and concerns for returning to NH with son, Les 1/10.\"    #Chronic pain:  Pt notes chronic abdominal pain as well as \"left body pain.\" Some pain in L neck. Was continued on home pain medications and given acetaminophen.   - continued fentanyl patch q72 hrs (PTA patch placed 1/24 0930 per nursing home RN)  - continued PTA oxycodone 5mg q4h PRN     #Coronary artery " disease   #Hypertension  #Chronic diastolic CHF  Last echo 5/2016 with EF ~55% and grade 1 diastolic failure. Held warfarin, aspirin, and ibuprofen in setting of GI bleed  Continued isosorbide mononitrate 60mg daily and metoprolol 50mg daily with hold parameters BP <120/80 and HR <60.      #Atrial fibrillation  Continue PTA metoprolol, as above.      #COPD  #ALEXIS  On chronic oxygen at home. Notes indicated that he has been treated multiple times for COPD exacerbations. Follows with Dr. Loera in Pulmonology. Continued PTA Spiriva and Symbicort. Provided Duonebs QID PRN.      #Lymphedema  #Venous stasis skin changes/ulcerations  Lymphedema team consulted; legs were wrapped regularly. Continued PTA Lasix 20mg BID.      Consultations This Hospital Stay   GI LUMINAL ADULT IP CONSULT  PHARMACY IP CONSULT  LYMPHEDEMA THERAPY IP CONSULT  PHYSICAL THERAPY ADULT IP CONSULT  OCCUPATIONAL THERAPY ADULT IP CONSULT  SPEECH LANGUAGE PATH ADULT IP CONSULT  PHARMACY TO DOSE VANCO  VASCULAR ACCESS CARE ADULT IP CONSULT  VASCULAR ACCESS CARE ADULT IP CONSULT  NUTRITION SERVICES ADULT IP CONSULT  PHYSICAL THERAPY ADULT IP CONSULT  OCCUPATIONAL THERAPY ADULT IP CONSULT  SPEECH LANGUAGE PATH ADULT IP CONSULT     Code Status   Full Code       The patient was discussed with Dr. Emanuel Estrada.     Jovani PAGAN [facility]  Pager: [phone]  ______________________________________________________________________    Physical Exam   Vital Signs:                    Weight: 193 lbs 11.2 oz    General: pt lying in bed, no acute distress   HEENT: no conjunctival icterus; some missing teeth; normocephalic and atraumatic   Cardiac: normal S1/S2 w/ regular rate; no murmurs noted  Resp: Normal work of breathing, breath sounds symmetric to bases; no wheezes, crackles, or rhonchi  Abd: Soft, non-distended; umbilical hernia is easily reduced; diffuse abdominal tenderness without ridigity  Skin: warm and dry  Extremities:  bilateral LE lymphedema; legs wrapped  Neuro: Alert and appropriately conversational; responds to commands appropriately, no gross deficits  Psych: Euthymic mood and normal affect    Significant Results and Procedures   Results for orders placed or performed during the hospital encounter of 01/24/18   XR Chest 2 Views    Narrative    Exam: Chest x-ray, 2 views, 1/24/2018.    COMPARISON: 1/10/2018.    HISTORY: Shortness of breath.    FINDINGS: PA and lateral views of the chest were obtained. Interval  removal of the previously described left upper extremity PICC line.  Cardiomediastinal silhouette within normal limits. Distinct pulmonary  vasculature. Stable bilateral lower lobes streaky opacities. No  pleural effusions. No pneumothorax. The right costophrenic angle is  collimated out of the image. Tortuous thoracic aorta with  calcifications. Degenerative changes of the spine and shoulders.      Impression    IMPRESSION: No acute airspace opacities. Stable bilateral lower lobes  streaky opacities, likely atelectasis.    KANNAN ELLINGTON MD   Abdomen US, limited (RUQ only)    Narrative    EXAMINATION: US ABDOMEN LIMITED, 1/24/2018 10:54 PM     COMPARISON: 1/8/2018 CT abdomen/pelvis    HISTORY: RUQ pain, r/o israel;     FINDINGS:   Fluid: No evidence of ascites or pleural effusions.    Liver: The liver demonstrates normal echotexture, measuring 16.6 cm in  craniocaudal dimension. There is no evidence of a focal mass. There is  no intrahepatic biliary dilatation. The main portal vein is patent  with antegrade flow.    Gallbladder: The gallbladder is well distended and of normal  morphology. There is no wall thickening, pericholecystic fluid,  sonographic Macias's sign nor evidence for cholelithiasis.    Bile Ducts: Both the intra- and extrahepatic biliary system are of  normal caliber.  The common bile duct measures 6 mm in diameter.    Pancreas: Not well visualized due to overlying bowel gas.    Kidney: The right  kidney measures 10.5 cm long. No hydronephrosis or  hydroureter, no shadowing renal calculi, and no solid mass. Several  thin-walled anechoic cysts arise from the right kidney. The capsule  and parenchyma have normal echotexture.       Impression    IMPRESSION:   1.  No evidence of cholecystitis. Normal sonographic appearance of the  gallbladder and normal caliber common bile duct.  2.  Several simple cysts in the right kidney. Solid mass arising from  the lower pole of the right kidney described on comparison CT is not  well visualized on this study.    I have personally reviewed the examination and initial interpretation  and I agree with the findings.    BERNICE LYNN MD   XR Chest Port 1 View    Narrative    Exam: XR CHEST PORT 1 VW, 1/28/2018 1:58 AM    Indication: 73 yo male sepsis concern for PNA;     Comparison: 1/24/2018     Findings:   The cardiomediastinal silhouette and pulmonary vasculature are within  normal limits. No pleural effusion or pneumothorax. Increased streaky  left midlung and basilar opacities. Subtle mixed interstitial and  airspace opacities in the right midlung and bilateral bases have also  slightly increased.       Impression    Impression: Slightly increased perihilar and bibasilar opacities,  representing atelectasis and/or infection.    I have personally reviewed the examination and initial interpretation  and I agree with the findings.    GARRETT BLANC MD   CT Abdomen Pelvis w/o Contrast    Narrative    EXAMINATION: CT ABDOMEN PELVIS W/O CONTRAST, 1/28/2018 2:33 AM    TECHNIQUE:  Helical CT images from the lung bases through the pubic  symphysis were obtained without IV contrast.     COMPARISON: 1/8/2018 CT abdomen/pelvis    HISTORY: 73yo M with flank pain. CT for stone protocol;     FINDINGS:    Abdomen and pelvis: 4 mm calyceal stone in the midpole of the right  kidney and punctate calyceal stone in the lower pole of the right  kidney. Unchanged mild dilation of the distal  ureters, more prominent  on the left with punctate nonobstructing calculus at the dependent  aspect of the left ureter (series 2 image 139) without associated  proximal ureteral dilation. No obstructing ureteral calculi  identified. No hydronephrosis or hydroureter The bladder is within  normal limits. Numerous bilateral renal cystic lesions are not  significant changed since 1/8/2018 CT. Unchanged 2.3 cm solid mass  arising from the lower pole of the right kidney is better  characterized on 1/8/2018 contrast-enhanced CT .    The liver, gallbladder, spleen, and pancreas are unremarkable.  Unchanged left adrenal nodule measuring 1.8 cm in its greatest  dimension. The right adrenal gland is within normal limits. Dystrophic  calcifications in the nonenlarged prostate. No free fluid or free air.  No bowel obstruction or inflammation. Normal appendix. Diffuse  atherosclerotic calcification of the abdominal aorta with unchanged  3.4 cm infrarenal abdominal aortic aneurysm. Aneurysmal common iliac  arteries, measuring 2.0 cm on the right and 2.1 cm on the left, also  unchanged since prior study. 11 mm right common iliac chain lymph node  (series 2 image 99), unchanged since prior study.    Lung bases: New bibasilar nodular and consolidative opacities  representing atelectasis, infection or aspiration. Bones and soft  tissues: Diffusely sclerotic bones, not significant changed. No  pathologic fractures identified. Unchanged fat-containing  periumbilical hernia.      Impression    IMPRESSION:   1. Nonobstructing right calyceal stones measuring up to 5 mm. Chronic  dilation of the distal left ureter with a punctate nonobstructing  stone layering dependently. No obstructing urinary tract stones. No  acute intra-abdominal pathology.  2. Unchanged right lower pole solid renal mass.  3. Unchanged indeterminate 11 mm right common iliac chain lymph node.   4. Unchanged 3.4 cm infrarenal abdominal aortic aneurysm and  aneurysmal  common iliac arteries.  5. Diffuse sclerotic osseous metastases are not significant changed.  6. New bibasilar opacities, representing atelectasis, aspiration or  infection.    I have personally reviewed the examination and initial interpretation  and I agree with the findings.    GARRETT BLANC MD     *Note: Due to a large number of results and/or encounters for the requested time period, some results have not been displayed. A complete set of results can be found in Results Review.       Pending Results   These results will be followed up by PCP  Unresulted Labs Ordered in the Past 30 Days of this Admission     Date and Time Order Name Status Description    1/27/2018 2146 Blood culture Preliminary     1/27/2018 2146 Blood culture Preliminary     1/11/2018 0445 CBC with platelets In process     1/11/2018 0445 INR In process              Primary Care Physician   Ekaterina Lozano    Discharge Disposition   Discharged to long-term care facility  Condition at discharge: Fair    Discharge Orders     Medication Therapy Management Referral     General info for SNF   Length of Stay Estimate: Long Term Care  Condition at Discharge: Stable  Level of care:skilled   Rehabilitation Potential: Fair  Admission H&P remains valid and up-to-date: Yes  Recent Chemotherapy: N/A  Use Nursing Home Standing Orders: Yes     Mantoux instructions   Give two-step Mantoux (PPD) Per Facility Policy Yes, if needed; pt already resides at facility so may not be needed.     Reason for your hospital stay   You initially came in for GI bleed. We did not find evidence of this. Your blood counts stayed constant. You later had evidence of infection which we believe is in your lungs. Your urine has not grown anything yet.     Activity - Up with nursing assistance     Wound care (specify)   Site:  bilateral lower legs  Instructions:  Lymphedema care     Follow Up and recommended labs and tests   Follow up with Nursing home physician in 3-5 days.   The following labs/tests are recommended: CBC, BMP to assess creatinine.     Full Code     Nutrition Services Adult IP Consult   Reason:  Pt had chewing difficulty and changed to dysphagia diet. Needs nutrition consult to ensure proper caloric intake.     Physical Therapy Adult Consult   Evaluate and treat as clinically indicated.    Reason:  PT - per plan established by the Physical Therapist, according to functional mobility the discharge recommendation is return to LTC. Pt needing min to mod A for all log roll tech. Pt needing mod  A for side lying to sitting at EOB. Pt able to sit up to 5 min. Pt demo core stability activities with close SBA.     Occupational Therapy Adult Consult   Evaluate and treat as clinically indicated.    Reason: Pt needing Cristiana for rolling in bed.  OT spoke with Tanya nurse at pt's residence, and she reports that pt was receiving assist for majority of ADL prior to admission.  He was completing oral/facial hygiene with min assist and eating meals with set-up.  Pt was variable in assist he needed to transfer, ranging from SBA to Ax2.     Speech Language Path Adult Consult   Evaluate and treat as clinically indicated.    Reason: Evaluate and treat as clinically indicated.    Reason: He continues to have difficulty chewing and then spitting out solid texture foods. Unclear if this is a change in status or is the patient's baseline function.  Barriers to return to prior living situation: none, anticipate LTC can provide modified diet.    Rationale for recommendations: could see SLP at LTC if swallowing function is below baseline     Oxygen - Nasal cannula   Lpm by nasal cannula to keep O2 sats 90% or greater.     Fall precautions     Advance Diet as Tolerated   Follow this diet upon discharge: dysphagia diet 1 and thin liquids       Discharge Medications   Discharge Medication List as of 1/30/2018  2:10 PM      START taking these medications    Details   amoxicillin-clavulanate (AUGMENTIN)  875-125 MG per tablet Take 1 tablet by mouth 2 times daily for 4 days, Disp-8 tablet, R-0, Transitional         CONTINUE these medications which have CHANGED    Details   fentaNYL (DURAGESIC) 12 mcg/hr 72 hr patch Place 1 patch onto the skin every 72 hours, Disp-5 patch, R-0, Local Print      oxyCODONE IR (ROXICODONE) 5 MG tablet Take 1 tablet (5 mg) by mouth every 4 hours as needed, Disp-18 tablet, R-0, Local Print         CONTINUE these medications which have NOT CHANGED    Details   ATORVASTATIN CALCIUM PO Take 10 mg by mouth daily, Historical      latanoprost (XALATAN) 0.005 % ophthalmic solution Place 1 drop into both eyes At Bedtime, Historical      omeprazole (PRILOSEC) 40 MG capsule Take 1 capsule (40 mg) by mouth 2 times daily, No Print Out      ipratropium - albuterol 0.5 mg/2.5 mg/3 mL (DUONEB) 0.5-2.5 (3) MG/3ML neb solution Take 1 vial by nebulization every 4 hours as needed for shortness of breath / dyspnea or wheezing, Historical      !! insulin aspart (NOVOLOG FLEXPEN) 100 UNIT/ML injection Inject Subcutaneous At Bedtime Inject as per sliding scale: if 0 - 199 = 0; 200 - 249 = 1; 250 - 299 = 2; 300 - 349 = 3; 350 - 399 = 4; 400+ = 5, Historical      !! insulin aspart (NOVOLOG FLEXPEN) 100 UNIT/ML injection Inject Subcutaneous 3 times daily (with meals) 140 - 189 = 1; 190 - 239 = 2; 240 - 289 = 3; 290 - 339 = 4; 340 - 399 = 5; 400 - 449 = 6; 450+ = 7, Historical      acetaminophen (TYLENOL) 500 MG tablet Take 2 tablets (1,000 mg) by mouth 3 times daily Not to exceed 3000 mg/24 hours, Transitional      isosorbide mononitrate (IMDUR) 30 MG 24 hr tablet Take 2 tablets (60 mg) by mouth daily, Disp-30 tablet, Transitional      nitroGLYcerin (NITROSTAT) 0.4 MG sublingual tablet Place 1 tablet (0.4 mg) under the tongue every 5 minutes as needed for chest pain if you are still having symptoms after 3 doses (15 minutes) call 911., Disp-25 tablet, Transitional      budesonide-formoterol (SYMBICORT) 160-4.5  MCG/ACT Inhaler Inhale 2 puffs into the lungs 2 times daily, Transitional      tiotropium (SPIRIVA) 18 MCG capsule Inhale 1 capsule (18 mcg) into the lungs daily, Disp-30 capsule, Transitional      DULoxetine (CYMBALTA) 20 MG EC capsule Take 1 capsule (20 mg) by mouth daily, Disp-60 capsule, Transitional      ondansetron 4 MG FILM Take 4 mg by mouth every 8 hours as needed, Disp-12 each, Transitional      metoprolol succinate (TOPROL XL) 50 MG 24 hr tablet Take 1 tablet (50 mg) by mouth daily, Disp-5 tablet, R-0, Transitional      ammonium lactate (LAC-HYDRIN) 12 % lotion Apply topically 2 times daily as needed for dry skin To all extremities for dry skinTransitional      diclofenac (VOLTAREN) 1 % GEL topical gel Apply 4 grams to knees four times daily as needed using enclosed dosing card.Disp-100 g, R-0Transitional      furosemide (LASIX) 20 MG tablet Take 1 tablet (20 mg) by mouth 2 times daily, Disp-30 tablet, Transitional      ferrous sulfate (IRON) 325 (65 FE) MG tablet Take 1 tablet (325 mg) by mouth 2 times daily, Disp-100 tablet, Transitional      folic acid (FOLVITE) 1 MG tablet Take 1 tablet (1 mg) by mouth daily, Disp-30 tablet, Transitional      polyethylene glycol (MIRALAX/GLYCOLAX) Packet Take 17 g by mouth daily , hold for loose stools., Disp-7 packet, Transitional      senna-docusate (SENOKOT-S;PERICOLACE) 8.6-50 MG per tablet Take 2 tablets by mouth 2 times daily , hold for loose stools., Disp-100 tablet, Transitional      calcium carbonate (OS-JORDAN 600 MG [place]. [place]) 1500 (600 CA) MG tablet Take 1 tablet (1,500 mg) by mouth 2 times daily (with meals), Disp-60 tablet, Transitional      tamsulosin (FLOMAX) 0.4 MG capsule Take 1 capsule (0.4 mg) by mouth daily, Disp-5 capsule, R-0, Transitional      simethicone (MYLICON) 40 MG/0.6ML suspension Take 0.6 mLs (40 mg) by mouth every 6 hours as needed for cramping, Transitional      bimatoprost (LUMIGAN) 0.01 % SOLN Place 1 drop into both eyes At Bedtime,  Disp-1 Bottle, Transitional      Carboxymethylcellulose Sod PF (REFRESH PLUS) 0.5 % SOLN ophthalmic solution Place 1 drop into both eyes 4 times daily And 1 drop both eyes daily prn, Disp-1 Bottle, Transitional      sucralfate (CARAFATE) 1 GM/10ML suspension Take 10 mLs (1 g) by mouth 4 times daily (before meals and nightly), Disp-1200 mL, Transitional      METHOCARBAMOL PO Take 500 mg by mouth every 6 hours as needed for muscle spasms, Historical      Timolol Hemihydrate (BETIMOL OP) Place 1 drop into both eyes daily , Historical       !! - Potential duplicate medications found. Please discuss with provider.        Allergies   Allergies   Allergen Reactions     Morphine Other (See Comments)     Patient reports makes him almost go into a coma

## 2018-02-01 NOTE — PROGRESS NOTES
Writer informed of patient status  Upon discharge with hgb of 7.4.  Patient to be transfused on 2/2/18 per Therapy tx plan.  Will f/u with MD on 2/12/18.  MD notified of patient status.  NICOLÁS Tobar will notify patient's facility for transportation arrangements.  Gage Yang RN, BSN, OCN  [facility]  Patient Care Coordinator

## 2018-02-01 NOTE — LETTER
"    2/1/2018        RE: Tomas Grey  [facility]  [address]  [facility] [address]        [facility]  PRIMARY CARE PROVIDER AND CLINIC:  Ekaterina Lozano [address] [place] [address] / [facility] [address]  Chief Complaint   Patient presents with     Hospital F/U     HPI:    Tomas Grey is a 72 year old  (1945), re-admitted to the [facility] from Hospital  [facility]. Hospital stay 1/24/18 through 1/30/18. Re-admitted to this facility for  rehab, medical management and nursing care. HPI information obtained from: facility chart records, facility staff, patient report and [facility] chart review. Hospital course per review of discharge summary:    This is a 72-year-old male, with a past medical history significant for severe COPD on supplemental Oxygen, obstructive sleep apnea, prostate cancer metastatic to bone, transfusion dependent anemia, type 2 diabetes mellitus, chronic kidney disease stage III, chronic diastolic heart failure with an EF 55-60% on 4/28/15, coronary artery disease, hypertension, paroxsymal atrial fibrillation on anticoagulation and lymphedema, who was admitted to [facility] with reports of multiple black, tarry stool. Of note, recently hospitalized at [facility] 1/8/18 through 1/13/18 for suspected GI bleed with Hemoglobin 3.2. Received 3 Units of PRBCs. Gastroenterology was consulted and felt risks outweighed benefit of EGD. During this hospitalization, labs revealed Hemoglobin 9.6. Gastroenterology was consulted. [place] to likely be bleeding from his stomach, but low probability of endoscopically treatable lesion. EGD too high risk. WBC 11.7 on 1/27/18. On 1/28/18, developed a fever with tachycardia. Broad spectrum antibiotics were initiated. A chest x-ray revealed \"Slightly increased perihilar and bibasilar opacities, representing atelectasis and/or infection.\". " Noted to have trouble chewing and swallowing solid food. Speech Therapy consulted and diet changed to pureed diet with thin liquids. Discharged back to long term care with Hemoglobin 7.4 on 1/30/18 receiving no blood transfusion.     Current issues are:        Speech much more difficult to understand today as compared to previous visits. Reports this is due to his mouth being dry and he needs water. Leaning forward on his electric wheelchair controls with his head down. Provides no other insight.    CODE STATUS/ADVANCE DIRECTIVES DISCUSSION:   CPR/Full code   Patient's living condition: lives in a skilled nursing facility    ALLERGIES:Morphine  PAST MEDICAL HISTORY:  has a past medical history of Anemia; Anxiety; Aspiration pneumonia (H) (2014); C. difficile colitis; CAD (coronary artery disease); Chronic pain; CKD (chronic kidney disease); COPD (chronic obstructive pulmonary disease) (H); Depressive disorder; Diabetes mellitus (H); Hypertension; Insomnia; ALEXIS (obstructive sleep apnea); Osteoporosis; and Prostate cancer metastatic to multiple sites (H).  PAST SURGICAL HISTORY:  has a past surgical history that includes Abdomen surgery; Arthroscopy knee; and Eye surgery.  FAMILY HISTORY: family history includes CANCER in his mother; DIABETES in his mother.  SOCIAL HISTORY:  reports that he has quit smoking. He has never used smokeless tobacco. He reports that he does not drink alcohol or use illicit drugs.    Post Discharge Medication Reconciliation Status: discharge medications reconciled, continue medications without change.  Current Outpatient Prescriptions   Medication Sig Dispense Refill     amoxicillin-clavulanate (AUGMENTIN) 875-125 MG per tablet Take 1 tablet by mouth 2 times daily for 4 days 8 tablet 0     fentaNYL (DURAGESIC) 12 mcg/hr 72 hr patch Place 1 patch onto the skin every 72 hours 5 patch 0     oxyCODONE IR (ROXICODONE) 5 MG tablet Take 1 tablet (5 mg) by mouth every 4 hours as needed 18 tablet 0      ATORVASTATIN CALCIUM PO Take 10 mg by mouth daily       latanoprost (XALATAN) 0.005 % ophthalmic solution Place 1 drop into both eyes At Bedtime       omeprazole (PRILOSEC) 40 MG capsule Take 1 capsule (40 mg) by mouth 2 times daily       ipratropium - albuterol 0.5 mg/2.5 mg/3 mL (DUONEB) 0.5-2.5 (3) MG/3ML neb solution Take 1 vial by nebulization every 4 hours as needed for shortness of breath / dyspnea or wheezing       insulin aspart (NOVOLOG FLEXPEN) 100 UNIT/ML injection Inject Subcutaneous At Bedtime Inject as per sliding scale: if 0 - 199 = 0; 200 - 249 = 1; 250 - 299 = 2; 300 - 349 = 3; 350 - 399 = 4; 400+ = 5       insulin aspart (NOVOLOG FLEXPEN) 100 UNIT/ML injection Inject Subcutaneous 3 times daily (with meals) 140 - 189 = 1; 190 - 239 = 2; 240 - 289 = 3; 290 - 339 = 4; 340 - 399 = 5; 400 - 449 = 6; 450+ = 7       acetaminophen (TYLENOL) 500 MG tablet Take 2 tablets (1,000 mg) by mouth 3 times daily Not to exceed 3000 mg/24 hours       isosorbide mononitrate (IMDUR) 30 MG 24 hr tablet Take 2 tablets (60 mg) by mouth daily 30 tablet      nitroGLYcerin (NITROSTAT) 0.4 MG sublingual tablet Place 1 tablet (0.4 mg) under the tongue every 5 minutes as needed for chest pain if you are still having symptoms after 3 doses (15 minutes) call 911. 25 tablet      budesonide-formoterol (SYMBICORT) 160-4.5 MCG/ACT Inhaler Inhale 2 puffs into the lungs 2 times daily       tiotropium (SPIRIVA) 18 MCG capsule Inhale 1 capsule (18 mcg) into the lungs daily 30 capsule      DULoxetine (CYMBALTA) 20 MG EC capsule Take 1 capsule (20 mg) by mouth daily 60 capsule      ondansetron 4 MG FILM Take 4 mg by mouth every 8 hours as needed 12 each      metoprolol succinate (TOPROL XL) 50 MG 24 hr tablet Take 1 tablet (50 mg) by mouth daily 5 tablet 0     ammonium lactate (LAC-HYDRIN) 12 % lotion Apply topically 2 times daily as needed for dry skin To all extremities for dry skin       diclofenac (VOLTAREN) 1 % GEL topical gel  Apply 4 grams to knees four times daily as needed using enclosed dosing card. 100 g 0     furosemide (LASIX) 20 MG tablet Take 1 tablet (20 mg) by mouth 2 times daily 30 tablet      ferrous sulfate (IRON) 325 (65 FE) MG tablet Take 1 tablet (325 mg) by mouth 2 times daily 100 tablet      folic acid (FOLVITE) 1 MG tablet Take 1 tablet (1 mg) by mouth daily 30 tablet      polyethylene glycol (MIRALAX/GLYCOLAX) Packet Take 17 g by mouth daily , hold for loose stools. 7 packet      senna-docusate (SENOKOT-S;PERICOLACE) 8.6-50 MG per tablet Take 2 tablets by mouth 2 times daily , hold for loose stools. 100 tablet      calcium carbonate (OS-JORDAN 600 MG [place]. CA) 1500 (600 CA) MG tablet Take 1 tablet (1,500 mg) by mouth 2 times daily (with meals) 60 tablet      tamsulosin (FLOMAX) 0.4 MG capsule Take 1 capsule (0.4 mg) by mouth daily 5 capsule 0     simethicone (MYLICON) 40 MG/0.6ML suspension Take 0.6 mLs (40 mg) by mouth every 6 hours as needed for cramping       bimatoprost (LUMIGAN) 0.01 % SOLN Place 1 drop into both eyes At Bedtime 1 Bottle      Carboxymethylcellulose Sod PF (REFRESH PLUS) 0.5 % SOLN ophthalmic solution Place 1 drop into both eyes 4 times daily And 1 drop both eyes daily prn 1 Bottle      sucralfate (CARAFATE) 1 GM/10ML suspension Take 10 mLs (1 g) by mouth 4 times daily (before meals and nightly) 1200 mL      METHOCARBAMOL PO Take 500 mg by mouth every 6 hours as needed for muscle spasms       Timolol Hemihydrate (BETIMOL OP) Place 1 drop into both eyes daily        [DISCONTINUED] enzalutamide (XTANDI) 40 MG capsule Take 4 capsules (160 mg) by mouth daily 120 capsule 0     ROS:  4 point ROS including Respiratory, CV, GI and , other than that noted in the HPI,  is negative    Exam:  /69  Pulse 116  Temp 97  F (36.1  C)  Resp 18  Wt 196 lb (88.9 kg)  SpO2 96%  BMI 27.34 kg/m2  GENERAL APPEARANCE: In no distress.   ENT:  Mouth and posterior oropharynx normal, moist mucous membranes  EYES:   EOM, conjunctivae, lids, pupils and irises normal  RESP:  Respiratory effort and palpation of chest normal, no respiratory distress, course lung sounds in bilateral lower lobes  CV:  Palpation and auscultation of heart done, regular rate and rhythm, no murmur, rub, or gallop  ABDOMEN: Active bowel sounds, soft, nontender, no hepatosplenomegaly or other masses  M/S:   Active movement of bilateral upper and lower extremities. Lymphedema wraps on BLE.  SKIN:  Inspection of skin and subcutaneous tissue baseline, Palpation of skin and subcutaneous tissue baseline  NEURO:   Cranial nerves 2-12 are normal tested and grossly at patient's baseline  PSYCH:  Oriented to person and place    Lab/Diagnostic data:   Last Basic Metabolic Panel:  Lab Results   Component Value Date     01/30/2018      Lab Results   Component Value Date    POTASSIUM 3.7 01/30/2018     Lab Results   Component Value Date    CHLORIDE 108 01/30/2018     Lab Results   Component Value Date    JORDAN 7.6 01/30/2018     Lab Results   Component Value Date    CO2 26 01/30/2018     Lab Results   Component Value Date    BUN 23 01/30/2018     Lab Results   Component Value Date    CR 1.30 01/30/2018     Lab Results   Component Value Date     01/30/2018     Lab Results   Component Value Date    WBC 10.0 01/30/2018     Lab Results   Component Value Date    RBC 2.68 01/30/2018     Lab Results   Component Value Date    HGB 7.4 01/30/2018     Lab Results   Component Value Date    HCT 25.1 01/30/2018     Lab Results   Component Value Date    MCV 94 01/30/2018     Lab Results   Component Value Date    MCH 27.6 01/30/2018     Lab Results   Component Value Date    MCHC 29.5 01/30/2018     Lab Results   Component Value Date    RDW 18.7 01/30/2018     Lab Results   Component Value Date     01/30/2018     ASSESSMENT/PLAN:  Possible Gastrointestinal Bleed with Transfusion Dependent Anemia. EGD unlikely to assist with overall clinical course and risk > benefit  so this was not performed. Recommend PPI BID indefinitely. Also taking Sucralfate. Aspirin and Warfarin discontinued. Received blood transfusions 11/1/17, 11/25/17. 12/1/17, 12/13/17 and 3 Units during hospitalization 1/8/18 through 1/13/18. Oncology recommends blood transfusions for Hemoglobin < 8. Noted by Oncology to be folate and iron deficiency. Chronic disease and bone marrow suppression from metastatic prostate cancer likely contributing. Continue Folic Acid and Ferrous Sulfate as ordered. Communicated with Oncology Hemoglobin 7.4 on 1/30/18. Oncology arranged blood transfusion 2/2/18. Facility to arrange transportation.     Suspected Pneumonia with Dysphagia. Thought to be possible aspiration pneumonia due to difficulty chewing and swallowing foods during hospitalization. Discharged on Amoxicillin-Clavulanate for 4 days outpatient to complete antibiotic course. Speech Therapy ordered. Diet downgraded to pureed texture with thin liquids.     Chronic Obstructive Pulmonary Disease on Chronic Oxygen Supplementation/Obstructive Sleep Apnea with CO2 Retention. Seen by Dr. Loera in Pulmonology on 10/4/17. Noted to have very severe COPD and chronic hypoxemic respiratory failure. Often refuses BiPAP at night per staff report. Sleep study outpatient recommended. Continue Budesonide-Formoterol, DuoNebs and Tiotropium as ordered.      Prostate Cancer Metastatic to Bone. Seen by Palliative Care during hospitalization 1/8/18 through 1/13/18 and outpatient 11/16/17. Follow-up appointment with Dr. Oviedo scheduled for 2/12/18. Comfort care recommended by Dr. Oviedo on 12/4/17 due to compromised performance status, advanced cancer for which he used all reasonable treatments and not being a candidate for cytotoxic chemotherapy. Has had 50 lbs weight loss in 1 year.  Resident and family refused [facility] enrollment. Remains FULL CODE. Given resident's lack of capacity to make decisions,  at facility is  working to pursue guardianship, but reportedly is having difficulty finding the numbers and addresses of all resident's family members. Fentanyl Patch, Oxycodone, Methocarbamol, Diclofenac gel and Acetaminophen ordered for pain control.      History of Urinary Retention in the Setting of Metastatic Lymph Node Dissection with Recurrent UTIs. Previously scheduled to see Urology, but patient was hospitalized at that time. Treated for ESBL E. Coli 1/8/18. Providencia Rettgeri UTI 7/1/17 with Rocephin and Bactrim DS. Treated for Pseudomonas and Klebsiella UTI 7/29/17 with Ciprofloxacin and ESBL E. Coli 8/30/17 with Ertapenem IV. Previously evaluated by Dr. Graf on 3/22/17 for urinary retention. Elevated Creatinine may be due to bladder outlet obstruction. Resistant to surgical options. Instructed how to self-catheterize, but resident resistant. Told Urology his main goal is avoiding pain. Voids as able. Continue Tamsulosin as ordered.      Weight Loss. Secondary to above and overall decline. Weight 204.1 lbs on 12/27/17 -> 208.2 lbs on 11/27/17 -> 220 lbs on 8/28/17 -> 239 lbs on 5/25/17 -> 256.2 lbs on 11/25/16. Dietary involved. Refuses Hospice enrollment.      Paroxysmal Atrial Fibrillation.Warfarin and Aspirin discontinued due to GI bleed. Continue Metoprolol as ordered.       Coronary Artery Disease/Hypertension/Chronic Diastolic Heart Failure with EF 60-65% on 11/24/17. Monitor blood pressure and weights weekly. Continue Isosorbide Mononitrate, Metoprolol, Atorvastatin and Nitroglycerin as ordered.      Lymphedema of Both Lower Extremities with Venous Stasis Ulcers. Physical Therapy ordered for lymphedema wraps. Continue Furosemide as ordered.      Chronic Kidney Disease Stage III. Baseline Creatinine mid 1s. Last Creatinine 1.3 on 1/30/18.  Monitor periodically.      Type 2 Diabetes Mellitus. Last A1C 6.0 on 11/18/17. Glargine discontinued during hospitalization earlier this month due to hypoglycemia. SSI  ordered. Continue to monitor Accuchecks prior to meals and at bedtime. Goal is to discontinue SSI once baseline blood sugars have been established.      Gastroesophageal Reflux. Continue Omeprazole as noted above.      Anxiety and Depression. Seen by in-house psychiatrist and started on Duloxetine 12/28/17.      Cognitive Impairment. Mild-to-moderate impairment based on neurocognitive testing ~ 1 year ago. BIMS Score 11/15 on 11/16/17. Given resident lacks the capacity to make decisions,  working to appoint a guardian for resident given lack of capacity to make decisions as goals are inconsistent with overall health status. Of note has 11 children and none of the children attend outside appointments with resident so a guardian will help ensure there is 1 appropriate decision maker. Resident also has metastatic prostate cancer and severe COPD making a FULL CODE situation quite dismal.       Glaucoma. Followed by [facility]. Continue Bimatoprost and Timolol Hemihydrate as ordered. Also on Carboxymethylcellulose drops.     Constipation. Continue Senna-S and Miralax as ordered.     Recurrent ED Visits and Hospitalizations. Resident calls 911 from facility without trying medications or staff interventions. Enjoys going to the hospital for IV pain medications. Have discussed on multiple occasions with resident and family appropriateness of ED and attempting staff interventions first. Most recent hospitalizations include:     [facility] - 1/24/18 - 1/30/18 - Possible GI Bleed/Possible Pneumonia.   [facility] - 1/8/18 - 1/13/18 - GI Bleed  [facility] - 12/29/17 - 12/31/17 - COPD Exacerbation  [facility] ED - 12/18/17 - Pain  [facility] - 12/13/17 - 12/14/17 - COPD Exacerbation  [facility] - 11/23/17 - 11/26/17 - COPD Exacerbation     Prior to 11/23/17, had 9 ED visits and 3 hospitalizations in 6 months [place] [facility] [place] and [facility] primarily for shortness of breath, chest pain and abdominal pain     Electronically signed by:  Ekatreina  SAL Babb CNP                      Sincerely,        SAL Acevedo CNP

## 2018-02-01 NOTE — PROGRESS NOTES
"[facility]  PRIMARY CARE PROVIDER AND CLINIC:  Ekaterina Lozano [address] [facility] [[email or web address]] [address] / [place] [address]  Chief Complaint   Patient presents with     Hospital F/U     HPI:    Tomas Grey is a 72 year old  (1945), re-admitted to the [facility] from Hospital  [facility]. Hospital stay 1/24/18 through 1/30/18. Re-admitted to this facility for  rehab, medical management and nursing care. HPI information obtained from: facility chart records, facility staff, patient report and [facility] chart review. Hospital course per review of discharge summary:    This is a 72-year-old male, with a past medical history significant for severe COPD on supplemental Oxygen, obstructive sleep apnea, prostate cancer metastatic to bone, transfusion dependent anemia, type 2 diabetes mellitus, chronic kidney disease stage III, chronic diastolic heart failure with an EF 55-60% on 4/28/15, coronary artery disease, hypertension, paroxsymal atrial fibrillation on anticoagulation and lymphedema, who was admitted to [facility] with reports of multiple black, tarry stool. Of note, recently hospitalized at [facility] 1/8/18 through 1/13/18 for suspected GI bleed with Hemoglobin 3.2. Received 3 Units of PRBCs. Gastroenterology was consulted and felt risks outweighed benefit of EGD. During this hospitalization, labs revealed Hemoglobin 9.6. Gastroenterology was consulted. [place] to likely be bleeding from his stomach, but low probability of endoscopically treatable lesion. EGD too high risk. WBC 11.7 on 1/27/18. On 1/28/18, developed a fever with tachycardia. Broad spectrum antibiotics were initiated. A chest x-ray revealed \"Slightly increased perihilar and bibasilar opacities, representing atelectasis and/or infection.\". Noted to have trouble chewing and swallowing solid food. Speech Therapy consulted and diet changed to pureed diet with thin " liquids. Discharged back to long term care with Hemoglobin 7.4 on 1/30/18 receiving no blood transfusion.     Current issues are:        Speech much more difficult to understand today as compared to previous visits. Reports this is due to his mouth being dry and he needs water. Leaning forward on his electric wheelchair controls with his head down. Provides no other insight.    CODE STATUS/ADVANCE DIRECTIVES DISCUSSION:   CPR/Full code   Patient's living condition: lives in a skilled nursing facility    ALLERGIES:Morphine  PAST MEDICAL HISTORY:  has a past medical history of Anemia; Anxiety; Aspiration pneumonia (H) (2014); C. difficile colitis; CAD (coronary artery disease); Chronic pain; CKD (chronic kidney disease); COPD (chronic obstructive pulmonary disease) (H); Depressive disorder; Diabetes mellitus (H); Hypertension; Insomnia; ALEXIS (obstructive sleep apnea); Osteoporosis; and Prostate cancer metastatic to multiple sites (H).  PAST SURGICAL HISTORY:  has a past surgical history that includes Abdomen surgery; Arthroscopy knee; and Eye surgery.  FAMILY HISTORY: family history includes CANCER in his mother; DIABETES in his mother.  SOCIAL HISTORY:  reports that he has quit smoking. He has never used smokeless tobacco. He reports that he does not drink alcohol or use illicit drugs.    Post Discharge Medication Reconciliation Status: discharge medications reconciled, continue medications without change.  Current Outpatient Prescriptions   Medication Sig Dispense Refill     amoxicillin-clavulanate (AUGMENTIN) 875-125 MG per tablet Take 1 tablet by mouth 2 times daily for 4 days 8 tablet 0     fentaNYL (DURAGESIC) 12 mcg/hr 72 hr patch Place 1 patch onto the skin every 72 hours 5 patch 0     oxyCODONE IR (ROXICODONE) 5 MG tablet Take 1 tablet (5 mg) by mouth every 4 hours as needed 18 tablet 0     ATORVASTATIN CALCIUM PO Take 10 mg by mouth daily       latanoprost (XALATAN) 0.005 % ophthalmic solution Place 1 drop  into both eyes At Bedtime       omeprazole (PRILOSEC) 40 MG capsule Take 1 capsule (40 mg) by mouth 2 times daily       ipratropium - albuterol 0.5 mg/2.5 mg/3 mL (DUONEB) 0.5-2.5 (3) MG/3ML neb solution Take 1 vial by nebulization every 4 hours as needed for shortness of breath / dyspnea or wheezing       insulin aspart (NOVOLOG FLEXPEN) 100 UNIT/ML injection Inject Subcutaneous At Bedtime Inject as per sliding scale: if 0 - 199 = 0; 200 - 249 = 1; 250 - 299 = 2; 300 - 349 = 3; 350 - 399 = 4; 400+ = 5       insulin aspart (NOVOLOG FLEXPEN) 100 UNIT/ML injection Inject Subcutaneous 3 times daily (with meals) 140 - 189 = 1; 190 - 239 = 2; 240 - 289 = 3; 290 - 339 = 4; 340 - 399 = 5; 400 - 449 = 6; 450+ = 7       acetaminophen (TYLENOL) 500 MG tablet Take 2 tablets (1,000 mg) by mouth 3 times daily Not to exceed 3000 mg/24 hours       isosorbide mononitrate (IMDUR) 30 MG 24 hr tablet Take 2 tablets (60 mg) by mouth daily 30 tablet      nitroGLYcerin (NITROSTAT) 0.4 MG sublingual tablet Place 1 tablet (0.4 mg) under the tongue every 5 minutes as needed for chest pain if you are still having symptoms after 3 doses (15 minutes) call 911. 25 tablet      budesonide-formoterol (SYMBICORT) 160-4.5 MCG/ACT Inhaler Inhale 2 puffs into the lungs 2 times daily       tiotropium (SPIRIVA) 18 MCG capsule Inhale 1 capsule (18 mcg) into the lungs daily 30 capsule      DULoxetine (CYMBALTA) 20 MG EC capsule Take 1 capsule (20 mg) by mouth daily 60 capsule      ondansetron 4 MG FILM Take 4 mg by mouth every 8 hours as needed 12 each      metoprolol succinate (TOPROL XL) 50 MG 24 hr tablet Take 1 tablet (50 mg) by mouth daily 5 tablet 0     ammonium lactate (LAC-HYDRIN) 12 % lotion Apply topically 2 times daily as needed for dry skin To all extremities for dry skin       diclofenac (VOLTAREN) 1 % GEL topical gel Apply 4 grams to knees four times daily as needed using enclosed dosing card. 100 g 0     furosemide (LASIX) 20 MG tablet Take  1 tablet (20 mg) by mouth 2 times daily 30 tablet      ferrous sulfate (IRON) 325 (65 FE) MG tablet Take 1 tablet (325 mg) by mouth 2 times daily 100 tablet      folic acid (FOLVITE) 1 MG tablet Take 1 tablet (1 mg) by mouth daily 30 tablet      polyethylene glycol (MIRALAX/GLYCOLAX) Packet Take 17 g by mouth daily , hold for loose stools. 7 packet      senna-docusate (SENOKOT-S;PERICOLACE) 8.6-50 MG per tablet Take 2 tablets by mouth 2 times daily , hold for loose stools. 100 tablet      calcium carbonate (OS-JORDAN 600 MG [place]. [place]) 1500 (600 CA) MG tablet Take 1 tablet (1,500 mg) by mouth 2 times daily (with meals) 60 tablet      tamsulosin (FLOMAX) 0.4 MG capsule Take 1 capsule (0.4 mg) by mouth daily 5 capsule 0     simethicone (MYLICON) 40 MG/0.6ML suspension Take 0.6 mLs (40 mg) by mouth every 6 hours as needed for cramping       bimatoprost (LUMIGAN) 0.01 % SOLN Place 1 drop into both eyes At Bedtime 1 Bottle      Carboxymethylcellulose Sod PF (REFRESH PLUS) 0.5 % SOLN ophthalmic solution Place 1 drop into both eyes 4 times daily And 1 drop both eyes daily prn 1 Bottle      sucralfate (CARAFATE) 1 GM/10ML suspension Take 10 mLs (1 g) by mouth 4 times daily (before meals and nightly) 1200 mL      METHOCARBAMOL PO Take 500 mg by mouth every 6 hours as needed for muscle spasms       Timolol Hemihydrate (BETIMOL OP) Place 1 drop into both eyes daily        [DISCONTINUED] enzalutamide (XTANDI) 40 MG capsule Take 4 capsules (160 mg) by mouth daily 120 capsule 0     ROS:  4 point ROS including Respiratory, CV, GI and , other than that noted in the HPI,  is negative    Exam:  /69  Pulse 116  Temp 97  F (36.1  C)  Resp 18  Wt 196 lb (88.9 kg)  SpO2 96%  BMI 27.34 kg/m2  GENERAL APPEARANCE: In no distress.   ENT:  Mouth and posterior oropharynx normal, moist mucous membranes  EYES:  EOM, conjunctivae, lids, pupils and irises normal  RESP:  Respiratory effort and palpation of chest normal, no respiratory  distress, course lung sounds in bilateral lower lobes  CV:  Palpation and auscultation of heart done, regular rate and rhythm, no murmur, rub, or gallop  ABDOMEN: Active bowel sounds, soft, nontender, no hepatosplenomegaly or other masses  M/S:   Active movement of bilateral upper and lower extremities. Lymphedema wraps on BLE.  SKIN:  Inspection of skin and subcutaneous tissue baseline, Palpation of skin and subcutaneous tissue baseline  NEURO:   Cranial nerves 2-12 are normal tested and grossly at patient's baseline  PSYCH:  Oriented to person and place    Lab/Diagnostic data:   Last Basic Metabolic Panel:  Lab Results   Component Value Date     01/30/2018      Lab Results   Component Value Date    POTASSIUM 3.7 01/30/2018     Lab Results   Component Value Date    CHLORIDE 108 01/30/2018     Lab Results   Component Value Date    JORDAN 7.6 01/30/2018     Lab Results   Component Value Date    CO2 26 01/30/2018     Lab Results   Component Value Date    BUN 23 01/30/2018     Lab Results   Component Value Date    CR 1.30 01/30/2018     Lab Results   Component Value Date     01/30/2018     Lab Results   Component Value Date    WBC 10.0 01/30/2018     Lab Results   Component Value Date    RBC 2.68 01/30/2018     Lab Results   Component Value Date    HGB 7.4 01/30/2018     Lab Results   Component Value Date    HCT 25.1 01/30/2018     Lab Results   Component Value Date    MCV 94 01/30/2018     Lab Results   Component Value Date    MCH 27.6 01/30/2018     Lab Results   Component Value Date    MCHC 29.5 01/30/2018     Lab Results   Component Value Date    RDW 18.7 01/30/2018     Lab Results   Component Value Date     01/30/2018     ASSESSMENT/PLAN:  Possible Gastrointestinal Bleed with Transfusion Dependent Anemia. EGD unlikely to assist with overall clinical course and risk > benefit so this was not performed. Recommend PPI BID indefinitely. Also taking Sucralfate. Aspirin and Warfarin discontinued.  Received blood transfusions 11/1/17, 11/25/17. 12/1/17, 12/13/17 and 3 Units during hospitalization 1/8/18 through 1/13/18. Oncology recommends blood transfusions for Hemoglobin < 8. Noted by Oncology to be folate and iron deficiency. Chronic disease and bone marrow suppression from metastatic prostate cancer likely contributing. Continue Folic Acid and Ferrous Sulfate as ordered. Communicated with Oncology Hemoglobin 7.4 on 1/30/18. Oncology arranged blood transfusion 2/2/18. Facility to arrange transportation.     Suspected Pneumonia with Dysphagia. Thought to be possible aspiration pneumonia due to difficulty chewing and swallowing foods during hospitalization. Discharged on Amoxicillin-Clavulanate for 4 days outpatient to complete antibiotic course. Speech Therapy ordered. Diet downgraded to pureed texture with thin liquids.     Chronic Obstructive Pulmonary Disease on Chronic Oxygen Supplementation/Obstructive Sleep Apnea with CO2 Retention. Seen by Dr. Loera in Pulmonology on 10/4/17. Noted to have very severe COPD and chronic hypoxemic respiratory failure. Often refuses BiPAP at night per staff report. Sleep study outpatient recommended. Continue Budesonide-Formoterol, DuoNebs and Tiotropium as ordered.      Prostate Cancer Metastatic to Bone. Seen by Palliative Care during hospitalization 1/8/18 through 1/13/18 and outpatient 11/16/17. Follow-up appointment with Dr. Oviedo scheduled for 2/12/18. Comfort care recommended by Dr. Oviedo on 12/4/17 due to compromised performance status, advanced cancer for which he used all reasonable treatments and not being a candidate for cytotoxic chemotherapy. Has had 50 lbs weight loss in 1 year.  Resident and family refused [facility] enrollment. Remains FULL CODE. Given resident's lack of capacity to make decisions,  at facility is working to pursue guardianship, but reportedly is having difficulty finding the numbers and addresses of all resident's  family members. Fentanyl Patch, Oxycodone, Methocarbamol, Diclofenac gel and Acetaminophen ordered for pain control.      History of Urinary Retention in the Setting of Metastatic Lymph Node Dissection with Recurrent UTIs. Previously scheduled to see Urology, but patient was hospitalized at that time. Treated for ESBL E. Coli 1/8/18. Providencia Rettgeri UTI 7/1/17 with Rocephin and Bactrim DS. Treated for Pseudomonas and Klebsiella UTI 7/29/17 with Ciprofloxacin and ESBL E. Coli 8/30/17 with Ertapenem IV. Previously evaluated by Dr. Graf on 3/22/17 for urinary retention. Elevated Creatinine may be due to bladder outlet obstruction. Resistant to surgical options. Instructed how to self-catheterize, but resident resistant. Told Urology his main goal is avoiding pain. Voids as able. Continue Tamsulosin as ordered.      Weight Loss. Secondary to above and overall decline. Weight 204.1 lbs on 12/27/17 -> 208.2 lbs on 11/27/17 -> 220 lbs on 8/28/17 -> 239 lbs on 5/25/17 -> 256.2 lbs on 11/25/16. Dietary involved. Refuses Hospice enrollment.      Paroxysmal Atrial Fibrillation.Warfarin and Aspirin discontinued due to GI bleed. Continue Metoprolol as ordered.       Coronary Artery Disease/Hypertension/Chronic Diastolic Heart Failure with EF 60-65% on 11/24/17. Monitor blood pressure and weights weekly. Continue Isosorbide Mononitrate, Metoprolol, Atorvastatin and Nitroglycerin as ordered.      Lymphedema of Both Lower Extremities with Venous Stasis Ulcers. Physical Therapy ordered for lymphedema wraps. Continue Furosemide as ordered.      Chronic Kidney Disease Stage III. Baseline Creatinine mid 1s. Last Creatinine 1.3 on 1/30/18.  Monitor periodically.      Type 2 Diabetes Mellitus. Last A1C 6.0 on 11/18/17. Glargine discontinued during hospitalization earlier this month due to hypoglycemia. SSI ordered. Continue to monitor Accuchecks prior to meals and at bedtime. Goal is to discontinue SSI once baseline blood sugars  have been established.      Gastroesophageal Reflux. Continue Omeprazole as noted above.      Anxiety and Depression. Seen by in-house psychiatrist and started on Duloxetine 12/28/17.      Cognitive Impairment. Mild-to-moderate impairment based on neurocognitive testing ~ 1 year ago. BIMS Score 11/15 on 11/16/17. Given resident lacks the capacity to make decisions,  working to appoint a guardian for resident given lack of capacity to make decisions as goals are inconsistent with overall health status. Of note has 11 children and none of the children attend outside appointments with resident so a guardian will help ensure there is 1 appropriate decision maker. Resident also has metastatic prostate cancer and severe COPD making a FULL CODE situation quite dismal.       Glaucoma. Followed by [facility]. Continue Bimatoprost and Timolol Hemihydrate as ordered. Also on Carboxymethylcellulose drops.     Constipation. Continue Senna-S and Miralax as ordered.     Recurrent ED Visits and Hospitalizations. Resident calls 911 from facility without trying medications or staff interventions. Enjoys going to the hospital for IV pain medications. Have discussed on multiple occasions with resident and family appropriateness of ED and attempting staff interventions first. Most recent hospitalizations include:     [facility] - 1/24/18 - 1/30/18 - Possible GI Bleed/Possible Pneumonia.   [facility] - 1/8/18 - 1/13/18 - GI Bleed  [facility] - 12/29/17 - 12/31/17 - COPD Exacerbation  [facility] - 12/18/17 - Pain  [facility] - 12/13/17 - 12/14/17 - COPD Exacerbation  [facility] - 11/23/17 - 11/26/17 - COPD Exacerbation     Prior to 11/23/17, had 9 ED visits and 3 hospitalizations in 6 months at [facility] and [facility] primarily for shortness of breath, chest pain and abdominal pain     Electronically signed by:  SAL Acevedo CNP

## 2018-02-02 NOTE — PROGRESS NOTES
Infusion Nursing Note:  Tomas Grey presents today for PRBC.    Patient seen by provider today: No   present during visit today: Not Applicable.    Note: Blood infused over 2 hours. Patient needed 3 staff members to help him use the restroom. He cannot bear weight of any kind.    Intravenous Access:  Labs drawn without difficulty.  Peripheral IV placed.    Treatment Conditions:  Blood transfusion consent signed 8/29/17.      Post Infusion Assessment:  Patient tolerated infusion without incident.  Site patent and intact, free from redness, edema or discomfort.  No evidence of extravasations.  Access discontinued per protocol.    Discharge Plan:   Patient declined prescription refills.  Discharge instructions reviewed with: Patient.  Patient and/or family verbalized understanding of discharge instructions and all questions answered.  Copy of AVS reviewed with patient and/or family.  Patient will return on 2/12/18 for next appointment.  Patient discharged in stable condition accompanied by: .  Departure Mode: Wheelchair.    Martina Petersen RN

## 2018-02-02 NOTE — MR AVS SNAPSHOT
After Visit Summary   2/2/2018    Tomas Grey    MRN: [id number]           Patient Information     Date Of Birth          1945        Visit Information        Provider Department      2/2/2018 8:30 AM [facility] INFUSION CHAIR 12 [facility]        Today's Diagnoses     Prostate cancer metastatic to multiple sites (H)    -  1       Follow-ups after your visit        Your next 10 appointments already scheduled     Feb 12, 2018 11:30 AM CST   Return Visit with Manpreet Oviedo MD   [facility] ([facility])    [facility]  [address]  [address]  [facility] [address]   [phone]              Future tests that were ordered for you today     Open Standing Orders        Priority Remaining Interval Expires Ordered    Transfuse red blood cell unit Routine 99/100 TRANSFUSE 1 UNIT  2/2/2018    Red blood cell prepare order unit Routine 99/100 CONDITIONAL (SPECIFY) BLOOD  2/1/2018            Who to contact     If you have questions or need follow up information about today's clinic visit or your schedule please contact [facility] directly at [phone].  Normal or non-critical lab and imaging results will be communicated to you by [email or web address], letter or phone within 4 business days after the clinic has received the results. If you do not hear from us within 7 days, please contact the clinic through [employer] or phone. If you have a critical or abnormal lab result, we will notify you by phone as soon as possible.  Submit refill requests through [employer] or call your pharmacy and they will forward the refill request to us. Please allow 3 business days for your refill to be completed.          Additional Information About Your Visit        [email or web address] Information     [employer] lets you send messages to your doctor, view your test results, renew your prescriptions, schedule appointments and  "more. To sign up, go to [email or web address] . Click on \"Log in\" on the left side of the screen, which will take you to the Welcome page. Then click on \"Sign up Now\" on the right side of the page.     You will be asked to enter the access code listed below, as well as some personal information. Please follow the directions to create your username and password.     Your access code is: [email or web address]  Expires: 3/14/2018 10:17 AM     Your access code will  in 90 days. If you need help or a new code, please call your [place] clinic or [phone].        Care EveryWhere ID     This is your Care EveryWhere ID. This could be used by other organizations to access your [place] medical records  [id number]        Your Vitals Were     Pulse Temperature Respirations Pulse Oximetry          102 97.8  F (36.6  C) 20 96%         Blood Pressure from Last 3 Encounters:   18 140/74   18 116/69   18 109/53    Weight from Last 3 Encounters:   18 88.9 kg (196 lb)   18 87.9 kg (193 lb 11.2 oz)   18 92.5 kg (204 lb)              We Performed the Following     ABO/Rh type and screen     Blood component     Transfuse red blood cell unit        Primary Care Provider Office Phone # Fax #    Ekaterina Sow SAL Lozano [facility] [phone]       [address] [address]        Equal Access to Services     [facility] BAUTISTA : Hadii shima ku hadasho Soarthurali, waaxda luqadaha, qaybta kaalmada adeegyada, jorje prather . So [facility] [phone].    ATENCIÓN: Si habla español, tiene a soria disposición servicios gratuitos de asistencia lingüística. Llame al [phone].    We comply with applicable federal civil rights laws and [place] laws. We do not discriminate on the basis of race, color, national origin, age, disability, sex, sexual orientation, or gender identity.            Thank you!     Thank you for choosing [facility] INF[facility] " SERVICES  for your care. Our goal is always to provide you with excellent care. Hearing back from our patients is one way we can continue to improve our services. Please take a few minutes to complete the written survey that you may receive in the mail after your visit with us. Thank you!             Your Updated Medication List - Protect others around you: Learn how to safely use, store and throw away your medicines at www.disposemymeds.org.          This list is accurate as of 2/2/18  1:58 PM.  Always use your most recent med list.                   Brand Name Dispense Instructions for use Diagnosis    acetaminophen 500 MG tablet    TYLENOL     Take 2 tablets (1,000 mg) by mouth 3 times daily Not to exceed 3000 mg/24 hours    Prostate cancer metastatic to bone (H)       ammonium lactate 12 % lotion    LAC-HYDRIN     Apply topically 2 times daily as needed for dry skin To all extremities for dry skin    Venous stasis dermatitis of both lower extremities       amoxicillin-clavulanate 875-125 MG per tablet    AUGMENTIN    8 tablet    Take 1 tablet by mouth 2 times daily for 4 days    Pneumonia of lower lobe due to infectious organism, unspecified laterality (H)       ATORVASTATIN CALCIUM PO      Take 10 mg by mouth daily        BETIMOL OP      Place 1 drop into both eyes daily        bimatoprost 0.01 % Soln    LUMIGAN    1 Bottle    Place 1 drop into both eyes At Bedtime    Eye abnormalities       budesonide-formoterol 160-4.5 MCG/ACT Inhaler    SYMBICORT     Inhale 2 puffs into the lungs 2 times daily    Chronic obstructive pulmonary disease, unspecified COPD type (H)       calcium carbonate 1500 (600 CA) MG tablet    OS-JORDAN 600 mg Knik. Ca    60 tablet    Take 1 tablet (1,500 mg) by mouth 2 times daily (with meals)    Prostate cancer metastatic to bone (H)       Carboxymethylcellulose Sod PF 0.5 % Soln ophthalmic solution    REFRESH PLUS    1 Bottle    Place 1 drop into both eyes 4 times daily And 1 drop both eyes  daily prn    Dry eyes       diclofenac 1 % Gel topical gel    VOLTAREN    100 g    Apply 4 grams to knees four times daily as needed using enclosed dosing card.    Chronic pain of both knees       DULoxetine 20 MG EC capsule    CYMBALTA    60 capsule    Take 1 capsule (20 mg) by mouth daily    Depression, unspecified depression type       fentaNYL 12 mcg/hr 72 hr patch    DURAGESIC    5 patch    Place 1 patch onto the skin every 72 hours    Prostate cancer metastatic to bone (H)       ferrous sulfate 325 (65 FE) MG tablet    IRON    100 tablet    Take 1 tablet (325 mg) by mouth 2 times daily    Anemia, unspecified type       folic acid 1 MG tablet    FOLVITE    30 tablet    Take 1 tablet (1 mg) by mouth daily    Anemia, unspecified type       furosemide 20 MG tablet    LASIX    30 tablet    Take 1 tablet (20 mg) by mouth 2 times daily    Chronic diastolic congestive heart failure (H), Lymphedema of both lower extremities       ipratropium - albuterol 0.5 mg/2.5 mg/3 mL 0.5-2.5 (3) MG/3ML neb solution    DUONEB     Take 1 vial by nebulization every 4 hours as needed for shortness of breath / dyspnea or wheezing        isosorbide mononitrate 30 MG 24 hr tablet    IMDUR    30 tablet    Take 2 tablets (60 mg) by mouth daily    Essential hypertension       latanoprost 0.005 % ophthalmic solution    XALATAN     Place 1 drop into both eyes At Bedtime        METHOCARBAMOL PO      Take 500 mg by mouth every 6 hours as needed for muscle spasms        metoprolol succinate 50 MG 24 hr tablet    TOPROL XL    5 tablet    Take 1 tablet (50 mg) by mouth daily    ASCVD (arteriosclerotic cardiovascular disease)       nitroGLYcerin 0.4 MG sublingual tablet    NITROSTAT    25 tablet    Place 1 tablet (0.4 mg) under the tongue every 5 minutes as needed for chest pain if you are still having symptoms after 3 doses (15 minutes) call 911.    Coronary artery disease involving native heart with angina pectoris, unspecified vessel or lesion  type (H)       * NovoLOG FLEXPEN 100 UNIT/ML injection   Generic drug:  insulin aspart      Inject Subcutaneous At Bedtime Inject as per sliding scale: if 0 - 199 = 0; 200 - 249 = 1; 250 - 299 = 2; 300 - 349 = 3; 350 - 399 = 4; 400+ = 5        * NovoLOG FLEXPEN 100 UNIT/ML injection   Generic drug:  insulin aspart      Inject Subcutaneous 3 times daily (with meals) 140 - 189 = 1; 190 - 239 = 2; 240 - 289 = 3; 290 - 339 = 4; 340 - 399 = 5; 400 - 449 = 6; 450+ = 7        omeprazole 40 MG capsule    priLOSEC     Take 1 capsule (40 mg) by mouth 2 times daily        ondansetron 4 MG Film     12 each    Take 4 mg by mouth every 8 hours as needed    Prostate cancer metastatic to bone (H)       oxyCODONE IR 5 MG tablet    ROXICODONE    18 tablet    Take 1 tablet (5 mg) by mouth every 4 hours as needed    Prostate cancer metastatic to bone (H)       polyethylene glycol Packet    MIRALAX/GLYCOLAX    7 packet    Take 17 g by mouth daily , hold for loose stools.    Constipation, unspecified constipation type       senna-docusate 8.6-50 MG per tablet    SENOKOT-S;PERICOLACE    100 tablet    Take 2 tablets by mouth 2 times daily , hold for loose stools.    Constipation, unspecified constipation type       simethicone 40 MG/0.6ML suspension    MYLICON     Take 0.6 mLs (40 mg) by mouth every 6 hours as needed for cramping    Flatulence, eructation and gas pain       sucralfate 1 GM/10ML suspension    CARAFATE    1200 mL    Take 10 mLs (1 g) by mouth 4 times daily (before meals and nightly)    Gastrointestinal hemorrhage with melena       tamsulosin 0.4 MG capsule    FLOMAX    5 capsule    Take 1 capsule (0.4 mg) by mouth daily    Benign non-nodular prostatic hyperplasia without lower urinary tract symptoms       tiotropium 18 MCG capsule    SPIRIVA    30 capsule    Inhale 1 capsule (18 mcg) into the lungs daily    Chronic obstructive pulmonary disease, unspecified COPD type (H)       * Notice:  This list has 2 medication(s) that  are the same as other medications prescribed for you. Read the directions carefully, and ask your doctor or other care provider to review them with you.

## 2018-02-05 NOTE — TELEPHONE ENCOUNTER
Called and spoke with NISHA Martínez  Gave VO for CBC/plt for Thursday 2/8/18.  If within parameters, he will have a blood transfusion on Friday 2/9/18.  Facility will arrange transportation and will notify clinic of patient's lab results.  Gage Yang RN, BSN, OCN  [facility] [place]  Patient Care Coordinator

## 2018-02-05 NOTE — TELEPHONE ENCOUNTER
Patient had blood transfusion on 2/3 and today hemoglobin is 7.5. Patient is scheduled for next transfusion on 2/12.      NISHA Martínez

## 2018-02-13 NOTE — PROGRESS NOTES
[facility]  Chief Complaint   Patient presents with     Annual Comprehensive Nursing Home     HPI:    Tomas Grey is a 72 year old  (1945), who is being seen today for an annual comprehensive visit at [facility]. HPI information obtained from: facility chart records, facility staff, patient report and [facility] chart review. Today's concerns are:    Possible Gastrointestinal Bleed with Transfusion Dependent Anemia. Today, resident denies fatigue or dark stools. Hospitalized 1/24/18 through 1/30/18 and 1/8/18 through 1/13/18 for possible GI bleed. CBC monitored at least weekly and faxed to Oncology. Last blood transfusion 2/2/18. Previous blood transfusions of 3 Units during 1/8/18 through 1/13/18 hospitalization, 12/13/17, 12/1/17, 11/25/17 and 11/1/17.     Chronic Obstructive Pulmonary Disease on Chronic Oxygen Supplementation/Obstructive Sleep Apnea with CO2 Retention. Denies shortness of breath. Often refuses BiPAP at night per staff report. Upon review of documentation, has not utilized DuoNebs PRN in the month of February.       Prostate Cancer Metastatic to Bone. Denies pain. Has not utilized Methocarbamol PRN in the month of February. Has utilized Oxycodone 4 times in the month of February, 3 times in 1 day. Followed by Dr. Oviedo. No longer receiving any treatment.       History of Urinary Retention in the Setting of Metastatic Lymph Node Dissection with Recurrent UTIs. Denies pain with urination.       Weight Loss. Weight 194.8 lbs on 2/12/18 -> 221.3 lbs on 8/13/17 -> 249.4 lbs on 2/12/17. Enjoys when his family brings in food for him.       Paroxysmal Atrial Fibrillation. Denies heart palpitations.     Coronary Artery Disease/Hypertension/Chronic Diastolic Heart Failure with EF 60-65% on 11/24/17. Upon review of blood pressures over the past month, systolic range from . Diastolic range from 48-87.      Lymphedema of Both Lower Extremities with Venous  Stasis Ulcers. Physical Therapy applying lymphedema wraps.       Chronic Kidney Disease Stage III. Baseline Creatinine now low 1s. Last Creatinine 1.01 on 2/7/18.       Type 2 Diabetes Mellitus. Upon review of blood sugars over the past 5 days, range is as follows:    Breakfast: , most < 150  Lunch: 100-339, most < 150  Dinner: 100-259, most <150  Bedtime: , most <150      Gastroesophageal Reflux. No reports of heartburn during today's visit.      Anxiety and Depression. PHQ-9 Score 8 on 2/8/18. Followed by in-house psychiatrist.       Cognitive Impairment. Able to make needs known.       Glaucoma. Denies recent changes in vision.       Constipation. Upon review of documentation over the past week, has had 7 bowel movements.    Abdominal Aortic Aneurysm. Incidental finding of 3.4 cm infrarenal abdominal aortic aneurysm. Appears to have been the same size since 2004. Last abdominal and pelvic CT 1/28/18.    Recurrent ED Visits and Hospitalizations. Resident calls 911 from facility without trying medications or staff interventions. Enjoys going to the hospital for IV pain medications. Have discussed on multiple occasions with resident and family appropriateness of ED and attempting staff interventions first.     ALLERGIES: Morphine  PROBLEM LIST:  Patient Active Problem List   Diagnosis     Insomnia     COPD (chronic obstructive pulmonary disease) (H)     Anemia     Osteoporosis     ASCVD (arteriosclerotic cardiovascular disease)     Prostate cancer metastatic to multiple sites (H)     Venous stasis dermatitis     Fecal incontinence     CKD (chronic kidney disease) stage 3, GFR 30-59 ml/min     Serum albumin decreased     Right heart failure     Type 2 diabetes, HbA1C goal < 8% (H)     Impaired mobility     Knee pain     Irregular heart rhythm     Sleep apnea     Metastasis to bone (H)     SOB (shortness of breath)     FTT (failure to thrive) in adult     Dyspnea     Atypical chest pain     Prostate cancer  metastatic to bone (H)     Chronic pain     ACP (advance care planning)     Respiratory failure (H)     CAD (coronary artery disease)     Physical deconditioning     Hyperlipidemia     Renal insufficiency     Epigastric pain     Lymphedema of both lower extremities     Primary pulmonary hypertension (H)     Redness of eye, left     Chest wall pain     Chronic obstructive pulmonary disease, unspecified COPD type (H)     FH: chronic lung disease requiring oxygen     COPD exacerbation (H)     History of heart attack     Nocturnal dyspnea     Dyspnea on exertion     Chronic obstructive pulmonary disease with severity to be determined (H)     Leg pain     Adjustment disorder with mixed anxiety and depressed mood     Noncompliance with medication regimen     Type 2 diabetes mellitus with stage 3 chronic kidney disease (H)     Chronic respiratory failure with hypoxia and hypercapnia (H)     Misuse of drugs (H)     Pneumonia of left lower lobe due to infectious organism (H)     Pneumonia     Hypercapnia     Health Care Home     HTN (hypertension)     CHF (congestive heart failure) (H)     Chronic respiratory failure with hypoxia (H)     Morbid obesity (H)     Altered mental status     Bacterial sepsis (H)     Anemia in neoplastic disease     GI bleed     PAST MEDICAL HISTORY:  has a past medical history of Anemia; Anxiety; Aspiration pneumonia (H) (2014); C. difficile colitis; CAD (coronary artery disease); Chronic pain; CKD (chronic kidney disease); COPD (chronic obstructive pulmonary disease) (H); Depressive disorder; Diabetes mellitus (H); Hypertension; Insomnia; ALEXIS (obstructive sleep apnea); Osteoporosis; and Prostate cancer metastatic to multiple sites (H).  PAST SURGICAL HISTORY:  has a past surgical history that includes Abdomen surgery; Arthroscopy knee; and Eye surgery.  FAMILY HISTORY: family history includes CANCER in his mother; DIABETES in his mother.  SOCIAL HISTORY:  reports that he has quit smoking. He has  never used smokeless tobacco. He reports that he does not drink alcohol or use illicit drugs.  IMMUNIZATIONS:  Most Recent Immunizations   Administered Date(s) Administered     Influenza (High Dose) 3 valent vaccine 10/04/2017     Influenza (IIV3) PF 11/04/2015     Pneumo Conj 13-V (2010&after) 11/04/2015     Pneumococcal 23 valent 02/08/2012     Tdap (Adacel,Boostrix) 11/04/2015     Above immunizations pulled from [facility]. [facility] and facility records also reconciled. Outstanding information sent to  to update [facility].  Future immunizations are not needed at this point as all recommended immunizations are up to date.   MEDICATIONS:  Current Outpatient Prescriptions   Medication Sig Dispense Refill     fentaNYL (DURAGESIC) 12 mcg/hr 72 hr patch Place 1 patch onto the skin every 72 hours 5 patch 0     oxyCODONE IR (ROXICODONE) 5 MG tablet Take 1 tablet (5 mg) by mouth every 4 hours as needed 18 tablet 0     ATORVASTATIN CALCIUM PO Take 10 mg by mouth daily       latanoprost (XALATAN) 0.005 % ophthalmic solution Place 1 drop into both eyes At Bedtime       omeprazole (PRILOSEC) 40 MG capsule Take 1 capsule (40 mg) by mouth 2 times daily       ipratropium - albuterol 0.5 mg/2.5 mg/3 mL (DUONEB) 0.5-2.5 (3) MG/3ML neb solution Take 1 vial by nebulization every 4 hours as needed for shortness of breath / dyspnea or wheezing       insulin aspart (NOVOLOG FLEXPEN) 100 UNIT/ML injection Inject Subcutaneous At Bedtime Inject as per sliding scale: if 0 - 199 = 0; 200 - 249 = 1; 250 - 299 = 2; 300 - 349 = 3; 350 - 399 = 4; 400+ = 5       insulin aspart (NOVOLOG FLEXPEN) 100 UNIT/ML injection Inject Subcutaneous 3 times daily (with meals) 140 - 189 = 1; 190 - 239 = 2; 240 - 289 = 3; 290 - 339 = 4; 340 - 399 = 5; 400 - 449 = 6; 450+ = 7       acetaminophen (TYLENOL) 500 MG tablet Take 2 tablets (1,000 mg) by mouth 3 times daily Not to exceed 3000 mg/24 hours       isosorbide mononitrate (IMDUR) 30 MG 24  hr tablet Take 2 tablets (60 mg) by mouth daily 30 tablet      nitroGLYcerin (NITROSTAT) 0.4 MG sublingual tablet Place 1 tablet (0.4 mg) under the tongue every 5 minutes as needed for chest pain if you are still having symptoms after 3 doses (15 minutes) call 911. 25 tablet      budesonide-formoterol (SYMBICORT) 160-4.5 MCG/ACT Inhaler Inhale 2 puffs into the lungs 2 times daily       tiotropium (SPIRIVA) 18 MCG capsule Inhale 1 capsule (18 mcg) into the lungs daily 30 capsule      DULoxetine (CYMBALTA) 20 MG EC capsule Take 1 capsule (20 mg) by mouth daily 60 capsule      ondansetron 4 MG FILM Take 4 mg by mouth every 8 hours as needed 12 each      metoprolol succinate (TOPROL XL) 50 MG 24 hr tablet Take 1 tablet (50 mg) by mouth daily 5 tablet 0     ammonium lactate (LAC-HYDRIN) 12 % lotion Apply topically 2 times daily as needed for dry skin To all extremities for dry skin       diclofenac (VOLTAREN) 1 % GEL topical gel Apply 4 grams to knees four times daily as needed using enclosed dosing card. 100 g 0     furosemide (LASIX) 20 MG tablet Take 1 tablet (20 mg) by mouth 2 times daily 30 tablet      ferrous sulfate (IRON) 325 (65 FE) MG tablet Take 1 tablet (325 mg) by mouth 2 times daily 100 tablet      folic acid (FOLVITE) 1 MG tablet Take 1 tablet (1 mg) by mouth daily 30 tablet      polyethylene glycol (MIRALAX/GLYCOLAX) Packet Take 17 g by mouth daily , hold for loose stools. 7 packet      senna-docusate (SENOKOT-S;PERICOLACE) 8.6-50 MG per tablet Take 2 tablets by mouth 2 times daily , hold for loose stools. 100 tablet      calcium carbonate (OS-JORDAN 600 MG [place]. CA) 1500 (600 CA) MG tablet Take 1 tablet (1,500 mg) by mouth 2 times daily (with meals) 60 tablet      tamsulosin (FLOMAX) 0.4 MG capsule Take 1 capsule (0.4 mg) by mouth daily 5 capsule 0     simethicone (MYLICON) 40 MG/0.6ML suspension Take 0.6 mLs (40 mg) by mouth every 6 hours as needed for cramping       bimatoprost (LUMIGAN) 0.01 % SOLN Place  1 drop into both eyes At Bedtime 1 Bottle      Carboxymethylcellulose Sod PF (REFRESH PLUS) 0.5 % SOLN ophthalmic solution Place 1 drop into both eyes 4 times daily And 1 drop both eyes daily prn 1 Bottle      sucralfate (CARAFATE) 1 GM/10ML suspension Take 10 mLs (1 g) by mouth 4 times daily (before meals and nightly) 1200 mL      METHOCARBAMOL PO Take 500 mg by mouth every 6 hours as needed for muscle spasms       Timolol Hemihydrate (BETIMOL OP) Place 1 drop into both eyes daily        [DISCONTINUED] enzalutamide (XTANDI) 40 MG capsule Take 4 capsules (160 mg) by mouth daily 120 capsule 0     Medications reviewed:  Medications reconciled to facility chart and changes were made to reflect current medications as identified as above med list. Below are the changes that were made:   Medications stopped since last EPIC medication reconciliation:   There are no discontinued medications.    Medications started since last [facility] medication reconciliation:  No orders of the defined types were placed in this encounter.    Case Management:  I have reviewed the facility/SNF care plan/MDS which was done 2/5/18, including the falls risk, nutrition and pain screening. I also reviewed the current immunizations, and preventive care..No future preventative screening is necessary. Resident is to high risk for EGD and colonoscopy Patient's desire to return to the community is present, but is not able due to care needs . Current Level of Care is appropriate.    Advance Directive Discussion:    I reviewed the current advanced directives as reflected in EPIC, the POLST and the facility chart, and verified the congruency of orders ***. I contacted the first party *** and {ADVANCED DIRECTIVE DISCUSSION:002651} plan of Care.  I {DID/DID NOT:946012} review the advance directives with the resident.     Team Discussion:  I communicated with the appropriate disciplines involved with the Plan of Care:   {NURSING HOME  DISCIPLINES:545201}    Patient Goal:  Patient's goal is {fgsgoal:187664}.    Information reviewed:  Medications, vital signs, orders, and nursing notes.    ROS:  {sakbwh87:689878}    Exam:  /76  Pulse 80  Temp 97.6  F (36.4  C)  Resp 16  Wt 194 lb 12.8 oz (88.4 kg)  SpO2 95%  BMI 27.17 kg/m2  ***  {Nursing home physical exam :037858} {2 in each of 9 for comp exam}    Lab/Diagnostic data:    Last Basic Metabolic Panel:  Lab Results   Component Value Date     01/30/2018      Lab Results   Component Value Date    POTASSIUM 3.7 01/30/2018     Lab Results   Component Value Date    CHLORIDE 108 01/30/2018     Lab Results   Component Value Date    JORDAN 7.6 01/30/2018     Lab Results   Component Value Date    CO2 26 01/30/2018     Lab Results   Component Value Date    BUN 23 01/30/2018     Lab Results   Component Value Date    CR 1.30 01/30/2018     Lab Results   Component Value Date     01/30/2018     Lab Results   Component Value Date    WBC 10.0 01/30/2018     Lab Results   Component Value Date    RBC 2.68 01/30/2018     Lab Results   Component Value Date    HGB 7.7 02/05/2018     Lab Results   Component Value Date    HCT 25.1 01/30/2018     Lab Results   Component Value Date    MCV 94 01/30/2018     Lab Results   Component Value Date    MCH 27.6 01/30/2018     Lab Results   Component Value Date    MCHC 29.5 01/30/2018     Lab Results   Component Value Date    RDW 18.7 01/30/2018     Lab Results   Component Value Date     01/30/2018     ASSESSMENT/PLAN  {FGS DX:864398}  {HTN}    Possible Gastrointestinal Bleed with Transfusion Dependent Anemia. EGD unlikely to assist with overall clinical course and risk > benefit so this was not performed. Recommend PPI BID indefinitely. Also taking Sucralfate. Aspirin and Warfarin discontinued. Received blood transfusions 11/1/17, 11/25/17. 12/1/17, 12/13/17 and 3 Units during hospitalization 1/8/18 through 1/13/18. Oncology recommends blood transfusions  for Hemoglobin < 8. Noted by Oncology to be folate and iron deficiency. Chronic disease and bone marrow suppression from metastatic prostate cancer likely contributing. Continue Folic Acid and Ferrous Sulfate as ordered. Communicated with Oncology Hemoglobin 7.4 on 1/30/18. Oncology arranged blood transfusion 2/2/18. Facility to arrange transportation.      Suspected Pneumonia with Dysphagia. Thought to be possible aspiration pneumonia due to difficulty chewing and swallowing foods during hospitalization. Discharged on Amoxicillin-Clavulanate for 4 days outpatient to complete antibiotic course. Speech Therapy ordered. Diet downgraded to pureed texture with thin liquids.      Chronic Obstructive Pulmonary Disease on Chronic Oxygen Supplementation/Obstructive Sleep Apnea with CO2 Retention. Seen by Dr. Loera in Pulmonology on 10/4/17. Noted to have very severe COPD and chronic hypoxemic respiratory failure. Often refuses BiPAP at night per staff report. Sleep study outpatient recommended. Continue Budesonide-Formoterol, DuoNebs and Tiotropium as ordered.      Prostate Cancer Metastatic to Bone. Seen by Palliative Care during hospitalization 1/8/18 through 1/13/18 and outpatient 11/16/17. Follow-up appointment with Dr. Oviedo scheduled for 2/12/18. Comfort care recommended by Dr. Oviedo on 12/4/17 due to compromised performance status, advanced cancer for which he used all reasonable treatments and not being a candidate for cytotoxic chemotherapy. Has had 50 lbs weight loss in 1 year.  Resident and family refused [facility] enrollment. Remains FULL CODE. Given resident's lack of capacity to make decisions,  at facility is working to pursue guardianship, but reportedly is having difficulty finding the numbers and addresses of all resident's family members. Fentanyl Patch, Oxycodone, Methocarbamol, Diclofenac gel and Acetaminophen ordered for pain control.       History of Urinary Retention in the  Setting of Metastatic Lymph Node Dissection with Recurrent UTIs. Previously scheduled to see Urology, but patient was hospitalized at that time. Treated for ESBL E. Coli 1/8/18. Providencia Rettgeri UTI 7/1/17 with Rocephin and Bactrim DS. Treated for Pseudomonas and Klebsiella UTI 7/29/17 with Ciprofloxacin and ESBL E. Coli 8/30/17 with Ertapenem IV. Previously evaluated by Dr. Graf on 3/22/17 for urinary retention. Elevated Creatinine may be due to bladder outlet obstruction. Resistant to surgical options. Instructed how to self-catheterize, but resident resistant. Told Urology his main goal is avoiding pain. Voids as able. Continue Tamsulosin as ordered.       Weight Loss. Secondary to above and overall decline. Weight 204.1 lbs on 12/27/17 -> 208.2 lbs on 11/27/17 -> 220 lbs on 8/28/17 -> 239 lbs on 5/25/17 -> 256.2 lbs on 11/25/16. Dietary involved. Refuses Hospice enrollment.       Paroxysmal Atrial Fibrillation.Warfarin and Aspirin discontinued due to GI bleed. Continue Metoprolol as ordered.       Coronary Artery Disease/Hypertension/Chronic Diastolic Heart Failure with EF 60-65% on 11/24/17. Monitor blood pressure and weights weekly. Continue Isosorbide Mononitrate, Metoprolol, Atorvastatin and Nitroglycerin as ordered.      Lymphedema of Both Lower Extremities with Venous Stasis Ulcers. Physical Therapy ordered for lymphedema wraps. Continue Furosemide as ordered.      Chronic Kidney Disease Stage III. Baseline Creatinine mid 1s. Last Creatinine 1.3 on 1/30/18.  Monitor periodically.      Type 2 Diabetes Mellitus. Last A1C 6.0 on 11/18/17. Glargine discontinued during hospitalization earlier this month due to hypoglycemia. SSI ordered. Continue to monitor Accuchecks prior to meals and at bedtime. Goal is to discontinue SSI once baseline blood sugars have been established.      Gastroesophageal Reflux. Continue Omeprazole as noted above.      Anxiety and Depression. Seen by in-house psychiatrist and  started on Duloxetine 12/28/17.      Cognitive Impairment. Mild-to-moderate impairment based on neurocognitive testing ~ 1 year ago. BIMS Score 11/15 on 11/16/17. Given resident lacks the capacity to make decisions,  working to appoint a guardian for resident given lack of capacity to make decisions as goals are inconsistent with overall health status. Of note has 11 children and none of the children attend outside appointments with resident so a guardian will help ensure there is 1 appropriate decision maker. Resident also has metastatic prostate cancer and severe COPD making a FULL CODE situation quite dismal.       Glaucoma. Followed by [facility]. Continue Bimatoprost and Timolol Hemihydrate as ordered. Also on Carboxymethylcellulose drops.      Constipation. Continue Senna-S and Miralax as ordered.      Recurrent ED Visits and Hospitalizations. Resident calls 911 from facility without trying medications or staff interventions. Enjoys going to the hospital for IV pain medications. Have discussed on multiple occasions with resident and family appropriateness of ED and attempting staff interventions first. Most recent hospitalizations include:      [facility] - 1/24/18 - 1/30/18 - Possible GI Bleed/Possible Pneumonia.   [facility] - 1/8/18 - 1/13/18 - GI Bleed  [facility] - 12/29/17 - 12/31/17 - COPD Exacerbation  [facility] ED - 12/18/17 - Pain  [facility] - 12/13/17 - 12/14/17 - COPD Exacerbation  [facility] - 11/23/17 - 11/26/17 - COPD Exacerbation      Prior to 11/23/17, had 9 ED visits and 3 hospitalizations in 6 months at [facility] [place] and [facility] primarily for shortness of breath, chest pain and abdominal pain    Electronically signed by:  SAL Acevedo CNP

## 2018-02-13 NOTE — LETTER
2/13/2018        RE: Tomas Grey  [facility]  [address]  [facility] [address]        [facility]  Chief Complaint   Patient presents with     Annual Comprehensive Nursing Home     HPI:    Tomas Grey is a 72 year old  (1945), who is being seen today for an annual comprehensive visit at [facility]. HPI information obtained from: facility chart records, facility staff, patient report and [facility] chart review. Today's concerns are:    Possible Gastrointestinal Bleed with Transfusion Dependent Anemia. Today, resident denies fatigue or dark stools. Hospitalized 1/24/18 through 1/30/18 and 1/8/18 through 1/13/18 for possible GI bleed. CBC monitored at least weekly and faxed to Oncology. Last blood transfusion 2/2/18. Previous blood transfusions of 3 Units during 1/8/18 through 1/13/18 hospitalization, 12/13/17, 12/1/17, 11/25/17 and 11/1/17.     Chronic Obstructive Pulmonary Disease on Chronic Oxygen Supplementation/Obstructive Sleep Apnea with CO2 Retention. Denies shortness of breath. Often refuses BiPAP at night per staff report. Upon review of documentation, has not utilized DuoNebs PRN in the month of February.       Prostate Cancer Metastatic to Bone. Denies pain. Has not utilized Methocarbamol PRN in the month of February. Has utilized Oxycodone 4 times in the month of February, 3 times in 1 day. Followed by Dr. Oviedo. No longer receiving any treatment.       History of Urinary Retention in the Setting of Metastatic Lymph Node Dissection with Recurrent UTIs. Denies pain with urination.       Weight Loss. Weight 194.8 lbs on 2/12/18 -> 221.3 lbs on 8/13/17 -> 249.4 lbs on 2/12/17. Enjoys when his family brings in food for him.       Paroxysmal Atrial Fibrillation. Denies heart palpitations.     Coronary Artery Disease/Hypertension/Chronic Diastolic Heart Failure with EF 60-65% on 11/24/17. Upon review of blood pressures over  the past month, systolic range from . Diastolic range from 48-87.      Lymphedema of Both Lower Extremities with Venous Stasis Ulcers. Physical Therapy applying lymphedema wraps.       Chronic Kidney Disease Stage III. Baseline Creatinine now low 1s. Last Creatinine 1.01 on 2/7/18.       Type 2 Diabetes Mellitus. Upon review of blood sugars over the past 5 days, range is as follows:    Breakfast: , most < 150  Lunch: 100-339, most < 150  Dinner: 100-259, most <150  Bedtime: , most <150      Gastroesophageal Reflux. No reports of heartburn during today's visit.      Anxiety and Depression. PHQ-9 Score 8 on 2/8/18. Followed by in-house psychiatrist.       Cognitive Impairment. Able to make needs known.       Glaucoma. Denies recent changes in vision.       Constipation. Upon review of documentation over the past week, has had 7 bowel movements.    Abdominal Aortic Aneurysm. Incidental finding of 3.4 cm infrarenal abdominal aortic aneurysm. Appears to have been the same size since 2004. Last abdominal and pelvic CT 1/28/18.    Recurrent ED Visits and Hospitalizations. Resident calls 911 from facility without trying medications or staff interventions. Enjoys going to the hospital for IV pain medications. Have discussed on multiple occasions with resident and family appropriateness of ED and attempting staff interventions first.     ALLERGIES: Morphine  PROBLEM LIST:  Patient Active Problem List   Diagnosis     Insomnia     COPD (chronic obstructive pulmonary disease) (H)     Anemia     Osteoporosis     ASCVD (arteriosclerotic cardiovascular disease)     Prostate cancer metastatic to multiple sites (H)     Venous stasis dermatitis     Fecal incontinence     CKD (chronic kidney disease) stage 3, GFR 30-59 ml/min     Serum albumin decreased     Right heart failure     Type 2 diabetes, HbA1C goal < 8% (H)     Impaired mobility     Knee pain     Irregular heart rhythm     Sleep apnea     Metastasis to bone  (H)     SOB (shortness of breath)     FTT (failure to thrive) in adult     Dyspnea     Atypical chest pain     Prostate cancer metastatic to bone (H)     Chronic pain     ACP (advance care planning)     Respiratory failure (H)     CAD (coronary artery disease)     Physical deconditioning     Hyperlipidemia     Renal insufficiency     Epigastric pain     Lymphedema of both lower extremities     Primary pulmonary hypertension (H)     Redness of eye, left     Chest wall pain     Chronic obstructive pulmonary disease, unspecified COPD type (H)     FH: chronic lung disease requiring oxygen     COPD exacerbation (H)     History of heart attack     Nocturnal dyspnea     Dyspnea on exertion     Chronic obstructive pulmonary disease with severity to be determined (H)     Leg pain     Adjustment disorder with mixed anxiety and depressed mood     Noncompliance with medication regimen     Type 2 diabetes mellitus with stage 3 chronic kidney disease (H)     Chronic respiratory failure with hypoxia and hypercapnia (H)     Misuse of drugs (H)     Pneumonia of left lower lobe due to infectious organism (H)     Pneumonia     Hypercapnia     Health Care Home     HTN (hypertension)     CHF (congestive heart failure) (H)     Chronic respiratory failure with hypoxia (H)     Morbid obesity (H)     Altered mental status     Bacterial sepsis (H)     Anemia in neoplastic disease     GI bleed     PAST MEDICAL HISTORY:  has a past medical history of Anemia; Anxiety; Aspiration pneumonia (H) (2014); C. difficile colitis; CAD (coronary artery disease); Chronic pain; CKD (chronic kidney disease); COPD (chronic obstructive pulmonary disease) (H); Depressive disorder; Diabetes mellitus (H); Hypertension; Insomnia; ALEXIS (obstructive sleep apnea); Osteoporosis; and Prostate cancer metastatic to multiple sites (H).  PAST SURGICAL HISTORY:  has a past surgical history that includes Abdomen surgery; Arthroscopy knee; and Eye surgery.  FAMILY HISTORY:  family history includes CANCER in his mother; DIABETES in his mother.  SOCIAL HISTORY:  reports that he has quit smoking. He has never used smokeless tobacco. He reports that he does not drink alcohol or use illicit drugs.  IMMUNIZATIONS:  Most Recent Immunizations   Administered Date(s) Administered     Influenza (High Dose) 3 valent vaccine 10/04/2017     Influenza (IIV3) PF 11/04/2015     Pneumo Conj 13-V (2010&after) 11/04/2015     Pneumococcal 23 valent 02/08/2012     Tdap (Adacel,Boostrix) 11/04/2015     Above immunizations pulled from [facility]. [facility] and facility records also reconciled. Outstanding information sent to  to update [facility].  Future immunizations are not needed at this point as all recommended immunizations are up to date.   MEDICATIONS:  Current Outpatient Prescriptions   Medication Sig Dispense Refill     fentaNYL (DURAGESIC) 12 mcg/hr 72 hr patch Place 1 patch onto the skin every 72 hours 5 patch 0     oxyCODONE IR (ROXICODONE) 5 MG tablet Take 1 tablet (5 mg) by mouth every 4 hours as needed 18 tablet 0     ATORVASTATIN CALCIUM PO Take 10 mg by mouth daily       latanoprost (XALATAN) 0.005 % ophthalmic solution Place 1 drop into both eyes At Bedtime       omeprazole (PRILOSEC) 40 MG capsule Take 1 capsule (40 mg) by mouth 2 times daily       ipratropium - albuterol 0.5 mg/2.5 mg/3 mL (DUONEB) 0.5-2.5 (3) MG/3ML neb solution Take 1 vial by nebulization every 4 hours as needed for shortness of breath / dyspnea or wheezing       insulin aspart (NOVOLOG FLEXPEN) 100 UNIT/ML injection Inject Subcutaneous At Bedtime Inject as per sliding scale: if 0 - 199 = 0; 200 - 249 = 1; 250 - 299 = 2; 300 - 349 = 3; 350 - 399 = 4; 400+ = 5       insulin aspart (NOVOLOG FLEXPEN) 100 UNIT/ML injection Inject Subcutaneous 3 times daily (with meals) 140 - 189 = 1; 190 - 239 = 2; 240 - 289 = 3; 290 - 339 = 4; 340 - 399 = 5; 400 - 449 = 6; 450+ = 7       acetaminophen (TYLENOL) 500 MG  tablet Take 2 tablets (1,000 mg) by mouth 3 times daily Not to exceed 3000 mg/24 hours       isosorbide mononitrate (IMDUR) 30 MG 24 hr tablet Take 2 tablets (60 mg) by mouth daily 30 tablet      nitroGLYcerin (NITROSTAT) 0.4 MG sublingual tablet Place 1 tablet (0.4 mg) under the tongue every 5 minutes as needed for chest pain if you are still having symptoms after 3 doses (15 minutes) call 911. 25 tablet      budesonide-formoterol (SYMBICORT) 160-4.5 MCG/ACT Inhaler Inhale 2 puffs into the lungs 2 times daily       tiotropium (SPIRIVA) 18 MCG capsule Inhale 1 capsule (18 mcg) into the lungs daily 30 capsule      DULoxetine (CYMBALTA) 20 MG EC capsule Take 1 capsule (20 mg) by mouth daily 60 capsule      ondansetron 4 MG FILM Take 4 mg by mouth every 8 hours as needed 12 each      metoprolol succinate (TOPROL XL) 50 MG 24 hr tablet Take 1 tablet (50 mg) by mouth daily 5 tablet 0     ammonium lactate (LAC-HYDRIN) 12 % lotion Apply topically 2 times daily as needed for dry skin To all extremities for dry skin       diclofenac (VOLTAREN) 1 % GEL topical gel Apply 4 grams to knees four times daily as needed using enclosed dosing card. 100 g 0     furosemide (LASIX) 20 MG tablet Take 1 tablet (20 mg) by mouth 2 times daily 30 tablet      ferrous sulfate (IRON) 325 (65 FE) MG tablet Take 1 tablet (325 mg) by mouth 2 times daily 100 tablet      folic acid (FOLVITE) 1 MG tablet Take 1 tablet (1 mg) by mouth daily 30 tablet      polyethylene glycol (MIRALAX/GLYCOLAX) Packet Take 17 g by mouth daily , hold for loose stools. 7 packet      senna-docusate (SENOKOT-S;PERICOLACE) 8.6-50 MG per tablet Take 2 tablets by mouth 2 times daily , hold for loose stools. 100 tablet      calcium carbonate (OS-JORDAN 600 MG [place]. CA) 1500 (600 CA) MG tablet Take 1 tablet (1,500 mg) by mouth 2 times daily (with meals) 60 tablet      tamsulosin (FLOMAX) 0.4 MG capsule Take 1 capsule (0.4 mg) by mouth daily 5 capsule 0     simethicone (MYLICON) 40  MG/0.6ML suspension Take 0.6 mLs (40 mg) by mouth every 6 hours as needed for cramping       bimatoprost (LUMIGAN) 0.01 % SOLN Place 1 drop into both eyes At Bedtime 1 Bottle      Carboxymethylcellulose Sod PF (REFRESH PLUS) 0.5 % SOLN ophthalmic solution Place 1 drop into both eyes 4 times daily And 1 drop both eyes daily prn 1 Bottle      sucralfate (CARAFATE) 1 GM/10ML suspension Take 10 mLs (1 g) by mouth 4 times daily (before meals and nightly) 1200 mL      METHOCARBAMOL PO Take 500 mg by mouth every 6 hours as needed for muscle spasms       Timolol Hemihydrate (BETIMOL OP) Place 1 drop into both eyes daily        [DISCONTINUED] enzalutamide (XTANDI) 40 MG capsule Take 4 capsules (160 mg) by mouth daily 120 capsule 0     Medications reviewed:  Medications reconciled to facility chart and changes were made to reflect current medications as identified as above med list. Below are the changes that were made:   Medications stopped since last EPIC medication reconciliation:   There are no discontinued medications.    Medications started since last [facility] medication reconciliation:  No orders of the defined types were placed in this encounter.    Case Management:  I have reviewed the facility/SNF care plan/MDS which was done 2/5/18, including the falls risk, nutrition and pain screening. I also reviewed the current immunizations, and preventive care..No future preventative screening is necessary. Resident is to high risk for EGD and colonoscopy Patient's desire to return to the community is present, but is not able due to care needs . Current Level of Care is appropriate.    Advance Directive Discussion:    I reviewed the current advanced directives as reflected in EPIC, the POLST and the facility chart, and verified the congruency of orders I contacted the first party, Les, and discussed the plan of Care.  I did not due to cognitive impairment review the advance directives with the resident.     Team Discussion:  I  communicated with the appropriate disciplines involved with the Plan of Care:   Nursing      Patient Goal:  Patient's goal is pain control and comfort with aggressive treatment.    Information reviewed:  Medications, vital signs, orders, and nursing notes.    ROS:  10 point ROS of systems including Constitutional, Eyes, Respiratory, Cardiovascular, Gastroenterology, Genitourinary, Integumentary, Muscularskeletal, Psychiatric were all negative except for pertinent positives noted in my HPI.    Exam:  /76  Pulse 80  Temp 97.6  F (36.4  C)  Resp 16  Wt 194 lb 12.8 oz (88.4 kg)  SpO2 95%  BMI 27.17 kg/m2   GENERAL APPEARANCE: In no distress.   ENT:  Mouth and posterior oropharynx normal, moist mucous membranes  EYES:  EOM, conjunctivae, lids, pupils and irises normal  RESP:  Respiratory effort and palpation of chest normal, no respiratory distress, diminished lung sounds in bilateral lower lobes  CV:  Palpation and auscultation of heart done, regular rate and rhythm, no murmur, rub, or gallop  ABDOMEN: Active bowel sounds, soft, nontender, no hepatosplenomegaly or other masses  M/S:   Active movement of bilateral upper and lower extremities. Lymphedema wraps on BLE.  SKIN:  Inspection of skin and subcutaneous tissue baseline, Palpation of skin and subcutaneous tissue baseline  NEURO:   Cranial nerves 2-12 are normal tested and grossly at patient's baseline  PSYCH:  Oriented to person and place    Lab/Diagnostic data:    Last Basic Metabolic Panel:  Lab Results   Component Value Date     01/30/2018      Lab Results   Component Value Date    POTASSIUM 3.7 01/30/2018     Lab Results   Component Value Date    CHLORIDE 108 01/30/2018     Lab Results   Component Value Date    JORDAN 7.6 01/30/2018     Lab Results   Component Value Date    CO2 26 01/30/2018     Lab Results   Component Value Date    BUN 23 01/30/2018     Lab Results   Component Value Date    CR 1.30 01/30/2018     Lab Results   Component Value  Date     01/30/2018     Lab Results   Component Value Date    WBC 10.0 01/30/2018     Lab Results   Component Value Date    RBC 2.68 01/30/2018     Lab Results   Component Value Date    HGB 7.7 02/05/2018     Lab Results   Component Value Date    HCT 25.1 01/30/2018     Lab Results   Component Value Date    MCV 94 01/30/2018     Lab Results   Component Value Date    MCH 27.6 01/30/2018     Lab Results   Component Value Date    MCHC 29.5 01/30/2018     Lab Results   Component Value Date    RDW 18.7 01/30/2018     Lab Results   Component Value Date     01/30/2018     ASSESSMENT/PLAN  Possible Gastrointestinal Bleed with Transfusion Dependent Anemia. Hospitalized 1/8/18 through 1/13/18 and 1/24/18 through 1/30/18 and 1/8/18 through 1/13/18 for possible GI bleed. EGD unlikely to assist with overall clinical course and risk > benefit so this was not performed. Recommend PPI BID indefinitely. Also taking Sucralfate QID which can be discontinued around 3/10/18. Aspirin and Warfarin discontinued. Received blood transfusions 11/1/17, 11/25/17. 12/1/17, 12/13/17, 3 Units during hospitalization 1/8/18 through 1/13/18 and 2/2/18. Oncology recommends blood transfusions for Hemoglobin < 8. Noted by Oncology to be folate and iron deficient. Chronic disease and bone marrow suppression from metastatic prostate cancer likely contributing. Continue Folic Acid and Ferrous Sulfate as ordered. Weekly labs to monitor Hemoglobin as per Oncology recommendations.     Chronic Obstructive Pulmonary Disease on Chronic Oxygen Supplementation/Obstructive Sleep Apnea with CO2 Retention. Seen by Dr. Loera in Pulmonology on 10/4/17. Noted to have very severe COPD and chronic hypoxemic respiratory failure. Often refuses BiPAP at night per staff report. Sleep study outpatient recommended. Continue Budesonide-Formoterol, DuoNebs and Tiotropium as ordered.      Prostate Cancer Metastatic to Bone. Seen by Palliative Care during  hospitalization 1/8/18 through 1/13/18 and outpatient 11/16/17. Follow-up appointment with Dr. Oviedo re-scheduled for 2/14/18. Comfort care recommended by Dr. Oviedo on 12/4/17 due to compromised performance status, advanced cancer for which he has used all reasonable treatments and not being a candidate for cytotoxic chemotherapy. Has had over 50 lbs weight loss in 1 year. Now requires tara lift for transfers. Resident and family refused [facility] enrollment. Remains FULL CODE. Given resident's lack of capacity to make decisions,  at facility is working to pursue guardianship, but reportedly is having difficulty finding the numbers and addresses of all resident's family members. Fentanyl Patch, Oxycodone, Methocarbamol, Diclofenac gel and Acetaminophen ordered for pain control.       History of Urinary Retention in the Setting of Metastatic Lymph Node Dissection with Recurrent UTIs. Order written to reschedule previously scheduled Urology appointment. Treated for ESBL E. Coli 1/8/18, Providencia Rettgeri UTI 7/1/17 with Rocephin and Bactrim DS. Treated for Pseudomonas and Klebsiella UTI 7/29/17 with Ciprofloxacin and ESBL E. Coli 8/30/17 with Ertapenem IV. Previously evaluated by Dr. Graf on 3/22/17 for urinary retention. Elevated Creatinine may be due to bladder outlet obstruction. Resistant to surgical options. Instructed how to self-catheterize, but resident resistant. Told Urology his main goal is avoiding pain. Voids as able. Continue Tamsulosin as ordered.       Weight Loss. Secondary to above and overall decline. Weight 194.8 lbs on 2/12/18 -> 221.3 lbs on 8/13/17 -> 249.4 lbs on 2/12/17. Dietary involved. Refuses Hospice enrollment.       Paroxysmal Atrial Fibrillation.Warfarin and Aspirin discontinued due to GI bleed. Continue Metoprolol as ordered.       Coronary Artery Disease/Hypertension/Chronic Diastolic Heart Failure with EF 60-65% on 11/24/17. Monitor blood pressure and  weights weekly. Blood pressures slightly lower than goal, but tachycardia noted on occasion so will not adjust Metoprolol dose. Continue Isosorbide Mononitrate, Metoprolol, Atorvastatin and Nitroglycerin as ordered. Based on JNC-8 goals,  patients age of 72 year old, presence of diabetes or CKD, and goals of care goal BP is  <140/90 mm Hg. Patient is stable with current plan of care and routine assessment..      Lymphedema of Both Lower Extremities with Venous Stasis Ulcers. Physical Therapy ordered for lymphedema wraps. Continue Furosemide as ordered.      Chronic Kidney Disease Stage III. Baseline Creatinine now low 1s. Last Creatinine 1.01 on 2/7/18. Continue to monitor periodically.       Type 2 Diabetes Mellitus. Last A1C 6.0 on 11/18/17. Glargine discontinued during hospitalization earlier this year due to hypoglycemia. SSI ordered, but due to most blood sugars < 150, will discontinue SSI and Accuchecks. Repeat A1C on next lab day.       Gastroesophageal Reflux. Continue Omeprazole as noted above.      Anxiety and Depression. Seen by in-house psychiatrist and started on Duloxetine 12/28/17.      Cognitive Impairment. Mild-to-moderate impairment based on neurocognitive testing ~ 1 year ago. BIMS Score 11/15 on 11/16/17.  working to appoint a guardian for resident given lack of capacity to make decisions as goals are inconsistent with overall health status. Of note has 11 children and none of the children attend outside appointments with resident so a guardian will help ensure there is 1 appropriate decision maker. Resident also has metastatic prostate cancer and severe COPD making a FULL CODE situation quite dismal.       Glaucoma. Followed by [facility]. Continue Bimatoprost and Timolol Hemihydrate as ordered. Also on Carboxymethylcellulose drops.      Constipation. Continue Senna-S and Miralax as ordered.    Abdominal Aortic Aneurysm. Incidental finding of 3.4 cm infrarenal  abdominal aortic aneurysm. Appears to have been the same size since 2004. Last abdominal and pelvic CT 1/28/18.      Recurrent ED Visits and Hospitalizations. Resident calls 911 from facility without trying medications or staff interventions. Enjoys going to the hospital for IV pain medications. Have discussed on multiple occasions with resident and family appropriateness of ED and attempting staff interventions first. Most recent hospitalizations include:      [facility] - 1/24/18 - 1/30/18 - Possible GI Bleed/Possible Pneumonia.   [facility] - 1/8/18 - 1/13/18 - GI Bleed  [facility] - 12/29/17 - 12/31/17 - COPD Exacerbation  [facility] ED - 12/18/17 - Pain  [facility] - 12/13/17 - 12/14/17 - COPD Exacerbation  [facility] - 11/23/17 - 11/26/17 - COPD Exacerbation      Prior to 11/23/17, had 9 ED visits and 3 hospitalizations in 6 months at [facility] and [facility] primarily for shortness of breath, chest pain and abdominal pain    Electronically signed by:  SAL Acevedo CNP          Sincerely,        SAL Acevedo CNP

## 2018-02-14 NOTE — PROGRESS NOTES
[facility] PHYS[facility]IANS  HEMATOLOGY ONCOLOGY    ONCOLOGY FOLLOWUP NOTE      DIAGNOSIS:    1- Metastatic prostate cancer with extensive metastases to bone.   2- Advanced COPD and multiple other co-morbidities.      TREATMENT:     Lupron injection monthly, discontinued 6/2016.  Casodex 50 mg daily. discontinued 2/9/2015  - Xgeva monthly  - Xtandi 4/15/15  - Zytgea and prednisone 9/19/2016-present  - Xofigo 1/4/17  - 10/2017 discontinued zytega and started Megace. Later treatment was stopped and hospice was recommended.      Subjective:  Mr. Tomas Grey comes in for followup today. He has been in and out of hospital for multiple complications. He has become transfusion dependent. He also have developed significant cognitive impairment.     REVIEW OF SYSTEMS:  A complete review of systems was performed and found to be negative other than pertinent positives mentioned in history of present illness.     Past medical, social histories reviewed.    Meds- Reviewed.     PHYSICAL EXAMINATION:   VITAL SIGNS:/62 (BP Location: Left arm, Patient Position: Chair, Cuff Size: Adult Regular)  Pulse 101  Temp 99  F (37.2  C) (Oral)  Resp 16  SpO2 96%  CONSTITUTIONAL: Appears fatigued.    HEENT: Pupils are equal. Oropharynx is clear.   NECK: No cervical or supraclavicular lymphadenopathy.   RESPIRATORY: Clear bilaterally.   CARD/VASC: S1, S2, regular.   GI: Soft, nontender, nondistended, no hepatosplenomegaly.   MUSKULOSKELETAL: Warm, well perfused.   NEUROLOGIC: Alert, awake. Demented.   INTEGUMENT: No rash.   LYMPHATICS: Bilateral edema.he has dressing on right leg.   PSYCH: Anxious.     LABORATORY DATA AND IMAGING REVIEWED DURING THIS VISIT:  Recent Labs   Lab Test 02/07/18 01/30/18   0833  01/29/18   0738   01/09/18   0421   12/26/16   1851   05/28/15   0715   NA   --   143  143   < >  144   < >  146*   < >  139   POTASSIUM  4.1  3.7  3.8   < >  4.5   < >  3.8   < >  5.5*   CHLORIDE   --   108  107   < >   110*   < >  99   < >  106   CO2   --   26  26   < >  22   < >  41*   < >  27   ANIONGAP   --   9  10   < >  12   < >  5   < >  6   BUN   --   23  26   < >  22   < >  48*   < >  26   CR  1.01  1.30*  1.42*   < >  1.31*   < >  1.80*   < >  1.66*   GLC  72*  115*  111*   < >  72   < >  61*   < >  121*   JORDAN   --   7.6*  7.6*   < >  8.0*   < >  9.2   < >  8.5   MAG   --    --    --    --   1.6   --   1.7   < >   --    PHOS   --    --    --    --   2.4*   --    --    --   3.3    < > = values in this interval not displayed.     Recent Labs   Lab Test  02/14/18   1126 02/05/18 01/30/18   0833  01/29/18   0738   01/24/18   2104   01/08/18   1028   WBC  10.0   --   10.0  10.0   < >  9.0   < >  12.1*   HGB  7.4*  7.7*  7.4*  7.6*   < >  9.6*   < >  3.2*   PLT  226   --   261  243   < >  267   < >  346   MCV  92   --   94  91   < >  94   < >  89   NEUTROPHIL  78.3   --    --    --    --   65.4   --   65.3    < > = values in this interval not displayed.     Recent Labs   Lab Test  01/25/18   0610  01/24/18   2104  01/08/18   1028  12/04/17   1040   BILITOTAL  0.4  0.4  0.3  0.6   ALKPHOS  227*  222*  150  157*   ALT  10  10  11  17   AST  39  24  13  29   ALBUMIN  2.4*  2.5*  2.3*  2.2*   LDH   --    --    --   282*       ECOG  performance status 3     ASSESSMENT:   1.  Metastatic prostate cancer with multiple sclerotic bone metastases.  The patient continues on monthly Lupron with Casodex.  He has been on this regimen since 05/2014.  He continues to do well symptomatically.  The patient switched care to us after having initial diagnosis and treatment in [place], [place]. He started Xtandi 4/15/15.   He is not able to keep up with his follow ups due to multiple other medical issues and repeated hospital admissions due to COPD exacerbations.   Started Zytega 9/19/16.  He saw Dr. Callahan as a second opinion and had Xofigo 1/4/17. We continued zytega after that. Afterwards, all cancer directed treatments were stopped.   - He is not  a candidate of chemotherapy, due to poor performance status. Repeated hospital admissions for COPD and infections and transfusion dependence.  - He has persistent issues with anemia and has become transfusion dependent. Anemia can be related to GI blood loss or bone marrow infiltration by the cancer.   - Extensive bone metastases. Bone scan 8/31/16 showed progression in bones.     His situation is very sad and complex. We have been trying to help him with end of life decision making and he has been resistant to the idea of hospice. He is in a poor shape and had his clothes found to be soiled and was incontinent in the clinic. I have discussed his situation with our  Tammy. We are trying to arrange a family conference soon.     It seems to me That Mr. Marin does not have decisional capacity anymore due to decline in his cognition.      PLAN:   1- Labs today- transfuse for hb less than 7  2- Need to arrange a family meeting.   TAYLOR PETERS MD  2/14/2018    CC: Balgobin, Christopher Lennox Paul, MD

## 2018-02-14 NOTE — MR AVS SNAPSHOT
"              After Visit Summary   2/14/2018    Tomas Grey    MRN: [id number]           Patient Information     Date Of Birth          1945        Visit Information        Provider Department      2/14/2018 3:53 PM Tammy Barriga LICSW [facility]        Today's Diagnoses     Counseling NOS(V65.40)    -  1       Follow-ups after your visit        Future tests that were ordered for you today     Open Standing Orders        Priority Remaining Interval Expires Ordered    Red blood cell prepare order unit Routine 99/100 CONDITIONAL (SPECIFY) BLOOD  2/14/2018    Transfuse red blood cell unit Routine 99/100 TRANSFUSE 1 UNIT  2/14/2018            Who to contact     If you have questions or need follow up information about today's clinic visit or your schedule please contact [facility] directly at [phone].  Normal or non-critical lab and imaging results will be communicated to you by Ekaya.comhart, letter or phone within 4 business days after the clinic has received the results. If you do not hear from us within 7 days, please contact the clinic through [email or web address] or phone. If you have a critical or abnormal lab result, we will notify you by phone as soon as possible.  Submit refill requests through [employer] or call your pharmacy and they will forward the refill request to us. Please allow 3 business days for your refill to be completed.          Additional Information About Your Visit        MyChart Information     [employer] lets you send messages to your doctor, view your test results, renew your prescriptions, schedule appointments and more. To sign up, go to [email or web address] . Click on \"Log in\" on the left side of the screen, which will take you to the Welcome page. Then click on \"Sign up Now\" on the right side of the page.     You will be asked to enter the access code listed below, as well as some personal information. Please follow " the directions to create your username and password.     Your access code is: [email or web address]  Expires: 3/14/2018 10:17 AM     Your access code will  in 90 days. If you need help or a new code, please call your [facility] clinic or [phone].        Care EveryWhere ID     This is your Care EveryWhere ID. This could be used by other organizations to access your [facility] medical records  [id number]         Blood Pressure from Last 3 Encounters:   18 101/62   18 102/76   18 140/74    Weight from Last 3 Encounters:   18 88.4 kg (194 lb 12.8 oz)   18 88.9 kg (196 lb)   18 87.9 kg (193 lb 11.2 oz)              Today, you had the following     No orders found for display       Primary Care Provider Office Phone # Fax #    Ekaternia SAL Babb [facility] [phone]       [address] [place] [address]        Equal Access to Services     [facility]: Hadii aad ku hadasho Soarthurali, waaxda luqadaha, qaybta kaalmada adewilliam, jorje prather . So [facility] [phone].    ATENCIÓN: Si habla español, tiene a soria disposición servicios gratuitos de asistencia lingüística. Matt al [phone].    We comply with applicable federal civil rights laws and [place] laws. We do not discriminate on the basis of race, color, national origin, age, disability, sex, sexual orientation, or gender identity.            Thank you!     Thank you for choosing [facility] AND [facility]  for your care. Our goal is always to provide you with excellent care. Hearing back from our patients is one way we can continue to improve our services. Please take a few minutes to complete the written survey that you may receive in the mail after your visit with us. Thank you!             Your Updated Medication List - Protect others around you: Learn how to safely use, store and throw away your medicines at www.disposemymeds.org.          This list is  accurate as of 2/14/18  4:28 PM.  Always use your most recent med list.                   Brand Name Dispense Instructions for use Diagnosis    acetaminophen 500 MG tablet    TYLENOL     Take 2 tablets (1,000 mg) by mouth 3 times daily Not to exceed 3000 mg/24 hours    Prostate cancer metastatic to bone (H)       ammonium lactate 12 % lotion    LAC-HYDRIN     Apply topically 2 times daily as needed for dry skin To all extremities for dry skin    Venous stasis dermatitis of both lower extremities       ATORVASTATIN CALCIUM PO      Take 10 mg by mouth daily        BETIMOL OP      Place 1 drop into both eyes daily        bimatoprost 0.01 % Soln    LUMIGAN    1 Bottle    Place 1 drop into both eyes At Bedtime    Eye abnormalities       budesonide-formoterol 160-4.5 MCG/ACT Inhaler    SYMBICORT     Inhale 2 puffs into the lungs 2 times daily    Chronic obstructive pulmonary disease, unspecified COPD type (H)       calcium carbonate 1500 (600 CA) MG tablet    OS-JORDAN 600 mg [place]. Ca    60 tablet    Take 1 tablet (1,500 mg) by mouth 2 times daily (with meals)    Prostate cancer metastatic to bone (H)       Carboxymethylcellulose Sod PF 0.5 % Soln ophthalmic solution    REFRESH PLUS    1 Bottle    Place 1 drop into both eyes 4 times daily And 1 drop both eyes daily prn    Dry eyes       diclofenac 1 % Gel topical gel    VOLTAREN    100 g    Apply 4 grams to knees four times daily as needed using enclosed dosing card.    Chronic pain of both knees       DULoxetine 20 MG EC capsule    CYMBALTA    60 capsule    Take 1 capsule (20 mg) by mouth daily    Depression, unspecified depression type       fentaNYL 12 mcg/hr 72 hr patch    DURAGESIC    5 patch    Place 1 patch onto the skin every 72 hours    Prostate cancer metastatic to bone (H)       ferrous sulfate 325 (65 FE) MG tablet    IRON    100 tablet    Take 1 tablet (325 mg) by mouth 2 times daily    Anemia, unspecified type       folic acid 1 MG tablet    FOLVITE    30  tablet    Take 1 tablet (1 mg) by mouth daily    Anemia, unspecified type       furosemide 20 MG tablet    LASIX    30 tablet    Take 1 tablet (20 mg) by mouth 2 times daily    Chronic diastolic congestive heart failure (H), Lymphedema of both lower extremities       ipratropium - albuterol 0.5 mg/2.5 mg/3 mL 0.5-2.5 (3) MG/3ML neb solution    DUONEB     Take 1 vial by nebulization every 4 hours as needed for shortness of breath / dyspnea or wheezing        isosorbide mononitrate 30 MG 24 hr tablet    IMDUR    30 tablet    Take 2 tablets (60 mg) by mouth daily    Essential hypertension       latanoprost 0.005 % ophthalmic solution    XALATAN     Place 1 drop into both eyes At Bedtime        METHOCARBAMOL PO      Take 500 mg by mouth every 6 hours as needed for muscle spasms        metoprolol succinate 50 MG 24 hr tablet    TOPROL XL    5 tablet    Take 1 tablet (50 mg) by mouth daily    ASCVD (arteriosclerotic cardiovascular disease)       nitroGLYcerin 0.4 MG sublingual tablet    NITROSTAT    25 tablet    Place 1 tablet (0.4 mg) under the tongue every 5 minutes as needed for chest pain if you are still having symptoms after 3 doses (15 minutes) call 911.    Coronary artery disease involving native heart with angina pectoris, unspecified vessel or lesion type (H)       * NovoLOG FLEXPEN 100 UNIT/ML injection   Generic drug:  insulin aspart      Inject Subcutaneous At Bedtime Inject as per sliding scale: if 0 - 199 = 0; 200 - 249 = 1; 250 - 299 = 2; 300 - 349 = 3; 350 - 399 = 4; 400+ = 5        * NovoLOG FLEXPEN 100 UNIT/ML injection   Generic drug:  insulin aspart      Inject Subcutaneous 3 times daily (with meals) 140 - 189 = 1; 190 - 239 = 2; 240 - 289 = 3; 290 - 339 = 4; 340 - 399 = 5; 400 - 449 = 6; 450+ = 7        omeprazole 40 MG capsule    priLOSEC     Take 1 capsule (40 mg) by mouth 2 times daily        ondansetron 4 MG Film     12 each    Take 4 mg by mouth every 8 hours as needed    Prostate cancer  metastatic to bone (H)       oxyCODONE IR 5 MG tablet    ROXICODONE    18 tablet    Take 1 tablet (5 mg) by mouth every 4 hours as needed    Prostate cancer metastatic to bone (H)       polyethylene glycol Packet    MIRALAX/GLYCOLAX    7 packet    Take 17 g by mouth daily , hold for loose stools.    Constipation, unspecified constipation type       senna-docusate 8.6-50 MG per tablet    SENOKOT-S;PERICOLACE    100 tablet    Take 2 tablets by mouth 2 times daily , hold for loose stools.    Constipation, unspecified constipation type       simethicone 40 MG/0.6ML suspension    MYLICON     Take 0.6 mLs (40 mg) by mouth every 6 hours as needed for cramping    Flatulence, eructation and gas pain       sucralfate 1 GM/10ML suspension    CARAFATE    1200 mL    Take 10 mLs (1 g) by mouth 4 times daily (before meals and nightly)    Gastrointestinal hemorrhage with melena       tamsulosin 0.4 MG capsule    FLOMAX    5 capsule    Take 1 capsule (0.4 mg) by mouth daily    Benign non-nodular prostatic hyperplasia without lower urinary tract symptoms       tiotropium 18 MCG capsule    SPIRIVA    30 capsule    Inhale 1 capsule (18 mcg) into the lungs daily    Chronic obstructive pulmonary disease, unspecified COPD type (H)       * Notice:  This list has 2 medication(s) that are the same as other medications prescribed for you. Read the directions carefully, and ask your doctor or other care provider to review them with you.

## 2018-02-14 NOTE — MR AVS SNAPSHOT
"              After Visit Summary   2/14/2018    Tomas Grey    MRN: [id number]           Patient Information     Date Of Birth          1945        Visit Information        Provider Department      2/14/2018 12:30 PM  INFUSION CHAIR 14 [facility]        Today's Diagnoses     Prostate cancer metastatic to multiple sites (H)    -  1       Follow-ups after your visit        Future tests that were ordered for you today     Open Standing Orders        Priority Remaining Interval Expires Ordered    Red blood cell prepare order unit Routine 99/100 CONDITIONAL (SPECIFY) BLOOD  2/14/2018    Transfuse red blood cell unit Routine 99/100 TRANSFUSE 1 UNIT  2/14/2018            Who to contact     If you have questions or need follow up information about today's clinic visit or your schedule please contact [facility] AND [facility] directly at [phone].  Normal or non-critical lab and imaging results will be communicated to you by IdeaSquareshart, letter or phone within 4 business days after the clinic has received the results. If you do not hear from us within 7 days, please contact the clinic through IdeaSquareshart or phone. If you have a critical or abnormal lab result, we will notify you by phone as soon as possible.  Submit refill requests through Mosaic or call your pharmacy and they will forward the refill request to us. Please allow 3 business days for your refill to be completed.          Additional Information About Your Visit        IdeaSquareshart Information     Mosaic lets you send messages to your doctor, view your test results, renew your prescriptions, schedule appointments and more. To sign up, go to [email or web address] . Click on \"Log in\" on the left side of the screen, which will take you to the Welcome page. Then click on \"Sign up Now\" on the right side of the page.     You will be asked to enter the access code listed below, as well as some personal " information. Please follow the directions to create your username and password.     Your access code is: [email or web address]  Expires: 3/14/2018 10:17 AM     Your access code will  in 90 days. If you need help or a new code, please call your [facility] or [phone].        Care EveryWhere ID     This is your Care EveryWhere ID. This could be used by other organizations to access your [place] medical records  [id number]         Blood Pressure from Last 3 Encounters:   18 101/62   18 102/76   18 140/74    Weight from Last 3 Encounters:   18 88.4 kg (194 lb 12.8 oz)   18 88.9 kg (196 lb)   18 87.9 kg (193 lb 11.2 oz)              We Performed the Following     ABO/Rh type and screen     Blood component        Primary Care Provider Office Phone # Fax #    Ekaterina Magi Lozano, SAL [facility] [phone]       [address] [address]        Equal Access to Services     [facility]: Hadii aad ku hadasho Soomaali, waaxda luqadaha, qaybta kaalmada adeegyada, jorje prather . So [facility] [phone].    ATENCIÓN: Si habla español, tiene a soria disposición servicios gratuitos de asistencia lingüística. [facility] [phone].    We comply with applicable federal civil rights laws and [place] laws. We do not discriminate on the basis of race, color, national origin, age, disability, sex, sexual orientation, or gender identity.            Thank you!     Thank you for choosing [facility]  for your care. Our goal is always to provide you with excellent care. Hearing back from our patients is one way we can continue to improve our services. Please take a few minutes to complete the written survey that you may receive in the mail after your visit with us. Thank you!             Your Updated Medication List - Protect others around you: Learn how to safely use, store and throw away your medicines at  www.disposemymeds.org.          This list is accurate as of 2/14/18  3:07 PM.  Always use your most recent med list.                   Brand Name Dispense Instructions for use Diagnosis    acetaminophen 500 MG tablet    TYLENOL     Take 2 tablets (1,000 mg) by mouth 3 times daily Not to exceed 3000 mg/24 hours    Prostate cancer metastatic to bone (H)       ammonium lactate 12 % lotion    LAC-HYDRIN     Apply topically 2 times daily as needed for dry skin To all extremities for dry skin    Venous stasis dermatitis of both lower extremities       ATORVASTATIN CALCIUM PO      Take 10 mg by mouth daily        BETIMOL OP      Place 1 drop into both eyes daily        bimatoprost 0.01 % Soln    LUMIGAN    1 Bottle    Place 1 drop into both eyes At Bedtime    Eye abnormalities       budesonide-formoterol 160-4.5 MCG/ACT Inhaler    SYMBICORT     Inhale 2 puffs into the lungs 2 times daily    Chronic obstructive pulmonary disease, unspecified COPD type (H)       calcium carbonate 1500 (600 CA) MG tablet    OS-JORDAN 600 mg [place]. Ca    60 tablet    Take 1 tablet (1,500 mg) by mouth 2 times daily (with meals)    Prostate cancer metastatic to bone (H)       Carboxymethylcellulose Sod PF 0.5 % Soln ophthalmic solution    REFRESH PLUS    1 Bottle    Place 1 drop into both eyes 4 times daily And 1 drop both eyes daily prn    Dry eyes       diclofenac 1 % Gel topical gel    VOLTAREN    100 g    Apply 4 grams to knees four times daily as needed using enclosed dosing card.    Chronic pain of both knees       DULoxetine 20 MG EC capsule    CYMBALTA    60 capsule    Take 1 capsule (20 mg) by mouth daily    Depression, unspecified depression type       fentaNYL 12 mcg/hr 72 hr patch    DURAGESIC    5 patch    Place 1 patch onto the skin every 72 hours    Prostate cancer metastatic to bone (H)       ferrous sulfate 325 (65 FE) MG tablet    IRON    100 tablet    Take 1 tablet (325 mg) by mouth 2 times daily    Anemia, unspecified type        folic acid 1 MG tablet    FOLVITE    30 tablet    Take 1 tablet (1 mg) by mouth daily    Anemia, unspecified type       furosemide 20 MG tablet    LASIX    30 tablet    Take 1 tablet (20 mg) by mouth 2 times daily    Chronic diastolic congestive heart failure (H), Lymphedema of both lower extremities       ipratropium - albuterol 0.5 mg/2.5 mg/3 mL 0.5-2.5 (3) MG/3ML neb solution    DUONEB     Take 1 vial by nebulization every 4 hours as needed for shortness of breath / dyspnea or wheezing        isosorbide mononitrate 30 MG 24 hr tablet    IMDUR    30 tablet    Take 2 tablets (60 mg) by mouth daily    Essential hypertension       latanoprost 0.005 % ophthalmic solution    XALATAN     Place 1 drop into both eyes At Bedtime        METHOCARBAMOL PO      Take 500 mg by mouth every 6 hours as needed for muscle spasms        metoprolol succinate 50 MG 24 hr tablet    TOPROL XL    5 tablet    Take 1 tablet (50 mg) by mouth daily    ASCVD (arteriosclerotic cardiovascular disease)       nitroGLYcerin 0.4 MG sublingual tablet    NITROSTAT    25 tablet    Place 1 tablet (0.4 mg) under the tongue every 5 minutes as needed for chest pain if you are still having symptoms after 3 doses (15 minutes) call 911.    Coronary artery disease involving native heart with angina pectoris, unspecified vessel or lesion type (H)       * NovoLOG FLEXPEN 100 UNIT/ML injection   Generic drug:  insulin aspart      Inject Subcutaneous At Bedtime Inject as per sliding scale: if 0 - 199 = 0; 200 - 249 = 1; 250 - 299 = 2; 300 - 349 = 3; 350 - 399 = 4; 400+ = 5        * NovoLOG FLEXPEN 100 UNIT/ML injection   Generic drug:  insulin aspart      Inject Subcutaneous 3 times daily (with meals) 140 - 189 = 1; 190 - 239 = 2; 240 - 289 = 3; 290 - 339 = 4; 340 - 399 = 5; 400 - 449 = 6; 450+ = 7        omeprazole 40 MG capsule    priLOSEC     Take 1 capsule (40 mg) by mouth 2 times daily        ondansetron 4 MG Film     12 each    Take 4 mg by mouth every 8  hours as needed    Prostate cancer metastatic to bone (H)       oxyCODONE IR 5 MG tablet    ROXICODONE    18 tablet    Take 1 tablet (5 mg) by mouth every 4 hours as needed    Prostate cancer metastatic to bone (H)       polyethylene glycol Packet    MIRALAX/GLYCOLAX    7 packet    Take 17 g by mouth daily , hold for loose stools.    Constipation, unspecified constipation type       senna-docusate 8.6-50 MG per tablet    SENOKOT-S;PERICOLACE    100 tablet    Take 2 tablets by mouth 2 times daily , hold for loose stools.    Constipation, unspecified constipation type       simethicone 40 MG/0.6ML suspension    MYLICON     Take 0.6 mLs (40 mg) by mouth every 6 hours as needed for cramping    Flatulence, eructation and gas pain       sucralfate 1 GM/10ML suspension    CARAFATE    1200 mL    Take 10 mLs (1 g) by mouth 4 times daily (before meals and nightly)    Gastrointestinal hemorrhage with melena       tamsulosin 0.4 MG capsule    FLOMAX    5 capsule    Take 1 capsule (0.4 mg) by mouth daily    Benign non-nodular prostatic hyperplasia without lower urinary tract symptoms       tiotropium 18 MCG capsule    SPIRIVA    30 capsule    Inhale 1 capsule (18 mcg) into the lungs daily    Chronic obstructive pulmonary disease, unspecified COPD type (H)       * Notice:  This list has 2 medication(s) that are the same as other medications prescribed for you. Read the directions carefully, and ask your doctor or other care provider to review them with you.

## 2018-02-14 NOTE — PROGRESS NOTES
Pt not seen in infusion today.  Rescheduled to Obs care 2/15/18 due to level of care.  Gina Dang RN

## 2018-02-14 NOTE — PATIENT INSTRUCTIONS
1- Labs today- transfuse for hb less than 8  2- Need to arrange a family meeting.     Thursday 2/15/18 : Blood transfusion- 1 unit  [address] [facility] [address]  [phone]  Please check in at 10:15 at the [facility] for a 10:30am appt

## 2018-02-14 NOTE — MR AVS SNAPSHOT
"              After Visit Summary   2/14/2018    Tomas Grey    MRN: [id number]           Patient Information     Date Of Birth          1945        Visit Information        Provider Department      2/14/2018 9:00 AM Manpreet Oviedo MD [facility]        Today's Diagnoses     Normocytic anemia    -  1      Care Instructions    1- Labs today- transfuse for hb less than 8  2- Need to arrange a family meeting.     Thursday 2/15/18 : Blood transfusion- 1 unit  [address]  [facility] [address]  [phone]  Please check in at 10:15 at the [employer] for a 10:30am appt          Follow-ups after your visit        Future tests that were ordered for you today     Open Standing Orders        Priority Remaining Interval Expires Ordered    Red blood cell prepare order unit Routine 99/100 CONDITIONAL (SPECIFY) BLOOD  2/14/2018    Transfuse red blood cell unit Routine 99/100 TRANSFUSE 1 UNIT  2/14/2018            Who to contact     If you have questions or need follow up information about today's clinic visit or your schedule please contact [facility] CANCER [facility] directly at [phone].  Normal or non-critical lab and imaging results will be communicated to you by [email or web address], letter or phone within 4 business days after the clinic has received the results. If you do not hear from us within 7 days, please contact the clinic through Boombotixt or phone. If you have a critical or abnormal lab result, we will notify you by phone as soon as possible.  Submit refill requests through Groupjump or call your pharmacy and they will forward the refill request to us. Please allow 3 business days for your refill to be completed.          Additional Information About Your Visit        MyChart Information     Groupjump lets you send messages to your doctor, view your test results, renew your prescriptions, schedule appointments and more. To sign up, go to [email or web address] . Click on \"Log in\" on the left side of the " "screen, which will take you to the Welcome page. Then click on \"Sign up Now\" on the right side of the page.     You will be asked to enter the access code listed below, as well as some personal information. Please follow the directions to create your username and password.     Your access code is: [email or web address]  Expires: 3/14/2018 10:17 AM     Your access code will  in 90 days. If you need help or a new code, please call your [facility] or [phone].        Care EveryWhere ID     This is your Care EveryWhere ID. This could be used by other organizations to access your [facility] medical records  [id number]        Your Vitals Were     Pulse Temperature Respirations Pulse Oximetry          101 99  F (37.2  C) (Oral) 16 96%         Blood Pressure from Last 3 Encounters:   18 101/62   18 102/76   18 140/74    Weight from Last 3 Encounters:   18 88.4 kg (194 lb 12.8 oz)   18 88.9 kg (196 lb)   18 87.9 kg (193 lb 11.2 oz)              We Performed the Following     CBC with platelets differential     Erythropoietin     Ferritin     Folate     Iron and iron binding capacity     Transferrin     Vitamin B12        Primary Care Provider Office Phone # Fax #    Ekaterina SAL [facility] [phone]       [address]  [place] [address]        Equal Access to Services     [facility]: Hadii aad ku hadasho Soarthurali, waaxda luqadaha, qaybta kaalmada ianyada, jorje prather . So [facility] [phone].    ATENCIÓN: Si habla español, tiene a soria disposición servicios gratuitos de asistencia lingüística. Matt al [phone].    We comply with applicable federal civil rights laws and [place] laws. We do not discriminate on the basis of race, color, national origin, age, disability, sex, sexual orientation, or gender identity.            Thank you!     Thank you for choosing [facility]  for your care. Our goal is " always to provide you with excellent care. Hearing back from our patients is one way we can continue to improve our services. Please take a few minutes to complete the written survey that you may receive in the mail after your visit with us. Thank you!             Your Updated Medication List - Protect others around you: Learn how to safely use, store and throw away your medicines at www.disposemymeds.org.          This list is accurate as of 2/14/18  2:55 PM.  Always use your most recent med list.                   Brand Name Dispense Instructions for use Diagnosis    acetaminophen 500 MG tablet    TYLENOL     Take 2 tablets (1,000 mg) by mouth 3 times daily Not to exceed 3000 mg/24 hours    Prostate cancer metastatic to bone (H)       ammonium lactate 12 % lotion    LAC-HYDRIN     Apply topically 2 times daily as needed for dry skin To all extremities for dry skin    Venous stasis dermatitis of both lower extremities       ATORVASTATIN CALCIUM PO      Take 10 mg by mouth daily        BETIMOL OP      Place 1 drop into both eyes daily        bimatoprost 0.01 % Soln    LUMIGAN    1 Bottle    Place 1 drop into both eyes At Bedtime    Eye abnormalities       budesonide-formoterol 160-4.5 MCG/ACT Inhaler    SYMBICORT     Inhale 2 puffs into the lungs 2 times daily    Chronic obstructive pulmonary disease, unspecified COPD type (H)       calcium carbonate 1500 (600 CA) MG tablet    OS-JORDAN 600 mg Sun'aq. Ca    60 tablet    Take 1 tablet (1,500 mg) by mouth 2 times daily (with meals)    Prostate cancer metastatic to bone (H)       Carboxymethylcellulose Sod PF 0.5 % Soln ophthalmic solution    REFRESH PLUS    1 Bottle    Place 1 drop into both eyes 4 times daily And 1 drop both eyes daily prn    Dry eyes       diclofenac 1 % Gel topical gel    VOLTAREN    100 g    Apply 4 grams to knees four times daily as needed using enclosed dosing card.    Chronic pain of both knees       DULoxetine 20 MG EC capsule    CYMBALTA    60  capsule    Take 1 capsule (20 mg) by mouth daily    Depression, unspecified depression type       fentaNYL 12 mcg/hr 72 hr patch    DURAGESIC    5 patch    Place 1 patch onto the skin every 72 hours    Prostate cancer metastatic to bone (H)       ferrous sulfate 325 (65 FE) MG tablet    IRON    100 tablet    Take 1 tablet (325 mg) by mouth 2 times daily    Anemia, unspecified type       folic acid 1 MG tablet    FOLVITE    30 tablet    Take 1 tablet (1 mg) by mouth daily    Anemia, unspecified type       furosemide 20 MG tablet    LASIX    30 tablet    Take 1 tablet (20 mg) by mouth 2 times daily    Chronic diastolic congestive heart failure (H), Lymphedema of both lower extremities       ipratropium - albuterol 0.5 mg/2.5 mg/3 mL 0.5-2.5 (3) MG/3ML neb solution    DUONEB     Take 1 vial by nebulization every 4 hours as needed for shortness of breath / dyspnea or wheezing        isosorbide mononitrate 30 MG 24 hr tablet    IMDUR    30 tablet    Take 2 tablets (60 mg) by mouth daily    Essential hypertension       latanoprost 0.005 % ophthalmic solution    XALATAN     Place 1 drop into both eyes At Bedtime        METHOCARBAMOL PO      Take 500 mg by mouth every 6 hours as needed for muscle spasms        metoprolol succinate 50 MG 24 hr tablet    TOPROL XL    5 tablet    Take 1 tablet (50 mg) by mouth daily    ASCVD (arteriosclerotic cardiovascular disease)       nitroGLYcerin 0.4 MG sublingual tablet    NITROSTAT    25 tablet    Place 1 tablet (0.4 mg) under the tongue every 5 minutes as needed for chest pain if you are still having symptoms after 3 doses (15 minutes) call 911.    Coronary artery disease involving native heart with angina pectoris, unspecified vessel or lesion type (H)       * NovoLOG FLEXPEN 100 UNIT/ML injection   Generic drug:  insulin aspart      Inject Subcutaneous At Bedtime Inject as per sliding scale: if 0 - 199 = 0; 200 - 249 = 1; 250 - 299 = 2; 300 - 349 = 3; 350 - 399 = 4; 400+ = 5         * NovoLOG FLEXPEN 100 UNIT/ML injection   Generic drug:  insulin aspart      Inject Subcutaneous 3 times daily (with meals) 140 - 189 = 1; 190 - 239 = 2; 240 - 289 = 3; 290 - 339 = 4; 340 - 399 = 5; 400 - 449 = 6; 450+ = 7        omeprazole 40 MG capsule    priLOSEC     Take 1 capsule (40 mg) by mouth 2 times daily        ondansetron 4 MG Film     12 each    Take 4 mg by mouth every 8 hours as needed    Prostate cancer metastatic to bone (H)       oxyCODONE IR 5 MG tablet    ROXICODONE    18 tablet    Take 1 tablet (5 mg) by mouth every 4 hours as needed    Prostate cancer metastatic to bone (H)       polyethylene glycol Packet    MIRALAX/GLYCOLAX    7 packet    Take 17 g by mouth daily , hold for loose stools.    Constipation, unspecified constipation type       senna-docusate 8.6-50 MG per tablet    SENOKOT-S;PERICOLACE    100 tablet    Take 2 tablets by mouth 2 times daily , hold for loose stools.    Constipation, unspecified constipation type       simethicone 40 MG/0.6ML suspension    MYLICON     Take 0.6 mLs (40 mg) by mouth every 6 hours as needed for cramping    Flatulence, eructation and gas pain       sucralfate 1 GM/10ML suspension    CARAFATE    1200 mL    Take 10 mLs (1 g) by mouth 4 times daily (before meals and nightly)    Gastrointestinal hemorrhage with melena       tamsulosin 0.4 MG capsule    FLOMAX    5 capsule    Take 1 capsule (0.4 mg) by mouth daily    Benign non-nodular prostatic hyperplasia without lower urinary tract symptoms       tiotropium 18 MCG capsule    SPIRIVA    30 capsule    Inhale 1 capsule (18 mcg) into the lungs daily    Chronic obstructive pulmonary disease, unspecified COPD type (H)       * Notice:  This list has 2 medication(s) that are the same as other medications prescribed for you. Read the directions carefully, and ask your doctor or other care provider to review them with you.

## 2018-02-14 NOTE — PROGRESS NOTES
Social Work Progress Note      Data/Intervention:  Patient Name:  Tomas Grey  /Age:  1945 (72 year old)    Reason for Follow-Up:  Tomas is a 72-year-old gentleman with metastatic prostate cancer, transfusion dependent anemia, cognitive impairment, COPD and multiple other co-morbidities. Tomas comes to clinic today for follow-up with Dr. Oviedo. This clinician was alerted to nursing concern regarding pt's cleanliness and safety, and Dr. Oviedo's concern regarding pt's ability to engage in informed discussion regarding pt's care.     Intervention:   This clinician was informed by Zoe Vyas RN care coordinator that pt, who is non-ambulatory came to clinic today without paperwork from his facility and without a facility escort. RN Zoe Vyas informed this clinician that pt has bladder and bowel incontinence in clinic, and that she observed dried feces on pt's clothing. Pt reported to nursing director that he had been cleaned in a recliner by staff earlier in the day at [facility]. This clinician called and left voicemail with  at [facility], asking for return call regarding concerns this clinician had for pt. This clinician also called and spoke with Karyn, nursing director, and vocalized concerns. Karyn expressed deep concern regarding pt's incontinence and lack of paperwork, and reported that she would look into these issues and report back to this clinician steps that will be taken by the facility to ensure pt's safely. Karyn reported that she would contact this clinician on 2/15/18. This clinician filed with [facility] (Ref # [id number]) due to concern about possible neglect in nursing home and concern about needing additional support when pt comes to ambulatory clinic.     Dr. Oviedo with concerns today about pt's ability to engage in medical decision making due to cognitive impairment. Dr. Oviedo asked this clinician to coordinate with family about scheduling family  meeting to discuss next steps in terms of pt's care. This clinician called pt's HCP Melissa ([phone]) who lives in [place] who reported that she would be able to attend via phone conference with pt next week. Pt's alternate agent, son Jeevan, does not have a working number, and cousin reports that neither she, nor anyone in the family had a number to contact Jeevan by. Melissa asked that this clinician reach out to pt's power of  (no current paperwork on file, lives in [place]) Tomas Grey Jr ([phone]), and pt's son Les Bourgeois ([phone]) who resides in [place], and inform about family meeting next week. This clinician spoke with Tomas and Les, Les reported that he would be in attendance, and Tomas reported that he would telephone in with Melissa. Pt's family members have this clinician's phone numbers and know to reach out in the case of psychosocial concern prior to scheduled family meeting.     Plan:  1) This clinician will await to hear back from Karyn, nursing director, regarding her exploration into concerns this clinician addressed with nursing director earlier in day.   2) This clinician will support family in larger family meeting 2/22/18 from 2:30-3:30pm regarding next steps in pt's care.     Please call or page if needs or concerns arise.     DAGO Yi, [facility]  Direct Phone: [phone]  Pager: [phone]

## 2018-02-14 NOTE — PROGRESS NOTES
"Oncology Rooming Note    February 14, 2018 10:31 AM   Tomas Grey is a 72 year old male who presents for:    Chief Complaint   Patient presents with     Oncology Clinic Visit     Initial Vitals: /62 (BP Location: Left arm, Patient Position: Chair, Cuff Size: Adult Regular)  Pulse 101  Temp 99  F (37.2  C) (Oral)  Resp 16  SpO2 96% Estimated body mass index is 27.17 kg/(m^2) as calculated from the following:    Height as of 1/24/18: 1.803 m (5' 11\").    Weight as of 2/13/18: 88.4 kg (194 lb 12.8 oz). There is no height or weight on file to calculate BSA.  No Pain (0) Comment: Data Unavailable   No LMP for male patient.  Allergies reviewed: Yes  Medications reviewed: Yes    Medications: Medication refills not needed today.  Pharmacy name entered into EPIC:    [facility]  [facility] - [place], [place] - [address] [place] [address] DRUG STORE [facility] - [place] - [address] [place] [address] & [facility] UNIV DISCHARGE - [place], [place] - [address] [facility] - [facility] [address] [facility] - [address] PHARMACY - [place], [place] - [address]    Clinical concerns: no   5 minutes for nursing intake (face to face time)          Johana Jacobo MA          Medical Assistant Note:  Tomas Grey presents today for yes.    Patient seen by provider today: Yes: Dr Oviedo.   present during visit today: Not Applicable.    Concerns: No Concerns.    Procedure:  Lab draw site: LAC, Needle type: BF, Gauge: 21. Guaze and coban applied    Post Assessment:  Labs drawn without difficulty: Yes.    Discharge Plan:  Departure Mode: Ambulatory.    Face to Face Time: 5.    Johana Jacobo MA          "

## 2018-02-14 NOTE — LETTER
"    2/14/2018         RE: Tomas Grey  [facility]  [address]  [address] [place] [id number]        Dear Colleague,    Thank you for referring your patient, Tomas Gery, to the [facility]. Please see a copy of my visit note below.    Oncology Rooming Note    February 14, 2018 10:31 AM   Tomas Grey is a 72 year old male who presents for:    Chief Complaint   Patient presents with     Oncology Clinic Visit     Initial Vitals: /62 (BP Location: Left arm, Patient Position: Chair, Cuff Size: Adult Regular)  Pulse 101  Temp 99  F (37.2  C) (Oral)  Resp 16  SpO2 96% Estimated body mass index is 27.17 kg/(m^2) as calculated from the following:    Height as of 1/24/18: 1.803 m (5' 11\").    Weight as of 2/13/18: 88.4 kg (194 lb 12.8 oz). There is no height or weight on file to calculate BSA.  No Pain (0) Comment: Data Unavailable   No LMP for male patient.  Allergies reviewed: Yes  Medications reviewed: Yes    Medications: Medication refills not needed today.  Pharmacy name entered into [facility] [facility]:    [facility]  [facility] - [place], [place] - [address] [place] [address] DRUG STORE [facility] [place] - [address] [facility] & [facility] UNIV DISCHARGE - [place], [place] - [address] [facility] - [place], [place] - [address] [facility]ARMACY [facility] - [address] PHARMACY - [place], [place] - [address]    Clinical concerns: no   5 minutes for nursing intake (face to face time)          Johana Jacobo MA          Medical Assistant Note:  Tomas Grey presents today for yes.    Patient seen by provider today: Yes: Dr Oviedo.   present during visit today: Not Applicable.    Concerns: No " Concerns.    Procedure:  Lab draw site: LAC, Needle type: BF, Gauge: 21. Guaze and coban applied    Post Assessment:  Labs drawn without difficulty: Yes.    Discharge Plan:  Departure Mode: Ambulatory.    Face to Face Time: 5.    Johana Jacobo MA            [facility]  HEMATOLOGY ONCOLOGY    ONCOLOGY FOLLOWUP NOTE      DIAGNOSIS:    1- Metastatic prostate cancer with extensive metastases to bone.   2- Advanced COPD and multiple other co-morbidities.      TREATMENT:     Lupron injection monthly, discontinued 6/2016.  Casodex 50 mg daily. discontinued 2/9/2015  - Xgeva monthly  - Xtandi 4/15/15  - Zytgea and prednisone 9/19/2016-present  - Xofigo 1/4/17  - 10/2017 discontinued zytega and started Megace. Later treatment was stopped and hospice was recommended.      Subjective:  Mr. Tomas Grey comes in for followup today. He has been in and out of hospital for multiple complications. He has become transfusion dependent. He also have developed significant cognitive impairment.     REVIEW OF SYSTEMS:  A complete review of systems was performed and found to be negative other than pertinent positives mentioned in history of present illness.     Past medical, social histories reviewed.    Meds- Reviewed.     PHYSICAL EXAMINATION:   VITAL SIGNS:/62 (BP Location: Left arm, Patient Position: Chair, Cuff Size: Adult Regular)  Pulse 101  Temp 99  F (37.2  C) (Oral)  Resp 16  SpO2 96%  CONSTITUTIONAL: Appears fatigued.    HEENT: Pupils are equal. Oropharynx is clear.   NECK: No cervical or supraclavicular lymphadenopathy.   RESPIRATORY: Clear bilaterally.   CARD/VASC: S1, S2, regular.   GI: Soft, nontender, nondistended, no hepatosplenomegaly.   MUSKULOSKELETAL: Warm, well perfused.   NEUROLOGIC: Alert, awake. Demented.   INTEGUMENT: No rash.   LYMPHATICS: Bilateral edema.he has dressing on right leg.   PSYCH: Anxious.     LABORATORY DATA AND IMAGING REVIEWED DURING THIS VISIT:  Recent  Labs   Lab Test 02/07/18 01/30/18   0833  01/29/18   0738   01/09/18   0421   12/26/16   1851   05/28/15   0715   NA   --   143  143   < >  144   < >  146*   < >  139   POTASSIUM  4.1  3.7  3.8   < >  4.5   < >  3.8   < >  5.5*   CHLORIDE   --   108  107   < >  110*   < >  99   < >  106   CO2   --   26  26   < >  22   < >  41*   < >  27   ANIONGAP   --   9  10   < >  12   < >  5   < >  6   BUN   --   23  26   < >  22   < >  48*   < >  26   CR  1.01  1.30*  1.42*   < >  1.31*   < >  1.80*   < >  1.66*   GLC  72*  115*  111*   < >  72   < >  61*   < >  121*   JORDAN   --   7.6*  7.6*   < >  8.0*   < >  9.2   < >  8.5   MAG   --    --    --    --   1.6   --   1.7   < >   --    PHOS   --    --    --    --   2.4*   --    --    --   3.3    < > = values in this interval not displayed.     Recent Labs   Lab Test  02/14/18   1126 02/05/18 01/30/18 0833 01/29/18 0738   01/24/18   2104 01/08/18   1028   WBC  10.0   --   10.0  10.0   < >  9.0   < >  12.1*   HGB  7.4*  7.7*  7.4*  7.6*   < >  9.6*   < >  3.2*   PLT  226   --   261  243   < >  267   < >  346   MCV  92   --   94  91   < >  94   < >  89   NEUTROPHIL  78.3   --    --    --    --   65.4   --   65.3    < > = values in this interval not displayed.     Recent Labs   Lab Test  01/25/18   0610  01/24/18   2104 01/08/18   1028  12/04/17   1040   BILITOTAL  0.4  0.4  0.3  0.6   ALKPHOS  227*  222*  150  157*   ALT  10  10  11  17   AST  39  24  13  29   ALBUMIN  2.4*  2.5*  2.3*  2.2*   LDH   --    --    --   282*       ECOG  performance status 3     ASSESSMENT:   1.  Metastatic prostate cancer with multiple sclerotic bone metastases.  The patient continues on monthly Lupron with Casodex.  He has been on this regimen since 05/2014.  He continues to do well symptomatically.  The patient switched care to us after having initial diagnosis and treatment in [place], [place]. He started Xtandi 4/15/15.   He is not able to keep up with his follow ups due to multiple other  medical issues and repeated hospital admissions due to COPD exacerbations.   Started Zytega 9/19/16.  He saw Dr. Callahan as a second opinion and had Xofigo 1/4/17. We continued zytega after that. Afterwards, all cancer directed treatments were stopped.   - He is not a candidate of chemotherapy, due to poor performance status. Repeated hospital admissions for COPD and infections and transfusion dependence.  - He has persistent issues with anemia and has become transfusion dependent. Anemia can be related to GI blood loss or bone marrow infiltration by the cancer.   - Extensive bone metastases. Bone scan 8/31/16 showed progression in bones.     His situation is very sad and complex. We have been trying to help him with end of life decision making and he has been resistant to the idea of hospice. He is in a poor shape and had his clothes found to be soiled and was incontinent in the clinic. I have discussed his situation with our  Tammy. We are trying to arrange a family conference soon.     It seems to me That Mr. Marin does not have decisional capacity anymore due to decline in his cognition.      PLAN:   1- Labs today- transfuse for hb less than 7  2- Need to arrange a family meeting.   MANPREET OVIEDO MD  2/14/2018    CC: Balgobin, Christopher Lennox Paul, MD    Again, thank you for allowing me to participate in the care of your patient.        Sincerely,        Manpreet Oviedo MD

## 2018-02-14 NOTE — PROGRESS NOTES
[facility]  Chief Complaint   Patient presents with     Annual Comprehensive Nursing Home     HPI:    Tomas Grey is a 72 year old  (1945), who is being seen today for an annual comprehensive visit at [facility]. HPI information obtained from: facility chart records, facility staff, patient report and [facility] chart review. Today's concerns are:    Possible Gastrointestinal Bleed with Transfusion Dependent Anemia. Today, resident denies fatigue or dark stools. Hospitalized 1/24/18 through 1/30/18 and 1/8/18 through 1/13/18 for possible GI bleed. CBC monitored at least weekly and faxed to Oncology. Last blood transfusion 2/2/18. Previous blood transfusions of 3 Units during 1/8/18 through 1/13/18 hospitalization, 12/13/17, 12/1/17, 11/25/17 and 11/1/17.     Chronic Obstructive Pulmonary Disease on Chronic Oxygen Supplementation/Obstructive Sleep Apnea with CO2 Retention. Denies shortness of breath. Often refuses BiPAP at night per staff report. Upon review of documentation, has not utilized DuoNebs PRN in the month of February.       Prostate Cancer Metastatic to Bone. Denies pain. Has not utilized Methocarbamol PRN in the month of February. Has utilized Oxycodone 4 times in the month of February, 3 times in 1 day. Followed by Dr. Oviedo. No longer receiving any treatment.       History of Urinary Retention in the Setting of Metastatic Lymph Node Dissection with Recurrent UTIs. Denies pain with urination.       Weight Loss. Weight 194.8 lbs on 2/12/18 -> 221.3 lbs on 8/13/17 -> 249.4 lbs on 2/12/17. Enjoys when his family brings in food for him.       Paroxysmal Atrial Fibrillation. Denies heart palpitations.     Coronary Artery Disease/Hypertension/Chronic Diastolic Heart Failure with EF 60-65% on 11/24/17. Upon review of blood pressures over the past month, systolic range from . Diastolic range from 48-87.      Lymphedema of Both Lower Extremities with Venous  Stasis Ulcers. Physical Therapy applying lymphedema wraps.       Chronic Kidney Disease Stage III. Baseline Creatinine now low 1s. Last Creatinine 1.01 on 2/7/18.       Type 2 Diabetes Mellitus. Upon review of blood sugars over the past 5 days, range is as follows:    Breakfast: , most < 150  Lunch: 100-339, most < 150  Dinner: 100-259, most <150  Bedtime: , most <150      Gastroesophageal Reflux. No reports of heartburn during today's visit.      Anxiety and Depression. PHQ-9 Score 8 on 2/8/18. Followed by in-house psychiatrist.       Cognitive Impairment. Able to make needs known.       Glaucoma. Denies recent changes in vision.       Constipation. Upon review of documentation over the past week, has had 7 bowel movements.    Abdominal Aortic Aneurysm. Incidental finding of 3.4 cm infrarenal abdominal aortic aneurysm. Appears to have been the same size since 2004. Last abdominal and pelvic CT 1/28/18.    Recurrent ED Visits and Hospitalizations. Resident calls 911 from facility without trying medications or staff interventions. Enjoys going to the hospital for IV pain medications. Have discussed on multiple occasions with resident and family appropriateness of ED and attempting staff interventions first.     ALLERGIES: Morphine  PROBLEM LIST:  Patient Active Problem List   Diagnosis     Insomnia     COPD (chronic obstructive pulmonary disease) (H)     Anemia     Osteoporosis     ASCVD (arteriosclerotic cardiovascular disease)     Prostate cancer metastatic to multiple sites (H)     Venous stasis dermatitis     Fecal incontinence     CKD (chronic kidney disease) stage 3, GFR 30-59 ml/min     Serum albumin decreased     Right heart failure     Type 2 diabetes, HbA1C goal < 8% (H)     Impaired mobility     Knee pain     Irregular heart rhythm     Sleep apnea     Metastasis to bone (H)     SOB (shortness of breath)     FTT (failure to thrive) in adult     Dyspnea     Atypical chest pain     Prostate cancer  metastatic to bone (H)     Chronic pain     ACP (advance care planning)     Respiratory failure (H)     CAD (coronary artery disease)     Physical deconditioning     Hyperlipidemia     Renal insufficiency     Epigastric pain     Lymphedema of both lower extremities     Primary pulmonary hypertension (H)     Redness of eye, left     Chest wall pain     Chronic obstructive pulmonary disease, unspecified COPD type (H)     FH: chronic lung disease requiring oxygen     COPD exacerbation (H)     History of heart attack     Nocturnal dyspnea     Dyspnea on exertion     Chronic obstructive pulmonary disease with severity to be determined (H)     Leg pain     Adjustment disorder with mixed anxiety and depressed mood     Noncompliance with medication regimen     Type 2 diabetes mellitus with stage 3 chronic kidney disease (H)     Chronic respiratory failure with hypoxia and hypercapnia (H)     Misuse of drugs (H)     Pneumonia of left lower lobe due to infectious organism (H)     Pneumonia     Hypercapnia     Health Care Home     HTN (hypertension)     CHF (congestive heart failure) (H)     Chronic respiratory failure with hypoxia (H)     Morbid obesity (H)     Altered mental status     Bacterial sepsis (H)     Anemia in neoplastic disease     GI bleed     PAST MEDICAL HISTORY:  has a past medical history of Anemia; Anxiety; Aspiration pneumonia (H) (2014); C. difficile colitis; CAD (coronary artery disease); Chronic pain; CKD (chronic kidney disease); COPD (chronic obstructive pulmonary disease) (H); Depressive disorder; Diabetes mellitus (H); Hypertension; Insomnia; ALEXIS (obstructive sleep apnea); Osteoporosis; and Prostate cancer metastatic to multiple sites (H).  PAST SURGICAL HISTORY:  has a past surgical history that includes Abdomen surgery; Arthroscopy knee; and Eye surgery.  FAMILY HISTORY: family history includes CANCER in his mother; DIABETES in his mother.  SOCIAL HISTORY:  reports that he has quit smoking. He has  never used smokeless tobacco. He reports that he does not drink alcohol or use illicit drugs.  IMMUNIZATIONS:  Most Recent Immunizations   Administered Date(s) Administered     Influenza (High Dose) 3 valent vaccine 10/04/2017     Influenza (IIV3) PF 11/04/2015     Pneumo Conj 13-V (2010&after) 11/04/2015     Pneumococcal 23 valent 02/08/2012     Tdap (Adacel,Boostrix) 11/04/2015     Above immunizations pulled from [facility]. [facility] and facility records also reconciled. Outstanding information sent to  to update [facility].  Future immunizations are not needed at this point as all recommended immunizations are up to date.   MEDICATIONS:  Current Outpatient Prescriptions   Medication Sig Dispense Refill     fentaNYL (DURAGESIC) 12 mcg/hr 72 hr patch Place 1 patch onto the skin every 72 hours 5 patch 0     oxyCODONE IR (ROXICODONE) 5 MG tablet Take 1 tablet (5 mg) by mouth every 4 hours as needed 18 tablet 0     ATORVASTATIN CALCIUM PO Take 10 mg by mouth daily       latanoprost (XALATAN) 0.005 % ophthalmic solution Place 1 drop into both eyes At Bedtime       omeprazole (PRILOSEC) 40 MG capsule Take 1 capsule (40 mg) by mouth 2 times daily       ipratropium - albuterol 0.5 mg/2.5 mg/3 mL (DUONEB) 0.5-2.5 (3) MG/3ML neb solution Take 1 vial by nebulization every 4 hours as needed for shortness of breath / dyspnea or wheezing       insulin aspart (NOVOLOG FLEXPEN) 100 UNIT/ML injection Inject Subcutaneous At Bedtime Inject as per sliding scale: if 0 - 199 = 0; 200 - 249 = 1; 250 - 299 = 2; 300 - 349 = 3; 350 - 399 = 4; 400+ = 5       insulin aspart (NOVOLOG FLEXPEN) 100 UNIT/ML injection Inject Subcutaneous 3 times daily (with meals) 140 - 189 = 1; 190 - 239 = 2; 240 - 289 = 3; 290 - 339 = 4; 340 - 399 = 5; 400 - 449 = 6; 450+ = 7       acetaminophen (TYLENOL) 500 MG tablet Take 2 tablets (1,000 mg) by mouth 3 times daily Not to exceed 3000 mg/24 hours       isosorbide mononitrate (IMDUR) 30 MG 24  hr tablet Take 2 tablets (60 mg) by mouth daily 30 tablet      nitroGLYcerin (NITROSTAT) 0.4 MG sublingual tablet Place 1 tablet (0.4 mg) under the tongue every 5 minutes as needed for chest pain if you are still having symptoms after 3 doses (15 minutes) call 911. 25 tablet      budesonide-formoterol (SYMBICORT) 160-4.5 MCG/ACT Inhaler Inhale 2 puffs into the lungs 2 times daily       tiotropium (SPIRIVA) 18 MCG capsule Inhale 1 capsule (18 mcg) into the lungs daily 30 capsule      DULoxetine (CYMBALTA) 20 MG EC capsule Take 1 capsule (20 mg) by mouth daily 60 capsule      ondansetron 4 MG FILM Take 4 mg by mouth every 8 hours as needed 12 each      metoprolol succinate (TOPROL XL) 50 MG 24 hr tablet Take 1 tablet (50 mg) by mouth daily 5 tablet 0     ammonium lactate (LAC-HYDRIN) 12 % lotion Apply topically 2 times daily as needed for dry skin To all extremities for dry skin       diclofenac (VOLTAREN) 1 % GEL topical gel Apply 4 grams to knees four times daily as needed using enclosed dosing card. 100 g 0     furosemide (LASIX) 20 MG tablet Take 1 tablet (20 mg) by mouth 2 times daily 30 tablet      ferrous sulfate (IRON) 325 (65 FE) MG tablet Take 1 tablet (325 mg) by mouth 2 times daily 100 tablet      folic acid (FOLVITE) 1 MG tablet Take 1 tablet (1 mg) by mouth daily 30 tablet      polyethylene glycol (MIRALAX/GLYCOLAX) Packet Take 17 g by mouth daily , hold for loose stools. 7 packet      senna-docusate (SENOKOT-S;PERICOLACE) 8.6-50 MG per tablet Take 2 tablets by mouth 2 times daily , hold for loose stools. 100 tablet      calcium carbonate (OS-JORDAN 600 MG [place]. CA) 1500 (600 CA) MG tablet Take 1 tablet (1,500 mg) by mouth 2 times daily (with meals) 60 tablet      tamsulosin (FLOMAX) 0.4 MG capsule Take 1 capsule (0.4 mg) by mouth daily 5 capsule 0     simethicone (MYLICON) 40 MG/0.6ML suspension Take 0.6 mLs (40 mg) by mouth every 6 hours as needed for cramping       bimatoprost (LUMIGAN) 0.01 % SOLN Place  1 drop into both eyes At Bedtime 1 Bottle      Carboxymethylcellulose Sod PF (REFRESH PLUS) 0.5 % SOLN ophthalmic solution Place 1 drop into both eyes 4 times daily And 1 drop both eyes daily prn 1 Bottle      sucralfate (CARAFATE) 1 GM/10ML suspension Take 10 mLs (1 g) by mouth 4 times daily (before meals and nightly) 1200 mL      METHOCARBAMOL PO Take 500 mg by mouth every 6 hours as needed for muscle spasms       Timolol Hemihydrate (BETIMOL OP) Place 1 drop into both eyes daily        [DISCONTINUED] enzalutamide (XTANDI) 40 MG capsule Take 4 capsules (160 mg) by mouth daily 120 capsule 0     Medications reviewed:  Medications reconciled to facility chart and changes were made to reflect current medications as identified as above med list. Below are the changes that were made:   Medications stopped since last EPIC medication reconciliation:   There are no discontinued medications.    Medications started since last [facility] medication reconciliation:  No orders of the defined types were placed in this encounter.    Case Management:  I have reviewed the facility/SNF care plan/MDS which was done 2/5/18, including the falls risk, nutrition and pain screening. I also reviewed the current immunizations, and preventive care..No future preventative screening is necessary. Resident is to high risk for EGD and colonoscopy Patient's desire to return to the community is present, but is not able due to care needs . Current Level of Care is appropriate.    Advance Directive Discussion:    I reviewed the current advanced directives as reflected in EPIC, the POLST and the facility chart, and verified the congruency of orders I contacted the first party, Les, and discussed the plan of Care.  I did not due to cognitive impairment review the advance directives with the resident.     Team Discussion:  I communicated with the appropriate disciplines involved with the Plan of Care:   Nursing      Patient Goal:  Patient's goal is pain  control and comfort with aggressive treatment.    Information reviewed:  Medications, vital signs, orders, and nursing notes.    ROS:  10 point ROS of systems including Constitutional, Eyes, Respiratory, Cardiovascular, Gastroenterology, Genitourinary, Integumentary, Muscularskeletal, Psychiatric were all negative except for pertinent positives noted in my HPI.    Exam:  /76  Pulse 80  Temp 97.6  F (36.4  C)  Resp 16  Wt 194 lb 12.8 oz (88.4 kg)  SpO2 95%  BMI 27.17 kg/m2   GENERAL APPEARANCE: In no distress.   ENT:  Mouth and posterior oropharynx normal, moist mucous membranes  EYES:  EOM, conjunctivae, lids, pupils and irises normal  RESP:  Respiratory effort and palpation of chest normal, no respiratory distress, diminished lung sounds in bilateral lower lobes  CV:  Palpation and auscultation of heart done, regular rate and rhythm, no murmur, rub, or gallop  ABDOMEN: Active bowel sounds, soft, nontender, no hepatosplenomegaly or other masses  M/S:   Active movement of bilateral upper and lower extremities. Lymphedema wraps on BLE.  SKIN:  Inspection of skin and subcutaneous tissue baseline, Palpation of skin and subcutaneous tissue baseline  NEURO:   Cranial nerves 2-12 are normal tested and grossly at patient's baseline  PSYCH:  Oriented to person and place    Lab/Diagnostic data:    Last Basic Metabolic Panel:  Lab Results   Component Value Date     01/30/2018      Lab Results   Component Value Date    POTASSIUM 3.7 01/30/2018     Lab Results   Component Value Date    CHLORIDE 108 01/30/2018     Lab Results   Component Value Date    JORDAN 7.6 01/30/2018     Lab Results   Component Value Date    CO2 26 01/30/2018     Lab Results   Component Value Date    BUN 23 01/30/2018     Lab Results   Component Value Date    CR 1.30 01/30/2018     Lab Results   Component Value Date     01/30/2018     Lab Results   Component Value Date    WBC 10.0 01/30/2018     Lab Results   Component Value Date     RBC 2.68 01/30/2018     Lab Results   Component Value Date    HGB 7.7 02/05/2018     Lab Results   Component Value Date    HCT 25.1 01/30/2018     Lab Results   Component Value Date    MCV 94 01/30/2018     Lab Results   Component Value Date    MCH 27.6 01/30/2018     Lab Results   Component Value Date    MCHC 29.5 01/30/2018     Lab Results   Component Value Date    RDW 18.7 01/30/2018     Lab Results   Component Value Date     01/30/2018     ASSESSMENT/PLAN  Possible Gastrointestinal Bleed with Transfusion Dependent Anemia. Hospitalized 1/8/18 through 1/13/18 and 1/24/18 through 1/30/18 and 1/8/18 through 1/13/18 for possible GI bleed. EGD unlikely to assist with overall clinical course and risk > benefit so this was not performed. Recommend PPI BID indefinitely. Also taking Sucralfate QID which can be discontinued around 3/10/18. Aspirin and Warfarin discontinued. Received blood transfusions 11/1/17, 11/25/17. 12/1/17, 12/13/17, 3 Units during hospitalization 1/8/18 through 1/13/18 and 2/2/18. Oncology recommends blood transfusions for Hemoglobin < 8. Noted by Oncology to be folate and iron deficient. Chronic disease and bone marrow suppression from metastatic prostate cancer likely contributing. Continue Folic Acid and Ferrous Sulfate as ordered. Weekly labs to monitor Hemoglobin as per Oncology recommendations.     Chronic Obstructive Pulmonary Disease on Chronic Oxygen Supplementation/Obstructive Sleep Apnea with CO2 Retention. Seen by Dr. Loera in Pulmonology on 10/4/17. Noted to have very severe COPD and chronic hypoxemic respiratory failure. Often refuses BiPAP at night per staff report. Sleep study outpatient recommended. Continue Budesonide-Formoterol, DuoNebs and Tiotropium as ordered.      Prostate Cancer Metastatic to Bone. Seen by Palliative Care during hospitalization 1/8/18 through 1/13/18 and outpatient 11/16/17. Follow-up appointment with Dr. Oviedo re-scheduled for 2/14/18. Comfort  care recommended by Dr. Oviedo on 12/4/17 due to compromised performance status, advanced cancer for which he has used all reasonable treatments and not being a candidate for cytotoxic chemotherapy. Has had over 50 lbs weight loss in 1 year. Now requires tara lift for transfers. Resident and family refused [facility] enrollment. Remains FULL CODE. Given resident's lack of capacity to make decisions,  at facility is working to pursue guardianship, but reportedly is having difficulty finding the numbers and addresses of all resident's family members. Fentanyl Patch, Oxycodone, Methocarbamol, Diclofenac gel and Acetaminophen ordered for pain control.       History of Urinary Retention in the Setting of Metastatic Lymph Node Dissection with Recurrent UTIs. Order written to reschedule previously scheduled Urology appointment. Treated for ESBL E. Coli 1/8/18, Providencia Rettgeri UTI 7/1/17 with Rocephin and Bactrim DS. Treated for Pseudomonas and Klebsiella UTI 7/29/17 with Ciprofloxacin and ESBL E. Coli 8/30/17 with Ertapenem IV. Previously evaluated by Dr. Graf on 3/22/17 for urinary retention. Elevated Creatinine may be due to bladder outlet obstruction. Resistant to surgical options. Instructed how to self-catheterize, but resident resistant. Told Urology his main goal is avoiding pain. Voids as able. Continue Tamsulosin as ordered.       Weight Loss. Secondary to above and overall decline. Weight 194.8 lbs on 2/12/18 -> 221.3 lbs on 8/13/17 -> 249.4 lbs on 2/12/17. Dietary involved. Refuses Hospice enrollment.       Paroxysmal Atrial Fibrillation.Warfarin and Aspirin discontinued due to GI bleed. Continue Metoprolol as ordered.       Coronary Artery Disease/Hypertension/Chronic Diastolic Heart Failure with EF 60-65% on 11/24/17. Monitor blood pressure and weights weekly. Blood pressures slightly lower than goal, but tachycardia noted on occasion so will not adjust Metoprolol dose. Continue  Isosorbide Mononitrate, Metoprolol, Atorvastatin and Nitroglycerin as ordered. Based on JNC-8 goals,  patients age of 72 year old, presence of diabetes or CKD, and goals of care goal BP is  <140/90 mm Hg. Patient is stable with current plan of care and routine assessment..      Lymphedema of Both Lower Extremities with Venous Stasis Ulcers. Physical Therapy ordered for lymphedema wraps. Continue Furosemide as ordered.      Chronic Kidney Disease Stage III. Baseline Creatinine now low 1s. Last Creatinine 1.01 on 2/7/18. Continue to monitor periodically.       Type 2 Diabetes Mellitus. Last A1C 6.0 on 11/18/17. Glargine discontinued during hospitalization earlier this year due to hypoglycemia. SSI ordered, but due to most blood sugars < 150, will discontinue SSI and Accuchecks. Repeat A1C on next lab day.       Gastroesophageal Reflux. Continue Omeprazole as noted above.      Anxiety and Depression. Seen by in-house psychiatrist and started on Duloxetine 12/28/17.      Cognitive Impairment. Mild-to-moderate impairment based on neurocognitive testing ~ 1 year ago. BIMS Score 11/15 on 11/16/17.  working to appoint a guardian for resident given lack of capacity to make decisions as goals are inconsistent with overall health status. Of note has 11 children and none of the children attend outside appointments with resident so a guardian will help ensure there is 1 appropriate decision maker. Resident also has metastatic prostate cancer and severe COPD making a FULL CODE situation quite dismal.       Glaucoma. Followed by [facility]. Continue Bimatoprost and Timolol Hemihydrate as ordered. Also on Carboxymethylcellulose drops.      Constipation. Continue Senna-S and Miralax as ordered.    Abdominal Aortic Aneurysm. Incidental finding of 3.4 cm infrarenal abdominal aortic aneurysm. Appears to have been the same size since 2004. Last abdominal and pelvic CT 1/28/18.      Recurrent ED Visits and  Hospitalizations. Resident calls 911 from facility without trying medications or staff interventions. Enjoys going to the hospital for IV pain medications. Have discussed on multiple occasions with resident and family appropriateness of ED and attempting staff interventions first. Most recent hospitalizations include:      [facility] - 1/24/18 - 1/30/18 - Possible GI Bleed/Possible Pneumonia.   [facility] - 1/8/18 - 1/13/18 - GI Bleed  [facility] - 12/29/17 - 12/31/17 - COPD Exacerbation  [facility] ED - 12/18/17 - Pain  [facility] - 12/13/17 - 12/14/17 - COPD Exacerbation  [facility] - 11/23/17 - 11/26/17 - COPD Exacerbation      Prior to 11/23/17, had 9 ED visits and 3 hospitalizations in 6 months at [facility] and [facility] primarily for shortness of breath, chest pain and abdominal pain    Electronically signed by:  SAL Acevedo CNP

## 2018-02-14 NOTE — TELEPHONE ENCOUNTER
Patient was at the clinic for and MD visit and Blood transfusion today.    While in the lobby the patient needed assistance to the restroom, he is not able to ambulate and states he needs a tara at his care facility.  He was an assist of 2-3 to the toilet, While in the restroom with him there appeared to be dried stool on the back bottom part of his shirt.  It was determined that he would be best cared for in care suites at the hospital as they have a tara lift and the staff to attend to him if he needs a sitter.  An appointment was arranged for Th. 2/15/18 at 1030am with a 1015am check in at [facility].  This message was given to NISHA Martínez at [facility] at [phone] who stated she would give it to the [facility].  RN also informed her that he was incontinent while here and will need to be changed and cleaned when he gets back to the facility.

## 2018-02-15 NOTE — TELEPHONE ENCOUNTER
"This clinician received call from Karyn after this clinician LVM. Karyn reported that she did investigation within her staff and had larger discussion with pt as pt reported to her upon his return that he was not pleased with care at [facility]. Karyn reported that her nurses said that pt was clean before he left and was not incontinent, and that Jm Mar filed with the state. Karyn reported that pt told her he \"waited for a while to get me into the bathroom\" and \"left me on the toilet for 6.5 hours\" and that \"4 ladies put me in my wheelchair and didn't clean me up.\" This clinician communicated Karyn's concerns to Alexis Perales, nursing director, who asked this clinician to submit an I-Care, which this clinician completed. This clinician also alerted Care Transitions director Gale Boo to incident and this clinician's report.     [facility] is aware of family meeting scheduled next week 2:30-3:30pm, and will plan needed transportation for pt. This clinician will call pt tomorrow and discuss goals of meeting next week with pt.    Please page this clinician in the case of psychosocial distress or need.      DAGO Yi, [facility]  Phone: [phone]  Pager: [phone]    [facility]: M, T  *every other Thursday, 8am-4:30pm  [facility]: W, F, *every other Thursday, 8am-4:30pm         "

## 2018-02-15 NOTE — TELEPHONE ENCOUNTER
This clinician did not receive phone call from nursing director Karyn today. This clinician called Karyn and ESTELITA asking for return call when able. This clinician also left VM for Karyn informing her of family meeting 2/22/18 from 2:30-3:30pm at [facility].     DAGO Yi, LICSW  Phone: [phone]  Pager: [phone]    [facility]: M, T  *every other Thursday, 8am-4:30pm  [facility]: W, F, *every other Thursday, 8am-4:30pm

## 2018-02-16 NOTE — TELEPHONE ENCOUNTER
"Social Work Progress Note      Data/Intervention:  Patient Name:  Tomas Grey  /Age:  1945 (72 year old)    Reason for Follow-Up:  This clinician spoke with pt's nurse manager Rudy Rousekerrijasen ([phone]) and also directly with pt today ([phone]).    Intervention:   Mr. West reported to this clinician that pt would not be accompanied by an escort from [facility] into the community as pt is alert and oriented and \"is not cognitively impaired enough.\" Mr. West reports that pt's that are accompanied by escorts are at risk of hurting themselves, which he reports, pt is not.     This clinician called and spoke with pt about goals of upcoming appointment, and provided education as to who would be in meeting. Pt reported that he does not want to attend visit at [facility], but would be open to larger family meeting if scheduled at [facility].     Plan:  1) This clinician has communicated pt's preferences with Dr. Oviedo. Will await Dr. Oviedo's thoughts as to scheduling appointment.   2) This clinician will coordinate with family members for different meeting time when know, and will follow-up with pt.    Please call or page if needs or concerns arise.     DAGO Yi, [facility]  Direct Phone: [phone]  Pager: [phone]  "

## 2018-02-16 NOTE — PLAN OF CARE
Problem: Patient Care Overview  Goal: Plan of Care/Patient Progress Review  Outcome: Adequate for Discharge Date Met: 02/15/18  Pt a direct admission this morning for blood transfusion. A&Ox4 but forgetful this shift. VSS; no c/o dizziness or lightheadedness. Lymphedema wraps on warner legs removed per pt's request b/c pt felt that they were tight; otherwise, no other complaints. IV access started by IV team and pt received a unit of PRBCs; pt tolerated transfusion/ No reaction. Tolerated diet. up with a lift/ /small BM x1. Belongings returned to pt. Pt discharged from floor in stable condition via w/c. Staff (MYRON) from [facility] transporting pt back to [facility].

## 2018-02-19 NOTE — TELEPHONE ENCOUNTER
Request sent to [facility].. cdk  Records received sent to [facility].. cdk

## 2018-02-20 NOTE — TELEPHONE ENCOUNTER
Social Work Progress Note      Data/Intervention:  Patient Name:  Tomas Grey  /Age:  1945 (72 year old)    Reason for Follow-Up:  This clinician contacted pt today to coordinate regarding upcoming family conference.     Intervention:   This clinician called pt today to offer appointment time at [facility] on 18, pt reported that he would prefer to keep appointment on 18 as he is aware that day and time work for family. This clinician called and confirmed appointment day and location with pt's son Les, and LVM for pt's cousin Melissa confirming appointment. Pt reports that he is aware that appointment will be at [facility], and is aware that pt, Dr. Oviedo, pt's son Les, pt's cousin Melissa (via phone), pt's son Tomas Gonzales (via phone), and this clinician will all be in attendance. This clinician called pt's nurse manager and LVM informing that pt would like to keep appointment time.     Plan:  Family conference scheduled for 18. Pt, family, nursing home, oncologist, and nurse manager are aware of upcoming meeting.     Please call or page if needs or concerns arise.     DAGO Yi, [facility]  Direct Phone: [phone]  Pager: [phone]

## 2018-02-22 NOTE — TELEPHONE ENCOUNTER
This clinician received call from pt's nurse manager Rudy Brett ([phone]) who reported that pt will not be attending scheduled visit today as rehab facility was not able to schedule needed transportation. Rudy reported that he had already discussed with [facility] and tried to explore whether there might be a more emergent transportation that they would be able to set up to support pt's coming to scheduled family meeting this afternoon, which Rudy reports that they are not able to do. Rudy rescheduled appointment for 2/26/18 at [facility], and reported that he would contact family to inform of change. This clinician provided Rudy with phone numbers for Les Torres, and Tomas Grey.     This clinician will call pt's family tomorrow to confirm their ability to be at appointment on 2/26/18 at [facility].     Please contact this clinician in the case of psychosocial concern or need.     DAGO Yi, [facility]  Phone: [phone]  Pager: [phone]    [facility]: M, T  *every other Thursday, 8am-4:30pm  [facility]: W, F, *every other Thursday, 8am-4:30pm

## 2018-02-23 NOTE — TELEPHONE ENCOUNTER
Received patient's lab from center today. Hgb 7.6. Notified Tanya at patient's resident facility.  Patient will have unit of blood transfused on Monday 2/26. Facility will arrange for transportation to and from [facility].  Will contact writer with questions or concerns.  Gage Yang RN, BSN, OCN  [facility] [place]  Patient Care Coordinator

## 2018-02-23 NOTE — TELEPHONE ENCOUNTER
This clinician called pt's cousin Melissa (and HCP) to confirm that 11am appointment on 2/26/18 will work for family, Melissa confirmed this and stated that she would inform Tomas Gonzales who will also be present via phone. This clinician also contacted pt's son, Les. Les reported that he would discuss with pt this weekend who he would like to have at visit in person and will organize those family members to be at visit on Monday 2/26/18. Son to contact this clinician in the morning of 2/26/18 to inform of the family members who will be in attendance in person.    DAGO Yi, LICSW  Phone: [phone]  Pager: [phone]    [facility]: M, T  *every other Thursday, 8am-4:30pm  [facility]: W, F, *every other Thursday, 8am-4:30pm

## 2018-02-26 NOTE — MR AVS SNAPSHOT
After Visit Summary   2/26/2018    Tomas Grey    MRN: [id number]           Patient Information     Date Of Birth          1945        Visit Information        Provider Department      2/26/2018 12:00 PM [facility] INFUSION CHAIR [facility]        Today's Diagnoses     Prostate cancer metastatic to multiple sites (H)    -  1       Follow-ups after your visit        Your next 10 appointments already scheduled     Mar 26, 2018 11:30 AM CDT   Return Visit with Manpreet Oviedo MD   [facility] ([facility])    [facility]  [address]  [facility] [address]   [phone]            Apr 10, 2018  1:00 PM CDT   (Arrive by 12:45 PM)   New Patient Visit with JASON Macedo   [facility] ([facility])    [address] [place] [address]  [facility] [id number] [phone]              Future tests that were ordered for you today     Open Standing Orders        Priority Remaining Interval Expires Ordered    Transfuse red blood cell unit Routine 99/100 TRANSFUSE 1 UNIT  2/26/2018    Red blood cell prepare order unit Routine 99/100 CONDITIONAL (SPECIFY) BLOOD  2/26/2018          Open Future Orders        Priority Expected Expires Ordered    XR Chest 2 Views Routine 2/26/2018 2/26/2019 2/26/2018            Who to contact     If you have questions or need follow up information about today's clinic visit or your schedule please contact [facility] directly at [phone].  Normal or non-critical lab and imaging results will be communicated to you by MyChart, letter or phone within 4 business days after the clinic has received the results. If you do not hear from us within 7 days, please contact the clinic through MyChart or phone. If you have a critical or abnormal lab result, we will  "notify you by phone as soon as possible.  Submit refill requests through [employer] or call your pharmacy and they will forward the refill request to us. Please allow 3 business days for your refill to be completed.          Additional Information About Your Visit        spotdockhart Information     [employer] lets you send messages to your doctor, view your test results, renew your prescriptions, schedule appointments and more. To sign up, go to [email or web address] . Click on \"Log in\" on the left side of the screen, which will take you to the Welcome page. Then click on \"Sign up Now\" on the right side of the page.     You will be asked to enter the access code listed below, as well as some personal information. Please follow the directions to create your username and password.     Your access code is: [email or web address]  Expires: 3/14/2018 10:17 AM     Your access code will  in 90 days. If you need help or a new code, please call your [place] clinic or [phone].        Care EveryWhere ID     This is your Care EveryWhere ID. This could be used by other organizations to access your [place] medical records  [id number]        Your Vitals Were     Pulse Temperature                112 99  F (37.2  C) (Tympanic)           Blood Pressure from Last 3 Encounters:   18 107/68   18 109/68   02/15/18 112/64    Weight from Last 3 Encounters:   02/15/18 89.1 kg (196 lb 6.4 oz)   18 88.4 kg (194 lb 12.8 oz)   18 88.9 kg (196 lb)              We Performed the Following     ABO/Rh type and screen     Blood component     CBC with platelets and differential     Transfuse red blood cell unit     UA with Microscopic (Cara Ibarra; [facility])          Today's Medication Changes          These changes are accurate as of 18  3:15 PM.  If you have any questions, ask your nurse or doctor.               Start taking these medicines.        Dose/Directions    levofloxacin 250 MG tablet   Commonly known as:  " LEVAQUIN   Used for:  Prostate cancer metastatic to multiple sites (H)   Started by:  Manpreet Oviedo MD        Dose:  250 mg   Take 1 tablet (250 mg) by mouth daily   Quantity:  5 tablet   Refills:  0            Where to get your medicines      These medications were sent to [place], [place] - [address] [facility]  [address] [facility] [address]     Phone:  [phone]     levofloxacin 250 MG tablet                Primary Care Provider Office Phone # Fax #    Ekaterina LozanoSAL [phone]       [address] [facility] [address]  [facility] [address]        Equal Access to Services     [facility]: Hadii aad ku hadasho Soomaali, waaxda luqadaha, qaybta kaalmada adeegyada, waxtony prather . So [facility] [phone].    ATENCIÓN: Si habla español, tiene a soria disposición servicios gratuitos de asistencia lingüística. [facility] [phone].    We comply with applicable federal civil rights laws and [place] laws. We do not discriminate on the basis of race, color, national origin, age, disability, sex, sexual orientation, or gender identity.            Thank you!     Thank you for choosing [facility]  for your care. Our goal is always to provide you with excellent care. Hearing back from our patients is one way we can continue to improve our services. Please take a few minutes to complete the written survey that you may receive in the mail after your visit with us. Thank you!             Your Updated Medication List - Protect others around you: Learn how to safely use, store and throw away your medicines at www.disposemymeds.org.          This list is accurate as of 2/26/18  3:15 PM.  Always use your most recent med list.                   Brand Name Dispense Instructions for use Diagnosis    acetaminophen 500 MG tablet    TYLENOL     Take 2 tablets (1,000 mg) by mouth 3 times daily Not to exceed 3000  mg/24 hours    Prostate cancer metastatic to bone (H)       ammonium lactate 12 % lotion    LAC-HYDRIN     Apply topically 2 times daily as needed for dry skin To all extremities for dry skin    Venous stasis dermatitis of both lower extremities       ATORVASTATIN CALCIUM PO      Take 10 mg by mouth daily        BETIMOL OP      Place 1 drop into both eyes daily        bimatoprost 0.01 % Soln    LUMIGAN    1 Bottle    Place 1 drop into both eyes At Bedtime    Eye abnormalities       budesonide-formoterol 160-4.5 MCG/ACT Inhaler    SYMBICORT     Inhale 2 puffs into the lungs 2 times daily    Chronic obstructive pulmonary disease, unspecified COPD type (H)       calcium carbonate 1500 (600 CA) MG tablet    OS-JORDAN 600 mg [place]. Ca    60 tablet    Take 1 tablet (1,500 mg) by mouth 2 times daily (with meals)    Prostate cancer metastatic to bone (H)       Carboxymethylcellulose Sod PF 0.5 % Soln ophthalmic solution    REFRESH PLUS    1 Bottle    Place 1 drop into both eyes 4 times daily And 1 drop both eyes daily prn    Dry eyes       diclofenac 1 % Gel topical gel    VOLTAREN    100 g    Apply 4 grams to knees four times daily as needed using enclosed dosing card.    Chronic pain of both knees       DULoxetine 20 MG EC capsule    CYMBALTA    60 capsule    Take 1 capsule (20 mg) by mouth daily    Depression, unspecified depression type       fentaNYL 12 mcg/hr 72 hr patch    DURAGESIC    5 patch    Place 1 patch onto the skin every 72 hours    Prostate cancer metastatic to bone (H)       ferrous sulfate 325 (65 FE) MG tablet    IRON    100 tablet    Take 1 tablet (325 mg) by mouth 2 times daily    Anemia, unspecified type       folic acid 1 MG tablet    FOLVITE    30 tablet    Take 1 tablet (1 mg) by mouth daily    Anemia, unspecified type       furosemide 20 MG tablet    LASIX    30 tablet    Take 1 tablet (20 mg) by mouth 2 times daily    Chronic diastolic congestive heart failure (H), Lymphedema of both lower  extremities       ipratropium - albuterol 0.5 mg/2.5 mg/3 mL 0.5-2.5 (3) MG/3ML neb solution    DUONEB     Take 1 vial by nebulization every 4 hours as needed for shortness of breath / dyspnea or wheezing        isosorbide mononitrate 30 MG 24 hr tablet    IMDUR    30 tablet    Take 2 tablets (60 mg) by mouth daily    Essential hypertension       latanoprost 0.005 % ophthalmic solution    XALATAN     Place 1 drop into both eyes At Bedtime        levofloxacin 250 MG tablet    LEVAQUIN    5 tablet    Take 1 tablet (250 mg) by mouth daily    Prostate cancer metastatic to multiple sites (H)       METHOCARBAMOL PO      Take 500 mg by mouth every 6 hours as needed for muscle spasms        metoprolol succinate 50 MG 24 hr tablet    TOPROL XL    5 tablet    Take 1 tablet (50 mg) by mouth daily    ASCVD (arteriosclerotic cardiovascular disease)       nitroGLYcerin 0.4 MG sublingual tablet    NITROSTAT    25 tablet    Place 1 tablet (0.4 mg) under the tongue every 5 minutes as needed for chest pain if you are still having symptoms after 3 doses (15 minutes) call 911.    Coronary artery disease involving native heart with angina pectoris, unspecified vessel or lesion type (H)       omeprazole 40 MG capsule    priLOSEC     Take 1 capsule (40 mg) by mouth 2 times daily        ondansetron 4 MG Film     12 each    Take 4 mg by mouth every 8 hours as needed    Prostate cancer metastatic to bone (H)       oxyCODONE IR 5 MG tablet    ROXICODONE    18 tablet    Take 1 tablet (5 mg) by mouth every 4 hours as needed    Prostate cancer metastatic to bone (H)       polyethylene glycol Packet    MIRALAX/GLYCOLAX    7 packet    Take 17 g by mouth daily , hold for loose stools.    Constipation, unspecified constipation type       senna-docusate 8.6-50 MG per tablet    SENOKOT-S;PERICOLACE    100 tablet    Take 2 tablets by mouth 2 times daily , hold for loose stools.    Constipation, unspecified constipation type       simethicone 40  MG/0.6ML suspension    MYLICON     Take 0.6 mLs (40 mg) by mouth every 6 hours as needed for cramping    Flatulence, eructation and gas pain       sucralfate 1 GM/10ML suspension    CARAFATE    1200 mL    Take 10 mLs (1 g) by mouth 4 times daily (before meals and nightly)    Gastrointestinal hemorrhage with melena       tamsulosin 0.4 MG capsule    FLOMAX    5 capsule    Take 1 capsule (0.4 mg) by mouth daily    Benign non-nodular prostatic hyperplasia without lower urinary tract symptoms       tiotropium 18 MCG capsule    SPIRIVA    30 capsule    Inhale 1 capsule (18 mcg) into the lungs daily    Chronic obstructive pulmonary disease, unspecified COPD type (H)

## 2018-02-26 NOTE — NURSING NOTE
"Oncology Rooming Note    February 26, 2018 11:12 AM   Tomas Grey is a 73 year old male who presents for:    Chief Complaint   Patient presents with     Oncology Clinic Visit     Follow up - Prostate cancer metastatic to multiple sites      Initial Vitals: /68  Pulse 108  Temp 100.4  F (38  C) (Tympanic)  Resp 18  Ht 1.803 m (5' 11\")  SpO2 97% Estimated body mass index is 27.39 kg/(m^2) as calculated from the following:    Height as of 2/15/18: 1.803 m (5' 11\").    Weight as of 2/15/18: 89.1 kg (196 lb 6.4 oz). There is no height or weight on file to calculate BSA.  Data Unavailable Comment: Data Unavailable   No LMP for male patient.  Allergies reviewed: Yes  Medications reviewed: Yes    Medications: Medication refills not needed today.  Pharmacy name entered into EPIC:    [facility]  [facility] - [place], [place] - [address]  [facility] - [place] - [address] [place] [address] & [facility] - [place], [place] - [address] [facility] - [place], [place] - [address] [facility] - [address] PHARMACY - [place], [place] - [address]    Clinical concerns: Follow up      8 minutes for nursing intake (face to face time)     Flor Wolf CMA     DISCHARGE PLAN:  Next appointments: See patient instruction section  Departure Mode: W/C  Accompanied by: self  0 minutes for nursing discharge (face to face time)   Flor Wolf CMA                  "

## 2018-02-26 NOTE — PROGRESS NOTES
Social Work Progress Note      Data/Intervention:  Patient Name:  Tomas Grey  /Age:  1945 (73 year old)    Reason for Follow-Up:  Tomas comes to clinic today via wheelchair for scheduled family conference with this clinician at Dr. Oviedo to discuss in greater detail pt's goals of care.     Intervention:   In attendance at meeting were pt, pt's HCP/cousin Melissa (via phone as she resides in [place]), Dr. Oviedo, and this clinician. Dr. Oviedo reviewed for family pt's multiple hospitalizations, poor quality of life, and limited options in terms of management of pt's advanced cancer. Dr. Oviedo made recommendation that pt consider hospice care at nursing facility. This clinician provided additional education to pt and family about frequency of visits, goals of services, members of hospice care team, communication with pt's rehab. Pt and cousin reiterated their understanding that engaging with hospice services would be a focus on comfort. Pt acknowledged concern that he had about enrolling with hospice meant to him that he thought he would have an expedited death, provided education today about benefits of engaging with hospice at earlier rather than later stage and Dr. Oviedo provided education as to studies that have been done regarding enhanced survival when engaging with hospice services. Pt also reported concerns today about ability to continue to receive blood transfusions when on hospice, as pt reports that he continues to find a benefit when he receives blood transfusions. Pt acknowledged that it has become more taxing for him to come to and from clinic to receive blood transfusions.     This clinician updated pt's son Les ([phone]) and pt's daughter-in-law Jaclyn ([phone]) at pt's request. This clinician also left voic[facility] as to pt's decision to enroll with hospice.    This clinician made referral to [facility], and  provided contact information for Melissa ([phone]), and Les, requesting that they coordinate meeting time with these family members and supports. [facility] will contact this clinician when family meeting is set up, and this clinician will plan to follow-up with family regarding pt's decision to enroll in hospice services. This clinician will continue to support pt and family as pt elects to engage with hospice.     Plan:  1) [facility] to contact this clinician with day and time of hospice meeting with Les Torres, and Rudy. This clinician will plan to follow-up with family after meeting for continued psychosocial support.   2) Will continue to follow pt and provide ongoing psychosocial support as pt continues to make decisions as to his plan of care.     Please call or page if needs or concerns arise.     DAGO Yi, LICSW  Direct Phone: [phone]  Pager: [phone]

## 2018-02-26 NOTE — MR AVS SNAPSHOT
After Visit Summary   2/26/2018    Tomas Grey    MRN: [id number]           Patient Information     Date Of Birth          1945        Visit Information        Provider Department      2/26/2018 1:31 PM Tammy Barriga LICSW [facility]        Today's Diagnoses     Counseling NOS(V65.40)    -  1       Follow-ups after your visit        Your next 10 appointments already scheduled     Mar 26, 2018 11:30 AM CDT   Return Visit with Manpreet Oviedo MD   [facility] ([facility])    [facility]  [address]  [facility] [address]   [phone]            Apr 10, 2018  1:00 PM CDT   (Arrive by 12:45 PM)   New Patient Visit with JASON Macedo   [facility]rologic [facility] ([facility])    [address] [place] [address]  4th [facility] [address] [phone]              Future tests that were ordered for you today     Open Standing Orders        Priority Remaining Interval Expires Ordered    Transfuse red blood cell unit Routine 99/100 TRANSFUSE 1 UNIT  2/26/2018    Red blood cell prepare order unit Routine 99/100 CONDITIONAL (SPECIFY) BLOOD  2/26/2018          Open Future Orders        Priority Expected Expires Ordered    XR Chest 2 Views Routine 2/26/2018 2/26/2019 2/26/2018            Who to contact     If you have questions or need follow up information about today's clinic visit or your schedule please contact [facility] directly at [phone].  Normal or non-critical lab and imaging results will be communicated to you by MyChart, letter or phone within 4 business days after the clinic has received the results. If you do not hear from us within 7 days, please contact the clinic through MyChart or phone. If you have a critical or abnormal lab result, we will notify you by phone as soon as possible.  Submit refill requests through  "[employer] or call your pharmacy and they will forward the refill request to us. Please allow 3 business days for your refill to be completed.          Additional Information About Your Visit        MyChart Information     [employer] lets you send messages to your doctor, view your test results, renew your prescriptions, schedule appointments and more. To sign up, go to [email or web address] . Click on \"Log in\" on the left side of the screen, which will take you to the Welcome page. Then click on \"Sign up Now\" on the right side of the page.     You will be asked to enter the access code listed below, as well as some personal information. Please follow the directions to create your username and password.     Your access code is: [email or web address]  Expires: 3/14/2018 10:17 AM     Your access code will  in 90 days. If you need help or a new code, please call your [facility] clinic or [phone].        Care EveryWhere ID     This is your Care EveryWhere ID. This could be used by other organizations to access your [facility] medical records  [id number]         Blood Pressure from Last 3 Encounters:   18 112/57   18 109/68   02/15/18 112/64    Weight from Last 3 Encounters:   02/15/18 89.1 kg (196 lb 6.4 oz)   18 88.4 kg (194 lb 12.8 oz)   18 88.9 kg (196 lb)              Today, you had the following     No orders found for display         Today's Medication Changes          These changes are accurate as of 18  1:50 PM.  If you have any questions, ask your nurse or doctor.               Start taking these medicines.        Dose/Directions    levofloxacin 250 MG tablet   Commonly known as:  LEVAQUIN   Used for:  Prostate cancer metastatic to multiple sites (H)   Started by:  Manpreet Oviedo MD        Dose:  250 mg   Take 1 tablet (250 mg) by mouth daily   Quantity:  5 tablet   Refills:  0            Where to get your medicines      These medications were sent to [facility] - " [facility] [address] [facility]  [address] [place] [address]     Phone:  [phone]     levofloxacin 250 MG tablet                Primary Care Provider Office Phone # Fax #    SAL Acevedo DOMINICK [phone]       [address] [place] [address]        Equal Access to Services     [facility] : Hadii aad ku hadasho Soomaali, waaxda luqadaha, qaybta kaalmada adeegyada, waxay idiin hayaan adeeg kharash la'aan ah. So [facility] [phone].    ATENCIÓN: Si habla español, tiene a soria disposición servicios gratuitos de asistencia lingüística. Matt al [phone].    We comply with applicable federal civil rights laws and [place] laws. We do not discriminate on the basis of race, color, national origin, age, disability, sex, sexual orientation, or gender identity.            Thank you!     Thank you for choosing [facility]  for your care. Our goal is always to provide you with excellent care. Hearing back from our patients is one way we can continue to improve our services. Please take a few minutes to complete the written survey that you may receive in the mail after your visit with us. Thank you!             Your Updated Medication List - Protect others around you: Learn how to safely use, store and throw away your medicines at www.disposemymeds.org.          This list is accurate as of 2/26/18  1:50 PM.  Always use your most recent med list.                   Brand Name Dispense Instructions for use Diagnosis    acetaminophen 500 MG tablet    TYLENOL     Take 2 tablets (1,000 mg) by mouth 3 times daily Not to exceed 3000 mg/24 hours    Prostate cancer metastatic to bone (H)       ammonium lactate 12 % lotion    LAC-HYDRIN     Apply topically 2 times daily as needed for dry skin To all extremities for dry skin    Venous stasis dermatitis of both lower extremities       ATORVASTATIN CALCIUM PO      Take 10 mg by mouth daily        BETIMOL OP      Place 1  drop into both eyes daily        bimatoprost 0.01 % Soln    LUMIGAN    1 Bottle    Place 1 drop into both eyes At Bedtime    Eye abnormalities       budesonide-formoterol 160-4.5 MCG/ACT Inhaler    SYMBICORT     Inhale 2 puffs into the lungs 2 times daily    Chronic obstructive pulmonary disease, unspecified COPD type (H)       calcium carbonate 1500 (600 CA) MG tablet    OS-JORDAN 600 mg [place]. Ca    60 tablet    Take 1 tablet (1,500 mg) by mouth 2 times daily (with meals)    Prostate cancer metastatic to bone (H)       Carboxymethylcellulose Sod PF 0.5 % Soln ophthalmic solution    REFRESH PLUS    1 Bottle    Place 1 drop into both eyes 4 times daily And 1 drop both eyes daily prn    Dry eyes       diclofenac 1 % Gel topical gel    VOLTAREN    100 g    Apply 4 grams to knees four times daily as needed using enclosed dosing card.    Chronic pain of both knees       DULoxetine 20 MG EC capsule    CYMBALTA    60 capsule    Take 1 capsule (20 mg) by mouth daily    Depression, unspecified depression type       fentaNYL 12 mcg/hr 72 hr patch    DURAGESIC    5 patch    Place 1 patch onto the skin every 72 hours    Prostate cancer metastatic to bone (H)       ferrous sulfate 325 (65 FE) MG tablet    IRON    100 tablet    Take 1 tablet (325 mg) by mouth 2 times daily    Anemia, unspecified type       folic acid 1 MG tablet    FOLVITE    30 tablet    Take 1 tablet (1 mg) by mouth daily    Anemia, unspecified type       furosemide 20 MG tablet    LASIX    30 tablet    Take 1 tablet (20 mg) by mouth 2 times daily    Chronic diastolic congestive heart failure (H), Lymphedema of both lower extremities       ipratropium - albuterol 0.5 mg/2.5 mg/3 mL 0.5-2.5 (3) MG/3ML neb solution    DUONEB     Take 1 vial by nebulization every 4 hours as needed for shortness of breath / dyspnea or wheezing        isosorbide mononitrate 30 MG 24 hr tablet    IMDUR    30 tablet    Take 2 tablets (60 mg) by mouth daily    Essential hypertension        latanoprost 0.005 % ophthalmic solution    XALATAN     Place 1 drop into both eyes At Bedtime        levofloxacin 250 MG tablet    LEVAQUIN    5 tablet    Take 1 tablet (250 mg) by mouth daily    Prostate cancer metastatic to multiple sites (H)       METHOCARBAMOL PO      Take 500 mg by mouth every 6 hours as needed for muscle spasms        metoprolol succinate 50 MG 24 hr tablet    TOPROL XL    5 tablet    Take 1 tablet (50 mg) by mouth daily    ASCVD (arteriosclerotic cardiovascular disease)       nitroGLYcerin 0.4 MG sublingual tablet    NITROSTAT    25 tablet    Place 1 tablet (0.4 mg) under the tongue every 5 minutes as needed for chest pain if you are still having symptoms after 3 doses (15 minutes) call 911.    Coronary artery disease involving native heart with angina pectoris, unspecified vessel or lesion type (H)       omeprazole 40 MG capsule    priLOSEC     Take 1 capsule (40 mg) by mouth 2 times daily        ondansetron 4 MG Film     12 each    Take 4 mg by mouth every 8 hours as needed    Prostate cancer metastatic to bone (H)       oxyCODONE IR 5 MG tablet    ROXICODONE    18 tablet    Take 1 tablet (5 mg) by mouth every 4 hours as needed    Prostate cancer metastatic to bone (H)       polyethylene glycol Packet    MIRALAX/GLYCOLAX    7 packet    Take 17 g by mouth daily , hold for loose stools.    Constipation, unspecified constipation type       senna-docusate 8.6-50 MG per tablet    SENOKOT-S;PERICOLACE    100 tablet    Take 2 tablets by mouth 2 times daily , hold for loose stools.    Constipation, unspecified constipation type       simethicone 40 MG/0.6ML suspension    MYLICON     Take 0.6 mLs (40 mg) by mouth every 6 hours as needed for cramping    Flatulence, eructation and gas pain       sucralfate 1 GM/10ML suspension    CARAFATE    1200 mL    Take 10 mLs (1 g) by mouth 4 times daily (before meals and nightly)    Gastrointestinal hemorrhage with melena       tamsulosin 0.4 MG capsule     FLOMAX    5 capsule    Take 1 capsule (0.4 mg) by mouth daily    Benign non-nodular prostatic hyperplasia without lower urinary tract symptoms       tiotropium 18 MCG capsule    SPIRIVA    30 capsule    Inhale 1 capsule (18 mcg) into the lungs daily    Chronic obstructive pulmonary disease, unspecified COPD type (H)

## 2018-02-26 NOTE — TELEPHONE ENCOUNTER
This clinician received call of plan for pt, HCP Melissa, and son Les to meet with [facility] team at 3:30pm on 2/28/18. This clinician will follow-up with family later in week regarding meeting and transition to hospice. Family knows to reach out in the case of psychosocial distress or concern.     DAGO Yi, LICSW  Phone: [phone]  Pager: [phone]    [place] Fanny: M, T  *every other Thursday, 8am-4:30pm  [place] Angel[place]: W, F, *every other Thursday, 8am-4:30pm

## 2018-02-26 NOTE — PROGRESS NOTES
Infusion Nursing Note:  Tomas Grey presents today for 1 unit PRBC.    Patient seen by provider today: Yes: Dr. Oviedo.   present during visit today: Not Applicable.    Note: N/A.    Intravenous Access:  Peripheral IV placed.    Treatment Conditions:  Blood transfusion consent signed 12/13/17.      Post Infusion Assessment:  Patient tolerated infusion without incident.  Blood return noted pre and post infusion.  Site patent and intact, free from redness, edema or discomfort.  No evidence of extravasations.  Access discontinued per protocol.    Discharge Plan:   Clinic nurse calling patient's nursing home to order levaquin for five days.  Patient discharged in stable condition accompanied by: self.  Departure Mode: Wheelchair.    Aamnda Simons RN

## 2018-02-26 NOTE — PATIENT INSTRUCTIONS
1- Labs today- CBC diff, UA. Cxr today  2- Levaquin 250 mg daily for 5 days  3- Hospice enrollment.     No need to schedule appointments. Dacia SANCHEZ

## 2018-02-26 NOTE — LETTER
"    2/26/2018         RE: Tomas Grey  [facility]  [address]  [facility] [address]        Dear Colleague,    Thank you for referring your patient, Tomas Grey, to the [facility]. Please see a copy of my visit note below.    [facility] PHYSICIANS  HEMATOLOGY ONCOLOGY    ONCOLOGY FOLLOWUP NOTE      DIAGNOSIS:    1- Metastatic prostate cancer with extensive metastases to bone.   2- Advanced COPD and multiple other co-morbidities.      TREATMENT:     Lupron injection monthly, discontinued 6/2016.  Casodex 50 mg daily. discontinued 2/9/2015  - Xgeva monthly  - Xtandi 4/15/15  - Zytgea and prednisone 9/19/2016-present  - Xofigo 1/4/17  - 10/2017 discontinued zytega and started Megace. Later treatment was stopped and hospice was recommended.      Subjective:  Mr. Tomas Grey comes in for followup today. He had low grade temperature. He denied any abdominal pain, difficulty urinating, new cough or breathing difficulty.   We also had arranged for a family conference to discuss plan of care.     REVIEW OF SYSTEMS:  A complete review of systems was performed and found to be negative other than pertinent positives mentioned in history of present illness.     Past medical, social histories reviewed.    Meds- Reviewed.     PHYSICAL EXAMINATION:   VITAL SIGNS:/68  Pulse 108  Temp 100.4  F (38  C) (Tympanic)  Resp 18  Ht 1.803 m (5' 11\")  SpO2 97%  CONSTITUTIONAL: Appears fatigued.    HEENT: Pupils are equal. Oropharynx is clear.   NECK: No cervical or supraclavicular lymphadenopathy.   RESPIRATORY: Clear bilaterally.   CARD/VASC: S1, S2, regular.   GI: Soft, nontender, nondistended, no hepatosplenomegaly.   MUSKULOSKELETAL: Warm, well perfused.   NEUROLOGIC: Alert, awake.   INTEGUMENT: No rash.   LYMPHATICS: Bilateral edema.he has dressing on right leg.   PSYCH: Anxious.     LABORATORY DATA AND IMAGING REVIEWED DURING THIS " VISIT:  Recent Labs   Lab Test 02/07/18 01/30/18   0833  01/29/18   0738   01/09/18   0421   12/26/16   1851   05/28/15   0715   NA   --   143  143   < >  144   < >  146*   < >  139   POTASSIUM  4.1  3.7  3.8   < >  4.5   < >  3.8   < >  5.5*   CHLORIDE   --   108  107   < >  110*   < >  99   < >  106   CO2   --   26  26   < >  22   < >  41*   < >  27   ANIONGAP   --   9  10   < >  12   < >  5   < >  6   BUN   --   23  26   < >  22   < >  48*   < >  26   CR  1.01  1.30*  1.42*   < >  1.31*   < >  1.80*   < >  1.66*   GLC  72*  115*  111*   < >  72   < >  61*   < >  121*   JORDAN   --   7.6*  7.6*   < >  8.0*   < >  9.2   < >  8.5   MAG   --    --    --    --   1.6   --   1.7   < >   --    PHOS   --    --    --    --   2.4*   --    --    --   3.3    < > = values in this interval not displayed.     Recent Labs   Lab Test  02/14/18   1126 02/05/18 01/30/18   0833  01/29/18   0738   01/24/18   2104 01/08/18   1028   WBC  10.0   --   10.0  10.0   < >  9.0   < >  12.1*   HGB  7.4*  7.7*  7.4*  7.6*   < >  9.6*   < >  3.2*   PLT  226   --   261  243   < >  267   < >  346   MCV  92   --   94  91   < >  94   < >  89   NEUTROPHIL  78.3   --    --    --    --   65.4   --   65.3    < > = values in this interval not displayed.     Recent Labs   Lab Test  01/25/18   0610  01/24/18   2104 01/08/18   1028  12/04/17   1040   BILITOTAL  0.4  0.4  0.3  0.6   ALKPHOS  227*  222*  150  157*   ALT  10  10  11  17   AST  39  24  13  29   ALBUMIN  2.4*  2.5*  2.3*  2.2*   LDH   --    --    --   282*     ECOG  performance status 3     ASSESSMENT:   1.  Metastatic prostate cancer with multiple sclerotic bone metastases.  The patient continues on monthly Lupron with Casodex.  He has been on this regimen since 05/2014.  He continues to do well symptomatically.  The patient switched care to us after having initial diagnosis and treatment in [place], [place]. He started Xtandi 4/15/15.   He is not able to keep up with his follow ups due to  multiple other medical issues and repeated hospital admissions due to COPD exacerbations.   Started Zytega 9/19/16.  He saw Dr. Callahan as a second opinion and had Xofigo 1/4/17. We continued zytega after that. Afterwards, all cancer directed treatments were stopped.   Extensive bone metastases. Bone scan 8/31/16 showed progression in bones.   He is not a candidate of chemotherapy, due to poor performance status. Repeated hospital admissions for COPD and infections and transfusion dependence.  He has persistent issues with anemia and has become transfusion dependent. Anemia can be related to GI blood loss or bone marrow infiltration by the cancer.     - Family conference- his family members did not show up, Melissa who is POA was able to be on the phone. I had a detailed discussion about incurable nature of disease, no realistic options in terms of treatment of his cancer and his poor quality of life. Discussed the role and scope of hospice. Patient and his POA expressed good understanding and he agrees to enroll in hospice. He is transfusion dependent and asked about transfusion support while enrolled in hospice- hospice sometimes allow patients to get transfusions for symptom control. They will have to discuss about transfusions with hospice.   - He has fever, I will order UA and CXR. I will start him on levaquin for possible UTI.       PLAN:   1- Labs today- CBC diff, UA. Cxr today  2- Levaquin 250 mg daily for 5 days  3- Hospice enrollment.   MANPREET OVIEDO MD  2/26/2018    CC: Balgobin, Christopher Lennox Paul, MD    Again, thank you for allowing me to participate in the care of your patient.        Sincerely,        Manpreet Oviedo MD

## 2018-02-26 NOTE — TELEPHONE ENCOUNTER
Called NICOLÁS Tobar at patient's facility to update on visit  Patient will be on Levaquin x 5 days for low grade fever and cough. Family conference was attended by phone with POA only.  Patient received one unit of blood today, had Chest X-ray which was negative, and plans to sign on to Hospice once he returns to his facility.  Ekaterina will contact writer with f/u questions or concerns.  Gage Yang RN, BSN, OCN  [facility]  Patient Care Coordinator

## 2018-02-26 NOTE — PROGRESS NOTES
"[facility] PHYSICIANS  HEMATOLOGY ONCOLOGY    ONCOLOGY FOLLOWUP NOTE      DIAGNOSIS:    1- Metastatic prostate cancer with extensive metastases to bone.   2- Advanced COPD and multiple other co-morbidities.      TREATMENT:     Lupron injection monthly, discontinued 6/2016.  Casodex 50 mg daily. discontinued 2/9/2015  - Xgeva monthly  - Xtandi 4/15/15  - Zytgea and prednisone 9/19/2016-present  - Xofigo 1/4/17  - 10/2017 discontinued zytega and started Megace. Later treatment was stopped and hospice was recommended.      Subjective:  Mr. Tomas Grey comes in for followup today. He had low grade temperature. He denied any abdominal pain, difficulty urinating, new cough or breathing difficulty.   We also had arranged for a family conference to discuss plan of care.     REVIEW OF SYSTEMS:  A complete review of systems was performed and found to be negative other than pertinent positives mentioned in history of present illness.     Past medical, social histories reviewed.    Meds- Reviewed.     PHYSICAL EXAMINATION:   VITAL SIGNS:/68  Pulse 108  Temp 100.4  F (38  C) (Tympanic)  Resp 18  Ht 1.803 m (5' 11\")  SpO2 97%  CONSTITUTIONAL: Appears fatigued.    HEENT: Pupils are equal. Oropharynx is clear.   NECK: No cervical or supraclavicular lymphadenopathy.   RESPIRATORY: Clear bilaterally.   CARD/VASC: S1, S2, regular.   GI: Soft, nontender, nondistended, no hepatosplenomegaly.   MUSKULOSKELETAL: Warm, well perfused.   NEUROLOGIC: Alert, awake.   INTEGUMENT: No rash.   LYMPHATICS: Bilateral edema.he has dressing on right leg.   PSYCH: Anxious.     LABORATORY DATA AND IMAGING REVIEWED DURING THIS VISIT:  Recent Labs   Lab Test 02/07/18 01/30/18   0833  01/29/18   0738   01/09/18   0421   12/26/16   1851   05/28/15   0715   NA   --   143  143   < >  144   < >  146*   < >  139   POTASSIUM  4.1  3.7  3.8   < >  4.5   < >  3.8   < >  5.5*   CHLORIDE   --   108  107   < >  110*   < >  99   < >  106 "   CO2   --   26  26   < >  22   < >  41*   < >  27   ANIONGAP   --   9  10   < >  12   < >  5   < >  6   BUN   --   23  26   < >  22   < >  48*   < >  26   CR  1.01  1.30*  1.42*   < >  1.31*   < >  1.80*   < >  1.66*   GLC  72*  115*  111*   < >  72   < >  61*   < >  121*   JORDAN   --   7.6*  7.6*   < >  8.0*   < >  9.2   < >  8.5   MAG   --    --    --    --   1.6   --   1.7   < >   --    PHOS   --    --    --    --   2.4*   --    --    --   3.3    < > = values in this interval not displayed.     Recent Labs   Lab Test  02/14/18   1126 02/05/18 01/30/18   0833  01/29/18   0738   01/24/18   2104   01/08/18   1028   WBC  10.0   --   10.0  10.0   < >  9.0   < >  12.1*   HGB  7.4*  7.7*  7.4*  7.6*   < >  9.6*   < >  3.2*   PLT  226   --   261  243   < >  267   < >  346   MCV  92   --   94  91   < >  94   < >  89   NEUTROPHIL  78.3   --    --    --    --   65.4   --   65.3    < > = values in this interval not displayed.     Recent Labs   Lab Test  01/25/18   0610  01/24/18   2104  01/08/18   1028  12/04/17   1040   BILITOTAL  0.4  0.4  0.3  0.6   ALKPHOS  227*  222*  150  157*   ALT  10  10  11  17   AST  39  24  13  29   ALBUMIN  2.4*  2.5*  2.3*  2.2*   LDH   --    --    --   282*     ECOG  performance status 3     ASSESSMENT:   1.  Metastatic prostate cancer with multiple sclerotic bone metastases.  The patient continues on monthly Lupron with Casodex.  He has been on this regimen since 05/2014.  He continues to do well symptomatically.  The patient switched care to us after having initial diagnosis and treatment in [place], [place]. He started Xtandi 4/15/15.   He is not able to keep up with his follow ups due to multiple other medical issues and repeated hospital admissions due to COPD exacerbations.   Started Zytega 9/19/16.  He saw Dr. Callahan as a second opinion and had Xofigo 1/4/17. We continued zytega after that. Afterwards, all cancer directed treatments were stopped.   Extensive bone metastases. Bone scan  8/31/16 showed progression in bones.   He is not a candidate of chemotherapy, due to poor performance status. Repeated hospital admissions for COPD and infections and transfusion dependence.  He has persistent issues with anemia and has become transfusion dependent. Anemia can be related to GI blood loss or bone marrow infiltration by the cancer.     - Family conference- his family members did not show up, Melissa who is POA was able to be on the phone. I had a detailed discussion about incurable nature of disease, no realistic options in terms of treatment of his cancer and his poor quality of life. Discussed the role and scope of hospice. Patient and his POA expressed good understanding and he agrees to enroll in hospice. He is transfusion dependent and asked about transfusion support while enrolled in hospice- hospice sometimes allow patients to get transfusions for symptom control. They will have to discuss about transfusions with hospice.   - He has fever, I will order UA and CXR. I will start him on levaquin for possible UTI.       PLAN:   1- Labs today- CBC diff, UA. Cxr today  2- Levaquin 250 mg daily for 5 days  3- Hospice enrollment.   TAYLOR PETERS MD  2/26/2018    CC: Balgobin, Christopher Lennox Paul, MD

## 2018-03-02 NOTE — TELEPHONE ENCOUNTER
Social Work Progress Note      Data/Intervention:  Patient Name:  Tomas Grey  /Age:  1945 (73 year old)    Reason for Follow-Up:  This clinician called pt for routine psychosocial check-in and support, also called pt's HCP Melissa, and [facility].     Intervention:   At time of this clinician's call pt reported that he was enjoying visit from music therapist through hospice. Pt reported that he signed onto [facility] services, and had no specific needs at current time.     This clinician contacted pt's HCP Melissa with goal of checking in as to status of meeting with hospice team. Melissa reported that she never met with hospice team, and would like to talk with team about concerns she has regarding ongoing plan for managing and supporting pt's pain.     This clinician called [facility], who confirmed that pt had been admitted to hospice 18, and that pt's care team is as follows:   Maday Dumont [phone]- RN  Lakshmi Perez [phone]- SW    This clinician called and LVM for pt's RN requesting that she contact pt's HCP as pt's HCP has questions for team. Informed Melissa of this clinician's actions taken, Melissa knows to reach out to this clinician with questions, concerns, or difficulty connecting with hospice team.     Plan:  Previously provided patient/family with writer's contact information and availability. Family knows to reach out to this clinician in the case of psychosocial distress or concern.     Please call or page if needs or concerns arise.     DAGO Yi, [facility]  Direct Phone: [phone]  Pager: [phone]

## 2018-03-06 NOTE — PROGRESS NOTES
[facility]    Chief Complaint   Patient presents with     RECHECK     HPI:    Tomas Grey is a 73 year old  (1945), who is being seen today for an episodic care visit at [facility]. HPI information obtained from: facility chart records, facility staff, patient report and [facility] chart review. Today's concern is:    Hospice Care Patient. Since previous visit, has enrolled in [facility] 2/28/18 after meeting with Dr. Oviedo 2/26/18 and discussing incurable nature of disease of metastatic prostate cancer with no treatment options and poor quality of life. Pain medications have been adjusted and polypharmacy reduced. Today, resident is resting in his bed. Appears comfortable. States his neck only hurts when he sits up.     Nausea and Vomiting. Reports vomiting this morning. States he was too nauseous to eat breakfast. Is now resting comfortably in bed. Per staff report, no emesis or nausea was noted early this morning. Appetite has been poor for some time with weight loss noted. Staff report the only time patient reports these concerns is when he wants to go to the hospital.     Hypertension. Upon review of blood pressure over the past month, systolic range from . Diastolic range from 48-87. Most blood pressure readings < 130/90.    ALLERGIES: Morphine  Past Medical, Surgical, Family and Social History reviewed and updated in [facility].    Current Outpatient Prescriptions   Medication Sig Dispense Refill     atropine 1 % ophthalmic solution Place 2 drops under the tongue every 2 hours as needed       bisacodyl (DULCOLAX) 10 MG Suppository Place 10 mg rectally daily as needed for constipation       fentaNYL (DURAGESIC) 25 mcg/hr 72 hr patch Place 1 patch onto the skin every 72 hours remove old patch.       loperamide (IMODIUM) 2 MG capsule Take 2 mg by mouth as needed for diarrhea       LORAZEPAM PO Take 0.5 mg by mouth every 4 hours as needed for anxiety        oxyCODONE (ROXICODONE) 5 MG/5ML solution Take 10 mg by mouth every 2 hours as needed for moderate to severe pain       PREDNISONE PO Take 10 mg by mouth daily       OSELTAMIVIR PHOSPHATE PO Take 30 mg by mouth daily x14 days       ATORVASTATIN CALCIUM PO Take 10 mg by mouth daily       latanoprost (XALATAN) 0.005 % ophthalmic solution Place 1 drop into both eyes At Bedtime       omeprazole (PRILOSEC) 40 MG capsule Take 1 capsule (40 mg) by mouth 2 times daily       ipratropium - albuterol 0.5 mg/2.5 mg/3 mL (DUONEB) 0.5-2.5 (3) MG/3ML neb solution Take 1 vial by nebulization every 4 hours as needed for shortness of breath / dyspnea or wheezing       acetaminophen (TYLENOL) 500 MG tablet Take 2 tablets (1,000 mg) by mouth 3 times daily Not to exceed 3000 mg/24 hours       isosorbide mononitrate (IMDUR) 30 MG 24 hr tablet Take 2 tablets (60 mg) by mouth daily 30 tablet      nitroGLYcerin (NITROSTAT) 0.4 MG sublingual tablet Place 1 tablet (0.4 mg) under the tongue every 5 minutes as needed for chest pain if you are still having symptoms after 3 doses (15 minutes) call 911. 25 tablet      DULoxetine (CYMBALTA) 20 MG EC capsule Take 1 capsule (20 mg) by mouth daily 60 capsule      metoprolol succinate (TOPROL XL) 50 MG 24 hr tablet Take 1 tablet (50 mg) by mouth daily 5 tablet 0     ammonium lactate (LAC-HYDRIN) 12 % lotion Apply topically 2 times daily as needed for dry skin To all extremities for dry skin       diclofenac (VOLTAREN) 1 % GEL topical gel Apply 4 grams to knees four times daily as needed using enclosed dosing card. 100 g 0     furosemide (LASIX) 20 MG tablet Take 1 tablet (20 mg) by mouth 2 times daily 30 tablet      polyethylene glycol (MIRALAX/GLYCOLAX) Packet Take 17 g by mouth daily , hold for loose stools. 7 packet      calcium carbonate (OS-JORDAN 600 MG [place]. CA) 1500 (600 CA) MG tablet Take 1 tablet (1,500 mg) by mouth 2 times daily (with meals) 60 tablet      tamsulosin (FLOMAX) 0.4 MG capsule Take  1 capsule (0.4 mg) by mouth daily 5 capsule 0     simethicone (MYLICON) 40 MG/0.6ML suspension Take 0.6 mLs (40 mg) by mouth every 6 hours as needed for cramping       bimatoprost (LUMIGAN) 0.01 % SOLN Place 1 drop into both eyes At Bedtime 1 Bottle      Carboxymethylcellulose Sod PF (REFRESH PLUS) 0.5 % SOLN ophthalmic solution Place 1 drop into both eyes 4 times daily And 1 drop both eyes daily prn 1 Bottle      sucralfate (CARAFATE) 1 GM/10ML suspension Take 10 mLs (1 g) by mouth 4 times daily (before meals and nightly) 1200 mL      METHOCARBAMOL PO Take 500 mg by mouth every 6 hours as needed for muscle spasms       Timolol Hemihydrate (BETIMOL OP) Place 1 drop into both eyes daily        [DISCONTINUED] enzalutamide (XTANDI) 40 MG capsule Take 4 capsules (160 mg) by mouth daily 120 capsule 0     Medications reviewed:  Medications reconciled to facility chart and changes were made to reflect current medications as identified as above med list. Below are the changes that were made:   Medications stopped since last EPIC medication reconciliation:   Medications Discontinued During This Encounter   Medication Reason     fentaNYL (DURAGESIC) 12 mcg/hr 72 hr patch Medication Reconciliation Clean Up     oxyCODONE IR (ROXICODONE) 5 MG tablet Medication Reconciliation Clean Up     budesonide-formoterol (SYMBICORT) 160-4.5 MCG/ACT Inhaler Medication Reconciliation Clean Up     ferrous sulfate (IRON) 325 (65 FE) MG tablet Medication Reconciliation Clean Up     folic acid (FOLVITE) 1 MG tablet Medication Reconciliation Clean Up     levofloxacin (LEVAQUIN) 250 MG tablet Medication Reconciliation Clean Up     ondansetron 4 MG FILM Medication Reconciliation Clean Up     tiotropium (SPIRIVA) 18 MCG capsule Medication Reconciliation Clean Up     senna-docusate (SENOKOT-S;PERICOLACE) 8.6-50 MG per tablet Medication Reconciliation Clean Up       Medications started since last [facility] medication reconciliation:  Orders Placed This  Encounter   Medications     ACETAMINOPHEN RE     Sig: Place 650 mg rectally every 4 hours as needed for mild pain or fever     atropine 1 % ophthalmic solution     Sig: Place 2 drops under the tongue every 2 hours as needed     bisacodyl (DULCOLAX) 10 MG Suppository     Sig: Place 10 mg rectally daily as needed for constipation     fentaNYL (DURAGESIC) 25 mcg/hr 72 hr patch     Sig: Place 1 patch onto the skin every 72 hours remove old patch.     loperamide (IMODIUM) 2 MG capsule     Sig: Take 2 mg by mouth as needed for diarrhea     LORAZEPAM PO     Sig: Take 0.5 mg by mouth every 4 hours as needed for anxiety     insulin aspart (NOVOLOG FLEXPEN) 100 UNIT/ML injection     Sig: Inject Subcutaneous 3 times daily (with meals) Inject as per sliding scale: if 140 - 189 = 1; 190 - 239 = 2; 240 - 289 = 3; 290 - 339 = 4; 340 399 = 5; 400 - 449 = 6; 450+ = 7, subcutaneously before meals for DM2, CKD Notify provider if pre-meal glucose > or equal to 450. Notify provider if glucose is > or equal to 350 after administration of correct dose     oxyCODONE (ROXICODONE) 5 MG/5ML solution     Sig: Take 10 mg by mouth every 2 hours as needed for moderate to severe pain     PREDNISONE PO     Sig: Take 10 mg by mouth daily     OSELTAMIVIR PHOSPHATE PO     Sig: Take 30 mg by mouth daily x14 days     REVIEW OF SYSTEMS:  4 point ROS including Respiratory, CV, GI and , other than that noted in the HPI,  is negative    Physical Exam:  /72  Pulse 94  Temp 96  F (35.6  C)  Resp 16  Wt 193 lb (87.5 kg)  SpO2 99%  BMI 26.92 kg/m2  GENERAL APPEARANCE: In no distress.   ENT:  Mouth and posterior oropharynx normal, moist mucous membranes  EYES:  EOM, conjunctivae, lids, pupils and irises normal  RESP:  Respiratory effort and palpation of chest normal, no respiratory distress, diminished lung sounds in bilateral lower lobes  CV:  Palpation and auscultation of heart done, regular rate and rhythm, no murmur, rub, or gallop  ABDOMEN:  Active bowel sounds, soft, nontender, no hepatosplenomegaly or other masses  M/S:   Active movement of bilateral upper and lower extremities. Kerlix wrap on RLE.  SKIN:  Inspection of skin and subcutaneous tissue baseline, Palpation of skin and subcutaneous tissue baseline  NEURO:   Cranial nerves 2-12 are normal tested and grossly at patient's baseline  PSYCH:  Oriented to person and place    Recent Labs:   Last Basic Metabolic Panel:  Lab Results   Component Value Date     01/30/2018      Lab Results   Component Value Date    POTASSIUM 4.1 02/07/2018     Lab Results   Component Value Date    CHLORIDE 108 01/30/2018     Lab Results   Component Value Date    JORDAN 7.6 01/30/2018     Lab Results   Component Value Date    CO2 26 01/30/2018     Lab Results   Component Value Date    BUN 23 01/30/2018     Lab Results   Component Value Date    CR 1.01 02/07/2018     Lab Results   Component Value Date    GLC 72 02/07/2018     Lab Results   Component Value Date    WBC 11.8 02/26/2018     Lab Results   Component Value Date    RBC 3.17 02/26/2018     Lab Results   Component Value Date    HGB 9.0 02/26/2018     Lab Results   Component Value Date    HCT 29.7 02/26/2018     Lab Results   Component Value Date    MCV 94 02/26/2018     Lab Results   Component Value Date    MCH 28.4 02/26/2018     Lab Results   Component Value Date    MCHC 30.3 02/26/2018     Lab Results   Component Value Date    RDW 17.7 02/26/2018     Lab Results   Component Value Date     02/26/2018     Assessment/Plan:  Hospice Care Patient. Enrolled in [facility] 2/28/18 due to metastatic prostate cancer with overall decline. Remains FULL CODE. Hospice MD to discuss code status with resident on 3/7/18. Labs and blood transfusions to be arranged conditionally per [facility]. Continue Atropine, Acetaminophen, Diclofenac gel, Fentanyl Patch, Lorazepam, Methocarbamol and Oxycodone as ordered. Also taking Prednisone.     Nausea and Vomiting.  Staff at facility reports no emesis or regular complaints of vomiting. Hesitate to add additional medication for nausea if not an ongoing issue due to polypharmacy and resident's request to not take medications in the past. Consider additional medications for nausea if complaint persists and/or Scopolamine patch to avoid medications.    Hypertension. Blood pressures <130/90. Taking Metoprolol for paroxsymal atrial fibrillation and Isosorbide Mononitrate for angina. Pulse primarily in high normal to tachycardia. Will not make any medication changes at this time. Consider adjustment as condition worsens. Goal is to avoid resident calling 911 as he has done in the past when not feeling well.     Electronically signed by  SAL Acevedo CNP

## 2018-03-06 NOTE — LETTER
3/6/2018        RE: Tomas Grey  [facility]  [address]  [place] [facility] [place] [facility] [place] [address]        [place] [facility]    Chief Complaint   Patient presents with     RECHECK     HPI:    Tomas Grey is a 73 year old  (1945), who is being seen today for an episodic care visit at [facility]. HPI information obtained from: facility chart records, facility staff, patient report and [facility] chart review. Today's concern is:    Hospice Care Patient. Since previous visit, has enrolled in [facility] 2/28/18 after meeting with Dr. Oviedo 2/26/18 and discussing incurable nature of disease of metastatic prostate cancer with no treatment options and poor quality of life. Pain medications have been adjusted and polypharmacy reduced. Today, resident is resting in his bed. Appears comfortable. States his neck only hurts when he sits up.     Nausea and Vomiting. Reports vomiting this morning. States he was too nauseous to eat breakfast. Is now resting comfortably in bed. Per staff report, no emesis or nausea was noted early this morning. Appetite has been poor for some time with weight loss noted. Staff report the only time patient reports these concerns is when he wants to go to the hospital.     Hypertension. Upon review of blood pressure over the past month, systolic range from . Diastolic range from 48-87. Most blood pressure readings < 130/90.    ALLERGIES: Morphine  Past Medical, Surgical, Family and Social History reviewed and updated in [facility].    Current Outpatient Prescriptions   Medication Sig Dispense Refill     atropine 1 % ophthalmic solution Place 2 drops under the tongue every 2 hours as needed       bisacodyl (DULCOLAX) 10 MG Suppository Place 10 mg rectally daily as needed for constipation       fentaNYL (DURAGESIC) 25 mcg/hr 72 hr patch Place 1 patch onto the skin every 72 hours remove old patch.       loperamide (IMODIUM) 2 MG capsule  Take 2 mg by mouth as needed for diarrhea       LORAZEPAM PO Take 0.5 mg by mouth every 4 hours as needed for anxiety       oxyCODONE (ROXICODONE) 5 MG/5ML solution Take 10 mg by mouth every 2 hours as needed for moderate to severe pain       PREDNISONE PO Take 10 mg by mouth daily       OSELTAMIVIR PHOSPHATE PO Take 30 mg by mouth daily x14 days       ATORVASTATIN CALCIUM PO Take 10 mg by mouth daily       latanoprost (XALATAN) 0.005 % ophthalmic solution Place 1 drop into both eyes At Bedtime       omeprazole (PRILOSEC) 40 MG capsule Take 1 capsule (40 mg) by mouth 2 times daily       ipratropium - albuterol 0.5 mg/2.5 mg/3 mL (DUONEB) 0.5-2.5 (3) MG/3ML neb solution Take 1 vial by nebulization every 4 hours as needed for shortness of breath / dyspnea or wheezing       acetaminophen (TYLENOL) 500 MG tablet Take 2 tablets (1,000 mg) by mouth 3 times daily Not to exceed 3000 mg/24 hours       isosorbide mononitrate (IMDUR) 30 MG 24 hr tablet Take 2 tablets (60 mg) by mouth daily 30 tablet      nitroGLYcerin (NITROSTAT) 0.4 MG sublingual tablet Place 1 tablet (0.4 mg) under the tongue every 5 minutes as needed for chest pain if you are still having symptoms after 3 doses (15 minutes) call 911. 25 tablet      DULoxetine (CYMBALTA) 20 MG EC capsule Take 1 capsule (20 mg) by mouth daily 60 capsule      metoprolol succinate (TOPROL XL) 50 MG 24 hr tablet Take 1 tablet (50 mg) by mouth daily 5 tablet 0     ammonium lactate (LAC-HYDRIN) 12 % lotion Apply topically 2 times daily as needed for dry skin To all extremities for dry skin       diclofenac (VOLTAREN) 1 % GEL topical gel Apply 4 grams to knees four times daily as needed using enclosed dosing card. 100 g 0     furosemide (LASIX) 20 MG tablet Take 1 tablet (20 mg) by mouth 2 times daily 30 tablet      polyethylene glycol (MIRALAX/GLYCOLAX) Packet Take 17 g by mouth daily , hold for loose stools. 7 packet      calcium carbonate (OS-JORDAN 600 MG [place]. CA) 1500 (600 CA)  MG tablet Take 1 tablet (1,500 mg) by mouth 2 times daily (with meals) 60 tablet      tamsulosin (FLOMAX) 0.4 MG capsule Take 1 capsule (0.4 mg) by mouth daily 5 capsule 0     simethicone (MYLICON) 40 MG/0.6ML suspension Take 0.6 mLs (40 mg) by mouth every 6 hours as needed for cramping       bimatoprost (LUMIGAN) 0.01 % SOLN Place 1 drop into both eyes At Bedtime 1 Bottle      Carboxymethylcellulose Sod PF (REFRESH PLUS) 0.5 % SOLN ophthalmic solution Place 1 drop into both eyes 4 times daily And 1 drop both eyes daily prn 1 Bottle      sucralfate (CARAFATE) 1 GM/10ML suspension Take 10 mLs (1 g) by mouth 4 times daily (before meals and nightly) 1200 mL      METHOCARBAMOL PO Take 500 mg by mouth every 6 hours as needed for muscle spasms       Timolol Hemihydrate (BETIMOL OP) Place 1 drop into both eyes daily        [DISCONTINUED] enzalutamide (XTANDI) 40 MG capsule Take 4 capsules (160 mg) by mouth daily 120 capsule 0     Medications reviewed:  Medications reconciled to facility chart and changes were made to reflect current medications as identified as above med list. Below are the changes that were made:   Medications stopped since last EPIC medication reconciliation:   Medications Discontinued During This Encounter   Medication Reason     fentaNYL (DURAGESIC) 12 mcg/hr 72 hr patch Medication Reconciliation Clean Up     oxyCODONE IR (ROXICODONE) 5 MG tablet Medication Reconciliation Clean Up     budesonide-formoterol (SYMBICORT) 160-4.5 MCG/ACT Inhaler Medication Reconciliation Clean Up     ferrous sulfate (IRON) 325 (65 FE) MG tablet Medication Reconciliation Clean Up     folic acid (FOLVITE) 1 MG tablet Medication Reconciliation Clean Up     levofloxacin (LEVAQUIN) 250 MG tablet Medication Reconciliation Clean Up     ondansetron 4 MG FILM Medication Reconciliation Clean Up     tiotropium (SPIRIVA) 18 MCG capsule Medication Reconciliation Clean Up     senna-docusate (SENOKOT-S;PERICOLACE) 8.6-50 MG per tablet  Medication Reconciliation Clean Up       Medications started since last [facility] medication reconciliation:  Orders Placed This Encounter   Medications     ACETAMINOPHEN RE     Sig: Place 650 mg rectally every 4 hours as needed for mild pain or fever     atropine 1 % ophthalmic solution     Sig: Place 2 drops under the tongue every 2 hours as needed     bisacodyl (DULCOLAX) 10 MG Suppository     Sig: Place 10 mg rectally daily as needed for constipation     fentaNYL (DURAGESIC) 25 mcg/hr 72 hr patch     Sig: Place 1 patch onto the skin every 72 hours remove old patch.     loperamide (IMODIUM) 2 MG capsule     Sig: Take 2 mg by mouth as needed for diarrhea     LORAZEPAM PO     Sig: Take 0.5 mg by mouth every 4 hours as needed for anxiety     insulin aspart (NOVOLOG FLEXPEN) 100 UNIT/ML injection     Sig: Inject Subcutaneous 3 times daily (with meals) Inject as per sliding scale: if 140 - 189 = 1; 190 - 239 = 2; 240 - 289 = 3; 290 - 339 = 4; 340 399 = 5; 400 - 449 = 6; 450+ = 7, subcutaneously before meals for DM2, CKD Notify provider if pre-meal glucose > or equal to 450. Notify provider if glucose is > or equal to 350 after administration of correct dose     oxyCODONE (ROXICODONE) 5 MG/5ML solution     Sig: Take 10 mg by mouth every 2 hours as needed for moderate to severe pain     PREDNISONE PO     Sig: Take 10 mg by mouth daily     OSELTAMIVIR PHOSPHATE PO     Sig: Take 30 mg by mouth daily x14 days     REVIEW OF SYSTEMS:  4 point ROS including Respiratory, CV, GI and , other than that noted in the HPI,  is negative    Physical Exam:  /72  Pulse 94  Temp 96  F (35.6  C)  Resp 16  Wt 193 lb (87.5 kg)  SpO2 99%  BMI 26.92 kg/m2  GENERAL APPEARANCE: In no distress.   ENT:  Mouth and posterior oropharynx normal, moist mucous membranes  EYES:  EOM, conjunctivae, lids, pupils and irises normal  RESP:  Respiratory effort and palpation of chest normal, no respiratory distress, diminished lung sounds in bilateral  lower lobes  CV:  Palpation and auscultation of heart done, regular rate and rhythm, no murmur, rub, or gallop  ABDOMEN: Active bowel sounds, soft, nontender, no hepatosplenomegaly or other masses  M/S:   Active movement of bilateral upper and lower extremities. Kerlix wrap on RLE.  SKIN:  Inspection of skin and subcutaneous tissue baseline, Palpation of skin and subcutaneous tissue baseline  NEURO:   Cranial nerves 2-12 are normal tested and grossly at patient's baseline  PSYCH:  Oriented to person and place    Recent Labs:   Last Basic Metabolic Panel:  Lab Results   Component Value Date     01/30/2018      Lab Results   Component Value Date    POTASSIUM 4.1 02/07/2018     Lab Results   Component Value Date    CHLORIDE 108 01/30/2018     Lab Results   Component Value Date    JORDAN 7.6 01/30/2018     Lab Results   Component Value Date    CO2 26 01/30/2018     Lab Results   Component Value Date    BUN 23 01/30/2018     Lab Results   Component Value Date    CR 1.01 02/07/2018     Lab Results   Component Value Date    GLC 72 02/07/2018     Lab Results   Component Value Date    WBC 11.8 02/26/2018     Lab Results   Component Value Date    RBC 3.17 02/26/2018     Lab Results   Component Value Date    HGB 9.0 02/26/2018     Lab Results   Component Value Date    HCT 29.7 02/26/2018     Lab Results   Component Value Date    MCV 94 02/26/2018     Lab Results   Component Value Date    MCH 28.4 02/26/2018     Lab Results   Component Value Date    MCHC 30.3 02/26/2018     Lab Results   Component Value Date    RDW 17.7 02/26/2018     Lab Results   Component Value Date     02/26/2018     Assessment/Plan:  Hospice Care Patient. Enrolled in [facility] 2/28/18 due to metastatic prostate cancer with overall decline. Remains FULL CODE. Hospice MD to discuss code status with resident on 3/7/18. Labs and blood transfusions to be arranged conditionally per [facility]. Continue Atropine, Acetaminophen, Diclofenac  gel, Fentanyl Patch, Lorazepam, Methocarbamol and Oxycodone as ordered. Also taking Prednisone.     Nausea and Vomiting. Staff at facility reports no emesis or regular complaints of vomiting. Hesitate to add additional medication for nausea if not an ongoing issue due to polypharmacy and resident's request to not take medications in the past. Consider additional medications for nausea if complaint persists and/or Scopolamine patch to avoid medications.    Hypertension. Blood pressures <130/90. Taking Metoprolol for paroxsymal atrial fibrillation and Isosorbide Mononitrate for angina. Pulse primarily in high normal to tachycardia. Will not make any medication changes at this time. Consider adjustment as condition worsens. Goal is to avoid resident calling 911 as he has done in the past when not feeling well.     Electronically signed by  SAL Acevedo CNP          Sincerely,        SAL Acevedo CNP

## 2018-03-20 ENCOUNTER — TELEPHONE (OUTPATIENT)
Dept: ONCOLOGY | Facility: CLINIC | Age: 73
End: 2018-03-20

## 2018-03-20 NOTE — TELEPHONE ENCOUNTER
Social Work Progress Note      Data/Intervention:  Patient Name:  Tomas Grey  /Age:  1945 (73 year old)    Reason for Follow-Up:  Upon chart review this clinician learned that pt had passed away with [facility]. This clinician LVM for pt's hospice RN and RN at nursing home. This clinician also contacted pt's son Les, and cousin (and HCP) Melissa to offer condolences.     Intervention:   Family appears to be coping as well as can be expected. Son acknowledges that family is planning for celebration of life in [place] at the end of month per pt's request. Provided safe place for son to share memories with father. Family aware of ongoing emotional support available through clinic, and know to reach out in the case of psychosocial distress or concern.     Plan:  Previously provided family with writer's contact information and availability, family knows to reach out in the case of psychosocial distress or concern. No further SW intervention needed at present time.     Please call or page if needs or concerns arise.     DAGO Yi, Northern Light C.A. Dean HospitalSW  Direct Phone: [phone]  Pager: [phone]

## 2018-08-07 NOTE — PROGRESS NOTES
Social Work Services Progress Note    Hospital Day: 6  Collaborated with:  [facility] (Xenia), [facility] (Joyce)    Data:  Pt is 71 y/o male admitted to [facility] on 1/24/18 for recurrent symptoms of GI bleed. Pt has hx of metastatic prostate cancer, COPD, hypertension, AFib, CAD, DM2 and was recently admitted for a GI bleed.   SW involved for return to LTC when medically stable.    Intervention:  JULIANA spoke with Joyce in Admissions at [facility] (Main: [phone], Admissions: [phone], Fax: [phone]) today and confirmed Pt is on bedhold; SW updated Joyce that per [facility] (Xenia), pt will likely be ready for d/c back to LTC tomorrow. Joyce requested updated notes; JULIANA faxed today. Will continue to keep [facility] updated when pt is ready for d/c.    Assessment:  Pt will return to [facility] when medically stable.    Plan:    Anticipated Disposition:  Facility:  [facility]    Barriers to d/c plan:  Medical stability    Follow Up:  SW to continue to follow and assist with d/c plan.    DAGO Valle, LGSW  7C Surgical Oncology Unit   [phone]  Pager: [phone]     07-Aug-2018

## 2018-12-13 NOTE — CONSULTS
"[facility]  Palliative Care Consultation         Tomas Grey MRN# [id number]   Age: 72 year old YOB: 1945   Date of Admission: 1/8/2018    Reason for consult: Goals of care       Requesting physician/service: Roula GOMEZ/[facility]       Recommendations        - Mr Grey reports, consistently and with the ability to explain why, that he chooses his stepson Les Ashford (also referred to in the chart as his son-in-law) as his choice for HCA.  Pt has an ACD in chart from 2015 listing his cousin Melissa Mccormack as his HCA and his son Jeevan Grey as alternate HCA, but reports that \"Les is the one who comes to see me now, he helps me the most\".   D/W JULIANA Trevino, who is trying to reach pt.  Consider new ACD if indicated as we further sort through his issues.      -I tried to bring up code status to assess how he engages about this, in spite of comments in his chart about limited decisional capacity based on formal psych testing, but Mr Grey stopped me immediately.  \"They know all about this, it's all in the chart\" and wouldn't engage further.    - He feels his pain is well controlled at this time.  We note for future references that he's been seen in our clinic previously (while still living with his son) and there were concerns about opioid diversion and other opioid misadventures.     Gt Iniguez MD  Palliative Medicine Consult Team  Pager: [phone]   TT: 72 minutes, with > 50% spent in C/C/E patient/family/care teams re: GOC, POC, Sx management.   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 Found to have a hgb of 3.2 on presentation to the ED concerning for a GI " Progress Notes by Carmen Chávez APN at 01/24/17 01:08 PM     Author:  Carmen Chávez APN Service:  (none) Author Type:  Nurse Practitioner     Filed:  01/24/17 02:02 PM Encounter Date:  1/24/2017 Status:  Signed     :  Carmen Chávez APN (Nurse Practitioner)            Roomed by Carolee Coleman M.A.[LG1.1T]   10/17/2014 Dr. Gudino[MJ1.1M]   Alice Chavez[MJ1.1T] is a 29 year old who presents for evaluation of a rash.[MJ1.1M]     Allergies      Allergen   Reactions   • Amoxicillin  Hives   • Sulfa Antibiotics  Rash   • Codeine  Nausea and Vomiting   • Estrogens  Other - See Comments     Impair vision[LG1.1T]      Current Outpatient Prescriptions:   •  triamcinolone (KENALOG) 0.5 % cream, Apply  topically 2 (two) times daily., Disp: 30 g, Rfl: 0  •  Budesonide (PULMICORT FLEXHALER) 180 MCG/ACT inhaler, Inhale 2 Puffs by mouth 2 (two) times daily., Disp: 3 Each, Rfl: 3  •  Budesonide 180 MCG/ACT inhaler, Inhale 2 Puffs by mouth 2 (two) times daily., Disp: 1 Each, Rfl: 0  •  budesonide (RHINOCORT AQUA) 32 MCG/ACT nasal spray, Use 2 Sprays in each nostril every morning., Disp: 1 Bottle, Rfl: 5  •  PULMICORT FLEXHALER 180 MCG/ACT inhaler, INHALE 2 PUFFS BY MOUTH TWICE DAILY, Disp: 1 Each, Rfl: 0  •  ibuprofen (ADVIL,MOTRIN) 600 MG tablet, Take 600 mg by mouth., Disp: , Rfl:   •  Multiple Vitamin (MULTI-VITAMIN OR), Take  by mouth daily., Disp: , Rfl:   •  budesonide (RHINOCORT AQUA) 32 MCG/ACT nasal spray, Use 2 Sprays in each nostril daily as needed for Rhinitis., Disp: 1 Bottle, Rfl: 3  •  EPINEPHrine (EPIPEN 2-RUPAL) 0.3 MG/0.3ML SOAJ, Inject 0.3 mL as directed as needed. Please fill the # of devices specified and ensure at least 1 year expiration date., Disp: 1 Each, Rfl: 0  •  fluticasone (FLONASE) 50 MCG/ACT nasal spray, Use 2 Sprays in each nostril daily as needed for Rhinitis., Disp: 3 Bottle, Rfl: 3  •  clonazepam (KLONOPIN) 0.5 MG tablet, Take 0.5 Tabs by mouth 2 (two) times daily., Disp: 30 Tab, Rfl: 1  •   bleed. Multiple recent admissions including [facility] ~ 2 weeks ago.  Seen in Oncology clinic here.       Support/Coping   Chart notes that he has 11 children but there seems to be a pattern of them not being involved; there are comments in the chart about his care facility seeking a guardian for him 2/2 family non-involvement and limited decisional capacity.     Decision-Making & Goals of Care     Discussion/counseling today about prognosis/goals of care/decisions:   None  Patient has a completed health care directive available in the chart (Y/N): Old ACD 2015; see comments above  Health care agent (only if patient has an available, complete HCD):  See comments above  Physician orders for life-sustaining treatment (POLST) form is not completed  Code Status: Full code   Patient has decision-making capacity (Y/N): He's able to contribute to some decisions, but does not appear to have decisional capacity for independent complex decision-making.     Coordination of Care     Findings & plan of care discussed with: Primary team, JULIANA Oneil  Follow-up plan from palliative team: Will f/u with you            Chief Complaint     GOC in setting of late-stage prostate CA with no treatment options, transfusion dependent ACD, GOC inconsistent with disease status.         History of Present Illness     71 y/o male with metastatic prostate cancer who presents with 5 days of abdominal pain and anorexia, and 3-4 days of melena. Found to have a hgb of 3.2 on presentation to the ED concerning for a GI bleed. Multiple recent admissions including [facility] ~ 2 weeks ago.  Seen in oncology clinic here. Recently DC'd from [facility] to [facility].          Past Medical History:   Past Medical History:   Diagnosis Date     Anemia      Anxiety      Aspiration pneumonia (H) 2014     C. difficile colitis      CAD (coronary artery disease)      Chronic pain      CKD (chronic kidney disease)      COPD (chronic obstructive pulmonary disease)  guaifenesin (MUCINEX) 600 MG 12 hr tablet, Take  by mouth., Disp: , Rfl:[MJ1.2T]     Problem #[LG1.1T] 1[LG1.1M]:RASH  SUBJECTIVE: It has been present[LG1.1T] for 4 days[MJ1.1M] and is[LG1.1T] gradually[MJ1.1M] worsening[LG1.1M].[LG1.1T] It started as a small red area and has gotten larger and more painful.[MJ1.1M]  Previous treatments:[LG1.1T] triamcinolone-given by [facility] on 1/21/17[LG1.1M] which has not helped[MJ1.1M].  Recent Environmental  Changes:[LG1.1T] none noted[LG1.1M].   Symptoms:[LG1.1T] itching[LG1.1M], burning[MJ1.3M] and pain[LG1.1M], especially to touch. Worse with shower/hot water exposure[MJ1.1M]   Derm Medication Side Effects:[LG1.1T] none noted by patient. No new soaps, fragrances, medications or recent changes per patient. No history of recent illness per patient.[MJ1.1M]     ROS: No liver, kidney problems, diabetes, TB, hypertension or ulcers. No weight change or adenopathy.[MJ1.1T] Patient is currently nursing. Reviewed[MJ1.1M] 1/24/2017[MJ1.2T]      OBJECTIVE: Rash - Location:[LG1.1T] mid abdomen, above umbilicus. See photo.[MJ1.1M]  Description:[LG1.1T] small vesicles on erythematous base, some crusts.[MJ1.1M] Color:[LG1.1T] pink[MJ1.1M]  ASSESSMENT:[LG1.1T] herpes zoster[MJ1.1M] -[LG1.1T] New Problem[MJ1.1M]  The skin of the head, neck, chest, back,[MJ1.1T] abdomen and[MJ1.1M] each arm was examined and found to be normal other than the noted abnormalities.[MJ1.1T]    PLAN:[LG1.1T] --Medication: Zovirax ointment BID --Patient wishes to avoid oral meds due to breastfeeding[MJ1.1M]. It has also been over 72 hours since rash appeared.[MJ1.3M]   Supportive care   Discussed viral nature and etiology of zoster- also keep area covered and frequent handwashing   Return to clinic if increasing erythema, warmth or purulent drainage as these may indicate bacterial infection  Follow up in 2-3 weeks[MJ1.1M]   [MJ1.4M]    Electronically Signed by:    YARIEL Griffiths , 1/24/2017   Supervising Physician:   "(H)      Depressive disorder      Diabetes mellitus (H)      Hypertension      Insomnia      ALEXIS (obstructive sleep apnea)      Osteoporosis      Prostate cancer metastatic to multiple sites (H)               Past Surgical History:   Past Surgical History:   Procedure Laterality Date     ABDOMEN SURGERY       ARTHROSCOPY KNEE       EYE SURGERY                 Social/Spiritual History:     Living situation: Currently at [facility]  Family system: By chart review has 11 children, but multiple chart references to limited family involvement  Functional status (needs help with ADLs or IADLs): Dependent  Employment/education: Retired from [employer]  Use of community resources: ND  Activities/interests: ND  History of substance use/abuse: History of opioid diversion and other aberrant opioid-related behaviors per Dr Rossi's clinic note 11/17.            Family History:   Family History   Problem Relation Age of Onset     DIABETES Mother      CANCER Mother      stomach     Family history reviewed and updated in EPIC           Allergies:   Allergies   Allergen Reactions     Morphine Other (See Comments)     Patient reports makes him almost go into a coma              Medications:   I have reviewed this patient's medication profile and medications during this hospitalization  Fentanyl 12 mcg patch  IV dilaudid 0.5 mg q 4 hours prn  APAP 1 gm TID  Duloxetine 20 mg daily           Review of Systems:   The comprehensive review of systems is negative other than noted here and in the HPI.  Pain is well controlled. + for weakness, abdominal pain,      Physical Exam:  VITALS: Blood pressure 124/59, temperature 97.5  F (36.4  C), temperature source Axillary, resp. rate 16, height 1.803 m (5' 11\"), weight 88.5 kg (195 lb 1.6 oz), SpO2 98 %.   GEN: Awake, alert, interactive. Chronically ill appearing  EYES: Sclera non-icteric  EARS: Eumorphic bilaterally  NOSE: No significant nasal drainage  MOUTH: Oral mucosa moist, no " Dr. Shane Suárez MD[MJ1.1T]            Revision History        User Key Date/Time User Provider Type Action    > MJ1.3 01/24/17 02:02 PM Carmen Chávez APN Nurse Practitioner Sign     MJ1.2 01/24/17 01:29 PM Carmen Chávez APN Nurse Practitioner      MJ1.1 01/24/17 01:26 PM Carmen Chávez APN Nurse Practitioner      MJ1.4 01/24/17 01:13 PM Carmen Chávez APN Nurse Practitioner      LG1.1 01/24/17 01:10 PM Carolee Coleman RMA Medical Assistant Sign at close encounter    M - Manual, T - Template             evidence of thrush  LUNGS: No increased WOB or accessory muscle use; anterior fields clear  CV: Regular radial pulse 88  ABD:Soft; + mild diffuse tenderness  EXTR: 3 LE edema and lymphedema; RLE dressed  SKIN: Warm, dry  NEUROPSYCH: Engaged, coherent thought process              Data Reviewed:     ROUTINE ICU LABS (Last four results)  CMP  Recent Labs  Lab 01/09/18 0421 01/08/18  1028    143   POTASSIUM 4.5 4.3   CHLORIDE 110* 110*   CO2 22 24   ANIONGAP 12 9   GLC 72 84   BUN 22 30   CR 1.31* 1.38*   GFRESTIMATED 54* 51*   GFRESTBLACK 65 61   JORDAN 8.0* 7.9*   MAG 1.6  --    PHOS 2.4*  --    PROTTOTAL  --  7.0   ALBUMIN  --  2.3*   BILITOTAL  --  0.3   ALKPHOS  --  150   AST  --  13   ALT  --  11     CBC  Recent Labs  Lab 01/09/18  1324 01/09/18  0421 01/08/18 2050 01/08/18  1028   WBC  --  9.1  --  12.1*   RBC  --  3.96*  --  1.23*   HGB 10.3* 11.0* 10.5* 3.2*   HCT  --  36.2*  --  11.0*   MCV  --  91  --  89   MCH  --  27.8  --  26.0*   MCHC  --  30.4*  --  29.1*   RDW  --  18.6*  --  19.4*   PLT  --  231  --  346     INR  Recent Labs  Lab 01/09/18  0421 01/08/18  1028   INR 1.43* 1.90*     Arterial Blood GasNo lab results found in last 7 days.

## 2019-07-16 NOTE — PROGRESS NOTES
Addended by: Purnima Colvin on: 7/16/2019 08:51 AM     Modules accepted: [facility]  PRIMARY CARE PROVIDER AND CLINIC:  Ekater[facility] [address] / [place]   Chief Complaint   Patient presents with     Hospital F/U     HPI:    Tomas Grey is a 72 year old  (1945), re-admitted to the [facility] from Hospital  [facility].  Hospital stay 5/14/17 through 5/15/17. Re-admitted to this facility for  medical management and nursing care. Hospital course per review of hospital notes:    This is a 72-year-old male, with a past medical history significant for severe COPD on supplemental Oxygen, prostate cancer metastatic to bone, chronic pain syndrome, type 2 diabetes mellitus, chronic kidney disease stage III, chronic diastolic heart failure, coronary artery disease, hypertension and lymphedema, who was admitted to [facility] for nausea, vomiting, abdominal pain, diarrhea, headache and eye pain. Labs revealed Creatinine 2.84. IV fluids were initiated for dehydration with improvement in Creatinine to 2.33 on 5/16/17. Nausea and vomiting resolved on admission. Podiatry was consulted for chronic venous stasis ulcerations and Unna Boots were placed. Instructed to follow-up outpatient with Ophthalmology for eye irritation.     Current issues are:        Feels much better since before he went into the hospital. Tolerating a regular diet. Has boots in place on his feet that he doesn't want removed until he is seen at the [facility] clinic. Doesn't know how he ended up at [facility]. States that's just where the ambulance took him. Would prefer the [facility] in the future. Denies increased shortness of breath or chest pain.     CODE STATUS/ADVANCE DIRECTIVES DISCUSSION:   CPR/Full code   Patient's living condition: lives in a nursing home    ALLERGIES:Morphine  PAST MEDICAL HISTORY:  has a past medical history of Anemia; Anxiety; Aspiration pneumonia (H) (2014); C. difficile colitis; CAD (coronary artery disease);  Chronic pain; CKD (chronic kidney disease); COPD (chronic obstructive pulmonary disease) (H); Depressive disorder; Diabetes mellitus (H); Hypertension; Insomnia; ALEXIS (obstructive sleep apnea); Osteoporosis; and Prostate cancer metastatic to multiple sites (H).  PAST SURGICAL HISTORY:  has a past surgical history that includes Abdomen surgery; Arthroscopy knee; and Eye surgery.  FAMILY HISTORY: family history includes CANCER in his mother; DIABETES in his mother.  SOCIAL HISTORY:  reports that he has quit smoking. He has never used smokeless tobacco. He reports that he does not drink alcohol or use illicit drugs.    Post Discharge Medication Reconciliation Status: discharge medications reconciled, continue medications without change.  Current Outpatient Prescriptions   Medication Sig Dispense Refill     bacitracin ointment Apply to Right great toe topically in the evening for Ingrown toenail       carboxymethylcellulose (REFRESH PLUS) 0.5 % SOLN ophthalmic solution Place 1 drop into both eyes 3 times daily       budesonide-formoterol (SYMBICORT) 160-4.5 MCG/ACT Inhaler Inhale 2 puffs into the lungs 2 times daily       abiraterone (ZYTIGA) 250 MG tablet Take 4 tablets (1,000 mg) by mouth daily for 30 doses Take on empty stomach. 120 tablet 0     predniSONE (DELTASONE) 5 MG tablet Take 1 tablet (5 mg) by mouth 2 times daily 60 tablet 0     ranitidine (RANITIDINE) 75 MG tablet Take 75 mg by mouth 2 times daily       diclofenac (VOLTAREN) 1 % GEL topical gel Apply 4 grams to knees four times daily as needed using enclosed dosing card. 100 g 0     HYDROmorphone (DILAUDID) 2 MG tablet Take 1 tablet (2 mg) by mouth every 4 hours as needed for moderate to severe pain 10 tablet 0     senna-docusate (SENOKOT-S;PERICOLACE) 8.6-50 MG per tablet Take 1 tablet by mouth 2 times daily 100 tablet      insulin glargine (LANTUS) 100 UNIT/ML injection Inject 25 Units Subcutaneous 2 times daily        omeprazole (PRILOSEC) 10 MG CR  "capsule Take 1 capsule (10 mg) by mouth daily       isosorbide mononitrate (IMDUR) 60 MG 24 hr tablet Take 1 tablet (60 mg) by mouth daily       atorvastatin (LIPITOR) 10 MG tablet Take 10 mg by mouth daily       fluticasone - vilanterol (BREO ELLIPTA) 100-25 MCG/INH oral inhaler Inhale 1 puff into the lungs daily       tiotropium (SPIRIVA) 18 MCG inhalation capsule Inhale 18 mcg into the lungs daily       polyvinyl alcohol (LIQUIFILM TEARS) 1.4 % ophthalmic solution Place 1 drop into both eyes 3 times daily       acetaminophen (TYLENOL) 500 MG tablet Take 500 mg by mouth every 4 hours as needed for pain Not to exceed 3000 mg/24 hours       nitroglycerin (NITROSTAT) 0.4 MG SL tablet Place 0.4 mg under the tongue every 5 minutes as needed for chest pain if you are still having symptoms after 3 doses (15 minutes) call 911.       ipratropium - albuterol 0.5 mg/2.5 mg/3 mL (DUONEB) 0.5-2.5 (3) MG/3ML nebulization Take 1 vial (3 mLs) by nebulization every 4 hours as needed for shortness of breath / dyspnea or wheezing 360 mL 3     aspirin 81 MG EC tablet Take 1 tablet (81 mg) by mouth daily 5 tablet 0     metoprolol (TOPROL XL) 50 MG 24 hr tablet Take 1 tablet (50 mg) by mouth daily 5 tablet 0     calcium carb 1250 mg, 500 mg Te-Moak,/vitamin D 200 units (OSCAL WITH D) 500-200 MG-UNIT per tablet Take 1 tablet by mouth 2 times daily (with meals) 10 tablet 0     tamsulosin (FLOMAX) 0.4 MG 24 hr capsule Take 1 capsule (0.4 mg) by mouth daily 5 capsule 0     ammonium lactate (LAC-HYDRIN) 12 % lotion Apply topically 2 times daily as needed for dry skin To all extremities for dry skin       [DISCONTINUED] enzalutamide (XTANDI) 40 MG capsule Take 4 capsules (160 mg) by mouth daily 120 capsule 0     ROS:  4 point ROS including Respiratory, CV, GI and , other than that noted in the HPI,  is negative    Exam:  /68  Pulse 96  Temp 94  F (34.4  C)  Resp 18  Ht 5' 9\" (1.753 m)  Wt 239 lb 14.4 oz (108.8 kg)  SpO2 94%  BMI " 35.43 kg/m2  GENERAL APPEARANCE: Alert, in no distress  ENT: Mouth and posterior oropharynx normal, moist mucous membranes  EYES: EOM, conjunctivae, lids, pupils and irises normal  RESP: respiratory effort and palpation of chest normal, slight wheeze with inspiration in bilateral upper lobes otherwise diminished throughout, no respiratory distress  CV: Palpation and auscultation of heart done, regular rate and rhythm, no murmur, rub, or gallop  ABDOMEN: normal bowel sounds, soft, nontender, no hepatosplenomegaly or other masses  M/S: Active movement of bilateral upper and lower extremities. ACE wraps on BLE with Unna Boot in place.  SKIN: Inspection of skin and subcutaneous tissue baseline, Palpation of skin and subcutaneous tissue baseline, abrasion on right shin  NEURO: Cranial nerves 2-12 are normal tested and grossly at patient's baseline  PSYCH: affect and mood normal    Lab/Diagnostic data:    PANEL BASIC METABOLIC (BMP) (05/16/2017 10:27 AM)  PANEL BASIC METABOLIC (BMP) (05/16/2017 10:27 AM)   Component Value Ref Range   Sodium 140 135 - 148 mEq/L   Potassium 4.5 3.5 - 5.3 mEq/L   Chloride 104 92 - 108 mEq/L   CO2 25 22 - 30 mEq/L   AnGap 11 8 - 16 mEq/L   Glucose 173 (H) 70 - 100 mg/dL   BUN 47 (H) 8 - 23 mg/dL   Creatinine 2.33 (H) 0.70 - 1.25 mg/dL   Calcium 8.4 (L) 8.8 - 10.2 mg/dL   GFR,  34 (L) >=60 ml/min/1.73m2   GFR, Non- 28 (L) >=60 ml/min/1.73m2     CBC WITH PLATELET (05/16/2017 10:27 AM)  CBC WITH PLATELET (05/16/2017 10:27 AM)   Component Value Ref Range   WBC 6.08 4.00 - 10.00 k/cmm   RBC 3.29 (L) 4.60 - 6.00 m/cmm   Hgb 9.2 (L) 13.1 - 17.5 g/dL   Hematocrit 30.4 (L) 40.0 - 51.0 %   MCV 92.4 80.0 - 100.0 fL   MCH 28.0 25.0 - 32.0 pg   MCHC 30.3 (L) 31.0 - 36.0 g/dL   RDW 15.9 (H) 11.5 - 14.5 %   Plt 219 150 - 400 k/cmm   MPV 9.6 6.5 - 12.5 fL   NRBC 0.5 (H) 0.0 - 0.0 %     ASSESSMENT/PLAN:  Acute on Chronic Kidney Disease Stage III. Improving. Secondary to  dehydration from nausea and vomiting. Creatinine trending down during hospitalization, 2.84 on 5/14/17 ->2.33 on 5/16/17. Baseline Creatinine upper 1s. Repeat BMP in the next week to ensure continued downward trend. If continued improvement in Creatinine, restart Furosemide.      Lymphedema of Both Lower Extremities with Venous Stasis Ulcers. Follow-up at [facility] on 5/22/17. Unna Boots in place for venous stasis ulcers. Dressing change to right great toe as instructed. ACE wraps. Previously on Furosemide BID which is on hold as noted above. Lymphedema Therapy ordered through Occupational Therapy prior to hospitalization.    Eye Irritation. Noted during hospitalization. Does not appear to be an issue on examination. Follow-up with Ophthalmology if bothersome. Continue Liquifilm Tears and Refresh tears as ordered.    Chronic Obstructive Pulmonary Disease, Stage IV, on Chronic Oxygen Supplementation/Obstructive Sleep Apnea. Follow-up with Dr. Mcmahon at the end of August as recommended. Should have Z-Pack dose available PRN for exacerbation. Continue Tiotropium, Budesonide and Fluticasone as ordered. DuoNebs available PRN. Also on Prednisone for Zytiga supplement. BIPAP to be worn at night for sleep apnea.      Prostate Cancer Metastatic to Bone. Follow-up with Dr. Oviedo and Dr. Callahan as recommended. Previous followed by Dr. Gallagher in Palliative Care. Taking Zytiga and Prednisone as well as Radium 223 monthly x 6 doses. PSA continues to rise despite palliative treatment. Continue Hydromorphone PRN as ordered. Also takes Tamsulosin.     Urinary Retention in the Setting of Metastatic Lymph Node Dissection. Evaluated by Urology, Dr. Graf, on 3/22/17. Creatinine continues to rise and this may be due to bladder outlet obstruction. Resistant to surgical options. Instructed how to self-catheterize, but resistant. Told Urology his main goal is avoiding pain. Voids as able. Continue Tamsulosin as  ordered.    Chronic Pain Syndrome. Likely Secondary to Above. History of diverting medication to family members in the past. Continue Hydromorphone and Acetaminophen as ordered. Voltaren gel ordered for knee pain.      Type 2 Diabetes Mellitus. Last A1C 6.8 on 5/2/17, down from 7.7 on 12/20/16.Continue Glargine as ordered.     Coronary Artery Disease/Hypertension/Chronic Diastolic Heart Failure with EF 55-60%. All blood pressures <140/90 over the past month. Continue Aspirin, Isosorbide Mononitrate, Metoprolol, Atorvastatin and Nitroglycerin as ordered. Furosemide on hold as noted above. Daily weights ordered while Furosemide on hold. Lipid panel ordered for next lab day.     Gastroesophageal Reflux. Continue Omeprazole and Ranitidine as ordered.     Anxiety and Depression. Patient refused antidepressant and in-house therapist visits during previous conversations. Will continue to re-approach.     Information reviewed:  Medications, vital signs, orders, nursing notes, problem list, hospital information. Facility MDS and care plan reviewed.   Total time spent with patient visit was 45 min including patient visit and review of past records. Greater than 50% of total time spent with counseling and coordinating care.    Electronically signed by:  SAL Acevedo CNP  [facility]

## 2022-07-19 NOTE — PLAN OF CARE
"Problem: Goal Outcome Summary  Goal: Goal Outcome Summary  /50  Pulse 85  Temp 97.2  F (36.2  C) (Oral)  Resp 16  Ht 1.803 m (5' 11\")  Wt 110.7 kg (244 lb 0.8 oz)  SpO2 99%  BMI 34.04 kg/m2      Afebrile, soft BPs, intermittent mild tachycardia (max 106), AOVSS on 2L O2 via NC w/ sats in mid 90s. A&Ox4, a little forgetful. Up w/ SBA/walker to BSC, using urinal, oliguric, no BM this shift. Pt spent night in recliner, refused to move to bed. LS diminished throughout. Pt c/o SOB, declined meds; IS and acapella use encouraged, pt coughed up medium amount of sputum, expressed relief. Pt needs encouragement to keep legs elevated. Bilateral lymphedema wraps to lower extremities are intact. PIV infusing TKO. Zosyn and heparin given per MAR. Will continue to monitor and follow POC.       " ----- Message from MARYBEL Lopez, FNP sent at 7/14/2022  1:05 PM CDT -----  Regarding: jardiance 25 mg daily  Hi!  Pt needs pa for jardiance   Or let me know if farxiga 10 mg daily or invokana 300 mg daily is on formulary  Thanks  Just received letter  IB

## 2022-12-20 NOTE — PROGRESS NOTES
Hpi Title: Evaluation of Skin Lesions Pt reported SOB when he woke up from nap. Provider notified. Vitals WNL. PRN breathing treatment requested for pt. Pt sounds wheezy at this time.

## 2023-01-26 NOTE — PROGRESS NOTES
"CM received call from dtr in law, Bria, stating they had a family meeting and family has decided to take client home. CM asked Bria what had changed as last time we spoke she was overwhelmed with the care client needed and family was not of much assistance.  She reports that \"the family is all on board now\" and that they want him to be able to die at home with family.  CM suggested having another care conference at [facility] to review what client would need if they take him home and allow time for that all to be in place before he actually d/c's.  EDIL spoke with Son Andrews at [facility], who had also spoken with Bria and was told the same thing.  Care conference scheduled for 4/5/17 at 1:00pm. Chart to CMS to prep.    PERNELL Simmons   [facility]   [phone]    " Protopic Counseling: Patient may experience a mild burning sensation during topical application. Protopic is not approved in children less than 2 years of age. There have been case reports of hematologic and skin malignancies in patients using topical calcineurin inhibitors although causality is questionable.

## 2024-01-08 NOTE — PROGRESS NOTES
Patient & friend here for left ESWL education & to sign consents. Educated them on left ESWL procedure including risks & alternatives. Pre-op instructions reviewed as well & copy given. Patient had recent labs CBC. CMP & URINALYSIS done at [facility] on 12/19/2023. Also had recent KUB done. Spoke with Dr. Staton & no need to repeat lab work at this time because it is not over 30 days old. Al faxed to [facility]. Questions answered, verbalized understanding & consents signed.      Social Work Services Progress Note    Hospital Day: 4  Date of Initial Social Work Evaluation:  Not yet completed.  Collaborated with:  [facility], Facundo Roberts)    Data:  Pt is 73 y/o male admitted to [facility] on 1/8/18 r/t 5 days of abdominal pain and anorexia and 3-4 days of melena. Pt admitted d/t concern for GI bleed.  SW involved for return to SNF when medically stable.     Intervention:  SW coord. With Facundo Roberts) today and confirmed that Pt is not medically stable for d/c today but that he could potentially d/c tomorrow.    SW confirmed with [facility] admissions that Pt remains on a bedhold. SW faxed updated information per their request. SW updated them that Pt may be ready for d/c tomorrow; SW to continue to update the facility on d/c planning as appropriate.     Assessment:  Pt will return to TCU when medically stable for d/c    Plan:    Anticipated Disposition:  Facility:  [facility]    Barriers to d/c plan:  Medical stability    Follow Up:  SW to continue to follow and assist with d/c plan    DAGO Valle, LGSW  7C Surgical Oncology Unit  - Assisting on 5C  [phone]  Pager: [phone]

## 2024-03-01 NOTE — ED NOTES
"Presents with left side chest pain for the past couple days with worsening this past evening.  Patient states he's had this pain for a long time, \"it comes and goes\".  Nursing facility gave 1 dose of nitroglycerin which did not help with pain.   " Contacted Roseanna Khan regarding apt scheduled for Monday. Ms. 2 pt identifiers verified. Bill agreed to reschedule appointment for another location and provider. Appointment reschedule for  with Alcira.

## 2024-05-18 NOTE — ED AVS SNAPSHOT
[facility], [place], Emergency Department    [address] [address]    Phone:  [phone]                                       Tomas Grey   MRN: [id number]    Department:  [facility], Emergency Department   Date of Visit:  10/11/2017           After Visit Summary Signature Page     I have received my discharge instructions, and my questions have been answered. I have discussed any challenges I see with this plan with the nurse or doctor.    ..........................................................................................................................................  Patient/Patient Representative Signature      ..........................................................................................................................................  Patient Representative Print Name and Relationship to Patient    ..................................................               ................................................  Date                                            Time    ..........................................................................................................................................  Reviewed by Signature/Title    ...................................................              ..............................................  Date                                                            Time           99

## 2025-02-24 NOTE — MR AVS SNAPSHOT
After Visit Summary   10/9/2017    Tomas Grey    MRN: [id number]           Patient Information     Date Of Birth          1945        Visit Information        Provider Department      10/9/2017 2:00 PM Manpreet Oviedo MD [facility]      Today's Diagnoses     Prostate cancer metastatic to multiple sites (H)    -  1      Care Instructions        1- Discontinue Zytega and prednsione.  2- RTC MD 1 month Scheduled  Dacia D    3- Labs before next visit- CBC diff, CMP, PSA Scheduled  Dacia D    4- Continue Xgeva Already scheduled Scheduled  Dacia D    5- Start Megace 80 mg BID. Please provide information.   AVS given to patient            Follow-ups after your visit        Your next 10 appointments already scheduled     Nov 06, 2017  2:00 PM CST   Level 1 with  INFUSION CHAIR [address] [place] [address] Infusion Services ([facility])    [facility]  [address] [place] [address]  [facility] [address]   [phone]            Nov 06, 2017  2:30 PM CST   Return Visit with Manpreet Oviedo MD   [facility] ([facility])    [facility]  [address]  [facility] [address]   [phone]            Nov 07, 2017  4:00 PM CST   (Arrive by 3:45 PM)   New Patient Visit with JASON Macedo   [facility] ([facility])    [address] [place] [id number] [phone]              Future tests that were ordered for you today     Open Future Orders        Priority Expected Expires Ordered    CBC with platelets differential Routine 11/9/2017 7/9/2018 10/9/2017    Comprehensive metabolic panel Routine 11/9/2017 7/9/2018 10/9/2017    PSA tumor marker Routine 11/9/2017 7/9/2018 10/9/2017            Who to contact     If you have questions or need follow up  "information about today's clinic visit or your schedule please contact [facility] directly at [phone].  Normal or non-critical lab and imaging results will be communicated to you by [employer], letter or phone within 4 business days after the clinic has received the results. If you do not hear from us within 7 days, please contact the clinic through [employer] TV[employer] or phone. If you have a critical or abnormal lab result, we will notify you by phone as soon as possible.  Submit refill requests through [employer] or call your pharmacy and they will forward the refill request to us. Please allow 3 business days for your refill to be completed.          Additional Information About Your Visit        [employer] TV[employer] Information     [employer] lets you send messages to your doctor, view your test results, renew your prescriptions, schedule appointments and more. To sign up, go to [email or web address] . Click on \"Log in\" on the left side of the screen, which will take you to the Welcome page. Then click on \"Sign up Now\" on the right side of the page.     You will be asked to enter the access code listed below, as well as some personal information. Please follow the directions to create your username and password.     Your access code is: [email or web address]  Expires: 2017 12:54 PM     Your access code will  in 90 days. If you need help or a new code, please call your [place] clinic or [phone].        Care EveryWhere ID     This is your Care EveryWhere ID. This could be used by other organizations to access your [place] medical records  [id number]        Your Vitals Were     Pulse Temperature Respirations Height Pulse Oximetry BMI (Body Mass Index)    98 98.5  F (36.9  C) (Tympanic) 20 1.803 m (5' 11\") 91% 33.05 kg/m2       Blood Pressure from Last 3 Encounters:   10/09/17 104/65   10/05/17 128/60   10/04/17 123/68    Weight from Last 3 Encounters:   10/09/17 107.5 kg (237 lb)   10/05/17 100.2 kg (221 " lb)   10/04/17 107.5 kg (237 lb)                 Today's Medication Changes          These changes are accurate as of: 10/9/17  3:09 PM.  If you have any questions, ask your nurse or doctor.               Start taking these medicines.        Dose/Directions    megestrol 40 MG tablet   Commonly known as:  MEGACE   Used for:  Prostate cancer metastatic to multiple sites (H)   Started by:  Manpreet Oviedo MD        Dose:  80 mg   Take 2 tablets (80 mg) by mouth 2 times daily   Quantity:  120 tablet   Refills:  3         Stop taking these medicines if you haven't already. Please contact your care team if you have questions.     predniSONE 5 MG tablet   Commonly known as:  DELTASONE   Stopped by:  Manpreet Oviedo MD                Where to get your medicines      These medications were sent to [facility] [address] [address]     Phone:  [phone]     megestrol 40 MG tablet                Primary Care Provider Office Phone # Fax #    Ekaterina Magi Lozano, APRN [phone]       [address] [address]        Equal Access to Services     [facility] AH: Hadii shima ku hadasho Soomaali, waaxda luqadaha, qaybta kaalmada adeegyada, jorje prather . So [facility] [phone].    ATENCIÓN: Si habla español, tiene a soria disposición servicios gratuitos de asistencia lingüística. Matt al [phone].    We comply with applicable federal civil rights laws and [place] laws. We do not discriminate on the basis of race, color, national origin, age, disability, sex, sexual orientation, or gender identity.            Thank you!     Thank you for choosing [facility]  for your care. Our goal is always to provide you with excellent care. Hearing back from our patients is one way we can continue to improve our services. Please take a few minutes to complete the written survey that  you may receive in the mail after your visit with us. Thank you!             Your Updated Medication List - Protect others around you: Learn how to safely use, store and throw away your medicines at www.disposemymeds.org.          This list is accurate as of: 10/9/17  3:09 PM.  Always use your most recent med list.                   Brand Name Dispense Instructions for use Diagnosis    acetaminophen 500 MG tablet    TYLENOL     Take 500 mg by mouth every 4 hours as needed for pain Not to exceed 3000 mg/24 hours        ammonium lactate 12 % lotion    LAC-HYDRIN     Apply topically 2 times daily as needed for dry skin To all extremities for dry skin        aspirin 81 MG EC tablet     5 tablet    Take 1 tablet (81 mg) by mouth daily    ASCVD (arteriosclerotic cardiovascular disease)       bimatoprost 0.01 % Soln    LUMIGAN     Place 1 drop into both eyes At Bedtime        budesonide-formoterol 160-4.5 MCG/ACT Inhaler    SYMBICORT     Inhale 2 puffs into the lungs 2 times daily        calcium carbonate 1500 (600 CA) MG tablet    OS-JORDAN 600 mg [place]. Ca     Take 1,500 mg by mouth 2 times daily (with meals)        carboxymethylcellulose 0.5 % Soln ophthalmic solution    REFRESH PLUS     Place 1 drop into both eyes 4 times daily And 1 drop in both eyes as needed.        diclofenac 1 % Gel topical gel    VOLTAREN    100 g    Apply 4 grams to knees four times daily as needed using enclosed dosing card.    Chronic pain of both knees       furosemide 20 MG tablet    LASIX    30 tablet    Take 1 tablet (20 mg) by mouth 2 times daily    Chronic diastolic congestive heart failure (H), Lymphedema of both lower extremities       HYDROmorphone 2 MG tablet    DILAUDID     Take 1 tablet (2 mg) by mouth 3 times daily And q3h PRN        insulin glargine 100 UNIT/ML injection    LANTUS     Inject 16 Units Subcutaneous 2 times daily        ipratropium - albuterol 0.5 mg/2.5 mg/3 mL 0.5-2.5 (3) MG/3ML neb solution    DUONEB    360 mL     Take 1 vial (3 mLs) by nebulization every 4 hours as needed for shortness of breath / dyspnea or wheezing        isosorbide mononitrate 30 MG 24 hr tablet    IMDUR    30 tablet    Take 2 tablets (60 mg) by mouth daily        LIPITOR 10 MG tablet   Generic drug:  atorvastatin      Take 10 mg by mouth At Bedtime        megestrol 40 MG tablet    MEGACE    120 tablet    Take 2 tablets (80 mg) by mouth 2 times daily    Prostate cancer metastatic to multiple sites (H)       metoprolol 50 MG 24 hr tablet    TOPROL XL    5 tablet    Take 1 tablet (50 mg) by mouth daily    ASCVD (arteriosclerotic cardiovascular disease)       nitroGLYcerin 0.4 MG sublingual tablet    NITROSTAT     Place 0.4 mg under the tongue every 5 minutes as needed for chest pain if you are still having symptoms after 3 doses (15 minutes) call 911.        omeprazole 10 MG CR capsule    priLOSEC     Take 1 capsule (10 mg) by mouth daily    Gastric reflux       ONDANSETRON PO      Take 4 mg by mouth every 8 hours as needed for nausea        polyvinyl alcohol 1.4 % ophthalmic solution    LIQUIFILM TEARS     Place 1 drop into both eyes 3 times daily        ranitidine 75 MG tablet   Generic drug:  ranitidine      Take 150 mg by mouth daily        senna-docusate 8.6-50 MG per tablet    SENOKOT-S;PERICOLACE     Take 2 tablets by mouth 2 times daily    Abdominal pain, generalized       tamsulosin 0.4 MG capsule    FLOMAX    5 capsule    Take 1 capsule (0.4 mg) by mouth daily    Benign non-nodular prostatic hyperplasia without lower urinary tract symptoms       tiotropium 18 MCG capsule    SPIRIVA     Inhale 18 mcg into the lungs daily        WARFARIN SODIUM PO      Take by mouth daily See facility orders for INR dosing           show

## (undated) RX ORDER — LIDOCAINE HYDROCHLORIDE 10 MG/ML
INJECTION, SOLUTION EPIDURAL; INFILTRATION; INTRACAUDAL; PERINEURAL
Status: DISPENSED
Start: 2018-01-01

## (undated) RX ORDER — ALBUTEROL SULFATE 0.83 MG/ML
SOLUTION RESPIRATORY (INHALATION)
Status: DISPENSED
Start: 2017-02-24